# Patient Record
Sex: MALE | Race: OTHER | NOT HISPANIC OR LATINO
[De-identification: names, ages, dates, MRNs, and addresses within clinical notes are randomized per-mention and may not be internally consistent; named-entity substitution may affect disease eponyms.]

---

## 2017-08-10 ENCOUNTER — APPOINTMENT (OUTPATIENT)
Dept: SURGERY | Facility: CLINIC | Age: 62
End: 2017-08-10

## 2017-08-10 VITALS
OXYGEN SATURATION: 99 % | BODY MASS INDEX: 31.66 KG/M2 | HEIGHT: 66 IN | TEMPERATURE: 98.1 F | HEART RATE: 69 BPM | WEIGHT: 197 LBS | DIASTOLIC BLOOD PRESSURE: 97 MMHG | SYSTOLIC BLOOD PRESSURE: 154 MMHG

## 2017-08-10 PROBLEM — Z00.00 ENCOUNTER FOR PREVENTIVE HEALTH EXAMINATION: Status: ACTIVE | Noted: 2017-08-10

## 2017-11-13 ENCOUNTER — APPOINTMENT (OUTPATIENT)
Dept: SURGERY | Facility: CLINIC | Age: 62
End: 2017-11-13

## 2017-11-13 VITALS
HEART RATE: 69 BPM | WEIGHT: 203 LBS | BODY MASS INDEX: 32.62 KG/M2 | TEMPERATURE: 97.8 F | HEIGHT: 66 IN | OXYGEN SATURATION: 100 % | SYSTOLIC BLOOD PRESSURE: 160 MMHG | DIASTOLIC BLOOD PRESSURE: 81 MMHG

## 2017-12-12 ENCOUNTER — OUTPATIENT (OUTPATIENT)
Dept: OUTPATIENT SERVICES | Facility: HOSPITAL | Age: 62
LOS: 1 days | End: 2017-12-12
Payer: COMMERCIAL

## 2017-12-12 VITALS
DIASTOLIC BLOOD PRESSURE: 88 MMHG | SYSTOLIC BLOOD PRESSURE: 147 MMHG | HEIGHT: 66 IN | HEART RATE: 70 BPM | WEIGHT: 188.05 LBS | TEMPERATURE: 98 F | OXYGEN SATURATION: 98 % | RESPIRATION RATE: 18 BRPM

## 2017-12-12 DIAGNOSIS — Z90.49 ACQUIRED ABSENCE OF OTHER SPECIFIED PARTS OF DIGESTIVE TRACT: Chronic | ICD-10-CM

## 2017-12-12 DIAGNOSIS — C16.9 MALIGNANT NEOPLASM OF STOMACH, UNSPECIFIED: ICD-10-CM

## 2017-12-12 DIAGNOSIS — Z01.818 ENCOUNTER FOR OTHER PREPROCEDURAL EXAMINATION: ICD-10-CM

## 2017-12-12 DIAGNOSIS — Z98.890 OTHER SPECIFIED POSTPROCEDURAL STATES: Chronic | ICD-10-CM

## 2017-12-12 LAB — BLD GP AB SCN SERPL QL: SIGNIFICANT CHANGE UP

## 2017-12-12 NOTE — H&P PST ADULT - GASTROINTESTINAL DETAILS
normal/no masses palpable/soft/nontender/no guarding/no rebound tenderness/bowel sounds normal/no bruit/no distention bowel sounds normal/soft/no masses palpable/normal/no bruit/no rebound tenderness/no guarding/no distention

## 2017-12-12 NOTE — H&P PST ADULT - PMH
HLD (hyperlipidemia)    HTN (hypertension)    Malignant neoplasm of stomach, unspecified location HLD (hyperlipidemia)    HTN (hypertension)    Malignant neoplasm of stomach, unspecified location    Prediabetes

## 2017-12-12 NOTE — H&P PST ADULT - NSANTHOSAYNRD_GEN_A_CORE
No. NANCY screening performed.  STOP BANG Legend: 0-2 = LOW Risk; 3-4 = INTERMEDIATE Risk; 5-8 = HIGH Risk

## 2017-12-12 NOTE — H&P PST ADULT - PSH
H/O prostate biopsy    History of appendectomy    History of arthroscopy of left knee  meniscal repair  History of arthroscopy of right shoulder  repair for rotator cuff syndrome

## 2017-12-12 NOTE — H&P PST ADULT - NEGATIVE CARDIOVASCULAR SYMPTOMS
no dyspnea on exertion/no paroxysmal nocturnal dyspnea/no palpitations/no peripheral edema/no chest pain/no claudication/no orthopnea

## 2017-12-12 NOTE — H&P PST ADULT - FAMILY HISTORY
Mother  Still living? Yes, Estimated age: Age Unknown  Cerebrovascular accident, Age at diagnosis: Age Unknown

## 2017-12-12 NOTE — H&P PST ADULT - NEGATIVE GASTROINTESTINAL SYMPTOMS
no vomiting/no nausea/no constipation/no change in bowel habits/no flatulence/no abdominal pain/no diarrhea

## 2017-12-12 NOTE — H&P PST ADULT - RS GEN PE MLT RESP DETAILS PC
clear to auscultation bilaterally/no rhonchi/no rales/no wheezes/normal/no chest wall tenderness/breath sounds equal/no subcutaneous emphysema/respirations non-labored/good air movement/no intercostal retractions/airway patent

## 2017-12-12 NOTE — H&P PST ADULT - ASSESSMENT
62 yr old male with PMH of Hypertension, Hypertension, prediabetes, herniated lumbar disc presents with gastric cancer. Pt is scheduled for gastrectomy, possible splenectomy on 12/13/2017. Pt is at moderate risk for procedure.

## 2017-12-12 NOTE — H&P PST ADULT - HISTORY OF PRESENT ILLNESS
62 yr old male with PMH of Hypertension, Hypertension, prediabetes, herniated lumbar disc presents with c/o gastric cancer. Pt reports gaining weight back and increased appetite after undergoing chemotherapy for 3 months. Pt for gastrectomy, possible splenectomy on 12/13/2017.

## 2017-12-13 ENCOUNTER — RESULT REVIEW (OUTPATIENT)
Age: 62
End: 2017-12-13

## 2017-12-13 ENCOUNTER — INPATIENT (INPATIENT)
Facility: HOSPITAL | Age: 62
LOS: 29 days | Discharge: HOME | DRG: 326 | End: 2018-01-12
Attending: SPECIALIST | Admitting: SPECIALIST
Payer: COMMERCIAL

## 2017-12-13 VITALS
HEIGHT: 66 IN | OXYGEN SATURATION: 98 % | HEART RATE: 70 BPM | TEMPERATURE: 98 F | DIASTOLIC BLOOD PRESSURE: 88 MMHG | SYSTOLIC BLOOD PRESSURE: 151 MMHG | RESPIRATION RATE: 18 BRPM | WEIGHT: 188.05 LBS

## 2017-12-13 DIAGNOSIS — Z98.890 OTHER SPECIFIED POSTPROCEDURAL STATES: Chronic | ICD-10-CM

## 2017-12-13 DIAGNOSIS — J96.90 RESPIRATORY FAILURE, UNSPECIFIED, UNSPECIFIED WHETHER WITH HYPOXIA OR HYPERCAPNIA: ICD-10-CM

## 2017-12-13 DIAGNOSIS — C16.9 MALIGNANT NEOPLASM OF STOMACH, UNSPECIFIED: ICD-10-CM

## 2017-12-13 DIAGNOSIS — Z90.3 ACQUIRED ABSENCE OF STOMACH [PART OF]: Chronic | ICD-10-CM

## 2017-12-13 DIAGNOSIS — R73.03 PREDIABETES: ICD-10-CM

## 2017-12-13 DIAGNOSIS — Z90.49 ACQUIRED ABSENCE OF OTHER SPECIFIED PARTS OF DIGESTIVE TRACT: Chronic | ICD-10-CM

## 2017-12-13 DIAGNOSIS — E78.5 HYPERLIPIDEMIA, UNSPECIFIED: ICD-10-CM

## 2017-12-13 DIAGNOSIS — I10 ESSENTIAL (PRIMARY) HYPERTENSION: ICD-10-CM

## 2017-12-13 LAB
ABO RH CONFIRMATION: SIGNIFICANT CHANGE UP
ANION GAP SERPL CALC-SCNC: 11 MMOL/L — SIGNIFICANT CHANGE UP (ref 5–17)
BASE EXCESS BLDA CALC-SCNC: -3.5 MMOL/L — LOW (ref -2–2)
BASOPHILS # BLD AUTO: 0 K/UL — SIGNIFICANT CHANGE UP (ref 0–0.2)
BASOPHILS NFR BLD AUTO: 0.4 % — SIGNIFICANT CHANGE UP (ref 0–2)
BLOOD GAS COMMENTS ARTERIAL: SIGNIFICANT CHANGE UP
BUN SERPL-MCNC: 7 MG/DL — SIGNIFICANT CHANGE UP (ref 7–18)
CALCIUM SERPL-MCNC: 7.5 MG/DL — LOW (ref 8.4–10.5)
CHLORIDE SERPL-SCNC: 106 MMOL/L — SIGNIFICANT CHANGE UP (ref 96–108)
CO2 SERPL-SCNC: 22 MMOL/L — SIGNIFICANT CHANGE UP (ref 22–31)
CREAT SERPL-MCNC: 0.75 MG/DL — SIGNIFICANT CHANGE UP (ref 0.5–1.3)
EOSINOPHIL # BLD AUTO: 0 K/UL — SIGNIFICANT CHANGE UP (ref 0–0.5)
EOSINOPHIL NFR BLD AUTO: 0 % — SIGNIFICANT CHANGE UP (ref 0–6)
GLUCOSE BLDC GLUCOMTR-MCNC: 114 MG/DL — HIGH (ref 70–99)
GLUCOSE SERPL-MCNC: 174 MG/DL — HIGH (ref 70–99)
HCO3 BLDA-SCNC: 23 MMOL/L — SIGNIFICANT CHANGE UP (ref 23–27)
HCT VFR BLD CALC: 37 % — LOW (ref 39–50)
HCT VFR BLD CALC: 44.3 % — SIGNIFICANT CHANGE UP (ref 39–50)
HGB BLD-MCNC: 12.9 G/DL — LOW (ref 13–17)
HGB BLD-MCNC: 14.8 G/DL — SIGNIFICANT CHANGE UP (ref 13–17)
HOROWITZ INDEX BLDA+IHG-RTO: 100 — SIGNIFICANT CHANGE UP
LYMPHOCYTES # BLD AUTO: 0.8 K/UL — LOW (ref 1–3.3)
LYMPHOCYTES # BLD AUTO: 6.7 % — LOW (ref 13–44)
MAGNESIUM SERPL-MCNC: 1.6 MG/DL — SIGNIFICANT CHANGE UP (ref 1.6–2.6)
MCHC RBC-ENTMCNC: 31.3 PG — SIGNIFICANT CHANGE UP (ref 27–34)
MCHC RBC-ENTMCNC: 32.4 PG — SIGNIFICANT CHANGE UP (ref 27–34)
MCHC RBC-ENTMCNC: 33.5 GM/DL — SIGNIFICANT CHANGE UP (ref 32–36)
MCHC RBC-ENTMCNC: 34.8 GM/DL — SIGNIFICANT CHANGE UP (ref 32–36)
MCV RBC AUTO: 93.3 FL — SIGNIFICANT CHANGE UP (ref 80–100)
MCV RBC AUTO: 93.6 FL — SIGNIFICANT CHANGE UP (ref 80–100)
MONOCYTES # BLD AUTO: 0.6 K/UL — SIGNIFICANT CHANGE UP (ref 0–0.9)
MONOCYTES NFR BLD AUTO: 5.4 % — SIGNIFICANT CHANGE UP (ref 2–14)
NEUTROPHILS # BLD AUTO: 10 K/UL — HIGH (ref 1.8–7.4)
NEUTROPHILS NFR BLD AUTO: 87.6 % — HIGH (ref 43–77)
PCO2 BLDA: 47 MMHG — HIGH (ref 32–46)
PH BLDA: 7.3 — LOW (ref 7.35–7.45)
PHOSPHATE SERPL-MCNC: 2.3 MG/DL — LOW (ref 2.5–4.5)
PLATELET # BLD AUTO: 100 K/UL — LOW (ref 150–400)
PLATELET # BLD AUTO: 126 K/UL — LOW (ref 150–400)
PO2 BLDA: 334 MMHG — HIGH (ref 74–108)
POTASSIUM SERPL-MCNC: 3.2 MMOL/L — LOW (ref 3.5–5.3)
POTASSIUM SERPL-SCNC: 3.2 MMOL/L — LOW (ref 3.5–5.3)
RBC # BLD: 3.97 M/UL — LOW (ref 4.2–5.8)
RBC # BLD: 4.73 M/UL — SIGNIFICANT CHANGE UP (ref 4.2–5.8)
RBC # FLD: 12.8 % — SIGNIFICANT CHANGE UP (ref 10.3–14.5)
RBC # FLD: 13.3 % — SIGNIFICANT CHANGE UP (ref 10.3–14.5)
SAO2 % BLDA: >99.1 % — SIGNIFICANT CHANGE UP (ref 92–96)
SODIUM SERPL-SCNC: 139 MMOL/L — SIGNIFICANT CHANGE UP (ref 135–145)
WBC # BLD: 11.4 K/UL — HIGH (ref 3.8–10.5)
WBC # BLD: 8 K/UL — SIGNIFICANT CHANGE UP (ref 3.8–10.5)
WBC # FLD AUTO: 11.4 K/UL — HIGH (ref 3.8–10.5)
WBC # FLD AUTO: 8 K/UL — SIGNIFICANT CHANGE UP (ref 3.8–10.5)

## 2017-12-13 PROCEDURE — 71010: CPT | Mod: 26

## 2017-12-13 PROCEDURE — 86900 BLOOD TYPING SEROLOGIC ABO: CPT

## 2017-12-13 PROCEDURE — 88305 TISSUE EXAM BY PATHOLOGIST: CPT | Mod: 26

## 2017-12-13 PROCEDURE — 88304 TISSUE EXAM BY PATHOLOGIST: CPT | Mod: 26

## 2017-12-13 PROCEDURE — 88112 CYTOPATH CELL ENHANCE TECH: CPT | Mod: 26

## 2017-12-13 PROCEDURE — 88331 PATH CONSLTJ SURG 1 BLK 1SPC: CPT | Mod: 26

## 2017-12-13 PROCEDURE — 86850 RBC ANTIBODY SCREEN: CPT

## 2017-12-13 PROCEDURE — 88334 PATH CONSLTJ SURG CYTO XM EA: CPT | Mod: 26,59

## 2017-12-13 PROCEDURE — 86920 COMPATIBILITY TEST SPIN: CPT

## 2017-12-13 PROCEDURE — 88305 TISSUE EXAM BY PATHOLOGIST: CPT | Mod: 26,59

## 2017-12-13 PROCEDURE — 43621 REMOVAL OF STOMACH: CPT | Mod: AS

## 2017-12-13 PROCEDURE — 43621 REMOVAL OF STOMACH: CPT

## 2017-12-13 PROCEDURE — 88309 TISSUE EXAM BY PATHOLOGIST: CPT | Mod: 26

## 2017-12-13 PROCEDURE — G0463: CPT

## 2017-12-13 PROCEDURE — 86901 BLOOD TYPING SEROLOGIC RH(D): CPT

## 2017-12-13 RX ORDER — POTASSIUM CHLORIDE 20 MEQ
10 PACKET (EA) ORAL
Qty: 0 | Refills: 0 | Status: DISCONTINUED | OUTPATIENT
Start: 2017-12-13 | End: 2017-12-13

## 2017-12-13 RX ORDER — PANTOPRAZOLE SODIUM 20 MG/1
40 TABLET, DELAYED RELEASE ORAL DAILY
Qty: 0 | Refills: 0 | Status: DISCONTINUED | OUTPATIENT
Start: 2017-12-13 | End: 2018-01-03

## 2017-12-13 RX ORDER — HEPARIN SODIUM 5000 [USP'U]/ML
5000 INJECTION INTRAVENOUS; SUBCUTANEOUS EVERY 8 HOURS
Qty: 0 | Refills: 0 | Status: DISCONTINUED | OUTPATIENT
Start: 2017-12-13 | End: 2018-01-03

## 2017-12-13 RX ORDER — SODIUM CHLORIDE 9 MG/ML
1000 INJECTION, SOLUTION INTRAVENOUS
Qty: 0 | Refills: 0 | Status: DISCONTINUED | OUTPATIENT
Start: 2017-12-13 | End: 2017-12-13

## 2017-12-13 RX ORDER — PANTOPRAZOLE SODIUM 20 MG/1
40 TABLET, DELAYED RELEASE ORAL DAILY
Qty: 0 | Refills: 0 | Status: DISCONTINUED | OUTPATIENT
Start: 2017-12-13 | End: 2017-12-13

## 2017-12-13 RX ORDER — POTASSIUM PHOSPHATE, MONOBASIC POTASSIUM PHOSPHATE, DIBASIC 236; 224 MG/ML; MG/ML
15 INJECTION, SOLUTION INTRAVENOUS ONCE
Qty: 0 | Refills: 0 | Status: COMPLETED | OUTPATIENT
Start: 2017-12-13 | End: 2017-12-13

## 2017-12-13 RX ORDER — ONDANSETRON 8 MG/1
4 TABLET, FILM COATED ORAL ONCE
Qty: 0 | Refills: 0 | Status: DISCONTINUED | OUTPATIENT
Start: 2017-12-13 | End: 2017-12-13

## 2017-12-13 RX ORDER — HYDROMORPHONE HYDROCHLORIDE 2 MG/ML
0.5 INJECTION INTRAMUSCULAR; INTRAVENOUS; SUBCUTANEOUS
Qty: 0 | Refills: 0 | Status: DISCONTINUED | OUTPATIENT
Start: 2017-12-18 | End: 2017-12-18

## 2017-12-13 RX ORDER — FENTANYL CITRATE 50 UG/ML
0.5 INJECTION INTRAVENOUS
Qty: 2500 | Refills: 0 | Status: DISCONTINUED | OUTPATIENT
Start: 2017-12-13 | End: 2017-12-14

## 2017-12-13 RX ORDER — SODIUM CHLORIDE 9 MG/ML
3 INJECTION INTRAMUSCULAR; INTRAVENOUS; SUBCUTANEOUS EVERY 8 HOURS
Qty: 0 | Refills: 0 | Status: DISCONTINUED | OUTPATIENT
Start: 2017-12-13 | End: 2017-12-13

## 2017-12-13 RX ORDER — DEXMEDETOMIDINE HYDROCHLORIDE IN 0.9% SODIUM CHLORIDE 4 UG/ML
0.2 INJECTION INTRAVENOUS
Qty: 200 | Refills: 0 | Status: DISCONTINUED | OUTPATIENT
Start: 2017-12-13 | End: 2017-12-13

## 2017-12-13 RX ORDER — SODIUM CHLORIDE 9 MG/ML
1000 INJECTION, SOLUTION INTRAVENOUS
Qty: 0 | Refills: 0 | Status: DISCONTINUED | OUTPATIENT
Start: 2017-12-13 | End: 2017-12-15

## 2017-12-13 RX ORDER — DEXTROSE MONOHYDRATE, SODIUM CHLORIDE, AND POTASSIUM CHLORIDE 50; .745; 4.5 G/1000ML; G/1000ML; G/1000ML
1000 INJECTION, SOLUTION INTRAVENOUS
Qty: 0 | Refills: 0 | Status: DISCONTINUED | OUTPATIENT
Start: 2017-12-13 | End: 2017-12-13

## 2017-12-13 RX ORDER — INFLUENZA VIRUS VACCINE 15; 15; 15; 15 UG/.5ML; UG/.5ML; UG/.5ML; UG/.5ML
0.5 SUSPENSION INTRAMUSCULAR ONCE
Qty: 0 | Refills: 0 | Status: DISCONTINUED | OUTPATIENT
Start: 2017-12-13 | End: 2017-12-13

## 2017-12-13 RX ORDER — POTASSIUM CHLORIDE 20 MEQ
10 PACKET (EA) ORAL
Qty: 0 | Refills: 0 | Status: COMPLETED | OUTPATIENT
Start: 2017-12-13 | End: 2017-12-13

## 2017-12-13 RX ORDER — PIPERACILLIN AND TAZOBACTAM 4; .5 G/20ML; G/20ML
3.38 INJECTION, POWDER, LYOPHILIZED, FOR SOLUTION INTRAVENOUS EVERY 12 HOURS
Qty: 0 | Refills: 0 | Status: DISCONTINUED | OUTPATIENT
Start: 2017-12-13 | End: 2017-12-13

## 2017-12-13 RX ORDER — PIPERACILLIN AND TAZOBACTAM 4; .5 G/20ML; G/20ML
3.38 INJECTION, POWDER, LYOPHILIZED, FOR SOLUTION INTRAVENOUS ONCE
Qty: 0 | Refills: 0 | Status: COMPLETED | OUTPATIENT
Start: 2017-12-13 | End: 2017-12-13

## 2017-12-13 RX ORDER — SODIUM CHLORIDE 9 MG/ML
1000 INJECTION INTRAMUSCULAR; INTRAVENOUS; SUBCUTANEOUS
Qty: 0 | Refills: 0 | Status: DISCONTINUED | OUTPATIENT
Start: 2017-12-13 | End: 2017-12-13

## 2017-12-13 RX ORDER — SODIUM CHLORIDE 9 MG/ML
1000 INJECTION INTRAMUSCULAR; INTRAVENOUS; SUBCUTANEOUS ONCE
Qty: 0 | Refills: 0 | Status: COMPLETED | OUTPATIENT
Start: 2017-12-13 | End: 2017-12-13

## 2017-12-13 RX ORDER — TAMSULOSIN HYDROCHLORIDE 0.4 MG/1
0.4 CAPSULE ORAL
Qty: 0 | Refills: 0 | COMMUNITY

## 2017-12-13 RX ADMIN — Medication 100 MILLIEQUIVALENT(S): at 14:58

## 2017-12-13 RX ADMIN — FENTANYL CITRATE 4.26 MICROGRAM(S)/KG/HR: 50 INJECTION INTRAVENOUS at 18:16

## 2017-12-13 RX ADMIN — Medication 100 MILLIEQUIVALENT(S): at 17:57

## 2017-12-13 RX ADMIN — SODIUM CHLORIDE 100 MILLILITER(S): 9 INJECTION, SOLUTION INTRAVENOUS at 14:54

## 2017-12-13 RX ADMIN — SODIUM CHLORIDE 100 MILLILITER(S): 9 INJECTION, SOLUTION INTRAVENOUS at 22:30

## 2017-12-13 RX ADMIN — Medication 100 MILLIEQUIVALENT(S): at 16:18

## 2017-12-13 RX ADMIN — PIPERACILLIN AND TAZOBACTAM 100 GRAM(S): 4; .5 INJECTION, POWDER, LYOPHILIZED, FOR SOLUTION INTRAVENOUS at 14:53

## 2017-12-13 RX ADMIN — HEPARIN SODIUM 5000 UNIT(S): 5000 INJECTION INTRAVENOUS; SUBCUTANEOUS at 22:30

## 2017-12-13 RX ADMIN — PIPERACILLIN AND TAZOBACTAM 12.5 GRAM(S): 4; .5 INJECTION, POWDER, LYOPHILIZED, FOR SOLUTION INTRAVENOUS at 17:57

## 2017-12-13 RX ADMIN — POTASSIUM PHOSPHATE, MONOBASIC POTASSIUM PHOSPHATE, DIBASIC 62.5 MILLIMOLE(S): 236; 224 INJECTION, SOLUTION INTRAVENOUS at 16:00

## 2017-12-13 RX ADMIN — PANTOPRAZOLE SODIUM 40 MILLIGRAM(S): 20 TABLET, DELAYED RELEASE ORAL at 14:54

## 2017-12-13 RX ADMIN — FENTANYL CITRATE 4.26 MICROGRAM(S)/KG/HR: 50 INJECTION INTRAVENOUS at 13:30

## 2017-12-13 RX ADMIN — SODIUM CHLORIDE 2000 MILLILITER(S): 9 INJECTION INTRAMUSCULAR; INTRAVENOUS; SUBCUTANEOUS at 19:29

## 2017-12-13 RX ADMIN — SODIUM CHLORIDE 3 MILLILITER(S): 9 INJECTION INTRAMUSCULAR; INTRAVENOUS; SUBCUTANEOUS at 07:21

## 2017-12-13 NOTE — CONSULT NOTE ADULT - SUBJECTIVE AND OBJECTIVE BOX
Patient is a 62y old  Male who presents with a chief complaint of gastric cancer.  PMH of Hypertension, hyperlipidemia prediabetes, herniated lumbar disc. Pt had gastrectomy, possible splenectomy done.       INTERVAL HPI/OVERNIGHT EVENTS:  T(C): 36.4 (12-13-17 @ 07:22), Max: 36.4 (12-13-17 @ 07:22)  HR: 88 (12-13-17 @ 13:25) (66 - 88)  BP: 151/86 (12-13-17 @ 07:22) (151/86 - 151/86)  RR: 16 (12-13-17 @ 07:22) (16 - 16)  SpO2: 100% (12-13-17 @ 13:25) (98% - 100%)  Wt(kg): --  I&O's Summary      PAST MEDICAL & SURGICAL HISTORY:  Prediabetes  HLD (hyperlipidemia)  HTN (hypertension)  Malignant neoplasm of stomach, unspecified location  H/O prostate biopsy  History of arthroscopy of right shoulder: repair for rotator cuff syndrome  History of arthroscopy of left knee: meniscal repair  History of appendectomy      SOCIAL HISTORY  Alcohol:  Tobacco:  Illicit substance use:      FAMILY HISTORY:      LABS:                        14.8   11.4  )-----------( 126      ( 13 Dec 2017 14:16 )             44.3     12-13    139  |  106  |  7   ----------------------------<  174<H>  3.2<L>   |  22  |  0.75    Ca    7.5<L>      13 Dec 2017 14:16  Phos  2.3     12-13  Mg     1.6     12-13          CAPILLARY BLOOD GLUCOSE      POCT Blood Glucose.: 114 mg/dL (13 Dec 2017 07:20)            MEDICATIONS  (STANDING):  fentaNYL   Infusion 0.5 MICROgram(s)/kG/Hr (4.265 mL/Hr) IV Continuous <Continuous>  heparin  Injectable 5000 Unit(s) SubCutaneous every 8 hours  lactated ringers. 1000 milliLiter(s) (100 mL/Hr) IV Continuous <Continuous>  pantoprazole  Injectable 40 milliGRAM(s) IV Push daily  piperacillin/tazobactam IVPB. 3.375 Gram(s) IV Intermittent every 12 hours  potassium chloride  10 mEq/100 mL IVPB 10 milliEquivalent(s) IV Intermittent every 1 hour  potassium phosphate IVPB 15 milliMole(s) IV Intermittent once    MEDICATIONS  (PRN):      REVIEW OF SYSTEMS:  CONSTITUTIONAL: No fever, weight loss, or fatigue  EYES: No eye pain, visual disturbances, or discharge  ENMT:  No difficulty hearing, tinnitus, vertigo; No sinus or throat pain  NECK: No pain or stiffness  RESPIRATORY: No cough, wheezing, chills or hemoptysis; No shortness of breath  CARDIOVASCULAR: No chest pain, palpitations, dizziness, or leg swelling  GASTROINTESTINAL: No abdominal or epigastric pain. No nausea, vomiting, or hematemesis; No diarrhea or constipation. No melena or hematochezia.  GENITOURINARY: No dysuria, frequency, hematuria, or incontinence  NEUROLOGICAL: No headaches, memory loss, loss of strength, numbness, or tremors  SKIN: No itching, burning, rashes, or lesions   LYMPH NODES: No enlarged glands  ENDOCRINE: No heat or cold intolerance; No hair loss  MUSCULOSKELETAL: No joint pain or swelling; No muscle, back, or extremity pain  PSYCHIATRIC: No depression, anxiety, mood swings, or difficulty sleeping  HEME/LYMPH: No easy bruising, or bleeding gums  ALLERY AND IMMUNOLOGIC: No hives or eczema    PHYSICAL EXAM:  GENERAL: NAD, well-groomed, well-developed  HEAD:  Atraumatic, Normocephalic  EYES: EOMI, PERRLA, conjunctiva and sclera clear  ENMT: No tonsillar erythema, exudates, or enlargement; Moist mucous membranes, Good dentition, No lesions  NECK: Supple, No JVD, Normal thyroid  NERVOUS SYSTEM:  Alert & Oriented X3, Good concentration; Motor Strength 5/5 B/L upper and lower extremities; DTRs 2+ intact and symmetric  CHEST/LUNG: Clear to percussion bilaterally; No rales, rhonchi, wheezing, or rubs  HEART: Regular rate and rhythm; No murmurs, rubs, or gallops  ABDOMEN: Soft, Nontender, Nondistended; Bowel sounds present  EXTREMITIES:  2+ Peripheral Pulses, No clubbing, cyanosis, or edema  LYMPH: No lymphadenopathy noted  SKIN: No rashes or lesions    RADIOLOGY & ADDITIONAL TESTS:    Imaging Personally Reviewed:  [ ] YES  [ ] NO    Consultant(s) Notes Reviewed:  [ ] YES  [ ] NO        Care Discussed with Consultants/Other Providers [ ] YES  [ ] NO Patient is a 62y old  Male who presents with a chief complaint of gastric cancer.  PMH of Hypertension, hyperlipidemia prediabetes, herniated lumbar disc. Pt had gastrectomy done. Currently sedated, intubated on mechanical ventilation.      INTERVAL HPI/OVERNIGHT EVENTS:  T(C): 36.4 (12-13-17 @ 07:22), Max: 36.4 (12-13-17 @ 07:22)  HR: 88 (12-13-17 @ 13:25) (66 - 88)  BP: 151/86 (12-13-17 @ 07:22) (151/86 - 151/86)  RR: 16 (12-13-17 @ 07:22) (16 - 16)  SpO2: 100% (12-13-17 @ 13:25) (98% - 100%)  Wt(kg): --  I&O's Summary      PAST MEDICAL & SURGICAL HISTORY:  Prediabetes  HLD (hyperlipidemia)  HTN (hypertension)  Malignant neoplasm of stomach, unspecified location  H/O prostate biopsy  History of arthroscopy of right shoulder: repair for rotator cuff syndrome  History of arthroscopy of left knee: meniscal repair  History of appendectomy      SOCIAL HISTORY  Alcohol:  Tobacco:  Illicit substance use:      FAMILY HISTORY:      LABS:                        14.8   11.4  )-----------( 126      ( 13 Dec 2017 14:16 )             44.3     12-13    139  |  106  |  7   ----------------------------<  174<H>  3.2<L>   |  22  |  0.75    Ca    7.5<L>      13 Dec 2017 14:16  Phos  2.3     12-13  Mg     1.6     12-13          CAPILLARY BLOOD GLUCOSE      POCT Blood Glucose.: 114 mg/dL (13 Dec 2017 07:20)            MEDICATIONS  (STANDING):  fentaNYL   Infusion 0.5 MICROgram(s)/kG/Hr (4.265 mL/Hr) IV Continuous <Continuous>  heparin  Injectable 5000 Unit(s) SubCutaneous every 8 hours  lactated ringers. 1000 milliLiter(s) (100 mL/Hr) IV Continuous <Continuous>  pantoprazole  Injectable 40 milliGRAM(s) IV Push daily  piperacillin/tazobactam IVPB. 3.375 Gram(s) IV Intermittent every 12 hours  potassium chloride  10 mEq/100 mL IVPB 10 milliEquivalent(s) IV Intermittent every 1 hour  potassium phosphate IVPB 15 milliMole(s) IV Intermittent once    MEDICATIONS  (PRN):      REVIEW OF SYSTEMS:  CONSTITUTIONAL: No fever, weight loss, or fatigue  EYES: No eye pain, visual disturbances, or discharge  ENMT:  No difficulty hearing, tinnitus, vertigo; No sinus or throat pain  NECK: No pain or stiffness  RESPIRATORY: No cough, wheezing, chills or hemoptysis; No shortness of breath  CARDIOVASCULAR: No chest pain, palpitations, dizziness, or leg swelling  GASTROINTESTINAL: No abdominal or epigastric pain. No nausea, vomiting, or hematemesis; No diarrhea or constipation. No melena or hematochezia.  GENITOURINARY: No dysuria, frequency, hematuria, or incontinence  NEUROLOGICAL: No headaches, memory loss, loss of strength, numbness, or tremors  SKIN: No itching, burning, rashes, or lesions   LYMPH NODES: No enlarged glands  ENDOCRINE: No heat or cold intolerance; No hair loss  MUSCULOSKELETAL: No joint pain or swelling; No muscle, back, or extremity pain  PSYCHIATRIC: No depression, anxiety, mood swings, or difficulty sleeping  HEME/LYMPH: No easy bruising, or bleeding gums  ALLERY AND IMMUNOLOGIC: No hives or eczema    PHYSICAL EXAM:  GENERAL: NAD, well-groomed, well-developed  HEAD:  Atraumatic, Normocephalic  EYES: EOMI, PERRLA, conjunctiva and sclera clear  ENMT: No tonsillar erythema, exudates, or enlargement; Moist mucous membranes, Good dentition, No lesions  NECK: Supple, No JVD, Normal thyroid  NERVOUS SYSTEM:  Alert & Oriented X3, Good concentration; Motor Strength 5/5 B/L upper and lower extremities; DTRs 2+ intact and symmetric  CHEST/LUNG: Clear to percussion bilaterally; No rales, rhonchi, wheezing, or rubs  HEART: Regular rate and rhythm; No murmurs, rubs, or gallops  ABDOMEN: Soft, Nontender, Nondistended; Bowel sounds present,dressing clean  EXTREMITIES:  2+ Peripheral Pulses, No clubbing, cyanosis, or edema  LYMPH: No lymphadenopathy noted  SKIN: No rashes or lesions    RADIOLOGY & ADDITIONAL TESTS:    Imaging Personally Reviewed:  [x ] YES  [ ] NO    Consultant(s) Notes Reviewed:  [x ] YES  [ ] NO        Care Discussed with Consultants/Other Providers [ ] YES  [ ] NO

## 2017-12-13 NOTE — BRIEF OPERATIVE NOTE - PROCEDURE
<<-----Click on this checkbox to enter Procedure Total gastrectomy with Kelby-en-Y esophagojejunal anastomosis  12/13/2017    Active  [id number]

## 2017-12-13 NOTE — CONSULT NOTE ADULT - SUBJECTIVE AND OBJECTIVE BOX
Patient is a 62y old  Male who presents with a chief complaint of weight loss , recently Dx have gastric cancer presented for surgical treatment.  (12 Dec 2017 11:41)       62 yr old male with PMH of Hypertension, Hypertension, prediabetes, herniated lumbar disc presents with c/o gastric cancer. Pt reports gaining weight back and increased appetite after undergoing chemotherapy for 3 months. Pt for gastrectomy, possible splenectomy on 12/13/2017. (12 Dec 2017 11:41)      BRIEF HOSPITAL COURSE: ***    PAST MEDICAL & SURGICAL HISTORY:  Prediabetes  HLD (hyperlipidemia)  HTN (hypertension)  Malignant neoplasm of stomach, unspecified location  H/O prostate biopsy  History of arthroscopy of right shoulder: repair for rotator cuff syndrome  History of arthroscopy of left knee: meniscal repair  History of appendectomy    Allergies    No Known Allergies    Intolerances      FAMILY HISTORY:  Cerebrovascular accident (Mother)    Social history reviewed: ***    Review of Systems:  CONSTITUTIONAL: No fever, chills, or fatigue  EYES: No eye pain, visual disturbances, or discharge  ENMT:  No difficulty hearing, tinnitus, vertigo; No sinus or throat pain  NECK: No pain or stiffness  RESPIRATORY: No cough, wheezing, chills or hemoptysis; No shortness of breath  CARDIOVASCULAR: No chest pain, palpitations, dizziness, or leg swelling  GASTROINTESTINAL: No abdominal or epigastric pain. No nausea, vomiting, or hematemesis; No diarrhea or constipation. No melena or hematochezia.  GENITOURINARY: No dysuria, frequency, hematuria, or incontinence  NEUROLOGICAL: No headaches, memory loss, loss of strength, numbness, or tremors  SKIN: No itching, burning, rashes, or lesions   MUSCULOSKELETAL: No joint pain or swelling; No muscle, back, or extremity pain  PSYCHIATRIC: No depression, anxiety, mood swings, or difficulty sleeping      Medications:      vent settings      Vital Signs Last 24 Hrs  T(C): 36.4 (13 Dec 2017 07:22), Max: 36.4 (13 Dec 2017 07:22)  T(F): 97.5 (13 Dec 2017 07:22), Max: 97.5 (13 Dec 2017 07:22)  HR: 66 (13 Dec 2017 07:22) (66 - 66)  BP: 151/86 (13 Dec 2017 07:22) (151/86 - 151/86)  BP(mean): --  RR: 16 (13 Dec 2017 07:22) (16 - 16)  SpO2: 98% (13 Dec 2017 07:22) (98% - 98%)              LABS:                        12.9   8.0   )-----------( 100      ( 13 Dec 2017 10:41 )             37.0                 CAPILLARY BLOOD GLUCOSE      POCT Blood Glucose.: 114 mg/dL (13 Dec 2017 07:20)        CULTURES:        Physical Examination:    General: No acute distress.      HEENT: Pupils equal, reactive to light.  Symmetric.    PULM: Clear to auscultation bilaterally, no significant sputum production    CVS: Regular rate and rhythm, no murmurs, rubs, or gallops    ABD: Soft, nondistended, nontender, normoactive bowel sounds, no masses    EXT: No edema, nontender    SKIN: Warm and well perfused, no rashes noted.    NEURO: Alert, oriented, interactive, nonfocal    RADIOLOGY REVIEWED ***    CRITICAL CARE TIME SPENT: 35 minutes Patient is a 62y old  Male who presents with a chief complaint of weight loss , recently Dx have gastric cancer presented for surgical treatment.  (12 Dec 2017 11:41)       62 yearr old male with PMH of Hypertension, prediabetes, herniated lumbar disc presents with c/o gastric cancer. Pt reports gaining weight back and increased appetite after undergoing chemotherapy for 3 months. Pt for gastrectomy, possible splenectomy on 12/13/2017. (12 Dec 2017 11:41)        PAST MEDICAL & SURGICAL HISTORY:  Prediabetes  HLD (hyperlipidemia)  HTN (hypertension)  Malignant neoplasm of stomach, unspecified location  H/O prostate biopsy  History of arthroscopy of right shoulder: repair for rotator cuff syndrome  History of arthroscopy of left knee: meniscal repair  History of appendectomy    Allergies    No Known Allergies    Intolerances      FAMILY HISTORY:  Cerebrovascular accident (Mother)    Social history reviewed: ***    Review of Systems:  CONSTITUTIONAL: No fever, chills, or fatigue  EYES: No eye pain, visual disturbances, or discharge  ENMT:  No difficulty hearing, tinnitus, vertigo; No sinus or throat pain  NECK: No pain or stiffness  RESPIRATORY: No cough, wheezing, chills or hemoptysis; No shortness of breath  CARDIOVASCULAR: No chest pain, palpitations, dizziness, or leg swelling  GASTROINTESTINAL: No abdominal or epigastric pain. No nausea, vomiting, or hematemesis; No diarrhea or constipation. No melena or hematochezia.  GENITOURINARY: No dysuria, frequency, hematuria, or incontinence  NEUROLOGICAL: No headaches, memory loss, loss of strength, numbness, or tremors  SKIN: No itching, burning, rashes, or lesions   MUSCULOSKELETAL: No joint pain or swelling; No muscle, back, or extremity pain  PSYCHIATRIC: No depression, anxiety, mood swings, or difficulty sleeping      Medications:      vent settings      Vital Signs Last 24 Hrs  T(C): 36.4 (13 Dec 2017 07:22), Max: 36.4 (13 Dec 2017 07:22)  T(F): 97.5 (13 Dec 2017 07:22), Max: 97.5 (13 Dec 2017 07:22)  HR: 66 (13 Dec 2017 07:22) (66 - 66)  BP: 151/86 (13 Dec 2017 07:22) (151/86 - 151/86)  BP(mean): --  RR: 16 (13 Dec 2017 07:22) (16 - 16)  SpO2: 98% (13 Dec 2017 07:22) (98% - 98%)              LABS:                        12.9   8.0   )-----------( 100      ( 13 Dec 2017 10:41 )             37.0                 CAPILLARY BLOOD GLUCOSE      POCT Blood Glucose.: 114 mg/dL (13 Dec 2017 07:20)        CULTURES:        Physical Examination:    General: No acute distress.      HEENT: Pupils equal, reactive to light.  Symmetric.    PULM: Clear to auscultation bilaterally, no significant sputum production    CVS: Regular rate and rhythm, no murmurs, rubs, or gallops    ABD: Soft, nondistended, nontender, normoactive bowel sounds, no masses    EXT: No edema, nontender    SKIN: Warm and well perfused, no rashes noted.    NEURO: Alert, oriented, interactive, nonfocal    RADIOLOGY REVIEWED ***    CRITICAL CARE TIME SPENT: 35 minutes HPI  -    62 year old male with PMH of Hypertension, prediabetes, herniated lumbar disc presents with a chief complaint of weight loss, recently Dx have gastric cancer presented for surgical treatment. Pt reported gaining weight back and increased appetite after undergoing chemotherapy for 3 months. Today 12/13 pt has undergone through total gastrectomy with Kelby-en-Y esophagojejunal anastomosis, pathology was sent for lesion of the lesser curvature, proximal margin was sent for frozen section and pathology saw atypical cells during procedure. Pt had EBL of 500 cc, Input was 3200 cc crystalloids and 1 U PRBC, output was 2300 cc. Pt had Hx of sleep apnea as per anesthesiologist and required to be intubated after OR and ICU consulted for post op monitoring.        ROS - negative except above , limited as pt is intubated    PAST MEDICAL & SURGICAL HISTORY:  Prediabetes  HLD (hyperlipidemia)  HTN (hypertension)  Malignant neoplasm of stomach, unspecified location  H/O prostate biopsy  History of arthroscopy of right shoulder: repair for rotator cuff syndrome  History of arthroscopy of left knee: meniscal repair  History of appendectomy    Allergies  No Known Allergies  Intolerances      FAMILY HISTORY:  Cerebrovascular accident (Mother)        Review of Systems:  Negative except above    Medications     Vital Signs Last 24 Hrs  T(C): 36.4 (13 Dec 2017 07:22), Max: 36.4 (13 Dec 2017 07:22)  T(F): 97.5 (13 Dec 2017 07:22), Max: 97.5 (13 Dec 2017 07:22)  HR: 66 (13 Dec 2017 07:22) (66 - 66)  BP: 151/86 (13 Dec 2017 07:22) (151/86 - 151/86)  BP(mean): --  RR: 16 (13 Dec 2017 07:22) (16 - 16)  SpO2: 98% (13 Dec 2017 07:22) (98% - 98%)        LABS:                        12.9   8.0   )-----------( 100      ( 13 Dec 2017 10:41 )             37.0         POCT Blood Glucose.: 114 mg/dL (13 Dec 2017 07:20)            Physical Examination:    General: No acute distress    HEENT: Pupils equal, reactive to light.  Symmetric.    PULM: Clear to auscultation bilaterally, intubated     CVS: Regular rate and rhythm, no murmurs, rubs, or gallops    ABD: Soft, nondistended, nontender, normoactive bowel sounds, no masses    EXT: No edema, nontender    SKIN: Warm and well perfused, no rashes noted.    NEURO:  Sedated         CRITICAL CARE TIME SPENT: 35 minutes

## 2017-12-13 NOTE — CONSULT NOTE ADULT - ASSESSMENT
62 year old male with PMH of Hypertension, prediabetes, herniated lumbar disc presents with a chief complaint of weight loss, recently Dx have gastric cancer presented for surgical treatment. Pt reported gaining weight back and increased appetite after undergoing chemotherapy for 3 months. Today 12/13 pt has undergone through total gastrectomy with Kelby-en-Y esophagojejunal anastomosis, pathology was sent for lesion of the lesser curvature, proximal margin was sent for frozen section and pathology saw atypical cells during procedure. Pt had EBL of 500 cc, Input was 3200 cc crystalloids and 1 U PRBC, output was 2300 cc. Pt had Hx of sleep apnea as per anesthesiologist and required to be intubated after OR and ICU consulted for post op monitoring.      Admit to ICU for post op Monitoring s/p total gastrectomy with Kelby-en-Y esophagojejunal anastomosis  POD - 0  c/w DVT and GI ppx - heparin and protonix  s/p intubation , c/w mechanical intubation   c/w sedation - fentanyl  strict I/O monitoring   post op abx  - zosyn  c/w LR @ 100 cc/hr   f/u am labs, am ABG, CXR  weaning trial tomorrow

## 2017-12-13 NOTE — PROGRESS NOTE ADULT - ASSESSMENT
63 y/o male with NANCY & gastric cancer s/p Total Gastrectomy      1. NS 1000cc bolus  2. Change IV fluids to D5 half NS with potassium and run at 150cc/hr  3. NGT to LWS  4. AM Labs  5. d/c antibiotics  6. DVT PPx  7. Plan per ICU team

## 2017-12-13 NOTE — PROGRESS NOTE ADULT - SUBJECTIVE AND OBJECTIVE BOX
s/p Total Gastrectomy      Patient examined at bedside, no complaints.   Remains intubated and in the ICU  Not on pressors  on 60% O2 & 5 of PEEP  BP is 80s/40s  NGT to LWS        T(F): 97.6 (12-13-17 @ 14:30), Max: 97.6 (12-13-17 @ 14:30)  HR: 83 (12-13-17 @ 17:47) (66 - 101)  BP: 112/74 (12-13-17 @ 16:00) (108/68 - 151/86)  RR: 17 (12-13-17 @ 16:15) (12 - 17)  SpO2: 100% (12-13-17 @ 17:47) (98% - 100%)        Bulb: 60 mL    Bulb: 20 mL    Indwelling Catheter - Urethral: 1325 mL                            14.8   11.4  )-----------( 126      ( 13 Dec 2017 14:16 )             44.3       139  |  106  |  7   ----------------------------<  174<H>  3.2<L>   |  22  |  0.75    Ca    7.5<L>      13 Dec 2017 14:16  Phos  2.3     12-13  Mg     1.6     12-13        Physical Exam  General: intubated & sedated, No acute distress  Skin: No jaundice, no icterus  Abdomen: soft, tenderness unable to be appreciated, nondistended, no rebound tenderness, no guarding, no palpable masses, JPx2 to sxn, dressing is clean, dry, intact  : Normal external genitalia  Extremities: non edematous, no calf pain bilaterally

## 2017-12-14 ENCOUNTER — TRANSCRIPTION ENCOUNTER (OUTPATIENT)
Age: 62
End: 2017-12-14

## 2017-12-14 DIAGNOSIS — J98.11 ATELECTASIS: ICD-10-CM

## 2017-12-14 LAB
ALBUMIN SERPL ELPH-MCNC: 2.7 G/DL — LOW (ref 3.5–5)
ALP SERPL-CCNC: 37 U/L — LOW (ref 40–120)
ALT FLD-CCNC: 67 U/L DA — HIGH (ref 10–60)
ANION GAP SERPL CALC-SCNC: 8 MMOL/L — SIGNIFICANT CHANGE UP (ref 5–17)
ANION GAP SERPL CALC-SCNC: 9 MMOL/L — SIGNIFICANT CHANGE UP (ref 5–17)
AST SERPL-CCNC: 67 U/L — HIGH (ref 10–40)
BASE EXCESS BLDA CALC-SCNC: 0 MMOL/L — SIGNIFICANT CHANGE UP (ref -2–2)
BASOPHILS # BLD AUTO: 0 K/UL — SIGNIFICANT CHANGE UP (ref 0–0.2)
BASOPHILS # BLD AUTO: 0 K/UL — SIGNIFICANT CHANGE UP (ref 0–0.2)
BASOPHILS NFR BLD AUTO: 0.2 % — SIGNIFICANT CHANGE UP (ref 0–2)
BASOPHILS NFR BLD AUTO: 0.4 % — SIGNIFICANT CHANGE UP (ref 0–2)
BILIRUB SERPL-MCNC: 0.7 MG/DL — SIGNIFICANT CHANGE UP (ref 0.2–1.2)
BLOOD GAS COMMENTS ARTERIAL: SIGNIFICANT CHANGE UP
BUN SERPL-MCNC: 10 MG/DL — SIGNIFICANT CHANGE UP (ref 7–18)
BUN SERPL-MCNC: 10 MG/DL — SIGNIFICANT CHANGE UP (ref 7–18)
CALCIUM SERPL-MCNC: 7 MG/DL — LOW (ref 8.4–10.5)
CALCIUM SERPL-MCNC: 7.5 MG/DL — LOW (ref 8.4–10.5)
CHLORIDE SERPL-SCNC: 109 MMOL/L — HIGH (ref 96–108)
CHLORIDE SERPL-SCNC: 110 MMOL/L — HIGH (ref 96–108)
CO2 SERPL-SCNC: 21 MMOL/L — LOW (ref 22–31)
CO2 SERPL-SCNC: 22 MMOL/L — SIGNIFICANT CHANGE UP (ref 22–31)
CREAT SERPL-MCNC: 0.68 MG/DL — SIGNIFICANT CHANGE UP (ref 0.5–1.3)
CREAT SERPL-MCNC: 0.84 MG/DL — SIGNIFICANT CHANGE UP (ref 0.5–1.3)
EOSINOPHIL # BLD AUTO: 0 K/UL — SIGNIFICANT CHANGE UP (ref 0–0.5)
EOSINOPHIL # BLD AUTO: 0 K/UL — SIGNIFICANT CHANGE UP (ref 0–0.5)
EOSINOPHIL NFR BLD AUTO: 0 % — SIGNIFICANT CHANGE UP (ref 0–6)
EOSINOPHIL NFR BLD AUTO: 0 % — SIGNIFICANT CHANGE UP (ref 0–6)
GLUCOSE SERPL-MCNC: 120 MG/DL — HIGH (ref 70–99)
GLUCOSE SERPL-MCNC: 141 MG/DL — HIGH (ref 70–99)
HCO3 BLDA-SCNC: 22 MMOL/L — LOW (ref 23–27)
HCT VFR BLD CALC: 34.1 % — LOW (ref 39–50)
HCT VFR BLD CALC: 36 % — LOW (ref 39–50)
HGB BLD-MCNC: 11.5 G/DL — LOW (ref 13–17)
HGB BLD-MCNC: 12.6 G/DL — LOW (ref 13–17)
HOROWITZ INDEX BLDA+IHG-RTO: 60 — SIGNIFICANT CHANGE UP
LYMPHOCYTES # BLD AUTO: 1.1 K/UL — SIGNIFICANT CHANGE UP (ref 1–3.3)
LYMPHOCYTES # BLD AUTO: 1.3 K/UL — SIGNIFICANT CHANGE UP (ref 1–3.3)
LYMPHOCYTES # BLD AUTO: 13 % — SIGNIFICANT CHANGE UP (ref 13–44)
LYMPHOCYTES # BLD AUTO: 9.9 % — LOW (ref 13–44)
MAGNESIUM SERPL-MCNC: 1.6 MG/DL — SIGNIFICANT CHANGE UP (ref 1.6–2.6)
MAGNESIUM SERPL-MCNC: 1.6 MG/DL — SIGNIFICANT CHANGE UP (ref 1.6–2.6)
MCHC RBC-ENTMCNC: 31.5 PG — SIGNIFICANT CHANGE UP (ref 27–34)
MCHC RBC-ENTMCNC: 33 PG — SIGNIFICANT CHANGE UP (ref 27–34)
MCHC RBC-ENTMCNC: 33.7 GM/DL — SIGNIFICANT CHANGE UP (ref 32–36)
MCHC RBC-ENTMCNC: 35 GM/DL — SIGNIFICANT CHANGE UP (ref 32–36)
MCV RBC AUTO: 93.5 FL — SIGNIFICANT CHANGE UP (ref 80–100)
MCV RBC AUTO: 94.5 FL — SIGNIFICANT CHANGE UP (ref 80–100)
MONOCYTES # BLD AUTO: 0.9 K/UL — SIGNIFICANT CHANGE UP (ref 0–0.9)
MONOCYTES # BLD AUTO: 1 K/UL — HIGH (ref 0–0.9)
MONOCYTES NFR BLD AUTO: 10 % — SIGNIFICANT CHANGE UP (ref 2–14)
MONOCYTES NFR BLD AUTO: 7.9 % — SIGNIFICANT CHANGE UP (ref 2–14)
NEUTROPHILS # BLD AUTO: 7.4 K/UL — SIGNIFICANT CHANGE UP (ref 1.8–7.4)
NEUTROPHILS # BLD AUTO: 9.5 K/UL — HIGH (ref 1.8–7.4)
NEUTROPHILS NFR BLD AUTO: 76.6 % — SIGNIFICANT CHANGE UP (ref 43–77)
NEUTROPHILS NFR BLD AUTO: 82.1 % — HIGH (ref 43–77)
PCO2 BLDA: 27 MMHG — LOW (ref 32–46)
PH BLDA: 7.52 — HIGH (ref 7.35–7.45)
PHOSPHATE SERPL-MCNC: 1.8 MG/DL — LOW (ref 2.5–4.5)
PHOSPHATE SERPL-MCNC: 2.6 MG/DL — SIGNIFICANT CHANGE UP (ref 2.5–4.5)
PLATELET # BLD AUTO: 119 K/UL — LOW (ref 150–400)
PLATELET # BLD AUTO: 119 K/UL — LOW (ref 150–400)
PO2 BLDA: 283 MMHG — HIGH (ref 74–108)
POTASSIUM SERPL-MCNC: 3.7 MMOL/L — SIGNIFICANT CHANGE UP (ref 3.5–5.3)
POTASSIUM SERPL-MCNC: 4.1 MMOL/L — SIGNIFICANT CHANGE UP (ref 3.5–5.3)
POTASSIUM SERPL-SCNC: 3.7 MMOL/L — SIGNIFICANT CHANGE UP (ref 3.5–5.3)
POTASSIUM SERPL-SCNC: 4.1 MMOL/L — SIGNIFICANT CHANGE UP (ref 3.5–5.3)
PROT SERPL-MCNC: 5.4 G/DL — LOW (ref 6–8.3)
RBC # BLD: 3.65 M/UL — LOW (ref 4.2–5.8)
RBC # BLD: 3.81 M/UL — LOW (ref 4.2–5.8)
RBC # FLD: 13.1 % — SIGNIFICANT CHANGE UP (ref 10.3–14.5)
RBC # FLD: 13.1 % — SIGNIFICANT CHANGE UP (ref 10.3–14.5)
SAO2 % BLDA: SIGNIFICANT CHANGE UP % (ref 92–96)
SODIUM SERPL-SCNC: 139 MMOL/L — SIGNIFICANT CHANGE UP (ref 135–145)
SODIUM SERPL-SCNC: 140 MMOL/L — SIGNIFICANT CHANGE UP (ref 135–145)
WBC # BLD: 11.6 K/UL — HIGH (ref 3.8–10.5)
WBC # BLD: 9.7 K/UL — SIGNIFICANT CHANGE UP (ref 3.8–10.5)
WBC # FLD AUTO: 11.6 K/UL — HIGH (ref 3.8–10.5)
WBC # FLD AUTO: 9.7 K/UL — SIGNIFICANT CHANGE UP (ref 3.8–10.5)

## 2017-12-14 PROCEDURE — 99233 SBSQ HOSP IP/OBS HIGH 50: CPT

## 2017-12-14 PROCEDURE — 71010: CPT | Mod: 26

## 2017-12-14 RX ORDER — KETOROLAC TROMETHAMINE 30 MG/ML
30 SYRINGE (ML) INJECTION EVERY 4 HOURS
Qty: 0 | Refills: 0 | Status: DISCONTINUED | OUTPATIENT
Start: 2017-12-14 | End: 2017-12-14

## 2017-12-14 RX ORDER — KETOROLAC TROMETHAMINE 30 MG/ML
15 SYRINGE (ML) INJECTION EVERY 6 HOURS
Qty: 0 | Refills: 0 | Status: DISCONTINUED | OUTPATIENT
Start: 2017-12-14 | End: 2017-12-14

## 2017-12-14 RX ORDER — ACETAMINOPHEN 500 MG
650 TABLET ORAL EVERY 6 HOURS
Qty: 0 | Refills: 0 | Status: DISCONTINUED | OUTPATIENT
Start: 2017-12-14 | End: 2018-01-12

## 2017-12-14 RX ORDER — HYDROMORPHONE HYDROCHLORIDE 2 MG/ML
1 INJECTION INTRAMUSCULAR; INTRAVENOUS; SUBCUTANEOUS EVERY 4 HOURS
Qty: 0 | Refills: 0 | Status: DISCONTINUED | OUTPATIENT
Start: 2017-12-14 | End: 2017-12-15

## 2017-12-14 RX ORDER — HYDROMORPHONE HYDROCHLORIDE 2 MG/ML
1 INJECTION INTRAMUSCULAR; INTRAVENOUS; SUBCUTANEOUS EVERY 6 HOURS
Qty: 0 | Refills: 0 | Status: DISCONTINUED | OUTPATIENT
Start: 2017-12-14 | End: 2017-12-14

## 2017-12-14 RX ORDER — HYDROMORPHONE HYDROCHLORIDE 2 MG/ML
1 INJECTION INTRAMUSCULAR; INTRAVENOUS; SUBCUTANEOUS
Qty: 0 | Refills: 0 | Status: DISCONTINUED | OUTPATIENT
Start: 2017-12-14 | End: 2017-12-20

## 2017-12-14 RX ORDER — POTASSIUM PHOSPHATE, MONOBASIC POTASSIUM PHOSPHATE, DIBASIC 236; 224 MG/ML; MG/ML
15 INJECTION, SOLUTION INTRAVENOUS ONCE
Qty: 0 | Refills: 0 | Status: COMPLETED | OUTPATIENT
Start: 2017-12-14 | End: 2017-12-14

## 2017-12-14 RX ADMIN — HYDROMORPHONE HYDROCHLORIDE 1 MILLIGRAM(S): 2 INJECTION INTRAMUSCULAR; INTRAVENOUS; SUBCUTANEOUS at 18:20

## 2017-12-14 RX ADMIN — PANTOPRAZOLE SODIUM 40 MILLIGRAM(S): 20 TABLET, DELAYED RELEASE ORAL at 12:06

## 2017-12-14 RX ADMIN — HYDROMORPHONE HYDROCHLORIDE 1 MILLIGRAM(S): 2 INJECTION INTRAMUSCULAR; INTRAVENOUS; SUBCUTANEOUS at 22:09

## 2017-12-14 RX ADMIN — HYDROMORPHONE HYDROCHLORIDE 1 MILLIGRAM(S): 2 INJECTION INTRAMUSCULAR; INTRAVENOUS; SUBCUTANEOUS at 09:15

## 2017-12-14 RX ADMIN — HEPARIN SODIUM 5000 UNIT(S): 5000 INJECTION INTRAVENOUS; SUBCUTANEOUS at 05:35

## 2017-12-14 RX ADMIN — HYDROMORPHONE HYDROCHLORIDE 1 MILLIGRAM(S): 2 INJECTION INTRAMUSCULAR; INTRAVENOUS; SUBCUTANEOUS at 22:25

## 2017-12-14 RX ADMIN — HYDROMORPHONE HYDROCHLORIDE 1 MILLIGRAM(S): 2 INJECTION INTRAMUSCULAR; INTRAVENOUS; SUBCUTANEOUS at 14:20

## 2017-12-14 RX ADMIN — SODIUM CHLORIDE 100 MILLILITER(S): 9 INJECTION, SOLUTION INTRAVENOUS at 09:13

## 2017-12-14 RX ADMIN — HYDROMORPHONE HYDROCHLORIDE 1 MILLIGRAM(S): 2 INJECTION INTRAMUSCULAR; INTRAVENOUS; SUBCUTANEOUS at 18:13

## 2017-12-14 RX ADMIN — HYDROMORPHONE HYDROCHLORIDE 1 MILLIGRAM(S): 2 INJECTION INTRAMUSCULAR; INTRAVENOUS; SUBCUTANEOUS at 09:12

## 2017-12-14 RX ADMIN — HEPARIN SODIUM 5000 UNIT(S): 5000 INJECTION INTRAVENOUS; SUBCUTANEOUS at 14:16

## 2017-12-14 RX ADMIN — HEPARIN SODIUM 5000 UNIT(S): 5000 INJECTION INTRAVENOUS; SUBCUTANEOUS at 22:10

## 2017-12-14 RX ADMIN — HYDROMORPHONE HYDROCHLORIDE 1 MILLIGRAM(S): 2 INJECTION INTRAMUSCULAR; INTRAVENOUS; SUBCUTANEOUS at 10:51

## 2017-12-14 RX ADMIN — FENTANYL CITRATE 4.26 MICROGRAM(S)/KG/HR: 50 INJECTION INTRAVENOUS at 04:00

## 2017-12-14 RX ADMIN — POTASSIUM PHOSPHATE, MONOBASIC POTASSIUM PHOSPHATE, DIBASIC 62.5 MILLIMOLE(S): 236; 224 INJECTION, SOLUTION INTRAVENOUS at 12:06

## 2017-12-14 RX ADMIN — HYDROMORPHONE HYDROCHLORIDE 1 MILLIGRAM(S): 2 INJECTION INTRAMUSCULAR; INTRAVENOUS; SUBCUTANEOUS at 12:06

## 2017-12-14 RX ADMIN — HYDROMORPHONE HYDROCHLORIDE 1 MILLIGRAM(S): 2 INJECTION INTRAMUSCULAR; INTRAVENOUS; SUBCUTANEOUS at 14:16

## 2017-12-14 NOTE — CHART NOTE - NSCHARTNOTEFT_GEN_A_CORE
62 year old male with PMH of Hypertension, prediabetes, herniated lumbar disc presents with a chief complaint of weight loss, recently diagnosed with gastric cancer and presented for surgical treatment. Pt reported gaining weight back and increased appetite after undergoing chemotherapy for 3 months. On 12/13 pt underwent total gastrectomy with Kelby-en-Y esophagojejunal anastomosis, pathology was sent for lesion of the lesser curvature, proximal margin was sent for frozen section and pathology saw atypical cells during procedure. Pt had Hx of sleep apnea as per anesthesiologist and required to be intubated after OR and ICU consulted for post op monitoring.  Patient had a good urine output post op. This morning 12/14 he passed his SBT and was successfully extubated. He was started on dilaudid, toradol and tylenol PRN for pain control. Dietary recommendations per surgery.

## 2017-12-14 NOTE — PROGRESS NOTE ADULT - SUBJECTIVE AND OBJECTIVE BOX
Chief Complaint: abdominal pain    HPI:   62y Male s/p gastrectomy,pod#1.  Pt complaining of abdominal      PAIN SCORE:         SCALE USED: (1-10 VNRS)    Allergies    No Known Allergies    Intolerances      MEDICATIONS  (STANDING):  heparin  Injectable 5000 Unit(s) SubCutaneous every 8 hours  HYDROmorphone  Injectable 1 milliGRAM(s) IV Push every 4 hours  lactated ringers. 1000 milliLiter(s) (100 mL/Hr) IV Continuous <Continuous>  pantoprazole  Injectable 40 milliGRAM(s) IV Push daily    MEDICATIONS  (PRN):  acetaminophen   Tablet 650 milliGRAM(s) Oral every 6 hours PRN mild pain  HYDROmorphone  Injectable 1 milliGRAM(s) IV Push every 2 hours PRN Severe Pain (7 - 10)  ketorolac   Injectable 15 milliGRAM(s) IV Push every 6 hours PRN Moderate Pain (4 - 6)      PHYSICAL EXAM:    Vital Signs Last 24 Hrs  T(C): 37.1 (14 Dec 2017 12:00), Max: 37.1 (14 Dec 2017 12:00)  T(F): 98.7 (14 Dec 2017 12:00), Max: 98.7 (14 Dec 2017 12:00)  HR: 70 (14 Dec 2017 15:00) (65 - 104)  BP: 108/59 (14 Dec 2017 15:00) (90/47 - 149/74)  BP(mean): 71 (14 Dec 2017 15:00) (57 - 91)  RR: 12 (14 Dec 2017 15:00) (7 - 31)  SpO2: 100% (14 Dec 2017 15:00) (99% - 100%)             LABS:                          11.5   9.7   )-----------( 119      ( 14 Dec 2017 06:26 )             34.1     12-14    139  |  109<H>  |  10  ----------------------------<  120<H>  3.7   |  22  |  0.68    Ca    7.5<L>      14 Dec 2017 06:26  Phos  1.8     12-14  Mg     1.6     12-14    TPro  5.4<L>  /  Alb  2.7<L>  /  TBili  0.7  /  DBili  x   /  AST  67<H>  /  ALT  67<H>  /  AlkPhos  37<L>  12-14          Drug Screen:        RADIOLOGY: Chief Complaint: abdominal pain    HPI:   62y Male s/p gastrectomy,pod#1.  Pt complaining of abdominal pain when coughing or with movement.  No nausea or vomiting.  NGT in place.        PAIN SCORE:  5/10       SCALE USED: (1-10 VNRS)    Allergies    No Known Allergies    Intolerances      MEDICATIONS  (STANDING):  heparin  Injectable 5000 Unit(s) SubCutaneous every 8 hours  HYDROmorphone  Injectable 1 milliGRAM(s) IV Push every 4 hours  lactated ringers. 1000 milliLiter(s) (100 mL/Hr) IV Continuous <Continuous>  pantoprazole  Injectable 40 milliGRAM(s) IV Push daily    MEDICATIONS  (PRN):  acetaminophen   Tablet 650 milliGRAM(s) Oral every 6 hours PRN mild pain  HYDROmorphone  Injectable 1 milliGRAM(s) IV Push every 2 hours PRN Severe Pain (7 - 10)  ketorolac   Injectable 15 milliGRAM(s) IV Push every 6 hours PRN Moderate Pain (4 - 6)      PHYSICAL EXAM:    Vital Signs Last 24 Hrs  T(C): 37.1 (14 Dec 2017 12:00), Max: 37.1 (14 Dec 2017 12:00)  T(F): 98.7 (14 Dec 2017 12:00), Max: 98.7 (14 Dec 2017 12:00)  HR: 70 (14 Dec 2017 15:00) (65 - 104)  BP: 108/59 (14 Dec 2017 15:00) (90/47 - 149/74)  BP(mean): 71 (14 Dec 2017 15:00) (57 - 91)  RR: 12 (14 Dec 2017 15:00) (7 - 31)  SpO2: 100% (14 Dec 2017 15:00) (99% - 100%)             LABS:                          11.5   9.7   )-----------( 119      ( 14 Dec 2017 06:26 )             34.1     12-14    139  |  109<H>  |  10  ----------------------------<  120<H>  3.7   |  22  |  0.68    Ca    7.5<L>      14 Dec 2017 06:26  Phos  1.8     12-14  Mg     1.6     12-14    TPro  5.4<L>  /  Alb  2.7<L>  /  TBili  0.7  /  DBili  x   /  AST  67<H>  /  ALT  67<H>  /  AlkPhos  37<L>  12-14          Drug Screen:        RADIOLOGY:

## 2017-12-14 NOTE — PROGRESS NOTE ADULT - PROBLEM SELECTOR PLAN 2
Check path report  Pain control  Surgery and Hem/Onc follow up. Bronchodilators neb  incentive spirometry  Chest PT

## 2017-12-14 NOTE — PROGRESS NOTE ADULT - SUBJECTIVE AND OBJECTIVE BOX
INTERVAL HPI/OVERNIGHT EVENTS: No acute events overnnight. Patient's blood pressure was borderline yesterday but responded to bolus. Passed SBT this morning. No acute events overnight.    PRESSORS: [ ] YES [X ] NO  WHICH:    ANTIBIOTICS: None                 DATE STARTED:  ANTIBIOTICS:                  DATE STARTED:  ANTIBIOTICS:                  DATE STARTED:    Antimicrobial:    Cardiovascular:    Pulmonary:    Hematalogic:  heparin  Injectable 5000 Unit(s) SubCutaneous every 8 hours    Other:  fentaNYL   Infusion 0.5 MICROgram(s)/kG/Hr IV Continuous <Continuous>  lactated ringers. 1000 milliLiter(s) IV Continuous <Continuous>  pantoprazole  Injectable 40 milliGRAM(s) IV Push daily    fentaNYL   Infusion 0.5 MICROgram(s)/kG/Hr IV Continuous <Continuous>  heparin  Injectable 5000 Unit(s) SubCutaneous every 8 hours  lactated ringers. 1000 milliLiter(s) IV Continuous <Continuous>  pantoprazole  Injectable 40 milliGRAM(s) IV Push daily    Drug Dosing Weight  Height (cm): 170.18 (13 Dec 2017 14:00)  Weight (kg): 78.6 (13 Dec 2017 14:00)  BMI (kg/m2): 27.1 (13 Dec 2017 14:00)  BSA (m2): 1.9 (13 Dec 2017 14:00)    CENTRAL LINE: [ ] YES [ X] NO  LOCATION:   DATE INSERTED:  REMOVE: [ ] YES [ ] NO  EXPLAIN:    GARCIA: [X ] YES [ ] NO    DATE INSERTED:  REMOVE:  [ ] YES [ ] NO  EXPLAIN:    A-LINE:  [X ] YES [ ] NO  LOCATION:   DATE INSERTED:  REMOVE:  [ ] YES [ ] NO  EXPLAIN:    PMH -reviewed admission note, no change since admission  PAST MEDICAL & SURGICAL HISTORY:  Prediabetes  HLD (hyperlipidemia)  HTN (hypertension)  Malignant neoplasm of stomach, unspecified location  H/O prostate biopsy  History of arthroscopy of right shoulder: repair for rotator cuff syndrome  History of arthroscopy of left knee: meniscal repair  History of appendectomy      ICU Vital Signs Last 24 Hrs  T(C): 37 (14 Dec 2017 06:00), Max: 37 (14 Dec 2017 06:00)  T(F): 98.6 (14 Dec 2017 06:00), Max: 98.6 (14 Dec 2017 06:00)  HR: 74 (14 Dec 2017 07:00) (70 - 104)  BP: 108/58 (14 Dec 2017 07:00) (90/47 - 149/74)  BP(mean): 71 (14 Dec 2017 07:00) (57 - 97)  ABP: 109/56 (14 Dec 2017 07:00) (85/47 - 155/76)  ABP(mean): 76 (14 Dec 2017 07:00) (59 - 103)  RR: 21 (14 Dec 2017 07:00) (10 - 24)  SpO2: 100% (14 Dec 2017 07:00) (99% - 100%)      ABG - ( 14 Dec 2017 04:53 )  pH: 7.52  /  pCO2: 27    /  pO2: 283   / HCO3: 22    / Base Excess: 0.0   /  SaO2: RESULTS >99.1                 12-13 @ 07:01  -  12-14 @ 07:00  --------------------------------------------------------  IN: 3366.7 mL / OUT: 2835 mL / NET: 531.7 mL        Mode: AC/ CMV (Assist Control/ Continuous Mandatory Ventilation)  RR (machine): 12  TV (machine): 550  FiO2: 60  PEEP: 5  ITime: 1  MAP: 5.8  PIP: 25      PHYSICAL EXAM:    General: No acute distress    HEENT: Pupils equal, reactive to light.  Symmetric.    PULM: Clear to auscultation bilaterally, intubated     CVS: Regular rate and rhythm, no murmurs, rubs, or gallops    ABD: Soft, nondistended, nontender, normoactive bowel sounds, no masses    EXT: No edema, nontender    SKIN: Warm and well perfused, no rashes noted.    NEURO:  Awake, following commands.       LABS:  CBC Full  -  ( 14 Dec 2017 06:26 )  WBC Count : 9.7 K/uL  Hemoglobin : 11.5 g/dL  Hematocrit : 34.1 %  Platelet Count - Automated : 119 K/uL  Mean Cell Volume : 93.5 fl  Mean Cell Hemoglobin : 31.5 pg  Mean Cell Hemoglobin Concentration : 33.7 gm/dL  Auto Neutrophil # : 7.4 K/uL  Auto Lymphocyte # : 1.3 K/uL  Auto Monocyte # : 1.0 K/uL  Auto Eosinophil # : 0.0 K/uL  Auto Basophil # : 0.0 K/uL  Auto Neutrophil % : 76.6 %  Auto Lymphocyte % : 13.0 %  Auto Monocyte % : 10.0 %  Auto Eosinophil % : 0.0 %  Auto Basophil % : 0.4 %    12-14    139  |  109<H>  |  10  ----------------------------<  120<H>  3.7   |  22  |  0.68    Ca    7.5<L>      14 Dec 2017 06:26  Phos  1.8     12-14  Mg     1.6     12-14    TPro  5.4<L>  /  Alb  2.7<L>  /  TBili  0.7  /  DBili  x   /  AST  67<H>  /  ALT  67<H>  /  AlkPhos  37<L>  12-14

## 2017-12-14 NOTE — PROGRESS NOTE ADULT - PROBLEM SELECTOR PLAN 1
Ventilator support  Wean as tolerated  DVT and GI PPX  Pulmonary toilet  Aspiration precautions. S/p extubation  oxygen supp  Aspiration precautions

## 2017-12-14 NOTE — PROGRESS NOTE ADULT - ASSESSMENT
POD#1 s/p total gastrectomy    1- ween to extubate as tolerated  2- BP improved  3- NGT to lws  4- coto to monitor I/Os  5- recc downgrade to surgery floor once extubated

## 2017-12-14 NOTE — PROGRESS NOTE ADULT - SUBJECTIVE AND OBJECTIVE BOX
INTERVAL HPI/OVERNIGHT EVENTS:  Pt stable.   NPO.   No flatus/BM.  NG tube bilious drainage  Good U/O    Vital Signs Last 24 Hrs  T(C): 36.9 (14 Dec 2017 07:30), Max: 37 (14 Dec 2017 06:00)  T(F): 98.5 (14 Dec 2017 07:30), Max: 98.6 (14 Dec 2017 06:00)  HR: 83 (14 Dec 2017 10:00) (70 - 104)  BP: 115/64 (14 Dec 2017 10:00) (90/47 - 149/74)  BP(mean): 76 (14 Dec 2017 10:00) (57 - 97)  RR: 19 (14 Dec 2017 10:00) (10 - 31)  SpO2: 100% (14 Dec 2017 10:00) (99% - 100%)    Physical:  Abdomen: Soft nondistended, nontender.  Dressing intact  JPs serosanguinous drainage    I&O's Summary    13 Dec 2017 07:01  -  14 Dec 2017 07:00  --------------------------------------------------------  IN: 3366.7 mL / OUT: 2835 mL / NET: 531.7 mL        LABS:                        11.5   9.7   )-----------( 119      ( 14 Dec 2017 06:26 )             34.1             12-14    139  |  109<H>  |  10  ----------------------------<  120<H>  3.7   |  22  |  0.68    Ca    7.5<L>      14 Dec 2017 06:26  Phos  1.8     12-14  Mg     1.6     12-14    TPro  5.4<L>  /  Alb  2.7<L>  /  TBili  0.7  /  DBili  x   /  AST  67<H>  /  ALT  67<H>  /  AlkPhos  37<L>  12-14

## 2017-12-14 NOTE — PROGRESS NOTE ADULT - SUBJECTIVE AND OBJECTIVE BOX
Patient is a 62y old Male who presents with a chief complaint of gastric cancer.   PMH of Hypertension, hyperlipidemia, prediabetes, herniated lumbar disc. Pt had gastrectomy done. Currently sedated, intubated on mechanical ventilation.        INTERVAL HPI/OVERNIGHT EVENTS:      VITAL SIGNS:  T(F): 98.5 (12-14-17 @ 07:30)  HR: 83 (12-14-17 @ 09:45)  BP: 126/63 (12-14-17 @ 09:00)  RR: 31 (12-14-17 @ 09:00)  SpO2: 100% (12-14-17 @ 09:45)  Wt(kg): --  I&O's Detail    13 Dec 2017 07:01  -  14 Dec 2017 07:00  --------------------------------------------------------  IN:    fentaNYL  Infusion: 629.2 mL    lactated ringers.: 1100 mL    lactated ringers.: 600 mL    Solution: 50 mL    Solution: 187.5 mL    Solution: 500 mL    Solution: 300 mL  Total IN: 3366.7 mL    OUT:    Bulb: 70 mL    Bulb: 200 mL    Indwelling Catheter - Urethral: 2565 mL  Total OUT: 2835 mL    Total NET: 531.7 mL        Mode: Spontaneous/ CPAP (Continuous Positive Airway Pressure)  FiO2: 40  PEEP: 5  PS: 5  MAP: 8        REVIEW OF SYSTEMS:    CONSTITUTIONAL:  No fevers, chills, sweats    HEENT:  Eyes:  No diplopia or blurred vision. ENT:  No earache, sore throat or runny nose.    CARDIOVASCULAR:  No pressure, squeezing, tightness, or heaviness about the chest; no palpitations.    RESPIRATORY:  Per HPI    GASTROINTESTINAL:  No abdominal pain, nausea, vomiting or diarrhea.    GENITOURINARY:  No dysuria, frequency or urgency.    NEUROLOGIC:  No paresthesias, fasciculations, seizures or weakness.    PSYCHIATRIC:  No disorder of thought or mood.      PHYSICAL EXAM:    Constitutional: Well developed and nourished  Eyes:Perrla  ENMT: normal  Neck:supple  Respiratory: good air entry  Cardiovascular: S1 S2 regular  Gastrointestinal: Soft, Non tender  Extremities: No edema  Vascular:normal  Neurological:Awake, alert,Ox3  Musculoskeletal:Normal      MEDICATIONS  (STANDING):  heparin  Injectable 5000 Unit(s) SubCutaneous every 8 hours  HYDROmorphone  Injectable 1 milliGRAM(s) IV Push every 4 hours  lactated ringers. 1000 milliLiter(s) (100 mL/Hr) IV Continuous <Continuous>  pantoprazole  Injectable 40 milliGRAM(s) IV Push daily  potassium phosphate IVPB 15 milliMole(s) IV Intermittent once    MEDICATIONS  (PRN):  acetaminophen   Tablet 650 milliGRAM(s) Oral every 6 hours PRN mild pain  HYDROmorphone  Injectable 1 milliGRAM(s) IV Push every 2 hours PRN Severe Pain (7 - 10)  ketorolac   Injectable 15 milliGRAM(s) IV Push every 6 hours PRN Moderate Pain (4 - 6)      Allergies    No Known Allergies    Intolerances        LABS:                        11.5   9.7   )-----------( 119      ( 14 Dec 2017 06:26 )             34.1     12-14    139  |  109<H>  |  10  ----------------------------<  120<H>  3.7   |  22  |  0.68    Ca    7.5<L>      14 Dec 2017 06:26  Phos  1.8     12-14  Mg     1.6     12-14    TPro  5.4<L>  /  Alb  2.7<L>  /  TBili  0.7  /  DBili  x   /  AST  67<H>  /  ALT  67<H>  /  AlkPhos  37<L>  12-14        ABG - ( 14 Dec 2017 04:53 )  pH: 7.52  /  pCO2: 27    /  pO2: 283   / HCO3: 22    / Base Excess: 0.0   /  SaO2: RESULTS >99.1                 CAPILLARY BLOOD GLUCOSE            RADIOLOGY & ADDITIONAL TESTS:    CXR:  < from: Xray Chest 1 View AP -PORTABLE-Routine (12.14.17 @ 07:03) >  ET and enteric tubes remain in place.   Streaky bilateral mid to lower lung opacities, likely atelectasis.  No pneumothorax or pleural effusion.   The cardiac silhouette is within normal limits in size.      < end of copied text >    Ct scan chest:    ekg;    echo: Patient is a 62y old Male who presents with a chief complaint of gastric cancer.   PMH of Hypertension, hyperlipidemia, prediabetes, herniated lumbar disc. Pt had gastrectomy done. Currently extubated, awake, alert with NGT in place        INTERVAL HPI/OVERNIGHT EVENTS:      VITAL SIGNS:  T(F): 98.5 (12-14-17 @ 07:30)  HR: 83 (12-14-17 @ 09:45)  BP: 126/63 (12-14-17 @ 09:00)  RR: 31 (12-14-17 @ 09:00)  SpO2: 100% (12-14-17 @ 09:45)  Wt(kg): --  I&O's Detail    13 Dec 2017 07:01  -  14 Dec 2017 07:00  --------------------------------------------------------  IN:    fentaNYL  Infusion: 629.2 mL    lactated ringers.: 1100 mL    lactated ringers.: 600 mL    Solution: 50 mL    Solution: 187.5 mL    Solution: 500 mL    Solution: 300 mL  Total IN: 3366.7 mL    OUT:    Bulb: 70 mL    Bulb: 200 mL    Indwelling Catheter - Urethral: 2565 mL  Total OUT: 2835 mL    Total NET: 531.7 mL        Mode: Spontaneous/ CPAP (Continuous Positive Airway Pressure)  FiO2: 40  PEEP: 5  PS: 5  MAP: 8        REVIEW OF SYSTEMS:    CONSTITUTIONAL:  No fevers, chills, sweats    HEENT:  Eyes:  No diplopia or blurred vision. ENT:  No earache, sore throat or runny nose.    CARDIOVASCULAR:  No pressure, squeezing, tightness, or heaviness about the chest; no palpitations.    RESPIRATORY:  Per HPI    GASTROINTESTINAL:   +abdominal pain but no nausea, vomiting or diarrhea.    GENITOURINARY:  No dysuria, frequency or urgency.    NEUROLOGIC:  No paresthesias, fasciculations, seizures or weakness.    PSYCHIATRIC:  No disorder of thought or mood.      PHYSICAL EXAM:    Constitutional: Well developed and nourished  Eyes:Perrla  ENMT: normal  Neck:supple  Respiratory: good air entry  Cardiovascular: S1 S2 regular  Gastrointestinal: Soft, Incisional tenderness  Extremities: No edema  Vascular:normal  Neurological:Awake, alert,Ox3  Musculoskeletal:Normal      MEDICATIONS  (STANDING):  heparin  Injectable 5000 Unit(s) SubCutaneous every 8 hours  HYDROmorphone  Injectable 1 milliGRAM(s) IV Push every 4 hours  lactated ringers. 1000 milliLiter(s) (100 mL/Hr) IV Continuous <Continuous>  pantoprazole  Injectable 40 milliGRAM(s) IV Push daily  potassium phosphate IVPB 15 milliMole(s) IV Intermittent once    MEDICATIONS  (PRN):  acetaminophen   Tablet 650 milliGRAM(s) Oral every 6 hours PRN mild pain  HYDROmorphone  Injectable 1 milliGRAM(s) IV Push every 2 hours PRN Severe Pain (7 - 10)  ketorolac   Injectable 15 milliGRAM(s) IV Push every 6 hours PRN Moderate Pain (4 - 6)      Allergies    No Known Allergies    Intolerances        LABS:                        11.5   9.7   )-----------( 119      ( 14 Dec 2017 06:26 )             34.1     12-14    139  |  109<H>  |  10  ----------------------------<  120<H>  3.7   |  22  |  0.68    Ca    7.5<L>      14 Dec 2017 06:26  Phos  1.8     12-14  Mg     1.6     12-14    TPro  5.4<L>  /  Alb  2.7<L>  /  TBili  0.7  /  DBili  x   /  AST  67<H>  /  ALT  67<H>  /  AlkPhos  37<L>  12-14        ABG - ( 14 Dec 2017 04:53 )  pH: 7.52  /  pCO2: 27    /  pO2: 283   / HCO3: 22    / Base Excess: 0.0   /  SaO2: RESULTS >99.1                 CAPILLARY BLOOD GLUCOSE            RADIOLOGY & ADDITIONAL TESTS:    CXR:  < from: Xray Chest 1 View AP -PORTABLE-Routine (12.14.17 @ 07:03) >  ET and enteric tubes remain in place.   Streaky bilateral mid to lower lung opacities, likely atelectasis.  No pneumothorax or pleural effusion.   The cardiac silhouette is within normal limits in size.      < end of copied text >    Ct scan chest:    ekg;    echo:

## 2017-12-14 NOTE — PROGRESS NOTE ADULT - ASSESSMENT
62 year old male with PMH of Hypertension, prediabetes, herniated lumbar disc presents with a chief complaint of weight loss, recently Dx have gastric cancer presented for surgical treatment. Pt reported gaining weight back and increased appetite after undergoing chemotherapy for 3 months. Today 12/13 pt has undergone through total gastrectomy with Kelby-en-Y esophagojejunal anastomosis, pathology was sent for lesion of the lesser curvature, proximal margin was sent for frozen section and pathology saw atypical cells during procedure. Pt had EBL of 500 cc, Input was 3200 cc crystalloids and 1 U PRBC, output was 2300 cc. Pt had Hx of sleep apnea as per anesthesiologist and required to be intubated after OR and ICU consulted for post op monitoring.      Admit to ICU for post op Monitoring s/p total gastrectomy with Kelby-en-Y esophagojejunal anastomosis  POD - 1  c/w DVT and GI ppx - heparin and protonix  s/p intubation , c/w mechanical intubation   c/w sedation - fentanyl  strict I/O monitoring   c/w LR @ 100 cc/hr   Passed SBT this AM--> plan to extubate

## 2017-12-14 NOTE — CONSULT NOTE ADULT - SUBJECTIVE AND OBJECTIVE BOX
62 year old male with PMH of Hypertension, prediabetes, herniated lumbar disc presents with a chief complaint of weight loss, recently Dx have gastric cancer presented for surgical treatment. Pt reported gaining weight back and increased appetite after undergoing chemotherapy for 3 months. Today 12/13 pt has undergone through total gastrectomy with Kelby-en-Y esophagojejunal anastomosis, pathology was sent for lesion of the lesser curvature, proximal margin was sent for frozen section and pathology saw atypical cells during procedure. Pt had EBL of 500 cc, Input was 3200 cc crystalloids and 1 U PRBC, output was 2300 cc. Pt had Hx of sleep apnea as per anesthesiologist and required to be intubated after OR and ICU consulted for post op monitoring.        ROS - negative except above , limited as pt is intubated    PAST MEDICAL & SURGICAL HISTORY:  Prediabetes  HLD (hyperlipidemia)  HTN (hypertension)  Malignant neoplasm of stomach, unspecified location  H/O prostate biopsy  History of arthroscopy of right shoulder: repair for rotator cuff syndrome  History of arthroscopy of left knee: meniscal repair  History of appendectomy      pt seen in icu [ x ], reg med floor [   ], bed [x  ], chair at bedside [   ], a+o x3 [ x ], lethargic [  ],  nad [ x ]    coto [x  ], ngt to wall suction [ x ], O2 via aerosol mask [x ], fentanyl drip [x  ],         Allergies    No Known Allergies        Vitals    T(F): 98.7 (12-14-17 @ 12:00), Max: 98.7 (12-14-17 @ 12:00)  HR: 70 (12-14-17 @ 15:00) (65 - 104)  BP: 108/59 (12-14-17 @ 15:00) (90/47 - 149/74)  RR: 12 (12-14-17 @ 15:00) (7 - 31)  SpO2: 100% (12-14-17 @ 15:00) (99% - 100%)  Wt(kg): --  CAPILLARY BLOOD GLUCOSE      POCT Blood Glucose.: 114 mg/dL (13 Dec 2017 07:20)      Labs                          11.5   9.7   )-----------( 119      ( 14 Dec 2017 06:26 )             34.1       12-14    139  |  109<H>  |  10  ----------------------------<  120<H>  3.7   |  22  |  0.68    Ca    7.5<L>      14 Dec 2017 06:26  Phos  1.8     12-14  Mg     1.6     12-14    TPro  5.4<L>  /  Alb  2.7<L>  /  TBili  0.7  /  DBili  x   /  AST  67<H>  /  ALT  67<H>  /  AlkPhos  37<L>  12-14      Radiology Results    < from: Xray Chest 1 View AP -PORTABLE-Routine (12.14.17 @ 07:03) >  FINDINGS/  IMPRESSION:  ET and enteric tubes remain in place.   Streaky bilateral mid to lower lung opacities, likely atelectasis.  No pneumothorax or pleural effusion.   The cardiac silhouette is within normal limits in size.    < end of copied text >        Meds    MEDICATIONS  (STANDING):  heparin  Injectable 5000 Unit(s) SubCutaneous every 8 hours  HYDROmorphone  Injectable 1 milliGRAM(s) IV Push every 4 hours  lactated ringers. 1000 milliLiter(s) (100 mL/Hr) IV Continuous <Continuous>  pantoprazole  Injectable 40 milliGRAM(s) IV Push daily      MEDICATIONS  (PRN):  acetaminophen   Tablet 650 milliGRAM(s) Oral every 6 hours PRN mild pain  HYDROmorphone  Injectable 1 milliGRAM(s) IV Push every 2 hours PRN Severe Pain (7 - 10)  ketorolac   Injectable 15 milliGRAM(s) IV Push every 6 hours PRN Moderate Pain (4 - 6)      Physical Exam    Neuro :  no focal deficits  Respiratory: CTA B/L  CV: RRR, S1S2, no murmurs,   Abdominal: Soft, ND, abd dressing cdi, diann x2 with serosanguinous drainage, ngt to wall suction with coffee ground drainage  Extremities: No edema, + peripheral pulses    ASSESSMENT    Malignant neoplasm of stomach  h/o Prediabetes  HLD (hyperlipidemia)  HTN (hypertension)  Malignant neoplasm of stomach, unspecified location  H/O prostate biopsy  History of arthroscopy of right shoulder  History of arthroscopy of left knee  History of appendectomy        PLAN      s/p total gatrectomy with kelby-en-y esophagojejunal anastomosis  cont wound care   s/p extubation this am  gi and dvt prophylaxis  incentive spirometer  pulm cons noted  cont supplemental O2  cont pain control  pain mgmt cons noted  cont current meds  mgmt as per icu

## 2017-12-15 LAB
ANION GAP SERPL CALC-SCNC: 5 MMOL/L — SIGNIFICANT CHANGE UP (ref 5–17)
BASOPHILS # BLD AUTO: 0.1 K/UL — SIGNIFICANT CHANGE UP (ref 0–0.2)
BASOPHILS NFR BLD AUTO: 1 % — SIGNIFICANT CHANGE UP (ref 0–2)
BUN SERPL-MCNC: 8 MG/DL — SIGNIFICANT CHANGE UP (ref 7–18)
CALCIUM SERPL-MCNC: 8 MG/DL — LOW (ref 8.4–10.5)
CHLORIDE SERPL-SCNC: 105 MMOL/L — SIGNIFICANT CHANGE UP (ref 96–108)
CO2 SERPL-SCNC: 29 MMOL/L — SIGNIFICANT CHANGE UP (ref 22–31)
CREAT SERPL-MCNC: 0.63 MG/DL — SIGNIFICANT CHANGE UP (ref 0.5–1.3)
EOSINOPHIL # BLD AUTO: 0 K/UL — SIGNIFICANT CHANGE UP (ref 0–0.5)
EOSINOPHIL NFR BLD AUTO: 0.5 % — SIGNIFICANT CHANGE UP (ref 0–6)
GLUCOSE SERPL-MCNC: 95 MG/DL — SIGNIFICANT CHANGE UP (ref 70–99)
HCT VFR BLD CALC: 35.8 % — LOW (ref 39–50)
HGB BLD-MCNC: 11.9 G/DL — LOW (ref 13–17)
LYMPHOCYTES # BLD AUTO: 2.2 K/UL — SIGNIFICANT CHANGE UP (ref 1–3.3)
LYMPHOCYTES # BLD AUTO: 24.7 % — SIGNIFICANT CHANGE UP (ref 13–44)
MAGNESIUM SERPL-MCNC: 2 MG/DL — SIGNIFICANT CHANGE UP (ref 1.6–2.6)
MCHC RBC-ENTMCNC: 31.6 PG — SIGNIFICANT CHANGE UP (ref 27–34)
MCHC RBC-ENTMCNC: 33.3 GM/DL — SIGNIFICANT CHANGE UP (ref 32–36)
MCV RBC AUTO: 94.7 FL — SIGNIFICANT CHANGE UP (ref 80–100)
MONOCYTES # BLD AUTO: 0.8 K/UL — SIGNIFICANT CHANGE UP (ref 0–0.9)
MONOCYTES NFR BLD AUTO: 8.9 % — SIGNIFICANT CHANGE UP (ref 2–14)
NEUTROPHILS # BLD AUTO: 5.7 K/UL — SIGNIFICANT CHANGE UP (ref 1.8–7.4)
NEUTROPHILS NFR BLD AUTO: 64.9 % — SIGNIFICANT CHANGE UP (ref 43–77)
NON-GYNECOLOGICAL CYTOLOGY STUDY: SIGNIFICANT CHANGE UP
PHOSPHATE SERPL-MCNC: 2.5 MG/DL — SIGNIFICANT CHANGE UP (ref 2.5–4.5)
PLATELET # BLD AUTO: 100 K/UL — LOW (ref 150–400)
POTASSIUM SERPL-MCNC: 3.8 MMOL/L — SIGNIFICANT CHANGE UP (ref 3.5–5.3)
POTASSIUM SERPL-SCNC: 3.8 MMOL/L — SIGNIFICANT CHANGE UP (ref 3.5–5.3)
RBC # BLD: 3.78 M/UL — LOW (ref 4.2–5.8)
RBC # FLD: 13.4 % — SIGNIFICANT CHANGE UP (ref 10.3–14.5)
SODIUM SERPL-SCNC: 139 MMOL/L — SIGNIFICANT CHANGE UP (ref 135–145)
WBC # BLD: 8.7 K/UL — SIGNIFICANT CHANGE UP (ref 3.8–10.5)
WBC # FLD AUTO: 8.7 K/UL — SIGNIFICANT CHANGE UP (ref 3.8–10.5)

## 2017-12-15 RX ORDER — SODIUM CHLORIDE 9 MG/ML
1000 INJECTION, SOLUTION INTRAVENOUS
Qty: 0 | Refills: 0 | Status: DISCONTINUED | OUTPATIENT
Start: 2017-12-15 | End: 2017-12-23

## 2017-12-15 RX ADMIN — HYDROMORPHONE HYDROCHLORIDE 1 MILLIGRAM(S): 2 INJECTION INTRAMUSCULAR; INTRAVENOUS; SUBCUTANEOUS at 05:48

## 2017-12-15 RX ADMIN — HEPARIN SODIUM 5000 UNIT(S): 5000 INJECTION INTRAVENOUS; SUBCUTANEOUS at 11:55

## 2017-12-15 RX ADMIN — HEPARIN SODIUM 5000 UNIT(S): 5000 INJECTION INTRAVENOUS; SUBCUTANEOUS at 21:09

## 2017-12-15 RX ADMIN — HYDROMORPHONE HYDROCHLORIDE 1 MILLIGRAM(S): 2 INJECTION INTRAMUSCULAR; INTRAVENOUS; SUBCUTANEOUS at 23:48

## 2017-12-15 RX ADMIN — HYDROMORPHONE HYDROCHLORIDE 1 MILLIGRAM(S): 2 INJECTION INTRAMUSCULAR; INTRAVENOUS; SUBCUTANEOUS at 20:27

## 2017-12-15 RX ADMIN — HYDROMORPHONE HYDROCHLORIDE 1 MILLIGRAM(S): 2 INJECTION INTRAMUSCULAR; INTRAVENOUS; SUBCUTANEOUS at 08:30

## 2017-12-15 RX ADMIN — HYDROMORPHONE HYDROCHLORIDE 1 MILLIGRAM(S): 2 INJECTION INTRAMUSCULAR; INTRAVENOUS; SUBCUTANEOUS at 05:33

## 2017-12-15 RX ADMIN — HYDROMORPHONE HYDROCHLORIDE 1 MILLIGRAM(S): 2 INJECTION INTRAMUSCULAR; INTRAVENOUS; SUBCUTANEOUS at 08:16

## 2017-12-15 RX ADMIN — HYDROMORPHONE HYDROCHLORIDE 1 MILLIGRAM(S): 2 INJECTION INTRAMUSCULAR; INTRAVENOUS; SUBCUTANEOUS at 01:42

## 2017-12-15 RX ADMIN — HYDROMORPHONE HYDROCHLORIDE 1 MILLIGRAM(S): 2 INJECTION INTRAMUSCULAR; INTRAVENOUS; SUBCUTANEOUS at 13:31

## 2017-12-15 RX ADMIN — HYDROMORPHONE HYDROCHLORIDE 1 MILLIGRAM(S): 2 INJECTION INTRAMUSCULAR; INTRAVENOUS; SUBCUTANEOUS at 02:08

## 2017-12-15 RX ADMIN — HEPARIN SODIUM 5000 UNIT(S): 5000 INJECTION INTRAVENOUS; SUBCUTANEOUS at 05:34

## 2017-12-15 RX ADMIN — PANTOPRAZOLE SODIUM 40 MILLIGRAM(S): 20 TABLET, DELAYED RELEASE ORAL at 11:55

## 2017-12-15 RX ADMIN — HYDROMORPHONE HYDROCHLORIDE 1 MILLIGRAM(S): 2 INJECTION INTRAMUSCULAR; INTRAVENOUS; SUBCUTANEOUS at 21:00

## 2017-12-15 RX ADMIN — HYDROMORPHONE HYDROCHLORIDE 1 MILLIGRAM(S): 2 INJECTION INTRAMUSCULAR; INTRAVENOUS; SUBCUTANEOUS at 13:45

## 2017-12-15 NOTE — PROGRESS NOTE ADULT - SUBJECTIVE AND OBJECTIVE BOX
PULMONARY CONSULT NOTE      ROGER PEGUERO  [id number]    Patient is a 62y old  Male who presents with a chief complaint of  gastric cancer.  PMH of Hypertension, hyperlipidemia, prediabetes, herniated lumbar disc. Pt had gastrectomy done. Currently extubated, awake, alert with NGT in place.        HISTORY OF PRESENT ILLNESS:    MEDICATIONS  (STANDING):  dextrose 5% + sodium chloride 0.45% 1000 milliLiter(s) (125 mL/Hr) IV Continuous <Continuous>  heparin  Injectable 5000 Unit(s) SubCutaneous every 8 hours  pantoprazole  Injectable 40 milliGRAM(s) IV Push daily      MEDICATIONS  (PRN):  acetaminophen   Tablet 650 milliGRAM(s) Oral every 6 hours PRN mild pain  HYDROmorphone  Injectable 1 milliGRAM(s) IV Push every 2 hours PRN Severe Pain (7 - 10)  ketorolac   Injectable 15 milliGRAM(s) IV Push every 6 hours PRN Moderate Pain (4 - 6)      Allergies    No Known Allergies    Intolerances        PAST MEDICAL & SURGICAL HISTORY:  Prediabetes  HLD (hyperlipidemia)  HTN (hypertension)  Malignant neoplasm of stomach, unspecified location  H/O prostate biopsy  History of arthroscopy of right shoulder: repair for rotator cuff syndrome  History of arthroscopy of left knee: meniscal repair  History of appendectomy      FAMILY HISTORY:  Cerebrovascular accident (Mother)      SOCIAL HISTORY  Smoking History:     REVIEW OF SYSTEMS:    CONSTITUTIONAL:  No fevers, chills, sweats    HEENT:  Eyes:  No diplopia or blurred vision. ENT:  No earache, sore throat or runny nose.    CARDIOVASCULAR:  No pressure, squeezing, tightness, or heaviness about the chest; no palpitations.    RESPIRATORY:  Per HPI    GASTROINTESTINAL:  No abdominal pain, nausea, vomiting or diarrhea.    GENITOURINARY:  No dysuria, frequency or urgency.    NEUROLOGIC:  No paresthesias, fasciculations, seizures or weakness.    PSYCHIATRIC:  No disorder of thought or mood.    Vital Signs Last 24 Hrs  T(C): 36.5 (15 Dec 2017 06:00), Max: 37.4 (14 Dec 2017 19:30)  T(F): 97.7 (15 Dec 2017 06:00), Max: 99.3 (14 Dec 2017 19:30)  HR: 75 (15 Dec 2017 06:00) (65 - 85)  BP: 135/59 (15 Dec 2017 06:00) (104/51 - 135/59)  BP(mean): 74 (14 Dec 2017 18:00) (65 - 74)  RR: 18 (15 Dec 2017 06:00) (9 - 29)  SpO2: 100% (15 Dec 2017 06:00) (99% - 100%)  I&O's Detail    14 Dec 2017 07:01  -  15 Dec 2017 07:00  --------------------------------------------------------  IN:    fentaNYL  Infusion: 62.8 mL    lactated ringers.: 1100 mL    Solution: 250 mL  Total IN: 1412.8 mL    OUT:    Bulb: 25 mL    Bulb: 30 mL    Indwelling Catheter - Urethral: 3040 mL  Total OUT: 3095 mL    Total NET: -1682.2 mL          PHYSICAL EXAMINATION:    GENERAL: The patient is a well-developed, well-nourished _____in no apparent distress.     HEENT: Head is normocephalic and atraumatic. Extraocular muscles are intact. Mucous membranes are moist.     NECK: Supple.     LUNGS: Clear to auscultation without wheezing, rales, or rhonchi. Respirations unlabored    HEART: Regular rate and rhythm without murmur.    ABDOMEN: Soft, nontender, and nondistended.  No hepatosplenomegaly is noted.    EXTREMITIES: Without any cyanosis, clubbing, rash, lesions or edema.    NEUROLOGIC: Grossly intact.      LABS:                        11.9   8.7   )-----------( 100      ( 15 Dec 2017 05:38 )             35.8     12-15    139  |  105  |  8   ----------------------------<  95  3.8   |  29  |  0.63    Ca    8.0<L>      15 Dec 2017 05:38  Phos  2.5     12-15  Mg     2.0     12-15    TPro  5.4<L>  /  Alb  2.7<L>  /  TBili  0.7  /  DBili  x   /  AST  67<H>  /  ALT  67<H>  /  AlkPhos  37<L>  12-14        ABG - ( 14 Dec 2017 04:53 )  pH: 7.52  /  pCO2: 27    /  pO2: 283   / HCO3: 22    / Base Excess: 0.0   /  SaO2: RESULTS >99.1                           MICROBIOLOGY:    RADIOLOGY & ADDITIONAL STUDIES:    CXR:  < from: Xray Chest 1 View AP -PORTABLE-Routine (12.14.17 @ 07:03) >  ET and enteric tubes remain in place.   Streaky bilateral mid to lower lung opacities, likely atelectasis.  No pneumothorax or pleural effusion.   The cardiac silhouette is within normal limits in size.      < end of copied text >    Ct scan chest:    ekg;    echo: PULMONARY CONSULT NOTE      ROGER PEGUERO  [id number]    Patient is a 62y old  Male who presents with a chief complaint of  gastric cancer.  PMH of Hypertension, hyperlipidemia, prediabetes, herniated lumbar disc. Pt had gastrectomy done. Currently extubated, awake, alert with NGT in place.  Lying in bed in NAD.      HISTORY OF PRESENT ILLNESS:    MEDICATIONS  (STANDING):  dextrose 5% + sodium chloride 0.45% 1000 milliLiter(s) (125 mL/Hr) IV Continuous <Continuous>  heparin  Injectable 5000 Unit(s) SubCutaneous every 8 hours  pantoprazole  Injectable 40 milliGRAM(s) IV Push daily      MEDICATIONS  (PRN):  acetaminophen   Tablet 650 milliGRAM(s) Oral every 6 hours PRN mild pain  HYDROmorphone  Injectable 1 milliGRAM(s) IV Push every 2 hours PRN Severe Pain (7 - 10)  ketorolac   Injectable 15 milliGRAM(s) IV Push every 6 hours PRN Moderate Pain (4 - 6)      Allergies    No Known Allergies    Intolerances        PAST MEDICAL & SURGICAL HISTORY:  Prediabetes  HLD (hyperlipidemia)  HTN (hypertension)  Malignant neoplasm of stomach, unspecified location  H/O prostate biopsy  History of arthroscopy of right shoulder: repair for rotator cuff syndrome  History of arthroscopy of left knee: meniscal repair  History of appendectomy      FAMILY HISTORY:  Cerebrovascular accident (Mother)      SOCIAL HISTORY  Smoking History:     REVIEW OF SYSTEMS:    CONSTITUTIONAL:  No fevers, chills, sweats    HEENT:  Eyes:  No diplopia or blurred vision. ENT:  No earache, sore throat or runny nose.    CARDIOVASCULAR:  No pressure, squeezing, tightness, or heaviness about the chest; no palpitations.    RESPIRATORY:  Per HPI    GASTROINTESTINAL:  Abdominal pain+    GENITOURINARY:  No dysuria, frequency or urgency.    NEUROLOGIC:  No paresthesias, fasciculations, seizures or weakness.    PSYCHIATRIC:  No disorder of thought or mood.    Vital Signs Last 24 Hrs  T(C): 36.5 (15 Dec 2017 06:00), Max: 37.4 (14 Dec 2017 19:30)  T(F): 97.7 (15 Dec 2017 06:00), Max: 99.3 (14 Dec 2017 19:30)  HR: 75 (15 Dec 2017 06:00) (65 - 85)  BP: 135/59 (15 Dec 2017 06:00) (104/51 - 135/59)  BP(mean): 74 (14 Dec 2017 18:00) (65 - 74)  RR: 18 (15 Dec 2017 06:00) (9 - 29)  SpO2: 100% (15 Dec 2017 06:00) (99% - 100%)  I&O's Detail    14 Dec 2017 07:01  -  15 Dec 2017 07:00  --------------------------------------------------------  IN:    fentaNYL  Infusion: 62.8 mL    lactated ringers.: 1100 mL    Solution: 250 mL  Total IN: 1412.8 mL    OUT:    Bulb: 25 mL    Bulb: 30 mL    Indwelling Catheter - Urethral: 3040 mL  Total OUT: 3095 mL    Total NET: -1682.2 mL          PHYSICAL EXAMINATION:    GENERAL: The patient is a well-developed, well-nourished _____in no apparent distress.     HEENT: Head is normocephalic and atraumatic. Extraocular muscles are intact. Mucous membranes are moist.     NECK: Supple.     LUNGS: Clear to auscultation without wheezing, rales, or rhonchi. Respirations unlabored    HEART: Regular rate and rhythm without murmur.    ABDOMEN: Soft, incisional tenderness    EXTREMITIES: Without any cyanosis, clubbing, rash, lesions or edema.    NEUROLOGIC: Grossly intact.      LABS:                        11.9   8.7   )-----------( 100      ( 15 Dec 2017 05:38 )             35.8     12-15    139  |  105  |  8   ----------------------------<  95  3.8   |  29  |  0.63    Ca    8.0<L>      15 Dec 2017 05:38  Phos  2.5     12-15  Mg     2.0     12-15    TPro  5.4<L>  /  Alb  2.7<L>  /  TBili  0.7  /  DBili  x   /  AST  67<H>  /  ALT  67<H>  /  AlkPhos  37<L>  12-14        ABG - ( 14 Dec 2017 04:53 )  pH: 7.52  /  pCO2: 27    /  pO2: 283   / HCO3: 22    / Base Excess: 0.0   /  SaO2: RESULTS >99.1                           MICROBIOLOGY:    RADIOLOGY & ADDITIONAL STUDIES:    CXR:  < from: Xray Chest 1 View AP -PORTABLE-Routine (12.14.17 @ 07:03) >  ET and enteric tubes remain in place.   Streaky bilateral mid to lower lung opacities, likely atelectasis.  No pneumothorax or pleural effusion.   The cardiac silhouette is within normal limits in size.      < end of copied text >    Ct scan chest:    ekg;    echo:

## 2017-12-15 NOTE — PROGRESS NOTE ADULT - SUBJECTIVE AND OBJECTIVE BOX
POD#2  Alert and awake, NAD. reports mild incisional pain  NGT to yaima coto    Vital Signs:  T(C): 36.5 (15 Dec 2017 06:00), Max: 37.4 (14 Dec 2017 19:30)  T(F): 97.7 (15 Dec 2017 06:00), Max: 99.3 (14 Dec 2017 19:30)  HR: 75 (15 Dec 2017 06:00) (65 - 89)  BP: 135/59 (15 Dec 2017 06:00) (103/55 - 135/59)  BP(mean): 74 (14 Dec 2017 18:00) (65 - 78)  ABP: 161/80 (14 Dec 2017 17:00) (114/54 - 161/80)  ABP(mean): 111 (14 Dec 2017 17:00) (75 - 111)  RR: 18 (15 Dec 2017 06:00) (7 - 31)  SpO2: 100% (15 Dec 2017 06:00) (99% - 100%)      Physical Exam:  General: NAD, comfortable  Abdomen: soft, nondistended. all dressings C/D/I. 2 SOSA drains to self suction, both outputs serosang (25,30mL)    IN:    fentaNYL  Infusion: 629.2 mL    lactated ringers.: 1100 mL    lactated ringers.: 600 mL    Solution: 50 mL    Solution: 187.5 mL    Solution: 500 mL    Solution: 300 mL  Total IN: 3366.7 mL      Indwelling Catheter - Urethral:3L                          11.9   8.7   )-----------( x        ( 15 Dec 2017 05:38 )             35.8   12-15    139  |  105  |  8   ----------------------------<  95  3.8   |  29  |  0.63    Ca    8.0<L>      15 Dec 2017 05:38  Phos  2.5     12-15  Mg     2.0     12-15    TPro  5.4<L>  /  Alb  2.7<L>  /  TBili  0.7  /  DBili  x   /  AST  67<H>  /  ALT  67<H>  /  AlkPhos  37<L>  12-14

## 2017-12-15 NOTE — PROGRESS NOTE ADULT - SUBJECTIVE AND OBJECTIVE BOX
Patient is a 62y old  Male who presents with a chief complaint of I have gastric cancer (12 Dec 2017 11:41)    pt seen in icu [ x ], reg med floor [   ], bed [x  ], chair at bedside [   ], a+o x3 [ x ], lethargic [  ],  nad [ x ]    coto [x  ], ngt to wall suction [ x ], O2 via aerosol mask [x ],      Allergies    No Known Allergies        Vitals    T(F): 97.7 (12-15-17 @ 06:00), Max: 99.3 (12-14-17 @ 19:30)  HR: 75 (12-15-17 @ 06:00) (65 - 89)  BP: 135/59 (12-15-17 @ 06:00) (103/55 - 135/59)  RR: 18 (12-15-17 @ 06:00) (7 - 31)  SpO2: 100% (12-15-17 @ 06:00) (99% - 100%)  Wt(kg): --  CAPILLARY BLOOD GLUCOSE          Labs                          11.9   8.7   )-----------( x        ( 15 Dec 2017 05:38 )             35.8       12-15    139  |  105  |  8   ----------------------------<  95  3.8   |  29  |  0.63    Ca    8.0<L>      15 Dec 2017 05:38  Phos  2.5     12-15  Mg     2.0     12-15    TPro  5.4<L>  /  Alb  2.7<L>  /  TBili  0.7  /  DBili  x   /  AST  67<H>  /  ALT  67<H>  /  AlkPhos  37<L>  12-14                Radiology Results      Meds    MEDICATIONS  (STANDING):  heparin  Injectable 5000 Unit(s) SubCutaneous every 8 hours  HYDROmorphone  Injectable 1 milliGRAM(s) IV Push every 4 hours  lactated ringers. 1000 milliLiter(s) (100 mL/Hr) IV Continuous <Continuous>  pantoprazole  Injectable 40 milliGRAM(s) IV Push daily      MEDICATIONS  (PRN):  acetaminophen   Tablet 650 milliGRAM(s) Oral every 6 hours PRN mild pain  HYDROmorphone  Injectable 1 milliGRAM(s) IV Push every 2 hours PRN Severe Pain (7 - 10)  ketorolac   Injectable 15 milliGRAM(s) IV Push every 6 hours PRN Moderate Pain (4 - 6)      Physical Exam      Neuro :  no focal deficits  Respiratory: CTA B/L  CV: RRR, S1S2, no murmurs,   Abdominal: Soft, ND, abd dressing cdi, diann x2 with serosanguinous drainage, ngt to wall suction with coffee ground drainage  Extremities: No edema, + peripheral pulses    ASSESSMENT    Malignant neoplasm of stomach  h/o Prediabetes  HLD (hyperlipidemia)  HTN (hypertension)  Malignant neoplasm of stomach, unspecified location  H/O prostate biopsy  History of arthroscopy of right shoulder  History of arthroscopy of left knee  History of appendectomy        PLAN      s/p total gatrectomy with cristobal-en-y esophagojejunal anastomosis  cont wound care   d/c coto  s/p extubation  gi and dvt prophylaxis  incentive spirometer  pulm f/u  cont supplemental O2  cont pain control  pain mgmt f/u  cont current meds Patient is a 62y old  Male who presents with a chief complaint of I have gastric cancer (12 Dec 2017 11:41)    pt seen in icu [ ], reg med floor [ x  ], bed [x  ], chair at bedside [   ], a+o x3 [ x ], lethargic [  ],  nad [ x ]    coto [x  ], ngt to wall suction [ x ], O2 via n/c [x ],      Allergies    No Known Allergies        Vitals    T(F): 97.7 (12-15-17 @ 06:00), Max: 99.3 (12-14-17 @ 19:30)  HR: 75 (12-15-17 @ 06:00) (65 - 89)  BP: 135/59 (12-15-17 @ 06:00) (103/55 - 135/59)  RR: 18 (12-15-17 @ 06:00) (7 - 31)  SpO2: 100% (12-15-17 @ 06:00) (99% - 100%)  Wt(kg): --  CAPILLARY BLOOD GLUCOSE          Labs                          11.9   8.7   )-----------( x        ( 15 Dec 2017 05:38 )             35.8       12-15    139  |  105  |  8   ----------------------------<  95  3.8   |  29  |  0.63    Ca    8.0<L>      15 Dec 2017 05:38  Phos  2.5     12-15  Mg     2.0     12-15    TPro  5.4<L>  /  Alb  2.7<L>  /  TBili  0.7  /  DBili  x   /  AST  67<H>  /  ALT  67<H>  /  AlkPhos  37<L>  12-14                Radiology Results      Meds    MEDICATIONS  (STANDING):  heparin  Injectable 5000 Unit(s) SubCutaneous every 8 hours  HYDROmorphone  Injectable 1 milliGRAM(s) IV Push every 4 hours  lactated ringers. 1000 milliLiter(s) (100 mL/Hr) IV Continuous <Continuous>  pantoprazole  Injectable 40 milliGRAM(s) IV Push daily      MEDICATIONS  (PRN):  acetaminophen   Tablet 650 milliGRAM(s) Oral every 6 hours PRN mild pain  HYDROmorphone  Injectable 1 milliGRAM(s) IV Push every 2 hours PRN Severe Pain (7 - 10)  ketorolac   Injectable 15 milliGRAM(s) IV Push every 6 hours PRN Moderate Pain (4 - 6)      Physical Exam      Neuro :  no focal deficits  Respiratory: CTA B/L  CV: RRR, S1S2, no murmurs,   Abdominal: Soft, ND, abd dressing cdi, diann x2 with serosanguinous drainage, ngt to wall suction with coffee ground drainage  Extremities: No edema, + peripheral pulses    ASSESSMENT    Malignant neoplasm of stomach  h/o Prediabetes  HLD (hyperlipidemia)  HTN (hypertension)  Malignant neoplasm of stomach, unspecified location  H/O prostate biopsy  History of arthroscopy of right shoulder  History of arthroscopy of left knee  History of appendectomy        PLAN      s/p total gatrectomy with cristobal-en-y esophagojejunal anastomosis  cont wound care   d/c coto  s/p extubation  gi and dvt prophylaxis  incentive spirometer  pulm f/u  cont supplemental O2  cont pain control  pain mgmt f/u  cont current meds

## 2017-12-15 NOTE — PHYSICAL THERAPY INITIAL EVALUATION ADULT - GENERAL OBSERVATIONS, REHAB EVAL
Pt. received sitting in his chair; AxOx3; IV line, NGT; reports abdominal pain 5/10; Edema noted on both hands.

## 2017-12-15 NOTE — PROGRESS NOTE ADULT - SUBJECTIVE AND OBJECTIVE BOX
INTERVAL HPI/OVERNIGHT EVENTS:  Pt stable.   NPO   No flatus/BM.  NG tube minimal bilious drainage    Vital Signs Last 24 Hrs  T(C): 36.8 (15 Dec 2017 14:44), Max: 37.4 (14 Dec 2017 19:30)  T(F): 98.3 (15 Dec 2017 14:44), Max: 99.3 (14 Dec 2017 19:30)  HR: 86 (15 Dec 2017 14:44) (68 - 86)  BP: 146/72 (15 Dec 2017 14:44) (105/52 - 146/72)  BP(mean): 74 (14 Dec 2017 18:00) (74 - 74)  RR: 18 (15 Dec 2017 14:44) (16 - 29)  SpO2: 96% (15 Dec 2017 14:44) (96% - 100%)    Physical:  Abdomen: Soft nondistended, nontender.  Dressing clean  JPs serosanguinous drainagw    I&O's Summary    14 Dec 2017 07:01  -  15 Dec 2017 07:00  --------------------------------------------------------  IN: 1412.8 mL / OUT: 3095 mL / NET: -1682.2 mL    15 Dec 2017 07:01  -  15 Dec 2017 17:34  --------------------------------------------------------  IN: 0 mL / OUT: 35 mL / NET: -35 mL        LABS:                        11.9   8.7   )-----------( 100      ( 15 Dec 2017 05:38 )             35.8             12-15    139  |  105  |  8   ----------------------------<  95  3.8   |  29  |  0.63    Ca    8.0<L>      15 Dec 2017 05:38  Phos  2.5     12-15  Mg     2.0     12-15    TPro  5.4<L>  /  Alb  2.7<L>  /  TBili  0.7  /  DBili  x   /  AST  67<H>  /  ALT  67<H>  /  AlkPhos  37<L>  12-14

## 2017-12-16 DIAGNOSIS — R10.9 UNSPECIFIED ABDOMINAL PAIN: ICD-10-CM

## 2017-12-16 DIAGNOSIS — Z90.3 ACQUIRED ABSENCE OF STOMACH [PART OF]: ICD-10-CM

## 2017-12-16 DIAGNOSIS — G89.18 OTHER ACUTE POSTPROCEDURAL PAIN: ICD-10-CM

## 2017-12-16 LAB
ANION GAP SERPL CALC-SCNC: 8 MMOL/L — SIGNIFICANT CHANGE UP (ref 5–17)
BUN SERPL-MCNC: 7 MG/DL — SIGNIFICANT CHANGE UP (ref 7–18)
CALCIUM SERPL-MCNC: 8.5 MG/DL — SIGNIFICANT CHANGE UP (ref 8.4–10.5)
CHLORIDE SERPL-SCNC: 103 MMOL/L — SIGNIFICANT CHANGE UP (ref 96–108)
CO2 SERPL-SCNC: 26 MMOL/L — SIGNIFICANT CHANGE UP (ref 22–31)
CREAT SERPL-MCNC: 0.65 MG/DL — SIGNIFICANT CHANGE UP (ref 0.5–1.3)
GLUCOSE SERPL-MCNC: 107 MG/DL — HIGH (ref 70–99)
HCT VFR BLD CALC: 36.2 % — LOW (ref 39–50)
HGB BLD-MCNC: 12.1 G/DL — LOW (ref 13–17)
MCHC RBC-ENTMCNC: 31.1 PG — SIGNIFICANT CHANGE UP (ref 27–34)
MCHC RBC-ENTMCNC: 33.3 GM/DL — SIGNIFICANT CHANGE UP (ref 32–36)
MCV RBC AUTO: 93.4 FL — SIGNIFICANT CHANGE UP (ref 80–100)
PLATELET # BLD AUTO: 130 K/UL — LOW (ref 150–400)
POTASSIUM SERPL-MCNC: 3.6 MMOL/L — SIGNIFICANT CHANGE UP (ref 3.5–5.3)
POTASSIUM SERPL-SCNC: 3.6 MMOL/L — SIGNIFICANT CHANGE UP (ref 3.5–5.3)
RBC # BLD: 3.88 M/UL — LOW (ref 4.2–5.8)
RBC # FLD: 12.6 % — SIGNIFICANT CHANGE UP (ref 10.3–14.5)
SODIUM SERPL-SCNC: 137 MMOL/L — SIGNIFICANT CHANGE UP (ref 135–145)
WBC # BLD: 7.4 K/UL — SIGNIFICANT CHANGE UP (ref 3.8–10.5)
WBC # FLD AUTO: 7.4 K/UL — SIGNIFICANT CHANGE UP (ref 3.8–10.5)

## 2017-12-16 RX ADMIN — HYDROMORPHONE HYDROCHLORIDE 1 MILLIGRAM(S): 2 INJECTION INTRAMUSCULAR; INTRAVENOUS; SUBCUTANEOUS at 06:00

## 2017-12-16 RX ADMIN — SODIUM CHLORIDE 125 MILLILITER(S): 9 INJECTION, SOLUTION INTRAVENOUS at 12:36

## 2017-12-16 RX ADMIN — HYDROMORPHONE HYDROCHLORIDE 1 MILLIGRAM(S): 2 INJECTION INTRAMUSCULAR; INTRAVENOUS; SUBCUTANEOUS at 22:18

## 2017-12-16 RX ADMIN — HYDROMORPHONE HYDROCHLORIDE 1 MILLIGRAM(S): 2 INJECTION INTRAMUSCULAR; INTRAVENOUS; SUBCUTANEOUS at 05:30

## 2017-12-16 RX ADMIN — HEPARIN SODIUM 5000 UNIT(S): 5000 INJECTION INTRAVENOUS; SUBCUTANEOUS at 15:09

## 2017-12-16 RX ADMIN — HEPARIN SODIUM 5000 UNIT(S): 5000 INJECTION INTRAVENOUS; SUBCUTANEOUS at 05:29

## 2017-12-16 RX ADMIN — PANTOPRAZOLE SODIUM 40 MILLIGRAM(S): 20 TABLET, DELAYED RELEASE ORAL at 12:36

## 2017-12-16 RX ADMIN — SODIUM CHLORIDE 125 MILLILITER(S): 9 INJECTION, SOLUTION INTRAVENOUS at 17:32

## 2017-12-16 RX ADMIN — HYDROMORPHONE HYDROCHLORIDE 1 MILLIGRAM(S): 2 INJECTION INTRAMUSCULAR; INTRAVENOUS; SUBCUTANEOUS at 12:36

## 2017-12-16 RX ADMIN — HYDROMORPHONE HYDROCHLORIDE 1 MILLIGRAM(S): 2 INJECTION INTRAMUSCULAR; INTRAVENOUS; SUBCUTANEOUS at 18:51

## 2017-12-16 RX ADMIN — HYDROMORPHONE HYDROCHLORIDE 1 MILLIGRAM(S): 2 INJECTION INTRAMUSCULAR; INTRAVENOUS; SUBCUTANEOUS at 17:31

## 2017-12-16 RX ADMIN — HYDROMORPHONE HYDROCHLORIDE 1 MILLIGRAM(S): 2 INJECTION INTRAMUSCULAR; INTRAVENOUS; SUBCUTANEOUS at 22:40

## 2017-12-16 RX ADMIN — HYDROMORPHONE HYDROCHLORIDE 1 MILLIGRAM(S): 2 INJECTION INTRAMUSCULAR; INTRAVENOUS; SUBCUTANEOUS at 00:20

## 2017-12-16 RX ADMIN — HEPARIN SODIUM 5000 UNIT(S): 5000 INJECTION INTRAVENOUS; SUBCUTANEOUS at 21:37

## 2017-12-16 RX ADMIN — HYDROMORPHONE HYDROCHLORIDE 1 MILLIGRAM(S): 2 INJECTION INTRAMUSCULAR; INTRAVENOUS; SUBCUTANEOUS at 13:07

## 2017-12-16 NOTE — PROGRESS NOTE ADULT - SUBJECTIVE AND OBJECTIVE BOX
POD#3  Alert and awake, NAD. reports mild incisional pain  NGT to lws    Vital Signs:  T(C): 37.6 (16 Dec 2017 06:12), Max: 37.6 (16 Dec 2017 06:12)  T(F): 99.6 (16 Dec 2017 06:12), Max: 99.6 (16 Dec 2017 06:12)  HR: 87 (16 Dec 2017 06:12) (68 - 87)  BP: 125/60 (16 Dec 2017 06:12) (116/77 - 146/72)  RR: 16 (16 Dec 2017 06:12) (16 - 19)  SpO2: 96% (16 Dec 2017 06:12) (95% - 96%)    Physical Exam:  General: NAD, comfortable  Abdomen: soft, nondistended. all dressings C/D/I. 2 SOSA drains to self suction, both outputs serosang (20/45mL)    IN:    fentaNYL  Infusion: 629.2 mL    lactated ringers.: 1100 mL    lactated ringers.: 600 mL    Solution: 50 mL    Solution: 187.5 mL    Solution: 500 mL    Solution: 300 mL  Total IN: 3366.7 mL                                       12.1   7.4   )-----------( 130      ( 16 Dec 2017 06:17 )             36.2   12-16    137  |  103  |  7   ----------------------------<  107<H>  3.6   |  26  |  0.65    Ca    8.5      16 Dec 2017 06:17  Phos  2.5     12-15  Mg     2.0     12-15

## 2017-12-16 NOTE — PROGRESS NOTE ADULT - SUBJECTIVE AND OBJECTIVE BOX
Patient is a 62y old  Male who presents with a chief complaint of I have gastric cancer (12 Dec 2017 11:41)      pt seen in icu [ ], reg med floor [ x  ], bed [x  ], chair at bedside [   ], a+o x3 [ x ], lethargic [  ],  nad [ x ]    coto [x  ], ngt to wall suction [ x ], O2 via n/c [x ],      Allergies    No Known Allergies        Vitals    T(F): 99.6 (12-16-17 @ 06:12), Max: 99.6 (12-16-17 @ 06:12)  HR: 87 (12-16-17 @ 06:12) (68 - 87)  BP: 125/60 (12-16-17 @ 06:12) (116/77 - 146/72)  RR: 16 (12-16-17 @ 06:12) (16 - 19)  SpO2: 96% (12-16-17 @ 06:12) (95% - 96%)  Wt(kg): --  CAPILLARY BLOOD GLUCOSE          Labs                          11.9   8.7   )-----------( 100      ( 15 Dec 2017 05:38 )             35.8       12-16    137  |  103  |  7   ----------------------------<  107<H>  3.6   |  26  |  0.65    Ca    8.5      16 Dec 2017 06:17  Phos  2.5     12-15  Mg     2.0     12-15                  Radiology Results      Meds    MEDICATIONS  (STANDING):  dextrose 5% + sodium chloride 0.45% 1000 milliLiter(s) (125 mL/Hr) IV Continuous <Continuous>  heparin  Injectable 5000 Unit(s) SubCutaneous every 8 hours  pantoprazole  Injectable 40 milliGRAM(s) IV Push daily      MEDICATIONS  (PRN):  acetaminophen   Tablet 650 milliGRAM(s) Oral every 6 hours PRN mild pain  HYDROmorphone  Injectable 1 milliGRAM(s) IV Push every 2 hours PRN Severe Pain (7 - 10)  ketorolac   Injectable 15 milliGRAM(s) IV Push every 6 hours PRN Moderate Pain (4 - 6)      Physical Exam      Neuro :  no focal deficits  Respiratory: CTA B/L  CV: RRR, S1S2, no murmurs,   Abdominal: Soft, ND, abd dressing cdi, diann x2 with serosanguinous drainage, ngt to wall suction with coffee ground drainage  Extremities: No edema, + peripheral pulses    ASSESSMENT    Malignant neoplasm of stomach  h/o Prediabetes  HLD (hyperlipidemia)  HTN (hypertension)  Malignant neoplasm of stomach, unspecified location  H/O prostate biopsy  History of arthroscopy of right shoulder  History of arthroscopy of left knee  History of appendectomy        PLAN      s/p total gatrectomy with cristobal-en-y esophagojejunal anastomosis  cont wound care   d/c coto  s/p extubation  gi and dvt prophylaxis  incentive spirometer  pulm f/u  cont supplemental O2  cont pain control  pain mgmt f/u  cont current meds Patient is a 62y old  Male who presents with a chief complaint of I have gastric cancer (12 Dec 2017 11:41)      pt seen in icu [ ], reg med floor [ x  ], bed [ ], chair at bedside [ x  ], a+o x3 [ x ], lethargic [  ],  nad [ x ]    coto [x  ], ngt to wall suction [ x ], O2 via n/c [x ],      Allergies    No Known Allergies        Vitals    T(F): 99.6 (12-16-17 @ 06:12), Max: 99.6 (12-16-17 @ 06:12)  HR: 87 (12-16-17 @ 06:12) (68 - 87)  BP: 125/60 (12-16-17 @ 06:12) (116/77 - 146/72)  RR: 16 (12-16-17 @ 06:12) (16 - 19)  SpO2: 96% (12-16-17 @ 06:12) (95% - 96%)  Wt(kg): --  CAPILLARY BLOOD GLUCOSE          Labs                          11.9   8.7   )-----------( 100      ( 15 Dec 2017 05:38 )             35.8       12-16    137  |  103  |  7   ----------------------------<  107<H>  3.6   |  26  |  0.65    Ca    8.5      16 Dec 2017 06:17  Phos  2.5     12-15  Mg     2.0     12-15                  Radiology Results      Meds    MEDICATIONS  (STANDING):  dextrose 5% + sodium chloride 0.45% 1000 milliLiter(s) (125 mL/Hr) IV Continuous <Continuous>  heparin  Injectable 5000 Unit(s) SubCutaneous every 8 hours  pantoprazole  Injectable 40 milliGRAM(s) IV Push daily      MEDICATIONS  (PRN):  acetaminophen   Tablet 650 milliGRAM(s) Oral every 6 hours PRN mild pain  HYDROmorphone  Injectable 1 milliGRAM(s) IV Push every 2 hours PRN Severe Pain (7 - 10)  ketorolac   Injectable 15 milliGRAM(s) IV Push every 6 hours PRN Moderate Pain (4 - 6)      Physical Exam      Neuro :  no focal deficits  Respiratory: CTA B/L  CV: RRR, S1S2, no murmurs,   Abdominal: Soft, ND, abd dressing cdi, diann x2 with serosanguinous drainage, ngt to wall suction with coffee ground drainage  Extremities: No edema, + peripheral pulses    ASSESSMENT    Malignant neoplasm of stomach  h/o Prediabetes  HLD (hyperlipidemia)  HTN (hypertension)  Malignant neoplasm of stomach, unspecified location  H/O prostate biopsy  History of arthroscopy of right shoulder  History of arthroscopy of left knee  History of appendectomy        PLAN      s/p total gatrectomy with cristobal-en-y esophagojejunal anastomosis  cont wound care   d/c coto  s/p extubation  gi and dvt prophylaxis  incentive spirometer  pulm f/u  cont supplemental O2  cont pain control  pain mgmt f/u  cont current meds

## 2017-12-16 NOTE — PROGRESS NOTE ADULT - SUBJECTIVE AND OBJECTIVE BOX
Patient is a 62y old  Male who presents with a chief complaint of  gastric cancer.  PMH of Hypertension, hyperlipidemia, prediabetes, herniated lumbar disc. Pt had gastrectomy done. Currently extubated,     awake, alert with NGT in place.  Lying in bed in NAD.      INTERVAL HPI/OVERNIGHT EVENTS:      VITAL SIGNS:  T(F): 99.6 (12-16-17 @ 06:12)  HR: 87 (12-16-17 @ 06:12)  BP: 125/60 (12-16-17 @ 06:12)  RR: 16 (12-16-17 @ 06:12)  SpO2: 96% (12-16-17 @ 06:12)  Wt(kg): --  I&O's Detail    15 Dec 2017 07:01  -  16 Dec 2017 07:00  --------------------------------------------------------  IN:  Total IN: 0 mL    OUT:    Bulb: 20 mL    Bulb: 45 mL    Nasoenteral Tube: 100 mL  Total OUT: 165 mL    Total NET: -165 mL              REVIEW OF SYSTEMS:    CONSTITUTIONAL:  No fevers, chills, sweats    HEENT:  Eyes:  No diplopia or blurred vision. ENT:  No earache, sore throat or runny nose.    CARDIOVASCULAR:  No pressure, squeezing, tightness, or heaviness about the chest; no palpitations.    RESPIRATORY:  Per HPI    GASTROINTESTINAL:  +abdominal pain    GENITOURINARY:  No dysuria, frequency or urgency.    NEUROLOGIC:  No paresthesias, fasciculations, seizures or weakness.    PSYCHIATRIC:  No disorder of thought or mood.      PHYSICAL EXAM:    Constitutional: Well developed and nourished  Eyes:Perrla  ENMT: normal  Neck:supple  Respiratory: good air entry  Cardiovascular: S1 S2 regular  Gastrointestinal: Soft, incisional tenderness  Extremities: No edema  Vascular:normal  Neurological:Awake, alert,Ox3  Musculoskeletal:Normal      MEDICATIONS  (STANDING):  dextrose 5% + sodium chloride 0.45% 1000 milliLiter(s) (125 mL/Hr) IV Continuous <Continuous>  heparin  Injectable 5000 Unit(s) SubCutaneous every 8 hours  pantoprazole  Injectable 40 milliGRAM(s) IV Push daily    MEDICATIONS  (PRN):  acetaminophen   Tablet 650 milliGRAM(s) Oral every 6 hours PRN mild pain  HYDROmorphone  Injectable 1 milliGRAM(s) IV Push every 2 hours PRN Severe Pain (7 - 10)  ketorolac   Injectable 15 milliGRAM(s) IV Push every 6 hours PRN Moderate Pain (4 - 6)      Allergies    No Known Allergies    Intolerances        LABS:                        12.1   7.4   )-----------( 130      ( 16 Dec 2017 06:17 )             36.2     12-16    137  |  103  |  7   ----------------------------<  107<H>  3.6   |  26  |  0.65    Ca    8.5      16 Dec 2017 06:17  Phos  2.5     12-15  Mg     2.0     12-15                CAPILLARY BLOOD GLUCOSE            RADIOLOGY & ADDITIONAL TESTS:    CXR:  < from: Xray Chest 1 View AP -PORTABLE-Routine (12.14.17 @ 07:03) >  IMPRESSION:  ET and enteric tubes remain in place.   Streaky bilateral mid to lower lung opacities, likely atelectasis.  No pneumothorax or pleural effusion.   The cardiac silhouette is within normal limits in size.    < end of copied text >    Ct scan chest:    ekg;    echo: Patient is a 62y old  Male who presents with a chief complaint of  gastric cancer.  PMH of Hypertension, hyperlipidemia, prediabetes, herniated lumbar disc. Pt had gastrectomy done. Currently extubated,     awake, alert with NGT in place.  Out of bed in NAD. No passing flatus as yet.      INTERVAL HPI/OVERNIGHT EVENTS:      VITAL SIGNS:  T(F): 99.6 (12-16-17 @ 06:12)  HR: 87 (12-16-17 @ 06:12)  BP: 125/60 (12-16-17 @ 06:12)  RR: 16 (12-16-17 @ 06:12)  SpO2: 96% (12-16-17 @ 06:12)  Wt(kg): --  I&O's Detail    15 Dec 2017 07:01  -  16 Dec 2017 07:00  --------------------------------------------------------  IN:  Total IN: 0 mL    OUT:    Bulb: 20 mL    Bulb: 45 mL    Nasoenteral Tube: 100 mL  Total OUT: 165 mL    Total NET: -165 mL              REVIEW OF SYSTEMS:    CONSTITUTIONAL:  No fevers, chills, sweats    HEENT:  Eyes:  No diplopia or blurred vision. ENT:  No earache, sore throat or runny nose.    CARDIOVASCULAR:  No pressure, squeezing, tightness, or heaviness about the chest; no palpitations.    RESPIRATORY:  Per HPI    GASTROINTESTINAL:  +abdominal pain    GENITOURINARY:  No dysuria, frequency or urgency.    NEUROLOGIC:  No paresthesias, fasciculations, seizures or weakness.    PSYCHIATRIC:  No disorder of thought or mood.      PHYSICAL EXAM:    Constitutional: Well developed and nourished  Eyes:Perrla  ENMT: normal  Neck:supple  Respiratory: good air entry  Cardiovascular: S1 S2 regular  Gastrointestinal: Soft, incisional tenderness  Extremities: No edema  Vascular:normal  Neurological:Awake, alert,Ox3  Musculoskeletal:Normal      MEDICATIONS  (STANDING):  dextrose 5% + sodium chloride 0.45% 1000 milliLiter(s) (125 mL/Hr) IV Continuous <Continuous>  heparin  Injectable 5000 Unit(s) SubCutaneous every 8 hours  pantoprazole  Injectable 40 milliGRAM(s) IV Push daily    MEDICATIONS  (PRN):  acetaminophen   Tablet 650 milliGRAM(s) Oral every 6 hours PRN mild pain  HYDROmorphone  Injectable 1 milliGRAM(s) IV Push every 2 hours PRN Severe Pain (7 - 10)  ketorolac   Injectable 15 milliGRAM(s) IV Push every 6 hours PRN Moderate Pain (4 - 6)      Allergies    No Known Allergies    Intolerances        LABS:                        12.1   7.4   )-----------( 130      ( 16 Dec 2017 06:17 )             36.2     12-16    137  |  103  |  7   ----------------------------<  107<H>  3.6   |  26  |  0.65    Ca    8.5      16 Dec 2017 06:17  Phos  2.5     12-15  Mg     2.0     12-15                CAPILLARY BLOOD GLUCOSE            RADIOLOGY & ADDITIONAL TESTS:    CXR:  < from: Xray Chest 1 View AP -PORTABLE-Routine (12.14.17 @ 07:03) >  IMPRESSION:  ET and enteric tubes remain in place.   Streaky bilateral mid to lower lung opacities, likely atelectasis.  No pneumothorax or pleural effusion.   The cardiac silhouette is within normal limits in size.    < end of copied text >    Ct scan chest:    ekg;    echo:

## 2017-12-16 NOTE — DIETITIAN INITIAL EVALUATION ADULT. - OTHER INFO
nutrition assessment for NPO status; lives home PTA; no pressure ulcer; NPO x 4d -in-house; NGT to LWS, minimal drainage, 100 ml/past 24h;

## 2017-12-16 NOTE — PROGRESS NOTE ADULT - SUBJECTIVE AND OBJECTIVE BOX
INTERVAL HPI/OVERNIGHT EVENTS:  Pt stable.   NPO  No flatus/BM.  NG tube minimal drainage  Ambulating    Vital Signs Last 24 Hrs  T(C): 37.6 (16 Dec 2017 06:12), Max: 37.6 (16 Dec 2017 06:12)  T(F): 99.6 (16 Dec 2017 06:12), Max: 99.6 (16 Dec 2017 06:12)  HR: 87 (16 Dec 2017 06:12) (68 - 87)  BP: 125/60 (16 Dec 2017 06:12) (116/77 - 146/72)  BP(mean): --  RR: 16 (16 Dec 2017 06:12) (16 - 19)  SpO2: 96% (16 Dec 2017 06:12) (95% - 96%)    Physical:  Abdomen: Soft nondistended, nontender.  Wound clean  JPs serous drainage    I&O's Summary    15 Dec 2017 07:01  -  16 Dec 2017 07:00  --------------------------------------------------------  IN: 0 mL / OUT: 165 mL / NET: -165 mL        LABS:                        12.1   7.4   )-----------( 130      ( 16 Dec 2017 06:17 )             36.2             12-16    137  |  103  |  7   ----------------------------<  107<H>  3.6   |  26  |  0.65    Ca    8.5      16 Dec 2017 06:17  Phos  2.5     12-15  Mg     2.0     12-15

## 2017-12-17 LAB
ANION GAP SERPL CALC-SCNC: 10 MMOL/L — SIGNIFICANT CHANGE UP (ref 5–17)
BUN SERPL-MCNC: 7 MG/DL — SIGNIFICANT CHANGE UP (ref 7–18)
CALCIUM SERPL-MCNC: 8.7 MG/DL — SIGNIFICANT CHANGE UP (ref 8.4–10.5)
CHLORIDE SERPL-SCNC: 102 MMOL/L — SIGNIFICANT CHANGE UP (ref 96–108)
CO2 SERPL-SCNC: 24 MMOL/L — SIGNIFICANT CHANGE UP (ref 22–31)
CREAT SERPL-MCNC: 0.68 MG/DL — SIGNIFICANT CHANGE UP (ref 0.5–1.3)
GLUCOSE BLDC GLUCOMTR-MCNC: 98 MG/DL — SIGNIFICANT CHANGE UP (ref 70–99)
GLUCOSE SERPL-MCNC: 128 MG/DL — HIGH (ref 70–99)
HCT VFR BLD CALC: 34.8 % — LOW (ref 39–50)
HGB BLD-MCNC: 12 G/DL — LOW (ref 13–17)
MCHC RBC-ENTMCNC: 32.4 PG — SIGNIFICANT CHANGE UP (ref 27–34)
MCHC RBC-ENTMCNC: 34.4 GM/DL — SIGNIFICANT CHANGE UP (ref 32–36)
MCV RBC AUTO: 94.3 FL — SIGNIFICANT CHANGE UP (ref 80–100)
PLATELET # BLD AUTO: 145 K/UL — LOW (ref 150–400)
POTASSIUM SERPL-MCNC: 3.5 MMOL/L — SIGNIFICANT CHANGE UP (ref 3.5–5.3)
POTASSIUM SERPL-SCNC: 3.5 MMOL/L — SIGNIFICANT CHANGE UP (ref 3.5–5.3)
RBC # BLD: 3.69 M/UL — LOW (ref 4.2–5.8)
RBC # FLD: 12.4 % — SIGNIFICANT CHANGE UP (ref 10.3–14.5)
SODIUM SERPL-SCNC: 136 MMOL/L — SIGNIFICANT CHANGE UP (ref 135–145)
WBC # BLD: 6.3 K/UL — SIGNIFICANT CHANGE UP (ref 3.8–10.5)
WBC # FLD AUTO: 6.3 K/UL — SIGNIFICANT CHANGE UP (ref 3.8–10.5)

## 2017-12-17 RX ADMIN — SODIUM CHLORIDE 125 MILLILITER(S): 9 INJECTION, SOLUTION INTRAVENOUS at 13:17

## 2017-12-17 RX ADMIN — HYDROMORPHONE HYDROCHLORIDE 1 MILLIGRAM(S): 2 INJECTION INTRAMUSCULAR; INTRAVENOUS; SUBCUTANEOUS at 21:57

## 2017-12-17 RX ADMIN — HYDROMORPHONE HYDROCHLORIDE 1 MILLIGRAM(S): 2 INJECTION INTRAMUSCULAR; INTRAVENOUS; SUBCUTANEOUS at 22:20

## 2017-12-17 RX ADMIN — HYDROMORPHONE HYDROCHLORIDE 1 MILLIGRAM(S): 2 INJECTION INTRAMUSCULAR; INTRAVENOUS; SUBCUTANEOUS at 01:23

## 2017-12-17 RX ADMIN — HYDROMORPHONE HYDROCHLORIDE 1 MILLIGRAM(S): 2 INJECTION INTRAMUSCULAR; INTRAVENOUS; SUBCUTANEOUS at 01:35

## 2017-12-17 RX ADMIN — HEPARIN SODIUM 5000 UNIT(S): 5000 INJECTION INTRAVENOUS; SUBCUTANEOUS at 13:17

## 2017-12-17 RX ADMIN — PANTOPRAZOLE SODIUM 40 MILLIGRAM(S): 20 TABLET, DELAYED RELEASE ORAL at 13:17

## 2017-12-17 RX ADMIN — HYDROMORPHONE HYDROCHLORIDE 1 MILLIGRAM(S): 2 INJECTION INTRAMUSCULAR; INTRAVENOUS; SUBCUTANEOUS at 07:07

## 2017-12-17 RX ADMIN — HYDROMORPHONE HYDROCHLORIDE 1 MILLIGRAM(S): 2 INJECTION INTRAMUSCULAR; INTRAVENOUS; SUBCUTANEOUS at 06:44

## 2017-12-17 RX ADMIN — HEPARIN SODIUM 5000 UNIT(S): 5000 INJECTION INTRAVENOUS; SUBCUTANEOUS at 21:58

## 2017-12-17 RX ADMIN — HEPARIN SODIUM 5000 UNIT(S): 5000 INJECTION INTRAVENOUS; SUBCUTANEOUS at 05:22

## 2017-12-17 NOTE — PROGRESS NOTE ADULT - SUBJECTIVE AND OBJECTIVE BOX
Patient is a 62y old  Male who presents with a chief complaint of I have gastric cancer (12 Dec 2017 11:41)  awake, alert, comfortable in bed in NAD. NGT is out. Passing flatus.    INTERVAL HPI/OVERNIGHT EVENTS:      VITAL SIGNS:  T(F): 98.1 (12-16-17 @ 22:00)  HR: 83 (12-16-17 @ 22:00)  BP: 135/74 (12-16-17 @ 22:00)  RR: 16 (12-16-17 @ 22:00)  SpO2: 99% (12-16-17 @ 22:00)  Wt(kg): --  I&O's Detail    16 Dec 2017 07:01  -  17 Dec 2017 07:00  --------------------------------------------------------  IN:  Total IN: 0 mL    OUT:    Bulb: 50 mL    Bulb: 22 mL    Nasoenteral Tube: 230 mL  Total OUT: 302 mL    Total NET: -302 mL              REVIEW OF SYSTEMS:    CONSTITUTIONAL:  No fevers, chills, sweats    HEENT:  Eyes:  No diplopia or blurred vision. ENT:  No earache, sore throat or runny nose.    CARDIOVASCULAR:  No pressure, squeezing, tightness, or heaviness about the chest; no palpitations.    RESPIRATORY:  Per HPI    GASTROINTESTINAL:  No abdominal pain, nausea, vomiting or diarrhea.    GENITOURINARY:  No dysuria, frequency or urgency.    NEUROLOGIC:  No paresthesias, fasciculations, seizures or weakness.    PSYCHIATRIC:  No disorder of thought or mood.      PHYSICAL EXAM:    Constitutional: Well developed and nourished  Eyes:Perrla  ENMT: normal  Neck:supple  Respiratory: good air entry  Cardiovascular: S1 S2 regular  Gastrointestinal: Soft, Incisional tenderness  Extremities: No edema  Vascular:normal  Neurological:Awake, alert,Ox3  Musculoskeletal:Normal      MEDICATIONS  (STANDING):  dextrose 5% + sodium chloride 0.45% 1000 milliLiter(s) (125 mL/Hr) IV Continuous <Continuous>  heparin  Injectable 5000 Unit(s) SubCutaneous every 8 hours  pantoprazole  Injectable 40 milliGRAM(s) IV Push daily    MEDICATIONS  (PRN):  acetaminophen   Tablet 650 milliGRAM(s) Oral every 6 hours PRN mild pain  HYDROmorphone  Injectable 1 milliGRAM(s) IV Push every 2 hours PRN Severe Pain (7 - 10)  ketorolac   Injectable 15 milliGRAM(s) IV Push every 6 hours PRN Moderate Pain (4 - 6)      Allergies    No Known Allergies    Intolerances        LABS:                        12.0   6.3   )-----------( 145      ( 17 Dec 2017 07:52 )             34.8     12-17    136  |  102  |  7   ----------------------------<  128<H>  3.5   |  24  |  0.68    Ca    8.7      17 Dec 2017 07:52                CAPILLARY BLOOD GLUCOSE            RADIOLOGY & ADDITIONAL TESTS:    CXR:    Ct scan chest:    ekg;    echo:

## 2017-12-17 NOTE — PROGRESS NOTE ADULT - ASSESSMENT
S/P total gastrectomy with Kelby-en-Y esophagojejunal anastomosis  POD # 4  hemodynamically stable    -OOB, IS  -pain management  -NPO, IVF  -NGT to gravity  -GI/DVT prophylaxis

## 2017-12-17 NOTE — PROGRESS NOTE ADULT - SUBJECTIVE AND OBJECTIVE BOX
INTERVAL HPI/OVERNIGHT EVENTS:  Pt stable.   NPO  flatus, no BM  NG tube minimal drainage    Vital Signs Last 24 Hrs  T(C): 36.7 (16 Dec 2017 22:00), Max: 37 (16 Dec 2017 14:53)  T(F): 98.1 (16 Dec 2017 22:00), Max: 98.6 (16 Dec 2017 14:53)  HR: 83 (16 Dec 2017 22:00) (78 - 83)  BP: 135/74 (16 Dec 2017 22:00) (112/82 - 135/74)  BP(mean): --  RR: 16 (16 Dec 2017 22:00) (16 - 17)  SpO2: 99% (16 Dec 2017 22:00) (95% - 99%)    Physical:  Abdomen: Soft nondistended, nontender.  JPs serosanguinous drainage    I&O's Summary    16 Dec 2017 07:01  -  17 Dec 2017 07:00  --------------------------------------------------------  IN: 0 mL / OUT: 302 mL / NET: -302 mL        LABS:                        12.0   6.3   )-----------( 145      ( 17 Dec 2017 07:52 )             34.8             12-17    136  |  102  |  7   ----------------------------<  128<H>  3.5   |  24  |  0.68    Ca    8.7      17 Dec 2017 07:52

## 2017-12-17 NOTE — PROGRESS NOTE ADULT - SUBJECTIVE AND OBJECTIVE BOX
Pt c/o mild incisional pain    PE:  A & O x 3, NAD    Vital Signs Last 24 Hrs  T(C): 36.7 (16 Dec 2017 22:00), Max: 37 (16 Dec 2017 14:53)  T(F): 98.1 (16 Dec 2017 22:00), Max: 98.6 (16 Dec 2017 14:53)  HR: 83 (16 Dec 2017 22:00) (78 - 83)  BP: 135/74 (16 Dec 2017 22:00) (112/82 - 135/74)  BP(mean): --  RR: 16 (16 Dec 2017 22:00) (16 - 17)  SpO2: 99% (16 Dec 2017 22:00) (95% - 99%)    Chest: B/l BS, decreased at bases  CVS: S1S2, RRR  Abdomen: soft, ND, +BS, dressing C/D/I; SOSA x 2 with ss draiange  Ext: no edema, no calf tenderness                          12.0   6.3   )-----------( 145      ( 17 Dec 2017 07:52 )             34.8     12-17    136  |  102  |  7   ----------------------------<  128<H>  3.5   |  24  |  0.68    Ca    8.7      17 Dec 2017 07:52      I&O's Detail    16 Dec 2017 07:01  -  17 Dec 2017 07:00  --------------------------------------------------------  IN:  Total IN: 0 mL    OUT:    Bulb: 50 mL    Bulb: 22 mL    Nasoenteral Tube: 230 mL  Total OUT: 302 mL    Total NET: -302 mL        MEDICATIONS  (STANDING):  dextrose 5% + sodium chloride 0.45% 1000 milliLiter(s) (125 mL/Hr) IV Continuous <Continuous>  heparin  Injectable 5000 Unit(s) SubCutaneous every 8 hours  pantoprazole  Injectable 40 milliGRAM(s) IV Push daily    MEDICATIONS  (PRN):  acetaminophen   Tablet 650 milliGRAM(s) Oral every 6 hours PRN mild pain  HYDROmorphone  Injectable 1 milliGRAM(s) IV Push every 2 hours PRN Severe Pain (7 - 10)  ketorolac   Injectable 15 milliGRAM(s) IV Push every 6 hours PRN Moderate Pain (4 - 6)

## 2017-12-17 NOTE — PROGRESS NOTE ADULT - SUBJECTIVE AND OBJECTIVE BOX
Patient is a 62y old  Male who presents with a chief complaint of I have gastric cancer (12 Dec 2017 11:41)      pt seen in icu [ ], reg med floor [ x  ], bed [ ], chair at bedside [ x  ], a+o x3 [ x ], lethargic [  ],  nad [ x ]    coto [x  ], ngt to wall suction [ x ], O2 via n/c [x ],      Allergies    No Known Allergies        Vitals    T(F): 98.1 (12-16-17 @ 22:00), Max: 98.6 (12-16-17 @ 14:53)  HR: 83 (12-16-17 @ 22:00) (78 - 83)  BP: 135/74 (12-16-17 @ 22:00) (112/82 - 135/74)  RR: 16 (12-16-17 @ 22:00) (16 - 17)  SpO2: 99% (12-16-17 @ 22:00) (95% - 99%)  Wt(kg): --  CAPILLARY BLOOD GLUCOSE          Labs                          12.1   7.4   )-----------( 130      ( 16 Dec 2017 06:17 )             36.2       12-16    137  |  103  |  7   ----------------------------<  107<H>  3.6   |  26  |  0.65    Ca    8.5      16 Dec 2017 06:17                  Radiology Results      Meds    MEDICATIONS  (STANDING):  dextrose 5% + sodium chloride 0.45% 1000 milliLiter(s) (125 mL/Hr) IV Continuous <Continuous>  heparin  Injectable 5000 Unit(s) SubCutaneous every 8 hours  pantoprazole  Injectable 40 milliGRAM(s) IV Push daily      MEDICATIONS  (PRN):  acetaminophen   Tablet 650 milliGRAM(s) Oral every 6 hours PRN mild pain  HYDROmorphone  Injectable 1 milliGRAM(s) IV Push every 2 hours PRN Severe Pain (7 - 10)  ketorolac   Injectable 15 milliGRAM(s) IV Push every 6 hours PRN Moderate Pain (4 - 6)      Physical Exam    Neuro :  no focal deficits  Respiratory: CTA B/L  CV: RRR, S1S2, no murmurs,   Abdominal: Soft, ND, abd dressing cdi, diann x2 with serosanguinous drainage, ngt to wall suction with coffee ground drainage  Extremities: No edema, + peripheral pulses    ASSESSMENT    Malignant neoplasm of stomach  h/o Prediabetes  HLD (hyperlipidemia)  HTN (hypertension)  Malignant neoplasm of stomach, unspecified location  H/O prostate biopsy  History of arthroscopy of right shoulder  History of arthroscopy of left knee  History of appendectomy        PLAN      s/p total gatrectomy with cristobal-en-y esophagojejunal anastomosis  cont wound care   d/c coto  s/p extubation  gi and dvt prophylaxis  incentive spirometer  pulm f/u  cont supplemental O2  cont pain control  pain mgmt f/u  cont current meds Patient is a 62y old  Male who presents with a chief complaint of I have gastric cancer (12 Dec 2017 11:41)      pt seen in icu [ ], reg med floor [ x  ], bed [ ], chair at bedside [ x  ], a+o x3 [ x ], lethargic [  ],  nad [ x ]    coto [  ], ngt to bsd[ x ],       Allergies    No Known Allergies        Vitals    T(F): 98.1 (12-16-17 @ 22:00), Max: 98.6 (12-16-17 @ 14:53)  HR: 83 (12-16-17 @ 22:00) (78 - 83)  BP: 135/74 (12-16-17 @ 22:00) (112/82 - 135/74)  RR: 16 (12-16-17 @ 22:00) (16 - 17)  SpO2: 99% (12-16-17 @ 22:00) (95% - 99%)  Wt(kg): --  CAPILLARY BLOOD GLUCOSE          Labs                          12.1   7.4   )-----------( 130      ( 16 Dec 2017 06:17 )             36.2       12-16    137  |  103  |  7   ----------------------------<  107<H>  3.6   |  26  |  0.65    Ca    8.5      16 Dec 2017 06:17                  Radiology Results      Meds    MEDICATIONS  (STANDING):  dextrose 5% + sodium chloride 0.45% 1000 milliLiter(s) (125 mL/Hr) IV Continuous <Continuous>  heparin  Injectable 5000 Unit(s) SubCutaneous every 8 hours  pantoprazole  Injectable 40 milliGRAM(s) IV Push daily      MEDICATIONS  (PRN):  acetaminophen   Tablet 650 milliGRAM(s) Oral every 6 hours PRN mild pain  HYDROmorphone  Injectable 1 milliGRAM(s) IV Push every 2 hours PRN Severe Pain (7 - 10)  ketorolac   Injectable 15 milliGRAM(s) IV Push every 6 hours PRN Moderate Pain (4 - 6)      Physical Exam    Neuro :  no focal deficits  Respiratory: CTA B/L  CV: RRR, S1S2, no murmurs,   Abdominal: Soft, ND, abd dressing cdi, diann x2 with serosanguinous drainage, ngt to bsd with small amt coffee ground drainage  Extremities: No edema, + peripheral pulses    ASSESSMENT    Malignant neoplasm of stomach  h/o Prediabetes  HLD (hyperlipidemia)  HTN (hypertension)  Malignant neoplasm of stomach, unspecified location  H/O prostate biopsy  History of arthroscopy of right shoulder  History of arthroscopy of left knee  History of appendectomy        PLAN      s/p total gatrectomy with cristobal-en-y esophagojejunal anastomosis  cont wound care   s/p extubation  gi and dvt prophylaxis  incentive spirometer  pulm f/u  cont supplemental O2  cont pain control  pain mgmt f/u  cont current meds

## 2017-12-18 LAB
ANION GAP SERPL CALC-SCNC: 7 MMOL/L — SIGNIFICANT CHANGE UP (ref 5–17)
BUN SERPL-MCNC: 9 MG/DL — SIGNIFICANT CHANGE UP (ref 7–18)
CALCIUM SERPL-MCNC: 9.1 MG/DL — SIGNIFICANT CHANGE UP (ref 8.4–10.5)
CHLORIDE SERPL-SCNC: 105 MMOL/L — SIGNIFICANT CHANGE UP (ref 96–108)
CO2 SERPL-SCNC: 26 MMOL/L — SIGNIFICANT CHANGE UP (ref 22–31)
CREAT SERPL-MCNC: 0.74 MG/DL — SIGNIFICANT CHANGE UP (ref 0.5–1.3)
GLUCOSE SERPL-MCNC: 109 MG/DL — HIGH (ref 70–99)
HCT VFR BLD CALC: 37.2 % — LOW (ref 39–50)
HGB BLD-MCNC: 12.7 G/DL — LOW (ref 13–17)
MCHC RBC-ENTMCNC: 31.6 PG — SIGNIFICANT CHANGE UP (ref 27–34)
MCHC RBC-ENTMCNC: 34 GM/DL — SIGNIFICANT CHANGE UP (ref 32–36)
MCV RBC AUTO: 93 FL — SIGNIFICANT CHANGE UP (ref 80–100)
PLATELET # BLD AUTO: 173 K/UL — SIGNIFICANT CHANGE UP (ref 150–400)
POTASSIUM SERPL-MCNC: 4 MMOL/L — SIGNIFICANT CHANGE UP (ref 3.5–5.3)
POTASSIUM SERPL-SCNC: 4 MMOL/L — SIGNIFICANT CHANGE UP (ref 3.5–5.3)
RBC # BLD: 4 M/UL — LOW (ref 4.2–5.8)
RBC # FLD: 12.4 % — SIGNIFICANT CHANGE UP (ref 10.3–14.5)
SODIUM SERPL-SCNC: 138 MMOL/L — SIGNIFICANT CHANGE UP (ref 135–145)
WBC # BLD: 7 K/UL — SIGNIFICANT CHANGE UP (ref 3.8–10.5)
WBC # FLD AUTO: 7 K/UL — SIGNIFICANT CHANGE UP (ref 3.8–10.5)

## 2017-12-18 RX ADMIN — HEPARIN SODIUM 5000 UNIT(S): 5000 INJECTION INTRAVENOUS; SUBCUTANEOUS at 21:15

## 2017-12-18 RX ADMIN — HYDROMORPHONE HYDROCHLORIDE 1 MILLIGRAM(S): 2 INJECTION INTRAMUSCULAR; INTRAVENOUS; SUBCUTANEOUS at 19:51

## 2017-12-18 RX ADMIN — HYDROMORPHONE HYDROCHLORIDE 1 MILLIGRAM(S): 2 INJECTION INTRAMUSCULAR; INTRAVENOUS; SUBCUTANEOUS at 19:36

## 2017-12-18 RX ADMIN — HYDROMORPHONE HYDROCHLORIDE 1 MILLIGRAM(S): 2 INJECTION INTRAMUSCULAR; INTRAVENOUS; SUBCUTANEOUS at 17:34

## 2017-12-18 RX ADMIN — SODIUM CHLORIDE 125 MILLILITER(S): 9 INJECTION, SOLUTION INTRAVENOUS at 05:36

## 2017-12-18 RX ADMIN — HYDROMORPHONE HYDROCHLORIDE 1 MILLIGRAM(S): 2 INJECTION INTRAMUSCULAR; INTRAVENOUS; SUBCUTANEOUS at 16:52

## 2017-12-18 RX ADMIN — HEPARIN SODIUM 5000 UNIT(S): 5000 INJECTION INTRAVENOUS; SUBCUTANEOUS at 05:36

## 2017-12-18 RX ADMIN — PANTOPRAZOLE SODIUM 40 MILLIGRAM(S): 20 TABLET, DELAYED RELEASE ORAL at 11:39

## 2017-12-18 RX ADMIN — HEPARIN SODIUM 5000 UNIT(S): 5000 INJECTION INTRAVENOUS; SUBCUTANEOUS at 13:20

## 2017-12-18 NOTE — PROGRESS NOTE ADULT - ASSESSMENT
POD#5 s/p total gastrectomy    1- npo  2- oob/ambulate  3- plan for upper GI series this AM POD#5 s/p total gastrectomy    1- npo  2- oob/ambulate  3- start clear liquid diet

## 2017-12-18 NOTE — PROGRESS NOTE ADULT - SUBJECTIVE AND OBJECTIVE BOX
INTERVAL HPI/OVERNIGHT EVENTS:  Pt stable.   NPO  flatus/BM  Spitting up bilious fluid  No nausea or vomiting    Vital Signs Last 24 Hrs  T(C): 36.9 (18 Dec 2017 05:33), Max: 36.9 (18 Dec 2017 05:33)  T(F): 98.5 (18 Dec 2017 05:33), Max: 98.5 (18 Dec 2017 05:33)  HR: 71 (18 Dec 2017 05:33) (71 - 93)  BP: 136/72 (18 Dec 2017 05:33) (110/68 - 136/72)  BP(mean): --  RR: 17 (18 Dec 2017 05:33) (16 - 17)  SpO2: 97% (18 Dec 2017 05:33) (97% - 98%)    Physical:  Abdomen: Soft nondistended, nontender.  Wound clean  JPs serous drainage    I&O's Summary    17 Dec 2017 07:01  -  18 Dec 2017 07:00  --------------------------------------------------------  IN: 0 mL / OUT: 415 mL / NET: -415 mL        LABS:                        12.7   7.0   )-----------( 173      ( 18 Dec 2017 08:28 )             37.2             12-18    138  |  105  |  9   ----------------------------<  109<H>  4.0   |  26  |  0.74    Ca    9.1      18 Dec 2017 08:28

## 2017-12-18 NOTE — PROGRESS NOTE ADULT - SUBJECTIVE AND OBJECTIVE BOX
Patient is a 62y old  Male who presents with a chief complaint of I have gastric cancer.   Awake, alert, comfortable in bed in NAD. NGT is out. Passing flatus.        INTERVAL HPI/OVERNIGHT EVENTS:      VITAL SIGNS:  T(F): 98.5 (12-18-17 @ 05:33)  HR: 71 (12-18-17 @ 05:33)  BP: 136/72 (12-18-17 @ 05:33)  RR: 17 (12-18-17 @ 05:33)  SpO2: 97% (12-18-17 @ 05:33)  Wt(kg): --  I&O's Detail    17 Dec 2017 07:01  -  18 Dec 2017 07:00  --------------------------------------------------------  IN:  Total IN: 0 mL    OUT:    Bulb: 40 mL    Bulb: 25 mL    Other: 350 mL  Total OUT: 415 mL    Total NET: -415 mL              REVIEW OF SYSTEMS:    CONSTITUTIONAL:  No fevers, chills, sweats    HEENT:  Eyes:  No diplopia or blurred vision. ENT:  No earache, sore throat or runny nose.    CARDIOVASCULAR:  No pressure, squeezing, tightness, or heaviness about the chest; no palpitations.    RESPIRATORY:  Per HPI    GASTROINTESTINAL:  No abdominal pain, nausea, vomiting or diarrhea.    GENITOURINARY:  No dysuria, frequency or urgency.    NEUROLOGIC:  No paresthesias, fasciculations, seizures or weakness.    PSYCHIATRIC:  No disorder of thought or mood.      PHYSICAL EXAM:    Constitutional: Well developed and nourished  Eyes:Perrla  ENMT: normal  Neck:supple  Respiratory: good air entry  Cardiovascular: S1 S2 regular  Gastrointestinal: Soft, Non tender  Extremities: No edema  Vascular:normal  Neurological:Awake, alert,Ox3  Musculoskeletal:Normal      MEDICATIONS  (STANDING):  dextrose 5% + sodium chloride 0.45% 1000 milliLiter(s) (125 mL/Hr) IV Continuous <Continuous>  heparin  Injectable 5000 Unit(s) SubCutaneous every 8 hours  pantoprazole  Injectable 40 milliGRAM(s) IV Push daily    MEDICATIONS  (PRN):  acetaminophen   Tablet 650 milliGRAM(s) Oral every 6 hours PRN mild pain  HYDROmorphone  Injectable 1 milliGRAM(s) IV Push every 2 hours PRN Severe Pain (7 - 10)  ketorolac   Injectable 15 milliGRAM(s) IV Push every 6 hours PRN Moderate Pain (4 - 6)      Allergies    No Known Allergies    Intolerances        LABS:                        12.0   6.3   )-----------( 145      ( 17 Dec 2017 07:52 )             34.8     12-18    138  |  105  |  9   ----------------------------<  109<H>  4.0   |  26  |  0.74    Ca    9.1      18 Dec 2017 08:28                CAPILLARY BLOOD GLUCOSE      POCT Blood Glucose.: 98 mg/dL (17 Dec 2017 16:16)        RADIOLOGY & ADDITIONAL TESTS:    CXR:    Ct scan chest:    ekg;    echo: Patient is a 62y old  Male who presents with a chief complaint of I have gastric cancer.   Awake, alert, comfortable in bed in NAD. NGT is out. Passing flatus. Still NPO        INTERVAL HPI/OVERNIGHT EVENTS:      VITAL SIGNS:  T(F): 98.5 (12-18-17 @ 05:33)  HR: 71 (12-18-17 @ 05:33)  BP: 136/72 (12-18-17 @ 05:33)  RR: 17 (12-18-17 @ 05:33)  SpO2: 97% (12-18-17 @ 05:33)  Wt(kg): --  I&O's Detail    17 Dec 2017 07:01  -  18 Dec 2017 07:00  --------------------------------------------------------  IN:  Total IN: 0 mL    OUT:    Bulb: 40 mL    Bulb: 25 mL    Other: 350 mL  Total OUT: 415 mL    Total NET: -415 mL              REVIEW OF SYSTEMS:    CONSTITUTIONAL:  No fevers, chills, sweats    HEENT:  Eyes:  No diplopia or blurred vision. ENT:  No earache, sore throat or runny nose.    CARDIOVASCULAR:  No pressure, squeezing, tightness, or heaviness about the chest; no palpitations.    RESPIRATORY:  Per HPI    GASTROINTESTINAL:  No abdominal pain, nausea, vomiting or diarrhea.    GENITOURINARY:  No dysuria, frequency or urgency.    NEUROLOGIC:  No paresthesias, fasciculations, seizures or weakness.    PSYCHIATRIC:  No disorder of thought or mood.      PHYSICAL EXAM:    Constitutional: Well developed and nourished  Eyes:Perrla  ENMT: normal  Neck:supple  Respiratory: good air entry  Cardiovascular: S1 S2 regular  Gastrointestinal: Soft, incisional tenderness  Extremities: No edema  Vascular:normal  Neurological:Awake, alert,Ox3  Musculoskeletal:Normal      MEDICATIONS  (STANDING):  dextrose 5% + sodium chloride 0.45% 1000 milliLiter(s) (125 mL/Hr) IV Continuous <Continuous>  heparin  Injectable 5000 Unit(s) SubCutaneous every 8 hours  pantoprazole  Injectable 40 milliGRAM(s) IV Push daily    MEDICATIONS  (PRN):  acetaminophen   Tablet 650 milliGRAM(s) Oral every 6 hours PRN mild pain  HYDROmorphone  Injectable 1 milliGRAM(s) IV Push every 2 hours PRN Severe Pain (7 - 10)  ketorolac   Injectable 15 milliGRAM(s) IV Push every 6 hours PRN Moderate Pain (4 - 6)      Allergies    No Known Allergies    Intolerances        LABS:                        12.0   6.3   )-----------( 145      ( 17 Dec 2017 07:52 )             34.8     12-18    138  |  105  |  9   ----------------------------<  109<H>  4.0   |  26  |  0.74    Ca    9.1      18 Dec 2017 08:28                CAPILLARY BLOOD GLUCOSE      POCT Blood Glucose.: 98 mg/dL (17 Dec 2017 16:16)        RADIOLOGY & ADDITIONAL TESTS:    CXR:    Ct scan chest:    ekg;    echo:

## 2017-12-18 NOTE — PROGRESS NOTE ADULT - PROBLEM SELECTOR PLAN 3
Check path report  Pain control  Surgery and Hem/Onc follow up.  NGT Check path report  Pain control  Surgery and Hem/Onc follow up.  Clear liquids if okay with surgery

## 2017-12-18 NOTE — PROGRESS NOTE ADULT - SUBJECTIVE AND OBJECTIVE BOX
POD#5  Alert and awake, NAD.denies abdominal pain, +nausea and vomiting this morning, pt reports +flatus, no bowel movement      Vital Signs:  T(C): 36.9 (18 Dec 2017 05:33), Max: 36.9 (18 Dec 2017 05:33)  T(F): 98.5 (18 Dec 2017 05:33), Max: 98.5 (18 Dec 2017 05:33)  HR: 71 (18 Dec 2017 05:33) (71 - 93)  BP: 136/72 (18 Dec 2017 05:33) (110/68 - 136/72)  RR: 17 (18 Dec 2017 05:33) (16 - 17)  SpO2: 97% (18 Dec 2017 05:33) (97% - 98%)      Physical Exam:  General: NAD, comfortable  Abdomen: soft, nondistended, nontender,incision C/D/I. 2 SOSA drains to self suction, both outputs serosang (25/40mL)

## 2017-12-18 NOTE — PROGRESS NOTE ADULT - SUBJECTIVE AND OBJECTIVE BOX
Patient is a 62y old  Male who presents with a chief complaint of I have gastric cancer (12 Dec 2017 11:41)    pt seen in icu [ ], reg med floor [ x  ], bed [ ], chair at bedside [ x  ], a+o x3 [ x ], lethargic [  ],   nad [ x ]    coto [  ], ngt to bsd[ x ],     Allergies    No Known Allergies        Vitals    T(F): 98.5 (12-18-17 @ 05:33), Max: 98.5 (12-18-17 @ 05:33)  HR: 71 (12-18-17 @ 05:33) (71 - 93)  BP: 136/72 (12-18-17 @ 05:33) (110/68 - 136/72)  RR: 17 (12-18-17 @ 05:33) (16 - 17)  SpO2: 97% (12-18-17 @ 05:33) (97% - 98%)  Wt(kg): --  CAPILLARY BLOOD GLUCOSE      POCT Blood Glucose.: 98 mg/dL (17 Dec 2017 16:16)      Labs                          12.0   6.3   )-----------( 145      ( 17 Dec 2017 07:52 )             34.8       12-17    136  |  102  |  7   ----------------------------<  128<H>  3.5   |  24  |  0.68    Ca    8.7      17 Dec 2017 07:52                  Radiology Results      Meds    MEDICATIONS  (STANDING):  dextrose 5% + sodium chloride 0.45% 1000 milliLiter(s) (125 mL/Hr) IV Continuous <Continuous>  heparin  Injectable 5000 Unit(s) SubCutaneous every 8 hours  pantoprazole  Injectable 40 milliGRAM(s) IV Push daily      MEDICATIONS  (PRN):  acetaminophen   Tablet 650 milliGRAM(s) Oral every 6 hours PRN mild pain  HYDROmorphone  Injectable 1 milliGRAM(s) IV Push every 2 hours PRN Severe Pain (7 - 10)  ketorolac   Injectable 15 milliGRAM(s) IV Push every 6 hours PRN Moderate Pain (4 - 6)      Physical Exam    Neuro :  no focal deficits  Respiratory: CTA B/L  CV: RRR, S1S2, no murmurs,   Abdominal: Soft, ND, abd dressing cdi, diann x2 with serosanguinous drainage, ngt to bsd with small amt coffee ground drainage  Extremities: No edema, + peripheral pulses    ASSESSMENT    Malignant neoplasm of stomach  h/o Prediabetes  HLD (hyperlipidemia)  HTN (hypertension)  Malignant neoplasm of stomach, unspecified location  H/O prostate biopsy  History of arthroscopy of right shoulder  History of arthroscopy of left knee  History of appendectomy        PLAN      s/p total gatrectomy with cristobal-en-y esophagojejunal anastomosis  cont wound care   s/p extubation  gi and dvt prophylaxis  incentive spirometer  pulm f/u  cont supplemental O2  cont pain control  pain mgmt f/u  cont current meds Patient is a 62y old  Male who presents with a chief complaint of I have gastric cancer (12 Dec 2017 11:41)    pt seen in icu [ ], reg med floor [ x  ], bed [ ], chair at bedside [ x  ], a+o x3 [ x ], lethargic [  ],   nad [ x ]      Allergies    No Known Allergies        Vitals    T(F): 98.5 (12-18-17 @ 05:33), Max: 98.5 (12-18-17 @ 05:33)  HR: 71 (12-18-17 @ 05:33) (71 - 93)  BP: 136/72 (12-18-17 @ 05:33) (110/68 - 136/72)  RR: 17 (12-18-17 @ 05:33) (16 - 17)  SpO2: 97% (12-18-17 @ 05:33) (97% - 98%)  Wt(kg): --  CAPILLARY BLOOD GLUCOSE      POCT Blood Glucose.: 98 mg/dL (17 Dec 2017 16:16)      Labs                          12.0   6.3   )-----------( 145      ( 17 Dec 2017 07:52 )             34.8       12-17    136  |  102  |  7   ----------------------------<  128<H>  3.5   |  24  |  0.68    Ca    8.7      17 Dec 2017 07:52                  Radiology Results      Meds    MEDICATIONS  (STANDING):  dextrose 5% + sodium chloride 0.45% 1000 milliLiter(s) (125 mL/Hr) IV Continuous <Continuous>  heparin  Injectable 5000 Unit(s) SubCutaneous every 8 hours  pantoprazole  Injectable 40 milliGRAM(s) IV Push daily      MEDICATIONS  (PRN):  acetaminophen   Tablet 650 milliGRAM(s) Oral every 6 hours PRN mild pain  HYDROmorphone  Injectable 1 milliGRAM(s) IV Push every 2 hours PRN Severe Pain (7 - 10)  ketorolac   Injectable 15 milliGRAM(s) IV Push every 6 hours PRN Moderate Pain (4 - 6)      Physical Exam    Neuro :  no focal deficits  Respiratory: CTA B/L  CV: RRR, S1S2, no murmurs,   Abdominal: Soft, ND, abd dressing cdi, diann x2 with serosanguinous drainage,  Extremities: No edema, + peripheral pulses    ASSESSMENT    Malignant neoplasm of stomach  h/o Prediabetes  HLD (hyperlipidemia)  HTN (hypertension)  Malignant neoplasm of stomach, unspecified location  H/O prostate biopsy  History of arthroscopy of right shoulder  History of arthroscopy of left knee  History of appendectomy        PLAN      s/p total gatrectomy with cristobal-en-y esophagojejunal anastomosis  cont wound care   s/p extubation  s/p d/c ngt  pt passing flattus but no bm as yet  pt to start clears  gi and dvt prophylaxis  incentive spirometer  pulm f/u  cont pain control  pain mgmt f/u  oob with ambulation as tolerated  cont current meds

## 2017-12-19 LAB
ANION GAP SERPL CALC-SCNC: 13 MMOL/L — SIGNIFICANT CHANGE UP (ref 5–17)
BUN SERPL-MCNC: 24 MG/DL — HIGH (ref 7–18)
CALCIUM SERPL-MCNC: 10 MG/DL — SIGNIFICANT CHANGE UP (ref 8.4–10.5)
CHLORIDE SERPL-SCNC: 104 MMOL/L — SIGNIFICANT CHANGE UP (ref 96–108)
CO2 SERPL-SCNC: 19 MMOL/L — LOW (ref 22–31)
CREAT SERPL-MCNC: 1.37 MG/DL — HIGH (ref 0.5–1.3)
GLUCOSE SERPL-MCNC: 178 MG/DL — HIGH (ref 70–99)
HCT VFR BLD CALC: 43.6 % — SIGNIFICANT CHANGE UP (ref 39–50)
HGB BLD-MCNC: 14.9 G/DL — SIGNIFICANT CHANGE UP (ref 13–17)
MCHC RBC-ENTMCNC: 31.8 PG — SIGNIFICANT CHANGE UP (ref 27–34)
MCHC RBC-ENTMCNC: 34.2 GM/DL — SIGNIFICANT CHANGE UP (ref 32–36)
MCV RBC AUTO: 93 FL — SIGNIFICANT CHANGE UP (ref 80–100)
PLATELET # BLD AUTO: 246 K/UL — SIGNIFICANT CHANGE UP (ref 150–400)
POTASSIUM SERPL-MCNC: 4.2 MMOL/L — SIGNIFICANT CHANGE UP (ref 3.5–5.3)
POTASSIUM SERPL-SCNC: 4.2 MMOL/L — SIGNIFICANT CHANGE UP (ref 3.5–5.3)
RBC # BLD: 4.69 M/UL — SIGNIFICANT CHANGE UP (ref 4.2–5.8)
RBC # FLD: 12.5 % — SIGNIFICANT CHANGE UP (ref 10.3–14.5)
SODIUM SERPL-SCNC: 136 MMOL/L — SIGNIFICANT CHANGE UP (ref 135–145)
SURGICAL PATHOLOGY FINAL REPORT - CH: SIGNIFICANT CHANGE UP
WBC # BLD: 8.2 K/UL — SIGNIFICANT CHANGE UP (ref 3.8–10.5)
WBC # FLD AUTO: 8.2 K/UL — SIGNIFICANT CHANGE UP (ref 3.8–10.5)

## 2017-12-19 RX ORDER — ONDANSETRON 8 MG/1
4 TABLET, FILM COATED ORAL ONCE
Qty: 0 | Refills: 0 | Status: COMPLETED | OUTPATIENT
Start: 2017-12-19 | End: 2017-12-19

## 2017-12-19 RX ORDER — ONDANSETRON 8 MG/1
4 TABLET, FILM COATED ORAL EVERY 8 HOURS
Qty: 0 | Refills: 0 | Status: DISCONTINUED | OUTPATIENT
Start: 2017-12-19 | End: 2018-01-12

## 2017-12-19 RX ORDER — SODIUM CHLORIDE 9 MG/ML
1000 INJECTION INTRAMUSCULAR; INTRAVENOUS; SUBCUTANEOUS ONCE
Qty: 0 | Refills: 0 | Status: COMPLETED | OUTPATIENT
Start: 2017-12-19 | End: 2017-12-19

## 2017-12-19 RX ADMIN — HEPARIN SODIUM 5000 UNIT(S): 5000 INJECTION INTRAVENOUS; SUBCUTANEOUS at 13:40

## 2017-12-19 RX ADMIN — SODIUM CHLORIDE 2000 MILLILITER(S): 9 INJECTION INTRAMUSCULAR; INTRAVENOUS; SUBCUTANEOUS at 15:37

## 2017-12-19 RX ADMIN — ONDANSETRON 4 MILLIGRAM(S): 8 TABLET, FILM COATED ORAL at 18:50

## 2017-12-19 RX ADMIN — ONDANSETRON 4 MILLIGRAM(S): 8 TABLET, FILM COATED ORAL at 13:40

## 2017-12-19 RX ADMIN — PANTOPRAZOLE SODIUM 40 MILLIGRAM(S): 20 TABLET, DELAYED RELEASE ORAL at 13:41

## 2017-12-19 RX ADMIN — HEPARIN SODIUM 5000 UNIT(S): 5000 INJECTION INTRAVENOUS; SUBCUTANEOUS at 21:12

## 2017-12-19 RX ADMIN — HYDROMORPHONE HYDROCHLORIDE 1 MILLIGRAM(S): 2 INJECTION INTRAMUSCULAR; INTRAVENOUS; SUBCUTANEOUS at 20:25

## 2017-12-19 RX ADMIN — HYDROMORPHONE HYDROCHLORIDE 1 MILLIGRAM(S): 2 INJECTION INTRAMUSCULAR; INTRAVENOUS; SUBCUTANEOUS at 20:43

## 2017-12-19 RX ADMIN — HEPARIN SODIUM 5000 UNIT(S): 5000 INJECTION INTRAVENOUS; SUBCUTANEOUS at 05:07

## 2017-12-19 NOTE — PROGRESS NOTE ADULT - SUBJECTIVE AND OBJECTIVE BOX
Patient is a 62y old  Male who presents with a chief complaint of  gastric cancer.  Awake, alert, comfortable in bed in NAD. NGT is out. Passing flatus. Still NPO.      INTERVAL HPI/OVERNIGHT EVENTS:      VITAL SIGNS:  T(F): 98.6 (12-19-17 @ 06:18)  HR: 92 (12-19-17 @ 06:18)  BP: 127/78 (12-19-17 @ 06:18)  RR: 18 (12-19-17 @ 06:18)  SpO2: 97% (12-19-17 @ 06:18)  Wt(kg): --  I&O's Detail    18 Dec 2017 07:01  -  19 Dec 2017 07:00  --------------------------------------------------------  IN:  Total IN: 0 mL    OUT:    Bulb: 35 mL    Bulb: 45 mL  Total OUT: 80 mL    Total NET: -80 mL              REVIEW OF SYSTEMS:    CONSTITUTIONAL:  No fevers, chills, sweats    HEENT:  Eyes:  No diplopia or blurred vision. ENT:  No earache, sore throat or runny nose.    CARDIOVASCULAR:  No pressure, squeezing, tightness, or heaviness about the chest; no palpitations.    RESPIRATORY:  Per HPI    GASTROINTESTINAL:  No abdominal pain, nausea, vomiting or diarrhea.    GENITOURINARY:  No dysuria, frequency or urgency.    NEUROLOGIC:  No paresthesias, fasciculations, seizures or weakness.    PSYCHIATRIC:  No disorder of thought or mood.      PHYSICAL EXAM:    Constitutional: Well developed and nourished  Eyes:Perrla  ENMT: normal  Neck:supple  Respiratory: good air entry  Cardiovascular: S1 S2 regular  Gastrointestinal: Soft, Non tender  Extremities: No edema  Vascular:normal  Neurological:Awake, alert,Ox3  Musculoskeletal:Normal      MEDICATIONS  (STANDING):  dextrose 5% + sodium chloride 0.45% 1000 milliLiter(s) (125 mL/Hr) IV Continuous <Continuous>  heparin  Injectable 5000 Unit(s) SubCutaneous every 8 hours  ondansetron Injectable 4 milliGRAM(s) IV Push once  pantoprazole  Injectable 40 milliGRAM(s) IV Push daily    MEDICATIONS  (PRN):  acetaminophen   Tablet 650 milliGRAM(s) Oral every 6 hours PRN mild pain  HYDROmorphone  Injectable 0.5 milliGRAM(s) IV Push every 10 minutes PRN Moderate Pain  HYDROmorphone  Injectable 1 milliGRAM(s) IV Push every 2 hours PRN Severe Pain (7 - 10)  ketorolac   Injectable 15 milliGRAM(s) IV Push every 6 hours PRN Moderate Pain (4 - 6)  ondansetron Injectable 4 milliGRAM(s) IV Push every 8 hours PRN Nausea and/or Vomiting      Allergies    No Known Allergies    Intolerances        LABS:                        14.9   8.2   )-----------( 246      ( 19 Dec 2017 06:23 )             43.6     12-19    136  |  104  |  24<H>  ----------------------------<  178<H>  4.2   |  19<L>  |  1.37<H>    Ca    10.0      19 Dec 2017 06:23                CAPILLARY BLOOD GLUCOSE            RADIOLOGY & ADDITIONAL TESTS:    CXR:  < from: Xray Chest 1 View AP -PORTABLE-Routine (12.14.17 @ 07:03) >  ET and enteric tubes remain in place.   Streaky bilateral mid to lower lung opacities, likely atelectasis.  No pneumothorax or pleural effusion.   The cardiac silhouette is within normal limits in size.    < end of copied text >    Ct scan chest:    ekg;    echo: Patient is a 62y old  Male who presents with a chief complaint of  gastric cancer.  Awake, alert, comfortable in bed in NAD. NGT is out. Passing flatus. Still NPO. Nausea relieved with med IV      INTERVAL HPI/OVERNIGHT EVENTS:      VITAL SIGNS:  T(F): 98.6 (12-19-17 @ 06:18)  HR: 92 (12-19-17 @ 06:18)  BP: 127/78 (12-19-17 @ 06:18)  RR: 18 (12-19-17 @ 06:18)  SpO2: 97% (12-19-17 @ 06:18)  Wt(kg): --  I&O's Detail    18 Dec 2017 07:01  -  19 Dec 2017 07:00  --------------------------------------------------------  IN:  Total IN: 0 mL    OUT:    Bulb: 35 mL    Bulb: 45 mL  Total OUT: 80 mL    Total NET: -80 mL              REVIEW OF SYSTEMS:    CONSTITUTIONAL:  No fevers, chills, sweats    HEENT:  Eyes:  No diplopia or blurred vision. ENT:  No earache, sore throat or runny nose.    CARDIOVASCULAR:  No pressure, squeezing, tightness, or heaviness about the chest; no palpitations.    RESPIRATORY:  Per HPI    GASTROINTESTINAL:  No abdominal pain, + nausea, no vomiting or diarrhea.    GENITOURINARY:  No dysuria, frequency or urgency.    NEUROLOGIC:  No paresthesias, fasciculations, seizures or weakness.    PSYCHIATRIC:  No disorder of thought or mood.      PHYSICAL EXAM:    Constitutional: Well developed and nourished  Eyes:Perrla  ENMT: normal  Neck:supple  Respiratory: good air entry  Cardiovascular: S1 S2 regular  Gastrointestinal: Soft, Incisional tenderness  Extremities: No edema  Vascular:normal  Neurological:Awake, alert,Ox3  Musculoskeletal:Normal      MEDICATIONS  (STANDING):  dextrose 5% + sodium chloride 0.45% 1000 milliLiter(s) (125 mL/Hr) IV Continuous <Continuous>  heparin  Injectable 5000 Unit(s) SubCutaneous every 8 hours  ondansetron Injectable 4 milliGRAM(s) IV Push once  pantoprazole  Injectable 40 milliGRAM(s) IV Push daily    MEDICATIONS  (PRN):  acetaminophen   Tablet 650 milliGRAM(s) Oral every 6 hours PRN mild pain  HYDROmorphone  Injectable 0.5 milliGRAM(s) IV Push every 10 minutes PRN Moderate Pain  HYDROmorphone  Injectable 1 milliGRAM(s) IV Push every 2 hours PRN Severe Pain (7 - 10)  ketorolac   Injectable 15 milliGRAM(s) IV Push every 6 hours PRN Moderate Pain (4 - 6)  ondansetron Injectable 4 milliGRAM(s) IV Push every 8 hours PRN Nausea and/or Vomiting      Allergies    No Known Allergies    Intolerances        LABS:                        14.9   8.2   )-----------( 246      ( 19 Dec 2017 06:23 )             43.6     12-19    136  |  104  |  24<H>  ----------------------------<  178<H>  4.2   |  19<L>  |  1.37<H>    Ca    10.0      19 Dec 2017 06:23                CAPILLARY BLOOD GLUCOSE            RADIOLOGY & ADDITIONAL TESTS:    CXR:  < from: Xray Chest 1 View AP -PORTABLE-Routine (12.14.17 @ 07:03) >  ET and enteric tubes remain in place.   Streaky bilateral mid to lower lung opacities, likely atelectasis.  No pneumothorax or pleural effusion.   The cardiac silhouette is within normal limits in size.    < end of copied text >    Ct scan chest:    ekg;    echo:

## 2017-12-19 NOTE — PROGRESS NOTE ADULT - SUBJECTIVE AND OBJECTIVE BOX
INTERVAL HPI/OVERNIGHT EVENTS:    Pt s/p total gastrectomy 12/13, seen and examined at bedside. Pt admits to multiple episodes of vomiting overnight, NBNB, currently nauseous. Admits to flatus/ BMs.       Vital Signs Last 24 Hrs  T(C): 37 (19 Dec 2017 06:18), Max: 37.2 (18 Dec 2017 14:42)  T(F): 98.6 (19 Dec 2017 06:18), Max: 98.9 (18 Dec 2017 14:42)  HR: 92 (19 Dec 2017 06:18) (82 - 92)  BP: 127/78 (19 Dec 2017 06:18) (112/75 - 127/78)  BP(mean): --  RR: 18 (19 Dec 2017 06:18) (17 - 18)  SpO2: 97% (19 Dec 2017 06:18) (97% - 99%)  I&O's Detail    18 Dec 2017 07:01  -  19 Dec 2017 07:00  --------------------------------------------------------  IN:  Total IN: 0 mL    OUT:    Bulb: 35 mL    Bulb: 45 mL  Total OUT: 80 mL    Total NET: -80 mL        pantoprazole  Injectable 40 milliGRAM(s) IV Push daily      Physical Exam  General: AAOx3, No acute distress  Skin: No jaundice, no icterus  Abdomen: soft, nondistended, nontender, incision w/ staples in place, clean and dry, JPx2 in place, ss output  Extremities: non edematous, no calf pain bilaterally      Labs:                        14.9   8.2   )-----------( x        ( 19 Dec 2017 06:23 )             43.6     12-19    136  |  104  |  x   ----------------------------<  178<H>  4.2   |  x   |  x     Ca    10.0      19 Dec 2017 06:23

## 2017-12-19 NOTE — PROGRESS NOTE ADULT - SUBJECTIVE AND OBJECTIVE BOX
Patient is a 62y old  Male who presents with a chief complaint of I have gastric cancer (12 Dec 2017 11:41)    pt seen in icu [ ], reg med floor [ x  ], bed [ ], chair at bedside [ x  ], a+o x3 [ x ], lethargic [  ],   nad [ x ]    Pt with multiple episodes of vomiting overnight, NBNB, currently nauseous. Admits to flatus/ BMs.           Allergies    No Known Allergies        Vitals    T(F): 98.6 (12-19-17 @ 06:18), Max: 98.9 (12-18-17 @ 14:42)  HR: 92 (12-19-17 @ 06:18) (82 - 92)  BP: 127/78 (12-19-17 @ 06:18) (112/75 - 127/78)  RR: 18 (12-19-17 @ 06:18) (17 - 18)  SpO2: 97% (12-19-17 @ 06:18) (97% - 99%)  Wt(kg): --  CAPILLARY BLOOD GLUCOSE      POCT Blood Glucose.: 98 mg/dL (17 Dec 2017 16:16)      Labs                          14.9   8.2   )-----------( 246      ( 19 Dec 2017 06:23 )             43.6       12-19    136  |  104  |  24<H>  ----------------------------<  178<H>  4.2   |  19<L>  |  1.37<H>    Ca    10.0      19 Dec 2017 06:23                  Radiology Results      Meds    MEDICATIONS  (STANDING):  dextrose 5% + sodium chloride 0.45% 1000 milliLiter(s) (125 mL/Hr) IV Continuous <Continuous>  heparin  Injectable 5000 Unit(s) SubCutaneous every 8 hours  ondansetron Injectable 4 milliGRAM(s) IV Push once  pantoprazole  Injectable 40 milliGRAM(s) IV Push daily      MEDICATIONS  (PRN):  acetaminophen   Tablet 650 milliGRAM(s) Oral every 6 hours PRN mild pain  HYDROmorphone  Injectable 0.5 milliGRAM(s) IV Push every 10 minutes PRN Moderate Pain  HYDROmorphone  Injectable 1 milliGRAM(s) IV Push every 2 hours PRN Severe Pain (7 - 10)  ketorolac   Injectable 15 milliGRAM(s) IV Push every 6 hours PRN Moderate Pain (4 - 6)  ondansetron Injectable 4 milliGRAM(s) IV Push every 8 hours PRN Nausea and/or Vomiting      Physical Exam      Neuro :  no focal deficits  Respiratory: CTA B/L  CV: RRR, S1S2, no murmurs,   Abdominal: Soft, ND, incision w/ staples in place, clean and dry,  diann x2 with serosanguinous drainage,  Extremities: No edema, + peripheral pulses    ASSESSMENT    Malignant neoplasm of stomach  h/o Prediabetes  HLD (hyperlipidemia)  HTN (hypertension)  Malignant neoplasm of stomach, unspecified location  H/O prostate biopsy  History of arthroscopy of right shoulder  History of arthroscopy of left knee  History of appendectomy        PLAN      s/p total gatrectomy with cristobal-en-y esophagojejunal anastomosis  cont wound care   s/p extubation  s/p d/c ngt  cont npo  reglan prn  gi and dvt prophylaxis  incentive spirometer  pulm f/u  cont pain control  pain mgmt f/u  oob with ambulation as tolerated  cont current meds

## 2017-12-19 NOTE — PROGRESS NOTE ADULT - ASSESSMENT
63 y/o Male s/p total gastrectomy 12/13    -NPO  -Antiemetics PRN   -Pain medication PRN  -OOB/ Ambulate   -DVT PPx

## 2017-12-19 NOTE — PROGRESS NOTE ADULT - SUBJECTIVE AND OBJECTIVE BOX
INTERVAL HPI/OVERNIGHT EVENTS:  Pt stable. Last PM events noted  NPO   flatus/BM.  Nausea improved    Vital Signs Last 24 Hrs  T(C): 37 (19 Dec 2017 06:18), Max: 37.2 (18 Dec 2017 14:42)  T(F): 98.6 (19 Dec 2017 06:18), Max: 98.9 (18 Dec 2017 14:42)  HR: 92 (19 Dec 2017 06:18) (82 - 92)  BP: 127/78 (19 Dec 2017 06:18) (112/75 - 127/78)  BP(mean): --  RR: 18 (19 Dec 2017 06:18) (17 - 18)  SpO2: 97% (19 Dec 2017 06:18) (97% - 99%)    Physical:  Abdomen: Soft nondistended, nontender.  Wound clean  JPs serous drainage    I&O's Summary    18 Dec 2017 07:01  -  19 Dec 2017 07:00  --------------------------------------------------------  IN: 0 mL / OUT: 80 mL / NET: -80 mL        LABS:                        14.9   8.2   )-----------( 246      ( 19 Dec 2017 06:23 )             43.6             12-19    136  |  104  |  24<H>  ----------------------------<  178<H>  4.2   |  19<L>  |  1.37<H>    Ca    10.0      19 Dec 2017 06:23

## 2017-12-20 LAB
ANION GAP SERPL CALC-SCNC: 9 MMOL/L — SIGNIFICANT CHANGE UP (ref 5–17)
BUN SERPL-MCNC: 30 MG/DL — HIGH (ref 7–18)
CALCIUM SERPL-MCNC: 8.7 MG/DL — SIGNIFICANT CHANGE UP (ref 8.4–10.5)
CHLORIDE SERPL-SCNC: 104 MMOL/L — SIGNIFICANT CHANGE UP (ref 96–108)
CO2 SERPL-SCNC: 23 MMOL/L — SIGNIFICANT CHANGE UP (ref 22–31)
CREAT SERPL-MCNC: 1.46 MG/DL — HIGH (ref 0.5–1.3)
GLUCOSE SERPL-MCNC: 138 MG/DL — HIGH (ref 70–99)
HCT VFR BLD CALC: 37.3 % — LOW (ref 39–50)
HGB BLD-MCNC: 13 G/DL — SIGNIFICANT CHANGE UP (ref 13–17)
MCHC RBC-ENTMCNC: 32.8 PG — SIGNIFICANT CHANGE UP (ref 27–34)
MCHC RBC-ENTMCNC: 34.9 GM/DL — SIGNIFICANT CHANGE UP (ref 32–36)
MCV RBC AUTO: 94.1 FL — SIGNIFICANT CHANGE UP (ref 80–100)
PLATELET # BLD AUTO: 227 K/UL — SIGNIFICANT CHANGE UP (ref 150–400)
POTASSIUM SERPL-MCNC: 4.1 MMOL/L — SIGNIFICANT CHANGE UP (ref 3.5–5.3)
POTASSIUM SERPL-SCNC: 4.1 MMOL/L — SIGNIFICANT CHANGE UP (ref 3.5–5.3)
RBC # BLD: 3.96 M/UL — LOW (ref 4.2–5.8)
RBC # FLD: 12.6 % — SIGNIFICANT CHANGE UP (ref 10.3–14.5)
SODIUM SERPL-SCNC: 136 MMOL/L — SIGNIFICANT CHANGE UP (ref 135–145)
WBC # BLD: 11.2 K/UL — HIGH (ref 3.8–10.5)
WBC # FLD AUTO: 11.2 K/UL — HIGH (ref 3.8–10.5)

## 2017-12-20 PROCEDURE — 76775 US EXAM ABDO BACK WALL LIM: CPT | Mod: 26

## 2017-12-20 PROCEDURE — 74020: CPT | Mod: 26

## 2017-12-20 RX ORDER — ACETAMINOPHEN 500 MG
1000 TABLET ORAL ONCE
Qty: 0 | Refills: 0 | Status: COMPLETED | OUTPATIENT
Start: 2017-12-20 | End: 2017-12-20

## 2017-12-20 RX ORDER — SODIUM CHLORIDE 9 MG/ML
1000 INJECTION INTRAMUSCULAR; INTRAVENOUS; SUBCUTANEOUS ONCE
Qty: 0 | Refills: 0 | Status: COMPLETED | OUTPATIENT
Start: 2017-12-20 | End: 2017-12-20

## 2017-12-20 RX ADMIN — SODIUM CHLORIDE 1000 MILLILITER(S): 9 INJECTION INTRAMUSCULAR; INTRAVENOUS; SUBCUTANEOUS at 12:06

## 2017-12-20 RX ADMIN — HYDROMORPHONE HYDROCHLORIDE 1 MILLIGRAM(S): 2 INJECTION INTRAMUSCULAR; INTRAVENOUS; SUBCUTANEOUS at 15:40

## 2017-12-20 RX ADMIN — HYDROMORPHONE HYDROCHLORIDE 1 MILLIGRAM(S): 2 INJECTION INTRAMUSCULAR; INTRAVENOUS; SUBCUTANEOUS at 07:35

## 2017-12-20 RX ADMIN — HEPARIN SODIUM 5000 UNIT(S): 5000 INJECTION INTRAVENOUS; SUBCUTANEOUS at 21:13

## 2017-12-20 RX ADMIN — HYDROMORPHONE HYDROCHLORIDE 1 MILLIGRAM(S): 2 INJECTION INTRAMUSCULAR; INTRAVENOUS; SUBCUTANEOUS at 07:20

## 2017-12-20 RX ADMIN — PANTOPRAZOLE SODIUM 40 MILLIGRAM(S): 20 TABLET, DELAYED RELEASE ORAL at 13:37

## 2017-12-20 RX ADMIN — HEPARIN SODIUM 5000 UNIT(S): 5000 INJECTION INTRAVENOUS; SUBCUTANEOUS at 05:38

## 2017-12-20 RX ADMIN — Medication 400 MILLIGRAM(S): at 22:58

## 2017-12-20 RX ADMIN — HYDROMORPHONE HYDROCHLORIDE 1 MILLIGRAM(S): 2 INJECTION INTRAMUSCULAR; INTRAVENOUS; SUBCUTANEOUS at 02:42

## 2017-12-20 RX ADMIN — HEPARIN SODIUM 5000 UNIT(S): 5000 INJECTION INTRAVENOUS; SUBCUTANEOUS at 13:37

## 2017-12-20 RX ADMIN — HYDROMORPHONE HYDROCHLORIDE 1 MILLIGRAM(S): 2 INJECTION INTRAMUSCULAR; INTRAVENOUS; SUBCUTANEOUS at 15:55

## 2017-12-20 RX ADMIN — HYDROMORPHONE HYDROCHLORIDE 1 MILLIGRAM(S): 2 INJECTION INTRAMUSCULAR; INTRAVENOUS; SUBCUTANEOUS at 02:29

## 2017-12-20 RX ADMIN — ONDANSETRON 4 MILLIGRAM(S): 8 TABLET, FILM COATED ORAL at 13:37

## 2017-12-20 NOTE — PROGRESS NOTE ADULT - SUBJECTIVE AND OBJECTIVE BOX
Patient is a 62y old  Male who presents with a chief complaint of I have gastric cancer (12 Dec 2017 11:41)      pt seen in icu [ ], reg med floor [ x  ], bed [ ], chair at bedside [ x  ], a+o x3 [ x ], lethargic [  ],   nad [ x ]    Pt with multiple episodes of vomiting overnight, NBNB, currently nauseous. Admits to flatus/ BMs.      Allergies    No Known Allergies        Vitals    T(F): 98.1 (12-20-17 @ 06:18), Max: 98.2 (12-19-17 @ 13:45)  HR: 80 (12-20-17 @ 06:18) (80 - 92)  BP: 95/66 (12-20-17 @ 06:18) (95/66 - 119/70)  RR: 16 (12-20-17 @ 06:18) (16 - 17)  SpO2: 97% (12-20-17 @ 06:18) (97% - 100%)  Wt(kg): --  CAPILLARY BLOOD GLUCOSE          Labs                          14.9   8.2   )-----------( 246      ( 19 Dec 2017 06:23 )             43.6       12-19    136  |  104  |  24<H>  ----------------------------<  178<H>  4.2   |  19<L>  |  1.37<H>    Ca    10.0      19 Dec 2017 06:23                  Radiology Results      Meds    MEDICATIONS  (STANDING):  dextrose 5% + sodium chloride 0.45% 1000 milliLiter(s) (150 mL/Hr) IV Continuous <Continuous>  heparin  Injectable 5000 Unit(s) SubCutaneous every 8 hours  pantoprazole  Injectable 40 milliGRAM(s) IV Push daily      MEDICATIONS  (PRN):  acetaminophen   Tablet 650 milliGRAM(s) Oral every 6 hours PRN mild pain  HYDROmorphone  Injectable 0.5 milliGRAM(s) IV Push every 10 minutes PRN Moderate Pain  HYDROmorphone  Injectable 1 milliGRAM(s) IV Push every 2 hours PRN Severe Pain (7 - 10)  ondansetron Injectable 4 milliGRAM(s) IV Push every 8 hours PRN Nausea and/or Vomiting      Physical Exam    Neuro :  no focal deficits  Respiratory: CTA B/L  CV: RRR, S1S2, no murmurs,   Abdominal: Soft, ND, incision w/ staples in place, clean and dry,  diann x2 with serosanguinous drainage,  Extremities: No edema, + peripheral pulses    ASSESSMENT    Malignant neoplasm of stomach  h/o Prediabetes  HLD (hyperlipidemia)  HTN (hypertension)  Malignant neoplasm of stomach, unspecified location  H/O prostate biopsy  History of arthroscopy of right shoulder  History of arthroscopy of left knee  History of appendectomy        PLAN      s/p total gatrectomy with cristobal-en-y esophagojejunal anastomosis  cont wound care   s/p extubation  s/p d/c ngt  cont npo  reglan prn  gi and dvt prophylaxis  incentive spirometer  pulm f/u  cont pain control  pain mgmt f/u  oob with ambulation as tolerated  cont current meds Patient is a 62y old  Male who presents with a chief complaint of I have gastric cancer (12 Dec 2017 11:41)      pt seen in icu [ ], reg med floor [ x  ], bed [ ], chair at bedside [ x  ], a+o x3 [ x ], lethargic [  ],   nad [ x ]    Pt with multiple episodes of vomiting overnight, NBNB, currently nauseous. Admits to flatus/ BMs.      Allergies    No Known Allergies        Vitals    T(F): 98.1 (12-20-17 @ 06:18), Max: 98.2 (12-19-17 @ 13:45)  HR: 80 (12-20-17 @ 06:18) (80 - 92)  BP: 95/66 (12-20-17 @ 06:18) (95/66 - 119/70)  RR: 16 (12-20-17 @ 06:18) (16 - 17)  SpO2: 97% (12-20-17 @ 06:18) (97% - 100%)  Wt(kg): --  CAPILLARY BLOOD GLUCOSE          Labs                          14.9   8.2   )-----------( 246      ( 19 Dec 2017 06:23 )             43.6       12-19    136  |  104  |  24<H>  ----------------------------<  178<H>  4.2   |  19<L>  |  1.37<H>    Ca    10.0      19 Dec 2017 06:23                  Radiology Results      Meds    MEDICATIONS  (STANDING):  dextrose 5% + sodium chloride 0.45% 1000 milliLiter(s) (150 mL/Hr) IV Continuous <Continuous>  heparin  Injectable 5000 Unit(s) SubCutaneous every 8 hours  pantoprazole  Injectable 40 milliGRAM(s) IV Push daily      MEDICATIONS  (PRN):  acetaminophen   Tablet 650 milliGRAM(s) Oral every 6 hours PRN mild pain  HYDROmorphone  Injectable 0.5 milliGRAM(s) IV Push every 10 minutes PRN Moderate Pain  HYDROmorphone  Injectable 1 milliGRAM(s) IV Push every 2 hours PRN Severe Pain (7 - 10)  ondansetron Injectable 4 milliGRAM(s) IV Push every 8 hours PRN Nausea and/or Vomiting      Physical Exam    Neuro :  no focal deficits  Respiratory: CTA B/L  CV: RRR, S1S2, no murmurs,   Abdominal: Soft, ND, incision w/ staples in place, clean and dry,  diann x2 with serosanguinous drainage,  Extremities: No edema, + peripheral pulses    ASSESSMENT    Malignant neoplasm of stomach  h/o Prediabetes  HLD (hyperlipidemia)  HTN (hypertension)  Malignant neoplasm of stomach, unspecified location  H/O prostate biopsy  History of arthroscopy of right shoulder  History of arthroscopy of left knee  History of appendectomy        PLAN      s/p total gatrectomy with cristobal-en-y esophagojejunal anastomosis  cont wound care   cont npo  reglan prn  diet recs as per surg  gi and dvt prophylaxis  incentive spirometer  pulm f/u  cont pain control  pain mgmt f/u  oob with ambulation as tolerated  cont current meds

## 2017-12-20 NOTE — PROGRESS NOTE ADULT - SUBJECTIVE AND OBJECTIVE BOX
Post Operative Day #: 7    SUBJECTIVE: 62y Male s/p total gastrectomy 12/13    INTERVAL HPI/OVERNIGHT EVENTS:    Flatus:     y       Bowel Movement: y    Pain Control Adequate:     Nausea:     y      Vomiting: yes but much improved from yesterday    Diet: NPO      MEDICATIONS  (STANDING):  dextrose 5% + sodium chloride 0.45% 1000 milliLiter(s) (150 mL/Hr) IV Continuous <Continuous>  heparin  Injectable 5000 Unit(s) SubCutaneous every 8 hours  pantoprazole  Injectable 40 milliGRAM(s) IV Push daily    MEDICATIONS  (PRN):  acetaminophen   Tablet 650 milliGRAM(s) Oral every 6 hours PRN mild pain  HYDROmorphone  Injectable 0.5 milliGRAM(s) IV Push every 10 minutes PRN Moderate Pain  HYDROmorphone  Injectable 1 milliGRAM(s) IV Push every 2 hours PRN Severe Pain (7 - 10)  ondansetron Injectable 4 milliGRAM(s) IV Push every 8 hours PRN Nausea and/or Vomiting      OBJECTIVE:  Vital Signs Last 24 Hrs  T(C): 36.7 (20 Dec 2017 06:18), Max: 36.8 (19 Dec 2017 13:45)  T(F): 98.1 (20 Dec 2017 06:18), Max: 98.2 (19 Dec 2017 13:45)  HR: 80 (20 Dec 2017 06:18) (80 - 92)  BP: 95/66 (20 Dec 2017 06:18) (95/66 - 119/70)  BP(mean): --  RR: 16 (20 Dec 2017 06:18) (16 - 17)  SpO2: 97% (20 Dec 2017 06:18) (97% - 100%)    Physical Exam:    Gen: awake, alert oriented NAD  Abdomen: soft, upper abdominal incision stapled, + tenderness Left upper quadrant, SOSA x 2 with serosanguinous output  Extremities: warm to touch no c/c/e    I&O's Detail    19 Dec 2017 07:01  -  20 Dec 2017 07:00  --------------------------------------------------------  IN:  Total IN: 0 mL    OUT:    Bulb: 15 mL    Bulb: 15 mL  Total OUT: 30 mL    Total NET: -30 mL                                13.0   x     )-----------( 227      ( 20 Dec 2017 06:59 )             37.3     20 Dec 2017 06:59    136    |  104    |  30     ----------------------------<  138    4.1     |  23     |  1.46   19 Dec 2017 06:23    136    |  104    |  24     ----------------------------<  178    4.2     |  19     |  1.37   18 Dec 2017 08:28    138    |  105    |  9      ----------------------------<  109    4.0     |  26     |  0.74     Ca    8.7        20 Dec 2017 06:59  Ca    10.0       19 Dec 2017 06:23  Ca    9.1        18 Dec 2017 08:28

## 2017-12-20 NOTE — PROGRESS NOTE ADULT - ASSESSMENT
s/p total gastrectomy 12/13 with vomiting that is improved. MEKHI  1. IV bolus  2. continue NPO  3. OOB to chair/ambulation  4. DVT prophylaxis  5. renal ultrasound  6. abdominal Xray s/p total gastrectomy 12/13 with vomiting that is improved. r/o Ileus. MEKHI  1. IV bolus  2. continue NPO  3. OOB to chair/ambulation  4. DVT prophylaxis  5. renal ultrasound  6. abdominal Xray

## 2017-12-20 NOTE — PROGRESS NOTE ADULT - SUBJECTIVE AND OBJECTIVE BOX
INTERVAL HPI/OVERNIGHT EVENTS:  Pt stable.   NPO  flatus, no BM today  NG tube clear bilious drainage    Vital Signs Last 24 Hrs  T(C): 36.6 (20 Dec 2017 14:12), Max: 36.7 (19 Dec 2017 22:35)  T(F): 97.8 (20 Dec 2017 14:12), Max: 98.1 (20 Dec 2017 06:18)  HR: 79 (20 Dec 2017 14:12) (79 - 87)  BP: 108/60 (20 Dec 2017 14:12) (95/66 - 121/75)  BP(mean): --  RR: 16 (20 Dec 2017 14:12) (16 - 17)  SpO2: 100% (20 Dec 2017 14:12) (97% - 100%)    Physical:  Abdomen: Soft nondistended, nontender.  Wound clean  JPs serous drainage    I&O's Summary    19 Dec 2017 07:01  -  20 Dec 2017 07:00  --------------------------------------------------------  IN: 0 mL / OUT: 30 mL / NET: -30 mL        LABS:                        13.0   11.2  )-----------( 227      ( 20 Dec 2017 06:59 )             37.3             12-20    136  |  104  |  30<H>  ----------------------------<  138<H>  4.1   |  23  |  1.46<H>    Ca    8.7      20 Dec 2017 06:59

## 2017-12-20 NOTE — PROGRESS NOTE ADULT - SUBJECTIVE AND OBJECTIVE BOX
Patient is a 62y old  Male who presents with a chief complaint of gastric cancer.   Awake, alert, comfortable in bed in NAD. NGT is out. Passing flatus. Still NPO. Nausea relieved with med IV        INTERVAL HPI/OVERNIGHT EVENTS:      VITAL SIGNS:  T(F): 98.1 (12-20-17 @ 06:18)  HR: 80 (12-20-17 @ 06:18)  BP: 95/66 (12-20-17 @ 06:18)  RR: 16 (12-20-17 @ 06:18)  SpO2: 97% (12-20-17 @ 06:18)  Wt(kg): --  I&O's Detail    19 Dec 2017 07:01  -  20 Dec 2017 07:00  --------------------------------------------------------  IN:  Total IN: 0 mL    OUT:    Bulb: 15 mL    Bulb: 15 mL  Total OUT: 30 mL    Total NET: -30 mL              REVIEW OF SYSTEMS:    CONSTITUTIONAL:  No fevers, chills, sweats    HEENT:  Eyes:  No diplopia or blurred vision. ENT:  No earache, sore throat or runny nose.    CARDIOVASCULAR:  No pressure, squeezing, tightness, or heaviness about the chest; no palpitations.    RESPIRATORY:  Per HPI    GASTROINTESTINAL:  No abdominal pain, nausea, vomiting or diarrhea.    GENITOURINARY:  No dysuria, frequency or urgency.    NEUROLOGIC:  No paresthesias, fasciculations, seizures or weakness.    PSYCHIATRIC:  No disorder of thought or mood.      PHYSICAL EXAM:    Constitutional: Well developed and nourished  Eyes:Perrla  ENMT: normal  Neck:supple  Respiratory: good air entry  Cardiovascular: S1 S2 regular  Gastrointestinal: Soft, Non tender  Extremities: No edema  Vascular:normal  Neurological:Awake, alert,Ox3  Musculoskeletal:Normal      MEDICATIONS  (STANDING):  dextrose 5% + sodium chloride 0.45% 1000 milliLiter(s) (150 mL/Hr) IV Continuous <Continuous>  heparin  Injectable 5000 Unit(s) SubCutaneous every 8 hours  pantoprazole  Injectable 40 milliGRAM(s) IV Push daily  sodium chloride 0.9% Bolus 1000 milliLiter(s) IV Bolus once    MEDICATIONS  (PRN):  acetaminophen   Tablet 650 milliGRAM(s) Oral every 6 hours PRN mild pain  HYDROmorphone  Injectable 0.5 milliGRAM(s) IV Push every 10 minutes PRN Moderate Pain  HYDROmorphone  Injectable 1 milliGRAM(s) IV Push every 2 hours PRN Severe Pain (7 - 10)  ondansetron Injectable 4 milliGRAM(s) IV Push every 8 hours PRN Nausea and/or Vomiting      Allergies    No Known Allergies    Intolerances        LABS:                        13.0   11.2  )-----------( 227      ( 20 Dec 2017 06:59 )             37.3     12-20    136  |  104  |  30<H>  ----------------------------<  138<H>  4.1   |  23  |  1.46<H>    Ca    8.7      20 Dec 2017 06:59                CAPILLARY BLOOD GLUCOSE            RADIOLOGY & ADDITIONAL TESTS:    CXR:    Ct scan chest:    ekg;    echo: Patient is a 62y old  Male who presents with a chief complaint of gastric cancer.   Awake, alert, comfortable in bed in NAD. NGT is out. Passing flatus. Still NPO. Nausea relieved with zofran IV  Vomited earlier yesterday. No vomiting today but nauseated.      INTERVAL HPI/OVERNIGHT EVENTS:      VITAL SIGNS:  T(F): 98.1 (12-20-17 @ 06:18)  HR: 80 (12-20-17 @ 06:18)  BP: 95/66 (12-20-17 @ 06:18)  RR: 16 (12-20-17 @ 06:18)  SpO2: 97% (12-20-17 @ 06:18)  Wt(kg): --  I&O's Detail    19 Dec 2017 07:01  -  20 Dec 2017 07:00  --------------------------------------------------------  IN:  Total IN: 0 mL    OUT:    Bulb: 15 mL    Bulb: 15 mL  Total OUT: 30 mL    Total NET: -30 mL              REVIEW OF SYSTEMS:    CONSTITUTIONAL:  No fevers, chills, sweats    HEENT:  Eyes:  No diplopia or blurred vision. ENT:  No earache, sore throat or runny nose.    CARDIOVASCULAR:  No pressure, squeezing, tightness, or heaviness about the chest; no palpitations.    RESPIRATORY:  Per HPI    GASTROINTESTINAL:  No abdominal pain, nausea, vomiting or diarrhea.    GENITOURINARY:  No dysuria, frequency or urgency.    NEUROLOGIC:  No paresthesias, fasciculations, seizures or weakness.    PSYCHIATRIC:  No disorder of thought or mood.      PHYSICAL EXAM:    Constitutional: Well developed and nourished  Eyes:Perrla  ENMT: normal  Neck:supple  Respiratory: good air entry  Cardiovascular: S1 S2 regular  Gastrointestinal: Soft, Incisional  tenderness  Extremities: No edema  Vascular:normal  Neurological:Awake, alert,Ox3  Musculoskeletal:Normal      MEDICATIONS  (STANDING):  dextrose 5% + sodium chloride 0.45% 1000 milliLiter(s) (150 mL/Hr) IV Continuous <Continuous>  heparin  Injectable 5000 Unit(s) SubCutaneous every 8 hours  pantoprazole  Injectable 40 milliGRAM(s) IV Push daily  sodium chloride 0.9% Bolus 1000 milliLiter(s) IV Bolus once    MEDICATIONS  (PRN):  acetaminophen   Tablet 650 milliGRAM(s) Oral every 6 hours PRN mild pain  HYDROmorphone  Injectable 0.5 milliGRAM(s) IV Push every 10 minutes PRN Moderate Pain  HYDROmorphone  Injectable 1 milliGRAM(s) IV Push every 2 hours PRN Severe Pain (7 - 10)  ondansetron Injectable 4 milliGRAM(s) IV Push every 8 hours PRN Nausea and/or Vomiting      Allergies    No Known Allergies    Intolerances        LABS:                        13.0   11.2  )-----------( 227      ( 20 Dec 2017 06:59 )             37.3     12-20    136  |  104  |  30<H>  ----------------------------<  138<H>  4.1   |  23  |  1.46<H>    Ca    8.7      20 Dec 2017 06:59                CAPILLARY BLOOD GLUCOSE            RADIOLOGY & ADDITIONAL TESTS:    CXR:    Ct scan chest:    ekg;    echo:

## 2017-12-21 DIAGNOSIS — E46 UNSPECIFIED PROTEIN-CALORIE MALNUTRITION: ICD-10-CM

## 2017-12-21 DIAGNOSIS — E43 UNSPECIFIED SEVERE PROTEIN-CALORIE MALNUTRITION: ICD-10-CM

## 2017-12-21 DIAGNOSIS — N17.9 ACUTE KIDNEY FAILURE, UNSPECIFIED: ICD-10-CM

## 2017-12-21 LAB
ANION GAP SERPL CALC-SCNC: 7 MMOL/L — SIGNIFICANT CHANGE UP (ref 5–17)
BUN SERPL-MCNC: 15 MG/DL — SIGNIFICANT CHANGE UP (ref 7–18)
CALCIUM SERPL-MCNC: 8.6 MG/DL — SIGNIFICANT CHANGE UP (ref 8.4–10.5)
CHLORIDE SERPL-SCNC: 103 MMOL/L — SIGNIFICANT CHANGE UP (ref 96–108)
CO2 SERPL-SCNC: 26 MMOL/L — SIGNIFICANT CHANGE UP (ref 22–31)
CREAT SERPL-MCNC: 0.97 MG/DL — SIGNIFICANT CHANGE UP (ref 0.5–1.3)
GLUCOSE SERPL-MCNC: 124 MG/DL — HIGH (ref 70–99)
HCT VFR BLD CALC: 36.9 % — LOW (ref 39–50)
HGB BLD-MCNC: 12.3 G/DL — LOW (ref 13–17)
MAGNESIUM SERPL-MCNC: 2.4 MG/DL — SIGNIFICANT CHANGE UP (ref 1.6–2.6)
MCHC RBC-ENTMCNC: 31.5 PG — SIGNIFICANT CHANGE UP (ref 27–34)
MCHC RBC-ENTMCNC: 33.4 GM/DL — SIGNIFICANT CHANGE UP (ref 32–36)
MCV RBC AUTO: 94.3 FL — SIGNIFICANT CHANGE UP (ref 80–100)
PHOSPHATE SERPL-MCNC: 3.1 MG/DL — SIGNIFICANT CHANGE UP (ref 2.5–4.5)
PLATELET # BLD AUTO: 222 K/UL — SIGNIFICANT CHANGE UP (ref 150–400)
POTASSIUM SERPL-MCNC: 4.1 MMOL/L — SIGNIFICANT CHANGE UP (ref 3.5–5.3)
POTASSIUM SERPL-SCNC: 4.1 MMOL/L — SIGNIFICANT CHANGE UP (ref 3.5–5.3)
RBC # BLD: 3.91 M/UL — LOW (ref 4.2–5.8)
RBC # FLD: 12.5 % — SIGNIFICANT CHANGE UP (ref 10.3–14.5)
SODIUM SERPL-SCNC: 136 MMOL/L — SIGNIFICANT CHANGE UP (ref 135–145)
WBC # BLD: 9.8 K/UL — SIGNIFICANT CHANGE UP (ref 3.8–10.5)
WBC # FLD AUTO: 9.8 K/UL — SIGNIFICANT CHANGE UP (ref 3.8–10.5)

## 2017-12-21 PROCEDURE — 36569 INSJ PICC 5 YR+ W/O IMAGING: CPT

## 2017-12-21 PROCEDURE — 74250 X-RAY XM SM INT 1CNTRST STD: CPT | Mod: 26

## 2017-12-21 PROCEDURE — 76937 US GUIDE VASCULAR ACCESS: CPT | Mod: 26

## 2017-12-21 PROCEDURE — 77001 FLUOROGUIDE FOR VEIN DEVICE: CPT | Mod: 26

## 2017-12-21 RX ORDER — SODIUM CHLORIDE 9 MG/ML
10 INJECTION INTRAMUSCULAR; INTRAVENOUS; SUBCUTANEOUS
Qty: 0 | Refills: 0 | Status: DISCONTINUED | OUTPATIENT
Start: 2017-12-21 | End: 2018-01-08

## 2017-12-21 RX ORDER — SODIUM CHLORIDE 9 MG/ML
10 INJECTION INTRAMUSCULAR; INTRAVENOUS; SUBCUTANEOUS EVERY 12 HOURS
Qty: 0 | Refills: 0 | Status: DISCONTINUED | OUTPATIENT
Start: 2017-12-21 | End: 2018-01-08

## 2017-12-21 RX ORDER — SODIUM CHLORIDE 9 MG/ML
20 INJECTION INTRAMUSCULAR; INTRAVENOUS; SUBCUTANEOUS ONCE
Qty: 0 | Refills: 0 | Status: DISCONTINUED | OUTPATIENT
Start: 2017-12-21 | End: 2018-01-08

## 2017-12-21 RX ADMIN — ONDANSETRON 4 MILLIGRAM(S): 8 TABLET, FILM COATED ORAL at 04:54

## 2017-12-21 RX ADMIN — SODIUM CHLORIDE 10 MILLILITER(S): 9 INJECTION INTRAMUSCULAR; INTRAVENOUS; SUBCUTANEOUS at 18:30

## 2017-12-21 RX ADMIN — HEPARIN SODIUM 5000 UNIT(S): 5000 INJECTION INTRAVENOUS; SUBCUTANEOUS at 05:34

## 2017-12-21 RX ADMIN — HEPARIN SODIUM 5000 UNIT(S): 5000 INJECTION INTRAVENOUS; SUBCUTANEOUS at 21:22

## 2017-12-21 RX ADMIN — PANTOPRAZOLE SODIUM 40 MILLIGRAM(S): 20 TABLET, DELAYED RELEASE ORAL at 12:46

## 2017-12-21 NOTE — PROGRESS NOTE ADULT - SUBJECTIVE AND OBJECTIVE BOX
Patient is a 62y old  Male who presents with a chief complaint of I have gastric cancer (12 Dec 2017 11:41)    pt seen in icu [ ], reg med floor [ x  ], bed [ ], chair at bedside [ x  ], a+o x3 [ x ], lethargic [  ],   nad [ x ]    Pt with multiple episodes of vomiting overnight, NBNB, currently nauseous. Admits to flatus/ BMs.      Allergies    No Known Allergies        Vitals    T(F): 99.5 (12-21-17 @ 06:02), Max: 99.9 (12-20-17 @ 22:30)  HR: 87 (12-21-17 @ 06:02) (79 - 88)  BP: 104/62 (12-21-17 @ 06:02) (104/62 - 121/75)  RR: 16 (12-21-17 @ 06:02) (16 - 16)  SpO2: 99% (12-21-17 @ 06:02) (98% - 100%)  Wt(kg): --  CAPILLARY BLOOD GLUCOSE          Labs                          13.0   11.2  )-----------( 227      ( 20 Dec 2017 06:59 )             37.3       12-20    136  |  104  |  30<H>  ----------------------------<  138<H>  4.1   |  23  |  1.46<H>    Ca    8.7      20 Dec 2017 06:59                  Radiology Results      Meds    MEDICATIONS  (STANDING):  dextrose 5% + sodium chloride 0.45% 1000 milliLiter(s) (150 mL/Hr) IV Continuous <Continuous>  heparin  Injectable 5000 Unit(s) SubCutaneous every 8 hours  pantoprazole  Injectable 40 milliGRAM(s) IV Push daily      MEDICATIONS  (PRN):  acetaminophen   Tablet 650 milliGRAM(s) Oral every 6 hours PRN mild pain  HYDROmorphone  Injectable 1 milliGRAM(s) IV Push every 2 hours PRN Severe Pain (7 - 10)  ondansetron Injectable 4 milliGRAM(s) IV Push every 8 hours PRN Nausea and/or Vomiting      Physical Exam    Neuro :  no focal deficits  Respiratory: CTA B/L  CV: RRR, S1S2, no murmurs,   Abdominal: Soft, ND, incision w/ staples in place, clean and dry,  diann x2 with serosanguinous drainage,  Extremities: No edema, + peripheral pulses    ASSESSMENT    Malignant neoplasm of stomach  h/o Prediabetes  HLD (hyperlipidemia)  HTN (hypertension)  Malignant neoplasm of stomach, unspecified location  H/O prostate biopsy  History of arthroscopy of right shoulder  History of arthroscopy of left knee  History of appendectomy        PLAN      s/p total gatrectomy with cristobal-en-y esophagojejunal anastomosis  cont wound care   cont npo  reglan prn  diet recs as per surg  gi and dvt prophylaxis  incentive spirometer  pulm f/u  cont pain control  pain mgmt f/u  oob with ambulation as tolerated  cont current meds Patient is a 62y old  Male who presents with a chief complaint of I have gastric cancer (12 Dec 2017 11:41)    pt seen in icu [ ], reg med floor [ x  ], bed [ ], chair at bedside [ x  ], a+o x3 [ x ], lethargic [  ],   nad [ x ], ngt to wall suction [x]            Allergies    No Known Allergies        Vitals    T(F): 99.5 (12-21-17 @ 06:02), Max: 99.9 (12-20-17 @ 22:30)  HR: 87 (12-21-17 @ 06:02) (79 - 88)  BP: 104/62 (12-21-17 @ 06:02) (104/62 - 121/75)  RR: 16 (12-21-17 @ 06:02) (16 - 16)  SpO2: 99% (12-21-17 @ 06:02) (98% - 100%)  Wt(kg): --  CAPILLARY BLOOD GLUCOSE          Labs                          13.0   11.2  )-----------( 227      ( 20 Dec 2017 06:59 )             37.3       12-20    136  |  104  |  30<H>  ----------------------------<  138<H>  4.1   |  23  |  1.46<H>    Ca    8.7      20 Dec 2017 06:59                  Radiology Results      Meds    MEDICATIONS  (STANDING):  dextrose 5% + sodium chloride 0.45% 1000 milliLiter(s) (150 mL/Hr) IV Continuous <Continuous>  heparin  Injectable 5000 Unit(s) SubCutaneous every 8 hours  pantoprazole  Injectable 40 milliGRAM(s) IV Push daily      MEDICATIONS  (PRN):  acetaminophen   Tablet 650 milliGRAM(s) Oral every 6 hours PRN mild pain  HYDROmorphone  Injectable 1 milliGRAM(s) IV Push every 2 hours PRN Severe Pain (7 - 10)  ondansetron Injectable 4 milliGRAM(s) IV Push every 8 hours PRN Nausea and/or Vomiting      Physical Exam    Neuro :  no focal deficits  Respiratory: CTA B/L  CV: RRR, S1S2, no murmurs,   Abdominal: Soft, ND, incision w/ staples in place, clean and dry,  diann x2 with serosanguinous drainage,  Extremities: No edema, + peripheral pulses    ASSESSMENT    Malignant neoplasm of stomach  h/o Prediabetes  HLD (hyperlipidemia)  HTN (hypertension)  Malignant neoplasm of stomach, unspecified location  H/O prostate biopsy  History of arthroscopy of right shoulder  History of arthroscopy of left knee  History of appendectomy        PLAN      s/p total gatrectomy with cristobal-en-y esophagojejunal anastomosis  cont wound care   cont npo  reglan prn  cont ngt  f/u abd series  gi and dvt prophylaxis  incentive spirometer  pulm f/u  cont pain control  pain mgmt f/u  oobto chair  cont current meds

## 2017-12-21 NOTE — PROGRESS NOTE ADULT - SUBJECTIVE AND OBJECTIVE BOX
Patient is a 62y old  Male who presents with a chief complaint of I have gastric cancer (12 Dec 2017 11:41)  S/p partial gastrectomy. Still with persistent nausea and occasional vomiting. NGT in place. Passing flatus. Had BM already. S/p small bowel series.    INTERVAL HPI/OVERNIGHT EVENTS:      VITAL SIGNS:  T(F): 98.1 (12-21-17 @ 09:30)  HR: 87 (12-21-17 @ 09:30)  BP: 108/63 (12-21-17 @ 09:30)  RR: 16 (12-21-17 @ 09:30)  SpO2: 98% (12-21-17 @ 09:30)  Wt(kg): --  I&O's Detail    20 Dec 2017 07:01  -  21 Dec 2017 07:00  --------------------------------------------------------  IN:  Total IN: 0 mL    OUT:    Bulb: 40 mL    Bulb: 30 mL    Nasoenteral Tube: 1150 mL    Other: 200 mL  Total OUT: 1420 mL    Total NET: -1420 mL              REVIEW OF SYSTEMS:    CONSTITUTIONAL:  No fevers, chills, sweats    HEENT:  Eyes:  No diplopia or blurred vision. ENT:  No earache, sore throat or runny nose.    CARDIOVASCULAR:  No pressure, squeezing, tightness, or heaviness about the chest; no palpitations.    RESPIRATORY:  Per HPI    GASTROINTESTINAL: +nausea and vomiting    GENITOURINARY:  No dysuria, frequency or urgency.    NEUROLOGIC:  No paresthesias, fasciculations, seizures or weakness.    PSYCHIATRIC:  No disorder of thought or mood.      PHYSICAL EXAM:    Constitutional: Well developed and nourished  Eyes:Perrla  ENMT: normal  Neck:supple  Respiratory: good air entry  Cardiovascular: S1 S2 regular  Gastrointestinal: Soft, Incisional tenderness  Extremities: No edema  Vascular:normal  Neurological:Awake, alert,Ox3  Musculoskeletal:Normal      MEDICATIONS  (STANDING):  dextrose 5% + sodium chloride 0.45% 1000 milliLiter(s) (150 mL/Hr) IV Continuous <Continuous>  heparin  Injectable 5000 Unit(s) SubCutaneous every 8 hours  pantoprazole  Injectable 40 milliGRAM(s) IV Push daily    MEDICATIONS  (PRN):  acetaminophen   Tablet 650 milliGRAM(s) Oral every 6 hours PRN mild pain  HYDROmorphone  Injectable 1 milliGRAM(s) IV Push every 2 hours PRN Severe Pain (7 - 10)  ondansetron Injectable 4 milliGRAM(s) IV Push every 8 hours PRN Nausea and/or Vomiting      Allergies    No Known Allergies    Intolerances        LABS:                        12.3   9.8   )-----------( 222      ( 21 Dec 2017 07:52 )             36.9     12-21    136  |  103  |  15  ----------------------------<  124<H>  4.1   |  26  |  0.97    Ca    8.6      21 Dec 2017 07:52  Phos  3.1     12-21  Mg     2.4     12-21                CAPILLARY BLOOD GLUCOSE            RADIOLOGY & ADDITIONAL TESTS:    CXR:  < from: Xray Abdomen Series Flat/Upright 2 View (12.20.17 @ 11:42) >  IMPRESSION:   1. The colon is decompressed.  2. Paucity of small bowel gas.  3. Post-surgical changes as above.  4. Recommend short interval (3 to 6hr) followup plain abdominal films to   reassess. Cross sectional imaging can be done at that time.       < end of copied text >    Ct scan chest:    ekg;    echo:

## 2017-12-21 NOTE — CONSULT NOTE ADULT - PROBLEM SELECTOR RECOMMENDATION 3
Lipid profile  statin once extubated
Pre-renal MEKHI resolving with IVF.   Renal US with no hydro.  Strict I/Os. Avoid nephrotoxins/ NSAIDs/ RCA. Monitor BMP.

## 2017-12-21 NOTE — CONSULT NOTE ADULT - SUBJECTIVE AND OBJECTIVE BOX
[facility] NEPHROLOGY- METABOLIC SUPPORT SERVICE CONSULTATION NOTE    62 yr old male with PMH of Hypertension, prediabetes, herniated lumbar disc, gastric adenocarcinoma admitted for gastrectomy. s/p Total gastrectomy with Kelby-en-Y esophagojejunal anastomosis  12/13/2017 .  Pt NPO for 9 days. Pt with nausea and vomiting with abd pain.  s/p Small bowel series with partial small bowel obstruction distal to the anastomosis. Plan to keep NPO. Consulted for TPN.   Pt states he weighed 188lb prior to admission, on admission weight documented 170 and today 179 lbs.     PAST MEDICAL & SURGICAL HISTORY:  Prediabetes  HLD (hyperlipidemia)  HTN (hypertension)  Malignant neoplasm of stomach, unspecified location  H/O prostate biopsy  History of arthroscopy of right shoulder: repair for rotator cuff syndrome  History of arthroscopy of left knee: meniscal repair  History of appendectomy    No Known Allergies    Home Medications Reviewed  Hospital Medications:   MEDICATIONS  (STANDING):  dextrose 5% + sodium chloride 0.45% 1000 milliLiter(s) (150 mL/Hr) IV Continuous <Continuous>  heparin  Injectable 5000 Unit(s) SubCutaneous every 8 hours  pantoprazole  Injectable 40 milliGRAM(s) IV Push daily  sodium chloride 0.9% lock flush 20 milliLiter(s) IV Push once      FAMILY HISTORY:  Cerebrovascular accident (Mother)    REVIEW OF SYSTEMS:  CONSTITUTIONAL: No weakness, fevers or chills  EYES/ENT: No visual changes;  No vertigo or throat pain   NECK: No pain or stiffness  RESPIRATORY: No cough, wheezing, hemoptysis; No shortness of breath  CARDIOVASCULAR: No chest pain or palpitations.  GASTROINTESTINAL: + abdominal pain. with +nausea and vomiting.. +flatus and had BM today.   GENITOURINARY: No dysuria, frequency, foamy urine, urinary urgency, incontinence or hematuria  NEUROLOGICAL: No numbness or weakness  SKIN: No itching, burning, rashes, or lesions   VASCULAR: No bilateral lower extremity edema.   All other review of systems is negative unless indicated above.    VITALS:  T(F): 98.5 (12-21-17 @ 13:40), Max: 99.9 (12-20-17 @ 22:30)  HR: 90 (12-21-17 @ 13:40)  BP: 123/70 (12-21-17 @ 13:40)  RR: 16 (12-21-17 @ 13:40)  SpO2: 95% (12-21-17 @ 13:40)  Wt(kg): --  Height (cm): 170.18 (12-13 @ 14:00), 167.64 (12-12 @ 11:41)  Weight (kg): 78.6 (12-13 @ 14:00), 85.275 (12-12 @ 12:33)  BMI (kg/m2): 27.1 (12-13 @ 14:00), 30.3 (12-12 @ 12:33)  BSA (m2): 1.9 (12-13 @ 14:00), 1.95 (12-12 @ 12:33)    12-20 @ 07:01  -  12-21 @ 07:00  --------------------------------------------------------  IN: 0 mL / OUT: 1420 mL / NET: -1420 mL      PHYSICAL EXAM:  Constitutional: NAD,   HEENT: +NG tube  Neck: No JVD  Respiratory: CTAB, no wheezes, rales or rhonchi  Cardiovascular: S1, S2, RRR  Gastrointestinal: BS+, soft, + left sided tenderness +SOSA drain x2  Extremities: No cyanosis or clubbing. No peripheral edema  Neurological: A/O x 3, no focal deficits  Psychiatric: Normal mood, normal affect  : No CVA tenderness. No coto.   Skin: No rashes      LABS:  12-21    136  |  103  |  15  ----------------------------<  124<H>  4.1   |  26  |  0.97    Ca    8.6      21 Dec 2017 07:52  Phos  3.1     12-21  Mg     2.4     12-21      Creatinine Trend: 0.97 <--, 1.46 <--, 1.37 <--, 0.74 <--, 0.68 <--, 0.65 <--, 0.63 <--                        12.3   9.8   )-----------( 222      ( 21 Dec 2017 07:52 )             36.9     Urine Studies:      RADIOLOGY & ADDITIONAL STUDIES:    < from: US Renal (12.20.17 @ 12:14) >    EXAM:  US KIDNEY(S)                            PROCEDURE DATE:  12/20/2017          INTERPRETATION:  CLINICAL INFORMATION: Renal insufficiency    COMPARISON: None available.    TECHNIQUE: Sonography of the kidneys and bladder.     FINDINGS:    Right kidney:  12.1 cm. No renal mass, hydronephrosis or calculi.    Left kidney:  12.1 cm. No hydronephrosis or calculi. A 1.1 cm hypoechoic   structure is identified at the margin of the mid pole region of the left   kidney, likely cystic in nature.    Urinary bladder: Not distended, precluding evaluation.    IMPRESSION:     No evidence for bilateral hydronephrosis. See full discussion.    < end of copied text > [facility] NEPHROLOGY- METABOLIC SUPPORT SERVICE CONSULTATION NOTE    62 yr old male with PMH of Hypertension, prediabetes, herniated lumbar disc, gastric adenocarcinoma admitted for gastrectomy. s/p Total gastrectomy with Kelby-en-Y esophagojejunal anastomosis  12/13/2017 .  Pt NPO for 9 days. Pt with nausea and vomiting with abd pain.  s/p Small bowel series with partial small bowel obstruction distal to the anastomosis. Plan to keep NPO. Consulted for TPN.   Pt states he weighed 188lb prior to admission, on admission weight documented 170 and today 179 lbs.     PAST MEDICAL & SURGICAL HISTORY:  Prediabetes  HLD (hyperlipidemia)  HTN (hypertension)  Malignant neoplasm of stomach, unspecified location  H/O prostate biopsy  History of arthroscopy of right shoulder: repair for rotator cuff syndrome  History of arthroscopy of left knee: meniscal repair  History of appendectomy    No Known Allergies    Home Medications Reviewed  Hospital Medications:   MEDICATIONS  (STANDING):  dextrose 5% + sodium chloride 0.45% 1000 milliLiter(s) (150 mL/Hr) IV Continuous <Continuous>  heparin  Injectable 5000 Unit(s) SubCutaneous every 8 hours  pantoprazole  Injectable 40 milliGRAM(s) IV Push daily  sodium chloride 0.9% lock flush 20 milliLiter(s) IV Push once      FAMILY HISTORY:  Cerebrovascular accident (Mother)    REVIEW OF SYSTEMS:  CONSTITUTIONAL: No weakness, fevers or chills  EYES/ENT: No visual changes;  No vertigo or throat pain   NECK: No pain or stiffness  RESPIRATORY: No cough, wheezing, hemoptysis; No shortness of breath  CARDIOVASCULAR: No chest pain or palpitations.  GASTROINTESTINAL: + abdominal pain. with +nausea and vomiting.. +flatus and had BM today.   GENITOURINARY: No dysuria, frequency, foamy urine, urinary urgency, incontinence or hematuria  NEUROLOGICAL: No numbness or weakness  SKIN: No itching, burning, rashes, or lesions   VASCULAR: No bilateral lower extremity edema.   All other review of systems is negative unless indicated above.    VITALS:  T(F): 98.5 (12-21-17 @ 13:40), Max: 99.9 (12-20-17 @ 22:30)  HR: 90 (12-21-17 @ 13:40)  BP: 123/70 (12-21-17 @ 13:40)  RR: 16 (12-21-17 @ 13:40)  SpO2: 95% (12-21-17 @ 13:40)  Wt(kg): --  Height (cm): 170.18 (12-13 @ 14:00), 167.64 (12-12 @ 11:41)  Weight (kg): 78.6 (12-13 @ 14:00), 85.275 (12-12 @ 12:33)  BMI (kg/m2): 27.1 (12-13 @ 14:00), 30.3 (12-12 @ 12:33)  BSA (m2): 1.9 (12-13 @ 14:00), 1.95 (12-12 @ 12:33)    12-20 @ 07:01  -  12-21 @ 07:00  --------------------------------------------------------  IN: 0 mL / OUT: 1420 mL / NET: -1420 mL      PHYSICAL EXAM:  Constitutional: NAD,   HEENT: +NG tube  Neck: No JVD  Respiratory: CTAB, no wheezes, rales or rhonchi  Cardiovascular: S1, S2, RRR  Gastrointestinal: BS+, soft, + left sided tenderness +SOSA drain x2  Extremities: No cyanosis or clubbing. No peripheral edema  Neurological: A/O x 3, no focal deficits  Psychiatric: Normal mood, normal affect  : No CVA tenderness. No coto.   Skin: No rashes      LABS:  12-21    136  |  103  |  15  ----------------------------<  124<H>  4.1   |  26  |  0.97    Ca    8.6      21 Dec 2017 07:52  Phos  3.1     12-21  Mg     2.4     12-21      Creatinine Trend: 0.97 <--, 1.46 <--, 1.37 <--, 0.74 <--, 0.68 <--, 0.65 <--, 0.63 <--                        12.3   9.8   )-----------( 222      ( 21 Dec 2017 07:52 )             36.9     Urine Studies:      RADIOLOGY & ADDITIONAL STUDIES:    < from: US Renal (12.20.17 @ 12:14) >    EXAM:  US KIDNEY(S)                            PROCEDURE DATE:  12/20/2017          INTERPRETATION:  CLINICAL INFORMATION: Renal insufficiency    COMPARISON: None available.    TECHNIQUE: Sonography of the kidneys and bladder.     FINDINGS:    Right kidney:  12.1 cm. No renal mass, hydronephrosis or calculi.    Left kidney:  12.1 cm. No hydronephrosis or calculi. A 1.1 cm hypoechoic   structure is identified at the margin of the mid pole region of the left   kidney, likely cystic in nature.    Urinary bladder: Not distended, precluding evaluation.    IMPRESSION:     No evidence for bilateral hydronephrosis. See full discussion.    < end of copied text >    < from: Xray Small Bowel Series (12.21.17 @ 11:08) >  EXAM:  SMALL BOWEL ONLY                            PROCEDURE DATE:  12/21/2017        < end of copied text >  < from: Xray Small Bowel Series (12.21.17 @ 11:08) >    IMPRESSION:  Status post total gastrectomy, with no leakage of oral contrast. Transit   time to the colon was 40 minutes. At the gastrojejunal anastomosis, there   is dilated proximal to mid degenerative without mucosal or mural   thickening. Differential includes ileus versus partial small bowel   obstruction.      < end of copied text >

## 2017-12-21 NOTE — PROGRESS NOTE ADULT - PROBLEM SELECTOR PLAN 3
Check path report  Pain control  Surgery and Hem/Onc follow up.  Check small bowel series  NGT as per surgery  NPO  Surgery follow up

## 2017-12-21 NOTE — PROGRESS NOTE ADULT - SUBJECTIVE AND OBJECTIVE BOX
INTERVAL HPI/OVERNIGHT EVENTS:  Pt stable.   NPO  flatus  NG tube bilious drainage  Small bowel series partial small bowel obstruction distal to the anastomosis    Vital Signs Last 24 Hrs  T(C): 36.7 (21 Dec 2017 09:30), Max: 37.7 (20 Dec 2017 22:30)  T(F): 98.1 (21 Dec 2017 09:30), Max: 99.9 (20 Dec 2017 22:30)  HR: 87 (21 Dec 2017 09:30) (79 - 88)  BP: 108/63 (21 Dec 2017 09:30) (104/62 - 119/71)  BP(mean): --  RR: 16 (21 Dec 2017 09:30) (16 - 16)  SpO2: 98% (21 Dec 2017 09:30) (98% - 100%)    Physical:  Abdomen: Soft nondistended, nontender.    I&O's Summary    20 Dec 2017 07:01  -  21 Dec 2017 07:00  --------------------------------------------------------  IN: 0 mL / OUT: 1420 mL / NET: -1420 mL        LABS:                        12.3   9.8   )-----------( 222      ( 21 Dec 2017 07:52 )             36.9             12-21    136  |  103  |  15  ----------------------------<  124<H>  4.1   |  26  |  0.97    Ca    8.6      21 Dec 2017 07:52  Phos  3.1     12-21  Mg     2.4     12-21

## 2017-12-21 NOTE — PROGRESS NOTE ADULT - SUBJECTIVE AND OBJECTIVE BOX
INTERVAL HPI/OVERNIGHT EVENTS:  Pt resting comfortably. NGT lws. Improved nausea.  -flatus/BM    MEDICATIONS  (STANDING):  dextrose 5% + sodium chloride 0.45% 1000 milliLiter(s) (150 mL/Hr) IV Continuous <Continuous>  heparin  Injectable 5000 Unit(s) SubCutaneous every 8 hours  pantoprazole  Injectable 40 milliGRAM(s) IV Push daily    MEDICATIONS  (PRN):  acetaminophen   Tablet 650 milliGRAM(s) Oral every 6 hours PRN mild pain  HYDROmorphone  Injectable 1 milliGRAM(s) IV Push every 2 hours PRN Severe Pain (7 - 10)  ondansetron Injectable 4 milliGRAM(s) IV Push every 8 hours PRN Nausea and/or Vomiting      Vital Signs Last 24 Hrs  T(C): 37.5 (21 Dec 2017 06:02), Max: 37.7 (20 Dec 2017 22:30)  T(F): 99.5 (21 Dec 2017 06:02), Max: 99.9 (20 Dec 2017 22:30)  HR: 87 (21 Dec 2017 06:02) (79 - 88)  BP: 104/62 (21 Dec 2017 06:02) (104/62 - 121/75)  BP(mean): --  RR: 16 (21 Dec 2017 06:02) (16 - 16)  SpO2: 99% (21 Dec 2017 06:02) (98% - 100%)    Physical:  General: A&Ox3. NAD.  Abdomen: Soft nondistended, nontender. Incision clean, dry. Staples intact.    I&O's Detail    20 Dec 2017 07:01  -  21 Dec 2017 07:00  --------------------------------------------------------  IN:  Total IN: 0 mL    OUT:    Bulb: 40 mL serosanguinous     Bulb: 30 mL serosanguinous    Nasoenteral Tube: 1150 mL light bilious    Other: 200 mL  Total OUT: 1420 mL    Total NET: -1420 mL    LABS:                        12.3   9.8   )-----------( 222      ( 21 Dec 2017 07:52 )             36.9             12-21    136  |  103  |  15  ----------------------------<  124<H>  4.1   |  26  |  0.97    Ca    8.6      21 Dec 2017 07:52  Phos  3.1     12-21  Mg     2.4     12-21

## 2017-12-21 NOTE — CONSULT NOTE ADULT - PROBLEM SELECTOR RECOMMENDATION 9
s/p total gastrectomy. Now with partial SBO. Pt NPO since admission with no plans to feed soon (NPO >9 days).   Plan for IR PICC placement and initiate TPN with lipids at 1L on 12/22/17. Plan to increase total volume to 2L on 12/23/17.  Check CMP/ Mg/ Phos and triglycerides.  Daily weights and strict I/Os. s/p total gastrectomy. Now with partial SBO. Pt NPO since admission with no plans to feed soon (NPO >9 days).   Plan for IR PICC placement and initiate TPN with lipids at 1L on 12/22/17. Plan to increase total volume to 2L on 12/23/17.  Dietician consult for calorie assessment.   Check CMP/ Mg/ Phos and triglycerides.  Daily weights and strict I/Os.

## 2017-12-21 NOTE — CONSULT NOTE ADULT - ASSESSMENT
62 yr old male with PMH of Hypertension, prediabetes, herniated lumbar disc, gastric adenocarcinoma admitted for gastrectomy. s/p Total gastrectomy with Kelby-en-Y esophagojejunal anastomosis  12/13/2017 .  Pt NPO for 9 days. Pt with nausea and vomiting with abd pain.  s/p Small bowel series with partial small bowel obstruction distal to the anastomosis. Plan to keep NPO. Consulted for TPN.   Pt states he weighed 188lb prior to admission, on admission weight documented 170 and today 179 lbs. 62 yr old male with gastric adenocarcinoma s/p Total gastrectomy with Kelby-en-Y esophagojejunal anastomosis  12/13/2017 .  Pt NPO for 9 days. Pt with nausea and vomiting with abd pain.  s/p Small bowel series with partial small bowel obstruction distal to the anastomosis. Plan to keep NPO. Consulted for TPN.

## 2017-12-22 LAB
ALBUMIN SERPL ELPH-MCNC: 3 G/DL — LOW (ref 3.5–5)
ALP SERPL-CCNC: 79 U/L — SIGNIFICANT CHANGE UP (ref 40–120)
ALT FLD-CCNC: 80 U/L DA — HIGH (ref 10–60)
ANION GAP SERPL CALC-SCNC: 10 MMOL/L — SIGNIFICANT CHANGE UP (ref 5–17)
AST SERPL-CCNC: 47 U/L — HIGH (ref 10–40)
BILIRUB SERPL-MCNC: 0.8 MG/DL — SIGNIFICANT CHANGE UP (ref 0.2–1.2)
BUN SERPL-MCNC: 23 MG/DL — HIGH (ref 7–18)
CALCIUM SERPL-MCNC: 9.2 MG/DL — SIGNIFICANT CHANGE UP (ref 8.4–10.5)
CHLORIDE SERPL-SCNC: 105 MMOL/L — SIGNIFICANT CHANGE UP (ref 96–108)
CHLORIDE UR-SCNC: 21 MMOL/L — LOW (ref 55–125)
CO2 SERPL-SCNC: 22 MMOL/L — SIGNIFICANT CHANGE UP (ref 22–31)
CREAT ?TM UR-MCNC: 531 MG/DL — SIGNIFICANT CHANGE UP
CREAT SERPL-MCNC: 1.23 MG/DL — SIGNIFICANT CHANGE UP (ref 0.5–1.3)
GLUCOSE BLDC GLUCOMTR-MCNC: 239 MG/DL — HIGH (ref 70–99)
GLUCOSE SERPL-MCNC: 147 MG/DL — HIGH (ref 70–99)
HCT VFR BLD CALC: 38.3 % — LOW (ref 39–50)
HGB BLD-MCNC: 12.8 G/DL — LOW (ref 13–17)
MAGNESIUM SERPL-MCNC: 2.9 MG/DL — HIGH (ref 1.6–2.6)
MCHC RBC-ENTMCNC: 32.1 PG — SIGNIFICANT CHANGE UP (ref 27–34)
MCHC RBC-ENTMCNC: 33.4 GM/DL — SIGNIFICANT CHANGE UP (ref 32–36)
MCV RBC AUTO: 96 FL — SIGNIFICANT CHANGE UP (ref 80–100)
PHOSPHATE SERPL-MCNC: 3.8 MG/DL — SIGNIFICANT CHANGE UP (ref 2.5–4.5)
PLATELET # BLD AUTO: 248 K/UL — SIGNIFICANT CHANGE UP (ref 150–400)
POTASSIUM SERPL-MCNC: 4.2 MMOL/L — SIGNIFICANT CHANGE UP (ref 3.5–5.3)
POTASSIUM SERPL-SCNC: 4.2 MMOL/L — SIGNIFICANT CHANGE UP (ref 3.5–5.3)
PROT SERPL-MCNC: 8.2 G/DL — SIGNIFICANT CHANGE UP (ref 6–8.3)
RBC # BLD: 3.99 M/UL — LOW (ref 4.2–5.8)
RBC # FLD: 12.6 % — SIGNIFICANT CHANGE UP (ref 10.3–14.5)
SODIUM SERPL-SCNC: 137 MMOL/L — SIGNIFICANT CHANGE UP (ref 135–145)
SODIUM UR-SCNC: 7 MMOL/L — LOW (ref 40–220)
TRIGL SERPL-MCNC: 117 MG/DL — SIGNIFICANT CHANGE UP (ref 10–149)
WBC # BLD: 11.4 K/UL — HIGH (ref 3.8–10.5)
WBC # FLD AUTO: 11.4 K/UL — HIGH (ref 3.8–10.5)

## 2017-12-22 RX ORDER — I.V. FAT EMULSION 20 G/100ML
0.3 EMULSION INTRAVENOUS
Qty: 24 | Refills: 0 | Status: DISCONTINUED | OUTPATIENT
Start: 2017-12-22 | End: 2017-12-22

## 2017-12-22 RX ORDER — ELECTROLYTE SOLUTION,INJ
1 VIAL (ML) INTRAVENOUS
Qty: 0 | Refills: 0 | Status: DISCONTINUED | OUTPATIENT
Start: 2017-12-22 | End: 2017-12-22

## 2017-12-22 RX ADMIN — PANTOPRAZOLE SODIUM 40 MILLIGRAM(S): 20 TABLET, DELAYED RELEASE ORAL at 13:46

## 2017-12-22 RX ADMIN — ONDANSETRON 4 MILLIGRAM(S): 8 TABLET, FILM COATED ORAL at 05:50

## 2017-12-22 RX ADMIN — SODIUM CHLORIDE 150 MILLILITER(S): 9 INJECTION, SOLUTION INTRAVENOUS at 13:43

## 2017-12-22 RX ADMIN — HEPARIN SODIUM 5000 UNIT(S): 5000 INJECTION INTRAVENOUS; SUBCUTANEOUS at 05:50

## 2017-12-22 RX ADMIN — I.V. FAT EMULSION 5 GM/KG/DAY: 20 EMULSION INTRAVENOUS at 22:01

## 2017-12-22 RX ADMIN — HEPARIN SODIUM 5000 UNIT(S): 5000 INJECTION INTRAVENOUS; SUBCUTANEOUS at 13:46

## 2017-12-22 RX ADMIN — Medication 1 EACH: at 22:01

## 2017-12-22 RX ADMIN — HEPARIN SODIUM 5000 UNIT(S): 5000 INJECTION INTRAVENOUS; SUBCUTANEOUS at 21:46

## 2017-12-22 NOTE — PROGRESS NOTE ADULT - SUBJECTIVE AND OBJECTIVE BOX
Patient is a 62y old  Male who presents with a chief complaint of I have gastric cancer (12 Dec 2017 11:41)      pt seen in icu [ ], reg med floor [ x  ], bed [ ], chair at bedside [ x  ], a+o x3 [ x ], lethargic [  ],   nad [ x ], ngt to wall suction [x]      Allergies    No Known Allergies        Vitals    T(F): 98.4 (12-22-17 @ 06:00), Max: 98.5 (12-21-17 @ 13:40)  HR: 71 (12-22-17 @ 06:00) (71 - 90)  BP: 122/69 (12-22-17 @ 06:00) (108/63 - 123/70)  RR: 18 (12-22-17 @ 06:00) (16 - 20)  SpO2: 98% (12-22-17 @ 06:00) (95% - 99%)  Wt(kg): --  CAPILLARY BLOOD GLUCOSE          Labs                          12.3   9.8   )-----------( 222      ( 21 Dec 2017 07:52 )             36.9       12-21    136  |  103  |  15  ----------------------------<  124<H>  4.1   |  26  |  0.97    Ca    8.6      21 Dec 2017 07:52  Phos  3.1     12-21  Mg     2.4     12-21                  Radiology Results      Meds    MEDICATIONS  (STANDING):  dextrose 5% + sodium chloride 0.45% 1000 milliLiter(s) (150 mL/Hr) IV Continuous <Continuous>  heparin  Injectable 5000 Unit(s) SubCutaneous every 8 hours  pantoprazole  Injectable 40 milliGRAM(s) IV Push daily  sodium chloride 0.9% lock flush 20 milliLiter(s) IV Push once      MEDICATIONS  (PRN):  acetaminophen   Tablet 650 milliGRAM(s) Oral every 6 hours PRN mild pain  ondansetron Injectable 4 milliGRAM(s) IV Push every 8 hours PRN Nausea and/or Vomiting  sodium chloride 0.9% lock flush 10 milliLiter(s) IV Push every 1 hour PRN After each medication administration  sodium chloride 0.9% lock flush 10 milliLiter(s) IV Push every 12 hours PRN Lumen of catheter NOT used      Physical Exam    Neuro :  no focal deficits  Respiratory: CTA B/L  CV: RRR, S1S2, no murmurs,   Abdominal: Soft, ND, incision w/ staples in place, clean and dry,  diann x2 with serosanguinous drainage,  Extremities: No edema, + peripheral pulses    ASSESSMENT    Malignant neoplasm of stomach  h/o Prediabetes  HLD (hyperlipidemia)  HTN (hypertension)  Malignant neoplasm of stomach, unspecified location  H/O prostate biopsy  History of arthroscopy of right shoulder  History of arthroscopy of left knee  History of appendectomy        PLAN      s/p total gatrectomy with cristobal-en-y esophagojejunal anastomosis  cont wound care   cont npo  reglan prn  cont ngt  f/u abd series  gi and dvt prophylaxis  incentive spirometer  pulm f/u  cont pain control  pain mgmt f/u  oobto chair  cont current meds Patient is a 62y old  Male who presents with a chief complaint of I have gastric cancer (12 Dec 2017 11:41)      pt seen in icu [ ], reg med floor [ x  ], bed [ ], chair at bedside [ x  ], a+o x3 [ x ], lethargic [  ],   nad [ x ], ngt to wall suction [x]      Allergies    No Known Allergies        Vitals    T(F): 98.4 (12-22-17 @ 06:00), Max: 98.5 (12-21-17 @ 13:40)  HR: 71 (12-22-17 @ 06:00) (71 - 90)  BP: 122/69 (12-22-17 @ 06:00) (108/63 - 123/70)  RR: 18 (12-22-17 @ 06:00) (16 - 20)  SpO2: 98% (12-22-17 @ 06:00) (95% - 99%)  Wt(kg): --  CAPILLARY BLOOD GLUCOSE          Labs                          12.3   9.8   )-----------( 222      ( 21 Dec 2017 07:52 )             36.9       12-21    136  |  103  |  15  ----------------------------<  124<H>  4.1   |  26  |  0.97    Ca    8.6      21 Dec 2017 07:52  Phos  3.1     12-21  Mg     2.4     12-21          Radiology Results    < from: Xray Small Bowel Series (12.21.17 @ 11:08) >  IMPRESSION:  Status post total gastrectomy, with no leakage of oral contrast. Transit   time to the colon was 40 minutes. At the gastrojejunal anastomosis, there   is dilated proximal to mid degenerative without mucosal or mural   thickening. Differential includes ileus versus partial small bowel   obstruction.    < end of copied text >        Meds    MEDICATIONS  (STANDING):  dextrose 5% + sodium chloride 0.45% 1000 milliLiter(s) (150 mL/Hr) IV Continuous <Continuous>  heparin  Injectable 5000 Unit(s) SubCutaneous every 8 hours  pantoprazole  Injectable 40 milliGRAM(s) IV Push daily  sodium chloride 0.9% lock flush 20 milliLiter(s) IV Push once      MEDICATIONS  (PRN):  acetaminophen   Tablet 650 milliGRAM(s) Oral every 6 hours PRN mild pain  ondansetron Injectable 4 milliGRAM(s) IV Push every 8 hours PRN Nausea and/or Vomiting  sodium chloride 0.9% lock flush 10 milliLiter(s) IV Push every 1 hour PRN After each medication administration  sodium chloride 0.9% lock flush 10 milliLiter(s) IV Push every 12 hours PRN Lumen of catheter NOT used      Physical Exam    Neuro :  no focal deficits  Respiratory: CTA B/L  CV: RRR, S1S2, no murmurs,   Abdominal: Soft, ND, incision w/ staples in place, clean and dry,  diann x2 with serosanguinous drainage,  Extremities: No edema, + peripheral pulses    ASSESSMENT    Malignant neoplasm of stomach  small bowell ileus vs obstruction  h/o Prediabetes  HLD (hyperlipidemia)  HTN (hypertension)  Malignant neoplasm of stomach, unspecified location  H/O prostate biopsy  History of arthroscopy of right shoulder  History of arthroscopy of left knee  History of appendectomy        PLAN      s/p total gatrectomy with cristobal-en-y esophagojejunal anastomosis  cont wound care   cont npo  reglan prn  cont ngt to ws  pt to start tpm today  renal cons for eval for tpn  small bowell series result noted above  gi and dvt prophylaxis  incentive spirometer  pulm f/u  cont pain control  pain mgmt f/u  oobto chair  cont current meds

## 2017-12-22 NOTE — PROGRESS NOTE ADULT - PROBLEM SELECTOR PLAN 1
s/p total gastrectomy. Now with partial SBO. Pt NPO since admission with no plans to feed soon (NPO >9 days).   s/p Left arm PICC placement. Will initiate TPN with lipids at 1L today. Spoke with PA to d/c IVF once TPN is initiated. Plan to increase TPN total volume to 2L on 12/23/17.   Check CMP/ Mg/ Phos.  Check FS q 6hrs; if elevated start ISS.   Daily weights and strict I/Os.

## 2017-12-22 NOTE — PROGRESS NOTE ADULT - SUBJECTIVE AND OBJECTIVE BOX
[facility] NEPHROLOGY- METABOLIC SUPPORT SERVICE PROGRESS NOTE    62 yr old male with gastric adenocarcinoma s/p Total gastrectomy with Kelby-en-Y esophagojejunal anastomosis  12/13/2017 .  Pt NPO for 9 days. Pt with nausea and vomiting with abd pain.  s/p Small bowel series with partial small bowel obstruction distal to the anastomosis. Plan to keep NPO. Consulted for TPN.       Hospital Medications: Medications reviewed.  REVIEW OF SYSTEMS:  CONSTITUTIONAL: No fevers or chills  RESPIRATORY: No shortness of breath  CARDIOVASCULAR: No chest pain.  GASTROINTESTINAL: +nausea and one episode of vomiting today. +abd pain. +flatus/ BM today   VASCULAR: No bilateral lower extremity edema.     VITALS:  T(F): 98.4 (12-22-17 @ 06:00), Max: 98.5 (12-21-17 @ 22:14)  HR: 90 (12-22-17 @ 11:26)  BP: 112/74 (12-22-17 @ 11:26)  RR: 18 (12-22-17 @ 06:00)  SpO2: 100% (12-22-17 @ 11:26)  Wt(kg): --  Height (cm): 170.18 (12-21 @ 18:30), 167.64 (12-12 @ 11:41)  Weight (kg): 80 (12-21 @ 18:30), 85.275 (12-12 @ 12:33)  BMI (kg/m2): 27.6 (12-21 @ 18:30), 30.3 (12-12 @ 12:33)  BSA (m2): 1.92 (12-21 @ 18:30), 1.95 (12-12 @ 12:33)    12-21 @ 07:01  -  12-22 @ 07:00  --------------------------------------------------------  IN: 1800 mL / OUT: 3020 mL / NET: -1220 mL      PHYSICAL EXAM:  Constitutional: NAD,   HEENT: +NG tube  Respiratory: CTAB, no wheezes, rales or rhonchi  Cardiovascular: S1, S2, RRR  Gastrointestinal: BS+, soft, + left sided tenderness +SOSA drain x2, intact incision  Extremities: No peripheral edema  Access: left arm PICC line      LABS:  12-22    137  |  105  |  23<H>  ----------------------------<  147<H>  4.2   |  22  |  1.23    Ca    9.2      22 Dec 2017 07:39  Phos  3.8     12-22  Mg     2.9     12-22    TPro  8.2  /  Alb  3.0<L>  /  TBili  0.8  /  DBili      /  AST  47<H>  /  ALT  80<H>  /  AlkPhos  79  12-22    Creatinine Trend: 1.23 <--, 0.97 <--, 1.46 <--, 1.37 <--, 0.74 <--, 0.68 <--, 0.65 <--                        12.8   11.4  )-----------( 248      ( 22 Dec 2017 07:39 )             38.3     Urine Studies:      RADIOLOGY & ADDITIONAL STUDIES:

## 2017-12-22 NOTE — PROGRESS NOTE ADULT - SUBJECTIVE AND OBJECTIVE BOX
Patient is a 62y old  Male who presents with a chief complaint of I have gastric cancer (12 Dec 2017 11:41)  Awake, alert, comfortable OOB to chair in NAD. Still with NGT in place because of nausea and vomiting. S/p partial gastrectomy    INTERVAL HPI/OVERNIGHT EVENTS:      VITAL SIGNS:  T(F): 98.4 (12-22-17 @ 06:00)  HR: 71 (12-22-17 @ 06:00)  BP: 122/69 (12-22-17 @ 06:00)  RR: 18 (12-22-17 @ 06:00)  SpO2: 98% (12-22-17 @ 06:00)  Wt(kg): --  I&O's Detail    21 Dec 2017 07:01  -  22 Dec 2017 07:00  --------------------------------------------------------  IN:    dextrose 5% + sodium chloride 0.45%: 1800 mL  Total IN: 1800 mL    OUT:    Bulb: 12 mL    Bulb: 8 mL    Nasoenteral Tube: 2100 mL    Voided: 900 mL  Total OUT: 3020 mL    Total NET: -1220 mL              REVIEW OF SYSTEMS:    CONSTITUTIONAL:  No fevers, chills, sweats    HEENT:  Eyes:  No diplopia or blurred vision. ENT:  No earache, sore throat or runny nose.    CARDIOVASCULAR:  No pressure, squeezing, tightness, or heaviness about the chest; no palpitations.    RESPIRATORY:  Per HPI    GASTROINTESTINAL:  Nausea and vomiting    GENITOURINARY:  No dysuria, frequency or urgency.    NEUROLOGIC:  No paresthesias, fasciculations, seizures or weakness.    PSYCHIATRIC:  No disorder of thought or mood.      PHYSICAL EXAM:    Constitutional: Well developed and nourished  Eyes:Perrla  ENMT: normal  Neck:supple  Respiratory: good air entry  Cardiovascular: S1 S2 regular  Gastrointestinal: Soft, Incisional tenderness  Extremities: No edema  Vascular:normal  Neurological:Awake, alert,Ox3  Musculoskeletal:Normal      MEDICATIONS  (STANDING):  dextrose 5% + sodium chloride 0.45% 1000 milliLiter(s) (150 mL/Hr) IV Continuous <Continuous>  heparin  Injectable 5000 Unit(s) SubCutaneous every 8 hours  pantoprazole  Injectable 40 milliGRAM(s) IV Push daily  sodium chloride 0.9% lock flush 20 milliLiter(s) IV Push once    MEDICATIONS  (PRN):  acetaminophen   Tablet 650 milliGRAM(s) Oral every 6 hours PRN mild pain  ondansetron Injectable 4 milliGRAM(s) IV Push every 8 hours PRN Nausea and/or Vomiting  sodium chloride 0.9% lock flush 10 milliLiter(s) IV Push every 1 hour PRN After each medication administration  sodium chloride 0.9% lock flush 10 milliLiter(s) IV Push every 12 hours PRN Lumen of catheter NOT used      Allergies    No Known Allergies    Intolerances        LABS:                        12.3   9.8   )-----------( 222      ( 21 Dec 2017 07:52 )             36.9     12-22    137  |  105  |  23<H>  ----------------------------<  147<H>  4.2   |  22  |  1.23    Ca    9.2      22 Dec 2017 07:39  Phos  3.8     12-22  Mg     2.9     12-22    TPro  8.2  /  Alb  3.0<L>  /  TBili  0.8  /  DBili  x   /  AST  47<H>  /  ALT  80<H>  /  AlkPhos  79  12-22              CAPILLARY BLOOD GLUCOSE            RADIOLOGY & ADDITIONAL TESTS:    CXR:  < from: US Renal (12.20.17 @ 12:14) >    No evidence for bilateral hydronephrosis. See full discussion.    < from: Xray Small Bowel Series (12.21.17 @ 11:08) >  IMPRESSION:  Status post total gastrectomy, with no leakage of oral contrast. Transit   time to the colon was 40 minutes. At the gastrojejunal anastomosis, there   is dilated proximal to mid degenerative without mucosal or mural   thickening. Differential includes ileus versus partial small bowel   obstruction.      < end of copied text >    < end of copied text >    Ct scan chest:    ekg;    echo:

## 2017-12-22 NOTE — PROGRESS NOTE ADULT - SUBJECTIVE AND OBJECTIVE BOX
s/p total gastrectomy POD #9  Pt resting comfortably. NGT lws. Improved nausea. reports vomiting once this morning.  +flatus and BM  Patient has picc line placed    MEDICATIONS  (STANDING):  dextrose 5% + sodium chloride 0.45% 1000 milliLiter(s) (150 mL/Hr) IV Continuous <Continuous>  heparin  Injectable 5000 Unit(s) SubCutaneous every 8 hours  pantoprazole  Injectable 40 milliGRAM(s) IV Push daily    MEDICATIONS  (PRN):  acetaminophen   Tablet 650 milliGRAM(s) Oral every 6 hours PRN mild pain  HYDROmorphone  Injectable 1 milliGRAM(s) IV Push every 2 hours PRN Severe Pain (7 - 10)  ondansetron Injectable 4 milliGRAM(s) IV Push every 8 hours PRN Nausea and/or Vomiting      Vital Signs Last 24 Hrs  T(C): 36.9 (22 Dec 2017 06:00), Max: 36.9 (21 Dec 2017 13:40)  T(F): 98.4 (22 Dec 2017 06:00), Max: 98.5 (21 Dec 2017 13:40)  HR: 71 (22 Dec 2017 06:00) (71 - 90)  BP: 122/69 (22 Dec 2017 06:00) (108/63 - 123/70)  ABP: 108/63 (21 Dec 2017 09:30) (108/63 - 108/63)  RR: 18 (22 Dec 2017 06:00) (16 - 20)  SpO2: 98% (22 Dec 2017 06:00) (95% - 99%)      Physical:  General: A&Ox3. NAD.  Abdomen: Soft nondistended, nontender. Incision clean, dry. Staples intact. JPx 2 in place with serosanguinous fluid (12,8)  NGT on lws: 2100mL

## 2017-12-22 NOTE — PROGRESS NOTE ADULT - SUBJECTIVE AND OBJECTIVE BOX
INTERVAL HPI/OVERNIGHT EVENTS:  Pt stable.   NPO  flatus/BM.  NG tube bilious drainage    Vital Signs Last 24 Hrs  T(C): 36.7 (22 Dec 2017 14:32), Max: 36.9 (21 Dec 2017 22:14)  T(F): 98 (22 Dec 2017 14:32), Max: 98.5 (21 Dec 2017 22:14)  HR: 91 (22 Dec 2017 14:32) (71 - 91)  BP: 105/81 (22 Dec 2017 14:32) (105/81 - 122/69)  BP(mean): --  RR: 17 (22 Dec 2017 14:32) (17 - 20)  SpO2: 98% (22 Dec 2017 14:32) (98% - 100%)    Physical:  Abdomen: Soft nondistended, nontender.  Wound clean  JPs serous drainage    I&O's Summary    21 Dec 2017 07:01  -  22 Dec 2017 07:00  --------------------------------------------------------  IN: 1800 mL / OUT: 3020 mL / NET: -1220 mL    22 Dec 2017 07:01  -  22 Dec 2017 17:36  --------------------------------------------------------  IN: 0 mL / OUT: 10 mL / NET: -10 mL        LABS:                        12.8   11.4  )-----------( 248      ( 22 Dec 2017 07:39 )             38.3             12-22    137  |  105  |  23<H>  ----------------------------<  147<H>  4.2   |  22  |  1.23    Ca    9.2      22 Dec 2017 07:39  Phos  3.8     12-22  Mg     2.9     12-22    TPro  8.2  /  Alb  3.0<L>  /  TBili  0.8  /  DBili  x   /  AST  47<H>  /  ALT  80<H>  /  AlkPhos  79  12-22

## 2017-12-22 NOTE — PROGRESS NOTE ADULT - PROBLEM SELECTOR PLAN 3
Pre-renal MEKHI overall improving with IVF.  Check urine lytes.   Renal US with no hydro.  Strict I/Os. Avoid nephrotoxins/ NSAIDs/ RCA. Monitor BMP.

## 2017-12-22 NOTE — CHART NOTE - NSCHARTNOTEFT_GEN_A_CORE
Upon Nutritional Assessment by the Registered Dietitian your patient was determined to meet criteria / has evidence of the following diagnosis/diagnoses:          [ ]  Mild Protein Calorie Malnutrition        [ ]  Moderate Protein Calorie Malnutrition        [X ] Severe Protein Calorie Malnutrition        [ ] Unspecified Protein Calorie Malnutrition        [ ] Underweight / BMI <19        [ ] Morbid Obesity / BMI > 40      Findings as based on:  •  Comprehensive nutrition assessment and consultation  •  Calorie counts (nutrient intake analysis)  •  Food acceptance and intake status from observations by staff  •  Follow up  •  Patient education  •  Intervention secondary to interdisciplinary rounds  •   concerns      Treatment:    The following diet has been recommended: NPO with TPN, as medially feasible       PROVIDER Section:     By signing this assessment you are acknowledging and agree with the diagnosis/diagnoses assigned by the Registered Dietitian    Comments:  To provide adequate nutrition via TPN, MD to adjust, as medically feasible.

## 2017-12-22 NOTE — PROGRESS NOTE ADULT - ASSESSMENT
62 yr old male with gastric adenocarcinoma s/p Total gastrectomy with Eklby-en-Y esophagojejunal anastomosis  12/13/2017 .  Pt NPO for 9 days. Pt with nausea and vomiting with abd pain.  s/p Small bowel series with partial small bowel obstruction distal to the anastomosis. Plan to keep NPO. Consulted for TPN.     TPN:   Total Calories 1170  Fat 24g    g  Dextrose 350 g

## 2017-12-23 LAB
-  AMIKACIN: SIGNIFICANT CHANGE UP
-  AMPICILLIN/SULBACTAM: SIGNIFICANT CHANGE UP
-  AMPICILLIN: SIGNIFICANT CHANGE UP
-  AZTREONAM: SIGNIFICANT CHANGE UP
-  CEFAZOLIN: SIGNIFICANT CHANGE UP
-  CEFEPIME: SIGNIFICANT CHANGE UP
-  CEFOXITIN: SIGNIFICANT CHANGE UP
-  CEFTAZIDIME: SIGNIFICANT CHANGE UP
-  CEFTRIAXONE: SIGNIFICANT CHANGE UP
-  CIPROFLOXACIN: SIGNIFICANT CHANGE UP
-  ERTAPENEM: SIGNIFICANT CHANGE UP
-  GENTAMICIN: SIGNIFICANT CHANGE UP
-  IMIPENEM: SIGNIFICANT CHANGE UP
-  LEVOFLOXACIN: SIGNIFICANT CHANGE UP
-  MEROPENEM: SIGNIFICANT CHANGE UP
-  NITROFURANTOIN: SIGNIFICANT CHANGE UP
-  PIPERACILLIN/TAZOBACTAM: SIGNIFICANT CHANGE UP
-  TOBRAMYCIN: SIGNIFICANT CHANGE UP
-  TRIMETHOPRIM/SULFAMETHOXAZOLE: SIGNIFICANT CHANGE UP
ALBUMIN SERPL ELPH-MCNC: 2.7 G/DL — LOW (ref 3.5–5)
ALP SERPL-CCNC: 88 U/L — SIGNIFICANT CHANGE UP (ref 40–120)
ALT FLD-CCNC: 101 U/L DA — HIGH (ref 10–60)
ANION GAP SERPL CALC-SCNC: 8 MMOL/L — SIGNIFICANT CHANGE UP (ref 5–17)
AST SERPL-CCNC: 55 U/L — HIGH (ref 10–40)
BILIRUB SERPL-MCNC: 0.6 MG/DL — SIGNIFICANT CHANGE UP (ref 0.2–1.2)
BUN SERPL-MCNC: 24 MG/DL — HIGH (ref 7–18)
CA-I BLD-SCNC: 1.24 MMOL/L — SIGNIFICANT CHANGE UP (ref 1.12–1.3)
CALCIUM SERPL-MCNC: 8.6 MG/DL — SIGNIFICANT CHANGE UP (ref 8.4–10.5)
CHLORIDE SERPL-SCNC: 105 MMOL/L — SIGNIFICANT CHANGE UP (ref 96–108)
CO2 SERPL-SCNC: 23 MMOL/L — SIGNIFICANT CHANGE UP (ref 22–31)
CREAT SERPL-MCNC: 1.03 MG/DL — SIGNIFICANT CHANGE UP (ref 0.5–1.3)
CULTURE RESULTS: SIGNIFICANT CHANGE UP
GLUCOSE BLDC GLUCOMTR-MCNC: 108 MG/DL — HIGH (ref 70–99)
GLUCOSE BLDC GLUCOMTR-MCNC: 139 MG/DL — HIGH (ref 70–99)
GLUCOSE BLDC GLUCOMTR-MCNC: 152 MG/DL — HIGH (ref 70–99)
GLUCOSE SERPL-MCNC: 156 MG/DL — HIGH (ref 70–99)
MAGNESIUM SERPL-MCNC: 2.6 MG/DL — SIGNIFICANT CHANGE UP (ref 1.6–2.6)
METHOD TYPE: SIGNIFICANT CHANGE UP
ORGANISM # SPEC MICROSCOPIC CNT: SIGNIFICANT CHANGE UP
ORGANISM # SPEC MICROSCOPIC CNT: SIGNIFICANT CHANGE UP
PHOSPHATE SERPL-MCNC: 3.8 MG/DL — SIGNIFICANT CHANGE UP (ref 2.5–4.5)
POTASSIUM SERPL-MCNC: 4.2 MMOL/L — SIGNIFICANT CHANGE UP (ref 3.5–5.3)
POTASSIUM SERPL-SCNC: 4.2 MMOL/L — SIGNIFICANT CHANGE UP (ref 3.5–5.3)
PROT SERPL-MCNC: 7.3 G/DL — SIGNIFICANT CHANGE UP (ref 6–8.3)
SODIUM SERPL-SCNC: 136 MMOL/L — SIGNIFICANT CHANGE UP (ref 135–145)
SPECIMEN SOURCE: SIGNIFICANT CHANGE UP

## 2017-12-23 RX ORDER — HYDROMORPHONE HYDROCHLORIDE 2 MG/ML
1 INJECTION INTRAMUSCULAR; INTRAVENOUS; SUBCUTANEOUS EVERY 6 HOURS
Qty: 0 | Refills: 0 | Status: DISCONTINUED | OUTPATIENT
Start: 2017-12-23 | End: 2017-12-30

## 2017-12-23 RX ORDER — ELECTROLYTE SOLUTION,INJ
1 VIAL (ML) INTRAVENOUS
Qty: 0 | Refills: 0 | Status: DISCONTINUED | OUTPATIENT
Start: 2017-12-23 | End: 2017-12-23

## 2017-12-23 RX ORDER — I.V. FAT EMULSION 20 G/100ML
0.3 EMULSION INTRAVENOUS
Qty: 24 | Refills: 0 | Status: DISCONTINUED | OUTPATIENT
Start: 2017-12-23 | End: 2017-12-23

## 2017-12-23 RX ORDER — SODIUM CHLORIDE 9 MG/ML
1000 INJECTION, SOLUTION INTRAVENOUS
Qty: 0 | Refills: 0 | Status: DISCONTINUED | OUTPATIENT
Start: 2017-12-23 | End: 2017-12-23

## 2017-12-23 RX ADMIN — SODIUM CHLORIDE 150 MILLILITER(S): 9 INJECTION, SOLUTION INTRAVENOUS at 13:41

## 2017-12-23 RX ADMIN — HYDROMORPHONE HYDROCHLORIDE 1 MILLIGRAM(S): 2 INJECTION INTRAMUSCULAR; INTRAVENOUS; SUBCUTANEOUS at 20:00

## 2017-12-23 RX ADMIN — HEPARIN SODIUM 5000 UNIT(S): 5000 INJECTION INTRAVENOUS; SUBCUTANEOUS at 13:41

## 2017-12-23 RX ADMIN — PANTOPRAZOLE SODIUM 40 MILLIGRAM(S): 20 TABLET, DELAYED RELEASE ORAL at 12:12

## 2017-12-23 RX ADMIN — HEPARIN SODIUM 5000 UNIT(S): 5000 INJECTION INTRAVENOUS; SUBCUTANEOUS at 05:56

## 2017-12-23 RX ADMIN — HYDROMORPHONE HYDROCHLORIDE 1 MILLIGRAM(S): 2 INJECTION INTRAMUSCULAR; INTRAVENOUS; SUBCUTANEOUS at 20:15

## 2017-12-23 RX ADMIN — HEPARIN SODIUM 5000 UNIT(S): 5000 INJECTION INTRAVENOUS; SUBCUTANEOUS at 21:42

## 2017-12-23 NOTE — PROGRESS NOTE ADULT - PROBLEM SELECTOR PLAN 3
Pre-renal MEKHI now resolving.   Renal US with no hydro.  Strict I/Os. Avoid nephrotoxins/ NSAIDs/ RCA. Monitor BMP.

## 2017-12-23 NOTE — PROGRESS NOTE ADULT - SUBJECTIVE AND OBJECTIVE BOX
[facility] NEPHROLOGY- METABOLIC SUPPORT SERVICE PROGRESS NOTE    62 yr old male with gastric adenocarcinoma s/p Total gastrectomy with Kelby-en-Y esophagojejunal anastomosis  12/13/2017 .  Pt NPO for 9 days. Pt with nausea and vomiting with abd pain.  s/p Small bowel series with partial small bowel obstruction distal to the anastomosis. Plan to keep NPO. Consulted for TPN.       Hospital Medications: Medications reviewed.  REVIEW OF SYSTEMS:  CONSTITUTIONAL: No fevers or chills  RESPIRATORY: No shortness of breath  CARDIOVASCULAR: No chest pain.  GASTROINTESTINAL: + nausea and one episode of vomiting today. + abd pain. +flatus/ BM today   VASCULAR: No bilateral lower extremity edema.     VITALS:  T(F): 99.5 (12-23-17 @ 14:37), Max: 99.5 (12-23-17 @ 14:37)  HR: 84 (12-23-17 @ 14:37)  BP: 109/72 (12-23-17 @ 14:37)  RR: 16 (12-23-17 @ 14:37)  SpO2: 98% (12-23-17 @ 14:37)  Wt(kg): --    12-22 @ 07:01  -  12-23 @ 07:00  --------------------------------------------------------  IN: 0 mL / OUT: 1220 mL / NET: -1220 mL    12-23 @ 07:01  -  12-23 @ 16:18  --------------------------------------------------------  IN: 788 mL / OUT: 555 mL / NET: 233 mL      PHYSICAL EXAM:  Constitutional: NAD,   HEENT: +NG tube  Respiratory: CTAB, no wheezes, rales or rhonchi  Cardiovascular: S1, S2, RRR  Gastrointestinal: BS+, soft, + left sided tenderness +SOSA drain x2, intact incision  Extremities: No peripheral edema  Access: left arm PICC line      LABS:  12-23    136  |  105  |  24<H>  ----------------------------<  156<H>  4.2   |  23  |  1.03    Ca    8.6      23 Dec 2017 06:20  Phos  3.8     12-23  Mg     2.6     12-23    TPro  7.3  /  Alb  2.7<L>  /  TBili  0.6  /  DBili      /  AST  55<H>  /  ALT  101<H>  /  AlkPhos  88  12-23    Creatinine Trend: 1.03 <--, 1.23 <--, 0.97 <--, 1.46 <--, 1.37 <--, 0.74 <--, 0.68 <--                        12.8   11.4  )-----------( 248      ( 22 Dec 2017 07:39 )             38.3

## 2017-12-23 NOTE — PROGRESS NOTE ADULT - SUBJECTIVE AND OBJECTIVE BOX
Patient is a 62y old  Male who presents with a chief complaint of I have gastric cancer (12 Dec 2017 11:41)  Awake, alert, comfortable in NAD. Less abdominal pain. Still with NGT. No further nausea or vomiting.    INTERVAL HPI/OVERNIGHT EVENTS:      VITAL SIGNS:  T(F): 98.4 (12-23-17 @ 06:17)  HR: 83 (12-23-17 @ 06:17)  BP: 105/63 (12-23-17 @ 06:17)  RR: 16 (12-23-17 @ 06:17)  SpO2: 97% (12-23-17 @ 06:17)  Wt(kg): --  I&O's Detail    22 Dec 2017 07:01  -  23 Dec 2017 07:00  --------------------------------------------------------  IN:  Total IN: 0 mL    OUT:    Bulb: 10 mL    Bulb: 10 mL    Nasoenteral Tube: 1200 mL  Total OUT: 1220 mL    Total NET: -1220 mL      23 Dec 2017 07:01  -  23 Dec 2017 11:30  --------------------------------------------------------  IN:    dextrose 5% + sodium chloride 0.45%: 600 mL    fat emulsion (Fish Oil and Plant Based) 20% Infusion: 20 mL    TPN (Total Parenteral Nutrition): 168 mL  Total IN: 788 mL    OUT:    Voided: 450 mL  Total OUT: 450 mL    Total NET: 338 mL              REVIEW OF SYSTEMS:    CONSTITUTIONAL:  No fevers, chills, sweats    HEENT:  Eyes:  No diplopia or blurred vision. ENT:  No earache, sore throat or runny nose.    CARDIOVASCULAR:  No pressure, squeezing, tightness, or heaviness about the chest; no palpitations.    RESPIRATORY:  Per HPI    GASTROINTESTINAL:  + abdominal pain but no nausea, vomiting or diarrhea.    GENITOURINARY:  No dysuria, frequency or urgency.    NEUROLOGIC:  No paresthesias, fasciculations, seizures or weakness.    PSYCHIATRIC:  No disorder of thought or mood.      PHYSICAL EXAM:    Constitutional: Well developed and nourished  Eyes:Perrla  ENMT: normal  Neck:supple  Respiratory: good air entry  Cardiovascular: S1 S2 regular  Gastrointestinal: Soft, incisional tenderness  Extremities: No edema  Vascular:normal  Neurological:Awake, alert,Ox3  Musculoskeletal:Normal      MEDICATIONS  (STANDING):  dextrose 5% + sodium chloride 0.45% 1000 milliLiter(s) (150 mL/Hr) IV Continuous <Continuous>  fat emulsion (Fish Oil and Plant Based) 20% Infusion 0.3 Gm/kG/Day (5 mL/Hr) IV Continuous <Continuous>  heparin  Injectable 5000 Unit(s) SubCutaneous every 8 hours  pantoprazole  Injectable 40 milliGRAM(s) IV Push daily  Parenteral Nutrition - Adult 1 Each (42 mL/Hr) TPN Continuous <Continuous>  sodium chloride 0.9% lock flush 20 milliLiter(s) IV Push once    MEDICATIONS  (PRN):  acetaminophen   Tablet 650 milliGRAM(s) Oral every 6 hours PRN mild pain  ondansetron Injectable 4 milliGRAM(s) IV Push every 8 hours PRN Nausea and/or Vomiting  sodium chloride 0.9% lock flush 10 milliLiter(s) IV Push every 1 hour PRN After each medication administration  sodium chloride 0.9% lock flush 10 milliLiter(s) IV Push every 12 hours PRN Lumen of catheter NOT used      Allergies    No Known Allergies    Intolerances        LABS:                        12.8   11.4  )-----------( 248      ( 22 Dec 2017 07:39 )             38.3     12-23    136  |  105  |  24<H>  ----------------------------<  156<H>  4.2   |  23  |  1.03    Ca    8.6      23 Dec 2017 06:20  Phos  3.8     12-23  Mg     2.6     12-23    TPro  7.3  /  Alb  2.7<L>  /  TBili  0.6  /  DBili  x   /  AST  55<H>  /  ALT  101<H>  /  AlkPhos  88  12-23              CAPILLARY BLOOD GLUCOSE      POCT Blood Glucose.: 152 mg/dL (23 Dec 2017 05:41)  POCT Blood Glucose.: 239 mg/dL (22 Dec 2017 23:40)        RADIOLOGY & ADDITIONAL TESTS:    CXR:    Ct scan chest:    ekg;    echo:

## 2017-12-23 NOTE — PROGRESS NOTE ADULT - ASSESSMENT
62 yr old male with gastric adenocarcinoma s/p Total gastrectomy with Kelby-en-Y esophagojejunal anastomosis  12/13/2017 .  Pt NPO for 9 days. Pt with nausea and vomiting with abd pain.  s/p Small bowel series with partial small bowel obstruction distal to the anastomosis. Plan to keep NPO. Consulted for TPN.     TPN:   Total Calories 1170  Fat 24g    g  Dextrose 350 g

## 2017-12-23 NOTE — PROGRESS NOTE ADULT - PROBLEM SELECTOR PLAN 1
s/p total gastrectomy. Now with partial SBO. Pt NPO since admission with no plans to feed soon (NPO >9 days).   s/p Left arm PICC placement. s/p initiation of TPN on 12/22. Will increase to 2L today. Will discontinue IVF.   Check CMP/Mg/Phos. Check FS Q 6hrs; if elevated start ISS.   Daily weights and strict I/Os.

## 2017-12-23 NOTE — PROGRESS NOTE ADULT - SUBJECTIVE AND OBJECTIVE BOX
s/p total gastrectomy POD #10  Pt resting comfortably. NGT lws. Improved nausea. reports no vomiting today  +flatus and BM  Patient has picc line and getting TPN    MEDICATIONS  (STANDING):  dextrose 5% + sodium chloride 0.45% 1000 milliLiter(s) (150 mL/Hr) IV Continuous <Continuous>  heparin  Injectable 5000 Unit(s) SubCutaneous every 8 hours  pantoprazole  Injectable 40 milliGRAM(s) IV Push daily    MEDICATIONS  (PRN):  acetaminophen   Tablet 650 milliGRAM(s) Oral every 6 hours PRN mild pain  HYDROmorphone  Injectable 1 milliGRAM(s) IV Push every 2 hours PRN Severe Pain (7 - 10)  ondansetron Injectable 4 milliGRAM(s) IV Push every 8 hours PRN Nausea and/or Vomiting      Vital Signs Last 24 Hrs  T(C): 36.9 (22 Dec 2017 06:00), Max: 36.9 (21 Dec 2017 13:40)  T(F): 98.4 (22 Dec 2017 06:00), Max: 98.5 (21 Dec 2017 13:40)  HR: 71 (22 Dec 2017 06:00) (71 - 90)  BP: 122/69 (22 Dec 2017 06:00) (108/63 - 123/70)  ABP: 108/63 (21 Dec 2017 09:30) (108/63 - 108/63)  RR: 18 (22 Dec 2017 06:00) (16 - 20)  SpO2: 98% (22 Dec 2017 06:00) (95% - 99%)      Physical:  General: A&Ox3. NAD.  Abdomen: Soft nondistended, nontender. Incision clean, dry. Staples intact. JPx 2 in place with serosanguinous fluid (10,10)  NGT on lws: 1200mL

## 2017-12-24 LAB
ALBUMIN SERPL ELPH-MCNC: 2.7 G/DL — LOW (ref 3.5–5)
ALP SERPL-CCNC: 104 U/L — SIGNIFICANT CHANGE UP (ref 40–120)
ALT FLD-CCNC: 114 U/L DA — HIGH (ref 10–60)
ANION GAP SERPL CALC-SCNC: 11 MMOL/L — SIGNIFICANT CHANGE UP (ref 5–17)
AST SERPL-CCNC: 58 U/L — HIGH (ref 10–40)
BILIRUB SERPL-MCNC: 0.6 MG/DL — SIGNIFICANT CHANGE UP (ref 0.2–1.2)
BUN SERPL-MCNC: 18 MG/DL — SIGNIFICANT CHANGE UP (ref 7–18)
CA-I BLD-SCNC: 1.25 MMOL/L — SIGNIFICANT CHANGE UP (ref 1.12–1.3)
CALCIUM SERPL-MCNC: 8.9 MG/DL — SIGNIFICANT CHANGE UP (ref 8.4–10.5)
CHLORIDE SERPL-SCNC: 103 MMOL/L — SIGNIFICANT CHANGE UP (ref 96–108)
CO2 SERPL-SCNC: 22 MMOL/L — SIGNIFICANT CHANGE UP (ref 22–31)
CREAT SERPL-MCNC: 0.89 MG/DL — SIGNIFICANT CHANGE UP (ref 0.5–1.3)
GLUCOSE BLDC GLUCOMTR-MCNC: 135 MG/DL — HIGH (ref 70–99)
GLUCOSE BLDC GLUCOMTR-MCNC: 135 MG/DL — HIGH (ref 70–99)
GLUCOSE BLDC GLUCOMTR-MCNC: 154 MG/DL — HIGH (ref 70–99)
GLUCOSE SERPL-MCNC: 143 MG/DL — HIGH (ref 70–99)
HCT VFR BLD CALC: 35.3 % — LOW (ref 39–50)
HGB BLD-MCNC: 12.2 G/DL — LOW (ref 13–17)
MAGNESIUM SERPL-MCNC: 2.4 MG/DL — SIGNIFICANT CHANGE UP (ref 1.6–2.6)
MCHC RBC-ENTMCNC: 33 PG — SIGNIFICANT CHANGE UP (ref 27–34)
MCHC RBC-ENTMCNC: 34.7 GM/DL — SIGNIFICANT CHANGE UP (ref 32–36)
MCV RBC AUTO: 95.3 FL — SIGNIFICANT CHANGE UP (ref 80–100)
PHOSPHATE SERPL-MCNC: 4.1 MG/DL — SIGNIFICANT CHANGE UP (ref 2.5–4.5)
PLATELET # BLD AUTO: 254 K/UL — SIGNIFICANT CHANGE UP (ref 150–400)
POTASSIUM SERPL-MCNC: 3.9 MMOL/L — SIGNIFICANT CHANGE UP (ref 3.5–5.3)
POTASSIUM SERPL-SCNC: 3.9 MMOL/L — SIGNIFICANT CHANGE UP (ref 3.5–5.3)
PROT SERPL-MCNC: 7.5 G/DL — SIGNIFICANT CHANGE UP (ref 6–8.3)
RBC # BLD: 3.7 M/UL — LOW (ref 4.2–5.8)
RBC # FLD: 12.5 % — SIGNIFICANT CHANGE UP (ref 10.3–14.5)
SODIUM SERPL-SCNC: 136 MMOL/L — SIGNIFICANT CHANGE UP (ref 135–145)
WBC # BLD: 10.9 K/UL — HIGH (ref 3.8–10.5)
WBC # FLD AUTO: 10.9 K/UL — HIGH (ref 3.8–10.5)

## 2017-12-24 RX ORDER — ELECTROLYTE SOLUTION,INJ
1 VIAL (ML) INTRAVENOUS
Qty: 0 | Refills: 0 | Status: DISCONTINUED | OUTPATIENT
Start: 2017-12-24 | End: 2017-12-24

## 2017-12-24 RX ORDER — I.V. FAT EMULSION 20 G/100ML
0.3 EMULSION INTRAVENOUS
Qty: 24 | Refills: 0 | Status: DISCONTINUED | OUTPATIENT
Start: 2017-12-24 | End: 2017-12-24

## 2017-12-24 RX ADMIN — HEPARIN SODIUM 5000 UNIT(S): 5000 INJECTION INTRAVENOUS; SUBCUTANEOUS at 05:14

## 2017-12-24 RX ADMIN — HEPARIN SODIUM 5000 UNIT(S): 5000 INJECTION INTRAVENOUS; SUBCUTANEOUS at 13:37

## 2017-12-24 RX ADMIN — HEPARIN SODIUM 5000 UNIT(S): 5000 INJECTION INTRAVENOUS; SUBCUTANEOUS at 21:29

## 2017-12-24 RX ADMIN — PANTOPRAZOLE SODIUM 40 MILLIGRAM(S): 20 TABLET, DELAYED RELEASE ORAL at 11:30

## 2017-12-24 NOTE — PROGRESS NOTE ADULT - PROBLEM SELECTOR PLAN 1
s/p total gastrectomy. Now with partial SBO. Pt NPO since admission with no plans to feed soon (NPO >9 days).   s/p Left arm PICC placement. s/p initiation of TPN on 12/22. Continue with TPN 2L daily with lipids until obstruction resolved.   Check CMP/Mg/Phos. Check FS Q 6hrs; if elevated start ISS.   Daily weights and strict I/Os.

## 2017-12-24 NOTE — PROGRESS NOTE ADULT - SUBJECTIVE AND OBJECTIVE BOX
Patient is a 62y old  Male who presents with a chief complaint of I have gastric cancer (12 Dec 2017 11:41)  S/p partial gastrectomy. Currently with NGT in place and on TPN because of persistent nausea and vomiting.  Less abdominal pain    INTERVAL HPI/OVERNIGHT EVENTS:      VITAL SIGNS:  T(F): 98.6 (12-24-17 @ 05:55)  HR: 83 (12-24-17 @ 05:55)  BP: 116/65 (12-24-17 @ 05:55)  RR: 97 (12-24-17 @ 05:55)  SpO2: 97% (12-24-17 @ 05:55)  Wt(kg): --  I&O's Detail    23 Dec 2017 07:01  -  24 Dec 2017 07:00  --------------------------------------------------------  IN:    dextrose 5% + sodium chloride 0.45%: 600 mL    fat emulsion (Fish Oil and Plant Based) 20% Infusion: 45 mL    TPN (Total Parenteral Nutrition): 542 mL  Total IN: 1187 mL    OUT:    Bulb: 9 mL    Bulb: 9 mL    Nasoenteral Tube: 650 mL    Voided: 1550 mL  Total OUT: 2218 mL    Total NET: -1031 mL              REVIEW OF SYSTEMS:    CONSTITUTIONAL:  No fevers, chills, sweats    HEENT:  Eyes:  No diplopia or blurred vision. ENT:  No earache, sore throat or runny nose.    CARDIOVASCULAR:  No pressure, squeezing, tightness, or heaviness about the chest; no palpitations.    RESPIRATORY:  Per HPI    GASTROINTESTINAL:  + abdominal pain but no nausea, vomiting or diarrhea.    GENITOURINARY:  No dysuria, frequency or urgency.    NEUROLOGIC:  No paresthesias, fasciculations, seizures or weakness.    PSYCHIATRIC:  No disorder of thought or mood.      PHYSICAL EXAM:    Constitutional: Well developed and nourished  Eyes:Perrla  ENMT: normal  Neck:supple  Respiratory: good air entry  Cardiovascular: S1 S2 regular  Gastrointestinal: Soft, Mildly  tender  Extremities: No edema  Vascular:normal  Neurological:Awake, alert,Ox3  Musculoskeletal:Normal      MEDICATIONS  (STANDING):  fat emulsion (Fish Oil and Plant Based) 20% Infusion 0.3 Gm/kG/Day (5 mL/Hr) IV Continuous <Continuous>  fat emulsion (Fish Oil and Plant Based) 20% Infusion 0.3 Gm/kG/Day (5 mL/Hr) IV Continuous <Continuous>  heparin  Injectable 5000 Unit(s) SubCutaneous every 8 hours  pantoprazole  Injectable 40 milliGRAM(s) IV Push daily  Parenteral Nutrition - Adult 1 Each (83 mL/Hr) TPN Continuous <Continuous>  Parenteral Nutrition - Adult 1 Each (83 mL/Hr) TPN Continuous <Continuous>  sodium chloride 0.9% lock flush 20 milliLiter(s) IV Push once    MEDICATIONS  (PRN):  acetaminophen   Tablet 650 milliGRAM(s) Oral every 6 hours PRN mild pain  HYDROmorphone  Injectable 1 milliGRAM(s) IV Push every 6 hours PRN Severe Pain (7 - 10)  ondansetron Injectable 4 milliGRAM(s) IV Push every 8 hours PRN Nausea and/or Vomiting  sodium chloride 0.9% lock flush 10 milliLiter(s) IV Push every 1 hour PRN After each medication administration  sodium chloride 0.9% lock flush 10 milliLiter(s) IV Push every 12 hours PRN Lumen of catheter NOT used      Allergies    No Known Allergies    Intolerances        LABS:                        12.2   10.9  )-----------( 254      ( 24 Dec 2017 06:59 )             35.3     12-24    136  |  103  |  18  ----------------------------<  143<H>  3.9   |  22  |  0.89    Ca    8.9      24 Dec 2017 06:59  Phos  4.1     12-24  Mg     2.4     12-24    TPro  7.5  /  Alb  2.7<L>  /  TBili  0.6  /  DBili  x   /  AST  58<H>  /  ALT  114<H>  /  AlkPhos  104  12-24              CAPILLARY BLOOD GLUCOSE      POCT Blood Glucose.: 154 mg/dL (24 Dec 2017 05:31)  POCT Blood Glucose.: 139 mg/dL (23 Dec 2017 23:37)  POCT Blood Glucose.: 108 mg/dL (23 Dec 2017 17:52)        RADIOLOGY & ADDITIONAL TESTS:    CXR:  < from: Xray Small Bowel Series (12.21.17 @ 11:08) >  IMPRESSION:  Status post total gastrectomy, with no leakage of oral contrast. Transit   time to the colon was 40 minutes. At the gastrojejunal anastomosis, there   is dilated proximal to mid degenerative without mucosal or mural   thickening. Differential includes ileus versus partial small bowel   obstruction.    < end of copied text >    Ct scan chest:    ekg;    echo:

## 2017-12-24 NOTE — PROGRESS NOTE ADULT - SUBJECTIVE AND OBJECTIVE BOX
pt s- new complaints.  +bm/flatus  VSS a/f  Abd: soft nt nd jp1=10cc serosanguinous  jp2=10cc serosanguinous  wounds: cdi, staples in place  lungs: cta  NGT: 650cc bilious  no calf swelling or erythema                          12.2   10.9  )-----------( 254      ( 24 Dec 2017 06:59 )             35.3   12-24    136  |  103  |  18  ----------------------------<  143<H>  3.9   |  22  |  0.89    Ca    8.9      24 Dec 2017 06:59  Phos  4.1     12-24  Mg     2.4     12-24    TPro  7.5  /  Alb  2.7<L>  /  TBili  0.6  /  DBili  x   /  AST  58<H>  /  ALT  114<H>  /  AlkPhos  104  12-24

## 2017-12-24 NOTE — PROGRESS NOTE ADULT - PROBLEM SELECTOR PLAN 3
Check path report  Pain control  Surgery and Hem/Onc follow up  NGT as per surgery  NPO  Surgery follow up  Cont TPN

## 2017-12-24 NOTE — PROGRESS NOTE ADULT - ASSESSMENT
s/p total gastrectemy  psbo resolving, ngt now 650cc 24 hrs  ppn  local wound care    continue present management, likely d/c ngt 12/25

## 2017-12-24 NOTE — PROGRESS NOTE ADULT - SUBJECTIVE AND OBJECTIVE BOX
[facility] NEPHROLOGY- METABOLIC SUPPORT SERVICE PROGRESS NOTE    62 yr old male with gastric adenocarcinoma s/p Total gastrectomy with Kelby-en-Y esophagojejunal anastomosis  12/13/2017 .  Pt NPO for 9 days. Pt with nausea and vomiting with abd pain.  s/p Small bowel series with partial small bowel obstruction distal to the anastomosis. Plan to keep NPO. Consulted for TPN.       Hospital Medications: Medications reviewed.  REVIEW OF SYSTEMS:  CONSTITUTIONAL: No fevers or chills  RESPIRATORY: No shortness of breath  CARDIOVASCULAR: No chest pain.  GASTROINTESTINAL: No nausea or vomiting, + flatus/ BM today   VASCULAR: No bilateral lower extremity edema.     VITALS:  T(F): 97.9 (12-24-17 @ 14:23), Max: 98.6 (12-24-17 @ 05:55)  HR: 82 (12-24-17 @ 14:23)  BP: 106/68 (12-24-17 @ 14:23)  RR: 18 (12-24-17 @ 14:23)  SpO2: 96% (12-24-17 @ 14:23)  Wt(kg): --    12-23 @ 07:01  -  12-24 @ 07:00  --------------------------------------------------------  IN: 1187 mL / OUT: 2218 mL / NET: -1031 mL    12-24 @ 07:01  -  12-24 @ 14:37  --------------------------------------------------------  IN: 0 mL / OUT: 1 mL / NET: -1 mL      PHYSICAL EXAM:  Constitutional: NAD,   HEENT: +NG tube  Respiratory: CTAB, no wheezes, rales or rhonchi  Cardiovascular: S1, S2, RRR  Gastrointestinal: BS+, soft, + left sided tenderness +SOSA drain x2, intact incision  Extremities: No peripheral edema  Access: left arm PICC line      LABS:  12-24    136  |  103  |  18  ----------------------------<  143<H>  3.9   |  22  |  0.89    Ca    8.9      24 Dec 2017 06:59  Phos  4.1     12-24  Mg     2.4     12-24    TPro  7.5  /  Alb  2.7<L>  /  TBili  0.6  /  DBili      /  AST  58<H>  /  ALT  114<H>  /  AlkPhos  104  12-24    Creatinine Trend: 0.89 <--, 1.03 <--, 1.23 <--, 0.97 <--, 1.46 <--, 1.37 <--, 0.74 <--                        12.2   10.9  )-----------( 254      ( 24 Dec 2017 06:59 )             35.3

## 2017-12-24 NOTE — PROGRESS NOTE ADULT - SUBJECTIVE AND OBJECTIVE BOX
Patient is a 62y old  Male who presents with a chief complaint of I have gastric cancer (12 Dec 2017 11:41)       pt seen in icu [ ], reg med floor [ x  ], bed [ ], chair at bedside [ x  ], a+o x3 [ x ], lethargic [  ],   nad [ x ], ngt to wall suction [x] on tpn via left upper arm picc line [x]        Allergies    No Known Allergies        Vitals    T(F): 98.6 (12-24-17 @ 05:55), Max: 99.5 (12-23-17 @ 14:37)  HR: 83 (12-24-17 @ 05:55) (68 - 84)  BP: 116/65 (12-24-17 @ 05:55) (109/72 - 116/69)  RR: 97 (12-24-17 @ 05:55) (16 - 97)  SpO2: 97% (12-24-17 @ 05:55) (97% - 100%)  Wt(kg): --  CAPILLARY BLOOD GLUCOSE      POCT Blood Glucose.: 154 mg/dL (24 Dec 2017 05:31)      Labs                          12.8   11.4  )-----------( 248      ( 22 Dec 2017 07:39 )             38.3       12-23    136  |  105  |  24<H>  ----------------------------<  156<H>  4.2   |  23  |  1.03    Ca    8.6      23 Dec 2017 06:20  Phos  3.8     12-23  Mg     2.6     12-23    TPro  7.3  /  Alb  2.7<L>  /  TBili  0.6  /  DBili  x   /  AST  55<H>  /  ALT  101<H>  /  AlkPhos  88  12-23            .Urine Clean Catch (Midstream)  12-21 @ 11:20   10,000 - 49,000 CFU/mL Escherichia coli  <10,000 CFU/ml Normal Urogenital keila present  --  Escherichia coli      .Blood  12-21 @ 11:19   No growth to date.  --  --      .Blood Blood  12-21 @ 11:17   No growth to date.  --  --          Radiology Results      Meds    MEDICATIONS  (STANDING):  fat emulsion (Fish Oil and Plant Based) 20% Infusion 0.3 Gm/kG/Day (5 mL/Hr) IV Continuous <Continuous>  heparin  Injectable 5000 Unit(s) SubCutaneous every 8 hours  pantoprazole  Injectable 40 milliGRAM(s) IV Push daily  Parenteral Nutrition - Adult 1 Each (83 mL/Hr) TPN Continuous <Continuous>  sodium chloride 0.9% lock flush 20 milliLiter(s) IV Push once      MEDICATIONS  (PRN):  acetaminophen   Tablet 650 milliGRAM(s) Oral every 6 hours PRN mild pain  HYDROmorphone  Injectable 1 milliGRAM(s) IV Push every 6 hours PRN Severe Pain (7 - 10)  ondansetron Injectable 4 milliGRAM(s) IV Push every 8 hours PRN Nausea and/or Vomiting  sodium chloride 0.9% lock flush 10 milliLiter(s) IV Push every 1 hour PRN After each medication administration  sodium chloride 0.9% lock flush 10 milliLiter(s) IV Push every 12 hours PRN Lumen of catheter NOT used      Physical Exam    Neuro :  no focal deficits  Respiratory: CTA B/L  CV: RRR, S1S2, no murmurs,   Abdominal: Soft, ND, incision w/ staples in place, clean and dry,  diann x2 with serosanguinous drainage,  Extremities: No edema, + peripheral pulses    ASSESSMENT    Malignant neoplasm of stomach  small bowell ileus vs obstruction  h/o Prediabetes  HLD (hyperlipidemia)  HTN (hypertension)  Malignant neoplasm of stomach, unspecified location  H/O prostate biopsy  History of arthroscopy of right shoulder  History of arthroscopy of left knee  History of appendectomy        PLAN      s/p total gatrectomy with cristobal-en-y esophagojejunal anastomosis  cont wound care   cont npo  pt having bm and flattus  reglan prn  cont ngt to ws  cont tpn  renal cons for eval for tpn  small bowell series result noted   gi and dvt prophylaxis  incentive spirometer  pulm f/u  cont pain control  pain mgmt f/u  oob to chair  cont current meds Patient is a 62y old  Male who presents with a chief complaint of I have gastric cancer (12 Dec 2017 11:41)       pt seen in icu [ ], reg med floor [ x  ], bed [x ], chair at bedside [ ], a+o x3 [ x ], lethargic [  ],   nad [ x ], ngt to wall suction [x] on tpn via left upper arm picc line [x]        Allergies    No Known Allergies        Vitals    T(F): 98.6 (12-24-17 @ 05:55), Max: 99.5 (12-23-17 @ 14:37)  HR: 83 (12-24-17 @ 05:55) (68 - 84)  BP: 116/65 (12-24-17 @ 05:55) (109/72 - 116/69)  RR: 97 (12-24-17 @ 05:55) (16 - 97)  SpO2: 97% (12-24-17 @ 05:55) (97% - 100%)  Wt(kg): --  CAPILLARY BLOOD GLUCOSE      POCT Blood Glucose.: 154 mg/dL (24 Dec 2017 05:31)      Labs                          12.8   11.4  )-----------( 248      ( 22 Dec 2017 07:39 )             38.3       12-23    136  |  105  |  24<H>  ----------------------------<  156<H>  4.2   |  23  |  1.03    Ca    8.6      23 Dec 2017 06:20  Phos  3.8     12-23  Mg     2.6     12-23    TPro  7.3  /  Alb  2.7<L>  /  TBili  0.6  /  DBili  x   /  AST  55<H>  /  ALT  101<H>  /  AlkPhos  88  12-23            .Urine Clean Catch (Midstream)  12-21 @ 11:20   10,000 - 49,000 CFU/mL Escherichia coli  <10,000 CFU/ml Normal Urogenital keila present  --  Escherichia coli      .Blood  12-21 @ 11:19   No growth to date.  --  --      .Blood Blood  12-21 @ 11:17   No growth to date.  --  --          Radiology Results      Meds    MEDICATIONS  (STANDING):  fat emulsion (Fish Oil and Plant Based) 20% Infusion 0.3 Gm/kG/Day (5 mL/Hr) IV Continuous <Continuous>  heparin  Injectable 5000 Unit(s) SubCutaneous every 8 hours  pantoprazole  Injectable 40 milliGRAM(s) IV Push daily  Parenteral Nutrition - Adult 1 Each (83 mL/Hr) TPN Continuous <Continuous>  sodium chloride 0.9% lock flush 20 milliLiter(s) IV Push once      MEDICATIONS  (PRN):  acetaminophen   Tablet 650 milliGRAM(s) Oral every 6 hours PRN mild pain  HYDROmorphone  Injectable 1 milliGRAM(s) IV Push every 6 hours PRN Severe Pain (7 - 10)  ondansetron Injectable 4 milliGRAM(s) IV Push every 8 hours PRN Nausea and/or Vomiting  sodium chloride 0.9% lock flush 10 milliLiter(s) IV Push every 1 hour PRN After each medication administration  sodium chloride 0.9% lock flush 10 milliLiter(s) IV Push every 12 hours PRN Lumen of catheter NOT used      Physical Exam    Neuro :  no focal deficits  Respiratory: CTA B/L  CV: RRR, S1S2, no murmurs,   Abdominal: Soft, ND, incision w/ staples in place, clean and dry,  diann x2 with serosanguinous drainage,  Extremities: No edema, + peripheral pulses    ASSESSMENT    Malignant neoplasm of stomach  small bowell ileus vs obstruction  h/o Prediabetes  HLD (hyperlipidemia)  HTN (hypertension)  Malignant neoplasm of stomach, unspecified location  H/O prostate biopsy  History of arthroscopy of right shoulder  History of arthroscopy of left knee  History of appendectomy        PLAN      s/p total gatrectomy with cristobal-en-y esophagojejunal anastomosis  cont wound care   cont npo  pt having bm and flattus  reglan prn  cont ngt to ws  cont tpn  renal cons f/u  small bowell series result noted   gi and dvt prophylaxis  incentive spirometer  pulm f/u  cont pain control  pain mgmt f/u  oob to chair  cont current meds

## 2017-12-24 NOTE — PROGRESS NOTE ADULT - PROBLEM SELECTOR PLAN 3
Pre-renal MEKHI now resolved.   Renal US with no hydro.  Strict I/Os. Avoid nephrotoxins/ NSAIDs/ RCA. Monitor BMP.

## 2017-12-25 LAB
ALBUMIN SERPL ELPH-MCNC: 2.9 G/DL — LOW (ref 3.5–5)
ALP SERPL-CCNC: 116 U/L — SIGNIFICANT CHANGE UP (ref 40–120)
ALT FLD-CCNC: 117 U/L DA — HIGH (ref 10–60)
ANION GAP SERPL CALC-SCNC: 12 MMOL/L — SIGNIFICANT CHANGE UP (ref 5–17)
AST SERPL-CCNC: 53 U/L — HIGH (ref 10–40)
BILIRUB SERPL-MCNC: 0.6 MG/DL — SIGNIFICANT CHANGE UP (ref 0.2–1.2)
BUN SERPL-MCNC: 23 MG/DL — HIGH (ref 7–18)
CA-I BLD-SCNC: 1.22 MMOL/L — SIGNIFICANT CHANGE UP (ref 1.12–1.3)
CALCIUM SERPL-MCNC: 8.9 MG/DL — SIGNIFICANT CHANGE UP (ref 8.4–10.5)
CHLORIDE SERPL-SCNC: 102 MMOL/L — SIGNIFICANT CHANGE UP (ref 96–108)
CO2 SERPL-SCNC: 24 MMOL/L — SIGNIFICANT CHANGE UP (ref 22–31)
CREAT SERPL-MCNC: 1.01 MG/DL — SIGNIFICANT CHANGE UP (ref 0.5–1.3)
GLUCOSE BLDC GLUCOMTR-MCNC: 137 MG/DL — HIGH (ref 70–99)
GLUCOSE BLDC GLUCOMTR-MCNC: 151 MG/DL — HIGH (ref 70–99)
GLUCOSE BLDC GLUCOMTR-MCNC: 156 MG/DL — HIGH (ref 70–99)
GLUCOSE BLDC GLUCOMTR-MCNC: 157 MG/DL — HIGH (ref 70–99)
GLUCOSE BLDC GLUCOMTR-MCNC: 168 MG/DL — HIGH (ref 70–99)
GLUCOSE SERPL-MCNC: 164 MG/DL — HIGH (ref 70–99)
MAGNESIUM SERPL-MCNC: 2.4 MG/DL — SIGNIFICANT CHANGE UP (ref 1.6–2.6)
PHOSPHATE SERPL-MCNC: 4.2 MG/DL — SIGNIFICANT CHANGE UP (ref 2.5–4.5)
POTASSIUM SERPL-MCNC: 3.9 MMOL/L — SIGNIFICANT CHANGE UP (ref 3.5–5.3)
POTASSIUM SERPL-SCNC: 3.9 MMOL/L — SIGNIFICANT CHANGE UP (ref 3.5–5.3)
PROT SERPL-MCNC: 8.1 G/DL — SIGNIFICANT CHANGE UP (ref 6–8.3)
SODIUM SERPL-SCNC: 138 MMOL/L — SIGNIFICANT CHANGE UP (ref 135–145)

## 2017-12-25 RX ORDER — I.V. FAT EMULSION 20 G/100ML
0.3 EMULSION INTRAVENOUS
Qty: 24 | Refills: 0 | Status: DISCONTINUED | OUTPATIENT
Start: 2017-12-25 | End: 2017-12-25

## 2017-12-25 RX ORDER — ELECTROLYTE SOLUTION,INJ
1 VIAL (ML) INTRAVENOUS
Qty: 0 | Refills: 0 | Status: DISCONTINUED | OUTPATIENT
Start: 2017-12-25 | End: 2017-12-25

## 2017-12-25 RX ADMIN — ONDANSETRON 4 MILLIGRAM(S): 8 TABLET, FILM COATED ORAL at 17:38

## 2017-12-25 RX ADMIN — HEPARIN SODIUM 5000 UNIT(S): 5000 INJECTION INTRAVENOUS; SUBCUTANEOUS at 06:09

## 2017-12-25 RX ADMIN — HEPARIN SODIUM 5000 UNIT(S): 5000 INJECTION INTRAVENOUS; SUBCUTANEOUS at 13:18

## 2017-12-25 RX ADMIN — HEPARIN SODIUM 5000 UNIT(S): 5000 INJECTION INTRAVENOUS; SUBCUTANEOUS at 21:36

## 2017-12-25 RX ADMIN — PANTOPRAZOLE SODIUM 40 MILLIGRAM(S): 20 TABLET, DELAYED RELEASE ORAL at 11:37

## 2017-12-25 NOTE — PROGRESS NOTE ADULT - SUBJECTIVE AND OBJECTIVE BOX
s/p total gastrectomy 12/13  Pt resting comfortably. NGT fell off this morning. Pt denies nausea or vomiting and denies abdominal pain  +flatus and BM  Patient has picc line and getting TPN    MEDICATIONS  (STANDING):  dextrose 5% + sodium chloride 0.45% 1000 milliLiter(s) (150 mL/Hr) IV Continuous <Continuous>  heparin  Injectable 5000 Unit(s) SubCutaneous every 8 hours  pantoprazole  Injectable 40 milliGRAM(s) IV Push daily    MEDICATIONS  (PRN):  acetaminophen   Tablet 650 milliGRAM(s) Oral every 6 hours PRN mild pain  HYDROmorphone  Injectable 1 milliGRAM(s) IV Push every 2 hours PRN Severe Pain (7 - 10)  ondansetron Injectable 4 milliGRAM(s) IV Push every 8 hours PRN Nausea and/or Vomiting      Vital Signs Last 24 Hrs  T(C): 36.4 (25 Dec 2017 05:34), Max: 37.3 (24 Dec 2017 21:48)  T(F): 97.6 (25 Dec 2017 05:34), Max: 99.1 (24 Dec 2017 21:48)  HR: 82 (25 Dec 2017 05:34) (77 - 82)  BP: 111/68 (25 Dec 2017 05:34) (106/68 - 111/68)  RR: 18 (25 Dec 2017 05:34) (17 - 18)  SpO2: 98% (25 Dec 2017 05:34) (96% - 98%)      Physical:  General: A&Ox3. NAD.  Abdomen: Soft nondistended, nontender. Incision clean, dry. Staples intact. JPx 2 in place with serosanguinous fluid (1,2)                 12-25    138  |  102  |  23<H>  ----------------------------<  164<H>  3.9   |  24  |  1.01    Ca    8.9      25 Dec 2017 06:35  Phos  4.2     12-25  Mg     2.4     12-25    TPro  8.1  /  Alb  2.9<L>  /  TBili  0.6  /  DBili  x   /  AST  53<H>  /  ALT  117<H>  /  AlkPhos  116  12-25

## 2017-12-25 NOTE — PROGRESS NOTE ADULT - SUBJECTIVE AND OBJECTIVE BOX
[facility] NEPHROLOGY- METABOLIC SUPPORT SERVICE PROGRESS NOTE    62 yr old male with gastric adenocarcinoma s/p Total gastrectomy with Kelby-en-Y esophagojejunal anastomosis  12/13/2017 .  Pt NPO for 9 days. Pt with nausea and vomiting with abd pain.  s/p Small bowel series with partial small bowel obstruction distal to the anastomosis. Plan to keep NPO. Consulted for TPN.       Hospital Medications: Medications reviewed.  REVIEW OF SYSTEMS:  CONSTITUTIONAL: No fevers or chills  RESPIRATORY: No shortness of breath  CARDIOVASCULAR: No chest pain.  GASTROINTESTINAL: No nausea or vomiting, + flatus/ BM today   VASCULAR: No bilateral lower extremity edema.     VITALS:  T(F): 97.6 (12-25-17 @ 05:34), Max: 99.1 (12-24-17 @ 21:48)  HR: 82 (12-25-17 @ 05:34)  BP: 111/68 (12-25-17 @ 05:34)  RR: 18 (12-25-17 @ 05:34)  SpO2: 98% (12-25-17 @ 05:34)  Wt(kg): --    12-24 @ 07:01  -  12-25 @ 07:00  --------------------------------------------------------  IN: 0 mL / OUT: 2043 mL / NET: -2043 mL      PHYSICAL EXAM:  Constitutional: NAD,   HEENT: +NG tube  Respiratory: CTAB, no wheezes, rales or rhonchi  Cardiovascular: S1, S2, RRR  Gastrointestinal: BS+, soft, + left sided tenderness +SOSA drain x2, intact incision  Extremities: No peripheral edema  Access: left arm PICC line    LABS:  12-25    138  |  102  |  23<H>  ----------------------------<  164<H>  3.9   |  24  |  1.01    Ca    8.9      25 Dec 2017 06:35  Phos  4.2     12-25  Mg     2.4     12-25    TPro  8.1  /  Alb  2.9<L>  /  TBili  0.6  /  DBili      /  AST  53<H>  /  ALT  117<H>  /  AlkPhos  116  12-25    Creatinine Trend: 1.01 <--, 0.89 <--, 1.03 <--, 1.23 <--, 0.97 <--, 1.46 <--, 1.37 <--                        12.2   10.9  )-----------( 254      ( 24 Dec 2017 06:59 )             35.3

## 2017-12-25 NOTE — PROGRESS NOTE ADULT - SUBJECTIVE AND OBJECTIVE BOX
Patient is a 62y old  Male who presents with a chief complaint of I have gastric cancer (12 Dec 2017 11:41)  Awake, alert, comfortable in NAD. NGT is out.    INTERVAL HPI/OVERNIGHT EVENTS:      VITAL SIGNS:  T(F): 97.7 (12-25-17 @ 14:28)  HR: 72 (12-25-17 @ 14:28)  BP: 116/59 (12-25-17 @ 14:28)  RR: 17 (12-25-17 @ 14:28)  SpO2: 100% (12-25-17 @ 14:28)  Wt(kg): --  I&O's Detail    24 Dec 2017 07:01  -  25 Dec 2017 07:00  --------------------------------------------------------  IN:  Total IN: 0 mL    OUT:    Bulb: 1 mL    Bulb: 2 mL    Nasoenteral Tube: 1280 mL    Voided: 760 mL  Total OUT: 2043 mL    Total NET: -2043 mL              REVIEW OF SYSTEMS:    CONSTITUTIONAL:  No fevers, chills, sweats    HEENT:  Eyes:  No diplopia or blurred vision. ENT:  No earache, sore throat or runny nose.    CARDIOVASCULAR:  No pressure, squeezing, tightness, or heaviness about the chest; no palpitations.    RESPIRATORY:  Per HPI    GASTROINTESTINAL:  No abdominal pain, nausea, vomiting or diarrhea.    GENITOURINARY:  No dysuria, frequency or urgency.    NEUROLOGIC:  No paresthesias, fasciculations, seizures or weakness.    PSYCHIATRIC:  No disorder of thought or mood.      PHYSICAL EXAM:    Constitutional: Well developed and nourished  Eyes:Perrla  ENMT: normal  Neck:supple  Respiratory: good air entry  Cardiovascular: S1 S2 regular  Gastrointestinal: Soft, Non tender  Extremities: No edema  Vascular:normal  Neurological:Awake, alert,Ox3  Musculoskeletal:Normal      MEDICATIONS  (STANDING):  fat emulsion (Fish Oil and Plant Based) 20% Infusion 0.3 Gm/kG/Day (5 mL/Hr) IV Continuous <Continuous>  fat emulsion (Fish Oil and Plant Based) 20% Infusion 0.3 Gm/kG/Day (5 mL/Hr) IV Continuous <Continuous>  heparin  Injectable 5000 Unit(s) SubCutaneous every 8 hours  pantoprazole  Injectable 40 milliGRAM(s) IV Push daily  Parenteral Nutrition - Adult 1 Each (83 mL/Hr) TPN Continuous <Continuous>  Parenteral Nutrition - Adult 1 Each (83 mL/Hr) TPN Continuous <Continuous>  sodium chloride 0.9% lock flush 20 milliLiter(s) IV Push once    MEDICATIONS  (PRN):  acetaminophen   Tablet 650 milliGRAM(s) Oral every 6 hours PRN mild pain  HYDROmorphone  Injectable 1 milliGRAM(s) IV Push every 6 hours PRN Severe Pain (7 - 10)  ondansetron Injectable 4 milliGRAM(s) IV Push every 8 hours PRN Nausea and/or Vomiting  sodium chloride 0.9% lock flush 10 milliLiter(s) IV Push every 1 hour PRN After each medication administration  sodium chloride 0.9% lock flush 10 milliLiter(s) IV Push every 12 hours PRN Lumen of catheter NOT used      Allergies    No Known Allergies    Intolerances        LABS:                        12.2   10.9  )-----------( 254      ( 24 Dec 2017 06:59 )             35.3     12-25    138  |  102  |  23<H>  ----------------------------<  164<H>  3.9   |  24  |  1.01    Ca    8.9      25 Dec 2017 06:35  Phos  4.2     12-25  Mg     2.4     12-25    TPro  8.1  /  Alb  2.9<L>  /  TBili  0.6  /  DBili  x   /  AST  53<H>  /  ALT  117<H>  /  AlkPhos  116  12-25              CAPILLARY BLOOD GLUCOSE      POCT Blood Glucose.: 151 mg/dL (25 Dec 2017 11:28)  POCT Blood Glucose.: 168 mg/dL (25 Dec 2017 07:25)  POCT Blood Glucose.: 157 mg/dL (25 Dec 2017 05:55)  POCT Blood Glucose.: 135 mg/dL (24 Dec 2017 17:25)        RADIOLOGY & ADDITIONAL TESTS:    CXR:    Ct scan chest:    ekg;    echo:

## 2017-12-25 NOTE — PROGRESS NOTE ADULT - SUBJECTIVE AND OBJECTIVE BOX
Patient is a 62y old  Male who presents with a chief complaint of I have gastric cancer (12 Dec 2017 11:41)      pt seen in icu [ ], reg med floor [ x  ], bed [x ], chair at bedside [ ], a+o x3 [ x ], lethargic [  ],   nad [ x ], ngt to wall suction [x] on tpn via left upper arm picc line [x]      Allergies    No Known Allergies        Vitals    T(F): 97.6 (12-25-17 @ 05:34), Max: 99.1 (12-24-17 @ 21:48)  HR: 82 (12-25-17 @ 05:34) (77 - 82)  BP: 111/68 (12-25-17 @ 05:34) (106/68 - 111/68)  RR: 18 (12-25-17 @ 05:34) (17 - 18)  SpO2: 98% (12-25-17 @ 05:34) (96% - 98%)  Wt(kg): --  CAPILLARY BLOOD GLUCOSE      POCT Blood Glucose.: 168 mg/dL (25 Dec 2017 07:25)      Labs                          12.2   10.9  )-----------( 254      ( 24 Dec 2017 06:59 )             35.3       12-25    138  |  102  |  23<H>  ----------------------------<  164<H>  3.9   |  24  |  1.01    Ca    8.9      25 Dec 2017 06:35  Phos  4.2     12-25  Mg     2.4     12-25    TPro  8.1  /  Alb  2.9<L>  /  TBili  0.6  /  DBili  x   /  AST  53<H>  /  ALT  117<H>  /  AlkPhos  116  12-25            .Urine Clean Catch (Midstream)  12-21 @ 11:20   10,000 - 49,000 CFU/mL Escherichia coli  <10,000 CFU/ml Normal Urogenital keila present  --  Escherichia coli      .Blood  12-21 @ 11:19   No growth to date.  --  --      .Blood Blood  12-21 @ 11:17   No growth to date.  --  --          Radiology Results      Meds    MEDICATIONS  (STANDING):  fat emulsion (Fish Oil and Plant Based) 20% Infusion 0.3 Gm/kG/Day (5 mL/Hr) IV Continuous <Continuous>  heparin  Injectable 5000 Unit(s) SubCutaneous every 8 hours  pantoprazole  Injectable 40 milliGRAM(s) IV Push daily  Parenteral Nutrition - Adult 1 Each (83 mL/Hr) TPN Continuous <Continuous>  sodium chloride 0.9% lock flush 20 milliLiter(s) IV Push once      MEDICATIONS  (PRN):  acetaminophen   Tablet 650 milliGRAM(s) Oral every 6 hours PRN mild pain  HYDROmorphone  Injectable 1 milliGRAM(s) IV Push every 6 hours PRN Severe Pain (7 - 10)  ondansetron Injectable 4 milliGRAM(s) IV Push every 8 hours PRN Nausea and/or Vomiting  sodium chloride 0.9% lock flush 10 milliLiter(s) IV Push every 1 hour PRN After each medication administration  sodium chloride 0.9% lock flush 10 milliLiter(s) IV Push every 12 hours PRN Lumen of catheter NOT used      Physical Exam    Neuro :  no focal deficits  Respiratory: CTA B/L  CV: RRR, S1S2, no murmurs,   Abdominal: Soft, ND, incision w/ staples in place, clean and dry,  diann x2 with serosanguinous drainage,  Extremities: No edema, + peripheral pulses    ASSESSMENT    Malignant neoplasm of stomach  small bowell ileus vs obstruction  h/o Prediabetes  HLD (hyperlipidemia)  HTN (hypertension)  Malignant neoplasm of stomach, unspecified location  H/O prostate biopsy  History of arthroscopy of right shoulder  History of arthroscopy of left knee  History of appendectomy        PLAN      s/p total gatrectomy with cristobal-en-y esophagojejunal anastomosis  cont wound care   cont npo  pt having bm and flattus  reglan prn  cont ngt to ws  cont tpn  renal cons f/u  small bowell series result noted   gi and dvt prophylaxis  incentive spirometer  pulm f/u  cont pain control  pain mgmt f/u  oob to chair  cont current meds Patient is a 62y old  Male who presents with a chief complaint of I have gastric cancer (12 Dec 2017 11:41)      pt seen in icu [ ], reg med floor [ x  ], bed [ ], chair at bedside [x ], a+o x3 [ x ], lethargic [  ],   nad [ x ], on tpn via left upper arm picc line [x]      Allergies    No Known Allergies        Vitals    T(F): 97.6 (12-25-17 @ 05:34), Max: 99.1 (12-24-17 @ 21:48)  HR: 82 (12-25-17 @ 05:34) (77 - 82)  BP: 111/68 (12-25-17 @ 05:34) (106/68 - 111/68)  RR: 18 (12-25-17 @ 05:34) (17 - 18)  SpO2: 98% (12-25-17 @ 05:34) (96% - 98%)  Wt(kg): --  CAPILLARY BLOOD GLUCOSE      POCT Blood Glucose.: 168 mg/dL (25 Dec 2017 07:25)      Labs                          12.2   10.9  )-----------( 254      ( 24 Dec 2017 06:59 )             35.3       12-25    138  |  102  |  23<H>  ----------------------------<  164<H>  3.9   |  24  |  1.01    Ca    8.9      25 Dec 2017 06:35  Phos  4.2     12-25  Mg     2.4     12-25    TPro  8.1  /  Alb  2.9<L>  /  TBili  0.6  /  DBili  x   /  AST  53<H>  /  ALT  117<H>  /  AlkPhos  116  12-25            .Urine Clean Catch (Midstream)  12-21 @ 11:20   10,000 - 49,000 CFU/mL Escherichia coli  <10,000 CFU/ml Normal Urogenital keila present  --  Escherichia coli      .Blood  12-21 @ 11:19   No growth to date.  --  --      .Blood Blood  12-21 @ 11:17   No growth to date.  --  --          Radiology Results      Meds    MEDICATIONS  (STANDING):  fat emulsion (Fish Oil and Plant Based) 20% Infusion 0.3 Gm/kG/Day (5 mL/Hr) IV Continuous <Continuous>  heparin  Injectable 5000 Unit(s) SubCutaneous every 8 hours  pantoprazole  Injectable 40 milliGRAM(s) IV Push daily  Parenteral Nutrition - Adult 1 Each (83 mL/Hr) TPN Continuous <Continuous>  sodium chloride 0.9% lock flush 20 milliLiter(s) IV Push once      MEDICATIONS  (PRN):  acetaminophen   Tablet 650 milliGRAM(s) Oral every 6 hours PRN mild pain  HYDROmorphone  Injectable 1 milliGRAM(s) IV Push every 6 hours PRN Severe Pain (7 - 10)  ondansetron Injectable 4 milliGRAM(s) IV Push every 8 hours PRN Nausea and/or Vomiting  sodium chloride 0.9% lock flush 10 milliLiter(s) IV Push every 1 hour PRN After each medication administration  sodium chloride 0.9% lock flush 10 milliLiter(s) IV Push every 12 hours PRN Lumen of catheter NOT used      Physical Exam    Neuro :  no focal deficits  Respiratory: CTA B/L  CV: RRR, S1S2, no murmurs,   Abdominal: Soft, ND, incision w/ staples in place, clean and dry,  diann x2 with serosanguinous drainage,  Extremities: No edema, + peripheral pulses    ASSESSMENT    Malignant neoplasm of stomach  small bowell ileus vs obstruction  h/o Prediabetes  HLD (hyperlipidemia)  HTN (hypertension)  Malignant neoplasm of stomach, unspecified location  H/O prostate biopsy  History of arthroscopy of right shoulder  History of arthroscopy of left knee  History of appendectomy        PLAN      s/p total gatrectomy with cristobal-en-y esophagojejunal anastomosis  cont wound care   cont npo  pt having bm and flattus  reglan prn  s/p d/c ngt to ws  cont tpn  renal f/u  small bowell series result noted   gi and dvt prophylaxis  incentive spirometer  pulm f/u  cont pain control  pain mgmt f/u  oob to chair  cont current meds

## 2017-12-26 DIAGNOSIS — E87.1 HYPO-OSMOLALITY AND HYPONATREMIA: ICD-10-CM

## 2017-12-26 LAB
ALBUMIN SERPL ELPH-MCNC: 2.9 G/DL — LOW (ref 3.5–5)
ALP SERPL-CCNC: 135 U/L — HIGH (ref 40–120)
ALT FLD-CCNC: 124 U/L DA — HIGH (ref 10–60)
ANION GAP SERPL CALC-SCNC: 12 MMOL/L — SIGNIFICANT CHANGE UP (ref 5–17)
AST SERPL-CCNC: 55 U/L — HIGH (ref 10–40)
BILIRUB SERPL-MCNC: 0.9 MG/DL — SIGNIFICANT CHANGE UP (ref 0.2–1.2)
BUN SERPL-MCNC: 26 MG/DL — HIGH (ref 7–18)
CA-I BLD-SCNC: 1.26 MMOL/L — SIGNIFICANT CHANGE UP (ref 1.12–1.3)
CALCIUM SERPL-MCNC: 8.9 MG/DL — SIGNIFICANT CHANGE UP (ref 8.4–10.5)
CHLORIDE SERPL-SCNC: 99 MMOL/L — SIGNIFICANT CHANGE UP (ref 96–108)
CO2 SERPL-SCNC: 21 MMOL/L — LOW (ref 22–31)
CREAT SERPL-MCNC: 1.25 MG/DL — SIGNIFICANT CHANGE UP (ref 0.5–1.3)
CULTURE RESULTS: SIGNIFICANT CHANGE UP
GLUCOSE BLDC GLUCOMTR-MCNC: 141 MG/DL — HIGH (ref 70–99)
GLUCOSE BLDC GLUCOMTR-MCNC: 170 MG/DL — HIGH (ref 70–99)
GLUCOSE BLDC GLUCOMTR-MCNC: 249 MG/DL — HIGH (ref 70–99)
GLUCOSE SERPL-MCNC: 238 MG/DL — HIGH (ref 70–99)
HCT VFR BLD CALC: 38 % — LOW (ref 39–50)
HGB BLD-MCNC: 12.8 G/DL — LOW (ref 13–17)
MAGNESIUM SERPL-MCNC: 2.1 MG/DL — SIGNIFICANT CHANGE UP (ref 1.6–2.6)
MCHC RBC-ENTMCNC: 31.2 PG — SIGNIFICANT CHANGE UP (ref 27–34)
MCHC RBC-ENTMCNC: 33.7 GM/DL — SIGNIFICANT CHANGE UP (ref 32–36)
MCV RBC AUTO: 92.5 FL — SIGNIFICANT CHANGE UP (ref 80–100)
PHOSPHATE SERPL-MCNC: 2.6 MG/DL — SIGNIFICANT CHANGE UP (ref 2.5–4.5)
PLATELET # BLD AUTO: 299 K/UL — SIGNIFICANT CHANGE UP (ref 150–400)
POTASSIUM SERPL-MCNC: 4.1 MMOL/L — SIGNIFICANT CHANGE UP (ref 3.5–5.3)
POTASSIUM SERPL-SCNC: 4.1 MMOL/L — SIGNIFICANT CHANGE UP (ref 3.5–5.3)
PREALB SERPL-MCNC: 21 MG/DL — SIGNIFICANT CHANGE UP (ref 18–45)
PROT SERPL-MCNC: 7.9 G/DL — SIGNIFICANT CHANGE UP (ref 6–8.3)
RBC # BLD: 4.11 M/UL — LOW (ref 4.2–5.8)
RBC # FLD: 12.1 % — SIGNIFICANT CHANGE UP (ref 10.3–14.5)
SODIUM SERPL-SCNC: 132 MMOL/L — LOW (ref 135–145)
SPECIMEN SOURCE: SIGNIFICANT CHANGE UP
TRIGL SERPL-MCNC: 111 MG/DL — SIGNIFICANT CHANGE UP (ref 10–149)
WBC # BLD: 17.5 K/UL — HIGH (ref 3.8–10.5)
WBC # FLD AUTO: 17.5 K/UL — HIGH (ref 3.8–10.5)

## 2017-12-26 RX ORDER — DEXTROSE 50 % IN WATER 50 %
12.5 SYRINGE (ML) INTRAVENOUS ONCE
Qty: 0 | Refills: 0 | Status: DISCONTINUED | OUTPATIENT
Start: 2017-12-26 | End: 2018-01-11

## 2017-12-26 RX ORDER — DEXTROSE 50 % IN WATER 50 %
25 SYRINGE (ML) INTRAVENOUS ONCE
Qty: 0 | Refills: 0 | Status: DISCONTINUED | OUTPATIENT
Start: 2017-12-26 | End: 2018-01-11

## 2017-12-26 RX ORDER — SODIUM CHLORIDE 9 MG/ML
1000 INJECTION, SOLUTION INTRAVENOUS
Qty: 0 | Refills: 0 | Status: DISCONTINUED | OUTPATIENT
Start: 2017-12-26 | End: 2018-01-11

## 2017-12-26 RX ORDER — SODIUM CHLORIDE 9 MG/ML
1000 INJECTION INTRAMUSCULAR; INTRAVENOUS; SUBCUTANEOUS
Qty: 0 | Refills: 0 | Status: DISCONTINUED | OUTPATIENT
Start: 2017-12-26 | End: 2017-12-27

## 2017-12-26 RX ORDER — ELECTROLYTE SOLUTION,INJ
1 VIAL (ML) INTRAVENOUS
Qty: 0 | Refills: 0 | Status: DISCONTINUED | OUTPATIENT
Start: 2017-12-26 | End: 2017-12-26

## 2017-12-26 RX ORDER — GLUCAGON INJECTION, SOLUTION 0.5 MG/.1ML
1 INJECTION, SOLUTION SUBCUTANEOUS ONCE
Qty: 0 | Refills: 0 | Status: DISCONTINUED | OUTPATIENT
Start: 2017-12-26 | End: 2018-01-11

## 2017-12-26 RX ORDER — INSULIN LISPRO 100/ML
VIAL (ML) SUBCUTANEOUS EVERY 6 HOURS
Qty: 0 | Refills: 0 | Status: DISCONTINUED | OUTPATIENT
Start: 2017-12-26 | End: 2018-01-08

## 2017-12-26 RX ORDER — DEXTROSE 50 % IN WATER 50 %
1 SYRINGE (ML) INTRAVENOUS ONCE
Qty: 0 | Refills: 0 | Status: DISCONTINUED | OUTPATIENT
Start: 2017-12-26 | End: 2018-01-11

## 2017-12-26 RX ORDER — I.V. FAT EMULSION 20 G/100ML
0.3 EMULSION INTRAVENOUS
Qty: 24 | Refills: 0 | Status: DISCONTINUED | OUTPATIENT
Start: 2017-12-26 | End: 2017-12-26

## 2017-12-26 RX ADMIN — Medication 83 EACH: at 22:01

## 2017-12-26 RX ADMIN — SODIUM CHLORIDE 50 MILLILITER(S): 9 INJECTION INTRAMUSCULAR; INTRAVENOUS; SUBCUTANEOUS at 11:53

## 2017-12-26 RX ADMIN — ONDANSETRON 4 MILLIGRAM(S): 8 TABLET, FILM COATED ORAL at 05:45

## 2017-12-26 RX ADMIN — PANTOPRAZOLE SODIUM 40 MILLIGRAM(S): 20 TABLET, DELAYED RELEASE ORAL at 11:54

## 2017-12-26 RX ADMIN — HEPARIN SODIUM 5000 UNIT(S): 5000 INJECTION INTRAVENOUS; SUBCUTANEOUS at 14:59

## 2017-12-26 RX ADMIN — HEPARIN SODIUM 5000 UNIT(S): 5000 INJECTION INTRAVENOUS; SUBCUTANEOUS at 21:54

## 2017-12-26 RX ADMIN — I.V. FAT EMULSION 5 GM/KG/DAY: 20 EMULSION INTRAVENOUS at 22:01

## 2017-12-26 RX ADMIN — HEPARIN SODIUM 5000 UNIT(S): 5000 INJECTION INTRAVENOUS; SUBCUTANEOUS at 05:45

## 2017-12-26 RX ADMIN — ONDANSETRON 4 MILLIGRAM(S): 8 TABLET, FILM COATED ORAL at 22:07

## 2017-12-26 NOTE — PROGRESS NOTE ADULT - PROBLEM SELECTOR PLAN 1
s/p total gastrectomy. Now with partial SBO. Pt NPO since admission still unable to feed.  s/p Left arm PICC placement. s/p initiation of TPN on 12/22.     Given rising LFTs and alk phos, will decrease dextrose to 300g (total calories to 1580).  Monitor LFTs daily.  Given mild MEKHI/dehydration, will give IVF NS 50ml/hr (1200ml/24h) in addition to 2L TPN for total volume 2200ml/24h.  For hyperglycemia, will decrease dextrose as above.  Also now started on sliding scale insulin.  Tomorrow will add insulin to the TPN.    Check CMP/Mg/Phos. Check FS Q 6hrs; if elevated start ISS.   Daily weights and strict I/Os.

## 2017-12-26 NOTE — PROGRESS NOTE ADULT - SUBJECTIVE AND OBJECTIVE BOX
s/p gastrectomy pod # 13. Patient examined at bedside, + nausea and vomiting, + flatus, no bm today   currently NPO    T(F): 99.4 (12-26-17 @ 06:03), Max: 99.4 (12-26-17 @ 06:03)  HR: 97 (12-26-17 @ 06:03) (17 - 97)  BP: 112/71 (12-26-17 @ 06:03) (106/61 - 116/59)  RR: 18 (12-26-17 @ 06:03) (17 - 18)  SpO2: 96% (12-26-17 @ 06:03) (96% - 100%)  Wt(kg): --      12-25 @ 07:01  -  12-26 @ 07:00  --------------------------------------------------------  IN:  Total IN: 0 mL    OUT:    Bulb: 6 mL    Bulb: 6 mL    Voided: 1125 mL  Total OUT: 1137 mL    Total NET: -1137 mL                        12.8   17.5  )-----------( 299      ( 26 Dec 2017 06:24 )             38.0   12-26    132<L>  |  99  |  26<H>  ----------------------------<  238<H>  4.1   |  21<L>  |  1.25    Ca    8.9      26 Dec 2017 06:24  Phos  2.6     12-26  Mg     2.1     12-26    TPro  7.9  /  Alb  2.9<L>  /  TBili  0.9  /  DBili  x   /  AST  55<H>  /  ALT  124<H>  /  AlkPhos  135<H>  12-26      Physical Exam  General: AAOx3, No acute distress  Skin: No jaundice, no icterus  Abdomen: soft, nontender, nondistended, no rebound tenderness, no guarding, no palpable masses, SOSA x 2 serous sanguinous drainage   : Normal external genitalia  Extremities: non edematous, no calf pain bilaterally

## 2017-12-26 NOTE — PROGRESS NOTE ADULT - SUBJECTIVE AND OBJECTIVE BOX
Remains afebrile,  Vomited yesterday x1  curently not nauseated    Abd  soft non tender,  SOSA  minimal      WBC inc 17,000   BUN sl increased     Imp m Still not open at likely jejuno-jej   Plan  Continue TPN,  increase fluids, ? NGT

## 2017-12-26 NOTE — PROGRESS NOTE ADULT - ASSESSMENT
62 yr old male with gastric adenocarcinoma s/p Total gastrectomy with Kelby-en-Y esophagojejunal anastomosis  12/13/2017 .  Pt NPO for 9 days. Pt with nausea and vomiting with abd pain.  s/p Small bowel series with partial small bowel obstruction distal to the anastomosis. Plan to keep NPO. Consulted for TPN.     Pt stable on TPN.  Pt with N/V today and with rising creatinine that is c/w dehydration.  Also with slowly increasing alk phos and LFTs, likely due to dextrose.  Hyperglycemia now as well.      TPN:   Total Calories: decrease  to 1580  Fat 24g   AA 80 g  Dextrose decrease to 300 g

## 2017-12-26 NOTE — PROGRESS NOTE ADULT - SUBJECTIVE AND OBJECTIVE BOX
[facility] NEPHROLOGY- METABOLIC SUPPORT SERVICE PROGRESS NOTE    62 yr old male with gastric adenocarcinoma s/p Total gastrectomy with Kelby-en-Y esophagojejunal anastomosis  12/13/2017 .  Pt NPO for 9 days. Pt with nausea and vomiting with abd pain.  s/p Small bowel series with partial small bowel obstruction distal to the anastomosis. Plan to keep NPO. Consulted for TPN.     Pt stable on TPN.  Pt now having N/V again.      Hospital Medications: Medications reviewed.  REVIEW OF SYSTEMS:  CONSTITUTIONAL: No fevers or chills  RESPIRATORY: No shortness of breath  CARDIOVASCULAR: No chest pain.  GASTROINTESTINAL: No nausea or vomiting, + flatus/ BM today   VASCULAR: No bilateral lower extremity edema.     VITALS:  T(F): 99.4 (12-26-17 @ 06:03), Max: 99.4 (12-26-17 @ 06:03)  HR: 97 (12-26-17 @ 06:03)  BP: 112/71 (12-26-17 @ 06:03)  RR: 18 (12-26-17 @ 06:03)  SpO2: 96% (12-26-17 @ 06:03)  Wt(kg): --    12-25 @ 07:01  -  12-26 @ 07:00  --------------------------------------------------------  IN: 0 mL / OUT: 1137 mL / NET: -1137 mL        PHYSICAL EXAM:  Constitutional: NAD,   HEENT: +NG tube  Respiratory: CTAB, no wheezes, rales or rhonchi  Cardiovascular: S1, S2, RRR  Gastrointestinal: BS+, soft, + mild left sided tenderness, a little more generalized today.  +SOSA drain x2, intact incision, dressing cdi.  Extremities: No peripheral edema  Access: left arm PICC line benign    LABS:  12-26    132<L>  |  99  |  26<H>  ----------------------------<  238<H>  4.1   |  21<L>  |  1.25    Ca    8.9      26 Dec 2017 06:24  Phos  2.6     12-26  Mg     2.1     12-26    TPro  7.9  /  Alb  2.9<L>  /  TBili  0.9  /  DBili      /  AST  55<H>  /  ALT  124<H>  /  AlkPhos  135<H>  12-26    Creatinine Trend: 1.25 <--, 1.01 <--, 0.89 <--, 1.03 <--, 1.23 <--, 0.97 <--, 1.46 <--                        12.8   17.5  )-----------( 299      ( 26 Dec 2017 06:24 )             38.0     Urine Studies:    Chloride, Random Urine: 21 mmol/L (12-22 @ 20:10)  Creatinine, Random Urine: 531 mg/dL (12-22 @ 20:10)  Sodium, Random Urine: 7 mmol/L (12-22 @ 20:10)

## 2017-12-26 NOTE — PROGRESS NOTE ADULT - SUBJECTIVE AND OBJECTIVE BOX
Patient is a 62y old  Male who presents with a chief complaint of I have gastric cancer (12 Dec 2017 11:41)  Patient is resting comfortable in bed in NAD. NGT is out. Still NPO on TPN. No nausea or vomiting. No sob or cough.    INTERVAL HPI/OVERNIGHT EVENTS:      VITAL SIGNS:  T(F): 99.4 (12-26-17 @ 06:03)  HR: 97 (12-26-17 @ 06:03)  BP: 112/71 (12-26-17 @ 06:03)  RR: 18 (12-26-17 @ 06:03)  SpO2: 96% (12-26-17 @ 06:03)  Wt(kg): --  I&O's Detail    25 Dec 2017 07:01  -  26 Dec 2017 07:00  --------------------------------------------------------  IN:  Total IN: 0 mL    OUT:    Bulb: 6 mL    Bulb: 6 mL    Voided: 1125 mL  Total OUT: 1137 mL    Total NET: -1137 mL              REVIEW OF SYSTEMS:    CONSTITUTIONAL:  No fevers, chills, sweats    HEENT:  Eyes:  No diplopia or blurred vision. ENT:  No earache, sore throat or runny nose.    CARDIOVASCULAR:  No pressure, squeezing, tightness, or heaviness about the chest; no palpitations.    RESPIRATORY:  Per HPI    GASTROINTESTINAL:  No abdominal pain, nausea, vomiting or diarrhea.    GENITOURINARY:  No dysuria, frequency or urgency.    NEUROLOGIC:  No paresthesias, fasciculations, seizures or weakness.    PSYCHIATRIC:  No disorder of thought or mood.      PHYSICAL EXAM:    Constitutional: Well developed and nourished  Eyes:Perrla  ENMT: normal  Neck:supple  Respiratory: good air entry  Cardiovascular: S1 S2 regular  Gastrointestinal: Soft, Incisional tenderness  Extremities: No edema  Vascular:normal  Neurological:Awake, alert,Ox3  Musculoskeletal:Normal      MEDICATIONS  (STANDING):  fat emulsion (Fish Oil and Plant Based) 20% Infusion 0.3 Gm/kG/Day (5 mL/Hr) IV Continuous <Continuous>  heparin  Injectable 5000 Unit(s) SubCutaneous every 8 hours  pantoprazole  Injectable 40 milliGRAM(s) IV Push daily  Parenteral Nutrition - Adult 1 Each (83 mL/Hr) TPN Continuous <Continuous>  sodium chloride 0.9% lock flush 20 milliLiter(s) IV Push once    MEDICATIONS  (PRN):  acetaminophen   Tablet 650 milliGRAM(s) Oral every 6 hours PRN mild pain  HYDROmorphone  Injectable 1 milliGRAM(s) IV Push every 6 hours PRN Severe Pain (7 - 10)  ondansetron Injectable 4 milliGRAM(s) IV Push every 8 hours PRN Nausea and/or Vomiting  sodium chloride 0.9% lock flush 10 milliLiter(s) IV Push every 1 hour PRN After each medication administration  sodium chloride 0.9% lock flush 10 milliLiter(s) IV Push every 12 hours PRN Lumen of catheter NOT used      Allergies    No Known Allergies    Intolerances        LABS:                        12.8   17.5  )-----------( 299      ( 26 Dec 2017 06:24 )             38.0     12-26    132<L>  |  99  |  26<H>  ----------------------------<  238<H>  4.1   |  21<L>  |  1.25    Ca    8.9      26 Dec 2017 06:24  Phos  2.6     12-26  Mg     2.1     12-26    TPro  7.9  /  Alb  2.9<L>  /  TBili  0.9  /  DBili  x   /  AST  55<H>  /  ALT  124<H>  /  AlkPhos  135<H>  12-26              CAPILLARY BLOOD GLUCOSE      POCT Blood Glucose.: 249 mg/dL (26 Dec 2017 05:42)  POCT Blood Glucose.: 137 mg/dL (25 Dec 2017 23:33)  POCT Blood Glucose.: 156 mg/dL (25 Dec 2017 17:04)  POCT Blood Glucose.: 151 mg/dL (25 Dec 2017 11:28)        RADIOLOGY & ADDITIONAL TESTS:    CXR:    Ct scan chest:    ekg;    echo:

## 2017-12-26 NOTE — PROGRESS NOTE ADULT - SUBJECTIVE AND OBJECTIVE BOX
Patient is a 62y old  Male who presents with a chief complaint of I have gastric cancer (12 Dec 2017 11:41)      pt seen in icu [ ], reg med floor [ x  ], bed [ ], chair at bedside [x ], a+o x3 [ x ], lethargic [  ],   nad [ x ], on tpn via left upper arm picc line [x]        Allergies    No Known Allergies        Vitals    T(F): 99.4 (12-26-17 @ 06:03), Max: 99.4 (12-26-17 @ 06:03)  HR: 97 (12-26-17 @ 06:03) (17 - 97)  BP: 112/71 (12-26-17 @ 06:03) (106/61 - 116/59)  RR: 18 (12-26-17 @ 06:03) (17 - 18)  SpO2: 96% (12-26-17 @ 06:03) (96% - 100%)  Wt(kg): --  CAPILLARY BLOOD GLUCOSE      POCT Blood Glucose.: 249 mg/dL (26 Dec 2017 05:42)      Labs                          12.8   x     )-----------( 299      ( 26 Dec 2017 06:24 )             38.0       12-26    132<L>  |  99  |  26<H>  ----------------------------<  238<H>  4.1   |  21<L>  |  1.25    Ca    8.9      26 Dec 2017 06:24  Phos  2.6     12-26  Mg     2.1     12-26    TPro  7.9  /  Alb  2.9<L>  /  TBili  0.9  /  DBili  x   /  AST  55<H>  /  ALT  124<H>  /  AlkPhos  135<H>  12-26            .Urine Clean Catch (Midstream)  12-21 @ 11:20   10,000 - 49,000 CFU/mL Escherichia coli  <10,000 CFU/ml Normal Urogenital keila present  --  Escherichia coli      .Blood  12-21 @ 11:19   No growth to date.  --  --      .Blood Blood  12-21 @ 11:17   No growth to date.  --  --          Radiology Results      Meds    MEDICATIONS  (STANDING):  fat emulsion (Fish Oil and Plant Based) 20% Infusion 0.3 Gm/kG/Day (5 mL/Hr) IV Continuous <Continuous>  heparin  Injectable 5000 Unit(s) SubCutaneous every 8 hours  pantoprazole  Injectable 40 milliGRAM(s) IV Push daily  Parenteral Nutrition - Adult 1 Each (83 mL/Hr) TPN Continuous <Continuous>  sodium chloride 0.9% lock flush 20 milliLiter(s) IV Push once      MEDICATIONS  (PRN):  acetaminophen   Tablet 650 milliGRAM(s) Oral every 6 hours PRN mild pain  HYDROmorphone  Injectable 1 milliGRAM(s) IV Push every 6 hours PRN Severe Pain (7 - 10)  ondansetron Injectable 4 milliGRAM(s) IV Push every 8 hours PRN Nausea and/or Vomiting  sodium chloride 0.9% lock flush 10 milliLiter(s) IV Push every 1 hour PRN After each medication administration  sodium chloride 0.9% lock flush 10 milliLiter(s) IV Push every 12 hours PRN Lumen of catheter NOT used      Physical Exam    Neuro :  no focal deficits  Respiratory: CTA B/L  CV: RRR, S1S2, no murmurs,   Abdominal: Soft, ND, incision w/ staples in place, clean and dry,  diann x2 with serosanguinous drainage,  Extremities: No edema, + peripheral pulses    ASSESSMENT    Malignant neoplasm of stomach  small bowell ileus vs obstruction  h/o Prediabetes  HLD (hyperlipidemia)  HTN (hypertension)  Malignant neoplasm of stomach, unspecified location  H/O prostate biopsy  History of arthroscopy of right shoulder  History of arthroscopy of left knee  History of appendectomy        PLAN      s/p total gatrectomy with cristobal-en-y esophagojejunal anastomosis  cont wound care   cont npo  pt having bm and flattus  reglan prn  s/p d/c ngt to ws  cont tpn  renal f/u  small bowell series result noted   gi and dvt prophylaxis  incentive spirometer  pulm f/u  cont pain control  pain mgmt f/u  oob to chair  cont current meds Patient is a 62y old  Male who presents with a chief complaint of I have gastric cancer (12 Dec 2017 11:41)      pt seen in icu [ ], reg med floor [ x  ], bed [ ], chair at bedside [x ], a+o x3 [ x ], lethargic [  ],   nad [ x ], on tpn via left upper arm picc line [x]        Allergies    No Known Allergies        Vitals    T(F): 99.4 (12-26-17 @ 06:03), Max: 99.4 (12-26-17 @ 06:03)  HR: 97 (12-26-17 @ 06:03) (17 - 97)  BP: 112/71 (12-26-17 @ 06:03) (106/61 - 116/59)  RR: 18 (12-26-17 @ 06:03) (17 - 18)  SpO2: 96% (12-26-17 @ 06:03) (96% - 100%)  Wt(kg): --  CAPILLARY BLOOD GLUCOSE      POCT Blood Glucose.: 249 mg/dL (26 Dec 2017 05:42)      Labs                          12.8   x     )-----------( 299      ( 26 Dec 2017 06:24 )             38.0       12-26    132<L>  |  99  |  26<H>  ----------------------------<  238<H>  4.1   |  21<L>  |  1.25    Ca    8.9      26 Dec 2017 06:24  Phos  2.6     12-26  Mg     2.1     12-26    TPro  7.9  /  Alb  2.9<L>  /  TBili  0.9  /  DBili  x   /  AST  55<H>  /  ALT  124<H>  /  AlkPhos  135<H>  12-26            .Urine Clean Catch (Midstream)  12-21 @ 11:20   10,000 - 49,000 CFU/mL Escherichia coli  <10,000 CFU/ml Normal Urogenital keila present  --  Escherichia coli      .Blood  12-21 @ 11:19   No growth to date.  --  --      .Blood Blood  12-21 @ 11:17   No growth to date.  --  --          Radiology Results      Meds    MEDICATIONS  (STANDING):  fat emulsion (Fish Oil and Plant Based) 20% Infusion 0.3 Gm/kG/Day (5 mL/Hr) IV Continuous <Continuous>  heparin  Injectable 5000 Unit(s) SubCutaneous every 8 hours  pantoprazole  Injectable 40 milliGRAM(s) IV Push daily  Parenteral Nutrition - Adult 1 Each (83 mL/Hr) TPN Continuous <Continuous>  sodium chloride 0.9% lock flush 20 milliLiter(s) IV Push once      MEDICATIONS  (PRN):  acetaminophen   Tablet 650 milliGRAM(s) Oral every 6 hours PRN mild pain  HYDROmorphone  Injectable 1 milliGRAM(s) IV Push every 6 hours PRN Severe Pain (7 - 10)  ondansetron Injectable 4 milliGRAM(s) IV Push every 8 hours PRN Nausea and/or Vomiting  sodium chloride 0.9% lock flush 10 milliLiter(s) IV Push every 1 hour PRN After each medication administration  sodium chloride 0.9% lock flush 10 milliLiter(s) IV Push every 12 hours PRN Lumen of catheter NOT used      Physical Exam    Neuro :  no focal deficits  Respiratory: CTA B/L  CV: RRR, S1S2, no murmurs,   Abdominal: Soft, ND, incision w/ staples in place, clean and dry,  diann x2 with serosanguinous drainage,  Extremities: No edema, + peripheral pulses    ASSESSMENT    Malignant neoplasm of stomach  small bowell ileus vs obstruction  h/o Prediabetes  HLD (hyperlipidemia)  HTN (hypertension)  Malignant neoplasm of stomach, unspecified location  H/O prostate biopsy  History of arthroscopy of right shoulder  History of arthroscopy of left knee  History of appendectomy        PLAN      s/p total gatrectomy with cristobal-en-y esophagojejunal anastomosis  cont wound care   cont npo  pt having bm and flattus  1 episode vomiting overnight  reglan prn  s/p d/c ngt to ws  cont tpn  renal f/u  small bowell series result noted   gi and dvt prophylaxis  incentive spirometer  pulm f/u  cont pain control  pain mgmt f/u  oob to chair  cont current meds

## 2017-12-26 NOTE — PROGRESS NOTE ADULT - PROBLEM SELECTOR PLAN 3
Pre-renal MEKHI worsening again.  IVF as above.    Renal US with no hydro.  Strict I/Os. Avoid nephrotoxins/ NSAIDs/ RCA. Monitor BMP.

## 2017-12-27 LAB
ANION GAP SERPL CALC-SCNC: 9 MMOL/L — SIGNIFICANT CHANGE UP (ref 5–17)
BUN SERPL-MCNC: 24 MG/DL — HIGH (ref 7–18)
CALCIUM SERPL-MCNC: 8.8 MG/DL — SIGNIFICANT CHANGE UP (ref 8.4–10.5)
CHLORIDE SERPL-SCNC: 102 MMOL/L — SIGNIFICANT CHANGE UP (ref 96–108)
CO2 SERPL-SCNC: 23 MMOL/L — SIGNIFICANT CHANGE UP (ref 22–31)
CREAT SERPL-MCNC: 1.03 MG/DL — SIGNIFICANT CHANGE UP (ref 0.5–1.3)
GLUCOSE BLDC GLUCOMTR-MCNC: 154 MG/DL — HIGH (ref 70–99)
GLUCOSE BLDC GLUCOMTR-MCNC: 155 MG/DL — HIGH (ref 70–99)
GLUCOSE BLDC GLUCOMTR-MCNC: 170 MG/DL — HIGH (ref 70–99)
GLUCOSE SERPL-MCNC: 186 MG/DL — HIGH (ref 70–99)
HBA1C BLD-MCNC: 5.7 % — HIGH (ref 4–5.6)
HCT VFR BLD CALC: 36.2 % — LOW (ref 39–50)
HGB BLD-MCNC: 12.1 G/DL — LOW (ref 13–17)
MAGNESIUM SERPL-MCNC: 2.2 MG/DL — SIGNIFICANT CHANGE UP (ref 1.6–2.6)
MCHC RBC-ENTMCNC: 31.4 PG — SIGNIFICANT CHANGE UP (ref 27–34)
MCHC RBC-ENTMCNC: 33.5 GM/DL — SIGNIFICANT CHANGE UP (ref 32–36)
MCV RBC AUTO: 93.7 FL — SIGNIFICANT CHANGE UP (ref 80–100)
PHOSPHATE SERPL-MCNC: 2.9 MG/DL — SIGNIFICANT CHANGE UP (ref 2.5–4.5)
PLATELET # BLD AUTO: 266 K/UL — SIGNIFICANT CHANGE UP (ref 150–400)
POTASSIUM SERPL-MCNC: 3.7 MMOL/L — SIGNIFICANT CHANGE UP (ref 3.5–5.3)
POTASSIUM SERPL-SCNC: 3.7 MMOL/L — SIGNIFICANT CHANGE UP (ref 3.5–5.3)
RBC # BLD: 3.86 M/UL — LOW (ref 4.2–5.8)
RBC # FLD: 12.3 % — SIGNIFICANT CHANGE UP (ref 10.3–14.5)
SODIUM SERPL-SCNC: 134 MMOL/L — LOW (ref 135–145)
WBC # BLD: 10.3 K/UL — SIGNIFICANT CHANGE UP (ref 3.8–10.5)
WBC # FLD AUTO: 10.3 K/UL — SIGNIFICANT CHANGE UP (ref 3.8–10.5)

## 2017-12-27 PROCEDURE — 74177 CT ABD & PELVIS W/CONTRAST: CPT | Mod: 26

## 2017-12-27 RX ORDER — I.V. FAT EMULSION 20 G/100ML
0.3 EMULSION INTRAVENOUS
Qty: 24 | Refills: 0 | Status: DISCONTINUED | OUTPATIENT
Start: 2017-12-27 | End: 2017-12-28

## 2017-12-27 RX ORDER — ELECTROLYTE SOLUTION,INJ
1 VIAL (ML) INTRAVENOUS
Qty: 0 | Refills: 0 | Status: DISCONTINUED | OUTPATIENT
Start: 2017-12-27 | End: 2017-12-28

## 2017-12-27 RX ORDER — SODIUM CHLORIDE 9 MG/ML
1000 INJECTION INTRAMUSCULAR; INTRAVENOUS; SUBCUTANEOUS
Qty: 0 | Refills: 0 | Status: DISCONTINUED | OUTPATIENT
Start: 2017-12-27 | End: 2018-01-02

## 2017-12-27 RX ADMIN — Medication 2: at 12:15

## 2017-12-27 RX ADMIN — ONDANSETRON 4 MILLIGRAM(S): 8 TABLET, FILM COATED ORAL at 21:47

## 2017-12-27 RX ADMIN — PANTOPRAZOLE SODIUM 40 MILLIGRAM(S): 20 TABLET, DELAYED RELEASE ORAL at 12:16

## 2017-12-27 RX ADMIN — Medication 2: at 18:42

## 2017-12-27 RX ADMIN — HEPARIN SODIUM 5000 UNIT(S): 5000 INJECTION INTRAVENOUS; SUBCUTANEOUS at 21:46

## 2017-12-27 RX ADMIN — SODIUM CHLORIDE 50 MILLILITER(S): 9 INJECTION INTRAMUSCULAR; INTRAVENOUS; SUBCUTANEOUS at 11:35

## 2017-12-27 RX ADMIN — I.V. FAT EMULSION 5 GM/KG/DAY: 20 EMULSION INTRAVENOUS at 22:01

## 2017-12-27 RX ADMIN — Medication 4: at 06:07

## 2017-12-27 RX ADMIN — HEPARIN SODIUM 5000 UNIT(S): 5000 INJECTION INTRAVENOUS; SUBCUTANEOUS at 06:00

## 2017-12-27 RX ADMIN — Medication 2: at 01:00

## 2017-12-27 NOTE — CONSULT NOTE ADULT - ASSESSMENT
Patient is a 62y old  Male who presents with a chief complaint of I have gastric cancer (12 Dec 2017 11:41)      HPI:  62 yr old male with PMH of Hypertension, Hypertension, prediabetes, herniated lumbar disc presents with c/o gastric cancer. Pt reports gaining weight back and increased appetite after undergoing chemotherapy for 3 months. s/p Gastrectomy w/ cristobal en y jejunostomy c/b partial SBO vs ileus. Now on TPN. NGT d/c'd

## 2017-12-27 NOTE — PROGRESS NOTE ADULT - SUBJECTIVE AND OBJECTIVE BOX
Patient is a 62y old  Male who presents with a chief complaint of I have gastric cancer (12 Dec 2017 11:41)  Patient is comfortable in bed in NAD. Has nausea but no vomiting. No abdominal pain. Passing flatus and had BM post op. No respiratory distress or cough. Still on TPN    INTERVAL HPI/OVERNIGHT EVENTS:      VITAL SIGNS:  T(F): 98.7 (12-27-17 @ 06:00)  HR: 84 (12-27-17 @ 06:00)  BP: 109/59 (12-27-17 @ 06:00)  RR: 18 (12-27-17 @ 06:00)  SpO2: 97% (12-27-17 @ 06:00)  Wt(kg): --  I&O's Detail    26 Dec 2017 07:01  -  27 Dec 2017 07:00  --------------------------------------------------------  IN:    fat emulsion (Fish Oil and Plant Based) 20% Infusion: 60 mL    sodium chloride 0.9%.: 400 mL    TPN (Total Parenteral Nutrition): 996 mL  Total IN: 1456 mL    OUT:    Bulb: 7 mL    Bulb: 7.5 mL    Voided: 850 mL  Total OUT: 864.5 mL    Total NET: 591.5 mL              REVIEW OF SYSTEMS:    CONSTITUTIONAL:  No fevers, chills, sweats    HEENT:  Eyes:  No diplopia or blurred vision. ENT:  No earache, sore throat or runny nose.    CARDIOVASCULAR:  No pressure, squeezing, tightness, or heaviness about the chest; no palpitations.    RESPIRATORY:  Per HPI    GASTROINTESTINAL:  No abdominal pain, nausea, vomiting or diarrhea.    GENITOURINARY:  No dysuria, frequency or urgency.    NEUROLOGIC:  No paresthesias, fasciculations, seizures or weakness.    PSYCHIATRIC:  No disorder of thought or mood.      PHYSICAL EXAM:    Constitutional: Well developed and nourished  Eyes:Perrla  ENMT: normal  Neck:supple  Respiratory: good air entry  Cardiovascular: S1 S2 regular  Gastrointestinal: Soft, Incisional tenderness  Extremities: No edema  Vascular:normal  Neurological:Awake, alert,Ox3  Musculoskeletal:Normal      MEDICATIONS  (STANDING):  dextrose 5%. 1000 milliLiter(s) (50 mL/Hr) IV Continuous <Continuous>  dextrose 50% Injectable 12.5 Gram(s) IV Push once  dextrose 50% Injectable 25 Gram(s) IV Push once  dextrose 50% Injectable 25 Gram(s) IV Push once  fat emulsion (Fish Oil and Plant Based) 20% Infusion 0.3 Gm/kG/Day (5 mL/Hr) IV Continuous <Continuous>  fat emulsion (Fish Oil and Plant Based) 20% Infusion 0.3 Gm/kG/Day (5 mL/Hr) IV Continuous <Continuous>  heparin  Injectable 5000 Unit(s) SubCutaneous every 8 hours  insulin lispro (HumaLOG) corrective regimen sliding scale   SubCutaneous every 6 hours  pantoprazole  Injectable 40 milliGRAM(s) IV Push daily  Parenteral Nutrition - Adult 1 Each (83 mL/Hr) TPN Continuous <Continuous>  Parenteral Nutrition - Adult 1 Each (83 mL/Hr) TPN Continuous <Continuous>  sodium chloride 0.9% lock flush 20 milliLiter(s) IV Push once  sodium chloride 0.9%. 1000 milliLiter(s) (50 mL/Hr) IV Continuous <Continuous>    MEDICATIONS  (PRN):  acetaminophen   Tablet 650 milliGRAM(s) Oral every 6 hours PRN mild pain  dextrose Gel 1 Dose(s) Oral once PRN Blood Glucose LESS THAN 70 milliGRAM(s)/deciliter  glucagon  Injectable 1 milliGRAM(s) IntraMuscular once PRN Glucose LESS THAN 70 milligrams/deciliter  HYDROmorphone  Injectable 1 milliGRAM(s) IV Push every 6 hours PRN Severe Pain (7 - 10)  ondansetron Injectable 4 milliGRAM(s) IV Push every 8 hours PRN Nausea and/or Vomiting  sodium chloride 0.9% lock flush 10 milliLiter(s) IV Push every 1 hour PRN After each medication administration  sodium chloride 0.9% lock flush 10 milliLiter(s) IV Push every 12 hours PRN Lumen of catheter NOT used      Allergies    No Known Allergies    Intolerances        LABS:                        12.1   10.3  )-----------( 266      ( 27 Dec 2017 06:26 )             36.2     12-27    134<L>  |  102  |  24<H>  ----------------------------<  186<H>  3.7   |  23  |  1.03    Ca    8.8      27 Dec 2017 06:26  Phos  2.9     12-27  Mg     2.2     12-27    TPro  7.9  /  Alb  2.9<L>  /  TBili  0.9  /  DBili  x   /  AST  55<H>  /  ALT  124<H>  /  AlkPhos  135<H>  12-26              CAPILLARY BLOOD GLUCOSE      POCT Blood Glucose.: 154 mg/dL (27 Dec 2017 07:46)  POCT Blood Glucose.: 170 mg/dL (26 Dec 2017 19:18)  POCT Blood Glucose.: 141 mg/dL (26 Dec 2017 12:00)        RADIOLOGY & ADDITIONAL TESTS:    CXR:    Ct scan chest:    ekg;    echo:

## 2017-12-27 NOTE — PROGRESS NOTE ADULT - ASSESSMENT
61 y/o male with gastric cancer s/p total gastrectomy, with PSBO and persistent nausea      1. Keep NPO  2. Continue TPN  3. Out of bed  4. Repeat SBS today  5. DVT PPx 61 y/o male with gastric cancer s/p total gastrectomy (path reveals invasive gastric adenocarcinoma (T4a tumor) with 2/16 lymph Nodes positive for disease), with PSBO and persistent nausea      1. Keep NPO  2. Continue TPN  3. Out of bed  4. Repeat SBS today  5. DVT PPx

## 2017-12-27 NOTE — PROGRESS NOTE ADULT - SUBJECTIVE AND OBJECTIVE BOX
[facility] NEPHROLOGY- METABOLIC SUPPORT SERVICE PROGRESS NOTE    62 yr old male with gastric adenocarcinoma s/p Total gastrectomy with [place]-en-Y esophagojejunal anastomosis  12/13/2017 .  Pt NPO for 9 days. Pt with nausea and vomiting with abd pain.  s/p Small bowel series with partial small bowel obstruction distal to the anastomosis. Plan to keep NPO. Consulted for TPN.     Yesterday, pt had worsening N/V and MEKHI.  I gave him additional IVF, NS 50ml/hr X 20hrs, which he tolerated well.  No SOB.  I also lowered his dextrose (and total caloric) intake due to rising alk phos and LFTs.      Hospital Medications: Medications reviewed.  REVIEW OF SYSTEMS:  CONSTITUTIONAL: No fevers or chills  RESPIRATORY: No shortness of breath  CARDIOVASCULAR: No chest pain.  GASTROINTESTINAL: No nausea or vomiting, + flatus/ BM today.  Mild abdominal pain.   VASCULAR: No bilateral lower extremity edema.     VITALS:  T(F): 98.7 (12-27-17 @ 06:00), Max: 98.8 (12-26-17 @ 22:05)  HR: 84 (12-27-17 @ 06:00)  BP: 109/59 (12-27-17 @ 06:00)  RR: 18 (12-27-17 @ 06:00)  SpO2: 97% (12-27-17 @ 06:00)  Wt(kg): --    12-26 @ 07:01  -  12-27 @ 07:00  --------------------------------------------------------  IN: 1456 mL / OUT: 864.5 mL / NET: 591.5 mL      PHYSICAL EXAM:  Constitutional: NAD,   HEENT: +NG tube  Respiratory: CTAB, no wheezes, rales or rhonchi  Cardiovascular: S1, S2, RRR  Gastrointestinal: BS+, soft, + mild left sided tenderness, mild generalized tenderness.  +SOSA drain x2, intact incision, dressing cdi.  Extremities: No peripheral edema  Access: left arm PICC line benign      LABS:  12-27    134<L>  |  102  |  24<H>  ----------------------------<  186<H>  3.7   |  23  |  1.03    Ca    8.8      27 Dec 2017 06:26  Phos  2.9     12-27  Mg     2.2     12-27    TPro  7.9  /  Alb  2.9<L>  /  TBili  0.9  /  DBili      /  AST  55<H>  /  ALT  124<H>  /  AlkPhos  135<H>  12-26    Creatinine Trend: 1.03 <--, 1.25 <--, 1.01 <--, 0.89 <--, 1.03 <--, 1.23 <--, 0.97 <--                        12.1   10.3  )-----------( 266      ( 27 Dec 2017 06:26 )             36.2     Urine Studies:    Chloride, Random Urine: 21 mmol/L (12-22 @ 20:10)  Creatinine, Random Urine: 531 mg/dL (12-22 @ 20:10)  Sodium, Random Urine: 7 mmol/L (12-22 @ 20:10)

## 2017-12-27 NOTE — CONSULT NOTE ADULT - PROBLEM SELECTOR PROBLEM 1
Malignant neoplasm of stomach, unspecified location
Malignant neoplasm of stomach, unspecified location
Severe protein-calorie malnutrition

## 2017-12-27 NOTE — CONSULT NOTE ADULT - SUBJECTIVE AND OBJECTIVE BOX
Patient is a 62y old  Male who presents with a chief complaint of I have gastric cancer (12 Dec 2017 11:41)      HPI:  62 yr old male with PMH of Hypertension, Hypertension, prediabetes, herniated lumbar disc presents with c/o gastric cancer. Pt reports gaining weight back and increased appetite after undergoing chemotherapy for 3 months. s/p Gastrectomy w/ cristobal en y jejunostomy c/b partial SBO vs ileus. Now on TPN. NGT d/c'd       ROS:  Negative except for:    PAST MEDICAL & SURGICAL HISTORY:  Prediabetes  HLD (hyperlipidemia)  HTN (hypertension)  Malignant neoplasm of stomach, unspecified location  H/O prostate biopsy  History of arthroscopy of right shoulder: repair for rotator cuff syndrome  History of arthroscopy of left knee: meniscal repair  History of appendectomy      SOCIAL HISTORY:    FAMILY HISTORY:  Cerebrovascular accident (Mother)      MEDICATIONS  (STANDING):  dextrose 5%. 1000 milliLiter(s) (50 mL/Hr) IV Continuous <Continuous>  dextrose 50% Injectable 12.5 Gram(s) IV Push once  dextrose 50% Injectable 25 Gram(s) IV Push once  dextrose 50% Injectable 25 Gram(s) IV Push once  fat emulsion (Fish Oil and Plant Based) 20% Infusion 0.3 Gm/kG/Day (5 mL/Hr) IV Continuous <Continuous>  heparin  Injectable 5000 Unit(s) SubCutaneous every 8 hours  insulin lispro (HumaLOG) corrective regimen sliding scale   SubCutaneous every 6 hours  pantoprazole  Injectable 40 milliGRAM(s) IV Push daily  Parenteral Nutrition - Adult 1 Each (83 mL/Hr) TPN Continuous <Continuous>  sodium chloride 0.9% lock flush 20 milliLiter(s) IV Push once  sodium chloride 0.9%. 1000 milliLiter(s) (50 mL/Hr) IV Continuous <Continuous>    MEDICATIONS  (PRN):  acetaminophen   Tablet 650 milliGRAM(s) Oral every 6 hours PRN mild pain  dextrose Gel 1 Dose(s) Oral once PRN Blood Glucose LESS THAN 70 milliGRAM(s)/deciliter  glucagon  Injectable 1 milliGRAM(s) IntraMuscular once PRN Glucose LESS THAN 70 milligrams/deciliter  HYDROmorphone  Injectable 1 milliGRAM(s) IV Push every 6 hours PRN Severe Pain (7 - 10)  ondansetron Injectable 4 milliGRAM(s) IV Push every 8 hours PRN Nausea and/or Vomiting  sodium chloride 0.9% lock flush 10 milliLiter(s) IV Push every 1 hour PRN After each medication administration  sodium chloride 0.9% lock flush 10 milliLiter(s) IV Push every 12 hours PRN Lumen of catheter NOT used      Allergies    No Known Allergies    Intolerances        Vital Signs Last 24 Hrs  T(C): 37.1 (27 Dec 2017 06:00), Max: 37.1 (26 Dec 2017 22:05)  T(F): 98.7 (27 Dec 2017 06:00), Max: 98.8 (26 Dec 2017 22:05)  HR: 84 (27 Dec 2017 06:00) (71 - 97)  BP: 109/59 (27 Dec 2017 06:00) (105/67 - 109/65)  BP(mean): --  RR: 18 (27 Dec 2017 06:00) (16 - 18)  SpO2: 97% (27 Dec 2017 06:00) (96% - 99%)    PHYSICAL EXAM  General: adult in NAD  HEENT: clear oropharynx, anicteric sclera, pink conjunctiva  Neck: supple  CV: normal S1/S2 with no murmur rubs or gallops  Lungs: positive air movement b/l ant lungs,clear to auscultation, no wheezes, no rales  Abdomen: soft non-tender non-distended, no hepatosplenomegaly  Ext: no clubbing cyanosis or edema  Skin: no rashes and no petechiae  Neuro: alert and oriented X 4, no focal deficits      LABS:                          12.8   17.5  )-----------( 299      ( 26 Dec 2017 06:24 )             38.0         Mean Cell Volume : 92.5 fl  Mean Cell Hemoglobin : 31.2 pg  Mean Cell Hemoglobin Concentration : 33.7 gm/dL  Auto Neutrophil # : x  Auto Lymphocyte # : x  Auto Monocyte # : x  Auto Eosinophil # : x  Auto Basophil # : x  Auto Neutrophil % : x  Auto Lymphocyte % : x  Auto Monocyte % : x  Auto Eosinophil % : x  Auto Basophil % : x      Serial CBC's  12-26 @ 06:24  Hct-38.0 / Hgb-12.8 / Plat-299 / RBC-4.11 / WBC-17.5  Serial CBC's  12-24 @ 06:59  Hct-35.3 / Hgb-12.2 / Plat-254 / RBC-3.70 / WBC-10.9      12-27    134<L>  |  102  |  24<H>  ----------------------------<  186<H>  3.7   |  23  |  1.03    Ca    8.8      27 Dec 2017 06:26  Phos  2.9     12-27  Mg     2.2     12-27    TPro  7.9  /  Alb  2.9<L>  /  TBili  0.9  /  DBili  x   /  AST  55<H>  /  ALT  124<H>  /  AlkPhos  135<H>  12-26                      BLOOD SMEAR INTERPRETATION:       RADIOLOGY & ADDITIONAL STUDIES:

## 2017-12-27 NOTE — PROGRESS NOTE ADULT - PROBLEM SELECTOR PLAN 3
Pre-renal MEKHI improving with IVF.  Conitinue IVF NS.  Will conisder increasing TPN volume if pt will be on long-term TPN.  Otherwise, continue at current volume.    Renal US with no hydro.  Strict I/Os. Avoid nephrotoxins/ NSAIDs/ RCA. Monitor BMP.

## 2017-12-27 NOTE — PROGRESS NOTE ADULT - PROBLEM SELECTOR PLAN 1
Given rising LFTs and alk phos, yesterday, decreased dextrose to 300g (total calories to 1580).  Monitor LFTs daily.  Given mild MEKHI/dehydration, will continue IVF NS 50ml/hr (1200ml/24h) in addition to 2L TPN for total volume 2200ml/24h.  For hyperglycemia, will decrease dextrose as above.  Also now started on sliding scale insulin with minimal insulin requirement.  Continue ISS.    s/p total gastrectomy. Now with partial SBO. Pt NPO since admission still unable to feed.  s/p Left arm PICC placement. s/p initiation of TPN on 12/22.     Check CMP/Mg/Phos. Daily weights and strict I/Os.

## 2017-12-27 NOTE — PROGRESS NOTE ADULT - SUBJECTIVE AND OBJECTIVE BOX
Patient is a 62y old  Male who presents with a chief complaint of I have gastric cancer (12 Dec 2017 11:41)      pt seen in icu [ ], reg med floor [ x  ], bed [ ], chair at bedside [x ], a+o x3 [ x ], lethargic [  ],   nad [ x ], on tpn via left upper arm picc line [x]      Allergies    No Known Allergies        Vitals    T(F): 98.7 (12-27-17 @ 06:00), Max: 98.8 (12-26-17 @ 22:05)  HR: 84 (12-27-17 @ 06:00) (71 - 97)  BP: 109/59 (12-27-17 @ 06:00) (105/67 - 109/65)  RR: 18 (12-27-17 @ 06:00) (16 - 18)  SpO2: 97% (12-27-17 @ 06:00) (96% - 99%)  Wt(kg): --  CAPILLARY BLOOD GLUCOSE      POCT Blood Glucose.: 170 mg/dL (26 Dec 2017 19:18)      Labs                          12.8   17.5  )-----------( 299      ( 26 Dec 2017 06:24 )             38.0       12-27    134<L>  |  102  |  24<H>  ----------------------------<  186<H>  3.7   |  23  |  1.03    Ca    8.8      27 Dec 2017 06:26  Phos  2.9     12-27  Mg     2.2     12-27    TPro  7.9  /  Alb  2.9<L>  /  TBili  0.9  /  DBili  x   /  AST  55<H>  /  ALT  124<H>  /  AlkPhos  135<H>  12-26            .Urine Clean Catch (Midstream)  12-21 @ 11:20   10,000 - 49,000 CFU/mL Escherichia coli  <10,000 CFU/ml Normal Urogenital keila present  --  Escherichia coli      .Blood  12-21 @ 11:19   No growth at 5 days.  --  --      .Blood Blood  12-21 @ 11:17   No growth at 5 days.  --  --          Radiology Results      Meds    MEDICATIONS  (STANDING):  dextrose 5%. 1000 milliLiter(s) (50 mL/Hr) IV Continuous <Continuous>  dextrose 50% Injectable 12.5 Gram(s) IV Push once  dextrose 50% Injectable 25 Gram(s) IV Push once  dextrose 50% Injectable 25 Gram(s) IV Push once  fat emulsion (Fish Oil and Plant Based) 20% Infusion 0.3 Gm/kG/Day (5 mL/Hr) IV Continuous <Continuous>  heparin  Injectable 5000 Unit(s) SubCutaneous every 8 hours  insulin lispro (HumaLOG) corrective regimen sliding scale   SubCutaneous every 6 hours  pantoprazole  Injectable 40 milliGRAM(s) IV Push daily  Parenteral Nutrition - Adult 1 Each (83 mL/Hr) TPN Continuous <Continuous>  sodium chloride 0.9% lock flush 20 milliLiter(s) IV Push once  sodium chloride 0.9%. 1000 milliLiter(s) (50 mL/Hr) IV Continuous <Continuous>      MEDICATIONS  (PRN):  acetaminophen   Tablet 650 milliGRAM(s) Oral every 6 hours PRN mild pain  dextrose Gel 1 Dose(s) Oral once PRN Blood Glucose LESS THAN 70 milliGRAM(s)/deciliter  glucagon  Injectable 1 milliGRAM(s) IntraMuscular once PRN Glucose LESS THAN 70 milligrams/deciliter  HYDROmorphone  Injectable 1 milliGRAM(s) IV Push every 6 hours PRN Severe Pain (7 - 10)  ondansetron Injectable 4 milliGRAM(s) IV Push every 8 hours PRN Nausea and/or Vomiting  sodium chloride 0.9% lock flush 10 milliLiter(s) IV Push every 1 hour PRN After each medication administration  sodium chloride 0.9% lock flush 10 milliLiter(s) IV Push every 12 hours PRN Lumen of catheter NOT used      Physical Exam    Neuro :  no focal deficits  Respiratory: CTA B/L  CV: RRR, S1S2, no murmurs,   Abdominal: Soft, ND, incision w/ staples in place, clean and dry,  diann x2 with serosanguinous drainage,  Extremities: No edema, + peripheral pulses    ASSESSMENT    Malignant neoplasm of stomach  small bowell ileus vs obstruction  h/o Prediabetes  HLD (hyperlipidemia)  HTN (hypertension)  Malignant neoplasm of stomach, unspecified location  H/O prostate biopsy  History of arthroscopy of right shoulder  History of arthroscopy of left knee  History of appendectomy        PLAN      s/p total gatrectomy with cristobal-en-y esophagojejunal anastomosis  cont wound care   cont npo  pt having bm and flattus  1 episode vomiting overnight  reglan prn  s/p d/c ngt to ws  cont tpn  renal f/u  small bowell series result noted   gi and dvt prophylaxis  incentive spirometer  pulm f/u  cont pain control  pain mgmt f/u  oob to chair  cont current meds Patient is a 62y old  Male who presents with a chief complaint of I have gastric cancer (12 Dec 2017 11:41)      pt seen in icu [ ], reg med floor [ x  ], bed [ ], chair at bedside [x ], a+o x3 [ x ], lethargic [  ],   nad [ x ], on tpn via left upper arm picc line [x]      Allergies    No Known Allergies        Vitals    T(F): 98.7 (12-27-17 @ 06:00), Max: 98.8 (12-26-17 @ 22:05)  HR: 84 (12-27-17 @ 06:00) (71 - 97)  BP: 109/59 (12-27-17 @ 06:00) (105/67 - 109/65)  RR: 18 (12-27-17 @ 06:00) (16 - 18)  SpO2: 97% (12-27-17 @ 06:00) (96% - 99%)  Wt(kg): --  CAPILLARY BLOOD GLUCOSE      POCT Blood Glucose.: 170 mg/dL (26 Dec 2017 19:18)      Labs                          12.8   17.5  )-----------( 299      ( 26 Dec 2017 06:24 )             38.0       12-27    134<L>  |  102  |  24<H>  ----------------------------<  186<H>  3.7   |  23  |  1.03    Ca    8.8      27 Dec 2017 06:26  Phos  2.9     12-27  Mg     2.2     12-27    TPro  7.9  /  Alb  2.9<L>  /  TBili  0.9  /  DBili  x   /  AST  55<H>  /  ALT  124<H>  /  AlkPhos  135<H>  12-26            .Urine Clean Catch (Midstream)  12-21 @ 11:20   10,000 - 49,000 CFU/mL Escherichia coli  <10,000 CFU/ml Normal Urogenital keila present  --  Escherichia coli      .Blood  12-21 @ 11:19   No growth at 5 days.  --  --      .Blood Blood  12-21 @ 11:17   No growth at 5 days.  --  --          Radiology Results      Meds    MEDICATIONS  (STANDING):  dextrose 5%. 1000 milliLiter(s) (50 mL/Hr) IV Continuous <Continuous>  dextrose 50% Injectable 12.5 Gram(s) IV Push once  dextrose 50% Injectable 25 Gram(s) IV Push once  dextrose 50% Injectable 25 Gram(s) IV Push once  fat emulsion (Fish Oil and Plant Based) 20% Infusion 0.3 Gm/kG/Day (5 mL/Hr) IV Continuous <Continuous>  heparin  Injectable 5000 Unit(s) SubCutaneous every 8 hours  insulin lispro (HumaLOG) corrective regimen sliding scale   SubCutaneous every 6 hours  pantoprazole  Injectable 40 milliGRAM(s) IV Push daily  Parenteral Nutrition - Adult 1 Each (83 mL/Hr) TPN Continuous <Continuous>  sodium chloride 0.9% lock flush 20 milliLiter(s) IV Push once  sodium chloride 0.9%. 1000 milliLiter(s) (50 mL/Hr) IV Continuous <Continuous>      MEDICATIONS  (PRN):  acetaminophen   Tablet 650 milliGRAM(s) Oral every 6 hours PRN mild pain  dextrose Gel 1 Dose(s) Oral once PRN Blood Glucose LESS THAN 70 milliGRAM(s)/deciliter  glucagon  Injectable 1 milliGRAM(s) IntraMuscular once PRN Glucose LESS THAN 70 milligrams/deciliter  HYDROmorphone  Injectable 1 milliGRAM(s) IV Push every 6 hours PRN Severe Pain (7 - 10)  ondansetron Injectable 4 milliGRAM(s) IV Push every 8 hours PRN Nausea and/or Vomiting  sodium chloride 0.9% lock flush 10 milliLiter(s) IV Push every 1 hour PRN After each medication administration  sodium chloride 0.9% lock flush 10 milliLiter(s) IV Push every 12 hours PRN Lumen of catheter NOT used      Physical Exam    Neuro :  no focal deficits  Respiratory: CTA B/L  CV: RRR, S1S2, no murmurs,   Abdominal: Soft, ND, incision w/ staples in place, clean and dry,  diann x2 with serosanguinous drainage,  Extremities: No edema, + peripheral pulses    ASSESSMENT    Malignant neoplasm of stomach  small bowell ileus vs obstruction  h/o Prediabetes  HLD (hyperlipidemia)  HTN (hypertension)  Malignant neoplasm of stomach, unspecified location  H/O prostate biopsy  History of arthroscopy of right shoulder  History of arthroscopy of left knee  History of appendectomy        PLAN      s/p total gatrectomy with cristobal-en-y esophagojejunal anastomosis  cont wound care  heme onc cons  cont npo  pt having bm and flattus  reglan prn  cont tpn  renal f/u  rept small bowell series  gi and dvt prophylaxis  incentive spirometer  pulm f/u  cont pain control  pain mgmt f/u  oob to chair  cont current meds

## 2017-12-27 NOTE — PROGRESS NOTE ADULT - SUBJECTIVE AND OBJECTIVE BOX
s/p Total Gastrectomy 12/13    Patient examined at bedside, reports feeling better   Reports nausea but denies vomiting  Reports BM and passing flatus  On TPN via LUE PICC Line      T(F): 98.7 (12-27-17 @ 06:00), Max: 98.8 (12-26-17 @ 22:05)  HR: 84 (12-27-17 @ 06:00) (71 - 97)  BP: 109/59 (12-27-17 @ 06:00) (105/67 - 109/65)  RR: 18 (12-27-17 @ 06:00) (16 - 18)  SpO2: 97% (12-27-17 @ 06:00) (96% - 99%)  Wt(kg): --      12-26 @ 07:01  -  12-27 @ 07:00  --------------------------------------------------------  IN:    fat emulsion (Fish Oil and Plant Based) 20% Infusion: 60 mL    sodium chloride 0.9%.: 400 mL    TPN (Total Parenteral Nutrition): 996 mL  Total IN: 1456 mL    OUT:    Bulb: 7 mL    Bulb: 7.5 mL    Voided: 850 mL  Total OUT: 864.5 mL    Total NET: 591.5 mL        Physical Exam  General: AAOx3, No acute distress  Skin: No jaundice, no icterus  Abdomen: soft, nontender, nondistended, no rebound tenderness, no guarding, no palpable masses, SOSA x2 to suction with minimal outputs, staples intact, wound healing well  : Normal external genitalia  Extremities: non edematous, no calf pain bilaterally s/p Total Gastrectomy 12/13    Patient examined at bedside, reports feeling better   Reports nausea but denies vomiting  Reports BM and passing flatus  On TPN via LUE PICC Line    Path shows invasive adenocarcinoma with 2/16 lymph nodes positive      T(F): 98.7 (12-27-17 @ 06:00), Max: 98.8 (12-26-17 @ 22:05)  HR: 84 (12-27-17 @ 06:00) (71 - 97)  BP: 109/59 (12-27-17 @ 06:00) (105/67 - 109/65)  RR: 18 (12-27-17 @ 06:00) (16 - 18)  SpO2: 97% (12-27-17 @ 06:00) (96% - 99%)  Wt(kg): --      12-26 @ 07:01  -  12-27 @ 07:00  --------------------------------------------------------  IN:    fat emulsion (Fish Oil and Plant Based) 20% Infusion: 60 mL    sodium chloride 0.9%.: 400 mL    TPN (Total Parenteral Nutrition): 996 mL  Total IN: 1456 mL    OUT:    Bulb: 7 mL    Bulb: 7.5 mL    Voided: 850 mL  Total OUT: 864.5 mL    Total NET: 591.5 mL        Physical Exam  General: AAOx3, No acute distress  Skin: No jaundice, no icterus  Abdomen: soft, nontender, nondistended, no rebound tenderness, no guarding, no palpable masses, SOSA x2 to suction with minimal outputs, staples intact, wound healing well  : Normal external genitalia  Extremities: non edematous, no calf pain bilaterally

## 2017-12-27 NOTE — CONSULT NOTE ADULT - PROBLEM SELECTOR RECOMMENDATION 9
Pathology shows: Stage IIIA (pT4a, N1 2+ LN M0)  Recommend further chemotherapy w/ mFOLFOX followed by chemoradiotherapy when discharged and bowel function is fully restored.  discussed with patient diagnosis and prognosis and treatment plan after discharge.

## 2017-12-28 LAB
ALBUMIN SERPL ELPH-MCNC: 2.6 G/DL — LOW (ref 3.5–5)
ALP SERPL-CCNC: 112 U/L — SIGNIFICANT CHANGE UP (ref 40–120)
ALT FLD-CCNC: 93 U/L DA — HIGH (ref 10–60)
ANION GAP SERPL CALC-SCNC: 8 MMOL/L — SIGNIFICANT CHANGE UP (ref 5–17)
AST SERPL-CCNC: 39 U/L — SIGNIFICANT CHANGE UP (ref 10–40)
BASOPHILS # BLD AUTO: 0 K/UL — SIGNIFICANT CHANGE UP (ref 0–0.2)
BASOPHILS NFR BLD AUTO: 0.4 % — SIGNIFICANT CHANGE UP (ref 0–2)
BILIRUB SERPL-MCNC: 0.5 MG/DL — SIGNIFICANT CHANGE UP (ref 0.2–1.2)
BUN SERPL-MCNC: 16 MG/DL — SIGNIFICANT CHANGE UP (ref 7–18)
CA-I BLD-SCNC: 1.22 MMOL/L — SIGNIFICANT CHANGE UP (ref 1.12–1.3)
CALCIUM SERPL-MCNC: 8.5 MG/DL — SIGNIFICANT CHANGE UP (ref 8.4–10.5)
CHLORIDE SERPL-SCNC: 103 MMOL/L — SIGNIFICANT CHANGE UP (ref 96–108)
CO2 SERPL-SCNC: 26 MMOL/L — SIGNIFICANT CHANGE UP (ref 22–31)
CREAT SERPL-MCNC: 0.9 MG/DL — SIGNIFICANT CHANGE UP (ref 0.5–1.3)
EOSINOPHIL # BLD AUTO: 0.2 K/UL — SIGNIFICANT CHANGE UP (ref 0–0.5)
EOSINOPHIL NFR BLD AUTO: 1.6 % — SIGNIFICANT CHANGE UP (ref 0–6)
GLUCOSE BLDC GLUCOMTR-MCNC: 130 MG/DL — HIGH (ref 70–99)
GLUCOSE BLDC GLUCOMTR-MCNC: 140 MG/DL — HIGH (ref 70–99)
GLUCOSE BLDC GLUCOMTR-MCNC: 146 MG/DL — HIGH (ref 70–99)
GLUCOSE BLDC GLUCOMTR-MCNC: 147 MG/DL — HIGH (ref 70–99)
GLUCOSE BLDC GLUCOMTR-MCNC: 161 MG/DL — HIGH (ref 70–99)
GLUCOSE BLDC GLUCOMTR-MCNC: 167 MG/DL — HIGH (ref 70–99)
GLUCOSE SERPL-MCNC: 168 MG/DL — HIGH (ref 70–99)
HCT VFR BLD CALC: 32.4 % — LOW (ref 39–50)
HGB BLD-MCNC: 11 G/DL — LOW (ref 13–17)
LYMPHOCYTES # BLD AUTO: 1.7 K/UL — SIGNIFICANT CHANGE UP (ref 1–3.3)
LYMPHOCYTES # BLD AUTO: 13.8 % — SIGNIFICANT CHANGE UP (ref 13–44)
MAGNESIUM SERPL-MCNC: 2 MG/DL — SIGNIFICANT CHANGE UP (ref 1.6–2.6)
MCHC RBC-ENTMCNC: 31.3 PG — SIGNIFICANT CHANGE UP (ref 27–34)
MCHC RBC-ENTMCNC: 33.8 GM/DL — SIGNIFICANT CHANGE UP (ref 32–36)
MCV RBC AUTO: 92.5 FL — SIGNIFICANT CHANGE UP (ref 80–100)
MONOCYTES # BLD AUTO: 0.9 K/UL — SIGNIFICANT CHANGE UP (ref 0–0.9)
MONOCYTES NFR BLD AUTO: 7.2 % — SIGNIFICANT CHANGE UP (ref 2–14)
NEUTROPHILS # BLD AUTO: 9.7 K/UL — HIGH (ref 1.8–7.4)
NEUTROPHILS NFR BLD AUTO: 77 % — SIGNIFICANT CHANGE UP (ref 43–77)
PHOSPHATE SERPL-MCNC: 2.9 MG/DL — SIGNIFICANT CHANGE UP (ref 2.5–4.5)
PLATELET # BLD AUTO: 231 K/UL — SIGNIFICANT CHANGE UP (ref 150–400)
POTASSIUM SERPL-MCNC: 3.6 MMOL/L — SIGNIFICANT CHANGE UP (ref 3.5–5.3)
POTASSIUM SERPL-SCNC: 3.6 MMOL/L — SIGNIFICANT CHANGE UP (ref 3.5–5.3)
PROT SERPL-MCNC: 6.9 G/DL — SIGNIFICANT CHANGE UP (ref 6–8.3)
RBC # BLD: 3.5 M/UL — LOW (ref 4.2–5.8)
RBC # FLD: 12.4 % — SIGNIFICANT CHANGE UP (ref 10.3–14.5)
SODIUM SERPL-SCNC: 137 MMOL/L — SIGNIFICANT CHANGE UP (ref 135–145)
TRIGL SERPL-MCNC: 120 MG/DL — SIGNIFICANT CHANGE UP (ref 10–149)
WBC # BLD: 12.6 K/UL — HIGH (ref 3.8–10.5)
WBC # FLD AUTO: 12.6 K/UL — HIGH (ref 3.8–10.5)

## 2017-12-28 RX ORDER — I.V. FAT EMULSION 20 G/100ML
0.3 EMULSION INTRAVENOUS
Qty: 24 | Refills: 0 | Status: DISCONTINUED | OUTPATIENT
Start: 2017-12-28 | End: 2017-12-29

## 2017-12-28 RX ORDER — ELECTROLYTE SOLUTION,INJ
1 VIAL (ML) INTRAVENOUS
Qty: 0 | Refills: 0 | Status: DISCONTINUED | OUTPATIENT
Start: 2017-12-28 | End: 2017-12-28

## 2017-12-28 RX ADMIN — Medication 2: at 11:52

## 2017-12-28 RX ADMIN — HEPARIN SODIUM 5000 UNIT(S): 5000 INJECTION INTRAVENOUS; SUBCUTANEOUS at 06:07

## 2017-12-28 RX ADMIN — PANTOPRAZOLE SODIUM 40 MILLIGRAM(S): 20 TABLET, DELAYED RELEASE ORAL at 11:52

## 2017-12-28 RX ADMIN — ONDANSETRON 4 MILLIGRAM(S): 8 TABLET, FILM COATED ORAL at 22:19

## 2017-12-28 RX ADMIN — Medication 1 EACH: at 22:19

## 2017-12-28 RX ADMIN — I.V. FAT EMULSION 5 GM/KG/DAY: 20 EMULSION INTRAVENOUS at 22:19

## 2017-12-28 RX ADMIN — Medication 2: at 06:09

## 2017-12-28 RX ADMIN — HEPARIN SODIUM 5000 UNIT(S): 5000 INJECTION INTRAVENOUS; SUBCUTANEOUS at 14:08

## 2017-12-28 NOTE — PROGRESS NOTE ADULT - ASSESSMENT
62 yr old male with gastric adenocarcinoma s/p Total gastrectomy with Kelby-en-Y esophagojejunal anastomosis  12/13/2017 . Hosp cb partial small bowel obstruction distal to the anastomosis. Now on TPN.     Pt stable on TPN.       TPN:   Total Calories: decrease  to 1580  Fat 24g   AA 80 g  Dextrose decrease to 300 g

## 2017-12-28 NOTE — PROGRESS NOTE ADULT - PROBLEM SELECTOR PLAN 3
Pre-renal MEKHI improving with IVF.  Conitinue IVF NS.  Will consider increasing TPN volume if pt will be on long-term TPN.  Otherwise, continue at current volume.    Renal US with no hydro.  Strict I/Os. Avoid nephrotoxins/ NSAIDs/ RCA. Monitor BMP.

## 2017-12-28 NOTE — PROGRESS NOTE ADULT - SUBJECTIVE AND OBJECTIVE BOX
s/p gastrectomy POD # 15. Still with nausea and some vomiting. Patient examined at bedside, no complaints.   + flatus     T(F): 99.7 (12-28-17 @ 05:53), Max: 99.7 (12-28-17 @ 05:53)  HR: 81 (12-28-17 @ 05:53) (79 - 83)  BP: 104/54 (12-28-17 @ 05:53) (104/54 - 116/67)  RR: 16 (12-28-17 @ 05:53) (16 - 18)  SpO2: 98% (12-28-17 @ 05:53) (98% - 100%)  Wt(kg): --      12-27 @ 07:01  -  12-28 @ 07:00  --------------------------------------------------------  IN:    sodium chloride 0.9%.: 50 mL  Total IN: 50 mL    OUT:    Bulb: 6 mL    Bulb: 4 mL  Total OUT: 10 mL    Total NET: 40 mL        Physical Exam  General: AAOx3, No acute distress  Skin: No jaundice, no icterus  Abdomen: soft, nontender, nondistended, no rebound tenderness, no guarding, no palpable masses, staples intact, SOSA x 2   : Normal external genitalia  Extremities: non edematous, no calf pain bilaterally  CT scan:           EXAM: CT ABDOMEN AND PELVIS OC IC       PROCEDURE DATE: 12/27/2017         INTERPRETATION: CLINICAL HISTORY: Abdominal pain following gastrectomy;   evaluate for small bowel obstruction     Multiple axial images of the abdomen and pelvis were obtained from the lung   bases through pubic symphysis with the administration of oral contrast. 97   cc of Omnipaque 350 was administered intravenously without complication.   Reformatted coronal and sagittal images are submitted.     COMPARISON: None     FINDINGS: The patient is status post gastrectomy with multiple surgical   staple lines identified in the region of esophageal to small bowel as well   as small bowel-small bowel anastomosis. Cutaneous surgical staples are   identified. There are 2 abdominal region drainage catheters identified.   Mechanical small bowel obstruction is identified with dilated proximal small   bowel loops transitioning to decompressed small bowel loops within the left   abdomen in the region of the advanced left-sided drainage catheter. Note is   made of oral contrast within the distal small bowel and colon suggesting   that the mechanical small bowel obstruction is partial in nature.     The liver is normal in attenuation and size. No focal hepatic masses are   identified. There is no evidence for intrahepatic or extrahepatic biliary   dilatation. Small gallstones are identified in the region of the gallbladder   neck. No gallbladder wall thickening or pericholecystic fluid identified.     The spleen, pancreas and adrenal glands are unremarkable.     There is no evidence for hydronephrosis or renal calculi. There is a small   less than 3 mm hypodense focus identified within the lower pole aspect of   the right kidney. A 9 mm hypodense lesion is identified within the mid pole   region of the left kidney, too small to characterize.     The abdominal aorta is normal in course and caliber. No abnormally enlarged   retroperitoneal or pelvic lymphadenopathy is noted.     There is no evidence for mechanical colonic obstruction. No colonic wall   thickening is identified. There is no evidence for free intraperitoneal air   or fluid. The appendix is not visualized; correlate with clinical history.     The urinary bladder appears unremarkable.     Imaging of the lung bases demonstrates patchy airspace and somewhat nodular   opacities within the inferior aspects of the bilateral lower lobe lung   parenchyma, which may be related to lung parenchymal infiltrate versus   atelectasis. No pleural effusion is identified. Degenerative changes of the   thoracic and lumbar spine noted.     IMPRESSION: Patient status post gastrectomy. Mechanical small bowel   obstruction is identified with dilated proximal small bowel loops   transitioning to decompressed small bowel loops within the left abdomen in   the region of the advanced left-sided drainage catheter. Note is made of   oral contrast within the distal small bowel and colon suggesting that the   mechanical small bowel obstruction is partial in nature. Clinical   correlation and follow-up suggested.                 HUY DUNN   This document has been electronically signed. Dec 27 2017 3:36PM

## 2017-12-28 NOTE — PROGRESS NOTE ADULT - PROBLEM SELECTOR PLAN 1
s/p total gastrectomy. Now with partial SBO. Initiated on TPN on 12/22.   Tolerated TPN well. c/w current TPN rx.  Dextrose decreased recently due to elevated Alk phos.   c/w ISS, monitor FS.   Check CMP/Mg/Phos. Daily weights and strict I/Os.

## 2017-12-28 NOTE — PROGRESS NOTE ADULT - SUBJECTIVE AND OBJECTIVE BOX
[facility] NEPHROLOGY- METABOLIC SUPPORT SERVICE PROGRESS NOTE    62 yr old male with gastric adenocarcinoma s/p Total gastrectomy with Kelby-en-Y esophagojejunal anastomosis  12/13/2017 . Hosp cb partial small bowel obstruction distal to the anastomosis. Now on TPN.        Yesterday, pt had worsening N/V. +flatus, last BM 2 days ago      Hospital Medications: Medications reviewed.  REVIEW OF SYSTEMS:  CONSTITUTIONAL: No fevers or chills  RESPIRATORY: No shortness of breath  CARDIOVASCULAR: No chest pain.  GASTROINTESTINAL: No nausea or vomiting, + flatus  Mild abdominal pain.   VASCULAR: No bilateral lower extremity edema.     VITALS:  T(F): 99.7 (12-28-17 @ 05:53), Max: 99.7 (12-28-17 @ 05:53)  HR: 81 (12-28-17 @ 05:53)  BP: 104/54 (12-28-17 @ 05:53)  RR: 16 (12-28-17 @ 05:53)  SpO2: 98% (12-28-17 @ 05:53)  Wt(kg): --    12-27 @ 07:01  -  12-28 @ 07:00  --------------------------------------------------------  IN: 50 mL / OUT: 10 mL / NET: 40 mL      PHYSICAL EXAM:  Constitutional: NAD,   Respiratory: CTAB, no wheezes, rales or rhonchi  Cardiovascular: S1, S2, RRR  Gastrointestinal: BS+, soft, + mild left sided tenderness,  +SOSA drain x2, intact incision,   Extremities: No peripheral edema  Access: left arm PICC line benign      LABS:  12-28    137  |  103  |  16  ----------------------------<  168<H>  3.6   |  26  |  0.90    Ca    8.5      28 Dec 2017 06:32  Phos  2.9     12-28  Mg     2.0     12-28    TPro  6.9  /  Alb  2.6<L>  /  TBili  0.5  /  DBili      /  AST  39  /  ALT  93<H>  /  AlkPhos  112  12-28    Creatinine Trend: 0.90 <--, 1.03 <--, 1.25 <--, 1.01 <--, 0.89 <--, 1.03 <--, 1.23 <--                        11.0   12.6  )-----------( 231      ( 28 Dec 2017 06:32 )             32.4     Urine Studies:    Chloride, Random Urine: 21 mmol/L (12-22 @ 20:10)  Creatinine, Random Urine: 531 mg/dL (12-22 @ 20:10)  Sodium, Random Urine: 7 mmol/L (12-22 @ 20:10)

## 2017-12-29 LAB
ALBUMIN SERPL ELPH-MCNC: 2.7 G/DL — LOW (ref 3.5–5)
ALP SERPL-CCNC: 114 U/L — SIGNIFICANT CHANGE UP (ref 40–120)
ALT FLD-CCNC: 101 U/L DA — HIGH (ref 10–60)
ANION GAP SERPL CALC-SCNC: 8 MMOL/L — SIGNIFICANT CHANGE UP (ref 5–17)
AST SERPL-CCNC: 53 U/L — HIGH (ref 10–40)
BILIRUB SERPL-MCNC: 0.5 MG/DL — SIGNIFICANT CHANGE UP (ref 0.2–1.2)
BUN SERPL-MCNC: 16 MG/DL — SIGNIFICANT CHANGE UP (ref 7–18)
CA-I BLD-SCNC: 1.24 MMOL/L — SIGNIFICANT CHANGE UP (ref 1.12–1.3)
CALCIUM SERPL-MCNC: 8.4 MG/DL — SIGNIFICANT CHANGE UP (ref 8.4–10.5)
CHLORIDE SERPL-SCNC: 105 MMOL/L — SIGNIFICANT CHANGE UP (ref 96–108)
CO2 SERPL-SCNC: 24 MMOL/L — SIGNIFICANT CHANGE UP (ref 22–31)
CREAT SERPL-MCNC: 0.82 MG/DL — SIGNIFICANT CHANGE UP (ref 0.5–1.3)
GLUCOSE BLDC GLUCOMTR-MCNC: 110 MG/DL — HIGH (ref 70–99)
GLUCOSE BLDC GLUCOMTR-MCNC: 139 MG/DL — HIGH (ref 70–99)
GLUCOSE BLDC GLUCOMTR-MCNC: 161 MG/DL — HIGH (ref 70–99)
GLUCOSE BLDC GLUCOMTR-MCNC: 163 MG/DL — HIGH (ref 70–99)
GLUCOSE SERPL-MCNC: 161 MG/DL — HIGH (ref 70–99)
MAGNESIUM SERPL-MCNC: 1.9 MG/DL — SIGNIFICANT CHANGE UP (ref 1.6–2.6)
PHOSPHATE SERPL-MCNC: 3.5 MG/DL — SIGNIFICANT CHANGE UP (ref 2.5–4.5)
POTASSIUM SERPL-MCNC: 3.5 MMOL/L — SIGNIFICANT CHANGE UP (ref 3.5–5.3)
POTASSIUM SERPL-SCNC: 3.5 MMOL/L — SIGNIFICANT CHANGE UP (ref 3.5–5.3)
PROT SERPL-MCNC: 7.2 G/DL — SIGNIFICANT CHANGE UP (ref 6–8.3)
SODIUM SERPL-SCNC: 137 MMOL/L — SIGNIFICANT CHANGE UP (ref 135–145)

## 2017-12-29 RX ORDER — I.V. FAT EMULSION 20 G/100ML
0.3 EMULSION INTRAVENOUS
Qty: 24 | Refills: 0 | Status: DISCONTINUED | OUTPATIENT
Start: 2017-12-29 | End: 2017-12-29

## 2017-12-29 RX ORDER — MAGNESIUM SULFATE 500 MG/ML
1 VIAL (ML) INJECTION ONCE
Qty: 0 | Refills: 0 | Status: COMPLETED | OUTPATIENT
Start: 2017-12-29 | End: 2017-12-29

## 2017-12-29 RX ORDER — ELECTROLYTE SOLUTION,INJ
1 VIAL (ML) INTRAVENOUS
Qty: 0 | Refills: 0 | Status: DISCONTINUED | OUTPATIENT
Start: 2017-12-29 | End: 2017-12-29

## 2017-12-29 RX ORDER — POTASSIUM CHLORIDE 20 MEQ
10 PACKET (EA) ORAL
Qty: 0 | Refills: 0 | Status: COMPLETED | OUTPATIENT
Start: 2017-12-29 | End: 2017-12-29

## 2017-12-29 RX ADMIN — HEPARIN SODIUM 5000 UNIT(S): 5000 INJECTION INTRAVENOUS; SUBCUTANEOUS at 21:50

## 2017-12-29 RX ADMIN — HEPARIN SODIUM 5000 UNIT(S): 5000 INJECTION INTRAVENOUS; SUBCUTANEOUS at 13:03

## 2017-12-29 RX ADMIN — PANTOPRAZOLE SODIUM 40 MILLIGRAM(S): 20 TABLET, DELAYED RELEASE ORAL at 11:29

## 2017-12-29 RX ADMIN — Medication 83 EACH: at 23:15

## 2017-12-29 RX ADMIN — Medication 100 MILLIEQUIVALENT(S): at 13:51

## 2017-12-29 RX ADMIN — ONDANSETRON 4 MILLIGRAM(S): 8 TABLET, FILM COATED ORAL at 19:38

## 2017-12-29 RX ADMIN — Medication 2: at 11:32

## 2017-12-29 RX ADMIN — Medication 100 MILLIEQUIVALENT(S): at 12:59

## 2017-12-29 RX ADMIN — Medication 100 GRAM(S): at 14:40

## 2017-12-29 RX ADMIN — I.V. FAT EMULSION 5 GM/KG/DAY: 20 EMULSION INTRAVENOUS at 23:16

## 2017-12-29 NOTE — PROGRESS NOTE ADULT - PROBLEM SELECTOR PLAN 1
s/p total gastrectomy. Now with partial SBO. Initiated on TPN on 12/22.   Tolerated TPN well.  c/w TPN with lipids. Will adjust electrolytes accordingly.   Dextrose decreased recently due to elevated Alk phos.   c/w ISS, monitor FS. Pt requiring low dose of insulin  Check CMP/Mg/Phos. Daily weights and strict I/Os.

## 2017-12-29 NOTE — PROGRESS NOTE ADULT - SUBJECTIVE AND OBJECTIVE BOX
Patient is a 62y old  Male who presents with a chief complaint of I have gastric cancer (12 Dec 2017 11:41)      pt seen in icu [ ], reg med floor [ x  ], bed [ ], chair at bedside [x ], a+o x3 [ x ], lethargic [  ],   nad [ x ], on tpn via left upper arm picc line [x]      Allergies    No Known Allergies        Vitals    T(F): 98.3 (12-29-17 @ 06:45), Max: 98.5 (12-28-17 @ 13:21)  HR: 79 (12-29-17 @ 06:45) (79 - 86)  BP: 106/63 (12-29-17 @ 06:45) (102/58 - 110/57)  RR: 16 (12-29-17 @ 06:45) (16 - 18)  SpO2: 98% (12-29-17 @ 06:45) (98% - 100%)  Wt(kg): --  CAPILLARY BLOOD GLUCOSE  144 (29 Dec 2017 06:30)      POCT Blood Glucose.: 161 mg/dL (29 Dec 2017 07:29)      Labs                          11.0   12.6  )-----------( 231      ( 28 Dec 2017 06:32 )             32.4       12-29    137  |  105  |  16  ----------------------------<  161<H>  3.5   |  24  |  0.82    Ca    8.4      29 Dec 2017 06:41  Phos  3.5     12-29  Mg     1.9     12-29    TPro  7.2  /  Alb  2.7<L>  /  TBili  0.5  /  DBili  x   /  AST  53<H>  /  ALT  101<H>  /  AlkPhos  114  12-29            .Urine Clean Catch (Midstream)  12-21 @ 11:20   10,000 - 49,000 CFU/mL Escherichia coli  <10,000 CFU/ml Normal Urogenital keila present  --  Escherichia coli      .Blood  12-21 @ 11:19   No growth at 5 days.  --  --      .Blood Blood  12-21 @ 11:17   No growth at 5 days.  --  --          Radiology Results      Meds    MEDICATIONS  (STANDING):  dextrose 5%. 1000 milliLiter(s) (50 mL/Hr) IV Continuous <Continuous>  dextrose 50% Injectable 12.5 Gram(s) IV Push once  dextrose 50% Injectable 25 Gram(s) IV Push once  dextrose 50% Injectable 25 Gram(s) IV Push once  fat emulsion (Fish Oil and Plant Based) 20% Infusion 0.3 Gm/kG/Day (5 mL/Hr) IV Continuous <Continuous>  heparin  Injectable 5000 Unit(s) SubCutaneous every 8 hours  insulin lispro (HumaLOG) corrective regimen sliding scale   SubCutaneous every 6 hours  pantoprazole  Injectable 40 milliGRAM(s) IV Push daily  Parenteral Nutrition - Adult 1 Each (83 mL/Hr) TPN Continuous <Continuous>  sodium chloride 0.9% lock flush 20 milliLiter(s) IV Push once  sodium chloride 0.9%. 1000 milliLiter(s) (50 mL/Hr) IV Continuous <Continuous>      MEDICATIONS  (PRN):  acetaminophen   Tablet 650 milliGRAM(s) Oral every 6 hours PRN mild pain  dextrose Gel 1 Dose(s) Oral once PRN Blood Glucose LESS THAN 70 milliGRAM(s)/deciliter  glucagon  Injectable 1 milliGRAM(s) IntraMuscular once PRN Glucose LESS THAN 70 milligrams/deciliter  HYDROmorphone  Injectable 1 milliGRAM(s) IV Push every 6 hours PRN Severe Pain (7 - 10)  ondansetron Injectable 4 milliGRAM(s) IV Push every 8 hours PRN Nausea and/or Vomiting  sodium chloride 0.9% lock flush 10 milliLiter(s) IV Push every 1 hour PRN After each medication administration  sodium chloride 0.9% lock flush 10 milliLiter(s) IV Push every 12 hours PRN Lumen of catheter NOT used      Physical Exam    Neuro :  no focal deficits  Respiratory: CTA B/L  CV: RRR, S1S2, no murmurs,   Abdominal: Soft, ND, incision w/ staples in place, clean and dry,  diann x2 with serosanguinous drainage,  Extremities: No edema, + peripheral pulses    ASSESSMENT    Malignant neoplasm of stomach  small bowell ileus vs obstruction  h/o Prediabetes  HLD (hyperlipidemia)  HTN (hypertension)  Malignant neoplasm of stomach, unspecified location  H/O prostate biopsy  History of arthroscopy of right shoulder  History of arthroscopy of left knee  History of appendectomy        PLAN      s/p total gatrectomy with cristobal-en-y esophagojejunal anastomosis  cont wound care  heme onc f/u  cont npo  pt having bm and flattus  reglan prn  cont tpn  renal f/u  rept ct abd pelv with partial mechanical sbo noted above  gi and dvt prophylaxis  incentive spirometer  pulm f/u  cont pain control  pain mgmt f/u  oob to chair  cont current meds Patient is a 62y old  Male who presents with a chief complaint of I have gastric cancer (12 Dec 2017 11:41)      pt seen in icu [ ], reg med floor [ x  ], bed [ ], chair at bedside [x ], a+o x3 [ x ], lethargic [  ],   nad [ x ], on tpn via left upper arm picc line [x]      Allergies    No Known Allergies        Vitals    T(F): 98.3 (12-29-17 @ 06:45), Max: 98.5 (12-28-17 @ 13:21)  HR: 79 (12-29-17 @ 06:45) (79 - 86)  BP: 106/63 (12-29-17 @ 06:45) (102/58 - 110/57)  RR: 16 (12-29-17 @ 06:45) (16 - 18)  SpO2: 98% (12-29-17 @ 06:45) (98% - 100%)  Wt(kg): --  CAPILLARY BLOOD GLUCOSE  144 (29 Dec 2017 06:30)      POCT Blood Glucose.: 161 mg/dL (29 Dec 2017 07:29)      Labs                          11.0   12.6  )-----------( 231      ( 28 Dec 2017 06:32 )             32.4       12-29    137  |  105  |  16  ----------------------------<  161<H>  3.5   |  24  |  0.82    Ca    8.4      29 Dec 2017 06:41  Phos  3.5     12-29  Mg     1.9     12-29    TPro  7.2  /  Alb  2.7<L>  /  TBili  0.5  /  DBili  x   /  AST  53<H>  /  ALT  101<H>  /  AlkPhos  114  12-29            .Urine Clean Catch (Midstream)  12-21 @ 11:20   10,000 - 49,000 CFU/mL Escherichia coli  <10,000 CFU/ml Normal Urogenital keila present  --  Escherichia coli      .Blood  12-21 @ 11:19   No growth at 5 days.  --  --      .Blood Blood  12-21 @ 11:17   No growth at 5 days.  --  --          Radiology Results      Meds    MEDICATIONS  (STANDING):  dextrose 5%. 1000 milliLiter(s) (50 mL/Hr) IV Continuous <Continuous>  dextrose 50% Injectable 12.5 Gram(s) IV Push once  dextrose 50% Injectable 25 Gram(s) IV Push once  dextrose 50% Injectable 25 Gram(s) IV Push once  fat emulsion (Fish Oil and Plant Based) 20% Infusion 0.3 Gm/kG/Day (5 mL/Hr) IV Continuous <Continuous>  heparin  Injectable 5000 Unit(s) SubCutaneous every 8 hours  insulin lispro (HumaLOG) corrective regimen sliding scale   SubCutaneous every 6 hours  pantoprazole  Injectable 40 milliGRAM(s) IV Push daily  Parenteral Nutrition - Adult 1 Each (83 mL/Hr) TPN Continuous <Continuous>  sodium chloride 0.9% lock flush 20 milliLiter(s) IV Push once  sodium chloride 0.9%. 1000 milliLiter(s) (50 mL/Hr) IV Continuous <Continuous>      MEDICATIONS  (PRN):  acetaminophen   Tablet 650 milliGRAM(s) Oral every 6 hours PRN mild pain  dextrose Gel 1 Dose(s) Oral once PRN Blood Glucose LESS THAN 70 milliGRAM(s)/deciliter  glucagon  Injectable 1 milliGRAM(s) IntraMuscular once PRN Glucose LESS THAN 70 milligrams/deciliter  HYDROmorphone  Injectable 1 milliGRAM(s) IV Push every 6 hours PRN Severe Pain (7 - 10)  ondansetron Injectable 4 milliGRAM(s) IV Push every 8 hours PRN Nausea and/or Vomiting  sodium chloride 0.9% lock flush 10 milliLiter(s) IV Push every 1 hour PRN After each medication administration  sodium chloride 0.9% lock flush 10 milliLiter(s) IV Push every 12 hours PRN Lumen of catheter NOT used      Physical Exam    Neuro :  no focal deficits  Respiratory: CTA B/L  CV: RRR, S1S2, no murmurs,   Abdominal: Soft, ND, incision w/ staples in place, clean and dry,  diann x2 with serosanguinous drainage,  Extremities: No edema, + peripheral pulses    ASSESSMENT    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   h/o Prediabetes  HLD (hyperlipidemia)  HTN (hypertension)  Malignant neoplasm of stomach, unspecified location  H/O prostate biopsy  History of arthroscopy of right shoulder  History of arthroscopy of left knee  History of appendectomy        PLAN      s/p total gatrectomy with cristobal-en-y esophagojejunal anastomosis  cont wound care  heme onc f/u  cont npo  still with nausea  reglan prn  cont tpn  renal f/u  rept ct abd pelv with partial mechanical sbo noted  gi and dvt prophylaxis  incentive spirometer  pulm f/u  cont pain control  pain mgmt f/u  oob to chair  cont current meds

## 2017-12-29 NOTE — PROGRESS NOTE ADULT - SUBJECTIVE AND OBJECTIVE BOX
[facility] NEPHROLOGY- METABOLIC SUPPORT SERVICE PROGRESS NOTE    62 yr old male with gastric adenocarcinoma s/p Total gastrectomy with Kelby-en-Y esophagojejunal anastomosis  12/13/2017 . Hosp cb partial small bowel obstruction distal to the anastomosis. Now on TPN.        O/N pt had one episode of vomiting.     Hospital Medications: Medications reviewed.  REVIEW OF SYSTEMS:  CONSTITUTIONAL: No fevers or chills  RESPIRATORY: No shortness of breath  CARDIOVASCULAR: No chest pain.  GASTROINTESTINAL: No nausea or vomiting, + flatus  No abdominal pain.  No BM today  VASCULAR: No bilateral lower extremity edema.     VITALS:  T(F): 98.3 (12-29-17 @ 06:45), Max: 98.3 (12-29-17 @ 06:45)  HR: 79 (12-29-17 @ 06:45)  BP: 106/63 (12-29-17 @ 06:45)  RR: 16 (12-29-17 @ 06:45)  SpO2: 98% (12-29-17 @ 06:45)  Wt(kg): --    12-28 @ 07:01  -  12-29 @ 07:00  --------------------------------------------------------  IN: 550 mL / OUT: 15 mL / NET: 535 mL      PHYSICAL EXAM:  Constitutional: NAD,   Respiratory: CTAB, no wheezes, rales or rhonchi  Cardiovascular: S1, S2, RRR  Gastrointestinal: BS+, soft, + mild left sided tenderness,  +SOSA drain x2, intact incision,   Extremities: No peripheral edema  Access: left arm PICC line benign    LABS:  12-29    137  |  105  |  16  ----------------------------<  161<H>  3.5   |  24  |  0.82    Ca    8.4      29 Dec 2017 06:41  Phos  3.5     12-29  Mg     1.9     12-29    TPro  7.2  /  Alb  2.7<L>  /  TBili  0.5  /  DBili      /  AST  53<H>  /  ALT  101<H>  /  AlkPhos  114  12-29    Creatinine Trend: 0.82 <--, 0.90 <--, 1.03 <--, 1.25 <--, 1.01 <--, 0.89 <--, 1.03 <--                        11.0   12.6  )-----------( 231      ( 28 Dec 2017 06:32 )             32.4     Urine Studies:    Chloride, Random Urine: 21 mmol/L (12-22 @ 20:10)  Creatinine, Random Urine: 531 mg/dL (12-22 @ 20:10)  Sodium, Random Urine: 7 mmol/L (12-22 @ 20:10)

## 2017-12-29 NOTE — PROGRESS NOTE ADULT - SUBJECTIVE AND OBJECTIVE BOX
s/p Total Gastrectomy 12/14    Patient examined at bedside, reports feeling better   Reports nausea but denies vomiting  Reports BM and passing flatus  On TPN via LUE PICC Line    Path shows invasive adenocarcinoma with 2/16 lymph nodes positive      T(F): 98.3 (12-29-17 @ 06:45), Max: 98.5 (12-28-17 @ 13:21)  HR: 79 (12-29-17 @ 06:45) (79 - 86)  BP: 106/63 (12-29-17 @ 06:45) (102/58 - 110/57)  RR: 16 (12-29-17 @ 06:45) (16 - 18)  SpO2: 98% (12-29-17 @ 06:45) (98% - 100%)  Wt(kg): --      12-28 @ 07:01  -  12-29 @ 07:00  --------------------------------------------------------  IN:    sodium chloride 0.9%.: 550 mL  Total IN: 550 mL    OUT:    Bulb: 6 mL    Bulb: 9 mL  Total OUT: 15 mL    Total NET: 535 mL          Physical Exam  General: AAOx3, No acute distress  Skin: No jaundice, no icterus  Abdomen: soft, nontender, nondistended, no rebound tenderness, no guarding, no palpable masses, SOSA x2 to suction with minimal outputs, staples intact, wound healing well  : Normal external genitalia  Extremities: non edematous, no calf pain bilaterally s/p Total Gastrectomy 12/13    Patient examined at bedside, reports feeling better   Reports nausea but denies vomiting  Reports BM and passing flatus  On TPN via LUE PICC Line    Path shows invasive adenocarcinoma with 2/16 lymph nodes positive      T(F): 98.3 (12-29-17 @ 06:45), Max: 98.5 (12-28-17 @ 13:21)  HR: 79 (12-29-17 @ 06:45) (79 - 86)  BP: 106/63 (12-29-17 @ 06:45) (102/58 - 110/57)  RR: 16 (12-29-17 @ 06:45) (16 - 18)  SpO2: 98% (12-29-17 @ 06:45) (98% - 100%)  Wt(kg): --      12-28 @ 07:01  -  12-29 @ 07:00  --------------------------------------------------------  IN:    sodium chloride 0.9%.: 550 mL  Total IN: 550 mL    OUT:    Bulb: 6 mL    Bulb: 9 mL  Total OUT: 15 mL    Total NET: 535 mL          Physical Exam  General: AAOx3, No acute distress  Skin: No jaundice, no icterus  Abdomen: soft, nontender, nondistended, no rebound tenderness, no guarding, no palpable masses, SOSA x2 to suction with minimal outputs, staples intact, wound healing well  : Normal external genitalia  Extremities: non edematous, no calf pain bilaterally

## 2017-12-29 NOTE — PROGRESS NOTE ADULT - ASSESSMENT
61 y/o male with gastric cancer s/p total gastrectomy (path reveals invasive gastric adenocarcinoma (T4a tumor) with 2/16 lymph Nodes positive for disease), with PSBO and persistent nausea      1. Keep NPO  2. Continue TPN  3. Out of bed

## 2017-12-30 LAB
ALBUMIN SERPL ELPH-MCNC: 2.4 G/DL — LOW (ref 3.5–5)
ALP SERPL-CCNC: 98 U/L — SIGNIFICANT CHANGE UP (ref 40–120)
ALT FLD-CCNC: 79 U/L DA — HIGH (ref 10–60)
ANION GAP SERPL CALC-SCNC: 9 MMOL/L — SIGNIFICANT CHANGE UP (ref 5–17)
AST SERPL-CCNC: 33 U/L — SIGNIFICANT CHANGE UP (ref 10–40)
BILIRUB SERPL-MCNC: 0.4 MG/DL — SIGNIFICANT CHANGE UP (ref 0.2–1.2)
BUN SERPL-MCNC: 13 MG/DL — SIGNIFICANT CHANGE UP (ref 7–18)
CA-I BLD-SCNC: 1.23 MMOL/L — SIGNIFICANT CHANGE UP (ref 1.12–1.3)
CALCIUM SERPL-MCNC: 8.3 MG/DL — LOW (ref 8.4–10.5)
CHLORIDE SERPL-SCNC: 106 MMOL/L — SIGNIFICANT CHANGE UP (ref 96–108)
CO2 SERPL-SCNC: 24 MMOL/L — SIGNIFICANT CHANGE UP (ref 22–31)
CREAT SERPL-MCNC: 0.8 MG/DL — SIGNIFICANT CHANGE UP (ref 0.5–1.3)
GLUCOSE BLDC GLUCOMTR-MCNC: 134 MG/DL — HIGH (ref 70–99)
GLUCOSE BLDC GLUCOMTR-MCNC: 146 MG/DL — HIGH (ref 70–99)
GLUCOSE BLDC GLUCOMTR-MCNC: 152 MG/DL — HIGH (ref 70–99)
GLUCOSE BLDC GLUCOMTR-MCNC: 168 MG/DL — HIGH (ref 70–99)
GLUCOSE SERPL-MCNC: 159 MG/DL — HIGH (ref 70–99)
MAGNESIUM SERPL-MCNC: 2 MG/DL — SIGNIFICANT CHANGE UP (ref 1.6–2.6)
PHOSPHATE SERPL-MCNC: 3.7 MG/DL — SIGNIFICANT CHANGE UP (ref 2.5–4.5)
POTASSIUM SERPL-MCNC: 3.6 MMOL/L — SIGNIFICANT CHANGE UP (ref 3.5–5.3)
POTASSIUM SERPL-SCNC: 3.6 MMOL/L — SIGNIFICANT CHANGE UP (ref 3.5–5.3)
PROT SERPL-MCNC: 6.8 G/DL — SIGNIFICANT CHANGE UP (ref 6–8.3)
SODIUM SERPL-SCNC: 139 MMOL/L — SIGNIFICANT CHANGE UP (ref 135–145)

## 2017-12-30 RX ORDER — I.V. FAT EMULSION 20 G/100ML
0.3 EMULSION INTRAVENOUS
Qty: 24 | Refills: 0 | Status: DISCONTINUED | OUTPATIENT
Start: 2017-12-30 | End: 2017-12-30

## 2017-12-30 RX ORDER — ELECTROLYTE SOLUTION,INJ
1 VIAL (ML) INTRAVENOUS
Qty: 0 | Refills: 0 | Status: DISCONTINUED | OUTPATIENT
Start: 2017-12-30 | End: 2017-12-30

## 2017-12-30 RX ORDER — METOCLOPRAMIDE HCL 10 MG
10 TABLET ORAL ONCE
Qty: 0 | Refills: 0 | Status: COMPLETED | OUTPATIENT
Start: 2017-12-30 | End: 2017-12-30

## 2017-12-30 RX ORDER — METOCLOPRAMIDE HCL 10 MG
10 TABLET ORAL EVERY 6 HOURS
Qty: 0 | Refills: 0 | Status: DISCONTINUED | OUTPATIENT
Start: 2017-12-30 | End: 2018-01-02

## 2017-12-30 RX ADMIN — Medication 2: at 18:37

## 2017-12-30 RX ADMIN — PANTOPRAZOLE SODIUM 40 MILLIGRAM(S): 20 TABLET, DELAYED RELEASE ORAL at 13:50

## 2017-12-30 RX ADMIN — Medication 10 MILLIGRAM(S): at 13:50

## 2017-12-30 RX ADMIN — HYDROMORPHONE HYDROCHLORIDE 1 MILLIGRAM(S): 2 INJECTION INTRAMUSCULAR; INTRAVENOUS; SUBCUTANEOUS at 04:33

## 2017-12-30 RX ADMIN — HYDROMORPHONE HYDROCHLORIDE 1 MILLIGRAM(S): 2 INJECTION INTRAMUSCULAR; INTRAVENOUS; SUBCUTANEOUS at 04:17

## 2017-12-30 RX ADMIN — HEPARIN SODIUM 5000 UNIT(S): 5000 INJECTION INTRAVENOUS; SUBCUTANEOUS at 21:55

## 2017-12-30 RX ADMIN — HEPARIN SODIUM 5000 UNIT(S): 5000 INJECTION INTRAVENOUS; SUBCUTANEOUS at 13:50

## 2017-12-30 RX ADMIN — Medication 2: at 06:22

## 2017-12-30 RX ADMIN — Medication 10 MILLIGRAM(S): at 20:19

## 2017-12-30 RX ADMIN — Medication 10 MILLIGRAM(S): at 18:36

## 2017-12-30 RX ADMIN — ONDANSETRON 4 MILLIGRAM(S): 8 TABLET, FILM COATED ORAL at 17:02

## 2017-12-30 RX ADMIN — HEPARIN SODIUM 5000 UNIT(S): 5000 INJECTION INTRAVENOUS; SUBCUTANEOUS at 06:22

## 2017-12-30 NOTE — PROGRESS NOTE ADULT - SUBJECTIVE AND OBJECTIVE BOX
SURGERY PROGRESS NOTE    INTERVAL HPI/OVERNIGHT EVENTS: Had BM yesterday, but also reports continued vomiting. On TPN. No other events.  ====================================  MEDICATIONS  (STANDING):  dextrose 5%. 1000 milliLiter(s) (50 mL/Hr) IV Continuous <Continuous>  dextrose 50% Injectable 12.5 Gram(s) IV Push once  dextrose 50% Injectable 25 Gram(s) IV Push once  dextrose 50% Injectable 25 Gram(s) IV Push once  fat emulsion (Fish Oil and Plant Based) 20% Infusion 0.3 Gm/kG/Day (5 mL/Hr) IV Continuous <Continuous>  fat emulsion (Fish Oil and Plant Based) 20% Infusion 0.3 Gm/kG/Day (5 mL/Hr) IV Continuous <Continuous>  heparin  Injectable 5000 Unit(s) SubCutaneous every 8 hours  insulin lispro (HumaLOG) corrective regimen sliding scale   SubCutaneous every 6 hours  pantoprazole  Injectable 40 milliGRAM(s) IV Push daily  Parenteral Nutrition - Adult 1 Each (83 mL/Hr) TPN Continuous <Continuous>  Parenteral Nutrition - Adult 1 Each (83 mL/Hr) TPN Continuous <Continuous>  sodium chloride 0.9% lock flush 20 milliLiter(s) IV Push once  sodium chloride 0.9%. 1000 milliLiter(s) (50 mL/Hr) IV Continuous <Continuous>    MEDICATIONS  (PRN):  acetaminophen   Tablet 650 milliGRAM(s) Oral every 6 hours PRN mild pain  dextrose Gel 1 Dose(s) Oral once PRN Blood Glucose LESS THAN 70 milliGRAM(s)/deciliter  glucagon  Injectable 1 milliGRAM(s) IntraMuscular once PRN Glucose LESS THAN 70 milligrams/deciliter  HYDROmorphone  Injectable 1 milliGRAM(s) IV Push every 6 hours PRN Severe Pain (7 - 10)  ondansetron Injectable 4 milliGRAM(s) IV Push every 8 hours PRN Nausea and/or Vomiting  sodium chloride 0.9% lock flush 10 milliLiter(s) IV Push every 1 hour PRN After each medication administration  sodium chloride 0.9% lock flush 10 milliLiter(s) IV Push every 12 hours PRN Lumen of catheter NOT used    ====================================  Allergies    No Known Allergies    Intolerances      ====================================  REVIEW OF SYSTEMS:  Negative for cardiac, pulmonary, dermatologic, neurologic, endocrine, gastrointestinal, genitourinary, and psychiatric, except where listed above.   ====================================  Vital Signs Last 24 Hrs  T(C): 36.8 (30 Dec 2017 05:55), Max: 37 (29 Dec 2017 22:50)  T(F): 98.3 (30 Dec 2017 05:55), Max: 98.6 (29 Dec 2017 22:50)  HR: 79 (30 Dec 2017 05:55) (78 - 84)  BP: 104/60 (30 Dec 2017 05:55) (101/42 - 111/63)  BP(mean): --  RR: 16 (30 Dec 2017 05:55) (16 - 17)  SpO2: 97% (30 Dec 2017 05:55) (97% - 100%)  ====================================  I&O's Detail    29 Dec 2017 07:01  -  30 Dec 2017 07:00  --------------------------------------------------------  IN:    fat emulsion (Fish Oil and Plant Based) 20% Infusion: 55 mL    fat emulsion (Fish Oil and Plant Based) 20% Infusion: 60 mL    sodium chloride 0.9%.: 550 mL    TPN (Total Parenteral Nutrition): 996 mL  Total IN: 1661 mL    OUT:    Bulb: 12 mL    Bulb: 10 mL  Total OUT: 22 mL    Total NET: 1639 mL        ====================================  PHYSICAL EXAM:  Gen: alert, comfortable, no distress  HEENT: no scleral icterus  Abd: soft, nondistended nontender - JPs are serous. Incisions cdi without erythema  Ext: no edema noted  Skin: no obvious skin rash noted  ====================================  LABS:    12-30    139  |  106  |  13  ----------------------------<  159<H>  3.6   |  24  |  0.80    Ca    8.3<L>      30 Dec 2017 06:46  Phos  3.7     12-30  Mg     2.0     12-30    TPro  6.8  /  Alb  2.4<L>  /  TBili  0.4  /  DBili  x   /  AST  33  /  ALT  79<H>  /  AlkPhos  98  12-30        ====================================  RADIOLOGY & ADDITIONAL TESTS:

## 2017-12-30 NOTE — PROGRESS NOTE ADULT - PROBLEM SELECTOR PLAN 3
Pre-renal MEKHI resolved with IVF.  Consider monitoring off IVF.  If needed, will consider increasing TPN volume if pt will be on long-term TPN.  Otherwise, continue at current volume.    Renal US with no hydro.  Strict I/Os. Avoid nephrotoxins/ NSAIDs/ RCA. Monitor BMP.

## 2017-12-30 NOTE — PROGRESS NOTE ADULT - SUBJECTIVE AND OBJECTIVE BOX
[facility] NEPHROLOGY- METABOLIC SUPPORT SERVICE PROGRESS NOTE    62 yr old male with gastric adenocarcinoma s/p Total gastrectomy with Kelby-en-Y esophagojejunal anastomosis  12/13/2017 . Hosp cb partial small bowel obstruction distal to the anastomosis. Now on TPN.        Hospital Medications: Medications reviewed.  REVIEW OF SYSTEMS:  CONSTITUTIONAL: No fevers or chills  RESPIRATORY: No shortness of breath  CARDIOVASCULAR: No chest pain.  GASTROINTESTINAL: + nausea & vomiting, + flatus  No abdominal pain.  last BM- last night  VASCULAR: No bilateral lower extremity edema.     VITALS:  T(F): 98.3 (12-30-17 @ 05:55), Max: 98.6 (12-29-17 @ 22:50)  HR: 79 (12-30-17 @ 05:55)  BP: 104/60 (12-30-17 @ 05:55)  RR: 16 (12-30-17 @ 05:55)  SpO2: 97% (12-30-17 @ 05:55)  Wt(kg): --    12-29 @ 07:01  -  12-30 @ 07:00  --------------------------------------------------------  IN: 1661 mL / OUT: 22 mL / NET: 1639 mL      PHYSICAL EXAM:  Constitutional: NAD,   Respiratory: CTAB, no wheezes, rales or rhonchi  Cardiovascular: S1, S2, RRR  Gastrointestinal: BS+, soft, + mild left sided tenderness,  +SOSA drain x2, intact incision,   Extremities: No peripheral edema  Access: left arm PICC line benign    LABS:  12-30    139  |  106  |  13  ----------------------------<  159<H>  3.6   |  24  |  0.80    Ca    8.3<L>      30 Dec 2017 06:46  Phos  3.7     12-30  Mg     2.0     12-30    TPro  6.8  /  Alb  2.4<L>  /  TBili  0.4  /  DBili      /  AST  33  /  ALT  79<H>  /  AlkPhos  98  12-30    Creatinine Trend: 0.80 <--, 0.82 <--, 0.90 <--, 1.03 <--, 1.25 <--, 1.01 <--, 0.89 <--    Urine Studies:

## 2017-12-30 NOTE — PROGRESS NOTE ADULT - ASSESSMENT
s/p total gastrectomy, ongoing vomiting and nausea.  Continue TPN  Maintain NPO status at this time  Consider reglan/erythromycin for motility

## 2017-12-30 NOTE — PROGRESS NOTE ADULT - SUBJECTIVE AND OBJECTIVE BOX
Patient is a 62y old  Male who presents with a chief complaint of I have gastric cancer (12 Dec 2017 11:41)      pt seen in icu [ ], reg med floor [ x  ], bed [ ], chair at bedside [x ], a+o x3 [ x ], lethargic [  ],   nad [ x ], on tpn via left upper arm picc line [x]      Allergies    No Known Allergies        Vitals    T(F): 98.3 (12-30-17 @ 05:55), Max: 98.6 (12-29-17 @ 22:50)  HR: 79 (12-30-17 @ 05:55) (78 - 84)  BP: 104/60 (12-30-17 @ 05:55) (101/42 - 111/63)  RR: 16 (12-30-17 @ 05:55) (16 - 17)  SpO2: 97% (12-30-17 @ 05:55) (97% - 100%)  Wt(kg): --  CAPILLARY BLOOD GLUCOSE  144 (29 Dec 2017 06:30)      POCT Blood Glucose.: 168 mg/dL (30 Dec 2017 05:37)      Labs        12-29    137  |  105  |  16  ----------------------------<  161<H>  3.5   |  24  |  0.82    Ca    8.4      29 Dec 2017 06:41  Phos  3.5     12-29  Mg     1.9     12-29    TPro  7.2  /  Alb  2.7<L>  /  TBili  0.5  /  DBili  x   /  AST  53<H>  /  ALT  101<H>  /  AlkPhos  114  12-29            .Urine Clean Catch (Midstream)  12-21 @ 11:20   10,000 - 49,000 CFU/mL Escherichia coli  <10,000 CFU/ml Normal Urogenital keila present  --  Escherichia coli      .Blood  12-21 @ 11:19   No growth at 5 days.  --  --      .Blood Blood  12-21 @ 11:17   No growth at 5 days.  --  --          Radiology Results      Meds    MEDICATIONS  (STANDING):  dextrose 5%. 1000 milliLiter(s) (50 mL/Hr) IV Continuous <Continuous>  dextrose 50% Injectable 12.5 Gram(s) IV Push once  dextrose 50% Injectable 25 Gram(s) IV Push once  dextrose 50% Injectable 25 Gram(s) IV Push once  fat emulsion (Fish Oil and Plant Based) 20% Infusion 0.3 Gm/kG/Day (5 mL/Hr) IV Continuous <Continuous>  heparin  Injectable 5000 Unit(s) SubCutaneous every 8 hours  insulin lispro (HumaLOG) corrective regimen sliding scale   SubCutaneous every 6 hours  pantoprazole  Injectable 40 milliGRAM(s) IV Push daily  Parenteral Nutrition - Adult 1 Each (83 mL/Hr) TPN Continuous <Continuous>  sodium chloride 0.9% lock flush 20 milliLiter(s) IV Push once  sodium chloride 0.9%. 1000 milliLiter(s) (50 mL/Hr) IV Continuous <Continuous>      MEDICATIONS  (PRN):  acetaminophen   Tablet 650 milliGRAM(s) Oral every 6 hours PRN mild pain  dextrose Gel 1 Dose(s) Oral once PRN Blood Glucose LESS THAN 70 milliGRAM(s)/deciliter  glucagon  Injectable 1 milliGRAM(s) IntraMuscular once PRN Glucose LESS THAN 70 milligrams/deciliter  HYDROmorphone  Injectable 1 milliGRAM(s) IV Push every 6 hours PRN Severe Pain (7 - 10)  ondansetron Injectable 4 milliGRAM(s) IV Push every 8 hours PRN Nausea and/or Vomiting  sodium chloride 0.9% lock flush 10 milliLiter(s) IV Push every 1 hour PRN After each medication administration  sodium chloride 0.9% lock flush 10 milliLiter(s) IV Push every 12 hours PRN Lumen of catheter NOT used      Physical Exam    Neuro :  no focal deficits  Respiratory: CTA B/L  CV: RRR, S1S2, no murmurs,   Abdominal: Soft, ND, incision w/ staples in place, clean and dry,  diann x2 with serosanguinous drainage,  Extremities: No edema, + peripheral pulses    ASSESSMENT    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   h/o Prediabetes  HLD (hyperlipidemia)  HTN (hypertension)  Malignant neoplasm of stomach, unspecified location  H/O prostate biopsy  History of arthroscopy of right shoulder  History of arthroscopy of left knee  History of appendectomy        PLAN      s/p total gatrectomy with cristobal-en-y esophagojejunal anastomosis  cont wound care  heme onc f/u  cont npo  still with nausea  reglan prn  cont tpn  renal f/u  rept ct abd pelv with partial mechanical sbo noted  gi and dvt prophylaxis  incentive spirometer  pulm f/u  cont pain control  pain mgmt f/u  oob to chair  cont current meds Patient is a 62y old  Male who presents with a chief complaint of I have gastric cancer (12 Dec 2017 11:41)      pt seen in icu [ ], reg med floor [ x  ], bed [x ], chair at bedside [ ], a+o x3 [ x ], lethargic [  ],   nad [ x ], on tpn via left upper arm picc line [x]      Allergies    No Known Allergies        Vitals    T(F): 98.3 (12-30-17 @ 05:55), Max: 98.6 (12-29-17 @ 22:50)  HR: 79 (12-30-17 @ 05:55) (78 - 84)  BP: 104/60 (12-30-17 @ 05:55) (101/42 - 111/63)  RR: 16 (12-30-17 @ 05:55) (16 - 17)  SpO2: 97% (12-30-17 @ 05:55) (97% - 100%)  Wt(kg): --  CAPILLARY BLOOD GLUCOSE  144 (29 Dec 2017 06:30)      POCT Blood Glucose.: 168 mg/dL (30 Dec 2017 05:37)      Labs        12-29    137  |  105  |  16  ----------------------------<  161<H>  3.5   |  24  |  0.82    Ca    8.4      29 Dec 2017 06:41  Phos  3.5     12-29  Mg     1.9     12-29    TPro  7.2  /  Alb  2.7<L>  /  TBili  0.5  /  DBili  x   /  AST  53<H>  /  ALT  101<H>  /  AlkPhos  114  12-29            .Urine Clean Catch (Midstream)  12-21 @ 11:20   10,000 - 49,000 CFU/mL Escherichia coli  <10,000 CFU/ml Normal Urogenital keila present  --  Escherichia coli      .Blood  12-21 @ 11:19   No growth at 5 days.  --  --      .Blood Blood  12-21 @ 11:17   No growth at 5 days.  --  --          Radiology Results      Meds    MEDICATIONS  (STANDING):  dextrose 5%. 1000 milliLiter(s) (50 mL/Hr) IV Continuous <Continuous>  dextrose 50% Injectable 12.5 Gram(s) IV Push once  dextrose 50% Injectable 25 Gram(s) IV Push once  dextrose 50% Injectable 25 Gram(s) IV Push once  fat emulsion (Fish Oil and Plant Based) 20% Infusion 0.3 Gm/kG/Day (5 mL/Hr) IV Continuous <Continuous>  heparin  Injectable 5000 Unit(s) SubCutaneous every 8 hours  insulin lispro (HumaLOG) corrective regimen sliding scale   SubCutaneous every 6 hours  pantoprazole  Injectable 40 milliGRAM(s) IV Push daily  Parenteral Nutrition - Adult 1 Each (83 mL/Hr) TPN Continuous <Continuous>  sodium chloride 0.9% lock flush 20 milliLiter(s) IV Push once  sodium chloride 0.9%. 1000 milliLiter(s) (50 mL/Hr) IV Continuous <Continuous>      MEDICATIONS  (PRN):  acetaminophen   Tablet 650 milliGRAM(s) Oral every 6 hours PRN mild pain  dextrose Gel 1 Dose(s) Oral once PRN Blood Glucose LESS THAN 70 milliGRAM(s)/deciliter  glucagon  Injectable 1 milliGRAM(s) IntraMuscular once PRN Glucose LESS THAN 70 milligrams/deciliter  HYDROmorphone  Injectable 1 milliGRAM(s) IV Push every 6 hours PRN Severe Pain (7 - 10)  ondansetron Injectable 4 milliGRAM(s) IV Push every 8 hours PRN Nausea and/or Vomiting  sodium chloride 0.9% lock flush 10 milliLiter(s) IV Push every 1 hour PRN After each medication administration  sodium chloride 0.9% lock flush 10 milliLiter(s) IV Push every 12 hours PRN Lumen of catheter NOT used      Physical Exam    Neuro :  no focal deficits  Respiratory: CTA B/L  CV: RRR, S1S2, no murmurs,   Abdominal: Soft, ND, incision w/ staples in place, clean and dry,  diann x2 with serosanguinous drainage,  Extremities: No edema, + peripheral pulses    ASSESSMENT    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   h/o Prediabetes  HLD (hyperlipidemia)  HTN (hypertension)  Malignant neoplasm of stomach, unspecified location  H/O prostate biopsy  History of arthroscopy of right shoulder  History of arthroscopy of left knee  History of appendectomy        PLAN      s/p total gatrectomy with cristobal-en-y esophagojejunal anastomosis  cont wound care  heme onc f/u  cont npo  still with nausea  reglan prn  cont tpn  renal f/u  rept ct abd pelv with partial mechanical sbo noted  gi and dvt prophylaxis  incentive spirometer  pulm f/u  cont pain control  pain mgmt f/u  oob to chair  cont current meds

## 2017-12-31 LAB
ALBUMIN SERPL ELPH-MCNC: 2.7 G/DL — LOW (ref 3.5–5)
ALP SERPL-CCNC: 102 U/L — SIGNIFICANT CHANGE UP (ref 40–120)
ALT FLD-CCNC: 75 U/L DA — HIGH (ref 10–60)
ANION GAP SERPL CALC-SCNC: 12 MMOL/L — SIGNIFICANT CHANGE UP (ref 5–17)
AST SERPL-CCNC: 31 U/L — SIGNIFICANT CHANGE UP (ref 10–40)
BASOPHILS # BLD AUTO: 0.1 K/UL — SIGNIFICANT CHANGE UP (ref 0–0.2)
BASOPHILS NFR BLD AUTO: 0.5 % — SIGNIFICANT CHANGE UP (ref 0–2)
BILIRUB SERPL-MCNC: 0.5 MG/DL — SIGNIFICANT CHANGE UP (ref 0.2–1.2)
BUN SERPL-MCNC: 15 MG/DL — SIGNIFICANT CHANGE UP (ref 7–18)
CA-I BLD-SCNC: 1.25 MMOL/L — SIGNIFICANT CHANGE UP (ref 1.12–1.3)
CALCIUM SERPL-MCNC: 8.5 MG/DL — SIGNIFICANT CHANGE UP (ref 8.4–10.5)
CHLORIDE SERPL-SCNC: 105 MMOL/L — SIGNIFICANT CHANGE UP (ref 96–108)
CO2 SERPL-SCNC: 22 MMOL/L — SIGNIFICANT CHANGE UP (ref 22–31)
CREAT SERPL-MCNC: 0.82 MG/DL — SIGNIFICANT CHANGE UP (ref 0.5–1.3)
EOSINOPHIL # BLD AUTO: 0.2 K/UL — SIGNIFICANT CHANGE UP (ref 0–0.5)
EOSINOPHIL NFR BLD AUTO: 2.1 % — SIGNIFICANT CHANGE UP (ref 0–6)
GLUCOSE BLDC GLUCOMTR-MCNC: 145 MG/DL — HIGH (ref 70–99)
GLUCOSE BLDC GLUCOMTR-MCNC: 146 MG/DL — HIGH (ref 70–99)
GLUCOSE BLDC GLUCOMTR-MCNC: 164 MG/DL — HIGH (ref 70–99)
GLUCOSE BLDC GLUCOMTR-MCNC: 174 MG/DL — HIGH (ref 70–99)
GLUCOSE SERPL-MCNC: 164 MG/DL — HIGH (ref 70–99)
HCT VFR BLD CALC: 32.2 % — LOW (ref 39–50)
HCT VFR BLD CALC: 32.7 % — LOW (ref 39–50)
HGB BLD-MCNC: 10.3 G/DL — LOW (ref 13–17)
HGB BLD-MCNC: 10.8 G/DL — LOW (ref 13–17)
LYMPHOCYTES # BLD AUTO: 1.4 K/UL — SIGNIFICANT CHANGE UP (ref 1–3.3)
LYMPHOCYTES # BLD AUTO: 12.7 % — LOW (ref 13–44)
MAGNESIUM SERPL-MCNC: 2 MG/DL — SIGNIFICANT CHANGE UP (ref 1.6–2.6)
MCHC RBC-ENTMCNC: 29.7 PG — SIGNIFICANT CHANGE UP (ref 27–34)
MCHC RBC-ENTMCNC: 30.7 PG — SIGNIFICANT CHANGE UP (ref 27–34)
MCHC RBC-ENTMCNC: 31.9 GM/DL — LOW (ref 32–36)
MCHC RBC-ENTMCNC: 33 GM/DL — SIGNIFICANT CHANGE UP (ref 32–36)
MCV RBC AUTO: 93.1 FL — SIGNIFICANT CHANGE UP (ref 80–100)
MCV RBC AUTO: 93.1 FL — SIGNIFICANT CHANGE UP (ref 80–100)
MONOCYTES # BLD AUTO: 0.7 K/UL — SIGNIFICANT CHANGE UP (ref 0–0.9)
MONOCYTES NFR BLD AUTO: 6.3 % — SIGNIFICANT CHANGE UP (ref 2–14)
NEUTROPHILS # BLD AUTO: 8.8 K/UL — HIGH (ref 1.8–7.4)
NEUTROPHILS NFR BLD AUTO: 78.4 % — HIGH (ref 43–77)
PHOSPHATE SERPL-MCNC: 3.2 MG/DL — SIGNIFICANT CHANGE UP (ref 2.5–4.5)
PLATELET # BLD AUTO: 211 K/UL — SIGNIFICANT CHANGE UP (ref 150–400)
PLATELET # BLD AUTO: 234 K/UL — SIGNIFICANT CHANGE UP (ref 150–400)
POTASSIUM SERPL-MCNC: 3.3 MMOL/L — LOW (ref 3.5–5.3)
POTASSIUM SERPL-SCNC: 3.3 MMOL/L — LOW (ref 3.5–5.3)
PROT SERPL-MCNC: 7.4 G/DL — SIGNIFICANT CHANGE UP (ref 6–8.3)
RBC # BLD: 3.46 M/UL — LOW (ref 4.2–5.8)
RBC # BLD: 3.51 M/UL — LOW (ref 4.2–5.8)
RBC # FLD: 12.3 % — SIGNIFICANT CHANGE UP (ref 10.3–14.5)
RBC # FLD: 12.4 % — SIGNIFICANT CHANGE UP (ref 10.3–14.5)
SODIUM SERPL-SCNC: 139 MMOL/L — SIGNIFICANT CHANGE UP (ref 135–145)
WBC # BLD: 11.2 K/UL — HIGH (ref 3.8–10.5)
WBC # BLD: 12.8 K/UL — HIGH (ref 3.8–10.5)
WBC # FLD AUTO: 11.2 K/UL — HIGH (ref 3.8–10.5)
WBC # FLD AUTO: 12.8 K/UL — HIGH (ref 3.8–10.5)

## 2017-12-31 PROCEDURE — 74000: CPT | Mod: 26

## 2017-12-31 RX ORDER — ELECTROLYTE SOLUTION,INJ
1 VIAL (ML) INTRAVENOUS
Qty: 0 | Refills: 0 | Status: DISCONTINUED | OUTPATIENT
Start: 2017-12-31 | End: 2017-12-31

## 2017-12-31 RX ORDER — I.V. FAT EMULSION 20 G/100ML
0.3 EMULSION INTRAVENOUS
Qty: 24 | Refills: 0 | Status: DISCONTINUED | OUTPATIENT
Start: 2017-12-31 | End: 2017-12-31

## 2017-12-31 RX ADMIN — ONDANSETRON 4 MILLIGRAM(S): 8 TABLET, FILM COATED ORAL at 22:17

## 2017-12-31 RX ADMIN — PANTOPRAZOLE SODIUM 40 MILLIGRAM(S): 20 TABLET, DELAYED RELEASE ORAL at 12:33

## 2017-12-31 RX ADMIN — Medication 10 MILLIGRAM(S): at 12:33

## 2017-12-31 RX ADMIN — ONDANSETRON 4 MILLIGRAM(S): 8 TABLET, FILM COATED ORAL at 00:02

## 2017-12-31 RX ADMIN — Medication 650 MILLIGRAM(S): at 08:19

## 2017-12-31 RX ADMIN — HEPARIN SODIUM 5000 UNIT(S): 5000 INJECTION INTRAVENOUS; SUBCUTANEOUS at 05:53

## 2017-12-31 RX ADMIN — Medication 10 MILLIGRAM(S): at 17:53

## 2017-12-31 RX ADMIN — Medication 2: at 23:56

## 2017-12-31 RX ADMIN — Medication 2: at 05:53

## 2017-12-31 RX ADMIN — Medication 10 MILLIGRAM(S): at 23:55

## 2017-12-31 RX ADMIN — Medication 10 MILLIGRAM(S): at 05:53

## 2017-12-31 RX ADMIN — HEPARIN SODIUM 5000 UNIT(S): 5000 INJECTION INTRAVENOUS; SUBCUTANEOUS at 13:44

## 2017-12-31 RX ADMIN — HEPARIN SODIUM 5000 UNIT(S): 5000 INJECTION INTRAVENOUS; SUBCUTANEOUS at 22:16

## 2017-12-31 RX ADMIN — I.V. FAT EMULSION 5 GM/KG/DAY: 20 EMULSION INTRAVENOUS at 23:56

## 2017-12-31 RX ADMIN — Medication 10 MILLIGRAM(S): at 00:03

## 2017-12-31 RX ADMIN — ONDANSETRON 4 MILLIGRAM(S): 8 TABLET, FILM COATED ORAL at 12:33

## 2017-12-31 NOTE — PROGRESS NOTE ADULT - SUBJECTIVE AND OBJECTIVE BOX
Patient is a 62y old  Male who presents with a chief complaint of I have gastric cancer (12 Dec 2017 11:41)    pt seen in icu [ ], reg med floor [ x  ], bed [x ], chair at bedside [ ], a+o x3 [ x ], lethargic [  ],   nad [ x ], on tpn via left upper arm picc line [x]      Allergies    No Known Allergies        Vitals    T(F): 99.4 (12-31-17 @ 06:00), Max: 99.4 (12-31-17 @ 06:00)  HR: 90 (12-31-17 @ 06:00) (85 - 90)  BP: 126/68 (12-31-17 @ 06:00) (100/61 - 126/68)  RR: 17 (12-31-17 @ 06:00) (17 - 18)  SpO2: 99% (12-31-17 @ 06:00) (98% - 99%)  Wt(kg): --  CAPILLARY BLOOD GLUCOSE      POCT Blood Glucose.: 174 mg/dL (31 Dec 2017 05:47)      Labs        12-31    139  |  105  |  x   ----------------------------<  164<H>  3.3<L>   |  x   |  x     Ca    8.3<L>      30 Dec 2017 06:46  Phos  3.7     12-30  Mg     2.0     12-30    TPro  6.8  /  Alb  2.4<L>  /  TBili  0.4  /  DBili  x   /  AST  33  /  ALT  79<H>  /  AlkPhos  98  12-30            .Urine Clean Catch (Midstream)  12-21 @ 11:20   10,000 - 49,000 CFU/mL Escherichia coli  <10,000 CFU/ml Normal Urogenital keila present  --  Escherichia coli      .Blood  12-21 @ 11:19   No growth at 5 days.  --  --      .Blood Blood  12-21 @ 11:17   No growth at 5 days.  --  --          Radiology Results      Meds    MEDICATIONS  (STANDING):  dextrose 5%. 1000 milliLiter(s) (50 mL/Hr) IV Continuous <Continuous>  dextrose 50% Injectable 12.5 Gram(s) IV Push once  dextrose 50% Injectable 25 Gram(s) IV Push once  dextrose 50% Injectable 25 Gram(s) IV Push once  fat emulsion (Fish Oil and Plant Based) 20% Infusion 0.3 Gm/kG/Day (5 mL/Hr) IV Continuous <Continuous>  heparin  Injectable 5000 Unit(s) SubCutaneous every 8 hours  insulin lispro (HumaLOG) corrective regimen sliding scale   SubCutaneous every 6 hours  metoclopramide Injectable 10 milliGRAM(s) IV Push every 6 hours  pantoprazole  Injectable 40 milliGRAM(s) IV Push daily  Parenteral Nutrition - Adult 1 Each (83 mL/Hr) TPN Continuous <Continuous>  sodium chloride 0.9% lock flush 20 milliLiter(s) IV Push once  sodium chloride 0.9%. 1000 milliLiter(s) (50 mL/Hr) IV Continuous <Continuous>      MEDICATIONS  (PRN):  acetaminophen   Tablet 650 milliGRAM(s) Oral every 6 hours PRN mild pain  dextrose Gel 1 Dose(s) Oral once PRN Blood Glucose LESS THAN 70 milliGRAM(s)/deciliter  glucagon  Injectable 1 milliGRAM(s) IntraMuscular once PRN Glucose LESS THAN 70 milligrams/deciliter  ondansetron Injectable 4 milliGRAM(s) IV Push every 8 hours PRN Nausea and/or Vomiting  sodium chloride 0.9% lock flush 10 milliLiter(s) IV Push every 1 hour PRN After each medication administration  sodium chloride 0.9% lock flush 10 milliLiter(s) IV Push every 12 hours PRN Lumen of catheter NOT used      Physical Exam    Neuro :  no focal deficits  Respiratory: CTA B/L  CV: RRR, S1S2, no murmurs,   Abdominal: Soft, ND, incision w/ staples in place, clean and dry,  diann x2 with serosanguinous drainage,  Extremities: No edema, + peripheral pulses    ASSESSMENT    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   h/o Prediabetes  HLD (hyperlipidemia)  HTN (hypertension)  Malignant neoplasm of stomach, unspecified location  H/O prostate biopsy  History of arthroscopy of right shoulder  History of arthroscopy of left knee  History of appendectomy        PLAN      s/p total gatrectomy with cristobal-en-y esophagojejunal anastomosis  cont wound care  heme onc f/u  cont npo  still with nausea  reglan prn  cont tpn  renal f/u  rept ct abd pelv with partial mechanical sbo noted  gi and dvt prophylaxis  incentive spirometer  pulm f/u  cont pain control  pain mgmt f/u  oob to chair  cont current meds Patient is a 62y old  Male who presents with a chief complaint of I have gastric cancer (12 Dec 2017 11:41)    pt seen in icu [ ], reg med floor [ x  ], bed [x ], chair at bedside [ ], a+o x3 [ x ], lethargic [  ],   nad [ x ], on tpn via left upper arm picc line [x]      Allergies    No Known Allergies        Vitals    T(F): 99.4 (12-31-17 @ 06:00), Max: 99.4 (12-31-17 @ 06:00)  HR: 90 (12-31-17 @ 06:00) (85 - 90)  BP: 126/68 (12-31-17 @ 06:00) (100/61 - 126/68)  RR: 17 (12-31-17 @ 06:00) (17 - 18)  SpO2: 99% (12-31-17 @ 06:00) (98% - 99%)  Wt(kg): --  CAPILLARY BLOOD GLUCOSE      POCT Blood Glucose.: 174 mg/dL (31 Dec 2017 05:47)      Labs        12-31    139  |  105  |  x   ----------------------------<  164<H>  3.3<L>   |  x   |  x     Ca    8.3<L>      30 Dec 2017 06:46  Phos  3.7     12-30  Mg     2.0     12-30    TPro  6.8  /  Alb  2.4<L>  /  TBili  0.4  /  DBili  x   /  AST  33  /  ALT  79<H>  /  AlkPhos  98  12-30            .Urine Clean Catch (Midstream)  12-21 @ 11:20   10,000 - 49,000 CFU/mL Escherichia coli  <10,000 CFU/ml Normal Urogenital keila present  --  Escherichia coli      .Blood  12-21 @ 11:19   No growth at 5 days.  --  --      .Blood Blood  12-21 @ 11:17   No growth at 5 days.  --  --          Radiology Results      Meds    MEDICATIONS  (STANDING):  dextrose 5%. 1000 milliLiter(s) (50 mL/Hr) IV Continuous <Continuous>  dextrose 50% Injectable 12.5 Gram(s) IV Push once  dextrose 50% Injectable 25 Gram(s) IV Push once  dextrose 50% Injectable 25 Gram(s) IV Push once  fat emulsion (Fish Oil and Plant Based) 20% Infusion 0.3 Gm/kG/Day (5 mL/Hr) IV Continuous <Continuous>  heparin  Injectable 5000 Unit(s) SubCutaneous every 8 hours  insulin lispro (HumaLOG) corrective regimen sliding scale   SubCutaneous every 6 hours  metoclopramide Injectable 10 milliGRAM(s) IV Push every 6 hours  pantoprazole  Injectable 40 milliGRAM(s) IV Push daily  Parenteral Nutrition - Adult 1 Each (83 mL/Hr) TPN Continuous <Continuous>  sodium chloride 0.9% lock flush 20 milliLiter(s) IV Push once  sodium chloride 0.9%. 1000 milliLiter(s) (50 mL/Hr) IV Continuous <Continuous>      MEDICATIONS  (PRN):  acetaminophen   Tablet 650 milliGRAM(s) Oral every 6 hours PRN mild pain  dextrose Gel 1 Dose(s) Oral once PRN Blood Glucose LESS THAN 70 milliGRAM(s)/deciliter  glucagon  Injectable 1 milliGRAM(s) IntraMuscular once PRN Glucose LESS THAN 70 milligrams/deciliter  ondansetron Injectable 4 milliGRAM(s) IV Push every 8 hours PRN Nausea and/or Vomiting  sodium chloride 0.9% lock flush 10 milliLiter(s) IV Push every 1 hour PRN After each medication administration  sodium chloride 0.9% lock flush 10 milliLiter(s) IV Push every 12 hours PRN Lumen of catheter NOT used      Physical Exam    Neuro :  no focal deficits  Respiratory: CTA B/L  CV: RRR, S1S2, no murmurs,   Abdominal: Soft, ND, incision w/ staples in place, clean and dry,  diann x2 with serous drainage,  Extremities: No edema, + peripheral pulses    ASSESSMENT    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

## 2017-12-31 NOTE — PROGRESS NOTE ADULT - SUBJECTIVE AND OBJECTIVE BOX
[facility] NEPHROLOGY- METABOLIC SUPPORT SERVICE PROGRESS NOTE    62 yr old male with gastric adenocarcinoma s/p Total gastrectomy with Kelby-en-Y esophagojejunal anastomosis  12/13/2017 . Hosp cb partial small bowel obstruction distal to the anastomosis. Now on TPN.        Hospital Medications: Medications reviewed.  REVIEW OF SYSTEMS:  CONSTITUTIONAL: No fevers or chills  RESPIRATORY: No shortness of breath  CARDIOVASCULAR: No chest pain.  GASTROINTESTINAL: + nausea & vomiting, + flatus  No abdominal pain.  No BM today  VASCULAR: No bilateral lower extremity edema.     VITALS:  T(F): 98.2 (12-31-17 @ 13:47), Max: 99.4 (12-31-17 @ 06:00)  HR: 82 (12-31-17 @ 13:47)  BP: 131/68 (12-31-17 @ 13:47)  RR: 16 (12-31-17 @ 13:47)  SpO2: 100% (12-31-17 @ 13:47)  Wt(kg): --    12-30 @ 07:01  -  12-31 @ 07:00  --------------------------------------------------------  IN: 2155 mL / OUT: 458 mL / NET: 1697 mL      PHYSICAL EXAM:  Constitutional: NAD,   Respiratory: CTAB, no wheezes, rales or rhonchi  Cardiovascular: S1, S2, RRR  Gastrointestinal: BS+, soft, Non tender,  +SOSA drain x2, intact incision,   Extremities: No peripheral edema  Access: left arm PICC line benign    LABS:  12-31    139  |  105  |  15  ----------------------------<  164<H>  3.3<L>   |  22  |  0.82    Ca    8.5      31 Dec 2017 06:50  Phos  3.2     12-31  Mg     2.0     12-31    TPro  7.4  /  Alb  2.7<L>  /  TBili  0.5  /  DBili      /  AST  31  /  ALT  75<H>  /  AlkPhos  102  12-31    Creatinine Trend: 0.82 <--, 0.80 <--, 0.82 <--, 0.90 <--, 1.03 <--, 1.25 <--, 1.01 <--                        10.3   12.8  )-----------( 211      ( 31 Dec 2017 10:35 )             32.2     Urine Studies:

## 2017-12-31 NOTE — PROGRESS NOTE ADULT - SUBJECTIVE AND OBJECTIVE BOX
pt s- new complaintsd. vomited 2x yesterday. no vomiting this am. BM yesterday.   mild pain at incision site.  VSS a/f  Abd: soft, mild incisional tenderness. staples in place. no erythema. no discharge. no fluctuance.  diann with minimal serous drainage.  lungs: cta  EXT: no calf swelling or erythema                          10.8   11.2  )-----------( 234      ( 31 Dec 2017 06:50 )             32.7     12-31    139  |  105  |  15  ----------------------------<  164<H>  3.3<L>   |  22  |  0.82    Ca    8.5      31 Dec 2017 06:50  Phos  3.2     12-31  Mg     2.0     12-31    TPro  7.4  /  Alb  2.7<L>  /  TBili  0.5  /  DBili  x   /  AST  31  /  ALT  75<H>  /  AlkPhos  102  12-31 pt without new complaintsd. vomited 2x yesterday. no vomiting this am. BM yesterday.   mild pain at incision site.    VSS a/f  Abd: soft, mild incisional tenderness. staples in place. no erythema. no discharge. no fluctuance.  diann with minimal serous drainage.  lungs: cta  EXT: no calf swelling or erythema                          10.8   11.2  )-----------( 234      ( 31 Dec 2017 06:50 )             32.7     12-31    139  |  105  |  15  ----------------------------<  164<H>  3.3<L>   |  22  |  0.82    Ca    8.5      31 Dec 2017 06:50  Phos  3.2     12-31  Mg     2.0     12-31    TPro  7.4  /  Alb  2.7<L>  /  TBili  0.5  /  DBili  x   /  AST  31  /  ALT  75<H>  /  AlkPhos  102  12-31

## 2017-12-31 NOTE — PROGRESS NOTE ADULT - ASSESSMENT
s/p gastrectemy pod#18  ileus vx psbo  on ppn      f/u am AXR  oob  observation s/p gastrectemy pod#18  ileus vx psbo  on tpn      f/u am AXR  oob  observation

## 2018-01-01 LAB
ALBUMIN SERPL ELPH-MCNC: 2.6 G/DL — LOW (ref 3.5–5)
ALP SERPL-CCNC: 100 U/L — SIGNIFICANT CHANGE UP (ref 40–120)
ALT FLD-CCNC: 82 U/L DA — HIGH (ref 10–60)
ANION GAP SERPL CALC-SCNC: 12 MMOL/L — SIGNIFICANT CHANGE UP (ref 5–17)
AST SERPL-CCNC: 41 U/L — HIGH (ref 10–40)
BILIRUB SERPL-MCNC: 0.4 MG/DL — SIGNIFICANT CHANGE UP (ref 0.2–1.2)
BUN SERPL-MCNC: 14 MG/DL — SIGNIFICANT CHANGE UP (ref 7–18)
CALCIUM SERPL-MCNC: 9.1 MG/DL — SIGNIFICANT CHANGE UP (ref 8.4–10.5)
CHLORIDE SERPL-SCNC: 108 MMOL/L — SIGNIFICANT CHANGE UP (ref 96–108)
CO2 SERPL-SCNC: 20 MMOL/L — LOW (ref 22–31)
CREAT SERPL-MCNC: 0.8 MG/DL — SIGNIFICANT CHANGE UP (ref 0.5–1.3)
GLUCOSE BLDC GLUCOMTR-MCNC: 129 MG/DL — HIGH (ref 70–99)
GLUCOSE BLDC GLUCOMTR-MCNC: 144 MG/DL — HIGH (ref 70–99)
GLUCOSE BLDC GLUCOMTR-MCNC: 148 MG/DL — HIGH (ref 70–99)
GLUCOSE BLDC GLUCOMTR-MCNC: 159 MG/DL — HIGH (ref 70–99)
GLUCOSE BLDC GLUCOMTR-MCNC: 174 MG/DL — HIGH (ref 70–99)
GLUCOSE SERPL-MCNC: 165 MG/DL — HIGH (ref 70–99)
HCT VFR BLD CALC: 30.7 % — LOW (ref 39–50)
HGB BLD-MCNC: 10 G/DL — LOW (ref 13–17)
MAGNESIUM SERPL-MCNC: 2 MG/DL — SIGNIFICANT CHANGE UP (ref 1.6–2.6)
MCHC RBC-ENTMCNC: 30.2 PG — SIGNIFICANT CHANGE UP (ref 27–34)
MCHC RBC-ENTMCNC: 32.7 GM/DL — SIGNIFICANT CHANGE UP (ref 32–36)
MCV RBC AUTO: 92.5 FL — SIGNIFICANT CHANGE UP (ref 80–100)
PHOSPHATE SERPL-MCNC: 3.1 MG/DL — SIGNIFICANT CHANGE UP (ref 2.5–4.5)
PLATELET # BLD AUTO: 214 K/UL — SIGNIFICANT CHANGE UP (ref 150–400)
POTASSIUM SERPL-MCNC: 3.4 MMOL/L — LOW (ref 3.5–5.3)
POTASSIUM SERPL-SCNC: 3.4 MMOL/L — LOW (ref 3.5–5.3)
PROT SERPL-MCNC: 7.2 G/DL — SIGNIFICANT CHANGE UP (ref 6–8.3)
RBC # BLD: 3.32 M/UL — LOW (ref 4.2–5.8)
RBC # FLD: 12.6 % — SIGNIFICANT CHANGE UP (ref 10.3–14.5)
SODIUM SERPL-SCNC: 140 MMOL/L — SIGNIFICANT CHANGE UP (ref 135–145)
TRIGL SERPL-MCNC: 111 MG/DL — SIGNIFICANT CHANGE UP (ref 10–149)
WBC # BLD: 12.1 K/UL — HIGH (ref 3.8–10.5)
WBC # FLD AUTO: 12.1 K/UL — HIGH (ref 3.8–10.5)

## 2018-01-01 RX ORDER — I.V. FAT EMULSION 20 G/100ML
0.3 EMULSION INTRAVENOUS
Qty: 24 | Refills: 0 | Status: DISCONTINUED | OUTPATIENT
Start: 2018-01-01 | End: 2018-01-02

## 2018-01-01 RX ORDER — ELECTROLYTE SOLUTION,INJ
1 VIAL (ML) INTRAVENOUS
Qty: 0 | Refills: 0 | Status: DISCONTINUED | OUTPATIENT
Start: 2018-01-01 | End: 2018-01-01

## 2018-01-01 RX ADMIN — PANTOPRAZOLE SODIUM 40 MILLIGRAM(S): 20 TABLET, DELAYED RELEASE ORAL at 12:05

## 2018-01-01 RX ADMIN — HEPARIN SODIUM 5000 UNIT(S): 5000 INJECTION INTRAVENOUS; SUBCUTANEOUS at 21:34

## 2018-01-01 RX ADMIN — SODIUM CHLORIDE 50 MILLILITER(S): 9 INJECTION INTRAMUSCULAR; INTRAVENOUS; SUBCUTANEOUS at 18:25

## 2018-01-01 RX ADMIN — Medication 10 MILLIGRAM(S): at 06:21

## 2018-01-01 RX ADMIN — HEPARIN SODIUM 5000 UNIT(S): 5000 INJECTION INTRAVENOUS; SUBCUTANEOUS at 06:21

## 2018-01-01 RX ADMIN — Medication 2: at 06:21

## 2018-01-01 RX ADMIN — Medication 10 MILLIGRAM(S): at 12:05

## 2018-01-01 RX ADMIN — Medication 10 MILLIGRAM(S): at 18:21

## 2018-01-01 RX ADMIN — ONDANSETRON 4 MILLIGRAM(S): 8 TABLET, FILM COATED ORAL at 21:34

## 2018-01-01 RX ADMIN — HEPARIN SODIUM 5000 UNIT(S): 5000 INJECTION INTRAVENOUS; SUBCUTANEOUS at 15:01

## 2018-01-01 RX ADMIN — Medication 2: at 12:04

## 2018-01-01 NOTE — PROGRESS NOTE ADULT - SUBJECTIVE AND OBJECTIVE BOX
GENERAL SURGERY - PROGRESS NOTE    INTERVAL HPI/OVERNIGHT EVENTS: Continues to have vomiting. Remains on TPN. No changes in clinical status overnight. Hypokalemia.  ====================================  MEDICATIONS  (STANDING):  dextrose 5%. 1000 milliLiter(s) (50 mL/Hr) IV Continuous <Continuous>  dextrose 50% Injectable 12.5 Gram(s) IV Push once  dextrose 50% Injectable 25 Gram(s) IV Push once  dextrose 50% Injectable 25 Gram(s) IV Push once  fat emulsion (Fish Oil and Plant Based) 20% Infusion 0.3 Gm/kG/Day (5 mL/Hr) IV Continuous <Continuous>  heparin  Injectable 5000 Unit(s) SubCutaneous every 8 hours  insulin lispro (HumaLOG) corrective regimen sliding scale   SubCutaneous every 6 hours  metoclopramide Injectable 10 milliGRAM(s) IV Push every 6 hours  pantoprazole  Injectable 40 milliGRAM(s) IV Push daily  Parenteral Nutrition - Adult 1 Each (83 mL/Hr) TPN Continuous <Continuous>  sodium chloride 0.9% lock flush 20 milliLiter(s) IV Push once  sodium chloride 0.9%. 1000 milliLiter(s) (50 mL/Hr) IV Continuous <Continuous>    MEDICATIONS  (PRN):  acetaminophen   Tablet 650 milliGRAM(s) Oral every 6 hours PRN mild pain  dextrose Gel 1 Dose(s) Oral once PRN Blood Glucose LESS THAN 70 milliGRAM(s)/deciliter  glucagon  Injectable 1 milliGRAM(s) IntraMuscular once PRN Glucose LESS THAN 70 milligrams/deciliter  ondansetron Injectable 4 milliGRAM(s) IV Push every 8 hours PRN Nausea and/or Vomiting  sodium chloride 0.9% lock flush 10 milliLiter(s) IV Push every 1 hour PRN After each medication administration  sodium chloride 0.9% lock flush 10 milliLiter(s) IV Push every 12 hours PRN Lumen of catheter NOT used    ====================================  Vital Signs Last 24 Hrs  T(C): 37 (01 Jan 2018 05:30), Max: 37 (01 Jan 2018 05:30)  T(F): 98.6 (01 Jan 2018 05:30), Max: 98.6 (01 Jan 2018 05:30)  HR: 97 (01 Jan 2018 05:30) (62 - 97)  BP: 122/65 (01 Jan 2018 05:30) (118/67 - 131/68)  BP(mean): --  RR: 17 (01 Jan 2018 05:30) (16 - 17)  SpO2: 97% (01 Jan 2018 05:30) (97% - 100%)  ====================================  PHYSICAL EXAM:  Gen: alert, comfortable, no distress  HEENT: no scleral icterus  Abd: soft, nondistended nontender , JPs are serous. Incision without erythema or drainage.  Ext: no edema noted  Skin: no obvious skin rash noted  ====================================  I&O's Detail    31 Dec 2017 07:01  -  01 Jan 2018 07:00  --------------------------------------------------------  IN:    fat emulsion (Fish Oil and Plant Based) 20% Infusion: 55 mL    fat emulsion (Fish Oil and Plant Based) 20% Infusion: 50 mL    sodium chloride 0.9%.: 1200 mL    TPN (Total Parenteral Nutrition): 996 mL  Total IN: 2301 mL    OUT:    Bulb: 4 mL    Bulb: 7 mL    Other: 300 mL  Total OUT: 311 mL    Total NET: 1990 mL        ====================================  LABS:                        10.0   12.1  )-----------( 214      ( 01 Jan 2018 06:05 )             30.7     01-01    140  |  108  |  14  ----------------------------<  165<H>  3.4<L>   |  20<L>  |  0.80    Ca    9.1      01 Jan 2018 06:05  Phos  3.1     01-01  Mg     2.0     01-01    TPro  7.2  /  Alb  2.6<L>  /  TBili  0.4  /  DBili  x   /  AST  41<H>  /  ALT  82<H>  /  AlkPhos  100  01-01        ====================================  RADIOLOGY & ADDITIONAL STUDIES:

## 2018-01-01 NOTE — PROGRESS NOTE ADULT - SUBJECTIVE AND OBJECTIVE BOX
[facility] NEPHROLOGY- METABOLIC SUPPORT SERVICE PROGRESS NOTE    62 yr old male with gastric adenocarcinoma s/p Total gastrectomy with Kelby-en-Y esophagojejunal anastomosis  12/13/2017 . Hosp cb partial small bowel obstruction distal to the anastomosis. Now on TPN.        Hospital Medications: Medications reviewed.  REVIEW OF SYSTEMS:  CONSTITUTIONAL: No fevers or chills  RESPIRATORY: No shortness of breath  CARDIOVASCULAR: No chest pain.  GASTROINTESTINAL: + nausea & vomiting, + flatus  No abdominal pain.    VASCULAR: No bilateral lower extremity edema.     VITALS:  T(F): 98.6 (01-01-18 @ 05:30), Max: 98.6 (01-01-18 @ 05:30)  HR: 97 (01-01-18 @ 05:30)  BP: 122/65 (01-01-18 @ 05:30)  RR: 17 (01-01-18 @ 05:30)  SpO2: 97% (01-01-18 @ 05:30)  Wt(kg): --    12-31 @ 07:01  -  01-01 @ 07:00  --------------------------------------------------------  IN: 2301 mL / OUT: 311 mL / NET: 1990 mL      PHYSICAL EXAM:  Constitutional: NAD,   Respiratory: CTAB, no wheezes, rales or rhonchi  Cardiovascular: S1, S2, RRR  Gastrointestinal: BS+, soft, Non tender,  +SOSA drain x2, intact incision,   Extremities: No peripheral edema  Access: left arm PICC line benign    LABS:  01-01    140  |  108  |  14  ----------------------------<  165<H>  3.4<L>   |  20<L>  |  0.80    Ca    9.1      01 Jan 2018 06:05  Phos  3.1     01-01  Mg     2.0     01-01    TPro  7.2  /  Alb  2.6<L>  /  TBili  0.4  /  DBili      /  AST  41<H>  /  ALT  82<H>  /  AlkPhos  100  01-01    Creatinine Trend: 0.80 <--, 0.82 <--, 0.80 <--, 0.82 <--, 0.90 <--, 1.03 <--, 1.25 <--                        10.0   12.1  )-----------( 214      ( 01 Jan 2018 06:05 )             30.7     Urine Studies:

## 2018-01-01 NOTE — PROGRESS NOTE ADULT - PROBLEM SELECTOR PLAN 1
s/p total gastrectomy. Now with partial SBO. Initiated on TPN on 12/22.   Tolerated TPN well.  c/w TPN with lipids. Will adjust electrolytes accordingly (add potassium acetate).   Dextrose decreased recently due to elevated Alk phos.   c/w ISS, monitor FS. Pt requiring low dose of insulin  Check CMP/Mg/Phos. Daily weights and strict I/Os.

## 2018-01-01 NOTE — PROGRESS NOTE ADULT - ASSESSMENT
s/p total gastrectomy, partial bowel obstruction  Persistent vomiting    - maintain NPO and TPN  - prn and standing antiemetics

## 2018-01-01 NOTE — PROGRESS NOTE ADULT - SUBJECTIVE AND OBJECTIVE BOX
Patient is a 62y old  Male who presents with a chief complaint of I have gastric cancer (12 Dec 2017 11:41)    pt seen in icu [ ], reg med floor [ x  ], bed [x ], chair at bedside [ ], a+o x3 [ x ], lethargic [  ],   nad [ x ], on tpn via left upper arm picc line [x]        Allergies    No Known Allergies        Vitals    T(F): 98.6 (01-01-18 @ 05:30), Max: 98.6 (01-01-18 @ 05:30)  HR: 97 (01-01-18 @ 05:30) (62 - 97)  BP: 122/65 (01-01-18 @ 05:30) (118/67 - 131/68)  RR: 17 (01-01-18 @ 05:30) (16 - 17)  SpO2: 97% (01-01-18 @ 05:30) (97% - 100%)  Wt(kg): --  CAPILLARY BLOOD GLUCOSE      POCT Blood Glucose.: 159 mg/dL (01 Jan 2018 05:48)      Labs                          10.0   12.1  )-----------( 214      ( 01 Jan 2018 06:05 )             30.7       12-31    139  |  105  |  15  ----------------------------<  164<H>  3.3<L>   |  22  |  0.82    Ca    8.5      31 Dec 2017 06:50  Phos  3.2     12-31  Mg     2.0     12-31    TPro  7.4  /  Alb  2.7<L>  /  TBili  0.5  /  DBili  x   /  AST  31  /  ALT  75<H>  /  AlkPhos  102  12-31            .Urine Clean Catch (Midstream)  12-21 @ 11:20   10,000 - 49,000 CFU/mL Escherichia coli  <10,000 CFU/ml Normal Urogenital keila present  --  Escherichia coli      .Blood  12-21 @ 11:19   No growth at 5 days.  --  --      .Blood Blood  12-21 @ 11:17   No growth at 5 days.  --  --          Radiology Results      Meds    MEDICATIONS  (STANDING):  dextrose 5%. 1000 milliLiter(s) (50 mL/Hr) IV Continuous <Continuous>  dextrose 50% Injectable 12.5 Gram(s) IV Push once  dextrose 50% Injectable 25 Gram(s) IV Push once  dextrose 50% Injectable 25 Gram(s) IV Push once  fat emulsion (Fish Oil and Plant Based) 20% Infusion 0.3 Gm/kG/Day (5 mL/Hr) IV Continuous <Continuous>  heparin  Injectable 5000 Unit(s) SubCutaneous every 8 hours  insulin lispro (HumaLOG) corrective regimen sliding scale   SubCutaneous every 6 hours  metoclopramide Injectable 10 milliGRAM(s) IV Push every 6 hours  pantoprazole  Injectable 40 milliGRAM(s) IV Push daily  Parenteral Nutrition - Adult 1 Each (83 mL/Hr) TPN Continuous <Continuous>  sodium chloride 0.9% lock flush 20 milliLiter(s) IV Push once  sodium chloride 0.9%. 1000 milliLiter(s) (50 mL/Hr) IV Continuous <Continuous>      MEDICATIONS  (PRN):  acetaminophen   Tablet 650 milliGRAM(s) Oral every 6 hours PRN mild pain  dextrose Gel 1 Dose(s) Oral once PRN Blood Glucose LESS THAN 70 milliGRAM(s)/deciliter  glucagon  Injectable 1 milliGRAM(s) IntraMuscular once PRN Glucose LESS THAN 70 milligrams/deciliter  ondansetron Injectable 4 milliGRAM(s) IV Push every 8 hours PRN Nausea and/or Vomiting  sodium chloride 0.9% lock flush 10 milliLiter(s) IV Push every 1 hour PRN After each medication administration  sodium chloride 0.9% lock flush 10 milliLiter(s) IV Push every 12 hours PRN Lumen of catheter NOT used      Physical Exam    Neuro :  no focal deficits  Respiratory: CTA B/L  CV: RRR, S1S2, no murmurs,   Abdominal: Soft, ND, incision w/ staples in place, clean and dry,  diann x2 with serous drainage,  Extremities: No edema, + peripheral pulses    ASSESSMENT    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 Patient is a 62y old  Male who presents with a chief complaint of I have gastric cancer (12 Dec 2017 11:41)    pt seen in icu [ ], reg med floor [ x  ], bed [ ], chair at bedside [x ], a+o x3 [ x ], lethargic [  ],   nad [ x ], on tpn via left upper arm picc line [x]        Allergies    No Known Allergies        Vitals    T(F): 98.6 (01-01-18 @ 05:30), Max: 98.6 (01-01-18 @ 05:30)  HR: 97 (01-01-18 @ 05:30) (62 - 97)  BP: 122/65 (01-01-18 @ 05:30) (118/67 - 131/68)  RR: 17 (01-01-18 @ 05:30) (16 - 17)  SpO2: 97% (01-01-18 @ 05:30) (97% - 100%)  Wt(kg): --  CAPILLARY BLOOD GLUCOSE      POCT Blood Glucose.: 159 mg/dL (01 Jan 2018 05:48)      Labs                          10.0   12.1  )-----------( 214      ( 01 Jan 2018 06:05 )             30.7       12-31    139  |  105  |  15  ----------------------------<  164<H>  3.3<L>   |  22  |  0.82    Ca    8.5      31 Dec 2017 06:50  Phos  3.2     12-31  Mg     2.0     12-31    TPro  7.4  /  Alb  2.7<L>  /  TBili  0.5  /  DBili  x   /  AST  31  /  ALT  75<H>  /  AlkPhos  102  12-31            .Urine Clean Catch (Midstream)  12-21 @ 11:20   10,000 - 49,000 CFU/mL Escherichia coli  <10,000 CFU/ml Normal Urogenital keila present  --  Escherichia coli      .Blood  12-21 @ 11:19   No growth at 5 days.  --  --      .Blood Blood  12-21 @ 11:17   No growth at 5 days.  --  --          Radiology Results      Meds    MEDICATIONS  (STANDING):  dextrose 5%. 1000 milliLiter(s) (50 mL/Hr) IV Continuous <Continuous>  dextrose 50% Injectable 12.5 Gram(s) IV Push once  dextrose 50% Injectable 25 Gram(s) IV Push once  dextrose 50% Injectable 25 Gram(s) IV Push once  fat emulsion (Fish Oil and Plant Based) 20% Infusion 0.3 Gm/kG/Day (5 mL/Hr) IV Continuous <Continuous>  heparin  Injectable 5000 Unit(s) SubCutaneous every 8 hours  insulin lispro (HumaLOG) corrective regimen sliding scale   SubCutaneous every 6 hours  metoclopramide Injectable 10 milliGRAM(s) IV Push every 6 hours  pantoprazole  Injectable 40 milliGRAM(s) IV Push daily  Parenteral Nutrition - Adult 1 Each (83 mL/Hr) TPN Continuous <Continuous>  sodium chloride 0.9% lock flush 20 milliLiter(s) IV Push once  sodium chloride 0.9%. 1000 milliLiter(s) (50 mL/Hr) IV Continuous <Continuous>      MEDICATIONS  (PRN):  acetaminophen   Tablet 650 milliGRAM(s) Oral every 6 hours PRN mild pain  dextrose Gel 1 Dose(s) Oral once PRN Blood Glucose LESS THAN 70 milliGRAM(s)/deciliter  glucagon  Injectable 1 milliGRAM(s) IntraMuscular once PRN Glucose LESS THAN 70 milligrams/deciliter  ondansetron Injectable 4 milliGRAM(s) IV Push every 8 hours PRN Nausea and/or Vomiting  sodium chloride 0.9% lock flush 10 milliLiter(s) IV Push every 1 hour PRN After each medication administration  sodium chloride 0.9% lock flush 10 milliLiter(s) IV Push every 12 hours PRN Lumen of catheter NOT used      Physical Exam    Neuro :  no focal deficits  Respiratory: CTA B/L  CV: RRR, S1S2, no murmurs,   Abdominal: Soft, ND, incision w/ staples in place, clean and dry,  diann x2 with serous drainage,  Extremities: No edema, + peripheral pulses    ASSESSMENT    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

## 2018-01-02 LAB
ALBUMIN SERPL ELPH-MCNC: 2.5 G/DL — LOW (ref 3.5–5)
ALP SERPL-CCNC: 95 U/L — SIGNIFICANT CHANGE UP (ref 40–120)
ALT FLD-CCNC: 80 U/L DA — HIGH (ref 10–60)
ANION GAP SERPL CALC-SCNC: 9 MMOL/L — SIGNIFICANT CHANGE UP (ref 5–17)
AST SERPL-CCNC: 38 U/L — SIGNIFICANT CHANGE UP (ref 10–40)
BILIRUB SERPL-MCNC: 0.4 MG/DL — SIGNIFICANT CHANGE UP (ref 0.2–1.2)
BUN SERPL-MCNC: 14 MG/DL — SIGNIFICANT CHANGE UP (ref 7–18)
CA-I BLD-SCNC: 1.24 MMOL/L — SIGNIFICANT CHANGE UP (ref 1.12–1.3)
CA-I BLD-SCNC: 1.27 MMOL/L — SIGNIFICANT CHANGE UP (ref 1.12–1.3)
CALCIUM SERPL-MCNC: 8.6 MG/DL — SIGNIFICANT CHANGE UP (ref 8.4–10.5)
CHLORIDE SERPL-SCNC: 107 MMOL/L — SIGNIFICANT CHANGE UP (ref 96–108)
CO2 SERPL-SCNC: 25 MMOL/L — SIGNIFICANT CHANGE UP (ref 22–31)
CREAT SERPL-MCNC: 0.72 MG/DL — SIGNIFICANT CHANGE UP (ref 0.5–1.3)
GLUCOSE BLDC GLUCOMTR-MCNC: 122 MG/DL — HIGH (ref 70–99)
GLUCOSE BLDC GLUCOMTR-MCNC: 152 MG/DL — HIGH (ref 70–99)
GLUCOSE BLDC GLUCOMTR-MCNC: 173 MG/DL — HIGH (ref 70–99)
GLUCOSE SERPL-MCNC: 174 MG/DL — HIGH (ref 70–99)
HCT VFR BLD CALC: 31.1 % — LOW (ref 39–50)
HGB BLD-MCNC: 10.2 G/DL — LOW (ref 13–17)
MAGNESIUM SERPL-MCNC: 1.8 MG/DL — SIGNIFICANT CHANGE UP (ref 1.6–2.6)
MCHC RBC-ENTMCNC: 30.6 PG — SIGNIFICANT CHANGE UP (ref 27–34)
MCHC RBC-ENTMCNC: 32.7 GM/DL — SIGNIFICANT CHANGE UP (ref 32–36)
MCV RBC AUTO: 93.4 FL — SIGNIFICANT CHANGE UP (ref 80–100)
PHOSPHATE SERPL-MCNC: 3 MG/DL — SIGNIFICANT CHANGE UP (ref 2.5–4.5)
PLATELET # BLD AUTO: 204 K/UL — SIGNIFICANT CHANGE UP (ref 150–400)
POTASSIUM SERPL-MCNC: 3.5 MMOL/L — SIGNIFICANT CHANGE UP (ref 3.5–5.3)
POTASSIUM SERPL-SCNC: 3.5 MMOL/L — SIGNIFICANT CHANGE UP (ref 3.5–5.3)
PROT SERPL-MCNC: 7.2 G/DL — SIGNIFICANT CHANGE UP (ref 6–8.3)
RBC # BLD: 3.32 M/UL — LOW (ref 4.2–5.8)
RBC # FLD: 12.2 % — SIGNIFICANT CHANGE UP (ref 10.3–14.5)
SODIUM SERPL-SCNC: 141 MMOL/L — SIGNIFICANT CHANGE UP (ref 135–145)
WBC # BLD: 9.8 K/UL — SIGNIFICANT CHANGE UP (ref 3.8–10.5)
WBC # FLD AUTO: 9.8 K/UL — SIGNIFICANT CHANGE UP (ref 3.8–10.5)

## 2018-01-02 RX ORDER — ELECTROLYTE SOLUTION,INJ
1 VIAL (ML) INTRAVENOUS
Qty: 0 | Refills: 0 | Status: DISCONTINUED | OUTPATIENT
Start: 2018-01-02 | End: 2018-01-02

## 2018-01-02 RX ORDER — I.V. FAT EMULSION 20 G/100ML
0.3 EMULSION INTRAVENOUS
Qty: 24 | Refills: 0 | Status: DISCONTINUED | OUTPATIENT
Start: 2018-01-02 | End: 2018-01-02

## 2018-01-02 RX ADMIN — Medication 2: at 18:27

## 2018-01-02 RX ADMIN — I.V. FAT EMULSION 5 GM/KG/DAY: 20 EMULSION INTRAVENOUS at 00:02

## 2018-01-02 RX ADMIN — HEPARIN SODIUM 5000 UNIT(S): 5000 INJECTION INTRAVENOUS; SUBCUTANEOUS at 22:24

## 2018-01-02 RX ADMIN — ONDANSETRON 4 MILLIGRAM(S): 8 TABLET, FILM COATED ORAL at 06:09

## 2018-01-02 RX ADMIN — Medication 2: at 06:09

## 2018-01-02 RX ADMIN — ONDANSETRON 4 MILLIGRAM(S): 8 TABLET, FILM COATED ORAL at 19:15

## 2018-01-02 RX ADMIN — HEPARIN SODIUM 5000 UNIT(S): 5000 INJECTION INTRAVENOUS; SUBCUTANEOUS at 06:09

## 2018-01-02 RX ADMIN — PANTOPRAZOLE SODIUM 40 MILLIGRAM(S): 20 TABLET, DELAYED RELEASE ORAL at 11:23

## 2018-01-02 RX ADMIN — I.V. FAT EMULSION 5 GM/KG/DAY: 20 EMULSION INTRAVENOUS at 22:24

## 2018-01-02 RX ADMIN — HEPARIN SODIUM 5000 UNIT(S): 5000 INJECTION INTRAVENOUS; SUBCUTANEOUS at 14:57

## 2018-01-02 RX ADMIN — Medication 10 MILLIGRAM(S): at 00:02

## 2018-01-02 RX ADMIN — Medication 83 EACH: at 22:32

## 2018-01-02 RX ADMIN — Medication 10 MILLIGRAM(S): at 06:10

## 2018-01-02 NOTE — PROGRESS NOTE ADULT - PROBLEM SELECTOR PLAN 3
Pre-renal MEKHI resolved with IVF.  Consider d/c  IVF.  If needed, will consider increasing TPN volume if pt will be on long-term TPN.  Otherwise, continue at current volume.    Renal US with no hydro.  Strict I/Os. Avoid nephrotoxins/ NSAIDs/ RCA. Monitor BMP.

## 2018-01-02 NOTE — PROGRESS NOTE ADULT - SUBJECTIVE AND OBJECTIVE BOX
s/p Total Gastrectomy 12/13, POD #20      Patient examined at bedside, reports nausea and vomiting  Is passing flatus, denies BM x 3 days  Still NPO  On TPN via LUE PICC        T(F): 98.8 (01-02-18 @ 06:15), Max: 99.5 (01-01-18 @ 21:58)  HR: 87 (01-02-18 @ 06:15) (85 - 91)  BP: 128/62 (01-02-18 @ 06:15) (108/67 - 128/62)  RR: 18 (01-02-18 @ 06:15) (16 - 18)  SpO2: 98% (01-02-18 @ 06:15) (98% - 98%)          Bulb: 4 mL    Bulb: 5 mL    Other: 400 mL        Physical Exam  General: AAOx3, No acute distress  Skin: No jaundice, no icterus  Abdomen: soft, nontender, nondistended, no rebound tenderness, no guarding, no palpable masses, JPx2 in place draining serous fluid, staples in place  : Normal external genitalia  Extremities: non edematous, no calf pain bilaterally

## 2018-01-02 NOTE — PROGRESS NOTE ADULT - PROBLEM SELECTOR PLAN 1
s/p total gastrectomy. Now with partial SBO. Initiated on TPN on 12/22.   Tolerated TPN well.  c/w TPN with lipids. Will adjust electrolytes accordingly increase potassium chloride. )   Dextrose decreased recently due to elevated Alk phos.   c/w ISS, monitor FS. Pt requiring low dose of insulin  Check CMP/Mg/Phos. Daily weights and strict I/Os.

## 2018-01-02 NOTE — PROGRESS NOTE ADULT - SUBJECTIVE AND OBJECTIVE BOX
Patient is a 62y old  Male who presents with a chief complaint of I have gastric cancer (12 Dec 2017 11:41)    pt seen in icu [ ], reg med floor [ x  ], bed [ ], chair at bedside [x ], a+o x3 [ x ], lethargic [  ],   nad [ x ], on tpn via left upper arm picc line [x]      Allergies    No Known Allergies        Vitals    T(F): 98.8 (01-02-18 @ 06:15), Max: 99.5 (01-01-18 @ 21:58)  HR: 87 (01-02-18 @ 06:15) (85 - 91)  BP: 128/62 (01-02-18 @ 06:15) (108/67 - 128/62)  RR: 18 (01-02-18 @ 06:15) (16 - 18)  SpO2: 98% (01-02-18 @ 06:15) (98% - 98%)  Wt(kg): --  CAPILLARY BLOOD GLUCOSE      POCT Blood Glucose.: 173 mg/dL (02 Jan 2018 05:48)      Labs                          10.2   9.8   )-----------( 204      ( 02 Jan 2018 06:03 )             31.1       01-02    141  |  107  |  14  ----------------------------<  174<H>  3.5   |  25  |  0.72    Ca    8.6      02 Jan 2018 06:03  Phos  3.0     01-02  Mg     1.8     01-02    TPro  7.2  /  Alb  2.5<L>  /  TBili  0.4  /  DBili  x   /  AST  38  /  ALT  80<H>  /  AlkPhos  95  01-02            .Urine Clean Catch (Midstream)  12-21 @ 11:20   10,000 - 49,000 CFU/mL Escherichia coli  <10,000 CFU/ml Normal Urogenital keila present  --  Escherichia coli      .Blood  12-21 @ 11:19   No growth at 5 days.  --  --      .Blood Blood  12-21 @ 11:17   No growth at 5 days.  --  --          Radiology Results      Meds    MEDICATIONS  (STANDING):  dextrose 5%. 1000 milliLiter(s) (50 mL/Hr) IV Continuous <Continuous>  dextrose 50% Injectable 12.5 Gram(s) IV Push once  dextrose 50% Injectable 25 Gram(s) IV Push once  dextrose 50% Injectable 25 Gram(s) IV Push once  fat emulsion (Fish Oil and Plant Based) 20% Infusion 0.3 Gm/kG/Day (5 mL/Hr) IV Continuous <Continuous>  heparin  Injectable 5000 Unit(s) SubCutaneous every 8 hours  insulin lispro (HumaLOG) corrective regimen sliding scale   SubCutaneous every 6 hours  metoclopramide Injectable 10 milliGRAM(s) IV Push every 6 hours  pantoprazole  Injectable 40 milliGRAM(s) IV Push daily  Parenteral Nutrition - Adult 1 Each (83 mL/Hr) TPN Continuous <Continuous>  sodium chloride 0.9% lock flush 20 milliLiter(s) IV Push once  sodium chloride 0.9%. 1000 milliLiter(s) (50 mL/Hr) IV Continuous <Continuous>      MEDICATIONS  (PRN):  acetaminophen   Tablet 650 milliGRAM(s) Oral every 6 hours PRN mild pain  dextrose Gel 1 Dose(s) Oral once PRN Blood Glucose LESS THAN 70 milliGRAM(s)/deciliter  glucagon  Injectable 1 milliGRAM(s) IntraMuscular once PRN Glucose LESS THAN 70 milligrams/deciliter  ondansetron Injectable 4 milliGRAM(s) IV Push every 8 hours PRN Nausea and/or Vomiting  sodium chloride 0.9% lock flush 10 milliLiter(s) IV Push every 1 hour PRN After each medication administration  sodium chloride 0.9% lock flush 10 milliLiter(s) IV Push every 12 hours PRN Lumen of catheter NOT used      Physical Exam    Neuro :  no focal deficits  Respiratory: CTA B/L  CV: RRR, S1S2, no murmurs,   Abdominal: Soft, ND, incision w/ staples in place, clean and dry,  diann x2 with serous drainage,  Extremities: No edema, + peripheral pulses    ASSESSMENT    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

## 2018-01-02 NOTE — PROGRESS NOTE ADULT - ASSESSMENT
63 y/o male s/p Total Gastrectomy for gastric cancer, on TPN with persistent nausea & vomiting      1. Continue TPN  2. Dr. Ramirez to evaluate patient today  3. DVT PPx

## 2018-01-02 NOTE — PROGRESS NOTE ADULT - PROBLEM SELECTOR PROBLEM 2
"Chief Complaint   Patient presents with     Fracture Followup     f/u left scapula fracture > 2.5 weeks       Initial /85  Ht 5' 7\" (1.702 m)  Wt 170 lb (77.1 kg)  BMI 26.63 kg/m2 Estimated body mass index is 26.63 kg/(m^2) as calculated from the following:    Height as of this encounter: 5' 7\" (1.702 m).    Weight as of this encounter: 170 lb (77.1 kg).  Medication Reconciliation: complete     Aleks Ventura ATC  " Hypoalbuminemia due to protein-calorie malnutrition

## 2018-01-02 NOTE — PROGRESS NOTE ADULT - SUBJECTIVE AND OBJECTIVE BOX
[facility] NEPHROLOGY- METABOLIC SUPPORT SERVICE PROGRESS NOTE    62 yr old male with gastric adenocarcinoma s/p Total gastrectomy with Kelby-en-Y esophagojejunal anastomosis  12/13/2017 . Hosp cb partial small bowel obstruction distal to the anastomosis. Now on TPN.        Hospital Medications: Medications reviewed.  REVIEW OF SYSTEMS:  CONSTITUTIONAL: No fevers or chills  RESPIRATORY: No shortness of breath  CARDIOVASCULAR: No chest pain.  GASTROINTESTINAL: + nausea & vomiting O/N nothing today.  + flatus  No abdominal pain.  No BM  VASCULAR: No bilateral lower extremity edema.     VITALS:  T(F): 98.8 (01-02-18 @ 06:15), Max: 99.5 (01-01-18 @ 21:58)  HR: 87 (01-02-18 @ 06:15)  BP: 128/62 (01-02-18 @ 06:15)  RR: 18 (01-02-18 @ 06:15)  SpO2: 98% (01-02-18 @ 06:15)  Wt(kg): --    01-01 @ 07:01  -  01-02 @ 07:00  --------------------------------------------------------  IN: 440 mL / OUT: 409 mL / NET: 31 mL      PHYSICAL EXAM:  Constitutional: NAD,   Respiratory: CTAB, no wheezes, rales or rhonchi  Cardiovascular: S1, S2, RRR  Gastrointestinal: BS+, soft, Non tender,  +SOSA drain x2, intact incision,   Extremities: No peripheral edema  Access: left arm PICC line benign    LABS:  01-02    141  |  107  |  14  ----------------------------<  174<H>  3.5   |  25  |  0.72    Ca    8.6      02 Jan 2018 06:03  Phos  3.0     01-02  Mg     1.8     01-02    TPro  7.2  /  Alb  2.5<L>  /  TBili  0.4  /  DBili      /  AST  38  /  ALT  80<H>  /  AlkPhos  95  01-02    Creatinine Trend: 0.72 <--, 0.80 <--, 0.82 <--, 0.80 <--, 0.82 <--, 0.90 <--, 1.03 <--                        10.2   9.8   )-----------( 204      ( 02 Jan 2018 06:03 )             31.1     Urine Studies:

## 2018-01-03 ENCOUNTER — RESULT REVIEW (OUTPATIENT)
Age: 63
End: 2018-01-03

## 2018-01-03 LAB
ALBUMIN SERPL ELPH-MCNC: 2.6 G/DL — LOW (ref 3.5–5)
ALP SERPL-CCNC: 98 U/L — SIGNIFICANT CHANGE UP (ref 40–120)
ALT FLD-CCNC: 93 U/L DA — HIGH (ref 10–60)
ANION GAP SERPL CALC-SCNC: 10 MMOL/L — SIGNIFICANT CHANGE UP (ref 5–17)
AST SERPL-CCNC: 43 U/L — HIGH (ref 10–40)
BILIRUB SERPL-MCNC: 0.5 MG/DL — SIGNIFICANT CHANGE UP (ref 0.2–1.2)
BUN SERPL-MCNC: 14 MG/DL — SIGNIFICANT CHANGE UP (ref 7–18)
CA-I BLD-SCNC: 1.17 MMOL/L — SIGNIFICANT CHANGE UP (ref 1.12–1.3)
CALCIUM SERPL-MCNC: 8.7 MG/DL — SIGNIFICANT CHANGE UP (ref 8.4–10.5)
CHLORIDE SERPL-SCNC: 106 MMOL/L — SIGNIFICANT CHANGE UP (ref 96–108)
CO2 SERPL-SCNC: 22 MMOL/L — SIGNIFICANT CHANGE UP (ref 22–31)
CREAT SERPL-MCNC: 0.72 MG/DL — SIGNIFICANT CHANGE UP (ref 0.5–1.3)
GLUCOSE BLDC GLUCOMTR-MCNC: 162 MG/DL — HIGH (ref 70–99)
GLUCOSE BLDC GLUCOMTR-MCNC: 199 MG/DL — HIGH (ref 70–99)
GLUCOSE SERPL-MCNC: 143 MG/DL — HIGH (ref 70–99)
MAGNESIUM SERPL-MCNC: 1.7 MG/DL — SIGNIFICANT CHANGE UP (ref 1.6–2.6)
PHOSPHATE SERPL-MCNC: 3.3 MG/DL — SIGNIFICANT CHANGE UP (ref 2.5–4.5)
POTASSIUM SERPL-MCNC: 3.6 MMOL/L — SIGNIFICANT CHANGE UP (ref 3.5–5.3)
POTASSIUM SERPL-SCNC: 3.6 MMOL/L — SIGNIFICANT CHANGE UP (ref 3.5–5.3)
PROT SERPL-MCNC: 7.5 G/DL — SIGNIFICANT CHANGE UP (ref 6–8.3)
SODIUM SERPL-SCNC: 138 MMOL/L — SIGNIFICANT CHANGE UP (ref 135–145)

## 2018-01-03 PROCEDURE — 88305 TISSUE EXAM BY PATHOLOGIST: CPT | Mod: 26

## 2018-01-03 PROCEDURE — 44005 FREEING OF BOWEL ADHESION: CPT | Mod: 58

## 2018-01-03 PROCEDURE — 88300 SURGICAL PATH GROSS: CPT | Mod: 26,59

## 2018-01-03 PROCEDURE — 44005 FREEING OF BOWEL ADHESION: CPT | Mod: AS

## 2018-01-03 PROCEDURE — 88331 PATH CONSLTJ SURG 1 BLK 1SPC: CPT | Mod: 26

## 2018-01-03 RX ORDER — ACETAMINOPHEN 500 MG
1000 TABLET ORAL ONCE
Qty: 0 | Refills: 0 | Status: DISCONTINUED | OUTPATIENT
Start: 2018-01-03 | End: 2018-01-03

## 2018-01-03 RX ORDER — NALOXONE HYDROCHLORIDE 4 MG/.1ML
0.1 SPRAY NASAL
Qty: 0 | Refills: 0 | Status: DISCONTINUED | OUTPATIENT
Start: 2018-01-03 | End: 2018-01-06

## 2018-01-03 RX ORDER — I.V. FAT EMULSION 20 G/100ML
0.3 EMULSION INTRAVENOUS
Qty: 24 | Refills: 0 | Status: DISCONTINUED | OUTPATIENT
Start: 2018-01-03 | End: 2018-01-03

## 2018-01-03 RX ORDER — ENOXAPARIN SODIUM 100 MG/ML
40 INJECTION SUBCUTANEOUS EVERY 24 HOURS
Qty: 0 | Refills: 0 | Status: DISCONTINUED | OUTPATIENT
Start: 2018-01-03 | End: 2018-01-12

## 2018-01-03 RX ORDER — SODIUM CHLORIDE 9 MG/ML
1000 INJECTION INTRAMUSCULAR; INTRAVENOUS; SUBCUTANEOUS
Qty: 0 | Refills: 0 | Status: DISCONTINUED | OUTPATIENT
Start: 2018-01-03 | End: 2018-01-05

## 2018-01-03 RX ORDER — ONDANSETRON 8 MG/1
4 TABLET, FILM COATED ORAL EVERY 6 HOURS
Qty: 0 | Refills: 0 | Status: DISCONTINUED | OUTPATIENT
Start: 2018-01-03 | End: 2018-01-06

## 2018-01-03 RX ORDER — ELECTROLYTE SOLUTION,INJ
1 VIAL (ML) INTRAVENOUS
Qty: 0 | Refills: 0 | Status: DISCONTINUED | OUTPATIENT
Start: 2018-01-03 | End: 2018-01-03

## 2018-01-03 RX ORDER — HYDROMORPHONE HYDROCHLORIDE 2 MG/ML
0.5 INJECTION INTRAMUSCULAR; INTRAVENOUS; SUBCUTANEOUS ONCE
Qty: 0 | Refills: 0 | Status: DISCONTINUED | OUTPATIENT
Start: 2018-01-03 | End: 2018-01-03

## 2018-01-03 RX ORDER — HYDROMORPHONE HYDROCHLORIDE 2 MG/ML
30 INJECTION INTRAMUSCULAR; INTRAVENOUS; SUBCUTANEOUS
Qty: 0 | Refills: 0 | Status: DISCONTINUED | OUTPATIENT
Start: 2018-01-03 | End: 2018-01-06

## 2018-01-03 RX ADMIN — HYDROMORPHONE HYDROCHLORIDE 30 MILLILITER(S): 2 INJECTION INTRAMUSCULAR; INTRAVENOUS; SUBCUTANEOUS at 19:40

## 2018-01-03 RX ADMIN — Medication 2: at 00:46

## 2018-01-03 RX ADMIN — Medication 4: at 23:59

## 2018-01-03 RX ADMIN — HYDROMORPHONE HYDROCHLORIDE 30 MILLILITER(S): 2 INJECTION INTRAMUSCULAR; INTRAVENOUS; SUBCUTANEOUS at 14:50

## 2018-01-03 RX ADMIN — Medication: at 18:19

## 2018-01-03 RX ADMIN — Medication 83 EACH: at 22:01

## 2018-01-03 RX ADMIN — I.V. FAT EMULSION 5 GM/KG/DAY: 20 EMULSION INTRAVENOUS at 22:01

## 2018-01-03 RX ADMIN — HEPARIN SODIUM 5000 UNIT(S): 5000 INJECTION INTRAVENOUS; SUBCUTANEOUS at 05:27

## 2018-01-03 RX ADMIN — HYDROMORPHONE HYDROCHLORIDE 30 MILLILITER(S): 2 INJECTION INTRAMUSCULAR; INTRAVENOUS; SUBCUTANEOUS at 16:07

## 2018-01-03 NOTE — BRIEF OPERATIVE NOTE - POST-OP DX
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

## 2018-01-03 NOTE — PROGRESS NOTE ADULT - ASSESSMENT
Condition discussed with patient, options risks and benefits explained  For exploratory laparotomy, possible bowel resection today

## 2018-01-03 NOTE — PROGRESS NOTE ADULT - PROBLEM SELECTOR PLAN 3
Pre-renal MEKHI resolved. s/p IVF.   Renal US with no hydro.  Strict I/Os. Avoid nephrotoxins/ NSAIDs/ RCA. Monitor BMP.

## 2018-01-03 NOTE — BRIEF OPERATIVE NOTE - OPERATION/FINDINGS
dense adhesions around diann site.
Lesion of the lesser curvature, proximal margin was sent for frozen section and pathology saw atypical cells

## 2018-01-03 NOTE — PROGRESS NOTE ADULT - ASSESSMENT
62 yr old male with gastric adenocarcinoma s/p Total gastrectomy with Kelby-en-Y esophagojejunal anastomosis  12/13/2017 . Hosp cb partial small bowel obstruction distal to the anastomosis. Now on TPN.     Pt stable on TPN.       TPN:   Total Calories: decrease  to 1580  Fat 24g    g  Dextrose decrease to 300 g

## 2018-01-03 NOTE — PROGRESS NOTE ADULT - SUBJECTIVE AND OBJECTIVE BOX
POSTOP NOTE  Patient is doing okay.        Vital Signs Last 24 Hrs  T(C): 37 (2018 21:00), Max: 37.3 (2018 22:28)  T(F): 98.6 (2018 21:00), Max: 99.2 (2018 06:00)  HR: 91 (2018 21:00) (81 - 99)  BP: 115/64 (2018 21:00) (103/66 - 127/80)  BP(mean): 87 (2018 15:50) (79 - 852)  RR: 16 (2018 21:00) (15 - 26)  SpO2: 98% (2018 21:00) (96% - 100%)    Daily     Daily Weight in k.2 (2018 06:30)    I&O's Detail    2018 07:01  -  2018 07:00  --------------------------------------------------------  IN:    fat emulsion (Fish Oil and Plant Based) 20% Infusion: 40 mL    TPN (Total Parenteral Nutrition): 747 mL  Total IN: 787 mL    OUT:    Bulb: 1 mL    Bulb: 1 mL    Other: 850 mL  Total OUT: 852 mL    Total NET: -65 mL      2018 07:01  -  2018 21:28  --------------------------------------------------------  IN:    Lactated Ringers IV Bolus: 2500 mL  Total IN: 2500 mL    OUT:    Indwelling Catheter - Urethral: 200 mL    Nasoenteral Tube: 10 mL  Total OUT: 210 mL    Total NET: 2290 mL          MEDICATIONS  (STANDING):  dextrose 5%. 1000 milliLiter(s) (50 mL/Hr) IV Continuous <Continuous>  dextrose 50% Injectable 12.5 Gram(s) IV Push once  dextrose 50% Injectable 25 Gram(s) IV Push once  dextrose 50% Injectable 25 Gram(s) IV Push once  enoxaparin Injectable 40 milliGRAM(s) SubCutaneous every 24 hours  fat emulsion (Fish Oil and Plant Based) 20% Infusion 0.3 Gm/kG/Day (5 mL/Hr) IV Continuous <Continuous>  fat emulsion (Fish Oil and Plant Based) 20% Infusion 0.3 Gm/kG/Day (5 mL/Hr) IV Continuous <Continuous>  HYDROmorphone PCA (1 mG/mL) 30 milliLiter(s) PCA Continuous PCA Continuous  insulin lispro (HumaLOG) corrective regimen sliding scale   SubCutaneous every 6 hours  Parenteral Nutrition - Adult 1 Each (83 mL/Hr) TPN Continuous <Continuous>  Parenteral Nutrition - Adult 1 Each (83 mL/Hr) TPN Continuous <Continuous>  sodium chloride 0.9% lock flush 20 milliLiter(s) IV Push once  sodium chloride 0.9%. 1000 milliLiter(s) (75 mL/Hr) IV Continuous <Continuous>    MEDICATIONS  (PRN):  acetaminophen   Tablet 650 milliGRAM(s) Oral every 6 hours PRN mild pain  dextrose Gel 1 Dose(s) Oral once PRN Blood Glucose LESS THAN 70 milliGRAM(s)/deciliter  glucagon  Injectable 1 milliGRAM(s) IntraMuscular once PRN Glucose LESS THAN 70 milligrams/deciliter  naloxone Injectable 0.1 milliGRAM(s) IV Push every 3 minutes PRN For ANY of the following changes in patient status:  A. RR LESS THAN 10 breaths per minute, B. Oxygen saturation LESS THAN 90%, C. Sedation score of 6  ondansetron Injectable 4 milliGRAM(s) IV Push every 6 hours PRN Nausea  ondansetron Injectable 4 milliGRAM(s) IV Push every 8 hours PRN Nausea and/or Vomiting  sodium chloride 0.9% lock flush 10 milliLiter(s) IV Push every 1 hour PRN After each medication administration  sodium chloride 0.9% lock flush 10 milliLiter(s) IV Push every 12 hours PRN Lumen of catheter NOT used      PHYSICAL EXAM:  GENERAL: In no acute distress  CHEST/LUNG: Clear to percussion bilaterally; Normal respiratory effort  HEART: Regular rate and rhythm  ABDOMEN: Soft, Dressing s/d/i, Nondistended; Bowel sounds present  EXTREMITIES:  No clubbing, cyanosis, or edema    LABS:                        10.2   9.8   )-----------( 204      ( 2018 06:03 )             31.1     Neutrophil %:       138  |  106  |  14  ----------------------------<  143<H>  3.6   |  22  |  0.72    Ca    8.7      2018 05:50  Phos  3.3       Mg     1.7         TPro  7.5  /  Alb  2.6<L>  /  TBili  0.5  /  DBili  x   /  AST  43<H>  /  ALT  93<H>  /  AlkPhos  98            RADIOLOGY & ADDITIONAL STUDIES:

## 2018-01-03 NOTE — PROGRESS NOTE ADULT - SUBJECTIVE AND OBJECTIVE BOX
Patient is a 62y old  Male who presents with a chief complaint of I have gastric cancer (12 Dec 2017 11:41)  Patient remains with nausea and vomiting post op despite NGT placement in the recent past. Scheduled for exp lap today. Denies sob or cough. Had Atelectasis of lung bases on Ct Abdomen in the past.    INTERVAL HPI/OVERNIGHT EVENTS:      VITAL SIGNS:  T(F): 99.2 (01-03-18 @ 06:00)  HR: 99 (01-03-18 @ 06:00)  BP: 106/57 (01-03-18 @ 06:00)  RR: 16 (01-03-18 @ 06:00)  SpO2: 96% (01-03-18 @ 06:00)  Wt(kg): --  I&O's Detail    02 Jan 2018 07:01  -  03 Jan 2018 07:00  --------------------------------------------------------  IN:    fat emulsion (Fish Oil and Plant Based) 20% Infusion: 40 mL    TPN (Total Parenteral Nutrition): 747 mL  Total IN: 787 mL    OUT:    Bulb: 1 mL    Bulb: 1 mL    Other: 850 mL  Total OUT: 852 mL    Total NET: -65 mL              REVIEW OF SYSTEMS:    CONSTITUTIONAL:  No fevers, chills, sweats    HEENT:  Eyes:  No diplopia or blurred vision. ENT:  No earache, sore throat or runny nose.    CARDIOVASCULAR:  No pressure, squeezing, tightness, or heaviness about the chest; no palpitations.    RESPIRATORY:  Per HPI    GASTROINTESTINAL:  +abdominal pain, nausea and vomiting but no diarrhea.    GENITOURINARY:  No dysuria, frequency or urgency.    NEUROLOGIC:  No paresthesias, fasciculations, seizures or weakness.    PSYCHIATRIC:  No disorder of thought or mood.      PHYSICAL EXAM:    Constitutional: Well developed and nourished  Eyes:Perrla  ENMT: normal  Neck:supple  Respiratory: good air entry  Cardiovascular: S1 S2 regular  Gastrointestinal:Soft, incisional tenederness  Extremities: No edema  Vascular:normal  Neurological:Awake, alert,Ox3  Musculoskeletal:Normal      MEDICATIONS  (STANDING):  dextrose 5%. 1000 milliLiter(s) (50 mL/Hr) IV Continuous <Continuous>  dextrose 50% Injectable 12.5 Gram(s) IV Push once  dextrose 50% Injectable 25 Gram(s) IV Push once  dextrose 50% Injectable 25 Gram(s) IV Push once  fat emulsion (Fish Oil and Plant Based) 20% Infusion 0.3 Gm/kG/Day (5 mL/Hr) IV Continuous <Continuous>  fat emulsion (Fish Oil and Plant Based) 20% Infusion 0.3 Gm/kG/Day (5 mL/Hr) IV Continuous <Continuous>  heparin  Injectable 5000 Unit(s) SubCutaneous every 8 hours  insulin lispro (HumaLOG) corrective regimen sliding scale   SubCutaneous every 6 hours  pantoprazole  Injectable 40 milliGRAM(s) IV Push daily  Parenteral Nutrition - Adult 1 Each (83 mL/Hr) TPN Continuous <Continuous>  Parenteral Nutrition - Adult 1 Each (83 mL/Hr) TPN Continuous <Continuous>  sodium chloride 0.9% lock flush 20 milliLiter(s) IV Push once    MEDICATIONS  (PRN):  acetaminophen   Tablet 650 milliGRAM(s) Oral every 6 hours PRN mild pain  dextrose Gel 1 Dose(s) Oral once PRN Blood Glucose LESS THAN 70 milliGRAM(s)/deciliter  glucagon  Injectable 1 milliGRAM(s) IntraMuscular once PRN Glucose LESS THAN 70 milligrams/deciliter  ondansetron Injectable 4 milliGRAM(s) IV Push every 8 hours PRN Nausea and/or Vomiting  sodium chloride 0.9% lock flush 10 milliLiter(s) IV Push every 1 hour PRN After each medication administration  sodium chloride 0.9% lock flush 10 milliLiter(s) IV Push every 12 hours PRN Lumen of catheter NOT used      Allergies    No Known Allergies    Intolerances        LABS:                        10.2   9.8   )-----------( 204      ( 02 Jan 2018 06:03 )             31.1     01-03    138  |  106  |  14  ----------------------------<  143<H>  3.6   |  22  |  0.72    Ca    8.7      03 Jan 2018 05:50  Phos  3.3     01-03  Mg     1.7     01-03    TPro  7.5  /  Alb  2.6<L>  /  TBili  0.5  /  DBili  x   /  AST  43<H>  /  ALT  93<H>  /  AlkPhos  98  01-03              CAPILLARY BLOOD GLUCOSE      POCT Blood Glucose.: 152 mg/dL (02 Jan 2018 17:50)        RADIOLOGY & ADDITIONAL TESTS:    CXR:  < from: Xray Abdomen 1 View PORTABLE -Urgent (12.31.17 @ 21:16) >  Impression: There are 2 and abdominal surgical drains. There are no   dilated loops of bowel. Contrast reaches the rectum. No free air on these   limited supine views. There is a possibility of small bowel gas which is   nonspecific.    < from: Xray Small Bowel Series (12.21.17 @ 11:08) >  IMPRESSION:  Status post total gastrectomy, with no leakage of oral contrast. Transit   time to the colon was 40 minutes. At the gastrojejunal anastomosis, there   is dilated proximal to mid degenerative without mucosal or mural   thickening. Differential includes ileus versus partial small bowel   obstruction.      < end of copied text >      < end of copied text >    Ct scan chest:    ekg;    echo:

## 2018-01-03 NOTE — PROGRESS NOTE ADULT - SUBJECTIVE AND OBJECTIVE BOX
INTERVAL HPI/OVERNIGHT EVENTS:  Pt stable.   Persistent nausea or vomiting  flatus/BM.    Vital Signs Last 24 Hrs  T(C): 37.3 (03 Jan 2018 06:00), Max: 37.3 (02 Jan 2018 20:00)  T(F): 99.2 (03 Jan 2018 06:00), Max: 99.2 (03 Jan 2018 06:00)  HR: 99 (03 Jan 2018 06:00) (85 - 99)  BP: 106/57 (03 Jan 2018 06:00) (106/57 - 124/66)  BP(mean): --  RR: 16 (03 Jan 2018 06:00) (16 - 18)  SpO2: 96% (03 Jan 2018 06:00) (96% - 100%)    Physical:  Abdomen: Soft nondistended, nontender.  wound clean  SOSA minimal serous drainage    I&O's Summary    02 Jan 2018 07:01  -  03 Jan 2018 07:00  --------------------------------------------------------  IN: 787 mL / OUT: 852 mL / NET: -65 mL        LABS:                        10.2   9.8   )-----------( 204      ( 02 Jan 2018 06:03 )             31.1             01-03    138  |  106  |  14  ----------------------------<  143<H>  3.6   |  22  |  0.72    Ca    8.7      03 Jan 2018 05:50  Phos  3.3     01-03  Mg     1.7     01-03    TPro  7.5  /  Alb  2.6<L>  /  TBili  0.5  /  DBili  x   /  AST  43<H>  /  ALT  93<H>  /  AlkPhos  98  01-03

## 2018-01-03 NOTE — BRIEF OPERATIVE NOTE - PRE-OP DX
Gastric cancer  12/13/2017    Active  Stanislaw Ortiz  SBO (small bowel obstruction)  01/03/2018    Active  Stephanie Huerta

## 2018-01-03 NOTE — PROGRESS NOTE ADULT - SUBJECTIVE AND OBJECTIVE BOX
Patient is a 62y old  Male who presents with a chief complaint of I have gastric cancer (12 Dec 2017 11:41)      pt seen in icu [ ], reg med floor [ x  ], bed [ ], chair at bedside [x ], a+o x3 [ x ], lethargic [  ],   nad [ x ], on tpn via left upper arm picc line [x]      Allergies    No Known Allergies        Vitals    T(F): 99.2 (01-03-18 @ 06:00), Max: 99.2 (01-03-18 @ 06:00)  HR: 99 (01-03-18 @ 06:00) (85 - 99)  BP: 106/57 (01-03-18 @ 06:00) (106/57 - 124/66)  RR: 16 (01-03-18 @ 06:00) (16 - 18)  SpO2: 96% (01-03-18 @ 06:00) (96% - 100%)  Wt(kg): --  CAPILLARY BLOOD GLUCOSE      POCT Blood Glucose.: 152 mg/dL (02 Jan 2018 17:50)      Labs                          10.2   9.8   )-----------( 204      ( 02 Jan 2018 06:03 )             31.1       01-03    138  |  106  |  14  ----------------------------<  143<H>  3.6   |  22  |  0.72    Ca    8.7      03 Jan 2018 05:50  Phos  3.3     01-03  Mg     1.7     01-03    TPro  7.5  /  Alb  2.6<L>  /  TBili  0.5  /  DBili  x   /  AST  43<H>  /  ALT  93<H>  /  AlkPhos  98  01-03            .Urine Clean Catch (Midstream)  12-21 @ 11:20   10,000 - 49,000 CFU/mL Escherichia coli  <10,000 CFU/ml Normal Urogenital keila present  --  Escherichia coli      .Blood  12-21 @ 11:19   No growth at 5 days.  --  --      .Blood Blood  12-21 @ 11:17   No growth at 5 days.  --  --          Radiology Results      Meds    MEDICATIONS  (STANDING):  dextrose 5%. 1000 milliLiter(s) (50 mL/Hr) IV Continuous <Continuous>  dextrose 50% Injectable 12.5 Gram(s) IV Push once  dextrose 50% Injectable 25 Gram(s) IV Push once  dextrose 50% Injectable 25 Gram(s) IV Push once  fat emulsion (Fish Oil and Plant Based) 20% Infusion 0.3 Gm/kG/Day (5 mL/Hr) IV Continuous <Continuous>  heparin  Injectable 5000 Unit(s) SubCutaneous every 8 hours  insulin lispro (HumaLOG) corrective regimen sliding scale   SubCutaneous every 6 hours  pantoprazole  Injectable 40 milliGRAM(s) IV Push daily  Parenteral Nutrition - Adult 1 Each (83 mL/Hr) TPN Continuous <Continuous>  sodium chloride 0.9% lock flush 20 milliLiter(s) IV Push once      MEDICATIONS  (PRN):  acetaminophen   Tablet 650 milliGRAM(s) Oral every 6 hours PRN mild pain  dextrose Gel 1 Dose(s) Oral once PRN Blood Glucose LESS THAN 70 milliGRAM(s)/deciliter  glucagon  Injectable 1 milliGRAM(s) IntraMuscular once PRN Glucose LESS THAN 70 milligrams/deciliter  ondansetron Injectable 4 milliGRAM(s) IV Push every 8 hours PRN Nausea and/or Vomiting  sodium chloride 0.9% lock flush 10 milliLiter(s) IV Push every 1 hour PRN After each medication administration  sodium chloride 0.9% lock flush 10 milliLiter(s) IV Push every 12 hours PRN Lumen of catheter NOT used      Physical Exam    Neuro :  no focal deficits  Respiratory: CTA B/L  CV: RRR, S1S2, no murmurs,   Abdominal: Soft, ND, incision w/ staples in place, clean and dry,  diann x2 with serous drainage,  Extremities: No edema, + peripheral pulses    ASSESSMENT    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

## 2018-01-03 NOTE — PROGRESS NOTE ADULT - PROBLEM SELECTOR PLAN 1
s/p total gastrectomy. Now with partial SBO. Initiated on TPN on 12/22.   Tolerated TPN well.  c/w TPN with lipids with increased protein. Will adjust electrolytes accordingly increase potassium chloride. )   c/w ISS, monitor FS. Pt requiring low dose of insulin  Check CMP/Mg/Phos. Daily weights and strict I/Os.

## 2018-01-03 NOTE — PROGRESS NOTE ADULT - SUBJECTIVE AND OBJECTIVE BOX
[facility] NEPHROLOGY- METABOLIC SUPPORT SERVICE PROGRESS NOTE    62 yr old male with gastric adenocarcinoma s/p Total gastrectomy with Kelby-en-Y esophagojejunal anastomosis  12/13/2017 . Hosp cb partial small bowel obstruction distal to the anastomosis. Now on TPN.        Hospital Medications: Medications reviewed.  REVIEW OF SYSTEMS:  CONSTITUTIONAL: No fevers or chills  RESPIRATORY: No shortness of breath  CARDIOVASCULAR: No chest pain.  GASTROINTESTINAL: + nausea & vomiting O/N and today.  + flatus  +abdominal pain.  No BM  VASCULAR: No bilateral lower extremity edema.     VITALS:  T(F): 99.2 (01-03-18 @ 06:00), Max: 99.2 (01-03-18 @ 06:00)  HR: 99 (01-03-18 @ 06:00)  BP: 106/57 (01-03-18 @ 06:00)  RR: 16 (01-03-18 @ 06:00)  SpO2: 96% (01-03-18 @ 06:00)  Wt(kg): --    01-02 @ 07:01  -  01-03 @ 07:00  --------------------------------------------------------  IN: 787 mL / OUT: 852 mL / NET: -65 mL      PHYSICAL EXAM:  Constitutional: NAD,   Respiratory: CTAB, no wheezes, rales or rhonchi  Cardiovascular: S1, S2, RRR  Gastrointestinal: BS+, soft, +left sided tenderness,  +SOSA drain x2, intact incision,   Extremities: No peripheral edema  Access: left arm PICC line benign    LABS:  01-03    138  |  106  |  14  ----------------------------<  143<H>  3.6   |  22  |  0.72    Ca    8.7      03 Jan 2018 05:50  Phos  3.3     01-03  Mg     1.7     01-03    TPro  7.5  /  Alb  2.6<L>  /  TBili  0.5  /  DBili      /  AST  43<H>  /  ALT  93<H>  /  AlkPhos  98  01-03    Creatinine Trend: 0.72 <--, 0.72 <--, 0.80 <--, 0.82 <--, 0.80 <--, 0.82 <--, 0.90 <--                        10.2   9.8   )-----------( 204      ( 02 Jan 2018 06:03 )             31.1     Urine Studies:

## 2018-01-04 ENCOUNTER — TRANSCRIPTION ENCOUNTER (OUTPATIENT)
Age: 63
End: 2018-01-04

## 2018-01-04 LAB
ALBUMIN SERPL ELPH-MCNC: 2 G/DL — LOW (ref 3.5–5)
ALP SERPL-CCNC: 78 U/L — SIGNIFICANT CHANGE UP (ref 40–120)
ALT FLD-CCNC: 76 U/L DA — HIGH (ref 10–60)
ANION GAP SERPL CALC-SCNC: 7 MMOL/L — SIGNIFICANT CHANGE UP (ref 5–17)
AST SERPL-CCNC: 45 U/L — HIGH (ref 10–40)
BASOPHILS # BLD AUTO: 0 K/UL — SIGNIFICANT CHANGE UP (ref 0–0.2)
BASOPHILS NFR BLD AUTO: 0.3 % — SIGNIFICANT CHANGE UP (ref 0–2)
BILIRUB SERPL-MCNC: 0.3 MG/DL — SIGNIFICANT CHANGE UP (ref 0.2–1.2)
BUN SERPL-MCNC: 15 MG/DL — SIGNIFICANT CHANGE UP (ref 7–18)
CA-I BLD-SCNC: 1.24 MMOL/L — SIGNIFICANT CHANGE UP (ref 1.12–1.3)
CALCIUM SERPL-MCNC: 8.1 MG/DL — LOW (ref 8.4–10.5)
CHLORIDE SERPL-SCNC: 109 MMOL/L — HIGH (ref 96–108)
CO2 SERPL-SCNC: 26 MMOL/L — SIGNIFICANT CHANGE UP (ref 22–31)
CREAT SERPL-MCNC: 0.66 MG/DL — SIGNIFICANT CHANGE UP (ref 0.5–1.3)
EOSINOPHIL # BLD AUTO: 0 K/UL — SIGNIFICANT CHANGE UP (ref 0–0.5)
EOSINOPHIL NFR BLD AUTO: 0.1 % — SIGNIFICANT CHANGE UP (ref 0–6)
GLUCOSE BLDC GLUCOMTR-MCNC: 139 MG/DL — HIGH (ref 70–99)
GLUCOSE BLDC GLUCOMTR-MCNC: 144 MG/DL — HIGH (ref 70–99)
GLUCOSE SERPL-MCNC: 152 MG/DL — HIGH (ref 70–99)
HCT VFR BLD CALC: 27.9 % — LOW (ref 39–50)
HGB BLD-MCNC: 9 G/DL — LOW (ref 13–17)
LYMPHOCYTES # BLD AUTO: 1.4 K/UL — SIGNIFICANT CHANGE UP (ref 1–3.3)
LYMPHOCYTES # BLD AUTO: 15.1 % — SIGNIFICANT CHANGE UP (ref 13–44)
MAGNESIUM SERPL-MCNC: 1.9 MG/DL — SIGNIFICANT CHANGE UP (ref 1.6–2.6)
MCHC RBC-ENTMCNC: 30.2 PG — SIGNIFICANT CHANGE UP (ref 27–34)
MCHC RBC-ENTMCNC: 32.2 GM/DL — SIGNIFICANT CHANGE UP (ref 32–36)
MCV RBC AUTO: 93.8 FL — SIGNIFICANT CHANGE UP (ref 80–100)
MONOCYTES # BLD AUTO: 0.7 K/UL — SIGNIFICANT CHANGE UP (ref 0–0.9)
MONOCYTES NFR BLD AUTO: 7.4 % — SIGNIFICANT CHANGE UP (ref 2–14)
NEUTROPHILS # BLD AUTO: 7.2 K/UL — SIGNIFICANT CHANGE UP (ref 1.8–7.4)
NEUTROPHILS NFR BLD AUTO: 77 % — SIGNIFICANT CHANGE UP (ref 43–77)
PHOSPHATE SERPL-MCNC: 3.4 MG/DL — SIGNIFICANT CHANGE UP (ref 2.5–4.5)
PLATELET # BLD AUTO: 173 K/UL — SIGNIFICANT CHANGE UP (ref 150–400)
POTASSIUM SERPL-MCNC: 3.9 MMOL/L — SIGNIFICANT CHANGE UP (ref 3.5–5.3)
POTASSIUM SERPL-SCNC: 3.9 MMOL/L — SIGNIFICANT CHANGE UP (ref 3.5–5.3)
PROT SERPL-MCNC: 6.1 G/DL — SIGNIFICANT CHANGE UP (ref 6–8.3)
RBC # BLD: 2.97 M/UL — LOW (ref 4.2–5.8)
RBC # FLD: 12.5 % — SIGNIFICANT CHANGE UP (ref 10.3–14.5)
SODIUM SERPL-SCNC: 142 MMOL/L — SIGNIFICANT CHANGE UP (ref 135–145)
WBC # BLD: 9.3 K/UL — SIGNIFICANT CHANGE UP (ref 3.8–10.5)
WBC # FLD AUTO: 9.3 K/UL — SIGNIFICANT CHANGE UP (ref 3.8–10.5)

## 2018-01-04 RX ORDER — ELECTROLYTE SOLUTION,INJ
1 VIAL (ML) INTRAVENOUS
Qty: 0 | Refills: 0 | Status: DISCONTINUED | OUTPATIENT
Start: 2018-01-04 | End: 2018-01-04

## 2018-01-04 RX ORDER — I.V. FAT EMULSION 20 G/100ML
0.3 EMULSION INTRAVENOUS
Qty: 24 | Refills: 0 | Status: DISCONTINUED | OUTPATIENT
Start: 2018-01-04 | End: 2018-01-04

## 2018-01-04 RX ADMIN — SODIUM CHLORIDE 75 MILLILITER(S): 9 INJECTION INTRAMUSCULAR; INTRAVENOUS; SUBCUTANEOUS at 05:09

## 2018-01-04 RX ADMIN — I.V. FAT EMULSION 5 GM/KG/DAY: 20 EMULSION INTRAVENOUS at 22:01

## 2018-01-04 RX ADMIN — Medication 1 EACH: at 22:01

## 2018-01-04 RX ADMIN — ENOXAPARIN SODIUM 40 MILLIGRAM(S): 100 INJECTION SUBCUTANEOUS at 05:03

## 2018-01-04 RX ADMIN — HYDROMORPHONE HYDROCHLORIDE 30 MILLILITER(S): 2 INJECTION INTRAMUSCULAR; INTRAVENOUS; SUBCUTANEOUS at 19:03

## 2018-01-04 RX ADMIN — HYDROMORPHONE HYDROCHLORIDE 30 MILLILITER(S): 2 INJECTION INTRAMUSCULAR; INTRAVENOUS; SUBCUTANEOUS at 07:28

## 2018-01-04 RX ADMIN — Medication 2: at 05:25

## 2018-01-04 NOTE — PROGRESS NOTE ADULT - SUBJECTIVE AND OBJECTIVE BOX
Patient is a 62y old  Male who presents with a chief complaint of I have gastric cancer (12 Dec 2017 11:41)      pt seen in icu [ ], reg med floor [ x  ], bed [ ], chair at bedside [x ], a+o x3 [ x ], lethargic [  ],   nad [ x ], on tpn via left upper arm picc line [x]        Allergies    No Known Allergies        Vitals    T(F): 98 (01-04-18 @ 05:00), Max: 99 (01-03-18 @ 15:50)  HR: 81 (01-04-18 @ 05:00) (81 - 92)  BP: 101/54 (01-04-18 @ 05:00) (101/54 - 127/80)  RR: 15 (01-04-18 @ 05:00) (15 - 26)  SpO2: 99% (01-04-18 @ 05:00) (98% - 100%)  Wt(kg): --  CAPILLARY BLOOD GLUCOSE  162 (03 Jan 2018 14:54)      POCT Blood Glucose.: 199 mg/dL (03 Jan 2018 18:13)      Labs        01-03    138  |  106  |  14  ----------------------------<  143<H>  3.6   |  22  |  0.72    Ca    8.7      03 Jan 2018 05:50  Phos  3.3     01-03  Mg     1.7     01-03    TPro  7.5  /  Alb  2.6<L>  /  TBili  0.5  /  DBili  x   /  AST  43<H>  /  ALT  93<H>  /  AlkPhos  98  01-03            .Urine Clean Catch (Midstream)  12-21 @ 11:20   10,000 - 49,000 CFU/mL Escherichia coli  <10,000 CFU/ml Normal Urogenital keila present  --  Escherichia coli      .Blood  12-21 @ 11:19   No growth at 5 days.  --  --      .Blood Blood  12-21 @ 11:17   No growth at 5 days.  --  --          Radiology Results      Meds    MEDICATIONS  (STANDING):  dextrose 5%. 1000 milliLiter(s) (50 mL/Hr) IV Continuous <Continuous>  dextrose 50% Injectable 12.5 Gram(s) IV Push once  dextrose 50% Injectable 25 Gram(s) IV Push once  dextrose 50% Injectable 25 Gram(s) IV Push once  enoxaparin Injectable 40 milliGRAM(s) SubCutaneous every 24 hours  fat emulsion (Fish Oil and Plant Based) 20% Infusion 0.3 Gm/kG/Day (5 mL/Hr) IV Continuous <Continuous>  HYDROmorphone PCA (1 mG/mL) 30 milliLiter(s) PCA Continuous PCA Continuous  insulin lispro (HumaLOG) corrective regimen sliding scale   SubCutaneous every 6 hours  Parenteral Nutrition - Adult 1 Each (83 mL/Hr) TPN Continuous <Continuous>  sodium chloride 0.9% lock flush 20 milliLiter(s) IV Push once  sodium chloride 0.9%. 1000 milliLiter(s) (75 mL/Hr) IV Continuous <Continuous>      MEDICATIONS  (PRN):  acetaminophen   Tablet 650 milliGRAM(s) Oral every 6 hours PRN mild pain  dextrose Gel 1 Dose(s) Oral once PRN Blood Glucose LESS THAN 70 milliGRAM(s)/deciliter  glucagon  Injectable 1 milliGRAM(s) IntraMuscular once PRN Glucose LESS THAN 70 milligrams/deciliter  naloxone Injectable 0.1 milliGRAM(s) IV Push every 3 minutes PRN For ANY of the following changes in patient status:  A. RR LESS THAN 10 breaths per minute, B. Oxygen saturation LESS THAN 90%, C. Sedation score of 6  ondansetron Injectable 4 milliGRAM(s) IV Push every 6 hours PRN Nausea  ondansetron Injectable 4 milliGRAM(s) IV Push every 8 hours PRN Nausea and/or Vomiting  sodium chloride 0.9% lock flush 10 milliLiter(s) IV Push every 1 hour PRN After each medication administration  sodium chloride 0.9% lock flush 10 milliLiter(s) IV Push every 12 hours PRN Lumen of catheter NOT used      Physical Exam    Neuro :  no focal deficits  Respiratory: CTA B/L  CV: RRR, S1S2, no murmurs,   Abdominal: Soft, ND, incision w/ staples in place, clean and dry,  diann x2 with serous drainage,  Extremities: No edema, + peripheral pulses    ASSESSMENT    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 Patient is a 62y old  Male who presents with a chief complaint of I have gastric cancer (12 Dec 2017 11:41)      pt seen in icu [ ], reg med floor [ x  ], bed [ ], chair at bedside [x ], a+o x3 [ x ], lethargic [  ],   nad [ x ], on tpn via left upper arm picc line [x], coto to bsd,[x],  ngt to wall suction [x]        Allergies    No Known Allergies        Vitals    T(F): 98 (01-04-18 @ 05:00), Max: 99 (01-03-18 @ 15:50)  HR: 81 (01-04-18 @ 05:00) (81 - 92)  BP: 101/54 (01-04-18 @ 05:00) (101/54 - 127/80)  RR: 15 (01-04-18 @ 05:00) (15 - 26)  SpO2: 99% (01-04-18 @ 05:00) (98% - 100%)  Wt(kg): --  CAPILLARY BLOOD GLUCOSE  162 (03 Jan 2018 14:54)      POCT Blood Glucose.: 199 mg/dL (03 Jan 2018 18:13)      Labs        01-03    138  |  106  |  14  ----------------------------<  143<H>  3.6   |  22  |  0.72    Ca    8.7      03 Jan 2018 05:50  Phos  3.3     01-03  Mg     1.7     01-03    TPro  7.5  /  Alb  2.6<L>  /  TBili  0.5  /  DBili  x   /  AST  43<H>  /  ALT  93<H>  /  AlkPhos  98  01-03            .Urine Clean Catch (Midstream)  12-21 @ 11:20   10,000 - 49,000 CFU/mL Escherichia coli  <10,000 CFU/ml Normal Urogenital keila present  --  Escherichia coli      .Blood  12-21 @ 11:19   No growth at 5 days.  --  --      .Blood Blood  12-21 @ 11:17   No growth at 5 days.  --  --          Radiology Results      Meds    MEDICATIONS  (STANDING):  dextrose 5%. 1000 milliLiter(s) (50 mL/Hr) IV Continuous <Continuous>  dextrose 50% Injectable 12.5 Gram(s) IV Push once  dextrose 50% Injectable 25 Gram(s) IV Push once  dextrose 50% Injectable 25 Gram(s) IV Push once  enoxaparin Injectable 40 milliGRAM(s) SubCutaneous every 24 hours  fat emulsion (Fish Oil and Plant Based) 20% Infusion 0.3 Gm/kG/Day (5 mL/Hr) IV Continuous <Continuous>  HYDROmorphone PCA (1 mG/mL) 30 milliLiter(s) PCA Continuous PCA Continuous  insulin lispro (HumaLOG) corrective regimen sliding scale   SubCutaneous every 6 hours  Parenteral Nutrition - Adult 1 Each (83 mL/Hr) TPN Continuous <Continuous>  sodium chloride 0.9% lock flush 20 milliLiter(s) IV Push once  sodium chloride 0.9%. 1000 milliLiter(s) (75 mL/Hr) IV Continuous <Continuous>      MEDICATIONS  (PRN):  acetaminophen   Tablet 650 milliGRAM(s) Oral every 6 hours PRN mild pain  dextrose Gel 1 Dose(s) Oral once PRN Blood Glucose LESS THAN 70 milliGRAM(s)/deciliter  glucagon  Injectable 1 milliGRAM(s) IntraMuscular once PRN Glucose LESS THAN 70 milligrams/deciliter  naloxone Injectable 0.1 milliGRAM(s) IV Push every 3 minutes PRN For ANY of the following changes in patient status:  A. RR LESS THAN 10 breaths per minute, B. Oxygen saturation LESS THAN 90%, C. Sedation score of 6  ondansetron Injectable 4 milliGRAM(s) IV Push every 6 hours PRN Nausea  ondansetron Injectable 4 milliGRAM(s) IV Push every 8 hours PRN Nausea and/or Vomiting  sodium chloride 0.9% lock flush 10 milliLiter(s) IV Push every 1 hour PRN After each medication administration  sodium chloride 0.9% lock flush 10 milliLiter(s) IV Push every 12 hours PRN Lumen of catheter NOT used      Physical Exam    Neuro :  no focal deficits  Respiratory: CTA B/L  CV: RRR, S1S2, no murmurs,   Abdominal: Soft, ND, incision w wound vac in place, clean and dry,   Extremities: No edema, + peripheral pulses    ASSESSMENT    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   h/o Prediabetes  HLD (hyperlipidemia)  HTN (hypertension)  Malignant neoplasm of stomach, unspecified location  H/O prostate biopsy  History of arthroscopy of right shoulder  History of arthroscopy of left knee  History of appendectomy        PLAN      s/p total gatrectomy with cristobal-en-y esophagojejunal anastomosis  s/p ex lap with nicole pod1  heme onc f/u  cont npo  no further c/o nausea/ vomiting  cont reglan q6  cont tpn  renal f/u  gi and dvt prophylaxis  incentive spirometer  pulm f/u  cont pain control  pain mgmt f/u  oob to chair  cont current meds

## 2018-01-04 NOTE — PROGRESS NOTE ADULT - SUBJECTIVE AND OBJECTIVE BOX
Patient is a 62y old  Male who presents with a chief complaint of I have gastric cancer (12 Dec 2017 11:41)  Awake,alert, comfortable OOB to chair in NAD. S/p gastric cancer removal. Went ot OR again because of SBO secondary to adhesions. S/p Exp lap and SHAR. Still npo with ngt in place. No nause or vomiting. No flatus or BM as yet. No sob.    INTERVAL HPI/OVERNIGHT EVENTS:      VITAL SIGNS:  T(F): 98 (01-04-18 @ 05:00)  HR: 81 (01-04-18 @ 05:00)  BP: 101/54 (01-04-18 @ 05:00)  RR: 15 (01-04-18 @ 05:00)  SpO2: 99% (01-04-18 @ 05:00)  Wt(kg): --  I&O's Detail    03 Jan 2018 07:01  -  04 Jan 2018 07:00  --------------------------------------------------------  IN:    fat emulsion (Fish Oil and Plant Based) 20% Infusion: 10 mL    fat emulsion (Fish Oil and Plant Based) 20% Infusion: 45 mL    Lactated Ringers IV Bolus: 2500 mL    sodium chloride 0.9%.: 825 mL    TPN (Total Parenteral Nutrition): 996 mL  Total IN: 4376 mL    OUT:    Indwelling Catheter - Urethral: 1000 mL    Nasoenteral Tube: 210 mL  Total OUT: 1210 mL    Total NET: 3166 mL              REVIEW OF SYSTEMS:    CONSTITUTIONAL:  No fevers, chills, sweats    HEENT:  Eyes:  No diplopia or blurred vision. ENT:  No earache, sore throat or runny nose.    CARDIOVASCULAR:  No pressure, squeezing, tightness, or heaviness about the chest; no palpitations.    RESPIRATORY:  Per HPI    GASTROINTESTINAL:  No abdominal pain, nausea, vomiting or diarrhea.    GENITOURINARY:  No dysuria, frequency or urgency.    NEUROLOGIC:  No paresthesias, fasciculations, seizures or weakness.    PSYCHIATRIC:  No disorder of thought or mood.      PHYSICAL EXAM:    Constitutional: Well developed and nourished  Eyes:Perrla  ENMT: normal  Neck:supple  Respiratory: good air entry  Cardiovascular: S1 S2 regular  Gastrointestinal: Soft, Incisional tenderness  Extremities: No edema  Vascular:normal  Neurological:Awake, alert,Ox3  Musculoskeletal:Normal      MEDICATIONS  (STANDING):  dextrose 5%. 1000 milliLiter(s) (50 mL/Hr) IV Continuous <Continuous>  dextrose 50% Injectable 12.5 Gram(s) IV Push once  dextrose 50% Injectable 25 Gram(s) IV Push once  dextrose 50% Injectable 25 Gram(s) IV Push once  enoxaparin Injectable 40 milliGRAM(s) SubCutaneous every 24 hours  fat emulsion (Fish Oil and Plant Based) 20% Infusion 0.3 Gm/kG/Day (5 mL/Hr) IV Continuous <Continuous>  fat emulsion (Fish Oil and Plant Based) 20% Infusion 0.3 Gm/kG/Day (5 mL/Hr) IV Continuous <Continuous>  HYDROmorphone PCA (1 mG/mL) 30 milliLiter(s) PCA Continuous PCA Continuous  insulin lispro (HumaLOG) corrective regimen sliding scale   SubCutaneous every 6 hours  Parenteral Nutrition - Adult 1 Each (83 mL/Hr) TPN Continuous <Continuous>  Parenteral Nutrition - Adult 1 Each (83 mL/Hr) TPN Continuous <Continuous>  sodium chloride 0.9% lock flush 20 milliLiter(s) IV Push once  sodium chloride 0.9%. 1000 milliLiter(s) (75 mL/Hr) IV Continuous <Continuous>    MEDICATIONS  (PRN):  acetaminophen   Tablet 650 milliGRAM(s) Oral every 6 hours PRN mild pain  dextrose Gel 1 Dose(s) Oral once PRN Blood Glucose LESS THAN 70 milliGRAM(s)/deciliter  glucagon  Injectable 1 milliGRAM(s) IntraMuscular once PRN Glucose LESS THAN 70 milligrams/deciliter  naloxone Injectable 0.1 milliGRAM(s) IV Push every 3 minutes PRN For ANY of the following changes in patient status:  A. RR LESS THAN 10 breaths per minute, B. Oxygen saturation LESS THAN 90%, C. Sedation score of 6  ondansetron Injectable 4 milliGRAM(s) IV Push every 6 hours PRN Nausea  ondansetron Injectable 4 milliGRAM(s) IV Push every 8 hours PRN Nausea and/or Vomiting  sodium chloride 0.9% lock flush 10 milliLiter(s) IV Push every 1 hour PRN After each medication administration  sodium chloride 0.9% lock flush 10 milliLiter(s) IV Push every 12 hours PRN Lumen of catheter NOT used      Allergies    No Known Allergies    Intolerances        LABS:                        9.0    9.3   )-----------( 173      ( 04 Jan 2018 07:48 )             27.9     01-04    142  |  109<H>  |  15  ----------------------------<  152<H>  3.9   |  26  |  0.66    Ca    8.1<L>      04 Jan 2018 07:48  Phos  3.4     01-04  Mg     1.9     01-04    TPro  6.1  /  Alb  2.0<L>  /  TBili  0.3  /  DBili  x   /  AST  45<H>  /  ALT  76<H>  /  AlkPhos  78  01-04              CAPILLARY BLOOD GLUCOSE  162 (03 Jan 2018 14:54)      POCT Blood Glucose.: 144 mg/dL (04 Jan 2018 11:26)  POCT Blood Glucose.: 199 mg/dL (03 Jan 2018 18:13)  POCT Blood Glucose.: 162 mg/dL (03 Jan 2018 15:09)        RADIOLOGY & ADDITIONAL TESTS:    CXR:    Ct scan chest:    ekg;    echo:

## 2018-01-04 NOTE — PROGRESS NOTE ADULT - ASSESSMENT
s/p exploratory laparotomy, lysis of adhesions 1/4, total gastrectomy 12/13. Pt doing well this AM  1. Cont NPO  2. keep NGT  3. OOB to chair  4. DVT prophylaxis  5. d/c coto

## 2018-01-04 NOTE — PROGRESS NOTE ADULT - SUBJECTIVE AND OBJECTIVE BOX
Post Operative Day #: 1    SUBJECTIVE: 62y Male s/p ex-lap, lysis of adhesions    INTERVAL HPI/OVERNIGHT EVENTS:    Flatus:   n         Bowel Movement: n    Pain Control Adequate: yes    Nausea: n          Vomiting: n    Diet: npo      MEDICATIONS  (STANDING):  dextrose 5%. 1000 milliLiter(s) (50 mL/Hr) IV Continuous <Continuous>  dextrose 50% Injectable 12.5 Gram(s) IV Push once  dextrose 50% Injectable 25 Gram(s) IV Push once  dextrose 50% Injectable 25 Gram(s) IV Push once  enoxaparin Injectable 40 milliGRAM(s) SubCutaneous every 24 hours  fat emulsion (Fish Oil and Plant Based) 20% Infusion 0.3 Gm/kG/Day (5 mL/Hr) IV Continuous <Continuous>  HYDROmorphone PCA (1 mG/mL) 30 milliLiter(s) PCA Continuous PCA Continuous  insulin lispro (HumaLOG) corrective regimen sliding scale   SubCutaneous every 6 hours  Parenteral Nutrition - Adult 1 Each (83 mL/Hr) TPN Continuous <Continuous>  sodium chloride 0.9% lock flush 20 milliLiter(s) IV Push once  sodium chloride 0.9%. 1000 milliLiter(s) (75 mL/Hr) IV Continuous <Continuous>    MEDICATIONS  (PRN):  acetaminophen   Tablet 650 milliGRAM(s) Oral every 6 hours PRN mild pain  dextrose Gel 1 Dose(s) Oral once PRN Blood Glucose LESS THAN 70 milliGRAM(s)/deciliter  glucagon  Injectable 1 milliGRAM(s) IntraMuscular once PRN Glucose LESS THAN 70 milligrams/deciliter  naloxone Injectable 0.1 milliGRAM(s) IV Push every 3 minutes PRN For ANY of the following changes in patient status:  A. RR LESS THAN 10 breaths per minute, B. Oxygen saturation LESS THAN 90%, C. Sedation score of 6  ondansetron Injectable 4 milliGRAM(s) IV Push every 6 hours PRN Nausea  ondansetron Injectable 4 milliGRAM(s) IV Push every 8 hours PRN Nausea and/or Vomiting  sodium chloride 0.9% lock flush 10 milliLiter(s) IV Push every 1 hour PRN After each medication administration  sodium chloride 0.9% lock flush 10 milliLiter(s) IV Push every 12 hours PRN Lumen of catheter NOT used      OBJECTIVE:  Vital Signs Last 24 Hrs  T(C): 36.7 (04 Jan 2018 05:00), Max: 37.2 (03 Jan 2018 15:50)  T(F): 98 (04 Jan 2018 05:00), Max: 99 (03 Jan 2018 15:50)  HR: 81 (04 Jan 2018 05:00) (81 - 92)  BP: 101/54 (04 Jan 2018 05:00) (101/54 - 127/80)  BP(mean): 87 (03 Jan 2018 15:50) (79 - 852)  RR: 15 (04 Jan 2018 05:00) (15 - 26)  SpO2: 99% (04 Jan 2018 05:00) (98% - 100%)    Physical Exam:    Gen: awake, alert oriented NAD  HEENT: NGT in place with bilious output  Abdomen: R subcostal wound with HEMANT dressing in place, SOSA in place with serosanguinous output, incisional tenderness  Extremities: warm to touch no c/c/e, no calf tenderness bilaterally    I&O's Detail    03 Jan 2018 07:01  -  04 Jan 2018 07:00  --------------------------------------------------------  IN:    fat emulsion (Fish Oil and Plant Based) 20% Infusion: 10 mL    fat emulsion (Fish Oil and Plant Based) 20% Infusion: 45 mL    Lactated Ringers IV Bolus: 2500 mL    sodium chloride 0.9%.: 825 mL    TPN (Total Parenteral Nutrition): 996 mL  Total IN: 4376 mL    OUT:    Indwelling Catheter - Urethral: 1000 mL    Nasoenteral Tube: 210 mL  Total OUT: 1210 mL    Total NET: 3166 mL      03 Jan 2018 05:50    138    |  106    |  14     ----------------------------<  143    3.6     |  22     |  0.72   02 Jan 2018 06:03    141    |  107    |  14     ----------------------------<  174    3.5     |  25     |  0.72     Ca    8.7        03 Jan 2018 05:50  Ca    8.6        02 Jan 2018 06:03  Phos  3.3       03 Jan 2018 05:50  Mg     1.7       03 Jan 2018 05:50    TPro  7.5    /  Alb  2.6    /  TBili  0.5    /  DBili  x      /  AST  43     /  ALT  93     /  AlkPhos  98     03 Jan 2018 05:50  TPro  7.2    /  Alb  2.5    /  TBili  0.4    /  DBili  x      /  AST  38     /  ALT  80     /  AlkPhos  95     02 Jan 2018 06:03 Post Operative Day #: 1    SUBJECTIVE: 62y Male s/p ex-lap, lysis of adhesions    INTERVAL HPI/OVERNIGHT EVENTS:    Flatus:   n         Bowel Movement: n    Pain Control Adequate: yes    Nausea: n          Vomiting: n    Diet: npo      MEDICATIONS  (STANDING):  dextrose 5%. 1000 milliLiter(s) (50 mL/Hr) IV Continuous <Continuous>  dextrose 50% Injectable 12.5 Gram(s) IV Push once  dextrose 50% Injectable 25 Gram(s) IV Push once  dextrose 50% Injectable 25 Gram(s) IV Push once  enoxaparin Injectable 40 milliGRAM(s) SubCutaneous every 24 hours  fat emulsion (Fish Oil and Plant Based) 20% Infusion 0.3 Gm/kG/Day (5 mL/Hr) IV Continuous <Continuous>  HYDROmorphone PCA (1 mG/mL) 30 milliLiter(s) PCA Continuous PCA Continuous  insulin lispro (HumaLOG) corrective regimen sliding scale   SubCutaneous every 6 hours  Parenteral Nutrition - Adult 1 Each (83 mL/Hr) TPN Continuous <Continuous>  sodium chloride 0.9% lock flush 20 milliLiter(s) IV Push once  sodium chloride 0.9%. 1000 milliLiter(s) (75 mL/Hr) IV Continuous <Continuous>    MEDICATIONS  (PRN):  acetaminophen   Tablet 650 milliGRAM(s) Oral every 6 hours PRN mild pain  dextrose Gel 1 Dose(s) Oral once PRN Blood Glucose LESS THAN 70 milliGRAM(s)/deciliter  glucagon  Injectable 1 milliGRAM(s) IntraMuscular once PRN Glucose LESS THAN 70 milligrams/deciliter  naloxone Injectable 0.1 milliGRAM(s) IV Push every 3 minutes PRN For ANY of the following changes in patient status:  A. RR LESS THAN 10 breaths per minute, B. Oxygen saturation LESS THAN 90%, C. Sedation score of 6  ondansetron Injectable 4 milliGRAM(s) IV Push every 6 hours PRN Nausea  ondansetron Injectable 4 milliGRAM(s) IV Push every 8 hours PRN Nausea and/or Vomiting  sodium chloride 0.9% lock flush 10 milliLiter(s) IV Push every 1 hour PRN After each medication administration  sodium chloride 0.9% lock flush 10 milliLiter(s) IV Push every 12 hours PRN Lumen of catheter NOT used      OBJECTIVE:  Vital Signs Last 24 Hrs  T(C): 36.7 (04 Jan 2018 05:00), Max: 37.2 (03 Jan 2018 15:50)  T(F): 98 (04 Jan 2018 05:00), Max: 99 (03 Jan 2018 15:50)  HR: 81 (04 Jan 2018 05:00) (81 - 92)  BP: 101/54 (04 Jan 2018 05:00) (101/54 - 127/80)  BP(mean): 87 (03 Jan 2018 15:50) (79 - 852)  RR: 15 (04 Jan 2018 05:00) (15 - 26)  SpO2: 99% (04 Jan 2018 05:00) (98% - 100%)    Physical Exam:    Gen: awake, alert oriented NAD  HEENT: NGT in place with bilious output  Abdomen: R subcostal wound with HEMANT dressing in place, incisional tenderness  Extremities: warm to touch no c/c/e, no calf tenderness bilaterally    I&O's Detail    03 Jan 2018 07:01  -  04 Jan 2018 07:00  --------------------------------------------------------  IN:    fat emulsion (Fish Oil and Plant Based) 20% Infusion: 10 mL    fat emulsion (Fish Oil and Plant Based) 20% Infusion: 45 mL    Lactated Ringers IV Bolus: 2500 mL    sodium chloride 0.9%.: 825 mL    TPN (Total Parenteral Nutrition): 996 mL  Total IN: 4376 mL    OUT:    Indwelling Catheter - Urethral: 1000 mL    Nasoenteral Tube: 210 mL  Total OUT: 1210 mL    Total NET: 3166 mL      03 Jan 2018 05:50    138    |  106    |  14     ----------------------------<  143    3.6     |  22     |  0.72   02 Jan 2018 06:03    141    |  107    |  14     ----------------------------<  174    3.5     |  25     |  0.72     Ca    8.7        03 Jan 2018 05:50  Ca    8.6        02 Jan 2018 06:03  Phos  3.3       03 Jan 2018 05:50  Mg     1.7       03 Jan 2018 05:50    TPro  7.5    /  Alb  2.6    /  TBili  0.5    /  DBili  x      /  AST  43     /  ALT  93     /  AlkPhos  98     03 Jan 2018 05:50  TPro  7.2    /  Alb  2.5    /  TBili  0.4    /  DBili  x      /  AST  38     /  ALT  80     /  AlkPhos  95     02 Jan 2018 06:03

## 2018-01-04 NOTE — PROGRESS NOTE ADULT - SUBJECTIVE AND OBJECTIVE BOX
INTERVAL HPI/OVERNIGHT EVENTS:  Pt stable.   No nausea  No flatus/BM.  NG tube drainage clear    Vital Signs Last 24 Hrs  T(C): 36.7 (04 Jan 2018 05:00), Max: 37.2 (03 Jan 2018 15:50)  T(F): 98 (04 Jan 2018 05:00), Max: 99 (03 Jan 2018 15:50)  HR: 81 (04 Jan 2018 05:00) (81 - 92)  BP: 101/54 (04 Jan 2018 05:00) (101/54 - 127/80)  BP(mean): 87 (03 Jan 2018 15:50) (79 - 852)  RR: 15 (04 Jan 2018 05:00) (15 - 26)  SpO2: 99% (04 Jan 2018 05:00) (98% - 100%)    Physical:  Abdomen: Soft nondistended, nontender.  Dressing intact  Good U/O    I&O's Summary    03 Jan 2018 07:01  -  04 Jan 2018 07:00  --------------------------------------------------------  IN: 4376 mL / OUT: 1210 mL / NET: 3166 mL        LABS:                        9.0    9.3   )-----------( 173      ( 04 Jan 2018 07:48 )             27.9             01-04    142  |  109<H>  |  15  ----------------------------<  152<H>  3.9   |  26  |  0.66    Ca    8.1<L>      04 Jan 2018 07:48  Phos  3.4     01-04  Mg     1.9     01-04    TPro  6.1  /  Alb  2.0<L>  /  TBili  0.3  /  DBili  x   /  AST  45<H>  /  ALT  76<H>  /  AlkPhos  78  01-04

## 2018-01-05 LAB
ALBUMIN SERPL ELPH-MCNC: 2.1 G/DL — LOW (ref 3.5–5)
ALP SERPL-CCNC: 79 U/L — SIGNIFICANT CHANGE UP (ref 40–120)
ALT FLD-CCNC: 69 U/L DA — HIGH (ref 10–60)
ANION GAP SERPL CALC-SCNC: 8 MMOL/L — SIGNIFICANT CHANGE UP (ref 5–17)
AST SERPL-CCNC: 35 U/L — SIGNIFICANT CHANGE UP (ref 10–40)
BILIRUB SERPL-MCNC: 0.3 MG/DL — SIGNIFICANT CHANGE UP (ref 0.2–1.2)
BUN SERPL-MCNC: 10 MG/DL — SIGNIFICANT CHANGE UP (ref 7–18)
CA-I BLD-SCNC: 1.19 MMOL/L — SIGNIFICANT CHANGE UP (ref 1.12–1.3)
CALCIUM SERPL-MCNC: 8 MG/DL — LOW (ref 8.4–10.5)
CHLORIDE SERPL-SCNC: 103 MMOL/L — SIGNIFICANT CHANGE UP (ref 96–108)
CO2 SERPL-SCNC: 26 MMOL/L — SIGNIFICANT CHANGE UP (ref 22–31)
CREAT SERPL-MCNC: 0.5 MG/DL — SIGNIFICANT CHANGE UP (ref 0.5–1.3)
GLUCOSE BLDC GLUCOMTR-MCNC: 131 MG/DL — HIGH (ref 70–99)
GLUCOSE BLDC GLUCOMTR-MCNC: 133 MG/DL — HIGH (ref 70–99)
GLUCOSE BLDC GLUCOMTR-MCNC: 141 MG/DL — HIGH (ref 70–99)
GLUCOSE BLDC GLUCOMTR-MCNC: 179 MG/DL — HIGH (ref 70–99)
GLUCOSE BLDC GLUCOMTR-MCNC: 94 MG/DL — SIGNIFICANT CHANGE UP (ref 70–99)
GLUCOSE SERPL-MCNC: 135 MG/DL — HIGH (ref 70–99)
MAGNESIUM SERPL-MCNC: 1.6 MG/DL — SIGNIFICANT CHANGE UP (ref 1.6–2.6)
PHOSPHATE SERPL-MCNC: 2.4 MG/DL — LOW (ref 2.5–4.5)
POTASSIUM SERPL-MCNC: 3.5 MMOL/L — SIGNIFICANT CHANGE UP (ref 3.5–5.3)
POTASSIUM SERPL-SCNC: 3.5 MMOL/L — SIGNIFICANT CHANGE UP (ref 3.5–5.3)
PROT SERPL-MCNC: 6.5 G/DL — SIGNIFICANT CHANGE UP (ref 6–8.3)
SODIUM SERPL-SCNC: 137 MMOL/L — SIGNIFICANT CHANGE UP (ref 135–145)
SURGICAL PATHOLOGY FINAL REPORT - CH: SIGNIFICANT CHANGE UP
TRIGL SERPL-MCNC: 101 MG/DL — SIGNIFICANT CHANGE UP (ref 10–149)

## 2018-01-05 RX ORDER — ELECTROLYTE SOLUTION,INJ
1 VIAL (ML) INTRAVENOUS
Qty: 0 | Refills: 0 | Status: DISCONTINUED | OUTPATIENT
Start: 2018-01-05 | End: 2018-01-05

## 2018-01-05 RX ORDER — I.V. FAT EMULSION 20 G/100ML
0.3 EMULSION INTRAVENOUS
Qty: 24 | Refills: 0 | Status: DISCONTINUED | OUTPATIENT
Start: 2018-01-05 | End: 2018-01-05

## 2018-01-05 RX ADMIN — I.V. FAT EMULSION 5 GM/KG/DAY: 20 EMULSION INTRAVENOUS at 23:32

## 2018-01-05 RX ADMIN — Medication 2: at 11:38

## 2018-01-05 RX ADMIN — Medication 1 EACH: at 23:32

## 2018-01-05 RX ADMIN — HYDROMORPHONE HYDROCHLORIDE 30 MILLILITER(S): 2 INJECTION INTRAMUSCULAR; INTRAVENOUS; SUBCUTANEOUS at 07:12

## 2018-01-05 RX ADMIN — HYDROMORPHONE HYDROCHLORIDE 30 MILLILITER(S): 2 INJECTION INTRAMUSCULAR; INTRAVENOUS; SUBCUTANEOUS at 21:08

## 2018-01-05 RX ADMIN — ENOXAPARIN SODIUM 40 MILLIGRAM(S): 100 INJECTION SUBCUTANEOUS at 05:19

## 2018-01-05 NOTE — PROGRESS NOTE ADULT - SUBJECTIVE AND OBJECTIVE BOX
Patient is a 62y old  Male who presents with a chief complaint of I have gastric cancer (12 Dec 2017 11:41)    pt seen in icu [ ], reg med floor [ x  ], bed [ ], chair at bedside [x ], a+o x3 [ x ], lethargic [  ],   nad [ x ], on tpn via left upper arm picc line [x], coto to bsd,[x],  ngt to wall suction [x]      Allergies    No Known Allergies        Vitals    T(F): 97.8 (01-05-18 @ 05:58), Max: 98.6 (01-05-18 @ 00:27)  HR: 76 (01-05-18 @ 05:58) (76 - 94)  BP: 110/67 (01-05-18 @ 05:58) (95/50 - 116/67)  RR: 18 (01-05-18 @ 05:58) (16 - 18)  SpO2: 99% (01-05-18 @ 05:58) (94% - 99%)  Wt(kg): --  CAPILLARY BLOOD GLUCOSE  162 (03 Jan 2018 14:54)      POCT Blood Glucose.: 133 mg/dL (05 Jan 2018 05:37)      Labs                          9.0    9.3   )-----------( 173      ( 04 Jan 2018 07:48 )             27.9       01-04    142  |  109<H>  |  15  ----------------------------<  152<H>  3.9   |  26  |  0.66    Ca    8.1<L>      04 Jan 2018 07:48  Phos  3.4     01-04  Mg     1.9     01-04    TPro  6.1  /  Alb  2.0<L>  /  TBili  0.3  /  DBili  x   /  AST  45<H>  /  ALT  76<H>  /  AlkPhos  78  01-04            .Urine Clean Catch (Midstream)  12-21 @ 11:20   10,000 - 49,000 CFU/mL Escherichia coli  <10,000 CFU/ml Normal Urogenital keila present  --  Escherichia coli      .Blood  12-21 @ 11:19   No growth at 5 days.  --  --      .Blood Blood  12-21 @ 11:17   No growth at 5 days.  --  --          Radiology Results      Meds    MEDICATIONS  (STANDING):  dextrose 5%. 1000 milliLiter(s) (50 mL/Hr) IV Continuous <Continuous>  dextrose 50% Injectable 12.5 Gram(s) IV Push once  dextrose 50% Injectable 25 Gram(s) IV Push once  dextrose 50% Injectable 25 Gram(s) IV Push once  enoxaparin Injectable 40 milliGRAM(s) SubCutaneous every 24 hours  fat emulsion (Fish Oil and Plant Based) 20% Infusion 0.3 Gm/kG/Day (5 mL/Hr) IV Continuous <Continuous>  HYDROmorphone PCA (1 mG/mL) 30 milliLiter(s) PCA Continuous PCA Continuous  insulin lispro (HumaLOG) corrective regimen sliding scale   SubCutaneous every 6 hours  Parenteral Nutrition - Adult 1 Each (83 mL/Hr) TPN Continuous <Continuous>  sodium chloride 0.9% lock flush 20 milliLiter(s) IV Push once  sodium chloride 0.9%. 1000 milliLiter(s) (75 mL/Hr) IV Continuous <Continuous>      MEDICATIONS  (PRN):  acetaminophen   Tablet 650 milliGRAM(s) Oral every 6 hours PRN mild pain  dextrose Gel 1 Dose(s) Oral once PRN Blood Glucose LESS THAN 70 milliGRAM(s)/deciliter  glucagon  Injectable 1 milliGRAM(s) IntraMuscular once PRN Glucose LESS THAN 70 milligrams/deciliter  naloxone Injectable 0.1 milliGRAM(s) IV Push every 3 minutes PRN For ANY of the following changes in patient status:  A. RR LESS THAN 10 breaths per minute, B. Oxygen saturation LESS THAN 90%, C. Sedation score of 6  ondansetron Injectable 4 milliGRAM(s) IV Push every 6 hours PRN Nausea  ondansetron Injectable 4 milliGRAM(s) IV Push every 8 hours PRN Nausea and/or Vomiting  sodium chloride 0.9% lock flush 10 milliLiter(s) IV Push every 1 hour PRN After each medication administration  sodium chloride 0.9% lock flush 10 milliLiter(s) IV Push every 12 hours PRN Lumen of catheter NOT used      Physical Exam    Neuro :  no focal deficits  Respiratory: CTA B/L  CV: RRR, S1S2, no murmurs,   Abdominal: Soft, ND, incision w wound vac in place, clean and dry,   Extremities: No edema, + peripheral pulses    ASSESSMENT    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   h/o Prediabetes  HLD (hyperlipidemia)  HTN (hypertension)  Malignant neoplasm of stomach, unspecified location  H/O prostate biopsy  History of arthroscopy of right shoulder  History of arthroscopy of left knee  History of appendectomy        PLAN      s/p total gatrectomy with cristobal-en-y esophagojejunal anastomosis  s/p ex lap with nicole pod1  heme onc f/u  cont npo  no further c/o nausea/ vomiting  cont reglan q6  cont tpn  renal f/u  gi and dvt prophylaxis  incentive spirometer  pulm f/u  cont pain control  pain mgmt f/u  oob to chair  cont current meds Patient is a 62y old  Male who presents with a chief complaint of I have gastric cancer (12 Dec 2017 11:41)    pt seen in icu [ ], reg med floor [ x  ], bed [ ], chair at bedside [x ], a+o x3 [ x ], lethargic [  ],   nad [ x ], on tpn via left upper arm picc line [x],      Allergies    No Known Allergies        Vitals    T(F): 97.8 (01-05-18 @ 05:58), Max: 98.6 (01-05-18 @ 00:27)  HR: 76 (01-05-18 @ 05:58) (76 - 94)  BP: 110/67 (01-05-18 @ 05:58) (95/50 - 116/67)  RR: 18 (01-05-18 @ 05:58) (16 - 18)  SpO2: 99% (01-05-18 @ 05:58) (94% - 99%)  Wt(kg): --  CAPILLARY BLOOD GLUCOSE  162 (03 Jan 2018 14:54)      POCT Blood Glucose.: 133 mg/dL (05 Jan 2018 05:37)      Labs                          9.0    9.3   )-----------( 173      ( 04 Jan 2018 07:48 )             27.9       01-04    142  |  109<H>  |  15  ----------------------------<  152<H>  3.9   |  26  |  0.66    Ca    8.1<L>      04 Jan 2018 07:48  Phos  3.4     01-04  Mg     1.9     01-04    TPro  6.1  /  Alb  2.0<L>  /  TBili  0.3  /  DBili  x   /  AST  45<H>  /  ALT  76<H>  /  AlkPhos  78  01-04            .Urine Clean Catch (Midstream)  12-21 @ 11:20   10,000 - 49,000 CFU/mL Escherichia coli  <10,000 CFU/ml Normal Urogenital keila present  --  Escherichia coli      .Blood  12-21 @ 11:19   No growth at 5 days.  --  --      .Blood Blood  12-21 @ 11:17   No growth at 5 days.  --  --          Radiology Results      Meds    MEDICATIONS  (STANDING):  dextrose 5%. 1000 milliLiter(s) (50 mL/Hr) IV Continuous <Continuous>  dextrose 50% Injectable 12.5 Gram(s) IV Push once  dextrose 50% Injectable 25 Gram(s) IV Push once  dextrose 50% Injectable 25 Gram(s) IV Push once  enoxaparin Injectable 40 milliGRAM(s) SubCutaneous every 24 hours  fat emulsion (Fish Oil and Plant Based) 20% Infusion 0.3 Gm/kG/Day (5 mL/Hr) IV Continuous <Continuous>  HYDROmorphone PCA (1 mG/mL) 30 milliLiter(s) PCA Continuous PCA Continuous  insulin lispro (HumaLOG) corrective regimen sliding scale   SubCutaneous every 6 hours  Parenteral Nutrition - Adult 1 Each (83 mL/Hr) TPN Continuous <Continuous>  sodium chloride 0.9% lock flush 20 milliLiter(s) IV Push once  sodium chloride 0.9%. 1000 milliLiter(s) (75 mL/Hr) IV Continuous <Continuous>      MEDICATIONS  (PRN):  acetaminophen   Tablet 650 milliGRAM(s) Oral every 6 hours PRN mild pain  dextrose Gel 1 Dose(s) Oral once PRN Blood Glucose LESS THAN 70 milliGRAM(s)/deciliter  glucagon  Injectable 1 milliGRAM(s) IntraMuscular once PRN Glucose LESS THAN 70 milligrams/deciliter  naloxone Injectable 0.1 milliGRAM(s) IV Push every 3 minutes PRN For ANY of the following changes in patient status:  A. RR LESS THAN 10 breaths per minute, B. Oxygen saturation LESS THAN 90%, C. Sedation score of 6  ondansetron Injectable 4 milliGRAM(s) IV Push every 6 hours PRN Nausea  ondansetron Injectable 4 milliGRAM(s) IV Push every 8 hours PRN Nausea and/or Vomiting  sodium chloride 0.9% lock flush 10 milliLiter(s) IV Push every 1 hour PRN After each medication administration  sodium chloride 0.9% lock flush 10 milliLiter(s) IV Push every 12 hours PRN Lumen of catheter NOT used      Physical Exam    Neuro :  no focal deficits  Respiratory: CTA B/L  CV: RRR, S1S2, no murmurs,   Abdominal: Soft, ND, incision w wound vac in place, clean and dry,   Extremities: No edema, + peripheral pulses    ASSESSMENT    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   h/o Prediabetes  HLD (hyperlipidemia)  HTN (hypertension)  Malignant neoplasm of stomach, unspecified location  H/O prostate biopsy  History of arthroscopy of right shoulder  History of arthroscopy of left knee  History of appendectomy        PLAN      s/p total gatrectomy with cristobal-en-y esophagojejunal anastomosis  s/p ex lap with nicole  heme onc f/u  cont npo  no further c/o nausea/ vomiting  no bm or flattus as yet  cont tpn  renal f/u  gi and dvt prophylaxis  incentive spirometer  pulm f/u  cont pain control  pain mgmt f/u  oob to chair  cont current meds

## 2018-01-05 NOTE — PROGRESS NOTE ADULT - PROBLEM SELECTOR PLAN 1
s/p total gastrectomy. Now with partial SBO s/p lysis of adhesions 1/3. Initiated on TPN on 12/22.   Tolerated TPN well.  c/w TPN with lipids.  Will adjust electrolytes accordingly increase potassium phosphate)   c/w ISS, monitor FS. Pt requiring low dose of insulin  Check CMP/Mg/Phos. Daily weights and strict I/Os.

## 2018-01-05 NOTE — PROGRESS NOTE ADULT - SUBJECTIVE AND OBJECTIVE BOX
[facility] NEPHROLOGY- METABOLIC SUPPORT SERVICE PROGRESS NOTE    62 yr old male with gastric adenocarcinoma s/p Total gastrectomy with [place]-en-Y esophagojejunal anastomosis  12/13/2017 . Hosp cb partial small bowel obstruction distal to the anastomosis. Now on TPN. s/p lysis of adhesions 1/3       Hospital Medications: Medications reviewed.  REVIEW OF SYSTEMS:  CONSTITUTIONAL: No fevers or chills  RESPIRATORY: No shortness of breath  CARDIOVASCULAR: No chest pain.  GASTROINTESTINAL: No nausea & vomiting. No flatus or BM or abdominal pain  VASCULAR: No bilateral lower extremity edema.     VITALS:  T(F): 97.7 (01-05-18 @ 09:00), Max: 98.6 (01-05-18 @ 00:27)  HR: 80 (01-05-18 @ 09:00)  BP: 100/64 (01-05-18 @ 09:00)  RR: 16 (01-05-18 @ 09:00)  SpO2: 96% (01-05-18 @ 09:00)  Wt(kg): --    01-04 @ 07:01  -  01-05 @ 07:00  --------------------------------------------------------  IN: 0 mL / OUT: 2650 mL / NET: -2650 mL      PHYSICAL EXAM:  Constitutional: NAD,   Respiratory: CTAB, no wheezes, rales or rhonchi  Cardiovascular: S1, S2, RRR  Gastrointestinal: Decreased BS+, soft, +wound vac  Extremities: No peripheral edema  Access: left arm PICC line benign    LABS:  01-05    137  |  103  |  10  ----------------------------<  135<H>  3.5   |  26  |  0.50    Ca    8.0<L>      05 Jan 2018 08:25  Phos  2.4     01-05  Mg     1.6     01-05    TPro  6.5  /  Alb  2.1<L>  /  TBili  0.3  /  DBili      /  AST  35  /  ALT  69<H>  /  AlkPhos  79  01-05    Creatinine Trend: 0.50 <--, 0.66 <--, 0.72 <--, 0.72 <--, 0.80 <--, 0.82 <--, 0.80 <--                        9.0    9.3   )-----------( 173      ( 04 Jan 2018 07:48 )             27.9     Urine Studies:

## 2018-01-05 NOTE — PROGRESS NOTE ADULT - PROBLEM SELECTOR PLAN 3
Pre-renal MEKHI resolved. s/p IVF. Agree with d/c IVF.   Renal US with no hydro.  Strict I/Os. Avoid nephrotoxins/ NSAIDs/ RCA. Monitor BMP.

## 2018-01-05 NOTE — PROGRESS NOTE ADULT - SUBJECTIVE AND OBJECTIVE BOX
Patient is a 62y old  Male who presents with a chief complaint of I have gastric cancer (12 Dec 2017 11:41)  Patient is awake, alert, comfortable OOB to chair. NGT is out. Still with no flatus or BM. S/p exp lap and SHAR. Had resection of gastric cancer recently followed by SBO requiring reintervention again.    INTERVAL HPI/OVERNIGHT EVENTS:      VITAL SIGNS:  T(F): 97.7 (01-05-18 @ 09:00)  HR: 80 (01-05-18 @ 09:00)  BP: 100/64 (01-05-18 @ 09:00)  RR: 16 (01-05-18 @ 09:00)  SpO2: 96% (01-05-18 @ 09:00)  Wt(kg): --  I&O's Detail    04 Jan 2018 07:01  -  05 Jan 2018 07:00  --------------------------------------------------------  IN:  Total IN: 0 mL    OUT:    Indwelling Catheter - Urethral: 2650 mL  Total OUT: 2650 mL    Total NET: -2650 mL              REVIEW OF SYSTEMS:    CONSTITUTIONAL:  No fevers, chills, sweats    HEENT:  Eyes:  No diplopia or blurred vision. ENT:  No earache, sore throat or runny nose.    CARDIOVASCULAR:  No pressure, squeezing, tightness, or heaviness about the chest; no palpitations.    RESPIRATORY:  Per HPI    GASTROINTESTINAL:  No abdominal pain, nausea, vomiting or diarrhea.    GENITOURINARY:  No dysuria, frequency or urgency.    NEUROLOGIC:  No paresthesias, fasciculations, seizures or weakness.    PSYCHIATRIC:  No disorder of thought or mood.      PHYSICAL EXAM:    Constitutional: Well developed and nourished  Eyes:Perrla  ENMT: normal  Neck:supple  Respiratory: good air entry  Cardiovascular: S1 S2 regular  Gastrointestinal: Soft, Incisional tenderness  Extremities: No edema  Vascular:normal  Neurological:Awake, alert,Ox3  Musculoskeletal:Normal      MEDICATIONS  (STANDING):  dextrose 5%. 1000 milliLiter(s) (50 mL/Hr) IV Continuous <Continuous>  dextrose 50% Injectable 12.5 Gram(s) IV Push once  dextrose 50% Injectable 25 Gram(s) IV Push once  dextrose 50% Injectable 25 Gram(s) IV Push once  enoxaparin Injectable 40 milliGRAM(s) SubCutaneous every 24 hours  fat emulsion (Fish Oil and Plant Based) 20% Infusion 0.3 Gm/kG/Day (5 mL/Hr) IV Continuous <Continuous>  fat emulsion (Fish Oil and Plant Based) 20% Infusion 0.3 Gm/kG/Day (5 mL/Hr) IV Continuous <Continuous>  HYDROmorphone PCA (1 mG/mL) 30 milliLiter(s) PCA Continuous PCA Continuous  insulin lispro (HumaLOG) corrective regimen sliding scale   SubCutaneous every 6 hours  Parenteral Nutrition - Adult 1 Each (83 mL/Hr) TPN Continuous <Continuous>  Parenteral Nutrition - Adult 1 Each (83 mL/Hr) TPN Continuous <Continuous>  sodium chloride 0.9% lock flush 20 milliLiter(s) IV Push once    MEDICATIONS  (PRN):  acetaminophen   Tablet 650 milliGRAM(s) Oral every 6 hours PRN mild pain  dextrose Gel 1 Dose(s) Oral once PRN Blood Glucose LESS THAN 70 milliGRAM(s)/deciliter  glucagon  Injectable 1 milliGRAM(s) IntraMuscular once PRN Glucose LESS THAN 70 milligrams/deciliter  naloxone Injectable 0.1 milliGRAM(s) IV Push every 3 minutes PRN For ANY of the following changes in patient status:  A. RR LESS THAN 10 breaths per minute, B. Oxygen saturation LESS THAN 90%, C. Sedation score of 6  ondansetron Injectable 4 milliGRAM(s) IV Push every 6 hours PRN Nausea  ondansetron Injectable 4 milliGRAM(s) IV Push every 8 hours PRN Nausea and/or Vomiting  sodium chloride 0.9% lock flush 10 milliLiter(s) IV Push every 1 hour PRN After each medication administration  sodium chloride 0.9% lock flush 10 milliLiter(s) IV Push every 12 hours PRN Lumen of catheter NOT used      Allergies    No Known Allergies    Intolerances        LABS:                        9.0    9.3   )-----------( 173      ( 04 Jan 2018 07:48 )             27.9     01-05    137  |  103  |  10  ----------------------------<  135<H>  3.5   |  26  |  0.50    Ca    8.0<L>      05 Jan 2018 08:25  Phos  2.4     01-05  Mg     1.6     01-05    TPro  6.5  /  Alb  2.1<L>  /  TBili  0.3  /  DBili  x   /  AST  35  /  ALT  69<H>  /  AlkPhos  79  01-05              CAPILLARY BLOOD GLUCOSE      POCT Blood Glucose.: 133 mg/dL (05 Jan 2018 05:37)  POCT Blood Glucose.: 131 mg/dL (04 Jan 2018 23:59)  POCT Blood Glucose.: 139 mg/dL (04 Jan 2018 17:24)  POCT Blood Glucose.: 144 mg/dL (04 Jan 2018 11:26)        RADIOLOGY & ADDITIONAL TESTS:    CXR:    Ct scan chest:    ekg;    echo:

## 2018-01-05 NOTE — PROGRESS NOTE ADULT - SUBJECTIVE AND OBJECTIVE BOX
Post Operative Day #2  s/p ex-lap, lysis of adhesions  Pt denies flatus or BM, mild incisional pain      MEDICATIONS  (PRN):  acetaminophen   Tablet 650 milliGRAM(s) Oral every 6 hours PRN mild pain  dextrose Gel 1 Dose(s) Oral once PRN Blood Glucose LESS THAN 70 milliGRAM(s)/deciliter  glucagon  Injectable 1 milliGRAM(s) IntraMuscular once PRN Glucose LESS THAN 70 milligrams/deciliter  naloxone Injectable 0.1 milliGRAM(s) IV Push every 3 minutes PRN For ANY of the following changes in patient status:  A. RR LESS THAN 10 breaths per minute, B. Oxygen saturation LESS THAN 90%, C. Sedation score of 6  ondansetron Injectable 4 milliGRAM(s) IV Push every 6 hours PRN Nausea  ondansetron Injectable 4 milliGRAM(s) IV Push every 8 hours PRN Nausea and/or Vomiting  sodium chloride 0.9% lock flush 10 milliLiter(s) IV Push every 1 hour PRN After each medication administration  sodium chloride 0.9% lock flush 10 milliLiter(s) IV Push every 12 hours PRN Lumen of catheter NOT used      Vital Signs Last 24 Hrs  T(C): 36.6 (05 Jan 2018 05:58), Max: 37 (05 Jan 2018 00:27)  T(F): 97.8 (05 Jan 2018 05:58), Max: 98.6 (05 Jan 2018 00:27)  HR: 76 (05 Jan 2018 05:58) (76 - 94)  BP: 110/67 (05 Jan 2018 05:58) (95/50 - 116/67)  RR: 18 (05 Jan 2018 05:58) (16 - 18)  SpO2: 99% (05 Jan 2018 05:58) (94% - 99%)      Physical Exam:    Gen: awake, alert oriented NAD  HEENT: NGT in place with bilious output  Abdomen: HEMANT dressing in place, mild incisional tenderness, non distended  Extremities: warm to touch no c/c/e, no calf tenderness bilaterally      coto: 2650mL clear urine

## 2018-01-05 NOTE — PROGRESS NOTE ADULT - SUBJECTIVE AND OBJECTIVE BOX
INTERVAL HPI/OVERNIGHT EVENTS:  Pt stable.   NPO  flatus, no BM  No nausea or vomiting    Vital Signs Last 24 Hrs  T(C): 36.9 (05 Jan 2018 14:48), Max: 37 (05 Jan 2018 00:27)  T(F): 98.4 (05 Jan 2018 14:48), Max: 98.6 (05 Jan 2018 00:27)  HR: 83 (05 Jan 2018 14:48) (75 - 83)  BP: 110/60 (05 Jan 2018 14:48) (100/64 - 116/67)  BP(mean): --  RR: 16 (05 Jan 2018 14:48) (16 - 18)  SpO2: 98% (05 Jan 2018 14:48) (96% - 99%)    Physical:  Abdomen: Soft nondistended, nontender.  Dressing clean    I&O's Summary    04 Jan 2018 07:01  -  05 Jan 2018 07:00  --------------------------------------------------------  IN: 0 mL / OUT: 2650 mL / NET: -2650 mL        LABS:                        9.0    9.3   )-----------( 173      ( 04 Jan 2018 07:48 )             27.9             01-05    137  |  103  |  10  ----------------------------<  135<H>  3.5   |  26  |  0.50    Ca    8.0<L>      05 Jan 2018 08:25  Phos  2.4     01-05  Mg     1.6     01-05    TPro  6.5  /  Alb  2.1<L>  /  TBili  0.3  /  DBili  x   /  AST  35  /  ALT  69<H>  /  AlkPhos  79  01-05

## 2018-01-05 NOTE — PROGRESS NOTE ADULT - ASSESSMENT
s/p exploratory laparotomy, lysis of adhesions 1/4, total gastrectomy 12/13  1. Cont NPO  2.NGT removed  3. OOB ambulate encouraged  4. DVT prophylaxis  5.coto removed, wait for trial of void

## 2018-01-06 LAB
ALBUMIN SERPL ELPH-MCNC: 2.5 G/DL — LOW (ref 3.5–5)
ALP SERPL-CCNC: 105 U/L — SIGNIFICANT CHANGE UP (ref 40–120)
ALT FLD-CCNC: 89 U/L DA — HIGH (ref 10–60)
ANION GAP SERPL CALC-SCNC: 8 MMOL/L — SIGNIFICANT CHANGE UP (ref 5–17)
AST SERPL-CCNC: 47 U/L — HIGH (ref 10–40)
BILIRUB SERPL-MCNC: 0.4 MG/DL — SIGNIFICANT CHANGE UP (ref 0.2–1.2)
BUN SERPL-MCNC: 13 MG/DL — SIGNIFICANT CHANGE UP (ref 7–18)
CA-I BLD-SCNC: 1.25 MMOL/L — SIGNIFICANT CHANGE UP (ref 1.12–1.3)
CALCIUM SERPL-MCNC: 8.9 MG/DL — SIGNIFICANT CHANGE UP (ref 8.4–10.5)
CHLORIDE SERPL-SCNC: 102 MMOL/L — SIGNIFICANT CHANGE UP (ref 96–108)
CO2 SERPL-SCNC: 26 MMOL/L — SIGNIFICANT CHANGE UP (ref 22–31)
CREAT SERPL-MCNC: 0.76 MG/DL — SIGNIFICANT CHANGE UP (ref 0.5–1.3)
GLUCOSE BLDC GLUCOMTR-MCNC: 101 MG/DL — HIGH (ref 70–99)
GLUCOSE BLDC GLUCOMTR-MCNC: 133 MG/DL — HIGH (ref 70–99)
GLUCOSE BLDC GLUCOMTR-MCNC: 134 MG/DL — HIGH (ref 70–99)
GLUCOSE BLDC GLUCOMTR-MCNC: 141 MG/DL — HIGH (ref 70–99)
GLUCOSE SERPL-MCNC: 167 MG/DL — HIGH (ref 70–99)
HCT VFR BLD CALC: 33.9 % — LOW (ref 39–50)
HGB BLD-MCNC: 10.9 G/DL — LOW (ref 13–17)
MAGNESIUM SERPL-MCNC: 1.7 MG/DL — SIGNIFICANT CHANGE UP (ref 1.6–2.6)
MCHC RBC-ENTMCNC: 29.8 PG — SIGNIFICANT CHANGE UP (ref 27–34)
MCHC RBC-ENTMCNC: 32.2 GM/DL — SIGNIFICANT CHANGE UP (ref 32–36)
MCV RBC AUTO: 92.5 FL — SIGNIFICANT CHANGE UP (ref 80–100)
PHOSPHATE SERPL-MCNC: 3.6 MG/DL — SIGNIFICANT CHANGE UP (ref 2.5–4.5)
PLATELET # BLD AUTO: 203 K/UL — SIGNIFICANT CHANGE UP (ref 150–400)
POTASSIUM SERPL-MCNC: 4.2 MMOL/L — SIGNIFICANT CHANGE UP (ref 3.5–5.3)
POTASSIUM SERPL-SCNC: 4.2 MMOL/L — SIGNIFICANT CHANGE UP (ref 3.5–5.3)
PROT SERPL-MCNC: 7.4 G/DL — SIGNIFICANT CHANGE UP (ref 6–8.3)
RBC # BLD: 3.66 M/UL — LOW (ref 4.2–5.8)
RBC # FLD: 12.3 % — SIGNIFICANT CHANGE UP (ref 10.3–14.5)
SODIUM SERPL-SCNC: 136 MMOL/L — SIGNIFICANT CHANGE UP (ref 135–145)
WBC # BLD: 7 K/UL — SIGNIFICANT CHANGE UP (ref 3.8–10.5)
WBC # FLD AUTO: 7 K/UL — SIGNIFICANT CHANGE UP (ref 3.8–10.5)

## 2018-01-06 RX ORDER — OXYCODONE AND ACETAMINOPHEN 5; 325 MG/1; MG/1
1 TABLET ORAL EVERY 6 HOURS
Qty: 0 | Refills: 0 | Status: DISCONTINUED | OUTPATIENT
Start: 2018-01-06 | End: 2018-01-12

## 2018-01-06 RX ORDER — MORPHINE SULFATE 50 MG/1
2 CAPSULE, EXTENDED RELEASE ORAL EVERY 4 HOURS
Qty: 0 | Refills: 0 | Status: DISCONTINUED | OUTPATIENT
Start: 2018-01-06 | End: 2018-01-12

## 2018-01-06 RX ORDER — ELECTROLYTE SOLUTION,INJ
1 VIAL (ML) INTRAVENOUS
Qty: 0 | Refills: 0 | Status: DISCONTINUED | OUTPATIENT
Start: 2018-01-06 | End: 2018-01-06

## 2018-01-06 RX ORDER — I.V. FAT EMULSION 20 G/100ML
0.3 EMULSION INTRAVENOUS
Qty: 24 | Refills: 0 | Status: DISCONTINUED | OUTPATIENT
Start: 2018-01-06 | End: 2018-01-07

## 2018-01-06 RX ADMIN — MORPHINE SULFATE 2 MILLIGRAM(S): 50 CAPSULE, EXTENDED RELEASE ORAL at 22:13

## 2018-01-06 RX ADMIN — HYDROMORPHONE HYDROCHLORIDE 30 MILLILITER(S): 2 INJECTION INTRAMUSCULAR; INTRAVENOUS; SUBCUTANEOUS at 09:01

## 2018-01-06 RX ADMIN — MORPHINE SULFATE 2 MILLIGRAM(S): 50 CAPSULE, EXTENDED RELEASE ORAL at 22:28

## 2018-01-06 RX ADMIN — ENOXAPARIN SODIUM 40 MILLIGRAM(S): 100 INJECTION SUBCUTANEOUS at 06:10

## 2018-01-06 NOTE — PROGRESS NOTE ADULT - PROBLEM SELECTOR PLAN 1
con't small meals of clear liquid diet  keep TPN at current rate, titrate in AM if con't jordyn clears  OOB  HEMANT to self suction  d/c PCA, pain control prn  incentive spirometry

## 2018-01-06 NOTE — PROGRESS NOTE ADULT - SUBJECTIVE AND OBJECTIVE BOX
[facility] NEPHROLOGY- METABOLIC SUPPORT SERVICE PROGRESS NOTE    62 yr old male with gastric adenocarcinoma s/p Total gastrectomy with Kelby-en-Y esophagojejunal anastomosis  12/13/2017 . Hosp cb partial small bowel obstruction distal to the anastomosis. Now on TPN. s/p lysis of adhesions 1/3       Hospital Medications: Medications reviewed.  REVIEW OF SYSTEMS:  CONSTITUTIONAL: No fevers or chills  RESPIRATORY: No shortness of breath  CARDIOVASCULAR: No chest pain.  GASTROINTESTINAL: No nausea & vomiting. No flatus or BM or abdominal pain  VASCULAR: No bilateral lower extremity edema.     VITALS:  T(F): 98.2 (01-06-18 @ 14:25), Max: 98.7 (01-05-18 @ 22:30)  HR: 91 (01-06-18 @ 14:25)  BP: 112/64 (01-06-18 @ 14:25)  RR: 16 (01-06-18 @ 14:25)  SpO2: 97% (01-06-18 @ 14:25)  Wt(kg): --    01-05 @ 07:01  -  01-06 @ 07:00  --------------------------------------------------------  IN: 968 mL / OUT: 1250 mL / NET: -282 mL      PHYSICAL EXAM:  Constitutional: NAD,   Respiratory: CTAB, no wheezes, rales or rhonchi  Cardiovascular: S1, S2, RRR  Gastrointestinal: Decreased BS+, soft, +wound vac  Extremities: No peripheral edema  Access: left arm PICC line benign    LABS:  01-06    136  |  102  |  13  ----------------------------<  167<H>  4.2   |  26  |  0.76    Ca    8.9      06 Jan 2018 08:33  Phos  3.6     01-06  Mg     1.7     01-06    TPro  7.4  /  Alb  2.5<L>  /  TBili  0.4  /  DBili      /  AST  47<H>  /  ALT  89<H>  /  AlkPhos  105  01-06    Creatinine Trend: 0.76 <--, 0.50 <--, 0.66 <--, 0.72 <--, 0.72 <--, 0.80 <--, 0.82 <--                        10.9   7.0   )-----------( 203      ( 06 Jan 2018 08:33 )             33.9

## 2018-01-06 NOTE — PROGRESS NOTE ADULT - SUBJECTIVE AND OBJECTIVE BOX
INTERVAL HPI/OVERNIGHT EVENTS:  Pt resting comfortably. No acute complaints.   Tolerating clear liquid diet. On TPN.  +latus/-BM.  Denies N/V    MEDICATIONS  (STANDING):  dextrose 5%. 1000 milliLiter(s) (50 mL/Hr) IV Continuous <Continuous>  dextrose 50% Injectable 12.5 Gram(s) IV Push once  dextrose 50% Injectable 25 Gram(s) IV Push once  dextrose 50% Injectable 25 Gram(s) IV Push once  enoxaparin Injectable 40 milliGRAM(s) SubCutaneous every 24 hours  fat emulsion (Fish Oil and Plant Based) 20% Infusion 0.3 Gm/kG/Day (5 mL/Hr) IV Continuous <Continuous>  insulin lispro (HumaLOG) corrective regimen sliding scale   SubCutaneous every 6 hours  Parenteral Nutrition - Adult 1 Each (83 mL/Hr) TPN Continuous <Continuous>  sodium chloride 0.9% lock flush 20 milliLiter(s) IV Push once    MEDICATIONS  (PRN):  acetaminophen   Tablet 650 milliGRAM(s) Oral every 6 hours PRN mild pain  dextrose Gel 1 Dose(s) Oral once PRN Blood Glucose LESS THAN 70 milliGRAM(s)/deciliter  glucagon  Injectable 1 milliGRAM(s) IntraMuscular once PRN Glucose LESS THAN 70 milligrams/deciliter  morphine  - Injectable 2 milliGRAM(s) IV Push every 4 hours PRN Severe Pain (7 - 10)  ondansetron Injectable 4 milliGRAM(s) IV Push every 8 hours PRN Nausea and/or Vomiting  oxyCODONE    5 mG/acetaminophen 325 mG 1 Tablet(s) Oral every 6 hours PRN Moderate Pain (4 - 6)  sodium chloride 0.9% lock flush 10 milliLiter(s) IV Push every 1 hour PRN After each medication administration  sodium chloride 0.9% lock flush 10 milliLiter(s) IV Push every 12 hours PRN Lumen of catheter NOT used      Vital Signs Last 24 Hrs  T(C): 36.8 (06 Jan 2018 06:00), Max: 37.1 (05 Jan 2018 22:30)  T(F): 98.3 (06 Jan 2018 06:00), Max: 98.7 (05 Jan 2018 22:30)  HR: 79 (06 Jan 2018 06:00) (75 - 93)  BP: 108/64 (06 Jan 2018 06:00) (101/64 - 125/68)  BP(mean): --  RR: 16 (06 Jan 2018 06:00) (16 - 18)  SpO2: 97% (06 Jan 2018 06:00) (97% - 99%)    Physical:  General: A&Ox3. NAD.  Abdomen: Soft nondistended, HEMANT dressing with good suction, minimally saturated at medial aspect.    I&O's Detail    05 Jan 2018 07:01  -  06 Jan 2018 07:00  --------------------------------------------------------  IN:    fat emulsion (Fish Oil and Plant Based) 20% Infusion: 55 mL    TPN (Total Parenteral Nutrition): 913 mL  Total IN: 968 mL    OUT:    Voided: 1250 mL  Total OUT: 1250 mL    Total NET: -282 mL    LABS:                        10.9   7.0   )-----------( 203      ( 06 Jan 2018 08:33 )             33.9             01-06    136  |  102  |  13  ----------------------------<  167<H>  4.2   |  26  |  0.76    Ca    8.9      06 Jan 2018 08:33  Phos  3.6     01-06  Mg     1.7     01-06    TPro  7.4  /  Alb  2.5<L>  /  TBili  0.4  /  DBili  x   /  AST  47<H>  /  ALT  89<H>  /  AlkPhos  105  01-06

## 2018-01-06 NOTE — PROGRESS NOTE ADULT - PROBLEM SELECTOR PLAN 3
Pre-renal MEKHI resolved s/p IVF, now maintained on fluids via TPN.  Renal US with no hydro.  Strict I/Os. Avoid nephrotoxins/ NSAIDs/ RCA. Monitor BMP.

## 2018-01-06 NOTE — PROGRESS NOTE ADULT - SUBJECTIVE AND OBJECTIVE BOX
Patient is a 62y old  Male who presents with a chief complaint of I have gastric cancer (12 Dec 2017 11:41)    pt seen in icu [ ], reg med floor [ x  ], bed [ ], chair at bedside [x ], a+o x3 [ x ], lethargic [  ],   nad [ x ], on tpn via left upper arm picc line [x],        Allergies    No Known Allergies        Vitals    T(F): 98.3 (01-06-18 @ 06:00), Max: 98.7 (01-05-18 @ 22:30)  HR: 79 (01-06-18 @ 06:00) (75 - 93)  BP: 108/64 (01-06-18 @ 06:00) (100/64 - 125/68)  RR: 16 (01-06-18 @ 06:00) (16 - 18)  SpO2: 97% (01-06-18 @ 06:00) (96% - 99%)  Wt(kg): --  CAPILLARY BLOOD GLUCOSE      POCT Blood Glucose.: 141 mg/dL (06 Jan 2018 05:36)      Labs                          9.0    9.3   )-----------( 173      ( 04 Jan 2018 07:48 )             27.9       01-05    137  |  103  |  10  ----------------------------<  135<H>  3.5   |  26  |  0.50    Ca    8.0<L>      05 Jan 2018 08:25  Phos  2.4     01-05  Mg     1.6     01-05    TPro  6.5  /  Alb  2.1<L>  /  TBili  0.3  /  DBili  x   /  AST  35  /  ALT  69<H>  /  AlkPhos  79  01-05            .Urine Clean Catch (Midstream)  12-21 @ 11:20   10,000 - 49,000 CFU/mL Escherichia coli  <10,000 CFU/ml Normal Urogenital keila present  --  Escherichia coli      .Blood  12-21 @ 11:19   No growth at 5 days.  --  --      .Blood Blood  12-21 @ 11:17   No growth at 5 days.  --  --          Radiology Results      Meds    MEDICATIONS  (STANDING):  dextrose 5%. 1000 milliLiter(s) (50 mL/Hr) IV Continuous <Continuous>  dextrose 50% Injectable 12.5 Gram(s) IV Push once  dextrose 50% Injectable 25 Gram(s) IV Push once  dextrose 50% Injectable 25 Gram(s) IV Push once  enoxaparin Injectable 40 milliGRAM(s) SubCutaneous every 24 hours  fat emulsion (Fish Oil and Plant Based) 20% Infusion 0.3 Gm/kG/Day (5 mL/Hr) IV Continuous <Continuous>  HYDROmorphone PCA (1 mG/mL) 30 milliLiter(s) PCA Continuous PCA Continuous  insulin lispro (HumaLOG) corrective regimen sliding scale   SubCutaneous every 6 hours  Parenteral Nutrition - Adult 1 Each (83 mL/Hr) TPN Continuous <Continuous>  sodium chloride 0.9% lock flush 20 milliLiter(s) IV Push once      MEDICATIONS  (PRN):  acetaminophen   Tablet 650 milliGRAM(s) Oral every 6 hours PRN mild pain  dextrose Gel 1 Dose(s) Oral once PRN Blood Glucose LESS THAN 70 milliGRAM(s)/deciliter  glucagon  Injectable 1 milliGRAM(s) IntraMuscular once PRN Glucose LESS THAN 70 milligrams/deciliter  naloxone Injectable 0.1 milliGRAM(s) IV Push every 3 minutes PRN For ANY of the following changes in patient status:  A. RR LESS THAN 10 breaths per minute, B. Oxygen saturation LESS THAN 90%, C. Sedation score of 6  ondansetron Injectable 4 milliGRAM(s) IV Push every 6 hours PRN Nausea  ondansetron Injectable 4 milliGRAM(s) IV Push every 8 hours PRN Nausea and/or Vomiting  sodium chloride 0.9% lock flush 10 milliLiter(s) IV Push every 1 hour PRN After each medication administration  sodium chloride 0.9% lock flush 10 milliLiter(s) IV Push every 12 hours PRN Lumen of catheter NOT used      Physical Exam    Neuro :  no focal deficits  Respiratory: CTA B/L  CV: RRR, S1S2, no murmurs,   Abdominal: Soft, ND, incision w wound vac in place, clean and dry,   Extremities: No edema, + peripheral pulses    ASSESSMENT    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   h/o Prediabetes  HLD (hyperlipidemia)  HTN (hypertension)  Malignant neoplasm of stomach, unspecified location  H/O prostate biopsy  History of arthroscopy of right shoulder  History of arthroscopy of left knee  History of appendectomy        PLAN      s/p total gatrectomy with cristobal-en-y esophagojejunal anastomosis  s/p ex lap with nicole  heme onc f/u  cont npo  no further c/o nausea/ vomiting  no bm or flattus as yet  cont tpn  renal f/u  gi and dvt prophylaxis  incentive spirometer  pulm f/u  cont pain control  pain mgmt f/u  oob to chair  cont current meds Patient is a 62y old  Male who presents with a chief complaint of I have gastric cancer (12 Dec 2017 11:41)    pt seen in icu [ ], reg med floor [ x  ], bed [ ], chair at bedside [x ], a+o x3 [ x ], lethargic [  ],   nad [ x ], on tpn via left upper arm picc line [x],        Allergies    No Known Allergies        Vitals    T(F): 98.3 (01-06-18 @ 06:00), Max: 98.7 (01-05-18 @ 22:30)  HR: 79 (01-06-18 @ 06:00) (75 - 93)  BP: 108/64 (01-06-18 @ 06:00) (100/64 - 125/68)  RR: 16 (01-06-18 @ 06:00) (16 - 18)  SpO2: 97% (01-06-18 @ 06:00) (96% - 99%)  Wt(kg): --  CAPILLARY BLOOD GLUCOSE      POCT Blood Glucose.: 141 mg/dL (06 Jan 2018 05:36)      Labs                          9.0    9.3   )-----------( 173      ( 04 Jan 2018 07:48 )             27.9       01-05    137  |  103  |  10  ----------------------------<  135<H>  3.5   |  26  |  0.50    Ca    8.0<L>      05 Jan 2018 08:25  Phos  2.4     01-05  Mg     1.6     01-05    TPro  6.5  /  Alb  2.1<L>  /  TBili  0.3  /  DBili  x   /  AST  35  /  ALT  69<H>  /  AlkPhos  79  01-05            .Urine Clean Catch (Midstream)  12-21 @ 11:20   10,000 - 49,000 CFU/mL Escherichia coli  <10,000 CFU/ml Normal Urogenital keila present  --  Escherichia coli      .Blood  12-21 @ 11:19   No growth at 5 days.  --  --      .Blood Blood  12-21 @ 11:17   No growth at 5 days.  --  --          Radiology Results      Meds    MEDICATIONS  (STANDING):  dextrose 5%. 1000 milliLiter(s) (50 mL/Hr) IV Continuous <Continuous>  dextrose 50% Injectable 12.5 Gram(s) IV Push once  dextrose 50% Injectable 25 Gram(s) IV Push once  dextrose 50% Injectable 25 Gram(s) IV Push once  enoxaparin Injectable 40 milliGRAM(s) SubCutaneous every 24 hours  fat emulsion (Fish Oil and Plant Based) 20% Infusion 0.3 Gm/kG/Day (5 mL/Hr) IV Continuous <Continuous>  HYDROmorphone PCA (1 mG/mL) 30 milliLiter(s) PCA Continuous PCA Continuous  insulin lispro (HumaLOG) corrective regimen sliding scale   SubCutaneous every 6 hours  Parenteral Nutrition - Adult 1 Each (83 mL/Hr) TPN Continuous <Continuous>  sodium chloride 0.9% lock flush 20 milliLiter(s) IV Push once      MEDICATIONS  (PRN):  acetaminophen   Tablet 650 milliGRAM(s) Oral every 6 hours PRN mild pain  dextrose Gel 1 Dose(s) Oral once PRN Blood Glucose LESS THAN 70 milliGRAM(s)/deciliter  glucagon  Injectable 1 milliGRAM(s) IntraMuscular once PRN Glucose LESS THAN 70 milligrams/deciliter  naloxone Injectable 0.1 milliGRAM(s) IV Push every 3 minutes PRN For ANY of the following changes in patient status:  A. RR LESS THAN 10 breaths per minute, B. Oxygen saturation LESS THAN 90%, C. Sedation score of 6  ondansetron Injectable 4 milliGRAM(s) IV Push every 6 hours PRN Nausea  ondansetron Injectable 4 milliGRAM(s) IV Push every 8 hours PRN Nausea and/or Vomiting  sodium chloride 0.9% lock flush 10 milliLiter(s) IV Push every 1 hour PRN After each medication administration  sodium chloride 0.9% lock flush 10 milliLiter(s) IV Push every 12 hours PRN Lumen of catheter NOT used      Physical Exam    Neuro :  no focal deficits  Respiratory: CTA B/L  CV: RRR, S1S2, no murmurs,   Abdominal: Soft, ND, incision w wound vac in place, clean and dry,   Extremities: No edema, + peripheral pulses    ASSESSMENT    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   h/o Prediabetes  HLD (hyperlipidemia)  HTN (hypertension)  Malignant neoplasm of stomach, unspecified location  H/O prostate biopsy  History of arthroscopy of right shoulder  History of arthroscopy of left knee  History of appendectomy        PLAN      s/p total gatrectomy with cristobal-en-y esophagojejunal anastomosis  s/p ex lap with nicole  heme onc f/u  no further c/o nausea/ vomiting  flattus but no bm as yet  pt started on clears  cont tpn  renal f/u  gi and dvt prophylaxis  incentive spirometer  pulm f/u  cont pain control  pain mgmt f/u  oob to chair  cont current meds

## 2018-01-06 NOTE — PROGRESS NOTE ADULT - SUBJECTIVE AND OBJECTIVE BOX
Patient is a 62y old  Male who presents with a chief complaint of I have gastric cancer (12 Dec 2017 11:41)  Awake, alert, comfortable OOB to chair in NAD. NGT is out. Tolerating po clear fluid. No nausea or vomiting. Remains on TPN as well    INTERVAL HPI/OVERNIGHT EVENTS:      VITAL SIGNS:  T(F): 98.3 (01-06-18 @ 06:00)  HR: 79 (01-06-18 @ 06:00)  BP: 108/64 (01-06-18 @ 06:00)  RR: 16 (01-06-18 @ 06:00)  SpO2: 97% (01-06-18 @ 06:00)  Wt(kg): --  I&O's Detail    05 Jan 2018 07:01  -  06 Jan 2018 07:00  --------------------------------------------------------  IN:    fat emulsion (Fish Oil and Plant Based) 20% Infusion: 55 mL    TPN (Total Parenteral Nutrition): 913 mL  Total IN: 968 mL    OUT:    Voided: 1250 mL  Total OUT: 1250 mL    Total NET: -282 mL              REVIEW OF SYSTEMS:    CONSTITUTIONAL:  No fevers, chills, sweats    HEENT:  Eyes:  No diplopia or blurred vision. ENT:  No earache, sore throat or runny nose.    CARDIOVASCULAR:  No pressure, squeezing, tightness, or heaviness about the chest; no palpitations.    RESPIRATORY:  Per HPI    GASTROINTESTINAL:  + abdominal pain but no nausea, vomiting or diarrhea.    GENITOURINARY:  No dysuria, frequency or urgency.    NEUROLOGIC:  No paresthesias, fasciculations, seizures or weakness.    PSYCHIATRIC:  No disorder of thought or mood.      PHYSICAL EXAM:    Constitutional: Well developed and nourished  Eyes:Perrla  ENMT: normal  Neck:supple  Respiratory: good air entry  Cardiovascular: S1 S2 regular  Gastrointestinal: Soft, Incisional tenderness  Extremities: No edema  Vascular:normal  Neurological:Awake, alert,Ox3  Musculoskeletal:Normal      MEDICATIONS  (STANDING):  dextrose 5%. 1000 milliLiter(s) (50 mL/Hr) IV Continuous <Continuous>  dextrose 50% Injectable 12.5 Gram(s) IV Push once  dextrose 50% Injectable 25 Gram(s) IV Push once  dextrose 50% Injectable 25 Gram(s) IV Push once  enoxaparin Injectable 40 milliGRAM(s) SubCutaneous every 24 hours  fat emulsion (Fish Oil and Plant Based) 20% Infusion 0.3 Gm/kG/Day (5 mL/Hr) IV Continuous <Continuous>  insulin lispro (HumaLOG) corrective regimen sliding scale   SubCutaneous every 6 hours  Parenteral Nutrition - Adult 1 Each (83 mL/Hr) TPN Continuous <Continuous>  sodium chloride 0.9% lock flush 20 milliLiter(s) IV Push once    MEDICATIONS  (PRN):  acetaminophen   Tablet 650 milliGRAM(s) Oral every 6 hours PRN mild pain  dextrose Gel 1 Dose(s) Oral once PRN Blood Glucose LESS THAN 70 milliGRAM(s)/deciliter  glucagon  Injectable 1 milliGRAM(s) IntraMuscular once PRN Glucose LESS THAN 70 milligrams/deciliter  morphine  - Injectable 2 milliGRAM(s) IV Push every 4 hours PRN Severe Pain (7 - 10)  ondansetron Injectable 4 milliGRAM(s) IV Push every 8 hours PRN Nausea and/or Vomiting  oxyCODONE    5 mG/acetaminophen 325 mG 1 Tablet(s) Oral every 6 hours PRN Moderate Pain (4 - 6)  sodium chloride 0.9% lock flush 10 milliLiter(s) IV Push every 1 hour PRN After each medication administration  sodium chloride 0.9% lock flush 10 milliLiter(s) IV Push every 12 hours PRN Lumen of catheter NOT used      Allergies    No Known Allergies    Intolerances        LABS:                        10.9   7.0   )-----------( 203      ( 06 Jan 2018 08:33 )             33.9     01-06    136  |  102  |  13  ----------------------------<  167<H>  4.2   |  26  |  0.76    Ca    8.9      06 Jan 2018 08:33  Phos  3.6     01-06  Mg     1.7     01-06    TPro  7.4  /  Alb  2.5<L>  /  TBili  0.4  /  DBili  x   /  AST  47<H>  /  ALT  89<H>  /  AlkPhos  105  01-06              CAPILLARY BLOOD GLUCOSE      POCT Blood Glucose.: 141 mg/dL (06 Jan 2018 05:36)  POCT Blood Glucose.: 141 mg/dL (05 Jan 2018 23:28)  POCT Blood Glucose.: 94 mg/dL (05 Jan 2018 17:54)  POCT Blood Glucose.: 179 mg/dL (05 Jan 2018 11:31)        RADIOLOGY & ADDITIONAL TESTS:    CXR:    Ct scan chest:    ekg;    echo:

## 2018-01-07 DIAGNOSIS — R74.0 NONSPECIFIC ELEVATION OF LEVELS OF TRANSAMINASE AND LACTIC ACID DEHYDROGENASE [LDH]: ICD-10-CM

## 2018-01-07 LAB
ALBUMIN SERPL ELPH-MCNC: 2.4 G/DL — LOW (ref 3.5–5)
ALP SERPL-CCNC: 98 U/L — SIGNIFICANT CHANGE UP (ref 40–120)
ALT FLD-CCNC: 90 U/L DA — HIGH (ref 10–60)
ANION GAP SERPL CALC-SCNC: 4 MMOL/L — LOW (ref 5–17)
AST SERPL-CCNC: 50 U/L — HIGH (ref 10–40)
BILIRUB SERPL-MCNC: 0.5 MG/DL — SIGNIFICANT CHANGE UP (ref 0.2–1.2)
BUN SERPL-MCNC: 14 MG/DL — SIGNIFICANT CHANGE UP (ref 7–18)
CA-I BLD-SCNC: 1.23 MMOL/L — SIGNIFICANT CHANGE UP (ref 1.12–1.3)
CALCIUM SERPL-MCNC: 8.6 MG/DL — SIGNIFICANT CHANGE UP (ref 8.4–10.5)
CHLORIDE SERPL-SCNC: 102 MMOL/L — SIGNIFICANT CHANGE UP (ref 96–108)
CO2 SERPL-SCNC: 28 MMOL/L — SIGNIFICANT CHANGE UP (ref 22–31)
CREAT SERPL-MCNC: 0.64 MG/DL — SIGNIFICANT CHANGE UP (ref 0.5–1.3)
GLUCOSE BLDC GLUCOMTR-MCNC: 131 MG/DL — HIGH (ref 70–99)
GLUCOSE BLDC GLUCOMTR-MCNC: 169 MG/DL — HIGH (ref 70–99)
GLUCOSE BLDC GLUCOMTR-MCNC: 172 MG/DL — HIGH (ref 70–99)
GLUCOSE BLDC GLUCOMTR-MCNC: 174 MG/DL — HIGH (ref 70–99)
GLUCOSE SERPL-MCNC: 166 MG/DL — HIGH (ref 70–99)
MAGNESIUM SERPL-MCNC: 1.6 MG/DL — SIGNIFICANT CHANGE UP (ref 1.6–2.6)
PHOSPHATE SERPL-MCNC: 3.3 MG/DL — SIGNIFICANT CHANGE UP (ref 2.5–4.5)
POTASSIUM SERPL-MCNC: 4 MMOL/L — SIGNIFICANT CHANGE UP (ref 3.5–5.3)
POTASSIUM SERPL-SCNC: 4 MMOL/L — SIGNIFICANT CHANGE UP (ref 3.5–5.3)
PROT SERPL-MCNC: 6.6 G/DL — SIGNIFICANT CHANGE UP (ref 6–8.3)
SODIUM SERPL-SCNC: 134 MMOL/L — LOW (ref 135–145)

## 2018-01-07 RX ORDER — ELECTROLYTE SOLUTION,INJ
1 VIAL (ML) INTRAVENOUS
Qty: 0 | Refills: 0 | Status: DISCONTINUED | OUTPATIENT
Start: 2018-01-07 | End: 2018-01-08

## 2018-01-07 RX ORDER — I.V. FAT EMULSION 20 G/100ML
0.3 EMULSION INTRAVENOUS
Qty: 24 | Refills: 0 | Status: DISCONTINUED | OUTPATIENT
Start: 2018-01-07 | End: 2018-01-08

## 2018-01-07 RX ORDER — ACETAMINOPHEN 500 MG
650 TABLET ORAL ONCE
Qty: 0 | Refills: 0 | Status: COMPLETED | OUTPATIENT
Start: 2018-01-07 | End: 2018-01-07

## 2018-01-07 RX ADMIN — Medication 650 MILLIGRAM(S): at 23:47

## 2018-01-07 RX ADMIN — Medication 2: at 06:18

## 2018-01-07 RX ADMIN — MORPHINE SULFATE 2 MILLIGRAM(S): 50 CAPSULE, EXTENDED RELEASE ORAL at 04:23

## 2018-01-07 RX ADMIN — Medication 2: at 18:23

## 2018-01-07 RX ADMIN — SODIUM CHLORIDE 10 MILLILITER(S): 9 INJECTION INTRAMUSCULAR; INTRAVENOUS; SUBCUTANEOUS at 09:21

## 2018-01-07 RX ADMIN — ENOXAPARIN SODIUM 40 MILLIGRAM(S): 100 INJECTION SUBCUTANEOUS at 05:18

## 2018-01-07 RX ADMIN — Medication 2: at 12:02

## 2018-01-07 RX ADMIN — MORPHINE SULFATE 2 MILLIGRAM(S): 50 CAPSULE, EXTENDED RELEASE ORAL at 04:08

## 2018-01-07 RX ADMIN — I.V. FAT EMULSION 5 GM/KG/DAY: 20 EMULSION INTRAVENOUS at 00:06

## 2018-01-07 NOTE — PROGRESS NOTE ADULT - SUBJECTIVE AND OBJECTIVE BOX
[facility] NEPHROLOGY- METABOLIC SUPPORT SERVICE PROGRESS NOTE    62 yr old male with gastric adenocarcinoma s/p Total gastrectomy with Kelby-en-Y esophagojejunal anastomosis  12/13/2017 . Hosp cb partial small bowel obstruction distal to the anastomosis. Now on TPN. s/p lysis of adhesions 1/3.    Pt taking in some clear liquid diet, but reports abdominal pain with any intake.       Hospital Medications: Medications reviewed.  REVIEW OF SYSTEMS:  CONSTITUTIONAL: No fevers or chills  RESPIRATORY: No shortness of breath  CARDIOVASCULAR: No chest pain.  GASTROINTESTINAL: No nausea & vomiting. No flatus or BM or abdominal pain  VASCULAR: No bilateral lower extremity edema.     VITALS:  T(F): 98.2 (01-07-18 @ 05:10), Max: 98.2 (01-06-18 @ 14:25)  HR: 91 (01-07-18 @ 05:10)  BP: 112/70 (01-07-18 @ 05:10)  RR: 16 (01-07-18 @ 05:10)  SpO2: 98% (01-07-18 @ 05:10)  Wt(kg): --    01-06 @ 07:01  -  01-07 @ 07:00  --------------------------------------------------------  IN: 852 mL / OUT: 2350 mL / NET: -1498 mL      PHYSICAL EXAM:  Constitutional: NAD,   Respiratory: CTAB, no wheezes, rales or rhonchi  Cardiovascular: S1, S2, RRR  Gastrointestinal: Decreased BS+, soft, +wound vac  Extremities: No peripheral edema  Access: left arm PICC line benign    LABS:  01-07    134<L>  |  102  |  14  ----------------------------<  166<H>  4.0   |  28  |  0.64    Ca    8.6      07 Jan 2018 07:03  Phos  3.3     01-07  Mg     1.6     01-07    TPro  6.6  /  Alb  2.4<L>  /  TBili  0.5  /  DBili      /  AST  50<H>  /  ALT  90<H>  /  AlkPhos  98  01-07    Creatinine Trend: 0.64 <--, 0.76 <--, 0.50 <--, 0.66 <--, 0.72 <--, 0.72 <--, 0.80 <--                        10.9   7.0   )-----------( 203      ( 06 Jan 2018 08:33 )             33.9

## 2018-01-07 NOTE — PROGRESS NOTE ADULT - ASSESSMENT
62 yr old male with gastric adenocarcinoma s/p Total gastrectomy with Kelby-en-Y esophagojejunal anastomosis  12/13/2017 . Hosp cb partial small bowel obstruction distal to the anastomosis. Now on TPN.     Pt stable on TPN.       TPN:   Total Calories: 1580  Fat 24g    g  Dextrose 300 g

## 2018-01-07 NOTE — PROGRESS NOTE ADULT - SUBJECTIVE AND OBJECTIVE BOX
Patient is a 62y old  Male who presents with a chief complaint of I have gastric cancer (12 Dec 2017 11:41)  Awake, alert, comfortable in NAD. Complaining of pain in epigastrium after taking clear liquids diet. S/p total gastrectomy and esophagojejunostomy because of gastric Cancer. S/p exp lap because of SBO and SHAR.    INTERVAL HPI/OVERNIGHT EVENTS:      VITAL SIGNS:  T(F): 98.2 (01-07-18 @ 05:10)  HR: 91 (01-07-18 @ 05:10)  BP: 112/70 (01-07-18 @ 05:10)  RR: 16 (01-07-18 @ 05:10)  SpO2: 98% (01-07-18 @ 05:10)  Wt(kg): --  I&O's Detail    06 Jan 2018 07:01  -  07 Jan 2018 07:00  --------------------------------------------------------  IN:    fat emulsion (Fish Oil and Plant Based) 20% Infusion: 105 mL    TPN (Total Parenteral Nutrition): 747 mL  Total IN: 852 mL    OUT:    Voided: 2350 mL  Total OUT: 2350 mL    Total NET: -1498 mL      07 Jan 2018 07:01  -  07 Jan 2018 11:26  --------------------------------------------------------  IN:    fat emulsion (Fish Oil and Plant Based) 20% Infusion: 10 mL    fat emulsion (Fish Oil and Plant Based) 20% Infusion: 166 mL  Total IN: 176 mL    OUT:    Voided: 100 mL  Total OUT: 100 mL    Total NET: 76 mL              REVIEW OF SYSTEMS:    CONSTITUTIONAL:  No fevers, chills, sweats    HEENT:  Eyes:  No diplopia or blurred vision. ENT:  No earache, sore throat or runny nose.    CARDIOVASCULAR:  No pressure, squeezing, tightness, or heaviness about the chest; no palpitations.    RESPIRATORY:  Per HPI    GASTROINTESTINAL:  No abdominal pain, nausea, vomiting or diarrhea.    GENITOURINARY:  No dysuria, frequency or urgency.    NEUROLOGIC:  No paresthesias, fasciculations, seizures or weakness.    PSYCHIATRIC:  No disorder of thought or mood.      PHYSICAL EXAM:    Constitutional: Well developed and nourished  Eyes:Perrla  ENMT: normal  Neck:supple  Respiratory: good air entry  Cardiovascular: S1 S2 regular  Gastrointestinal: Soft, Incisional tenderness  Extremities: No edema  Vascular:normal  Neurological:Awake, alert,Ox3  Musculoskeletal:Normal      MEDICATIONS  (STANDING):  dextrose 5%. 1000 milliLiter(s) (50 mL/Hr) IV Continuous <Continuous>  dextrose 50% Injectable 12.5 Gram(s) IV Push once  dextrose 50% Injectable 25 Gram(s) IV Push once  dextrose 50% Injectable 25 Gram(s) IV Push once  enoxaparin Injectable 40 milliGRAM(s) SubCutaneous every 24 hours  fat emulsion (Fish Oil and Plant Based) 20% Infusion 0.3 Gm/kG/Day (5 mL/Hr) IV Continuous <Continuous>  fat emulsion (Fish Oil and Plant Based) 20% Infusion 0.3 Gm/kG/Day (5 mL/Hr) IV Continuous <Continuous>  insulin lispro (HumaLOG) corrective regimen sliding scale   SubCutaneous every 6 hours  Parenteral Nutrition - Adult 1 Each (83 mL/Hr) TPN Continuous <Continuous>  Parenteral Nutrition - Adult 1 Each (83 mL/Hr) TPN Continuous <Continuous>  sodium chloride 0.9% lock flush 20 milliLiter(s) IV Push once    MEDICATIONS  (PRN):  acetaminophen   Tablet 650 milliGRAM(s) Oral every 6 hours PRN mild pain  dextrose Gel 1 Dose(s) Oral once PRN Blood Glucose LESS THAN 70 milliGRAM(s)/deciliter  glucagon  Injectable 1 milliGRAM(s) IntraMuscular once PRN Glucose LESS THAN 70 milligrams/deciliter  morphine  - Injectable 2 milliGRAM(s) IV Push every 4 hours PRN Severe Pain (7 - 10)  ondansetron Injectable 4 milliGRAM(s) IV Push every 8 hours PRN Nausea and/or Vomiting  oxyCODONE    5 mG/acetaminophen 325 mG 1 Tablet(s) Oral every 6 hours PRN Moderate Pain (4 - 6)  sodium chloride 0.9% lock flush 10 milliLiter(s) IV Push every 1 hour PRN After each medication administration  sodium chloride 0.9% lock flush 10 milliLiter(s) IV Push every 12 hours PRN Lumen of catheter NOT used      Allergies    No Known Allergies    Intolerances        LABS:                        10.9   7.0   )-----------( 203      ( 06 Jan 2018 08:33 )             33.9     01-07    134<L>  |  102  |  14  ----------------------------<  166<H>  4.0   |  28  |  0.64    Ca    8.6      07 Jan 2018 07:03  Phos  3.3     01-07  Mg     1.6     01-07    TPro  6.6  /  Alb  2.4<L>  /  TBili  0.5  /  DBili  x   /  AST  50<H>  /  ALT  90<H>  /  AlkPhos  98  01-07              CAPILLARY BLOOD GLUCOSE      POCT Blood Glucose.: 174 mg/dL (07 Jan 2018 06:13)  POCT Blood Glucose.: 134 mg/dL (06 Jan 2018 23:55)  POCT Blood Glucose.: 101 mg/dL (06 Jan 2018 17:30)        RADIOLOGY & ADDITIONAL TESTS:    CXR:    Ct scan chest:    ekg;    echo:

## 2018-01-07 NOTE — PROGRESS NOTE ADULT - SUBJECTIVE AND OBJECTIVE BOX
SUBJECTIVE    Nausea: [ ] YES [X ] NO           Vomiting: [ ] YES [X ] NO  Flatus: [X ] YES [ ] NO             Bowel Movement: [ ] YES [X ] NO            Diarrhea: [ ] YES [ X] NO  Voiding normally: [X ]YES [ ]NO  Pain under control: [X ] YES [ ] NO  Tolerating limited liquids--he notes pain upon drinking beyond a certain amount--related to over drinking relative to small pouch at esophago-jejunostomy  Ambulated: [X ] YES [ ]NO  Using Incentive Spirometer: [X ] YES [ ] NO    VITALS  Vital Signs Last 24 Hrs  T(C): 36.8 (07 Jan 2018 05:10), Max: 36.8 (06 Jan 2018 14:25)  T(F): 98.2 (07 Jan 2018 05:10), Max: 98.2 (06 Jan 2018 14:25)  HR: 91 (07 Jan 2018 05:10) (87 - 91)  BP: 112/70 (07 Jan 2018 05:10) (108/58 - 112/70)  BP(mean): --  RR: 16 (07 Jan 2018 05:10) (16 - 17)  SpO2: 98% (07 Jan 2018 05:10) (97% - 98%)    I&O's Summary    06 Jan 2018 07:01  -  07 Jan 2018 07:00  --------------------------------------------------------  IN: 852 mL / OUT: 2350 mL / NET: -1498 mL        PHYSICAL EXAMINATION    Abdomen: softly distended, tender to left hemiabdomen, but mild      LABS                        10.9   7.0   )-----------( 203      ( 06 Jan 2018 08:33 )             33.9     01-07    134<L>  |  102  |  14  ----------------------------<  166<H>  4.0   |  28  |  0.64    Ca    8.6      07 Jan 2018 07:03  Phos  3.3     01-07  Mg     1.6     01-07    TPro  6.6  /  Alb  2.4<L>  /  TBili  0.5  /  DBili  x   /  AST  50<H>  /  ALT  90<H>  /  AlkPhos  98  01-07     LIVER FUNCTIONS - ( 07 Jan 2018 07:03 )  Alb: 2.4 g/dL / Pro: 6.6 g/dL / ALK PHOS: 98 U/L / ALT: 90 U/L DA / AST: 50 U/L / GGT: x             MEDICATIONS  MEDICATIONS  (STANDING):  dextrose 5%. 1000 milliLiter(s) (50 mL/Hr) IV Continuous <Continuous>  dextrose 50% Injectable 12.5 Gram(s) IV Push once  dextrose 50% Injectable 25 Gram(s) IV Push once  dextrose 50% Injectable 25 Gram(s) IV Push once  enoxaparin Injectable 40 milliGRAM(s) SubCutaneous every 24 hours  fat emulsion (Fish Oil and Plant Based) 20% Infusion 0.3 Gm/kG/Day (5 mL/Hr) IV Continuous <Continuous>  insulin lispro (HumaLOG) corrective regimen sliding scale   SubCutaneous every 6 hours  Parenteral Nutrition - Adult 1 Each (83 mL/Hr) TPN Continuous <Continuous>  sodium chloride 0.9% lock flush 20 milliLiter(s) IV Push once    MEDICATIONS  (PRN):  acetaminophen   Tablet 650 milliGRAM(s) Oral every 6 hours PRN mild pain  dextrose Gel 1 Dose(s) Oral once PRN Blood Glucose LESS THAN 70 milliGRAM(s)/deciliter  glucagon  Injectable 1 milliGRAM(s) IntraMuscular once PRN Glucose LESS THAN 70 milligrams/deciliter  morphine  - Injectable 2 milliGRAM(s) IV Push every 4 hours PRN Severe Pain (7 - 10)  ondansetron Injectable 4 milliGRAM(s) IV Push every 8 hours PRN Nausea and/or Vomiting  oxyCODONE    5 mG/acetaminophen 325 mG 1 Tablet(s) Oral every 6 hours PRN Moderate Pain (4 - 6)  sodium chloride 0.9% lock flush 10 milliLiter(s) IV Push every 1 hour PRN After each medication administration  sodium chloride 0.9% lock flush 10 milliLiter(s) IV Push every 12 hours PRN Lumen of catheter NOT used

## 2018-01-07 NOTE — PROGRESS NOTE ADULT - SUBJECTIVE AND OBJECTIVE BOX
Patient is a 62y old  Male who presents with a chief complaint of I have gastric cancer (12 Dec 2017 11:41)    pt seen in icu [ ], reg med floor [ x  ], bed [ ], chair at bedside [x ], a+o x3 [ x ], lethargic [  ],   nad [ x ], on tpn via left upper arm picc line [x],        Allergies    No Known Allergies        Vitals    T(F): 98.2 (01-07-18 @ 05:10), Max: 98.2 (01-06-18 @ 14:25)  HR: 91 (01-07-18 @ 05:10) (87 - 91)  BP: 112/70 (01-07-18 @ 05:10) (108/58 - 112/70)  RR: 16 (01-07-18 @ 05:10) (16 - 17)  SpO2: 98% (01-07-18 @ 05:10) (97% - 98%)  Wt(kg): --  CAPILLARY BLOOD GLUCOSE      POCT Blood Glucose.: 174 mg/dL (07 Jan 2018 06:13)      Labs                          10.9   7.0   )-----------( 203      ( 06 Jan 2018 08:33 )             33.9       01-06    136  |  102  |  13  ----------------------------<  167<H>  4.2   |  26  |  0.76    Ca    8.9      06 Jan 2018 08:33  Phos  3.6     01-06  Mg     1.7     01-06    TPro  7.4  /  Alb  2.5<L>  /  TBili  0.4  /  DBili  x   /  AST  47<H>  /  ALT  89<H>  /  AlkPhos  105  01-06            .Urine Clean Catch (Midstream)  12-21 @ 11:20   10,000 - 49,000 CFU/mL Escherichia coli  <10,000 CFU/ml Normal Urogenital keila present  --  Escherichia coli      .Blood  12-21 @ 11:19   No growth at 5 days.  --  --      .Blood Blood  12-21 @ 11:17   No growth at 5 days.  --  --          Radiology Results      Meds    MEDICATIONS  (STANDING):  dextrose 5%. 1000 milliLiter(s) (50 mL/Hr) IV Continuous <Continuous>  dextrose 50% Injectable 12.5 Gram(s) IV Push once  dextrose 50% Injectable 25 Gram(s) IV Push once  dextrose 50% Injectable 25 Gram(s) IV Push once  enoxaparin Injectable 40 milliGRAM(s) SubCutaneous every 24 hours  fat emulsion (Fish Oil and Plant Based) 20% Infusion 0.3 Gm/kG/Day (5 mL/Hr) IV Continuous <Continuous>  insulin lispro (HumaLOG) corrective regimen sliding scale   SubCutaneous every 6 hours  Parenteral Nutrition - Adult 1 Each (83 mL/Hr) TPN Continuous <Continuous>  sodium chloride 0.9% lock flush 20 milliLiter(s) IV Push once      MEDICATIONS  (PRN):  acetaminophen   Tablet 650 milliGRAM(s) Oral every 6 hours PRN mild pain  dextrose Gel 1 Dose(s) Oral once PRN Blood Glucose LESS THAN 70 milliGRAM(s)/deciliter  glucagon  Injectable 1 milliGRAM(s) IntraMuscular once PRN Glucose LESS THAN 70 milligrams/deciliter  morphine  - Injectable 2 milliGRAM(s) IV Push every 4 hours PRN Severe Pain (7 - 10)  ondansetron Injectable 4 milliGRAM(s) IV Push every 8 hours PRN Nausea and/or Vomiting  oxyCODONE    5 mG/acetaminophen 325 mG 1 Tablet(s) Oral every 6 hours PRN Moderate Pain (4 - 6)  sodium chloride 0.9% lock flush 10 milliLiter(s) IV Push every 1 hour PRN After each medication administration  sodium chloride 0.9% lock flush 10 milliLiter(s) IV Push every 12 hours PRN Lumen of catheter NOT used      Physical Exam    Neuro :  no focal deficits  Respiratory: CTA B/L  CV: RRR, S1S2, no murmurs,   Abdominal: Soft, ND, incision w wound vac in place, clean and dry,   Extremities: No edema, + peripheral pulses    ASSESSMENT    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   h/o Prediabetes  HLD (hyperlipidemia)  HTN (hypertension)  Malignant neoplasm of stomach, unspecified location  H/O prostate biopsy  History of arthroscopy of right shoulder  History of arthroscopy of left knee  History of appendectomy        PLAN      s/p total gatrectomy with cristobal-en-y esophagojejunal anastomosis  s/p ex lap with nicole  heme onc f/u  no further c/o nausea/ vomiting  flattus but no bm as yet  pt started on clears  cont tpn  renal f/u  gi and dvt prophylaxis  incentive spirometer  pulm f/u  cont pain control  pain mgmt f/u  oob to chair  cont current meds Patient is a 62y old  Male who presents with a chief complaint of I have gastric cancer (12 Dec 2017 11:41)    pt seen in icu [ ], reg med floor [ x  ], bed [ ], chair at bedside [x ], a+o x3 [ x ], lethargic [  ],   nad [ x ], on tpn via left upper arm picc line [x],        Allergies    No Known Allergies        Vitals    T(F): 98.2 (01-07-18 @ 05:10), Max: 98.2 (01-06-18 @ 14:25)  HR: 91 (01-07-18 @ 05:10) (87 - 91)  BP: 112/70 (01-07-18 @ 05:10) (108/58 - 112/70)  RR: 16 (01-07-18 @ 05:10) (16 - 17)  SpO2: 98% (01-07-18 @ 05:10) (97% - 98%)  Wt(kg): --  CAPILLARY BLOOD GLUCOSE      POCT Blood Glucose.: 174 mg/dL (07 Jan 2018 06:13)      Labs                          10.9   7.0   )-----------( 203      ( 06 Jan 2018 08:33 )             33.9       01-06    136  |  102  |  13  ----------------------------<  167<H>  4.2   |  26  |  0.76    Ca    8.9      06 Jan 2018 08:33  Phos  3.6     01-06  Mg     1.7     01-06    TPro  7.4  /  Alb  2.5<L>  /  TBili  0.4  /  DBili  x   /  AST  47<H>  /  ALT  89<H>  /  AlkPhos  105  01-06            .Urine Clean Catch (Midstream)  12-21 @ 11:20   10,000 - 49,000 CFU/mL Escherichia coli  <10,000 CFU/ml Normal Urogenital keila present  --  Escherichia coli      .Blood  12-21 @ 11:19   No growth at 5 days.  --  --      .Blood Blood  12-21 @ 11:17   No growth at 5 days.  --  --          Radiology Results      Meds    MEDICATIONS  (STANDING):  dextrose 5%. 1000 milliLiter(s) (50 mL/Hr) IV Continuous <Continuous>  dextrose 50% Injectable 12.5 Gram(s) IV Push once  dextrose 50% Injectable 25 Gram(s) IV Push once  dextrose 50% Injectable 25 Gram(s) IV Push once  enoxaparin Injectable 40 milliGRAM(s) SubCutaneous every 24 hours  fat emulsion (Fish Oil and Plant Based) 20% Infusion 0.3 Gm/kG/Day (5 mL/Hr) IV Continuous <Continuous>  insulin lispro (HumaLOG) corrective regimen sliding scale   SubCutaneous every 6 hours  Parenteral Nutrition - Adult 1 Each (83 mL/Hr) TPN Continuous <Continuous>  sodium chloride 0.9% lock flush 20 milliLiter(s) IV Push once      MEDICATIONS  (PRN):  acetaminophen   Tablet 650 milliGRAM(s) Oral every 6 hours PRN mild pain  dextrose Gel 1 Dose(s) Oral once PRN Blood Glucose LESS THAN 70 milliGRAM(s)/deciliter  glucagon  Injectable 1 milliGRAM(s) IntraMuscular once PRN Glucose LESS THAN 70 milligrams/deciliter  morphine  - Injectable 2 milliGRAM(s) IV Push every 4 hours PRN Severe Pain (7 - 10)  ondansetron Injectable 4 milliGRAM(s) IV Push every 8 hours PRN Nausea and/or Vomiting  oxyCODONE    5 mG/acetaminophen 325 mG 1 Tablet(s) Oral every 6 hours PRN Moderate Pain (4 - 6)  sodium chloride 0.9% lock flush 10 milliLiter(s) IV Push every 1 hour PRN After each medication administration  sodium chloride 0.9% lock flush 10 milliLiter(s) IV Push every 12 hours PRN Lumen of catheter NOT used      Physical Exam    Neuro :  no focal deficits  Respiratory: CTA B/L  CV: RRR, S1S2, no murmurs,   Abdominal: Soft, ND, incision w wound vac in place, clean and dry,   Extremities: No edema, + peripheral pulses    ASSESSMENT    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   h/o Prediabetes  HLD (hyperlipidemia)  HTN (hypertension)  Malignant neoplasm of stomach, unspecified location  H/O prostate biopsy  History of arthroscopy of right shoulder  History of arthroscopy of left knee  History of appendectomy        PLAN      s/p total gatrectomy with cristobal-en-y esophagojejunal anastomosis  s/p ex lap with nicole  heme onc f/u  no further c/o nausea/ vomiting  flattus but no bm as yet  pt started on clears but has some pain with cold liquids  cont tpn  renal f/u  gi and dvt prophylaxis  incentive spirometer  pulm f/u  cont pain control  pain mgmt f/u  oob to chair  cont current meds

## 2018-01-07 NOTE — PROGRESS NOTE ADULT - ASSESSMENT
62M s/p total gastrectomy and esophagojejunostomy and return to OR from SBO caused by reaction to SOSA drains. Now tolerating PO.    1) keep on clears with clear protein shakes

## 2018-01-08 LAB
ALBUMIN SERPL ELPH-MCNC: 2.5 G/DL — LOW (ref 3.5–5)
ALP SERPL-CCNC: 105 U/L — SIGNIFICANT CHANGE UP (ref 40–120)
ALT FLD-CCNC: 129 U/L DA — HIGH (ref 10–60)
ANION GAP SERPL CALC-SCNC: 8 MMOL/L — SIGNIFICANT CHANGE UP (ref 5–17)
AST SERPL-CCNC: 83 U/L — HIGH (ref 10–40)
BILIRUB SERPL-MCNC: 0.7 MG/DL — SIGNIFICANT CHANGE UP (ref 0.2–1.2)
BUN SERPL-MCNC: 14 MG/DL — SIGNIFICANT CHANGE UP (ref 7–18)
CA-I BLD-SCNC: 1.22 MMOL/L — SIGNIFICANT CHANGE UP (ref 1.12–1.3)
CALCIUM SERPL-MCNC: 8.5 MG/DL — SIGNIFICANT CHANGE UP (ref 8.4–10.5)
CHLORIDE SERPL-SCNC: 101 MMOL/L — SIGNIFICANT CHANGE UP (ref 96–108)
CO2 SERPL-SCNC: 28 MMOL/L — SIGNIFICANT CHANGE UP (ref 22–31)
CREAT SERPL-MCNC: 0.68 MG/DL — SIGNIFICANT CHANGE UP (ref 0.5–1.3)
GLUCOSE BLDC GLUCOMTR-MCNC: 160 MG/DL — HIGH (ref 70–99)
GLUCOSE BLDC GLUCOMTR-MCNC: 172 MG/DL — HIGH (ref 70–99)
GLUCOSE SERPL-MCNC: 158 MG/DL — HIGH (ref 70–99)
MAGNESIUM SERPL-MCNC: 1.8 MG/DL — SIGNIFICANT CHANGE UP (ref 1.6–2.6)
PHOSPHATE SERPL-MCNC: 3.2 MG/DL — SIGNIFICANT CHANGE UP (ref 2.5–4.5)
POTASSIUM SERPL-MCNC: 4 MMOL/L — SIGNIFICANT CHANGE UP (ref 3.5–5.3)
POTASSIUM SERPL-SCNC: 4 MMOL/L — SIGNIFICANT CHANGE UP (ref 3.5–5.3)
PROT SERPL-MCNC: 6.7 G/DL — SIGNIFICANT CHANGE UP (ref 6–8.3)
SODIUM SERPL-SCNC: 137 MMOL/L — SIGNIFICANT CHANGE UP (ref 135–145)

## 2018-01-08 RX ADMIN — I.V. FAT EMULSION 5 GM/KG/DAY: 20 EMULSION INTRAVENOUS at 00:48

## 2018-01-08 RX ADMIN — ENOXAPARIN SODIUM 40 MILLIGRAM(S): 100 INJECTION SUBCUTANEOUS at 05:57

## 2018-01-08 RX ADMIN — Medication 2: at 06:15

## 2018-01-08 NOTE — PROGRESS NOTE ADULT - NSHPATTENDINGPLANDISCUSS_GEN_ALL_CORE
Dr Flynn
surgical team and patient; patient agrees with plan of care
icu team on rounds
Sari SELLERS

## 2018-01-08 NOTE — PROGRESS NOTE ADULT - SUBJECTIVE AND OBJECTIVE BOX
Patient is a 62y old  Male who presents with a chief complaint of I have gastric cancer (12 Dec 2017 11:41)  Awake, alert, comfortable in NAD. No further abdominal pain. Tolerating clear liquid diet.    INTERVAL HPI/OVERNIGHT EVENTS:      VITAL SIGNS:  T(F): 98.9 (01-08-18 @ 06:26)  HR: 84 (01-08-18 @ 06:26)  BP: 116/71 (01-08-18 @ 06:26)  RR: 18 (01-08-18 @ 06:26)  SpO2: 97% (01-08-18 @ 06:26)  Wt(kg): --  I&O's Detail    07 Jan 2018 07:01  -  08 Jan 2018 07:00  --------------------------------------------------------  IN:    fat emulsion (Fish Oil and Plant Based) 20% Infusion: 25 mL    TPN (Total Parenteral Nutrition): 581 mL  Total IN: 606 mL    OUT:    Voided: 600 mL  Total OUT: 600 mL    Total NET: 6 mL              REVIEW OF SYSTEMS:    CONSTITUTIONAL:  No fevers, chills, sweats    HEENT:  Eyes:  No diplopia or blurred vision. ENT:  No earache, sore throat or runny nose.    CARDIOVASCULAR:  No pressure, squeezing, tightness, or heaviness about the chest; no palpitations.    RESPIRATORY:  Per HPI    GASTROINTESTINAL:  No abdominal pain, nausea, vomiting or diarrhea.    GENITOURINARY:  No dysuria, frequency or urgency.    NEUROLOGIC:  No paresthesias, fasciculations, seizures or weakness.    PSYCHIATRIC:  No disorder of thought or mood.      PHYSICAL EXAM:    Constitutional: Well developed and nourished  Eyes:Perrla  ENMT: normal  Neck:supple  Respiratory: good air entry  Cardiovascular: S1 S2 regular  Gastrointestinal: Soft, incisional tenderness  Extremities: No edema  Vascular:normal  Neurological:Awake, alert,Ox3  Musculoskeletal:Normal      MEDICATIONS  (STANDING):  dextrose 5%. 1000 milliLiter(s) (50 mL/Hr) IV Continuous <Continuous>  dextrose 50% Injectable 12.5 Gram(s) IV Push once  dextrose 50% Injectable 25 Gram(s) IV Push once  dextrose 50% Injectable 25 Gram(s) IV Push once  enoxaparin Injectable 40 milliGRAM(s) SubCutaneous every 24 hours  fat emulsion (Fish Oil and Plant Based) 20% Infusion 0.3 Gm/kG/Day (5 mL/Hr) IV Continuous <Continuous>  insulin lispro (HumaLOG) corrective regimen sliding scale   SubCutaneous every 6 hours  Parenteral Nutrition - Adult 1 Each (83 mL/Hr) TPN Continuous <Continuous>  sodium chloride 0.9% lock flush 20 milliLiter(s) IV Push once    MEDICATIONS  (PRN):  acetaminophen   Tablet 650 milliGRAM(s) Oral every 6 hours PRN mild pain  dextrose Gel 1 Dose(s) Oral once PRN Blood Glucose LESS THAN 70 milliGRAM(s)/deciliter  glucagon  Injectable 1 milliGRAM(s) IntraMuscular once PRN Glucose LESS THAN 70 milligrams/deciliter  morphine  - Injectable 2 milliGRAM(s) IV Push every 4 hours PRN Severe Pain (7 - 10)  ondansetron Injectable 4 milliGRAM(s) IV Push every 8 hours PRN Nausea and/or Vomiting  oxyCODONE    5 mG/acetaminophen 325 mG 1 Tablet(s) Oral every 6 hours PRN Moderate Pain (4 - 6)  sodium chloride 0.9% lock flush 10 milliLiter(s) IV Push every 1 hour PRN After each medication administration  sodium chloride 0.9% lock flush 10 milliLiter(s) IV Push every 12 hours PRN Lumen of catheter NOT used      Allergies    No Known Allergies    Intolerances        LABS:    01-08    137  |  101  |  14  ----------------------------<  158<H>  4.0   |  28  |  0.68    Ca    8.5      08 Jan 2018 07:04  Phos  3.2     01-08  Mg     1.8     01-08    TPro  6.7  /  Alb  2.5<L>  /  TBili  0.7  /  DBili  x   /  AST  83<H>  /  ALT  129<H>  /  AlkPhos  105  01-08              CAPILLARY BLOOD GLUCOSE      POCT Blood Glucose.: 160 mg/dL (08 Jan 2018 06:04)  POCT Blood Glucose.: 131 mg/dL (07 Jan 2018 23:33)  POCT Blood Glucose.: 169 mg/dL (07 Jan 2018 17:37)  POCT Blood Glucose.: 172 mg/dL (07 Jan 2018 11:58)        RADIOLOGY & ADDITIONAL TESTS:    CXR:    Ct scan chest:    ekg;    echo:

## 2018-01-08 NOTE — PROGRESS NOTE ADULT - ATTENDING COMMENTS
-POD #1 for gastrectomy. intubated on vented support  -SAT/SBT for extubation  -pain control  -hemodynamic support
As Above  continues to improve      Afebrile OOB    Abd soft non distended, + BM  wound clean and dry     NGT decreased to 650m( 1200)  SOSA minimal     WBC 10,000   Imp  Post op obstruction resolving   Plan  Continue TPN,NGT
Agree with above  s/p gastrectomy with partial SBO, and persistent vomiting.  On standing reglan now    Continue TPN and NPO at this time
As above  Pt with recent total gastrectomy and post op bstruction now on TPN    Afebrile, comfortable ambulating    Abd  softly distended, non tender  + BM  NGT drainage decreased to 1200 from 2100      Labs ok    Imp  Post op obstruction appears resolving  Plan  Continue TPN, NGT
patient seen and examined. Doing better, denies nausea at this time.  Agree with above assessment and plan.
Pt with obstruction following total gastrectomy, likely at jenu-jejunostomy  CT scan done shows obstruction still present but partial.  Has not had an NGT for 72 hrs vomits small amount less than daily.  WBC 12,00    SBO immediate poet op period should improve      Plan  Continue npo, TPN
[facility] NEPHROLOGY  Maik Chance M.D.  Tommie Washington D.O.  Emily Calvo M.D.  Love Winston, MSN, ANP-C  [phone]    [address] [place] [address]
[facility] NEPHROLOGY  Maik Chance M.D.  Tommie Washington D.O.  Emily Calvo M.D.  Love Winston, MSN, ANP-C  [phone]    [address] [place] [address]
[facility] NEPHROLOGY  Maik Chance M.D.  Tommie Washington D.O.  Emily Calvo M.D.  Love Winston, MSN, ANP-C  [phone]    [address] [place] [address]
[facility] NEPHROLOGY  Maik Chance M.D.  Tommie Washington D.O.  Emily Calvo M.D.  Love Winston, MSN, ANP-C  [phone]    [address] [place] [address]
[facility] NEPHROLOGY  Maik Chance M.D.  Tommie Washington D.O.  Emily Calvo M.D.  Love Winston, MSN, ANP-C  [phone]    [address] [place] [address]
[facility] NEPHROLOGY  Maik Chance M.D.  Tommie Washington D.O.  Emily Calvo M.D.  Love Winston, MSN, ANP-C  [phone]    [address] [place] [address]
[facility] NEPHROLOGY  Maik Chance M.D.  Tommie Washington D.O.  Emily Calvo M.D.  Love Winston, MSN, ANP-C  [phone]    [address] [place] [address]
[facility] NEPHROLOGY  Maik Chance M.D.  Tommie Washington D.O.  Emily Calvo M.D.  Love Winston, MSN, ANP-C  [phone]    [address] [place] [address]
[facility] NEPHROLOGY  Maik Chance M.D.  Tommie Washington D.O.  Emily Calvo M.D.  Love Winston, MSN, ANP-C  [phone]    [address] [place] [address]
[facility] NEPHROLOGY  Maik Chance M.D.  Tommie Washington D.O.  Emily Calvo M.D.  Love Winston, MSN, ANP-C  [phone]    [address] [place] [address]
[facility] NEPHROLOGY  Maik Chance M.D.  Tommie Washington D.O.  Emily Calvo M.D.  Love Winston, MSN, ANP-C  [phone]    [address] [place] [address]
[facility] NEPHROLOGY  Maik Chance M.D.  Tommie Washington D.O.  Emily Calvo M.D.  Love Winston, MSN, ANP-C  [phone]    [address] [place] [address]
[facility] NEPHROLOGY  Maik Chance M.D.  Tommie Washington D.O.  Emily Calvo M.D.  Love Winston, MSN, ANP-C  [phone]    [address] [place] [address]
Pt now off TPN. Will sign-off. Please re-consult as needed.   [facility] NEPHROLOGY  Maik Chance M.D.  Tommie Washington D.O.  Emily Calvo M.D.  Love Winston, MSN, ANP-C  [phone]    [address] [place] [id number]
[facility] NEPHROLOGY  Maik Chance M.D.  Tommie Washington D.O.  Emily Calvo M.D.  Love Winston, MSN, ANP-C  [phone]    [address] [place] [address]
[facility] NEPHROLOGY  Maik Chance M.D.  Tommie Washington D.O.  Emily Calvo M.D.  Love Winston, MSN, ANP-C  [phone]    [address] [place] [address]
[facility] NEPHROLOGY  Maik Chance M.D.  Tommie Washington D.O.  Emily Calvo M.D.  Love Winston, MSN, ANP-C  [phone]    [address] [place] [address]

## 2018-01-08 NOTE — PROGRESS NOTE ADULT - SUBJECTIVE AND OBJECTIVE BOX
INTERVAL HPI/OVERNIGHT EVENTS:  Pt stable. Afebrile this AM  Tolerating diet.   flatus/BM.    Vital Signs Last 24 Hrs  T(C): 37.2 (08 Jan 2018 06:26), Max: 38.8 (08 Jan 2018 00:24)  T(F): 98.9 (08 Jan 2018 06:26), Max: 101.8 (08 Jan 2018 00:24)  HR: 84 (08 Jan 2018 06:26) (84 - 103)  BP: 116/71 (08 Jan 2018 06:26) (94/74 - 116/71)  BP(mean): --  RR: 18 (08 Jan 2018 06:26) (17 - 18)  SpO2: 97% (08 Jan 2018 06:26) (93% - 99%)    Physical:  Abdomen: Soft nondistended, nontender.  Wound clean    I&O's Summary    07 Jan 2018 07:01  -  08 Jan 2018 07:00  --------------------------------------------------------  IN: 606 mL / OUT: 600 mL / NET: 6 mL        LABS:            01-08    137  |  101  |  14  ----------------------------<  158<H>  4.0   |  28  |  0.68    Ca    8.5      08 Jan 2018 07:04  Phos  3.2     01-08  Mg     1.8     01-08    TPro  6.7  /  Alb  2.5<L>  /  TBili  0.7  /  DBili  x   /  AST  83<H>  /  ALT  129<H>  /  AlkPhos  105  01-08

## 2018-01-08 NOTE — PROGRESS NOTE ADULT - PROBLEM SELECTOR PLAN 1
s/p total gastrectomy. Now with partial SBO s/p lysis of adhesions 1/3. Initiated on TPN on 12/22. Pt tolerating CLD, plan to advance to soft today. Pt had fever O/N. TPN held and PICC line removed. Recc Blood culture.   No need for further TPN if pt tolerates soft diet.   Monitor lytes.

## 2018-01-08 NOTE — PROGRESS NOTE ADULT - SUBJECTIVE AND OBJECTIVE BOX
Patient is a 62y old  Male who presents with a chief complaint of I have gastric cancer (12 Dec 2017 11:41)    pt seen in icu [ ], reg med floor [ x  ], bed [ ], chair at bedside [x ], a+o x3 [ x ], lethargic [  ],   nad [ x ], on tpn via left upper arm picc line [x],      Allergies    No Known Allergies        Vitals    T(F): 98.9 (01-08-18 @ 06:26), Max: 101.8 (01-08-18 @ 00:24)  HR: 84 (01-08-18 @ 06:26) (84 - 103)  BP: 116/71 (01-08-18 @ 06:26) (94/74 - 116/71)  RR: 18 (01-08-18 @ 06:26) (17 - 18)  SpO2: 97% (01-08-18 @ 06:26) (93% - 99%)  Wt(kg): --  CAPILLARY BLOOD GLUCOSE      POCT Blood Glucose.: 160 mg/dL (08 Jan 2018 06:04)      Labs                          10.9   7.0   )-----------( 203      ( 06 Jan 2018 08:33 )             33.9       01-08    137  |  101  |  14  ----------------------------<  158<H>  4.0   |  28  |  0.68    Ca    8.5      08 Jan 2018 07:04  Phos  3.2     01-08  Mg     1.8     01-08    TPro  6.7  /  Alb  2.5<L>  /  TBili  0.7  /  DBili  x   /  AST  83<H>  /  ALT  129<H>  /  AlkPhos  105  01-08            .Urine Clean Catch (Midstream)  12-21 @ 11:20   10,000 - 49,000 CFU/mL Escherichia coli  <10,000 CFU/ml Normal Urogenital keila present  --  Escherichia coli      .Blood  12-21 @ 11:19   No growth at 5 days.  --  --      .Blood Blood  12-21 @ 11:17   No growth at 5 days.  --  --          Radiology Results      Meds    MEDICATIONS  (STANDING):  dextrose 5%. 1000 milliLiter(s) (50 mL/Hr) IV Continuous <Continuous>  dextrose 50% Injectable 12.5 Gram(s) IV Push once  dextrose 50% Injectable 25 Gram(s) IV Push once  dextrose 50% Injectable 25 Gram(s) IV Push once  enoxaparin Injectable 40 milliGRAM(s) SubCutaneous every 24 hours  fat emulsion (Fish Oil and Plant Based) 20% Infusion 0.3 Gm/kG/Day (5 mL/Hr) IV Continuous <Continuous>  insulin lispro (HumaLOG) corrective regimen sliding scale   SubCutaneous every 6 hours  Parenteral Nutrition - Adult 1 Each (83 mL/Hr) TPN Continuous <Continuous>  sodium chloride 0.9% lock flush 20 milliLiter(s) IV Push once      MEDICATIONS  (PRN):  acetaminophen   Tablet 650 milliGRAM(s) Oral every 6 hours PRN mild pain  dextrose Gel 1 Dose(s) Oral once PRN Blood Glucose LESS THAN 70 milliGRAM(s)/deciliter  glucagon  Injectable 1 milliGRAM(s) IntraMuscular once PRN Glucose LESS THAN 70 milligrams/deciliter  morphine  - Injectable 2 milliGRAM(s) IV Push every 4 hours PRN Severe Pain (7 - 10)  ondansetron Injectable 4 milliGRAM(s) IV Push every 8 hours PRN Nausea and/or Vomiting  oxyCODONE    5 mG/acetaminophen 325 mG 1 Tablet(s) Oral every 6 hours PRN Moderate Pain (4 - 6)  sodium chloride 0.9% lock flush 10 milliLiter(s) IV Push every 1 hour PRN After each medication administration  sodium chloride 0.9% lock flush 10 milliLiter(s) IV Push every 12 hours PRN Lumen of catheter NOT used      Physical Exam      Neuro :  no focal deficits  Respiratory: CTA B/L  CV: RRR, S1S2, no murmurs,   Abdominal: Soft, ND, incision w wound vac in place, clean and dry,   Extremities: No edema, + peripheral pulses    ASSESSMENT    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   h/o Prediabetes  HLD (hyperlipidemia)  HTN (hypertension)  Malignant neoplasm of stomach, unspecified location  H/O prostate biopsy  History of arthroscopy of right shoulder  History of arthroscopy of left knee  History of appendectomy        PLAN      s/p total gatrectomy with cristobal-en-y esophagojejunal anastomosis  s/p ex lap with nicole  heme onc f/u  no further c/o nausea/ vomiting  flattus but no bm as yet  con clears   cont tpn  renal f/u  gi and dvt prophylaxis  incentive spirometer  pulm f/u  cont pain control  pain mgmt f/u  oob to chair  cont current meds Patient is a 62y old  Male who presents with a chief complaint of I have gastric cancer (12 Dec 2017 11:41)    pt seen in icu [ ], reg med floor [ x  ], bed [ ], chair at bedside [x ], a+o x3 [ x ], lethargic [  ],   nad [ x ], on tpn via left upper arm picc line [x],      Allergies    No Known Allergies        Vitals    T(F): 98.9 (01-08-18 @ 06:26), Max: 101.8 (01-08-18 @ 00:24)  HR: 84 (01-08-18 @ 06:26) (84 - 103)  BP: 116/71 (01-08-18 @ 06:26) (94/74 - 116/71)  RR: 18 (01-08-18 @ 06:26) (17 - 18)  SpO2: 97% (01-08-18 @ 06:26) (93% - 99%)  Wt(kg): --  CAPILLARY BLOOD GLUCOSE      POCT Blood Glucose.: 160 mg/dL (08 Jan 2018 06:04)      Labs                          10.9   7.0   )-----------( 203      ( 06 Jan 2018 08:33 )             33.9       01-08    137  |  101  |  14  ----------------------------<  158<H>  4.0   |  28  |  0.68    Ca    8.5      08 Jan 2018 07:04  Phos  3.2     01-08  Mg     1.8     01-08    TPro  6.7  /  Alb  2.5<L>  /  TBili  0.7  /  DBili  x   /  AST  83<H>  /  ALT  129<H>  /  AlkPhos  105  01-08            .Urine Clean Catch (Midstream)  12-21 @ 11:20   10,000 - 49,000 CFU/mL Escherichia coli  <10,000 CFU/ml Normal Urogenital keila present  --  Escherichia coli      .Blood  12-21 @ 11:19   No growth at 5 days.  --  --      .Blood Blood  12-21 @ 11:17   No growth at 5 days.  --  --          Radiology Results      Meds    MEDICATIONS  (STANDING):  dextrose 5%. 1000 milliLiter(s) (50 mL/Hr) IV Continuous <Continuous>  dextrose 50% Injectable 12.5 Gram(s) IV Push once  dextrose 50% Injectable 25 Gram(s) IV Push once  dextrose 50% Injectable 25 Gram(s) IV Push once  enoxaparin Injectable 40 milliGRAM(s) SubCutaneous every 24 hours  fat emulsion (Fish Oil and Plant Based) 20% Infusion 0.3 Gm/kG/Day (5 mL/Hr) IV Continuous <Continuous>  insulin lispro (HumaLOG) corrective regimen sliding scale   SubCutaneous every 6 hours  Parenteral Nutrition - Adult 1 Each (83 mL/Hr) TPN Continuous <Continuous>  sodium chloride 0.9% lock flush 20 milliLiter(s) IV Push once      MEDICATIONS  (PRN):  acetaminophen   Tablet 650 milliGRAM(s) Oral every 6 hours PRN mild pain  dextrose Gel 1 Dose(s) Oral once PRN Blood Glucose LESS THAN 70 milliGRAM(s)/deciliter  glucagon  Injectable 1 milliGRAM(s) IntraMuscular once PRN Glucose LESS THAN 70 milligrams/deciliter  morphine  - Injectable 2 milliGRAM(s) IV Push every 4 hours PRN Severe Pain (7 - 10)  ondansetron Injectable 4 milliGRAM(s) IV Push every 8 hours PRN Nausea and/or Vomiting  oxyCODONE    5 mG/acetaminophen 325 mG 1 Tablet(s) Oral every 6 hours PRN Moderate Pain (4 - 6)  sodium chloride 0.9% lock flush 10 milliLiter(s) IV Push every 1 hour PRN After each medication administration  sodium chloride 0.9% lock flush 10 milliLiter(s) IV Push every 12 hours PRN Lumen of catheter NOT used      Physical Exam      Neuro :  no focal deficits  Respiratory: CTA B/L  CV: RRR, S1S2, no murmurs,   Abdominal: Soft, ND, incision w wound vac in place, clean and dry,   Extremities: No edema, + peripheral pulses    ASSESSMENT    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   h/o Prediabetes  HLD (hyperlipidemia)  HTN (hypertension)  Malignant neoplasm of stomach, unspecified location  H/O prostate biopsy  History of arthroscopy of right shoulder  History of arthroscopy of left knee  History of appendectomy        PLAN      s/p total gatrectomy with cristobal-en-y esophagojejunal anastomosis  s/p ex lap with nicole  heme onc f/u  no further c/o nausea/ vomiting  pt having flattus and bm   cont clears   cont tpn  renal f/u  gi and dvt prophylaxis  incentive spirometer  pulm f/u  cont pain control  pain mgmt f/u  oob to chair  cont current meds

## 2018-01-08 NOTE — PROGRESS NOTE ADULT - SUBJECTIVE AND OBJECTIVE BOX
[facility] NEPHROLOGY- METABOLIC SUPPORT SERVICE PROGRESS NOTE    62 yr old male with gastric adenocarcinoma s/p Total gastrectomy with Kelby-en-Y esophagojejunal anastomosis  12/13/2017 . Hosp cb partial small bowel obstruction distal to the anastomosis. Now on TPN. s/p lysis of adhesions 1/3.    Pt tolerating clear liquid diet. Pt developed fever O/N. PICC line removed and TPN held.       Hospital Medications: Medications reviewed.  REVIEW OF SYSTEMS:  CONSTITUTIONAL: + fever, No chills  RESPIRATORY: No shortness of breath  CARDIOVASCULAR: No chest pain.  GASTROINTESTINAL: No nausea & vomiting. No flatus or abdominal pain. +BM x2 today  VASCULAR: No bilateral lower extremity edema.     VITALS:  T(F): 98.9 (01-08-18 @ 06:26), Max: 101.8 (01-08-18 @ 00:24)  HR: 84 (01-08-18 @ 06:26)  BP: 116/71 (01-08-18 @ 06:26)  RR: 18 (01-08-18 @ 06:26)  SpO2: 97% (01-08-18 @ 06:26)  Wt(kg): --    01-07 @ 07:01  -  01-08 @ 07:00  --------------------------------------------------------  IN: 606 mL / OUT: 600 mL / NET: 6 mL      PHYSICAL EXAM:  Constitutional: NAD,   Respiratory: CTAB, no wheezes, rales or rhonchi  Cardiovascular: S1, S2, RRR  Gastrointestinal: BS+, soft, +wound vac  Extremities: No peripheral edema    LABS:  01-08    137  |  101  |  14  ----------------------------<  158<H>  4.0   |  28  |  0.68    Ca    8.5      08 Jan 2018 07:04  Phos  3.2     01-08  Mg     1.8     01-08    TPro  6.7  /  Alb  2.5<L>  /  TBili  0.7  /  DBili      /  AST  83<H>  /  ALT  129<H>  /  AlkPhos  105  01-08    Creatinine Trend: 0.68 <--, 0.64 <--, 0.76 <--, 0.50 <--, 0.66 <--, 0.72 <--, 0.72 <--    Urine Studies:

## 2018-01-08 NOTE — PROGRESS NOTE ADULT - PROBLEM SELECTOR PLAN 3
Pre-renal MEKHI resolved s/p IVF  Renal US with no hydro.  Strict I/Os. Avoid nephrotoxins/ NSAIDs/ RCA. Monitor BMP.

## 2018-01-08 NOTE — PROGRESS NOTE ADULT - SUBJECTIVE AND OBJECTIVE BOX
Patient examined at bedside, no complaints  No nausea, no vomiting  Tolerating clear liquid diet    T(C): 37.2 (01-08-18 @ 06:26), Max: 38.8 (01-08-18 @ 00:24)  HR: 84 (01-08-18 @ 06:26) (84 - 103)  BP: 116/71 (01-08-18 @ 06:26) (94/74 - 116/71)  RR: 18 (01-08-18 @ 06:26) (17 - 18)  SpO2: 97% (01-08-18 @ 06:26) (93% - 99%)  Wt(kg): --      01-07 @ 07:01  -  01-08 @ 07:00  --------------------------------------------------------  IN: 606 mL / OUT: 600 mL / NET: 6 mL        Physical Exam  General: AAOx3, No acute distress  Skin: No jaundice, no icterus  Abdomen: soft, nontender, nondistended, john dressing in place  Extremities: non edematous, no calf pain bilaterally                     01-08    137  |  101  |  14  ----------------------------<  158<H>  4.0   |  28  |  0.68    Ca    8.5      08 Jan 2018 07:04  Phos  3.2     01-08  Mg     1.8     01-08    TPro  6.7  /  Alb  2.5<L>  /  TBili  0.7  /  DBili  x   /  AST  83<H>  /  ALT  129<H>  /  AlkPhos  105  01-08

## 2018-01-09 LAB — GLUCOSE BLDC GLUCOMTR-MCNC: 98 MG/DL — SIGNIFICANT CHANGE UP (ref 70–99)

## 2018-01-09 RX ADMIN — OXYCODONE AND ACETAMINOPHEN 1 TABLET(S): 5; 325 TABLET ORAL at 01:45

## 2018-01-09 RX ADMIN — OXYCODONE AND ACETAMINOPHEN 1 TABLET(S): 5; 325 TABLET ORAL at 00:43

## 2018-01-09 RX ADMIN — ENOXAPARIN SODIUM 40 MILLIGRAM(S): 100 INJECTION SUBCUTANEOUS at 06:13

## 2018-01-09 NOTE — PROGRESS NOTE ADULT - SUBJECTIVE AND OBJECTIVE BOX
Patient is a 62y old  Male who presents with a chief complaint of I have gastric cancer (12 Dec 2017 11:41)  Awake, alert, comfortable in NAD. Off TPN,  tolerating regular diet.    INTERVAL HPI/OVERNIGHT EVENTS:      VITAL SIGNS:  T(F): 98.1 (01-09-18 @ 05:46)  HR: 72 (01-09-18 @ 05:46)  BP: 109/57 (01-09-18 @ 05:46)  RR: 16 (01-09-18 @ 05:46)  SpO2: 99% (01-09-18 @ 05:46)  Wt(kg): --  I&O's Detail          REVIEW OF SYSTEMS:    CONSTITUTIONAL:  No fevers, chills, sweats    HEENT:  Eyes:  No diplopia or blurred vision. ENT:  No earache, sore throat or runny nose.    CARDIOVASCULAR:  No pressure, squeezing, tightness, or heaviness about the chest; no palpitations.    RESPIRATORY:  Per HPI    GASTROINTESTINAL:  No abdominal pain, nausea, vomiting or diarrhea.    GENITOURINARY:  No dysuria, frequency or urgency.    NEUROLOGIC:  No paresthesias, fasciculations, seizures or weakness.    PSYCHIATRIC:  No disorder of thought or mood.      PHYSICAL EXAM:    Constitutional: Well developed and nourished  Eyes:Perrla  ENMT: normal  Neck:supple  Respiratory: good air entry  Cardiovascular: S1 S2 regular  Gastrointestinal: Soft, Incisional tenderness  Extremities: No edema  Vascular:normal  Neurological:Awake, alert,Ox3  Musculoskeletal:Normal      MEDICATIONS  (STANDING):  dextrose 5%. 1000 milliLiter(s) (50 mL/Hr) IV Continuous <Continuous>  dextrose 50% Injectable 12.5 Gram(s) IV Push once  dextrose 50% Injectable 25 Gram(s) IV Push once  dextrose 50% Injectable 25 Gram(s) IV Push once  enoxaparin Injectable 40 milliGRAM(s) SubCutaneous every 24 hours    MEDICATIONS  (PRN):  acetaminophen   Tablet 650 milliGRAM(s) Oral every 6 hours PRN mild pain  dextrose Gel 1 Dose(s) Oral once PRN Blood Glucose LESS THAN 70 milliGRAM(s)/deciliter  glucagon  Injectable 1 milliGRAM(s) IntraMuscular once PRN Glucose LESS THAN 70 milligrams/deciliter  morphine  - Injectable 2 milliGRAM(s) IV Push every 4 hours PRN Severe Pain (7 - 10)  ondansetron Injectable 4 milliGRAM(s) IV Push every 8 hours PRN Nausea and/or Vomiting  oxyCODONE    5 mG/acetaminophen 325 mG 1 Tablet(s) Oral every 6 hours PRN Moderate Pain (4 - 6)      Allergies    No Known Allergies    Intolerances        LABS:    01-08    137  |  101  |  14  ----------------------------<  158<H>  4.0   |  28  |  0.68    Ca    8.5      08 Jan 2018 07:04  Phos  3.2     01-08  Mg     1.8     01-08    TPro  6.7  /  Alb  2.5<L>  /  TBili  0.7  /  DBili  x   /  AST  83<H>  /  ALT  129<H>  /  AlkPhos  105  01-08              CAPILLARY BLOOD GLUCOSE      POCT Blood Glucose.: 98 mg/dL (09 Jan 2018 01:21)  POCT Blood Glucose.: 172 mg/dL (08 Jan 2018 11:53)        RADIOLOGY & ADDITIONAL TESTS:    CXR:    Ct scan chest:    ekg;    echo:

## 2018-01-09 NOTE — PROGRESS NOTE ADULT - SUBJECTIVE AND OBJECTIVE BOX
Patient is a 62y old  Male who presents with a chief complaint of I have gastric cancer (12 Dec 2017 11:41)    pt seen in icu [ ], reg med floor [ x  ], bed [ ], chair at bedside [x ], a+o x3 [ x ], lethargic [  ],   nad [ x ]      Allergies    No Known Allergies        Vitals    T(F): 98.1 (01-09-18 @ 05:46), Max: 98.9 (01-08-18 @ 21:54)  HR: 72 (01-09-18 @ 05:46) (72 - 90)  BP: 109/57 (01-09-18 @ 05:46) (107/60 - 114/67)  RR: 16 (01-09-18 @ 05:46) (16 - 17)  SpO2: 99% (01-09-18 @ 05:46) (97% - 99%)  Wt(kg): --  CAPILLARY BLOOD GLUCOSE      POCT Blood Glucose.: 98 mg/dL (09 Jan 2018 01:21)      Labs        01-08    137  |  101  |  14  ----------------------------<  158<H>  4.0   |  28  |  0.68    Ca    8.5      08 Jan 2018 07:04  Phos  3.2     01-08  Mg     1.8     01-08    TPro  6.7  /  Alb  2.5<L>  /  TBili  0.7  /  DBili  x   /  AST  83<H>  /  ALT  129<H>  /  AlkPhos  105  01-08            .Urine Clean Catch (Midstream)  12-21 @ 11:20   10,000 - 49,000 CFU/mL Escherichia coli  <10,000 CFU/ml Normal Urogenital keila present  --  Escherichia coli      .Blood  12-21 @ 11:19   No growth at 5 days.  --  --      .Blood Blood  12-21 @ 11:17   No growth at 5 days.  --  --          Radiology Results      Meds    MEDICATIONS  (STANDING):  dextrose 5%. 1000 milliLiter(s) (50 mL/Hr) IV Continuous <Continuous>  dextrose 50% Injectable 12.5 Gram(s) IV Push once  dextrose 50% Injectable 25 Gram(s) IV Push once  dextrose 50% Injectable 25 Gram(s) IV Push once  enoxaparin Injectable 40 milliGRAM(s) SubCutaneous every 24 hours      MEDICATIONS  (PRN):  acetaminophen   Tablet 650 milliGRAM(s) Oral every 6 hours PRN mild pain  dextrose Gel 1 Dose(s) Oral once PRN Blood Glucose LESS THAN 70 milliGRAM(s)/deciliter  glucagon  Injectable 1 milliGRAM(s) IntraMuscular once PRN Glucose LESS THAN 70 milligrams/deciliter  morphine  - Injectable 2 milliGRAM(s) IV Push every 4 hours PRN Severe Pain (7 - 10)  ondansetron Injectable 4 milliGRAM(s) IV Push every 8 hours PRN Nausea and/or Vomiting  oxyCODONE    5 mG/acetaminophen 325 mG 1 Tablet(s) Oral every 6 hours PRN Moderate Pain (4 - 6)      Physical Exam    Neuro :  no focal deficits  Respiratory: CTA B/L  CV: RRR, S1S2, no murmurs,   Abdominal: Soft, ND, incision w wound vac in place, clean and dry,   Extremities: No edema, + peripheral pulses    ASSESSMENT    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   h/o Prediabetes  HLD (hyperlipidemia)  HTN (hypertension)  Malignant neoplasm of stomach, unspecified location  H/O prostate biopsy  History of arthroscopy of right shoulder  History of arthroscopy of left knee  History of appendectomy        PLAN      s/p total gatrectomy with cristobal-en-y esophagojejunal anastomosis  s/p ex lap with nicole  heme onc f/u  no further c/o nausea/ vomiting  pt having flattus and bm   diet advanced as per surg  tpn and picc line d/c'd  renal f/u  gi and dvt prophylaxis  incentive spirometer  pulm f/u  oob to chair  cont current meds

## 2018-01-09 NOTE — PROGRESS NOTE ADULT - PROBLEM SELECTOR PLAN 2
Pain control  Surgery and Hem/Onc follow up    Clear liquid diet Pain control  Surgery and Hem/Onc follow up

## 2018-01-09 NOTE — PROGRESS NOTE ADULT - SUBJECTIVE AND OBJECTIVE BOX
INTERVAL HPI/OVERNIGHT EVENTS:  Pt stable.   Tolerating diet, regular  flatus/BM.    Vital Signs Last 24 Hrs  T(C): 36.7 (09 Jan 2018 05:46), Max: 37.2 (08 Jan 2018 21:54)  T(F): 98.1 (09 Jan 2018 05:46), Max: 98.9 (08 Jan 2018 21:54)  HR: 72 (09 Jan 2018 05:46) (72 - 90)  BP: 109/57 (09 Jan 2018 05:46) (107/60 - 114/67)  BP(mean): --  RR: 16 (09 Jan 2018 05:46) (16 - 17)  SpO2: 99% (09 Jan 2018 05:46) (97% - 99%)    Physical:  Abdomen: Soft nondistended, nontender.  Wound clean    I&O's Summary      LABS:            01-08    137  |  101  |  14  ----------------------------<  158<H>  4.0   |  28  |  0.68    Ca    8.5      08 Jan 2018 07:04  Phos  3.2     01-08  Mg     1.8     01-08    TPro  6.7  /  Alb  2.5<L>  /  TBili  0.7  /  DBili  x   /  AST  83<H>  /  ALT  129<H>  /  AlkPhos  105  01-08

## 2018-01-10 ENCOUNTER — TRANSCRIPTION ENCOUNTER (OUTPATIENT)
Age: 63
End: 2018-01-10

## 2018-01-10 LAB
-  COAGULASE NEGATIVE STAPHYLOCOCCUS: SIGNIFICANT CHANGE UP
GRAM STN FLD: SIGNIFICANT CHANGE UP
METHOD TYPE: SIGNIFICANT CHANGE UP

## 2018-01-10 RX ORDER — LANSOPRAZOLE 15 MG/1
1 CAPSULE, DELAYED RELEASE ORAL
Qty: 0 | Refills: 0 | COMMUNITY

## 2018-01-10 RX ORDER — IBUPROFEN 200 MG
1 TABLET ORAL
Qty: 20 | Refills: 0 | OUTPATIENT
Start: 2018-01-10 | End: 2018-01-14

## 2018-01-10 RX ORDER — VANCOMYCIN HCL 1 G
1000 VIAL (EA) INTRAVENOUS EVERY 12 HOURS
Qty: 0 | Refills: 0 | Status: DISCONTINUED | OUTPATIENT
Start: 2018-01-10 | End: 2018-01-11

## 2018-01-10 RX ORDER — VANCOMYCIN HCL 1 G
1000 VIAL (EA) INTRAVENOUS ONCE
Qty: 0 | Refills: 0 | Status: COMPLETED | OUTPATIENT
Start: 2018-01-10 | End: 2018-01-10

## 2018-01-10 RX ORDER — VANCOMYCIN HCL 1 G
VIAL (EA) INTRAVENOUS
Qty: 0 | Refills: 0 | Status: DISCONTINUED | OUTPATIENT
Start: 2018-01-10 | End: 2018-01-11

## 2018-01-10 RX ADMIN — Medication 250 MILLIGRAM(S): at 10:55

## 2018-01-10 RX ADMIN — OXYCODONE AND ACETAMINOPHEN 1 TABLET(S): 5; 325 TABLET ORAL at 04:20

## 2018-01-10 RX ADMIN — ENOXAPARIN SODIUM 40 MILLIGRAM(S): 100 INJECTION SUBCUTANEOUS at 05:32

## 2018-01-10 RX ADMIN — Medication 250 MILLIGRAM(S): at 19:28

## 2018-01-10 NOTE — DISCHARGE NOTE ADULT - CARE PLAN
Principal Discharge DX:	Malignant neoplasm of stomach, unspecified location  Goal:	wound healing  Instructions for follow-up, activity and diet:	Please follow up with Dr. Ramirez within 1 week   no heavy lifting or straining   Diet as tolerated

## 2018-01-10 NOTE — PROGRESS NOTE ADULT - ASSESSMENT
s/p exploratory laparotomy and lysis of adhesions POD # 7 s/p exploratory laparotomy and lysis of adhesions POD # 7 now with gram positive bacteremia

## 2018-01-10 NOTE — DISCHARGE NOTE ADULT - PATIENT PORTAL LINK FT
“You can access the [facility] Patient Portal, offered by [facility], by registering with the following website: [email or web address]”

## 2018-01-10 NOTE — DISCHARGE NOTE ADULT - PLAN OF CARE
wound healing Please follow up with Dr. Ramirez within 1 week   no heavy lifting or straining   Diet as tolerated

## 2018-01-10 NOTE — PROGRESS NOTE ADULT - SUBJECTIVE AND OBJECTIVE BOX
Patient is a 62y old  Male who presents with a chief complaint of I have gastric cancer (12 Dec 2017 11:41)    pt seen in icu [ ], reg med floor [ x  ], bed [ ], chair at bedside [x ], a+o x3 [ x ], lethargic [  ],   nad [ x ]    Allergies    No Known Allergies        Vitals    T(F): 98.4 (01-10-18 @ 05:30), Max: 98.6 (01-09-18 @ 21:30)  HR: 80 (01-10-18 @ 05:30) (80 - 93)  BP: 100/54 (01-10-18 @ 05:30) (100/54 - 109/67)  RR: 16 (01-10-18 @ 05:30) (16 - 17)  SpO2: 98% (01-10-18 @ 05:30) (98% - 100%)  Wt(kg): --  CAPILLARY BLOOD GLUCOSE      POCT Blood Glucose.: 98 mg/dL (09 Jan 2018 01:21)      Labs      .Blood Blood-Peripheral  01-08 @ 23:54   No growth to date.  --  --      .Blood Blood-Peripheral  01-08 @ 17:35   No growth to date.  --  --      .Urine Clean Catch (Midstream)  12-21 @ 11:20   10,000 - 49,000 CFU/mL Escherichia coli  <10,000 CFU/ml Normal Urogenital keila present  --  Escherichia coli      .Blood  12-21 @ 11:19   No growth at 5 days.  --  --      .Blood Blood  12-21 @ 11:17   No growth at 5 days.  --  --          Radiology Results      Meds    MEDICATIONS  (STANDING):  dextrose 5%. 1000 milliLiter(s) (50 mL/Hr) IV Continuous <Continuous>  dextrose 50% Injectable 12.5 Gram(s) IV Push once  dextrose 50% Injectable 25 Gram(s) IV Push once  dextrose 50% Injectable 25 Gram(s) IV Push once  enoxaparin Injectable 40 milliGRAM(s) SubCutaneous every 24 hours      MEDICATIONS  (PRN):  acetaminophen   Tablet 650 milliGRAM(s) Oral every 6 hours PRN mild pain  dextrose Gel 1 Dose(s) Oral once PRN Blood Glucose LESS THAN 70 milliGRAM(s)/deciliter  glucagon  Injectable 1 milliGRAM(s) IntraMuscular once PRN Glucose LESS THAN 70 milligrams/deciliter  morphine  - Injectable 2 milliGRAM(s) IV Push every 4 hours PRN Severe Pain (7 - 10)  ondansetron Injectable 4 milliGRAM(s) IV Push every 8 hours PRN Nausea and/or Vomiting  oxyCODONE    5 mG/acetaminophen 325 mG 1 Tablet(s) Oral every 6 hours PRN Moderate Pain (4 - 6)      Physical Exam      Neuro :  no focal deficits  Respiratory: CTA B/L  CV: RRR, S1S2, no murmurs,   Abdominal: Soft, ND, incision w wound vac in place, clean and dry,   Extremities: No edema, + peripheral pulses    ASSESSMENT    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   h/o Prediabetes  HLD (hyperlipidemia)  HTN (hypertension)  Malignant neoplasm of stomach, unspecified location  H/O prostate biopsy  History of arthroscopy of right shoulder  History of arthroscopy of left knee  History of appendectomy        PLAN      s/p total gatrectomy with cristobal-en-y esophagojejunal anastomosis  s/p ex lap with nicole  heme onc f/u  no further c/o nausea/ vomiting  pt having flattus and bm   diet tolerated  gi and dvt prophylaxis  incentive spirometer  pulm f/u  oob to ambulate as toleratedchair  cont current meds  d/c plan as per surgery

## 2018-01-10 NOTE — DISCHARGE NOTE ADULT - MEDICATION SUMMARY - MEDICATIONS TO TAKE
I will START or STAY ON the medications listed below when I get home from the hospital:     mg oral tablet  -- 1 tab(s) by mouth every 6 hours, As Needed -for moderate pain MDD:5 tabs   -- Do not take this drug if you are pregnant.  It is very important that you take or use this exactly as directed.  Do not skip doses or discontinue unless directed by your doctor.  May cause drowsiness or dizziness.  Obtain medical advice before taking any non-prescription drugs as some may affect the action of this medication.  Take with food or milk.    -- Indication: For Prn pain     tamsulosin  -- 0.4  by mouth once a day  -- Indication: For bph

## 2018-01-10 NOTE — DISCHARGE NOTE ADULT - MEDICATION SUMMARY - MEDICATIONS TO STOP TAKING
I will STOP taking the medications listed below when I get home from the hospital:    lansoprazole 30 mg oral delayed release capsule  -- 1 cap(s) by mouth once a day

## 2018-01-10 NOTE — DISCHARGE NOTE ADULT - CONDITIONS AT DISCHARGE
Pt AOx3 breathing unlabored no c/o resp. distress chest pain or SOB. Pt S/P Total Gastrectomy Abdomen with heeled wound intact Abdomen soft obese in size non distended non tender BS present in all 4 quadrants Pt tolerating diet denies any N/V. Cap refill less than 3 seconds pulses present in all extremities Pt with positive sensation and mobility no neurovascular deficit noted. Pt OOB ambulating voiding without any difficulties. Pt for DC home today verbalizes understanding and agreement with DC.

## 2018-01-10 NOTE — DISCHARGE NOTE ADULT - HOSPITAL COURSE
62 yr old male with PMH of Hypertension, prediabetes, herniated lumbar disc, gastric adenocarcinoma. Patient was admitted and underwent total gastrectomy 12/13/17. Post op course complicated by mechanical obstruction. Patient was placed on TPN for malnutrition. Patient was taken back to the OR on 1/03/18 and underwent exploratory laparotomy, lysis of adhesions and removal of JPs. Patient tolerated procedure well. Diet was advanced and tolerated. Patient for d/c home with outpatient follow up. 62 yr old male with PMH of Hypertension, prediabetes, herniated lumbar disc, gastric adenocarcinoma. Patient was admitted and underwent total gastrectomy 12/13/17. Post op course complicated by mechanical obstruction. Patient was placed on TPN for malnutrition. Patient was taken back to the OR on 1/03/18 and underwent exploratory laparotomy, lysis of adhesions and removal of JPs. Patient tolerated procedure well. Diet was advanced and tolerated. Patient for d/c home with outpatient follow up.   bacteremia likely contaminate.

## 2018-01-10 NOTE — PROGRESS NOTE ADULT - PROBLEM SELECTOR PLAN 1
discharge home today with outpatient follow up start vanco for bacteremia   ID consult   repeat blood cultures in am

## 2018-01-10 NOTE — PROGRESS NOTE ADULT - SUBJECTIVE AND OBJECTIVE BOX
Patient is a 62y old  Male who presents with a chief complaint of I have gastric cancer (12 Dec 2017 11:41)  Patient is awake, alert, comfortable in bed in NAD. Tolerating po food. No further abdominal pain.    INTERVAL HPI/OVERNIGHT EVENTS:      VITAL SIGNS:  T(F): 98.4 (01-10-18 @ 05:30)  HR: 80 (01-10-18 @ 05:30)  BP: 100/54 (01-10-18 @ 05:30)  RR: 16 (01-10-18 @ 05:30)  SpO2: 98% (01-10-18 @ 05:30)  Wt(kg): --  I&O's Detail          REVIEW OF SYSTEMS:    CONSTITUTIONAL:  No fevers, chills, sweats    HEENT:  Eyes:  No diplopia or blurred vision. ENT:  No earache, sore throat or runny nose.    CARDIOVASCULAR:  No pressure, squeezing, tightness, or heaviness about the chest; no palpitations.    RESPIRATORY:  Per HPI    GASTROINTESTINAL:  No abdominal pain, nausea, vomiting or diarrhea.    GENITOURINARY:  No dysuria, frequency or urgency.    NEUROLOGIC:  No paresthesias, fasciculations, seizures or weakness.    PSYCHIATRIC:  No disorder of thought or mood.      PHYSICAL EXAM:    Constitutional: Well developed and nourished  Eyes:Perrla  ENMT: normal  Neck:supple  Respiratory: good air entry  Cardiovascular: S1 S2 regular  Gastrointestinal: Soft, Non tender  Extremities: No edema  Vascular:normal  Neurological:Awake, alert,Ox3  Musculoskeletal:Normal      MEDICATIONS  (STANDING):  dextrose 5%. 1000 milliLiter(s) (50 mL/Hr) IV Continuous <Continuous>  dextrose 50% Injectable 12.5 Gram(s) IV Push once  dextrose 50% Injectable 25 Gram(s) IV Push once  dextrose 50% Injectable 25 Gram(s) IV Push once  enoxaparin Injectable 40 milliGRAM(s) SubCutaneous every 24 hours  vancomycin  IVPB 1000 milliGRAM(s) IV Intermittent every 12 hours  vancomycin  IVPB        MEDICATIONS  (PRN):  acetaminophen   Tablet 650 milliGRAM(s) Oral every 6 hours PRN mild pain  dextrose Gel 1 Dose(s) Oral once PRN Blood Glucose LESS THAN 70 milliGRAM(s)/deciliter  glucagon  Injectable 1 milliGRAM(s) IntraMuscular once PRN Glucose LESS THAN 70 milligrams/deciliter  morphine  - Injectable 2 milliGRAM(s) IV Push every 4 hours PRN Severe Pain (7 - 10)  ondansetron Injectable 4 milliGRAM(s) IV Push every 8 hours PRN Nausea and/or Vomiting  oxyCODONE    5 mG/acetaminophen 325 mG 1 Tablet(s) Oral every 6 hours PRN Moderate Pain (4 - 6)      Allergies    No Known Allergies    Intolerances        LABS:                    CAPILLARY BLOOD GLUCOSE            RADIOLOGY & ADDITIONAL TESTS:    CXR:    Ct scan chest:    ekg;    echo:

## 2018-01-10 NOTE — PROGRESS NOTE ADULT - SUBJECTIVE AND OBJECTIVE BOX
INTERVAL HPI/OVERNIGHT EVENTS:  Pt stable.   Tolerating diet.   flatus/BM.  Blood cultures noted    Vital Signs Last 24 Hrs  T(C): 36.9 (10 [place]on 2018 13:32), Max: 37 (09 Jan 2018 21:30)  T(F): 98.5 (10 Weston 2018 13:32), Max: 98.6 (09 Jan 2018 21:30)  HR: 88 (10 Weston 2018 13:32) (80 - 93)  BP: 103/56 (10 Weston 2018 13:32) (100/54 - 109/67)  BP(mean): --  RR: 16 (10 Weston 2018 13:32) (16 - 17)  SpO2: 99% (10 Weston 2018 13:32) (98% - 100%)    Physical:  Abdomen: Soft nondistended, nontender.  Wound clean    I&O's Summary      LABS:

## 2018-01-11 LAB
CULTURE RESULTS: SIGNIFICANT CHANGE UP
ORGANISM # SPEC MICROSCOPIC CNT: SIGNIFICANT CHANGE UP
ORGANISM # SPEC MICROSCOPIC CNT: SIGNIFICANT CHANGE UP
SPECIMEN SOURCE: SIGNIFICANT CHANGE UP

## 2018-01-11 RX ADMIN — Medication 250 MILLIGRAM(S): at 05:43

## 2018-01-11 RX ADMIN — Medication 650 MILLIGRAM(S): at 11:21

## 2018-01-11 RX ADMIN — ENOXAPARIN SODIUM 40 MILLIGRAM(S): 100 INJECTION SUBCUTANEOUS at 05:43

## 2018-01-11 NOTE — PROGRESS NOTE ADULT - SUBJECTIVE AND OBJECTIVE BOX
INTERVAL HPI/OVERNIGHT EVENTS:  Pt stable.   Tolerating diet.   flatus/BM.    Vital Signs Last 24 Hrs  T(C): 36.7 (11 Jan 2018 05:30), Max: 37.1 (10 Weston 2018 22:23)  T(F): 98.1 (11 Jan 2018 05:30), Max: 98.8 (10 Weston 2018 22:23)  HR: 81 (11 Jan 2018 05:30) (77 - 88)  BP: 106/59 (11 Jan 2018 05:30) (103/56 - 143/79)  BP(mean): --  RR: 16 (11 Jan 2018 05:30) (16 - 18)  SpO2: 98% (11 Jan 2018 05:30) (98% - 99%)    Physical:  Abdomen: Soft nondistended, nontender.  No complaints    I&O's Summary      LABS:

## 2018-01-11 NOTE — CONSULT NOTE ADULT - ATTENDING COMMENTS
62 year old male with PMH of Hypertension, prediabetes, herniated lumbar disc presents with a chief complaint of weight loss, recently Dx have gastric cancer presented for surgical treatment. Pt reported gaining weight back and increased appetite after undergoing chemotherapy for 3 months. Today 12/13 pt has undergone through total gastrectomy with Kelby-en-Y esophagojejunal anastomosis, pathology was sent for lesion of the lesser curvature, proximal margin was sent for frozen section and pathology saw atypical cells during procedure. Pt had EBL of 500 cc, Input was 3200 cc crystalloids and 1 U PRBC, output was 2300 cc. Pt had Hx of sleep apnea as per anesthesiologist and required to be intubated after OR and ICU consulted for post op monitoring.      Admit to ICU for post op Monitoring s/p total gastrectomy with Kelby-en-Y esophagojejunal anastomosis  POD - 0  c/w DVT and GI ppx - heparin and protonix  s/p intubation , c/w mechanical intubation   c/w sedation - fentanyl  strict I/O monitoring   post op abx  - zosyn  c/w LR @ 100 cc/hr   f/u am labs, am ABG, CXR  weaning trial tomorrow
[facility] NEPHROLOGY  Maik Chance M.D.  Tommie Washington D.O.  Emily Calvo M.D.  Love Winston, MSN, ANP-C  [phone]    [address] [place] [address]
pt seen and examined with ID resident

## 2018-01-11 NOTE — CONSULT NOTE ADULT - SUBJECTIVE AND OBJECTIVE BOX
HPI:  62 yr old male with PMH of Hypertension, Hypertension, prediabetes, herniated lumbar disc presents with c/o gastric cancer admitted to surgical services for gastrectomy. Patient underwent total gastrectomy 12/13/17. Post op course complicated by mechanical obstruction. Patient was placed on TPN for malnutrition. Patient was taken back to the OR on 1/03/18 and underwent exploratory laparotomy, lysis of adhesions and removal of JPs. Patient tolerated procedure well. Diet was advanced and tolerated.   Patient had fever of 101.8 on 8th Jan. Blood cultures were drawn and came back positive for Coag negative staph. No leucocytosis. PICC line was placed on 21 december, but that was removed suspecting source of infection. Patient was started on vancomycin.       PAST MEDICAL & SURGICAL HISTORY:  Prediabetes  HLD (hyperlipidemia)  HTN (hypertension)  Malignant neoplasm of stomach, unspecified location  H/O prostate biopsy  History of arthroscopy of right shoulder: repair for rotator cuff syndrome  History of arthroscopy of left knee: meniscal repair  History of appendectomy      No Known Allergies      Meds:  acetaminophen   Tablet 650 milliGRAM(s) Oral every 6 hours PRN  enoxaparin Injectable 40 milliGRAM(s) SubCutaneous every 24 hours  morphine  - Injectable 2 milliGRAM(s) IV Push every 4 hours PRN  ondansetron Injectable 4 milliGRAM(s) IV Push every 8 hours PRN  oxyCODONE    5 mG/acetaminophen 325 mG 1 Tablet(s) Oral every 6 hours PRN  vancomycin  IVPB 1000 milliGRAM(s) IV Intermittent every 12 hours  vancomycin  IVPB          SOCIAL HISTORY:  Smoker:  YES / NO        PACK YEARS:                         WHEN QUIT?  ETOH use:  YES / NO               FREQUENCY / QUANTITY:  Ilicit Drug use:  YES / NO  Occupation:  Assisted device use (Cane / Walker):  Live with:    FAMILY HISTORY:  Cerebrovascular accident (Mother)      VITALS:  Vital Signs Last 24 Hrs  T(C): 36.7 (11 Jan 2018 05:30), Max: 37.1 (10 Weston 2018 22:23)  T(F): 98.1 (11 Jan 2018 05:30), Max: 98.8 (10 Weston 2018 22:23)  HR: 81 (11 Jan 2018 05:30) (77 - 88)  BP: 106/59 (11 Jan 2018 05:30) (103/56 - 143/79)  BP(mean): --  RR: 16 (11 Jan 2018 05:30) (16 - 18)  SpO2: 98% (11 Jan 2018 05:30) (98% - 99%)    LABS/DIAGNOSTIC TESTS:                              LACTATE:    ABG -     CULTURES:   .Blood Blood-Peripheral  01-08 @ 23:54   No growth to date.  --  --      .Blood Blood-Peripheral  01-08 @ 17:35   Growth in aerobic bottle: Coag Negative Staphylococcus  Single set isolate, possible contaminant. Contact  Microbiology if susceptibility testing clinically  indicated.  ***Blood Panel PCR results on this specimen are available  approximately 3 hours after the Gram stain result.***  Gram stain, PCR, and/or culture results may not always  correspond due to difference in methodologies.  ************************************************************  This PCR assay was performed using [employer].  The following targets are tested for: Enterococcus,  vancomycin resistant enterococci, Listeria monocytogenes,  coagulase negative staphylococci, S. aureus,  methicillin resistant S. aureus, Streptococcus agalactiae  (Group B), S. pneumoniae, S.pyogenes (Group A),  Acinetobacter baumannii, Enterobacter cloacae, E. coli,  Klebsiella oxytoca, K. pneumoniae, Proteus sp.,  Serratia marcescens, Haemophilus influenzae,  Neisseria meningitidis, Pseudomonas aeruginosa, Candida  albicans, C. glabrata, C krusei, C parapsilosis,  C. tropicalis and the KPC resistance gene.  --  Blood Culture PCR      .Urine Clean Catch (Midstream)  12-21 @ 11:20   10,000 - 49,000 CFU/mL Escherichia coli  <10,000 CFU/ml Normal Urogenital keila present  --  Escherichia coli      .Blood  12-21 @ 11:19   No growth at 5 days.  --  --      .Blood Blood  12-21 @ 11:17   No growth at 5 days.  --  --          RADIOLOGY:      ROS  [  ] UNABLE TO ELICIT HPI:  62 yr old male with PMH of Hypertension, Hypertension, prediabetes, herniated lumbar disc presents with c/o gastric cancer admitted to surgical services for gastrectomy. Patient underwent total gastrectomy 12/13/17. Post op course complicated by mechanical obstruction. Patient was placed on TPN for malnutrition. Patient was taken back to the OR on 1/03/18 and underwent exploratory laparotomy, lysis of adhesions and removal of JPs. Patient tolerated procedure well. Diet was advanced and tolerated.   Patient had fever of 101.8 on 8th Jan. Blood cultures were drawn and came back positive for Coag negative staph. No leucocytosis. PICC line was placed on 21 december, but that was removed suspecting source of infection. Patient was started on vancomycin. Pt has not been febrile since his initial fever and has been asymptomatic otherwise.      PAST MEDICAL & SURGICAL HISTORY:  Prediabetes  HLD (hyperlipidemia)  HTN (hypertension)  Malignant neoplasm of stomach, unspecified location  H/O prostate biopsy  History of arthroscopy of right shoulder: repair for rotator cuff syndrome  History of arthroscopy of left knee: meniscal repair  History of appendectomy      No Known Allergies      Meds:  acetaminophen   Tablet 650 milliGRAM(s) Oral every 6 hours PRN  enoxaparin Injectable 40 milliGRAM(s) SubCutaneous every 24 hours  morphine  - Injectable 2 milliGRAM(s) IV Push every 4 hours PRN  ondansetron Injectable 4 milliGRAM(s) IV Push every 8 hours PRN  oxyCODONE    5 mG/acetaminophen 325 mG 1 Tablet(s) Oral every 6 hours PRN  vancomycin  IVPB 1000 milliGRAM(s) IV Intermittent every 12 hours  vancomycin  IVPB          SOCIAL HISTORY:  Smoker:  no  ETOH use:  no    FAMILY HISTORY:  Cerebrovascular accident (Mother)      VITALS:  Vital Signs Last 24 Hrs  T(C): 36.7 (11 Jan 2018 05:30), Max: 37.1 (10 [place]on 2018 22:23)  T(F): 98.1 (11 Jan 2018 05:30), Max: 98.8 (10 Weston 2018 22:23)  HR: 81 (11 Jan 2018 05:30) (77 - 88)  BP: 106/59 (11 Jan 2018 05:30) (103/56 - 143/79)  BP(mean): --  RR: 16 (11 Jan 2018 05:30) (16 - 18)  SpO2: 98% (11 Jan 2018 05:30) (98% - 99%)    LABS/DIAGNOSTIC TESTS:                              LACTATE:    ABG -     CULTURES:   .Blood Blood-Peripheral  01-08 @ 23:54   No growth to date.  --  --      .Blood Blood-Peripheral  01-08 @ 17:35   Growth in aerobic bottle: Coag Negative Staphylococcus  Single set isolate, possible contaminant. Contact  Microbiology if susceptibility testing clinically  indicated.  ***Blood Panel PCR results on this specimen are available  approximately 3 hours after the Gram stain result.***  Gram stain, PCR, and/or culture results may not always  correspond due to difference in methodologies.  ************************************************************  This PCR assay was performed using [employer].  The following targets are tested for: Enterococcus,  vancomycin resistant enterococci, Listeria monocytogenes,  coagulase negative staphylococci, S. aureus,  methicillin resistant S. aureus, Streptococcus agalactiae  (Group B), S. pneumoniae, S.pyogenes (Group A),  Acinetobacter baumannii, Enterobacter cloacae, E. coli,  Klebsiella oxytoca, K. pneumoniae, Proteus sp.,  Serratia marcescens, Haemophilus influenzae,  Neisseria meningitidis, Pseudomonas aeruginosa, Candida  albicans, C. glabrata, C krusei, C parapsilosis,  C. tropicalis and the KPC resistance gene.  --  Blood Culture PCR      .Urine Clean Catch (Midstream)  12-21 @ 11:20   10,000 - 49,000 CFU/mL Escherichia coli  <10,000 CFU/ml Normal Urogenital keila present  --  Escherichia coli      .Blood  12-21 @ 11:19   No growth at 5 days.  --  --      .Blood Blood  12-21 @ 11:17   No growth at 5 days.  --  --          RADIOLOGY:      ROS  [  ] UNABLE TO ELICIT

## 2018-01-11 NOTE — PROGRESS NOTE ADULT - SUBJECTIVE AND OBJECTIVE BOX
Patient is a 62y old  Male who presents with a chief complaint of I have gastric cancer (12 Dec 2017 11:41)  Awake, alert, comfortable in NAD. BC reported coag neg staph. It will be repeated today. No fever or chills. Tolerating po food.  INTERVAL HPI/OVERNIGHT EVENTS:      VITAL SIGNS:  T(F): 98.1 (01-11-18 @ 05:30)  HR: 81 (01-11-18 @ 05:30)  BP: 106/59 (01-11-18 @ 05:30)  RR: 16 (01-11-18 @ 05:30)  SpO2: 98% (01-11-18 @ 05:30)  Wt(kg): --  I&O's Detail          REVIEW OF SYSTEMS:    CONSTITUTIONAL:  No fevers, chills, sweats    HEENT:  Eyes:  No diplopia or blurred vision. ENT:  No earache, sore throat or runny nose.    CARDIOVASCULAR:  No pressure, squeezing, tightness, or heaviness about the chest; no palpitations.    RESPIRATORY:  Per HPI    GASTROINTESTINAL:  No abdominal pain, nausea, vomiting or diarrhea.    GENITOURINARY:  No dysuria, frequency or urgency.    NEUROLOGIC:  No paresthesias, fasciculations, seizures or weakness.    PSYCHIATRIC:  No disorder of thought or mood.      PHYSICAL EXAM:    Constitutional: Well developed and nourished  Eyes:Perrla  ENMT: normal  Neck:supple  Respiratory: good air entry  Cardiovascular: S1 S2 regular  Gastrointestinal: Soft, Non tender  Extremities: No edema  Vascular:normal  Neurological:Awake, alert,Ox3  Musculoskeletal:Normal      MEDICATIONS  (STANDING):  dextrose 5%. 1000 milliLiter(s) (50 mL/Hr) IV Continuous <Continuous>  dextrose 50% Injectable 12.5 Gram(s) IV Push once  dextrose 50% Injectable 25 Gram(s) IV Push once  dextrose 50% Injectable 25 Gram(s) IV Push once  enoxaparin Injectable 40 milliGRAM(s) SubCutaneous every 24 hours  vancomycin  IVPB 1000 milliGRAM(s) IV Intermittent every 12 hours  vancomycin  IVPB        MEDICATIONS  (PRN):  acetaminophen   Tablet 650 milliGRAM(s) Oral every 6 hours PRN mild pain  dextrose Gel 1 Dose(s) Oral once PRN Blood Glucose LESS THAN 70 milliGRAM(s)/deciliter  glucagon  Injectable 1 milliGRAM(s) IntraMuscular once PRN Glucose LESS THAN 70 milligrams/deciliter  morphine  - Injectable 2 milliGRAM(s) IV Push every 4 hours PRN Severe Pain (7 - 10)  ondansetron Injectable 4 milliGRAM(s) IV Push every 8 hours PRN Nausea and/or Vomiting  oxyCODONE    5 mG/acetaminophen 325 mG 1 Tablet(s) Oral every 6 hours PRN Moderate Pain (4 - 6)      Allergies    No Known Allergies    Intolerances        LABS:                    CAPILLARY BLOOD GLUCOSE            RADIOLOGY & ADDITIONAL TESTS:    CXR:    Ct scan chest:    ekg;    echo:

## 2018-01-11 NOTE — CONSULT NOTE ADULT - GASTROINTESTINAL COMMENTS
Surgical scar covered with sterile dressing Surgical scar covered with sterile dressing, palpable area of induration or a hematoma? under right side of incision site which is non tender

## 2018-01-11 NOTE — CONSULT NOTE ADULT - CONSULT REQUESTED DATE/TIME
11-Jan-2018 12:23
13-Dec-2017 12:41
14-Dec-2017 15:23
21-Dec-2017 16:08
27-Dec-2017 07:58
13-Dec-2017 15:07

## 2018-01-11 NOTE — PROGRESS NOTE ADULT - SUBJECTIVE AND OBJECTIVE BOX
Patient is a 62y old  Male who presents with a chief complaint of I have gastric cancer (12 Dec 2017 11:41)    pt seen in icu [ ], reg med floor [ x  ], bed [ ], chair at bedside [x ], a+o x3 [ x ], lethargic [  ],   nad [ x ]      Allergies    No Known Allergies        Vitals    T(F): 98.1 (01-11-18 @ 05:30), Max: 98.8 (01-10-18 @ 22:23)  HR: 81 (01-11-18 @ 05:30) (77 - 88)  BP: 106/59 (01-11-18 @ 05:30) (103/56 - 143/79)  RR: 16 (01-11-18 @ 05:30) (16 - 18)  SpO2: 98% (01-11-18 @ 05:30) (98% - 99%)  Wt(kg): --  CAPILLARY BLOOD GLUCOSE          Labs        .Blood Blood-Peripheral  01-08 @ 23:54   No growth to date.  --  --      .Blood Blood-Peripheral  01-08 @ 17:35   Growth in aerobic bottle: Gram Positive Cocci in Clusters  ***Blood Panel PCR results on this specimen are available  approximately 3 hours after the Gram stain result.***  Gram stain, PCR, and/or culture results may not always  correspond due to difference in methodologies.  ************************************************************  This PCR assay was performed using [employer].  The following targets are tested for: Enterococcus,  vancomycin resistant enterococci, Listeria monocytogenes,  coagulase negative staphylococci, S. aureus,  methicillin resistant S. aureus, Streptococcus agalactiae  (Group B), S. pneumoniae, S. pyogenes (Group A),  Acinetobacter baumannii, Enterobacter cloacae, E. coli,  Klebsiella oxytoca, K. pneumoniae, Proteus sp.,  Serratia marcescens, Haemophilus influenzae,  Neisseria meningitidis, Pseudomonas aeruginosa, Candida  albicans, C. glabrata, C krusei, C parapsilosis,  C. tropicalis and the KPC resistance gene.  --  Blood Culture PCR    -  Coagulase negative Staphylococcus: Detec (01.08.18 @ 17:35)        .Urine Clean Catch (Midstream)  12-21 @ 11:20   10,000 - 49,000 CFU/mL Escherichia coli  <10,000 CFU/ml Normal Urogenital keila present  --  Escherichia coli      .Blood  12-21 @ 11:19   No growth at 5 days.  --  --      .Blood Blood  12-21 @ 11:17   No growth at 5 days.  --  --          Radiology Results      Meds    MEDICATIONS  (STANDING):  dextrose 5%. 1000 milliLiter(s) (50 mL/Hr) IV Continuous <Continuous>  dextrose 50% Injectable 12.5 Gram(s) IV Push once  dextrose 50% Injectable 25 Gram(s) IV Push once  dextrose 50% Injectable 25 Gram(s) IV Push once  enoxaparin Injectable 40 milliGRAM(s) SubCutaneous every 24 hours  vancomycin  IVPB 1000 milliGRAM(s) IV Intermittent every 12 hours  vancomycin  IVPB          MEDICATIONS  (PRN):  acetaminophen   Tablet 650 milliGRAM(s) Oral every 6 hours PRN mild pain  dextrose Gel 1 Dose(s) Oral once PRN Blood Glucose LESS THAN 70 milliGRAM(s)/deciliter  glucagon  Injectable 1 milliGRAM(s) IntraMuscular once PRN Glucose LESS THAN 70 milligrams/deciliter  morphine  - Injectable 2 milliGRAM(s) IV Push every 4 hours PRN Severe Pain (7 - 10)  ondansetron Injectable 4 milliGRAM(s) IV Push every 8 hours PRN Nausea and/or Vomiting  oxyCODONE    5 mG/acetaminophen 325 mG 1 Tablet(s) Oral every 6 hours PRN Moderate Pain (4 - 6)      Physical Exam      Neuro :  no focal deficits  Respiratory: CTA B/L  CV: RRR, S1S2, no murmurs,   Abdominal: Soft, ND, incision w wound vac in place, clean and dry,   Extremities: No edema, + peripheral pulses    ASSESSMENT    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   h/o Prediabetes  HLD (hyperlipidemia)  HTN (hypertension)  Malignant neoplasm of stomach, unspecified location  H/O prostate biopsy  History of arthroscopy of right shoulder  History of arthroscopy of left knee  History of appendectomy        PLAN      s/p total gatrectomy with cristobal-en-y esophagojejunal anastomosis  s/p ex lap with nicole  heme onc f/u  no further c/o nausea/ vomiting  pt having flattus and bm   diet tolerated  gi and dvt prophylaxis  incentive spirometer  pulm f/u  oob to ambulate as tolerated  bld cx in one bottle positive for coagulase neg staph noted above possibly contaminant  pt afebrile  pt started on vanco  id cons  rept bld cx  cont current meds

## 2018-01-11 NOTE — CONSULT NOTE ADULT - ASSESSMENT
fever - x 1 - ?source  bacteremia - coag neg staph in 1 of 4 bottles only, likely a contaminant    plan - dc vanco  if repeat cult is neg then dc planning

## 2018-01-12 VITALS
TEMPERATURE: 99 F | HEART RATE: 93 BPM | DIASTOLIC BLOOD PRESSURE: 67 MMHG | SYSTOLIC BLOOD PRESSURE: 121 MMHG | OXYGEN SATURATION: 100 % | RESPIRATION RATE: 16 BRPM

## 2018-01-12 LAB — GLUCOSE BLDC GLUCOMTR-MCNC: 94 MG/DL — SIGNIFICANT CHANGE UP (ref 70–99)

## 2018-01-12 PROCEDURE — 88112 CYTOPATH CELL ENHANCE TECH: CPT

## 2018-01-12 PROCEDURE — 87040 BLOOD CULTURE FOR BACTERIA: CPT

## 2018-01-12 PROCEDURE — 76775 US EXAM ABDO BACK WALL LIM: CPT

## 2018-01-12 PROCEDURE — 88309 TISSUE EXAM BY PATHOLOGIST: CPT

## 2018-01-12 PROCEDURE — 85027 COMPLETE CBC AUTOMATED: CPT

## 2018-01-12 PROCEDURE — 88333 PATH CONSLTJ SURG CYTO XM 1: CPT

## 2018-01-12 PROCEDURE — 82962 GLUCOSE BLOOD TEST: CPT

## 2018-01-12 PROCEDURE — 82436 ASSAY OF URINE CHLORIDE: CPT

## 2018-01-12 PROCEDURE — 74020: CPT

## 2018-01-12 PROCEDURE — 88305 TISSUE EXAM BY PATHOLOGIST: CPT

## 2018-01-12 PROCEDURE — 97116 GAIT TRAINING THERAPY: CPT

## 2018-01-12 PROCEDURE — 80048 BASIC METABOLIC PNL TOTAL CA: CPT

## 2018-01-12 PROCEDURE — 36430 TRANSFUSION BLD/BLD COMPNT: CPT

## 2018-01-12 PROCEDURE — 94003 VENT MGMT INPAT SUBQ DAY: CPT

## 2018-01-12 PROCEDURE — 86850 RBC ANTIBODY SCREEN: CPT

## 2018-01-12 PROCEDURE — 83036 HEMOGLOBIN GLYCOSYLATED A1C: CPT

## 2018-01-12 PROCEDURE — 84100 ASSAY OF PHOSPHORUS: CPT

## 2018-01-12 PROCEDURE — 86901 BLOOD TYPING SEROLOGIC RH(D): CPT

## 2018-01-12 PROCEDURE — 87086 URINE CULTURE/COLONY COUNT: CPT

## 2018-01-12 PROCEDURE — 88300 SURGICAL PATH GROSS: CPT

## 2018-01-12 PROCEDURE — 86900 BLOOD TYPING SEROLOGIC ABO: CPT

## 2018-01-12 PROCEDURE — 74250 X-RAY XM SM INT 1CNTRST STD: CPT

## 2018-01-12 PROCEDURE — 74018 RADEX ABDOMEN 1 VIEW: CPT

## 2018-01-12 PROCEDURE — 97110 THERAPEUTIC EXERCISES: CPT

## 2018-01-12 PROCEDURE — 84300 ASSAY OF URINE SODIUM: CPT

## 2018-01-12 PROCEDURE — P9040: CPT

## 2018-01-12 PROCEDURE — 87150 DNA/RNA AMPLIFIED PROBE: CPT

## 2018-01-12 PROCEDURE — 82330 ASSAY OF CALCIUM: CPT

## 2018-01-12 PROCEDURE — 87186 SC STD MICRODIL/AGAR DIL: CPT

## 2018-01-12 PROCEDURE — 77001 FLUOROGUIDE FOR VEIN DEVICE: CPT

## 2018-01-12 PROCEDURE — 80053 COMPREHEN METABOLIC PANEL: CPT

## 2018-01-12 PROCEDURE — 82803 BLOOD GASES ANY COMBINATION: CPT

## 2018-01-12 PROCEDURE — 76937 US GUIDE VASCULAR ACCESS: CPT

## 2018-01-12 PROCEDURE — 88304 TISSUE EXAM BY PATHOLOGIST: CPT

## 2018-01-12 PROCEDURE — 82570 ASSAY OF URINE CREATININE: CPT

## 2018-01-12 PROCEDURE — 84134 ASSAY OF PREALBUMIN: CPT

## 2018-01-12 PROCEDURE — C1751: CPT

## 2018-01-12 PROCEDURE — 97530 THERAPEUTIC ACTIVITIES: CPT

## 2018-01-12 PROCEDURE — 84478 ASSAY OF TRIGLYCERIDES: CPT

## 2018-01-12 PROCEDURE — 36569 INSJ PICC 5 YR+ W/O IMAGING: CPT

## 2018-01-12 PROCEDURE — 83735 ASSAY OF MAGNESIUM: CPT

## 2018-01-12 PROCEDURE — 71045 X-RAY EXAM CHEST 1 VIEW: CPT

## 2018-01-12 PROCEDURE — 74177 CT ABD & PELVIS W/CONTRAST: CPT

## 2018-01-12 PROCEDURE — 88331 PATH CONSLTJ SURG 1 BLK 1SPC: CPT

## 2018-01-12 RX ADMIN — OXYCODONE AND ACETAMINOPHEN 1 TABLET(S): 5; 325 TABLET ORAL at 02:08

## 2018-01-12 RX ADMIN — ENOXAPARIN SODIUM 40 MILLIGRAM(S): 100 INJECTION SUBCUTANEOUS at 10:00

## 2018-01-12 NOTE — PROGRESS NOTE ADULT - ASSESSMENT
61 y/o Male s/p exploratory laparotomy and lysis of adhesions 1/3 now with gram positive bacteremia       -f/u final rpt bcx   -Reg diet   -OOB/Ambulate   -DVT phx   -poss dc today

## 2018-01-12 NOTE — PROGRESS NOTE ADULT - PROBLEM SELECTOR PROBLEM 1
Respiratory failure
Atelectasis
R/O Small bowel obstruction
Respiratory failure
S/P gastrectomy
Severe protein-calorie malnutrition
Malignant neoplasm of stomach, unspecified location
Malignant neoplasm of stomach, unspecified location
R/O Small bowel obstruction
Respiratory failure
S/P gastrectomy
Severe protein-calorie malnutrition
Atelectasis
Respiratory failure
Respiratory failure
Atelectasis
Respiratory failure
Atelectasis
Respiratory failure
Acute post-operative pain

## 2018-01-12 NOTE — PROGRESS NOTE ADULT - PROBLEM SELECTOR PLAN 7
Monitor LFTs
Monitor BP  Cont med
Monitor LFTs
Monitor LFTs
Monitor BP  Cont med
Monitor LFTs

## 2018-01-12 NOTE — PROGRESS NOTE ADULT - PROBLEM SELECTOR PLAN 6
Monitor BP  Cont med
Statin  Lipid profile
Monitor BP  Cont med
Statin  Lipid profile
Monitor BP  Cont med
Statin  Lipid profile
Monitor BP  Cont med
Monitor BP  Cont med

## 2018-01-12 NOTE — PROGRESS NOTE ADULT - PROBLEM SELECTOR PROBLEM 6
HLD (hyperlipidemia)
HTN (hypertension)
HLD (hyperlipidemia)
HTN (hypertension)
HLD (hyperlipidemia)
HTN (hypertension)

## 2018-01-12 NOTE — PROGRESS NOTE ADULT - SUBJECTIVE AND OBJECTIVE BOX
Patient is a 62y old  Male who presents with a chief complaint of I have gastric cancer (12 Dec 2017 11:41)  Awake, alert, comfortable in bed in NAD. BC repeated because of coag neg staph in previous one. Otherwise patient is stable.    INTERVAL HPI/OVERNIGHT EVENTS:      VITAL SIGNS:  T(F): 98.1 (01-12-18 @ 06:00)  HR: 79 (01-12-18 @ 06:00)  BP: 100/49 (01-12-18 @ 06:00)  RR: 16 (01-12-18 @ 06:00)  SpO2: 97% (01-12-18 @ 06:00)  Wt(kg): --  I&O's Detail          REVIEW OF SYSTEMS:    CONSTITUTIONAL:  No fevers, chills, sweats    HEENT:  Eyes:  No diplopia or blurred vision. ENT:  No earache, sore throat or runny nose.    CARDIOVASCULAR:  No pressure, squeezing, tightness, or heaviness about the chest; no palpitations.    RESPIRATORY:  Per HPI    GASTROINTESTINAL:  No abdominal pain, nausea, vomiting or diarrhea.    GENITOURINARY:  No dysuria, frequency or urgency.    NEUROLOGIC:  No paresthesias, fasciculations, seizures or weakness.    PSYCHIATRIC:  No disorder of thought or mood.      PHYSICAL EXAM:    Constitutional: Well developed and nourished  Eyes:Perrla  ENMT: normal  Neck:supple  Respiratory: good air entry  Cardiovascular: S1 S2 regular  Gastrointestinal: Soft, Non tender  Extremities: No edema  Vascular:normal  Neurological:Awake, alert,Ox3  Musculoskeletal:Normal      MEDICATIONS  (STANDING):  enoxaparin Injectable 40 milliGRAM(s) SubCutaneous every 24 hours    MEDICATIONS  (PRN):  acetaminophen   Tablet 650 milliGRAM(s) Oral every 6 hours PRN mild pain  morphine  - Injectable 2 milliGRAM(s) IV Push every 4 hours PRN Severe Pain (7 - 10)  ondansetron Injectable 4 milliGRAM(s) IV Push every 8 hours PRN Nausea and/or Vomiting  oxyCODONE    5 mG/acetaminophen 325 mG 1 Tablet(s) Oral every 6 hours PRN Moderate Pain (4 - 6)      Allergies    No Known Allergies    Intolerances        LABS:                    CAPILLARY BLOOD GLUCOSE      POCT Blood Glucose.: 94 mg/dL (12 Jan 2018 07:32)        RADIOLOGY & ADDITIONAL TESTS:    CXR:    Ct scan chest:    ekg;    echo:

## 2018-01-12 NOTE — PROGRESS NOTE ADULT - PROVIDER SPECIALTY LIST ADULT
Critical Care
Internal Medicine
Nephrology
Pain Medicine
Pulmonology
Surgery
Internal Medicine
Pulmonology
Surgery
Pulmonology
Surgery
Pulmonology
Nephrology
Pulmonology

## 2018-01-12 NOTE — PROGRESS NOTE ADULT - SUBJECTIVE AND OBJECTIVE BOX
Patient is a 62y old  Male who presents with a chief complaint of I have gastric cancer (12 Dec 2017 11:41)    pt seen in icu [ ], reg med floor [ x  ], bed [ ], chair at bedside [x ], a+o x3 [ x ], lethargic [  ],   nad [ x ]      Allergies    No Known Allergies        Vitals    T(F): 98.1 (01-12-18 @ 06:00), Max: 99.1 (01-11-18 @ 14:26)  HR: 79 (01-12-18 @ 06:00) (79 - 91)  BP: 100/49 (01-12-18 @ 06:00) (100/49 - 106/70)  RR: 16 (01-12-18 @ 06:00) (16 - 18)  SpO2: 97% (01-12-18 @ 06:00) (97% - 99%)  Wt(kg): --  CAPILLARY BLOOD GLUCOSE      POCT Blood Glucose.: 94 mg/dL (12 Jan 2018 07:32)      Labs      .Blood Blood-Peripheral  01-08 @ 23:54   No growth to date.  --  --      .Blood Blood-Peripheral  01-08 @ 17:35   Growth in aerobic bottle: Coag Negative Staphylococcus  Single set isolate, possible contaminant. Contact  Microbiology if susceptibility testing clinically  indicated.  ***Blood Panel PCR results on this specimen are available  approximately 3 hours after the Gram stain result.***  Gram stain, PCR, and/or culture results may not always  correspond due to difference in methodologies.  ************************************************************  This PCR assay was performed using [employer].  The following targets are tested for: Enterococcus,  vancomycin resistant enterococci, Listeria monocytogenes,  coagulase negative staphylococci, S. aureus,  methicillin resistant S. aureus, Streptococcus agalactiae  (Group B), S. pneumoniae, S.pyogenes (Group A),  Acinetobacter baumannii, Enterobacter cloacae, E. coli,  Klebsiella oxytoca, K. pneumoniae, Proteus sp.,  Serratia marcescens, Haemophilus influenzae,  Neisseria meningitidis, Pseudomonas aeruginosa, Candida  albicans, C. glabrata, C krusei, C parapsilosis,  C. tropicalis and the KPC resistance gene.  --  Blood Culture PCR      .Urine Clean Catch (Midstream)  12-21 @ 11:20   10,000 - 49,000 CFU/mL Escherichia coli  <10,000 CFU/ml Normal Urogenital keila present  --  Escherichia coli      .Blood  12-21 @ 11:19   No growth at 5 days.  --  --      .Blood Blood  12-21 @ 11:17   No growth at 5 days.  --  --          Radiology Results      Meds    MEDICATIONS  (STANDING):  enoxaparin Injectable 40 milliGRAM(s) SubCutaneous every 24 hours      MEDICATIONS  (PRN):  acetaminophen   Tablet 650 milliGRAM(s) Oral every 6 hours PRN mild pain  morphine  - Injectable 2 milliGRAM(s) IV Push every 4 hours PRN Severe Pain (7 - 10)  ondansetron Injectable 4 milliGRAM(s) IV Push every 8 hours PRN Nausea and/or Vomiting  oxyCODONE    5 mG/acetaminophen 325 mG 1 Tablet(s) Oral every 6 hours PRN Moderate Pain (4 - 6)      Physical Exam      Neuro :  no focal deficits  Respiratory: CTA B/L  CV: RRR, S1S2, no murmurs,   Abdominal: Soft, ND, incision w wound vac in place, clean and dry,   Extremities: No edema, + peripheral pulses    ASSESSMENT    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   h/o Prediabetes  HLD (hyperlipidemia)  HTN (hypertension)  Malignant neoplasm of stomach, unspecified location  H/O prostate biopsy  History of arthroscopy of right shoulder  History of arthroscopy of left knee  History of appendectomy        PLAN      s/p total gatrectomy with cristobal-en-y esophagojejunal anastomosis  s/p ex lap with nicole  heme onc f/u  no further c/o nausea/ vomiting  pt having flattus and bm   diet tolerated  gi and dvt prophylaxis  incentive spirometer  pulm f/u  oob to ambulate as tolerated  bld cx in one bottle positive for coagulase neg staph noted above possibly contaminant  pt afebrile  id cons noted  vanco d/c'd  f/u rept bld cx  cont current meds  d/c plan pend neg rept bld cx

## 2018-01-12 NOTE — PROGRESS NOTE ADULT - SUBJECTIVE AND OBJECTIVE BOX
INTERVAL HPI/OVERNIGHT EVENTS:    s/p exploratory laparotomy and lysis of adhesions 1/3. Pt seen and examined at bedside. Offers no acute complaints at this time.     Vital Signs Last 24 Hrs  T(C): 36.7 (12 Jan 2018 06:00), Max: 37.3 (11 Jan 2018 14:26)  T(F): 98.1 (12 Jan 2018 06:00), Max: 99.1 (11 Jan 2018 14:26)  HR: 79 (12 Jan 2018 06:00) (79 - 91)  BP: 100/49 (12 Jan 2018 06:00) (100/49 - 106/70)  BP(mean): --  RR: 16 (12 Jan 2018 06:00) (16 - 18)  SpO2: 97% (12 Jan 2018 06:00) (97% - 99%)  I&O's Detail        Physical Exam  General: AAOx3, No acute distress  Skin: No jaundice, no icterus  Abdomen: soft, nondistended, nontender, no rebound tenderness, upper abd wound well healed, no active drainage, no erythema  Extremities: non edematous, no calf pain bilaterally

## 2018-01-12 NOTE — PROGRESS NOTE ADULT - PROBLEM SELECTOR PROBLEM 7
Transaminitis
HTN (hypertension)
Transaminitis
Transaminitis
HTN (hypertension)
Transaminitis

## 2018-01-14 LAB
CULTURE RESULTS: SIGNIFICANT CHANGE UP
SPECIMEN SOURCE: SIGNIFICANT CHANGE UP

## 2018-01-16 ENCOUNTER — EMERGENCY (EMERGENCY)
Facility: HOSPITAL | Age: 63
LOS: 1 days | Discharge: HOME | End: 2018-01-16
Attending: EMERGENCY MEDICINE
Payer: COMMERCIAL

## 2018-01-16 VITALS
HEIGHT: 66 IN | RESPIRATION RATE: 18 BRPM | OXYGEN SATURATION: 99 % | WEIGHT: 171.08 LBS | HEART RATE: 97 BPM | TEMPERATURE: 98 F | SYSTOLIC BLOOD PRESSURE: 125 MMHG | DIASTOLIC BLOOD PRESSURE: 84 MMHG

## 2018-01-16 VITALS
OXYGEN SATURATION: 99 % | RESPIRATION RATE: 18 BRPM | DIASTOLIC BLOOD PRESSURE: 71 MMHG | SYSTOLIC BLOOD PRESSURE: 121 MMHG | TEMPERATURE: 98 F | HEART RATE: 82 BPM

## 2018-01-16 DIAGNOSIS — Z98.890 OTHER SPECIFIED POSTPROCEDURAL STATES: Chronic | ICD-10-CM

## 2018-01-16 DIAGNOSIS — Z90.3 ACQUIRED ABSENCE OF STOMACH [PART OF]: Chronic | ICD-10-CM

## 2018-01-16 DIAGNOSIS — Z90.49 ACQUIRED ABSENCE OF OTHER SPECIFIED PARTS OF DIGESTIVE TRACT: Chronic | ICD-10-CM

## 2018-01-16 LAB
ALBUMIN SERPL ELPH-MCNC: 3.1 G/DL — LOW (ref 3.5–5)
ALP SERPL-CCNC: 80 U/L — SIGNIFICANT CHANGE UP (ref 40–120)
ALT FLD-CCNC: 78 U/L DA — HIGH (ref 10–60)
ANION GAP SERPL CALC-SCNC: 7 MMOL/L — SIGNIFICANT CHANGE UP (ref 5–17)
AST SERPL-CCNC: 42 U/L — HIGH (ref 10–40)
BASOPHILS # BLD AUTO: 0.1 K/UL — SIGNIFICANT CHANGE UP (ref 0–0.2)
BASOPHILS NFR BLD AUTO: 0.9 % — SIGNIFICANT CHANGE UP (ref 0–2)
BILIRUB SERPL-MCNC: 0.4 MG/DL — SIGNIFICANT CHANGE UP (ref 0.2–1.2)
BUN SERPL-MCNC: 9 MG/DL — SIGNIFICANT CHANGE UP (ref 7–18)
CALCIUM SERPL-MCNC: 9.2 MG/DL — SIGNIFICANT CHANGE UP (ref 8.4–10.5)
CHLORIDE SERPL-SCNC: 103 MMOL/L — SIGNIFICANT CHANGE UP (ref 96–108)
CO2 SERPL-SCNC: 27 MMOL/L — SIGNIFICANT CHANGE UP (ref 22–31)
CREAT SERPL-MCNC: 0.86 MG/DL — SIGNIFICANT CHANGE UP (ref 0.5–1.3)
CULTURE RESULTS: SIGNIFICANT CHANGE UP
EOSINOPHIL # BLD AUTO: 0.1 K/UL — SIGNIFICANT CHANGE UP (ref 0–0.5)
EOSINOPHIL NFR BLD AUTO: 1.3 % — SIGNIFICANT CHANGE UP (ref 0–6)
GLUCOSE SERPL-MCNC: 92 MG/DL — SIGNIFICANT CHANGE UP (ref 70–99)
HCT VFR BLD CALC: 33.9 % — LOW (ref 39–50)
HGB BLD-MCNC: 11.1 G/DL — LOW (ref 13–17)
LYMPHOCYTES # BLD AUTO: 2.1 K/UL — SIGNIFICANT CHANGE UP (ref 1–3.3)
LYMPHOCYTES # BLD AUTO: 26.8 % — SIGNIFICANT CHANGE UP (ref 13–44)
MCHC RBC-ENTMCNC: 30.9 PG — SIGNIFICANT CHANGE UP (ref 27–34)
MCHC RBC-ENTMCNC: 32.8 GM/DL — SIGNIFICANT CHANGE UP (ref 32–36)
MCV RBC AUTO: 94.2 FL — SIGNIFICANT CHANGE UP (ref 80–100)
MONOCYTES # BLD AUTO: 0.7 K/UL — SIGNIFICANT CHANGE UP (ref 0–0.9)
MONOCYTES NFR BLD AUTO: 8.3 % — SIGNIFICANT CHANGE UP (ref 2–14)
NEUTROPHILS # BLD AUTO: 5 K/UL — SIGNIFICANT CHANGE UP (ref 1.8–7.4)
NEUTROPHILS NFR BLD AUTO: 62.8 % — SIGNIFICANT CHANGE UP (ref 43–77)
PLATELET # BLD AUTO: 330 K/UL — SIGNIFICANT CHANGE UP (ref 150–400)
POTASSIUM SERPL-MCNC: 4.2 MMOL/L — SIGNIFICANT CHANGE UP (ref 3.5–5.3)
POTASSIUM SERPL-SCNC: 4.2 MMOL/L — SIGNIFICANT CHANGE UP (ref 3.5–5.3)
PROT SERPL-MCNC: 8 G/DL — SIGNIFICANT CHANGE UP (ref 6–8.3)
RBC # BLD: 3.6 M/UL — LOW (ref 4.2–5.8)
RBC # FLD: 12.9 % — SIGNIFICANT CHANGE UP (ref 10.3–14.5)
SODIUM SERPL-SCNC: 137 MMOL/L — SIGNIFICANT CHANGE UP (ref 135–145)
SPECIMEN SOURCE: SIGNIFICANT CHANGE UP
WBC # BLD: 7.9 K/UL — SIGNIFICANT CHANGE UP (ref 3.8–10.5)
WBC # FLD AUTO: 7.9 K/UL — SIGNIFICANT CHANGE UP (ref 3.8–10.5)

## 2018-01-16 PROCEDURE — 85027 COMPLETE CBC AUTOMATED: CPT

## 2018-01-16 PROCEDURE — 99284 EMERGENCY DEPT VISIT MOD MDM: CPT

## 2018-01-16 PROCEDURE — 82962 GLUCOSE BLOOD TEST: CPT

## 2018-01-16 PROCEDURE — 99284 EMERGENCY DEPT VISIT MOD MDM: CPT | Mod: 25

## 2018-01-16 PROCEDURE — 80053 COMPREHEN METABOLIC PANEL: CPT

## 2018-01-16 PROCEDURE — 87070 CULTURE OTHR SPECIMN AEROBIC: CPT

## 2018-01-16 PROCEDURE — 10180 I&D COMPLEX PO WOUND INFCTJ: CPT

## 2018-01-16 RX ORDER — AZTREONAM 2 G
1 VIAL (EA) INJECTION
Qty: 20 | Refills: 0 | OUTPATIENT
Start: 2018-01-16 | End: 2018-01-25

## 2018-01-16 NOTE — ED PROVIDER NOTE - PHYSICAL EXAMINATION
horizontal well approximated abdominal incision site, pustulant drainage from bl corners, minimal fluctuance, no induration, surrounding erythema/streaking or heat

## 2018-01-16 NOTE — ED PROVIDER NOTE - ATTENDING CONTRIBUTION TO CARE
63 y/o male presents with purulent drainage from abdominal incision site x1 day. PE reveals healing horizontal abdominal incision site, purulent drainage from bl corners; no induration, surrounding erythema or warmth. Surgery consult called. No leukocytosis on labs. No evidence of abscess. Will d/c home with abx's and f/u as outpt with Dr. Arvizu.

## 2018-01-16 NOTE — CONSULT NOTE ADULT - ADDITIONAL PE
Abdomen: in sterile fashion, wound cleaned w/ Povidone Iodine Prep Solution, both open distal aspects of the wound probed, loculations broken up, purulent fluid expressed, cx obtained, wound packed w/ packing. Dressing applied.

## 2018-01-16 NOTE — ED ADULT NURSE NOTE - OBJECTIVE STATEMENT
AOX3 +ambulatory patient reports s/p surgery complaining of draining on his surgical site. patient denies any fevers or chills

## 2018-01-16 NOTE — ED PROVIDER NOTE - MEDICAL DECISION MAKING DETAILS
pt well appearing, vss afebrile, presents with pustulant drainage from abdominal incision site, pt did not contact Dr. Ramirez, state RN told pt to immediately return to ED with any concerns. Denies fevers at home, incision site well approximated, consulted surgery PA for purulent drainage and concerns for possible abscess, PA states CT abd r/o abscess not required, requesting basic labs r/o leukocytosis and dc home with abx, will re-assess.

## 2018-01-16 NOTE — ED PROVIDER NOTE - PMH
HLD (hyperlipidemia)    HTN (hypertension)    Malignant neoplasm of stomach, unspecified location    Prediabetes

## 2018-01-16 NOTE — ED PROVIDER NOTE - NOTES
Surgery PA collected sample, states CT abdomen to r/o abscess not necessary, requesting basic labs r/o leukocytosis and send home with abx, did not have preference

## 2018-01-16 NOTE — CONSULT NOTE ADULT - ASSESSMENT
61 y/o Male w/ hx of gastric adenocarcinoma s/p total gastrectomy s/p exploratory laparotomy, SHAR, removal of JPs now w/ infection of the incision site     -No need for CT abd/pelvis at this time; Wound probed and loculations broken up, purulent fluid drained at bedside, wound packed, cultures obtained  -Daily dressing changes   -PO abx   -It was advised if pt develops abd pain, n/v, fever or chills to return to ED promptly  -Pt to follow up w/ Dr Ramirez in office   -Dc home   -D/w Dr Ramirez and agrees

## 2018-01-16 NOTE — CONSULT NOTE ADULT - GASTROINTESTINAL COMMENTS
upper abdomen wound, distal aspects of the wound w/ 2 cm openings w/ active seropurulent drainage, min erythema, no TTP

## 2018-01-16 NOTE — ED PROVIDER NOTE - SKIN WOUND TYPE
INCISION(S)/horizontal well approximated abdominal incision site, pustulant drainage from bl corners, minimal fluctuance, no induration, surrounding erythema/streaking or heat

## 2018-01-16 NOTE — CONSULT NOTE ADULT - SUBJECTIVE AND OBJECTIVE BOX
Attending:  Dr Ramirez     HPI:  61 y/o Male w/ PMH of Hypertension, prediabetes, herniated lumbar disc, gastric adenocarcinoma s/p total gastrectomy 12/13/17, complicated w/ mechanical obstruction s/p exploratory laparotomy, lysis of adhesions and removal of JPs 1/03/18 p/w c/o purulent drainage from the incision site x1day. Denies abdominal pain, no nausea, vomiting. Denies fever, chills, SOB or CP. Pt offers no other complaints.      PAST MEDICAL & SURGICAL HISTORY:  Prediabetes  HLD (hyperlipidemia)  HTN (hypertension)  Malignant neoplasm of stomach, unspecified location  H/O prostate biopsy  History of arthroscopy of right shoulder: repair for rotator cuff syndrome  History of arthroscopy of left knee: meniscal repair  History of appendectomy  s/p total gastrectomy  s/p exploratory laparotomy, lysis of adhesions and removal of JPs       Allergies  No Known Allergies  Intolerances        FAMILY HISTORY:  Cerebrovascular accident (Mother)      Vital Signs Last 24 Hrs  T(C): 36.7 (16 Jan 2018 17:49), Max: 36.8 (16 Jan 2018 13:56)  T(F): 98 (16 Jan 2018 17:49), Max: 98.3 (16 Jan 2018 13:56)  HR: 82 (16 Jan 2018 17:49) (82 - 97)  BP: 121/71 (16 Jan 2018 17:49) (121/71 - 125/84)  BP(mean): --  RR: 18 (16 Jan 2018 17:49) (18 - 18)  SpO2: 99% (16 Jan 2018 17:49) (99% - 99%)      LABS:                        11.1   7.9   )-----------( 330      ( 16 Jan 2018 16:52 )             33.9     01-16    137  |  103  |  9   ----------------------------<  92  4.2   |  27  |  0.86    Ca    9.2      16 Jan 2018 16:52    TPro  8.0  /  Alb  3.1<L>  /  TBili  0.4  /  DBili  x   /  AST  42<H>  /  ALT  78<H>  /  AlkPhos  80  01-16        POCT Blood Glucose.: 95 mg/dL (16 Jan 2018 14:00)    LIVER FUNCTIONS - ( 16 Jan 2018 16:52 )  Alb: 3.1 g/dL / Pro: 8.0 g/dL / ALK PHOS: 80 U/L / ALT: 78 U/L DA / AST: 42 U/L / GGT: x

## 2018-01-16 NOTE — ED PROVIDER NOTE - OBJECTIVE STATEMENT
63 y/o male, pmhx malignant stomach neoplasm (surgery 1/12/18), HLD, HTN, returns x3 days for pustulant drainage from incision site x1 day. Denies fever/chill, abdominal pain, N/V or any other concerns.

## 2018-01-18 LAB
CULTURE RESULTS: SIGNIFICANT CHANGE UP
SPECIMEN SOURCE: SIGNIFICANT CHANGE UP

## 2018-01-30 PROBLEM — Z85.028 HISTORY OF MALIGNANT NEOPLASM OF STOMACH: Status: RESOLVED | Noted: 2017-11-13 | Resolved: 2018-01-30

## 2018-01-30 PROBLEM — Z78.9 NON-SMOKER: Status: ACTIVE | Noted: 2017-08-10

## 2018-01-30 PROBLEM — Z87.19 HISTORY OF SMALL BOWEL OBSTRUCTION: Status: RESOLVED | Noted: 2018-01-30 | Resolved: 2018-01-30

## 2018-01-30 PROBLEM — Z82.3 FAMILY HISTORY OF CEREBROVASCULAR ACCIDENT (CVA): Status: ACTIVE | Noted: 2018-01-30

## 2018-01-30 PROBLEM — R73.03 PREDIABETES: Status: RESOLVED | Noted: 2018-01-30 | Resolved: 2018-01-30

## 2018-01-30 PROBLEM — Z86.79 HISTORY OF ESSENTIAL HYPERTENSION: Status: RESOLVED | Noted: 2018-01-30 | Resolved: 2018-01-30

## 2018-01-30 PROBLEM — Z86.39 HISTORY OF HIGH CHOLESTEROL: Status: RESOLVED | Noted: 2018-01-30 | Resolved: 2018-01-30

## 2018-02-01 ENCOUNTER — APPOINTMENT (OUTPATIENT)
Dept: SURGERY | Facility: CLINIC | Age: 63
End: 2018-02-01
Payer: MEDICARE

## 2018-02-01 VITALS
BODY MASS INDEX: 28.12 KG/M2 | OXYGEN SATURATION: 100 % | TEMPERATURE: 98.3 F | SYSTOLIC BLOOD PRESSURE: 115 MMHG | HEART RATE: 68 BPM | WEIGHT: 175 LBS | HEIGHT: 66 IN | DIASTOLIC BLOOD PRESSURE: 76 MMHG

## 2018-02-01 DIAGNOSIS — Z82.3 FAMILY HISTORY OF STROKE: ICD-10-CM

## 2018-02-01 DIAGNOSIS — Z85.028 PERSONAL HISTORY OF OTHER MALIGNANT NEOPLASM OF STOMACH: ICD-10-CM

## 2018-02-01 DIAGNOSIS — R73.03 PREDIABETES.: ICD-10-CM

## 2018-02-01 DIAGNOSIS — Z86.79 PERSONAL HISTORY OF OTHER DISEASES OF THE CIRCULATORY SYSTEM: ICD-10-CM

## 2018-02-01 DIAGNOSIS — Z78.9 OTHER SPECIFIED HEALTH STATUS: ICD-10-CM

## 2018-02-01 DIAGNOSIS — Z87.19 PERSONAL HISTORY OF OTHER DISEASES OF THE DIGESTIVE SYSTEM: ICD-10-CM

## 2018-02-01 DIAGNOSIS — C16.9 MALIGNANT NEOPLASM OF STOMACH, UNSPECIFIED: ICD-10-CM

## 2018-02-01 DIAGNOSIS — Z86.39 PERSONAL HISTORY OF OTHER ENDOCRINE, NUTRITIONAL AND METABOLIC DISEASE: ICD-10-CM

## 2018-02-01 PROCEDURE — 99024 POSTOP FOLLOW-UP VISIT: CPT

## 2018-02-15 ENCOUNTER — APPOINTMENT (OUTPATIENT)
Dept: SURGERY | Facility: CLINIC | Age: 63
End: 2018-02-15
Payer: MEDICARE

## 2018-02-15 VITALS
HEIGHT: 66 IN | WEIGHT: 175 LBS | SYSTOLIC BLOOD PRESSURE: 134 MMHG | OXYGEN SATURATION: 98 % | HEART RATE: 64 BPM | TEMPERATURE: 97.6 F | DIASTOLIC BLOOD PRESSURE: 88 MMHG | BODY MASS INDEX: 28.12 KG/M2

## 2018-02-15 PROCEDURE — 99024 POSTOP FOLLOW-UP VISIT: CPT

## 2018-05-17 ENCOUNTER — APPOINTMENT (OUTPATIENT)
Dept: SURGERY | Facility: CLINIC | Age: 63
End: 2018-05-17

## 2018-12-20 ENCOUNTER — RESULT REVIEW (OUTPATIENT)
Age: 63
End: 2018-12-20

## 2018-12-20 ENCOUNTER — APPOINTMENT (OUTPATIENT)
Dept: SURGERY | Facility: CLINIC | Age: 63
End: 2018-12-20
Payer: MEDICARE

## 2018-12-20 VITALS
DIASTOLIC BLOOD PRESSURE: 80 MMHG | WEIGHT: 130 LBS | HEIGHT: 66 IN | BODY MASS INDEX: 20.89 KG/M2 | SYSTOLIC BLOOD PRESSURE: 119 MMHG | HEART RATE: 71 BPM | TEMPERATURE: 97.9 F

## 2018-12-20 PROBLEM — R73.03 PREDIABETES: Chronic | Status: ACTIVE | Noted: 2017-12-12

## 2018-12-20 PROBLEM — E78.5 HYPERLIPIDEMIA, UNSPECIFIED: Chronic | Status: ACTIVE | Noted: 2017-12-12

## 2018-12-20 PROBLEM — I10 ESSENTIAL (PRIMARY) HYPERTENSION: Chronic | Status: ACTIVE | Noted: 2017-12-12

## 2018-12-20 PROBLEM — C16.9 MALIGNANT NEOPLASM OF STOMACH, UNSPECIFIED: Chronic | Status: ACTIVE | Noted: 2017-12-12

## 2018-12-20 PROCEDURE — 99213 OFFICE O/P EST LOW 20 MIN: CPT

## 2018-12-24 ENCOUNTER — INPATIENT (INPATIENT)
Facility: HOSPITAL | Age: 63
LOS: 21 days | Discharge: ANOTHER HOSPITAL | DRG: 304 | End: 2019-01-15
Attending: SPECIALIST | Admitting: SPECIALIST
Payer: COMMERCIAL

## 2018-12-24 VITALS
TEMPERATURE: 97 F | RESPIRATION RATE: 18 BRPM | SYSTOLIC BLOOD PRESSURE: 118 MMHG | DIASTOLIC BLOOD PRESSURE: 84 MMHG | OXYGEN SATURATION: 98 % | HEART RATE: 88 BPM

## 2018-12-24 DIAGNOSIS — K31.1 ADULT HYPERTROPHIC PYLORIC STENOSIS: ICD-10-CM

## 2018-12-24 DIAGNOSIS — Z90.49 ACQUIRED ABSENCE OF OTHER SPECIFIED PARTS OF DIGESTIVE TRACT: Chronic | ICD-10-CM

## 2018-12-24 DIAGNOSIS — Z90.3 ACQUIRED ABSENCE OF STOMACH [PART OF]: Chronic | ICD-10-CM

## 2018-12-24 DIAGNOSIS — Z98.890 OTHER SPECIFIED POSTPROCEDURAL STATES: Chronic | ICD-10-CM

## 2018-12-24 DIAGNOSIS — C16.9 MALIGNANT NEOPLASM OF STOMACH, UNSPECIFIED: ICD-10-CM

## 2018-12-24 DIAGNOSIS — N17.9 ACUTE KIDNEY FAILURE, UNSPECIFIED: ICD-10-CM

## 2018-12-24 LAB
ALBUMIN SERPL ELPH-MCNC: 4.6 G/DL — SIGNIFICANT CHANGE UP (ref 3.5–5)
ALP SERPL-CCNC: 85 U/L — SIGNIFICANT CHANGE UP (ref 40–120)
ALT FLD-CCNC: 36 U/L DA — SIGNIFICANT CHANGE UP (ref 10–60)
ANION GAP SERPL CALC-SCNC: 11 MMOL/L — SIGNIFICANT CHANGE UP (ref 5–17)
APPEARANCE UR: CLEAR — SIGNIFICANT CHANGE UP
AST SERPL-CCNC: 42 U/L — HIGH (ref 10–40)
BASOPHILS # BLD AUTO: 0 K/UL — SIGNIFICANT CHANGE UP (ref 0–0.2)
BASOPHILS NFR BLD AUTO: 0.7 % — SIGNIFICANT CHANGE UP (ref 0–2)
BILIRUB SERPL-MCNC: 1.1 MG/DL — SIGNIFICANT CHANGE UP (ref 0.2–1.2)
BILIRUB UR-MCNC: NEGATIVE — SIGNIFICANT CHANGE UP
BUN SERPL-MCNC: 48 MG/DL — HIGH (ref 7–18)
CALCIUM SERPL-MCNC: 10 MG/DL — SIGNIFICANT CHANGE UP (ref 8.4–10.5)
CHLORIDE SERPL-SCNC: 105 MMOL/L — SIGNIFICANT CHANGE UP (ref 96–108)
CO2 SERPL-SCNC: 30 MMOL/L — SIGNIFICANT CHANGE UP (ref 22–31)
COLOR SPEC: YELLOW — SIGNIFICANT CHANGE UP
CREAT SERPL-MCNC: 1.6 MG/DL — HIGH (ref 0.5–1.3)
DIFF PNL FLD: ABNORMAL
EOSINOPHIL # BLD AUTO: 0 K/UL — SIGNIFICANT CHANGE UP (ref 0–0.5)
EOSINOPHIL NFR BLD AUTO: 0.7 % — SIGNIFICANT CHANGE UP (ref 0–6)
GLUCOSE SERPL-MCNC: 119 MG/DL — HIGH (ref 70–99)
GLUCOSE UR QL: NEGATIVE — SIGNIFICANT CHANGE UP
HCT VFR BLD CALC: 48.6 % — SIGNIFICANT CHANGE UP (ref 39–50)
HGB BLD-MCNC: 16.7 G/DL — SIGNIFICANT CHANGE UP (ref 13–17)
KETONES UR-MCNC: ABNORMAL
LACTATE SERPL-SCNC: 1.5 MMOL/L — SIGNIFICANT CHANGE UP (ref 0.7–2)
LEUKOCYTE ESTERASE UR-ACNC: NEGATIVE — SIGNIFICANT CHANGE UP
LIDOCAIN IGE QN: 67 U/L — LOW (ref 73–393)
LYMPHOCYTES # BLD AUTO: 0.6 K/UL — LOW (ref 1–3.3)
LYMPHOCYTES # BLD AUTO: 9.9 % — LOW (ref 13–44)
MCHC RBC-ENTMCNC: 31.3 PG — SIGNIFICANT CHANGE UP (ref 27–34)
MCHC RBC-ENTMCNC: 34.3 GM/DL — SIGNIFICANT CHANGE UP (ref 32–36)
MCV RBC AUTO: 91.3 FL — SIGNIFICANT CHANGE UP (ref 80–100)
MONOCYTES # BLD AUTO: 0.6 K/UL — SIGNIFICANT CHANGE UP (ref 0–0.9)
MONOCYTES NFR BLD AUTO: 9.3 % — SIGNIFICANT CHANGE UP (ref 2–14)
NEUTROPHILS # BLD AUTO: 4.9 K/UL — SIGNIFICANT CHANGE UP (ref 1.8–7.4)
NEUTROPHILS NFR BLD AUTO: 79.5 % — HIGH (ref 43–77)
NITRITE UR-MCNC: NEGATIVE — SIGNIFICANT CHANGE UP
PH UR: 6.5 — SIGNIFICANT CHANGE UP (ref 5–8)
PLATELET # BLD AUTO: 128 K/UL — LOW (ref 150–400)
POTASSIUM SERPL-MCNC: 3.9 MMOL/L — SIGNIFICANT CHANGE UP (ref 3.5–5.3)
POTASSIUM SERPL-SCNC: 3.9 MMOL/L — SIGNIFICANT CHANGE UP (ref 3.5–5.3)
PROT SERPL-MCNC: 9.2 G/DL — HIGH (ref 6–8.3)
PROT UR-MCNC: 30 MG/DL
RBC # BLD: 5.32 M/UL — SIGNIFICANT CHANGE UP (ref 4.2–5.8)
RBC # FLD: 11.6 % — SIGNIFICANT CHANGE UP (ref 10.3–14.5)
SODIUM SERPL-SCNC: 146 MMOL/L — HIGH (ref 135–145)
SP GR SPEC: 1.01 — SIGNIFICANT CHANGE UP (ref 1.01–1.02)
UROBILINOGEN FLD QL: 1
WBC # BLD: 6.1 K/UL — SIGNIFICANT CHANGE UP (ref 3.8–10.5)
WBC # FLD AUTO: 6.1 K/UL — SIGNIFICANT CHANGE UP (ref 3.8–10.5)

## 2018-12-24 PROCEDURE — 74177 CT ABD & PELVIS W/CONTRAST: CPT | Mod: 26

## 2018-12-24 PROCEDURE — 99285 EMERGENCY DEPT VISIT HI MDM: CPT

## 2018-12-24 RX ORDER — SODIUM CHLORIDE 9 MG/ML
1000 INJECTION, SOLUTION INTRAVENOUS ONCE
Qty: 0 | Refills: 0 | Status: COMPLETED | OUTPATIENT
Start: 2018-12-24 | End: 2018-12-24

## 2018-12-24 RX ORDER — IOHEXOL 300 MG/ML
30 INJECTION, SOLUTION INTRAVENOUS ONCE
Qty: 0 | Refills: 0 | Status: COMPLETED | OUTPATIENT
Start: 2018-12-24 | End: 2018-12-24

## 2018-12-24 RX ORDER — HEPARIN SODIUM 5000 [USP'U]/ML
5000 INJECTION INTRAVENOUS; SUBCUTANEOUS EVERY 8 HOURS
Qty: 0 | Refills: 0 | Status: DISCONTINUED | OUTPATIENT
Start: 2018-12-24 | End: 2019-01-02

## 2018-12-24 RX ORDER — ONDANSETRON 8 MG/1
4 TABLET, FILM COATED ORAL ONCE
Qty: 0 | Refills: 0 | Status: COMPLETED | OUTPATIENT
Start: 2018-12-24 | End: 2018-12-24

## 2018-12-24 RX ORDER — SODIUM CHLORIDE 9 MG/ML
1000 INJECTION, SOLUTION INTRAVENOUS
Qty: 0 | Refills: 0 | Status: DISCONTINUED | OUTPATIENT
Start: 2018-12-24 | End: 2018-12-27

## 2018-12-24 RX ORDER — PANTOPRAZOLE SODIUM 20 MG/1
40 TABLET, DELAYED RELEASE ORAL DAILY
Qty: 0 | Refills: 0 | Status: DISCONTINUED | OUTPATIENT
Start: 2018-12-24 | End: 2018-12-28

## 2018-12-24 RX ADMIN — SODIUM CHLORIDE 100 MILLILITER(S): 9 INJECTION, SOLUTION INTRAVENOUS at 20:05

## 2018-12-24 RX ADMIN — SODIUM CHLORIDE 2000 MILLILITER(S): 9 INJECTION, SOLUTION INTRAVENOUS at 15:51

## 2018-12-24 RX ADMIN — IOHEXOL 30 MILLILITER(S): 300 INJECTION, SOLUTION INTRAVENOUS at 15:52

## 2018-12-24 RX ADMIN — ONDANSETRON 4 MILLIGRAM(S): 8 TABLET, FILM COATED ORAL at 15:52

## 2018-12-24 NOTE — H&P ADULT - PROBLEM SELECTOR PLAN 1
CT abdomen and pelvis pending, will follow up results  NGT to be placed for decompression  NPO, IVF CT abdomen and pelvis pending, will follow up results  NGT to be placed for decompression  NPO, IVF  Possible dilation of obstructed area with Dr. Irvnig on Wednesday CT abdomen and pelvis pending, will follow up results  Will place NGT pending CT results  NPO, IVF  Possible dilation of obstructed area with Dr. Irving on Wednesday

## 2018-12-24 NOTE — ED PROVIDER NOTE - PROGRESS NOTE DETAILS
Discussed with Noelle of surgery who will evaluate patient. Discussed with Paula of gen surgery who will evaluate patient.

## 2018-12-24 NOTE — ED PROVIDER NOTE - CARE PLAN
Principal Discharge DX:	Gastric outlet obstruction  Secondary Diagnosis:	MEKHI (acute kidney injury)  Secondary Diagnosis:	Protein-calorie malnutrition

## 2018-12-24 NOTE — ED PROVIDER NOTE - OBJECTIVE STATEMENT
62 y/o with PMHx of stomach cancer s/p chemotherapy, radiation therapy in remission presents to the ED per Dr. Leon for bowel obstruction. As per wife at bedside, patient had endoscopy 4 days ago at which time was advised to present to Dr. Ramirez for further evaluation. Patient states they went to the office, which was closed thus came to the ED. Patient states he has been unable to eat x 3 weeks; has only been drinking liquids. Reports several episodes of vomiting. Last normal bowel movement was last week, but patient passing gas. Patient reports mild intermittent abdominal pain. Patient denies blood in stool or any other acute complaints. NKDA. Wife translates. 64 y/o with PMHx of gastric cancer s/p resection 1 yr ago by Dr. Ramirez, chemotherapy, radiation therapy, in remission presents to the ED for bowel obstruction. As per wife at bedside, patient had endoscopy 4 days ago by Dr. Moisés Leon where he was told he had an obstruction and was advised to present to Dr. Ramirez's office for further evaluation. Patient states they went to the office, which was closed thus came to the ED. Patient states he has been vomiting all intake x 3 weeks; has only been drinking liquids, but this comes right back up. Reports several episodes of NBNB clear vomiting. Last normal bowel movement was last week, but patient passing gas. Patient reports mild intermittent epigastric abdominal pain. Patient denies blood in stool, fever, or any other acute complaints. NKDA.

## 2018-12-24 NOTE — H&P ADULT - NSHPPHYSICALEXAM_GEN_ALL_CORE
Vital Signs Last 24 Hrs  T(C): 36.2 (24 Dec 2018 14:45), Max: 36.2 (24 Dec 2018 14:45)  T(F): 97.2 (24 Dec 2018 14:45), Max: 97.2 (24 Dec 2018 14:45)  HR: 88 (24 Dec 2018 14:45) (88 - 88)  BP: 118/84 (24 Dec 2018 14:45) (118/84 - 118/84)  RR: 18 (24 Dec 2018 14:45) (18 - 18)  SpO2: 98% (24 Dec 2018 14:45) (98% - 98%)    Physical Exam:    General:  Appears stated age, well-groomed, well-nourished, no distress, cachectic  Eyes: EOMI  HENT:  WNL, no JVD  Chest: respirations nonlabored  Abdomen: scaphoid abdomen, soft, NT/ND, LUQ surgical scar well healed  Extremities: no edema or erythema to bilateral lower extremities  Skin: warm and dry   Musculoskeletal: no calf tenderness  Neuro:  Alert, oriented to time, place and person   Psych: normal affect

## 2018-12-24 NOTE — ED ADULT NURSE NOTE - OBJECTIVE STATEMENT
as per wife, pt. unable to eat for 3 weeks. Pt. vomiting but denies any abdominal pain. Pt. has hx of stomach cancer.

## 2018-12-24 NOTE — H&P ADULT - NSHPLABSRESULTS_GEN_ALL_CORE
LABS:                        16.7   6.1   )-----------( 128      ( 24 Dec 2018 15:42 )             48.6     12-24    146<H>  |  105  |  48<H>  ----------------------------<  119<H>  3.9   |  30  |  1.60<H>    Ca    10.0      24 Dec 2018 15:42    TPro  9.2<H>  /  Alb  4.6  /  TBili  1.1  /  DBili  x   /  AST  42<H>  /  ALT  36  /  AlkPhos  85  12-24    RADIOLOGY & ADDITIONAL STUDIES:

## 2018-12-24 NOTE — H&P ADULT - HISTORY OF PRESENT ILLNESS
63 year old male with PMH of metastatic gastric adenocarcinoma s/p total gastrectomy with [place] en Y esophagojejunal anastomosis on 12/13/17 and Lysis of adhesions on 01/03/18 due to Small Bowel Obstruction presents to ED complaining of persistent nausea and vomiting with inability to tolerate oral intake for the past 3 weeks. Patient states that he had an endoscopy with Dr. Moisés Simms on 12/20/18 which revealed an obstruction. Last BM was one week ago, admits to minimal flatus. Admits to 14pound weight loss over the past 2 weeks. Complains of fatigue and weakness which has limited his ability to preform daily tasks. Patient received chemo and radiation which he completed in July on 2018 with Dr. Chin. Denies chest pain, shortness of breath, abdominal pain, fever, chills, and dysuria. 63 year old male with PMH of metastatic gastric adenocarcinoma s/p total gastrectomy with [place] en Y esophagojejunal anastomosis on 12/13/17 and Lysis of adhesions on 01/03/18 due to Small Bowel Obstruction presents to ED complaining of persistent nausea and vomiting with inability to tolerate oral intake for the past 3 weeks. Patient states that he had an endoscopy with Dr. Moisés Simms on 12/20/18 which revealed an obstruction. Last BM was one week ago, admits to minimal flatus. Admits to 14pound weight loss over the past 2 weeks. Complains of fatigue and weakness which has limited his ability to preform daily tasks. Patient received chemo and radiation which he completed in July on 2018 with Dr. Chin. Denies chest pain, shortness of breath, abdominal pain, fever, chills, and dysuria. Discussed with Dr. Chin who states that he had discussed the patient with Dr. Irving. Discussed with Dr. Irving, who was called to possibly dilate occluded area. Patient was previously in EMR under different MR: [id number].

## 2018-12-24 NOTE — ED PROVIDER NOTE - PHYSICAL EXAMINATION
Afebrile, hemodynamically stable, saturating well  NAD, cachectic, non toxic appearing  Head NCAT  EOMI grossly, anicteric  MM dry  RRR, nml S1/S2, no m/r/g  Lungs CTAB, no w/r/r  Abd soft, mild epigastric TTP, no guarding, ND  AAO, CN's 3-12 grossly intact  GONSALEZ spontaneously, no leg cyanosis or edema  Skin warm, dry, no rashes or hives

## 2018-12-24 NOTE — ED ADULT NURSE REASSESSMENT NOTE - NS ED NURSE REASSESS COMMENT FT1
Pt. is stable at this time. No complaints voiced. Pt. informed of NPO status. Pt. verbalized understanding. Endorsed to NIEVES Nevarez in ED holding for continued care.

## 2018-12-24 NOTE — H&P ADULT - ASSESSMENT
63 year old male with bowel obstruction, MEKHI likely secondary to dehydration, PMH metastatic gastric adenocarcinoma, PSH total gastrectomy with Kelby en Y esophagojejunal anastomosis on 12/13/17 and Lysis of adhesions on 01/03/18 due to Small Bowel Obstruction

## 2018-12-24 NOTE — ED ADULT NURSE NOTE - NSIMPLEMENTINTERV_GEN_ALL_ED
Implemented All Universal Safety Interventions:  [place] to call system. Call bell, personal items and telephone within reach. Instruct patient to call for assistance. Room bathroom lighting operational. Non-slip footwear when patient is off stretcher. Physically safe environment: no spills, clutter or unnecessary equipment. Stretcher in lowest position, wheels locked, appropriate side rails in place.

## 2018-12-24 NOTE — ED PROVIDER NOTE - MEDICAL DECISION MAKING DETAILS
Surgery evaluated pt and concerned for gastric outlet obstruction, requesting admission at this time and they will f/u the CT. Character low suspicion for mesenteric ischemia or AAA as cause of symptoms. Pt tolerating the contrast at this time, is hemodynamically stable, afebrile. Hydrated. Admitted to surgery at this time.

## 2018-12-25 ENCOUNTER — INPATIENT (INPATIENT)
Facility: HOSPITAL | Age: 63
LOS: 37 days | Discharge: OTHER INSTITUTION | End: 2019-02-01
Attending: SPECIALIST | Admitting: SPECIALIST
Payer: MEDICARE

## 2018-12-25 DIAGNOSIS — Z98.890 OTHER SPECIFIED POSTPROCEDURAL STATES: Chronic | ICD-10-CM

## 2018-12-25 DIAGNOSIS — Z90.49 ACQUIRED ABSENCE OF OTHER SPECIFIED PARTS OF DIGESTIVE TRACT: Chronic | ICD-10-CM

## 2018-12-25 DIAGNOSIS — Z90.3 ACQUIRED ABSENCE OF STOMACH [PART OF]: Chronic | ICD-10-CM

## 2018-12-25 RX ADMIN — SODIUM CHLORIDE 100 MILLILITER(S): 9 INJECTION, SOLUTION INTRAVENOUS at 08:12

## 2018-12-25 RX ADMIN — HEPARIN SODIUM 5000 UNIT(S): 5000 INJECTION INTRAVENOUS; SUBCUTANEOUS at 16:27

## 2018-12-25 RX ADMIN — PANTOPRAZOLE SODIUM 40 MILLIGRAM(S): 20 TABLET, DELAYED RELEASE ORAL at 11:41

## 2018-12-25 RX ADMIN — HEPARIN SODIUM 5000 UNIT(S): 5000 INJECTION INTRAVENOUS; SUBCUTANEOUS at 08:07

## 2018-12-25 RX ADMIN — SODIUM CHLORIDE 100 MILLILITER(S): 9 INJECTION, SOLUTION INTRAVENOUS at 06:34

## 2018-12-25 RX ADMIN — HEPARIN SODIUM 5000 UNIT(S): 5000 INJECTION INTRAVENOUS; SUBCUTANEOUS at 01:36

## 2018-12-25 NOTE — PROGRESS NOTE ADULT - SUBJECTIVE AND OBJECTIVE BOX
Pt 1 yr s/p total gastrectomy with 3/16 nodes +  Completed chemo June,18   was noted to develop stricture at gasro-jejunostomy    Now with almost complete obstruction    Dr. Irving aware of patient from outpatient, will do endoscopy with attempt at dilatation of anastomosis in am

## 2018-12-26 LAB
ANION GAP SERPL CALC-SCNC: 4 MMOL/L — LOW (ref 5–17)
BUN SERPL-MCNC: 18 MG/DL — SIGNIFICANT CHANGE UP (ref 7–18)
CALCIUM SERPL-MCNC: 8.9 MG/DL — SIGNIFICANT CHANGE UP (ref 8.4–10.5)
CHLORIDE SERPL-SCNC: 116 MMOL/L — HIGH (ref 96–108)
CO2 SERPL-SCNC: 33 MMOL/L — HIGH (ref 22–31)
CREAT SERPL-MCNC: 0.8 MG/DL — SIGNIFICANT CHANGE UP (ref 0.5–1.3)
GLUCOSE SERPL-MCNC: 114 MG/DL — HIGH (ref 70–99)
HCT VFR BLD CALC: 39.7 % — SIGNIFICANT CHANGE UP (ref 39–50)
HGB BLD-MCNC: 13.5 G/DL — SIGNIFICANT CHANGE UP (ref 13–17)
MCHC RBC-ENTMCNC: 30.4 PG — SIGNIFICANT CHANGE UP (ref 27–34)
MCHC RBC-ENTMCNC: 34 GM/DL — SIGNIFICANT CHANGE UP (ref 32–36)
MCV RBC AUTO: 89.2 FL — SIGNIFICANT CHANGE UP (ref 80–100)
PLATELET # BLD AUTO: 82 K/UL — LOW (ref 150–400)
POTASSIUM SERPL-MCNC: 3.4 MMOL/L — LOW (ref 3.5–5.3)
POTASSIUM SERPL-SCNC: 3.4 MMOL/L — LOW (ref 3.5–5.3)
RBC # BLD: 4.45 M/UL — SIGNIFICANT CHANGE UP (ref 4.2–5.8)
RBC # FLD: 11.4 % — SIGNIFICANT CHANGE UP (ref 10.3–14.5)
SODIUM SERPL-SCNC: 153 MMOL/L — HIGH (ref 135–145)
WBC # BLD: 4.4 K/UL — SIGNIFICANT CHANGE UP (ref 3.8–10.5)
WBC # FLD AUTO: 4.4 K/UL — SIGNIFICANT CHANGE UP (ref 3.8–10.5)

## 2018-12-26 PROCEDURE — 74241: CPT | Mod: 26

## 2018-12-26 PROCEDURE — 99223 1ST HOSP IP/OBS HIGH 75: CPT

## 2018-12-26 RX ADMIN — HEPARIN SODIUM 5000 UNIT(S): 5000 INJECTION INTRAVENOUS; SUBCUTANEOUS at 21:29

## 2018-12-26 RX ADMIN — PANTOPRAZOLE SODIUM 40 MILLIGRAM(S): 20 TABLET, DELAYED RELEASE ORAL at 12:11

## 2018-12-26 RX ADMIN — SODIUM CHLORIDE 100 MILLILITER(S): 9 INJECTION, SOLUTION INTRAVENOUS at 21:30

## 2018-12-26 NOTE — PROGRESS NOTE ADULT - SUBJECTIVE AND OBJECTIVE BOX
62 y/o male with history of gastric adeno ca s/p Kelby en Y gastrectomy one year ago. Patient presents with nausea and vomiting. Gastrojej now almost completely obstructed. Patient seen and examined. States nausea improved. No further vomiting   currently NPO    T(F): 97.6 (12-26-18 @ 06:01), Max: 98.8 (12-25-18 @ 14:44)  HR: 50 (12-26-18 @ 06:01) (50 - 57)  BP: 115/71 (12-26-18 @ 06:01) (115/71 - 125/65)  RR: 16 (12-26-18 @ 06:01) (16 - 16)  SpO2: 100% (12-26-18 @ 06:01) (99% - 100%)  Wt(kg): --                            16.7   6.1   )-----------( 128      ( 24 Dec 2018 15:42 )             48.6   12-24    146<H>  |  105  |  48<H>  ----------------------------<  119<H>  3.9   |  30  |  1.60<H>    Ca    10.0      24 Dec 2018 15:42    TPro  9.2<H>  /  Alb  4.6  /  TBili  1.1  /  DBili  x   /  AST  42<H>  /  ALT  36  /  AlkPhos  85  12-24        Physical Exam  General: AAOx3, No acute distress  Skin: No jaundice, no icterus  Abdomen: soft, nontender, nondistended, no rebound tenderness, no guarding, no palpable masses  : Normal external genitalia  Extremities: non edematous, no calf pain bilaterally

## 2018-12-26 NOTE — CONSULT NOTE ADULT - PROBLEM SELECTOR RECOMMENDATION 9
noted to develop stricture at gasro-jejunostomy  NPO  plan for Endoscopy and dilatation noted to develop stricture at gasro-jejunostomy  NPO  UGI series noted to develop stricture at gasro-jejunostomy  NPO  UGI series today UGI series done this am showed high-grade stricture at the GE junction measuring 3 cm in length   full liquid diet  NPO after MN for EGD and possible dilation   if necessary stent placement on 12/31.

## 2018-12-26 NOTE — PROGRESS NOTE ADULT - ASSESSMENT
62 y/o male with history of gastric adeno ca s/p Kelby en Y gastrectomy one year ago. Patient presents with nausea and vomiting. Gastrojej now almost completely obstructed.     1. npo/ IVF   2. f/u am labs   3. for endoscopy and dilatation today

## 2018-12-27 ENCOUNTER — RESULT REVIEW (OUTPATIENT)
Age: 63
End: 2018-12-27

## 2018-12-27 LAB
ANION GAP SERPL CALC-SCNC: 8 MMOL/L — SIGNIFICANT CHANGE UP (ref 5–17)
BUN SERPL-MCNC: 15 MG/DL — SIGNIFICANT CHANGE UP (ref 7–18)
CALCIUM SERPL-MCNC: 9.1 MG/DL — SIGNIFICANT CHANGE UP (ref 8.4–10.5)
CHLORIDE SERPL-SCNC: 117 MMOL/L — HIGH (ref 96–108)
CO2 SERPL-SCNC: 31 MMOL/L — SIGNIFICANT CHANGE UP (ref 22–31)
CREAT SERPL-MCNC: 0.85 MG/DL — SIGNIFICANT CHANGE UP (ref 0.5–1.3)
GLUCOSE SERPL-MCNC: 107 MG/DL — HIGH (ref 70–99)
HCT VFR BLD CALC: 41.4 % — SIGNIFICANT CHANGE UP (ref 39–50)
HGB BLD-MCNC: 13.6 G/DL — SIGNIFICANT CHANGE UP (ref 13–17)
MCHC RBC-ENTMCNC: 29.7 PG — SIGNIFICANT CHANGE UP (ref 27–34)
MCHC RBC-ENTMCNC: 33 GM/DL — SIGNIFICANT CHANGE UP (ref 32–36)
MCV RBC AUTO: 90.2 FL — SIGNIFICANT CHANGE UP (ref 80–100)
PLATELET # BLD AUTO: 89 K/UL — LOW (ref 150–400)
POTASSIUM SERPL-MCNC: 3.7 MMOL/L — SIGNIFICANT CHANGE UP (ref 3.5–5.3)
POTASSIUM SERPL-SCNC: 3.7 MMOL/L — SIGNIFICANT CHANGE UP (ref 3.5–5.3)
RBC # BLD: 4.59 M/UL — SIGNIFICANT CHANGE UP (ref 4.2–5.8)
RBC # FLD: 11.8 % — SIGNIFICANT CHANGE UP (ref 10.3–14.5)
SODIUM SERPL-SCNC: 156 MMOL/L — HIGH (ref 135–145)
WBC # BLD: 3.2 K/UL — LOW (ref 3.8–10.5)
WBC # FLD AUTO: 3.2 K/UL — LOW (ref 3.8–10.5)

## 2018-12-27 PROCEDURE — 88305 TISSUE EXAM BY PATHOLOGIST: CPT | Mod: 26

## 2018-12-27 PROCEDURE — 43249 ESOPH EGD DILATION <30 MM: CPT

## 2018-12-27 RX ORDER — SODIUM CHLORIDE 9 MG/ML
1000 INJECTION, SOLUTION INTRAVENOUS
Qty: 0 | Refills: 0 | Status: DISCONTINUED | OUTPATIENT
Start: 2018-12-27 | End: 2018-12-28

## 2018-12-27 RX ADMIN — HEPARIN SODIUM 5000 UNIT(S): 5000 INJECTION INTRAVENOUS; SUBCUTANEOUS at 13:14

## 2018-12-27 RX ADMIN — HEPARIN SODIUM 5000 UNIT(S): 5000 INJECTION INTRAVENOUS; SUBCUTANEOUS at 22:08

## 2018-12-27 RX ADMIN — SODIUM CHLORIDE 100 MILLILITER(S): 9 INJECTION, SOLUTION INTRAVENOUS at 18:13

## 2018-12-27 RX ADMIN — SODIUM CHLORIDE 100 MILLILITER(S): 9 INJECTION, SOLUTION INTRAVENOUS at 22:08

## 2018-12-27 RX ADMIN — SODIUM CHLORIDE 100 MILLILITER(S): 9 INJECTION, SOLUTION INTRAVENOUS at 11:56

## 2018-12-27 RX ADMIN — PANTOPRAZOLE SODIUM 40 MILLIGRAM(S): 20 TABLET, DELAYED RELEASE ORAL at 11:55

## 2018-12-27 NOTE — CHART NOTE - NSCHARTNOTEFT_GEN_A_CORE
Esophagogastroduodenoscopy Report  Indication: Anemia   Referring MD: Dr. Lam    Instrument:  #[id number]  Anesthesia: MAC  Consent:  Informed consent was obtained from the patient after providing any opportunity for questions  Procedure: The gastroscope was gently passed through the incisoral orifice into the oral cavity and under direct visualization the esophagus was intubated. The endoscope was passed down the esophagus, through the stomach and into the 3rd portion. The scope was then advanced into the jejunum.  Color, texture, mucosa and anatomy of the esophagus, stomach, and duodenum were carefully examined with the scope. The patient tolerated the procedure well. After completion of the examination, the patient was transferred to the recovery room.   Preparation: NPO   Findings:   Oropharynx	Normal  Esophagus	Normal   EG-junction	Normal  Cardia	  Body	S.p Gastro jejunosotomy.  At the Anastomosis site resistance was met with  the standard gastroscope, however, with gentle pressure the scope was able to advance.  The area was biopsied [1]. The area was then dilated in a controlled sequential manner – 11-12 -13.5 mm holding at each measurement for 60 seconds.   Antrum	   Pylorus	  Duodenal Bulb	  2nd portion	  3rd portion	  Date and time: 12/27/2018 2:39:27 PM  EBL:0  Impression: 1- Gastro-jejunostomy anastomosis stricture. 2- No recurrence of mass although strictured area was edematous.     Plan: 1- Advance diet as tolerated. 2- Aspiration precautions 3- If still not tolerating diet may consider stent placement     Procedure Start Time:  14:16  Procedure End Time:   14:28    Attending:      Felton Rogel M.D.  Date and Time: 12/27/2018 2:39:27 PM

## 2018-12-27 NOTE — PROGRESS NOTE ADULT - ASSESSMENT
64 y/o male with history of gastric adeno ca s/p Kelby en Y gastrectomy one year ago. Patient presents with nausea and vomiting. Gastrojej now almost completely obstructed.     1. npo/ IVF   2. for endoscopy and dilatation today

## 2018-12-27 NOTE — PROGRESS NOTE ADULT - SUBJECTIVE AND OBJECTIVE BOX
64 y/o male with history of gastric adeno ca s/p Kelby en Y gastrectomy one year ago. Patient presents with nausea and vomiting. Gastrojej now almost completely obstructed seen on CT and upper GI. Patient seen and examined.  No further nausea, vomiting tolerated clears       ICU Vital Signs Last 24 Hrs  T(C): 36.6 (27 Dec 2018 05:58), Max: 36.6 (26 Dec 2018 21:39)  T(F): 97.9 (27 Dec 2018 05:58), Max: 97.9 (27 Dec 2018 05:58)  HR: 51 (27 Dec 2018 05:58) (51 - 51)  BP: 137/89 (27 Dec 2018 05:58) (124/60 - 137/89)  BP(mean): --  ABP: --  ABP(mean): --  RR: 16 (27 Dec 2018 05:58) (16 - 16)  SpO2: 100% (27 Dec 2018 05:58) (100% - 100%)                          13.5   4.4   )-----------( 82       ( 26 Dec 2018 09:41 )             39.7   12-26    153<H>  |  116<H>  |  18  ----------------------------<  114<H>  3.4<L>   |  33<H>  |  0.80    Ca    8.9      26 Dec 2018 09:41            Physical Exam  General: AAOx3, No acute distress  Skin: No jaundice, no icterus  Abdomen: soft, nontender, nondistended, no rebound tenderness, no guarding, no palpable masses  : Normal external genitalia  Extremities: non edematous, no calf pain bilaterally

## 2018-12-28 LAB
ANION GAP SERPL CALC-SCNC: 4 MMOL/L — LOW (ref 5–17)
BUN SERPL-MCNC: 9 MG/DL — SIGNIFICANT CHANGE UP (ref 7–18)
CALCIUM SERPL-MCNC: 9.1 MG/DL — SIGNIFICANT CHANGE UP (ref 8.4–10.5)
CHLORIDE SERPL-SCNC: 111 MMOL/L — HIGH (ref 96–108)
CO2 SERPL-SCNC: 33 MMOL/L — HIGH (ref 22–31)
CREAT SERPL-MCNC: 0.95 MG/DL — SIGNIFICANT CHANGE UP (ref 0.5–1.3)
GLUCOSE SERPL-MCNC: 98 MG/DL — SIGNIFICANT CHANGE UP (ref 70–99)
HCT VFR BLD CALC: 43 % — SIGNIFICANT CHANGE UP (ref 39–50)
HGB BLD-MCNC: 14.1 G/DL — SIGNIFICANT CHANGE UP (ref 13–17)
MCHC RBC-ENTMCNC: 30.3 PG — SIGNIFICANT CHANGE UP (ref 27–34)
MCHC RBC-ENTMCNC: 32.7 GM/DL — SIGNIFICANT CHANGE UP (ref 32–36)
MCV RBC AUTO: 92.8 FL — SIGNIFICANT CHANGE UP (ref 80–100)
PLATELET # BLD AUTO: 82 K/UL — LOW (ref 150–400)
POTASSIUM SERPL-MCNC: 3.9 MMOL/L — SIGNIFICANT CHANGE UP (ref 3.5–5.3)
POTASSIUM SERPL-SCNC: 3.9 MMOL/L — SIGNIFICANT CHANGE UP (ref 3.5–5.3)
RBC # BLD: 4.64 M/UL — SIGNIFICANT CHANGE UP (ref 4.2–5.8)
RBC # FLD: 12.1 % — SIGNIFICANT CHANGE UP (ref 10.3–14.5)
SODIUM SERPL-SCNC: 148 MMOL/L — HIGH (ref 135–145)
WBC # BLD: 4.2 K/UL — SIGNIFICANT CHANGE UP (ref 3.8–10.5)
WBC # FLD AUTO: 4.2 K/UL — SIGNIFICANT CHANGE UP (ref 3.8–10.5)

## 2018-12-28 RX ORDER — ONDANSETRON 8 MG/1
4 TABLET, FILM COATED ORAL EVERY 6 HOURS
Qty: 0 | Refills: 0 | Status: DISCONTINUED | OUTPATIENT
Start: 2018-12-28 | End: 2019-01-02

## 2018-12-28 RX ORDER — SODIUM CHLORIDE 9 MG/ML
1000 INJECTION, SOLUTION INTRAVENOUS
Qty: 0 | Refills: 0 | Status: DISCONTINUED | OUTPATIENT
Start: 2018-12-28 | End: 2018-12-31

## 2018-12-28 RX ADMIN — HEPARIN SODIUM 5000 UNIT(S): 5000 INJECTION INTRAVENOUS; SUBCUTANEOUS at 21:41

## 2018-12-28 RX ADMIN — HEPARIN SODIUM 5000 UNIT(S): 5000 INJECTION INTRAVENOUS; SUBCUTANEOUS at 06:06

## 2018-12-28 RX ADMIN — HEPARIN SODIUM 5000 UNIT(S): 5000 INJECTION INTRAVENOUS; SUBCUTANEOUS at 13:25

## 2018-12-28 RX ADMIN — SODIUM CHLORIDE 100 MILLILITER(S): 9 INJECTION, SOLUTION INTRAVENOUS at 06:05

## 2018-12-28 NOTE — CHART NOTE - NSCHARTNOTEFT_GEN_A_CORE
Upon Nutritional Assessment by the Registered Dietitian your patient was determined to meet criteria / has evidence of the following diagnosis/diagnoses:          [ ]  Mild Protein Calorie Malnutrition        [ x]  Moderate Protein Calorie Malnutrition        [ ] Severe Protein Calorie Malnutrition        [ ] Unspecified Protein Calorie Malnutrition        [ ] Underweight / BMI <19        [ ] Morbid Obesity / BMI > 40      Findings as based on:  •  Comprehensive nutrition assessment and consultation  •  Calorie counts (nutrient intake analysis)  •  Food acceptance and intake status from observations by staff  •  Follow up  •  Patient education  •  Intervention secondary to interdisciplinary rounds  •   concerns      Treatment:    The following diet has been recommended:      PROVIDER Section:     By signing this assessment you are acknowledging and agree with the diagnosis/diagnoses assigned by the Registered Dietitian    Comments: Advance diet to solids, if diet cannot be advanced, consider nutrition support

## 2018-12-28 NOTE — DIETITIAN INITIAL EVALUATION ADULT. - PROBLEM SELECTOR PLAN 1
CT abdomen and pelvis pending, will follow up results  Will place NGT pending CT results  NPO, IVF  Possible dilation of obstructed area with Dr. Irving on Wednesday

## 2018-12-28 NOTE — DIETITIAN INITIAL EVALUATION ADULT. - FACTORS AFF FOOD INTAKE
persistent lack of appetite/vomiting not tolerating clear liquid diet/persistent lack of appetite/vomiting

## 2018-12-28 NOTE — PROGRESS NOTE ADULT - ASSESSMENT
62 y/o male with history of gastric adeno ca s/p Kelby en Y gastrectomy one year ago. Patient presents with nausea and vomiting. Gastrojej now almost completely obstructed, s/p EGD with dilation    1.clears  2. plan for EGD with stent placement Monday 12/31 64 y/o male with history of gastric adeno ca s/p Kelby en Y gastrectomy one year ago. Patient presents with nausea and vomiting. Gastrojej now almost completely obstructed, s/p EGD with dilation    1.clears  2. plan for EGD with stent placement Monday 12/31  3) oob ambulate 64 y/o male with history of gastric adeno ca s/p Kelby en Y gastrectomy one year ago. Patient presents with nausea and vomiting. Gastrojej now almost completely obstructed, s/p EGD with dilation, hypernatremia     1.clears  2. plan for EGD with stent placement Monday 12/31  3) oob ambulate  4) correct hypernatremia with D5W 50ml/Hr IVF

## 2018-12-28 NOTE — PROGRESS NOTE ADULT - SUBJECTIVE AND OBJECTIVE BOX
pt seen at bedside, no complaints  Gastrojej obstruction seen on CT and upper GI with history of gastric adeno ca s/p Kelby en Y gastrectomy one year ago  Pt on clears and tolerating  s/p EGD with dilation 12/27    Vital Signs Last 24 Hrs  T(C): 36.6 (28 Dec 2018 06:36), Max: 36.6 (28 Dec 2018 06:36)  T(F): 97.9 (28 Dec 2018 06:36), Max: 97.9 (28 Dec 2018 06:36)  HR: 51 (28 Dec 2018 06:36) (46 - 51)  BP: 128/78 (28 Dec 2018 06:36) (128/78 - 148/71)  BP(mean): --  RR: 16 (28 Dec 2018 06:36) (16 - 18)  SpO2: 99% (28 Dec 2018 06:36) (99% - 100%)      Physical Exam  General: AAOx3, No acute distress  Skin: No jaundice, no icterus  Abdomen: soft, nontender, nondistended, no masses  Extremities: non edematous, no calf pain bilaterally pt seen at bedside, no complaints  Gastrojej obstruction seen on CT and upper GI with history of gastric adeno ca s/p Kelby en Y gastrectomy one year ago  Pt on clears and tolerating, +flatus and +BM  s/p EGD with dilation 12/27    Vital Signs Last 24 Hrs  T(C): 36.6 (28 Dec 2018 06:36), Max: 36.6 (28 Dec 2018 06:36)  T(F): 97.9 (28 Dec 2018 06:36), Max: 97.9 (28 Dec 2018 06:36)  HR: 51 (28 Dec 2018 06:36) (46 - 51)  BP: 128/78 (28 Dec 2018 06:36) (128/78 - 148/71)  BP(mean): --  RR: 16 (28 Dec 2018 06:36) (16 - 18)  SpO2: 99% (28 Dec 2018 06:36) (99% - 100%)      Physical Exam  General: AAOx3, No acute distress  Skin: No jaundice, no icterus  Abdomen: soft, nontender, nondistended, no masses  Extremities: non edematous, no calf pain bilaterally

## 2018-12-29 LAB
ANION GAP SERPL CALC-SCNC: 7 MMOL/L — SIGNIFICANT CHANGE UP (ref 5–17)
BUN SERPL-MCNC: 13 MG/DL — SIGNIFICANT CHANGE UP (ref 7–18)
CALCIUM SERPL-MCNC: 9.2 MG/DL — SIGNIFICANT CHANGE UP (ref 8.4–10.5)
CHLORIDE SERPL-SCNC: 110 MMOL/L — HIGH (ref 96–108)
CO2 SERPL-SCNC: 30 MMOL/L — SIGNIFICANT CHANGE UP (ref 22–31)
CREAT SERPL-MCNC: 0.86 MG/DL — SIGNIFICANT CHANGE UP (ref 0.5–1.3)
GLUCOSE SERPL-MCNC: 89 MG/DL — SIGNIFICANT CHANGE UP (ref 70–99)
HCT VFR BLD CALC: 38.6 % — LOW (ref 39–50)
HGB BLD-MCNC: 13 G/DL — SIGNIFICANT CHANGE UP (ref 13–17)
MCHC RBC-ENTMCNC: 30.1 PG — SIGNIFICANT CHANGE UP (ref 27–34)
MCHC RBC-ENTMCNC: 33.5 GM/DL — SIGNIFICANT CHANGE UP (ref 32–36)
MCV RBC AUTO: 89.6 FL — SIGNIFICANT CHANGE UP (ref 80–100)
PLATELET # BLD AUTO: 75 K/UL — LOW (ref 150–400)
POTASSIUM SERPL-MCNC: 3.8 MMOL/L — SIGNIFICANT CHANGE UP (ref 3.5–5.3)
POTASSIUM SERPL-SCNC: 3.8 MMOL/L — SIGNIFICANT CHANGE UP (ref 3.5–5.3)
RBC # BLD: 4.31 M/UL — SIGNIFICANT CHANGE UP (ref 4.2–5.8)
RBC # FLD: 11.5 % — SIGNIFICANT CHANGE UP (ref 10.3–14.5)
SODIUM SERPL-SCNC: 147 MMOL/L — HIGH (ref 135–145)
WBC # BLD: 3.1 K/UL — LOW (ref 3.8–10.5)
WBC # FLD AUTO: 3.1 K/UL — LOW (ref 3.8–10.5)

## 2018-12-29 RX ADMIN — HEPARIN SODIUM 5000 UNIT(S): 5000 INJECTION INTRAVENOUS; SUBCUTANEOUS at 05:26

## 2018-12-29 RX ADMIN — HEPARIN SODIUM 5000 UNIT(S): 5000 INJECTION INTRAVENOUS; SUBCUTANEOUS at 14:11

## 2018-12-29 RX ADMIN — HEPARIN SODIUM 5000 UNIT(S): 5000 INJECTION INTRAVENOUS; SUBCUTANEOUS at 22:16

## 2018-12-29 NOTE — PROGRESS NOTE ADULT - ASSESSMENT
62 y/o male with Hx of Gastric Adenocarcinoma s/p Total Gastrectomy w/ Esophagojejunal Anastomosis now with an anastomotic stricture & Hypernatremia (improving)       1. Liquids as tolerated  2. Continue to slowly correct the hypernatremia w/ D5W  3. DVT PPx  4. For esophageal stent monday with Dr. Irving

## 2018-12-29 NOTE — PROGRESS NOTE ADULT - SUBJECTIVE AND OBJECTIVE BOX
s/p EGD & Stricture Dilation 12/27      Patient examined at bedside, unable to tolerate liquids  Says he just rinses his mouth & spis them out   No nausea, no vomiting  Hypernatremia is improving, 148 today down from 156        T(F): 98 (12-29-18 @ 06:19), Max: 98.3 (12-28-18 @ 21:31)  HR: 50 (12-29-18 @ 06:19) (50 - 60)  BP: 112/60 (12-29-18 @ 06:19) (112/60 - 123/76)  RR: 16 (12-29-18 @ 06:19) (16 - 17)  SpO2: 100% (12-29-18 @ 06:19) (100% - 100%)          Physical Exam  General: AAOx3, No acute distress  Skin: No jaundice, no icterus  Abdomen: soft, nontender, nondistended, no rebound tenderness, no guarding, no palpable masses  Extremities: non edematous, no calf pain bilaterally

## 2018-12-30 LAB
ANION GAP SERPL CALC-SCNC: 10 MMOL/L — SIGNIFICANT CHANGE UP (ref 5–17)
BUN SERPL-MCNC: 14 MG/DL — SIGNIFICANT CHANGE UP (ref 7–18)
CALCIUM SERPL-MCNC: 8.7 MG/DL — SIGNIFICANT CHANGE UP (ref 8.4–10.5)
CHLORIDE SERPL-SCNC: 104 MMOL/L — SIGNIFICANT CHANGE UP (ref 96–108)
CO2 SERPL-SCNC: 28 MMOL/L — SIGNIFICANT CHANGE UP (ref 22–31)
CREAT SERPL-MCNC: 0.81 MG/DL — SIGNIFICANT CHANGE UP (ref 0.5–1.3)
GLUCOSE SERPL-MCNC: 89 MG/DL — SIGNIFICANT CHANGE UP (ref 70–99)
POTASSIUM SERPL-MCNC: 3.5 MMOL/L — SIGNIFICANT CHANGE UP (ref 3.5–5.3)
POTASSIUM SERPL-SCNC: 3.5 MMOL/L — SIGNIFICANT CHANGE UP (ref 3.5–5.3)
SODIUM SERPL-SCNC: 142 MMOL/L — SIGNIFICANT CHANGE UP (ref 135–145)

## 2018-12-30 PROCEDURE — 99233 SBSQ HOSP IP/OBS HIGH 50: CPT

## 2018-12-30 RX ADMIN — HEPARIN SODIUM 5000 UNIT(S): 5000 INJECTION INTRAVENOUS; SUBCUTANEOUS at 05:35

## 2018-12-30 RX ADMIN — HEPARIN SODIUM 5000 UNIT(S): 5000 INJECTION INTRAVENOUS; SUBCUTANEOUS at 13:17

## 2018-12-30 RX ADMIN — HEPARIN SODIUM 5000 UNIT(S): 5000 INJECTION INTRAVENOUS; SUBCUTANEOUS at 21:56

## 2018-12-30 RX ADMIN — SODIUM CHLORIDE 50 MILLILITER(S): 9 INJECTION, SOLUTION INTRAVENOUS at 21:56

## 2018-12-30 NOTE — PROGRESS NOTE ADULT - SUBJECTIVE AND OBJECTIVE BOX
Subjective:     Still not tolerating Liquid diet   Objective:    MEDICATIONS  (STANDING):  dextrose 5% 1000 milliLiter(s) (50 mL/Hr) IV Continuous <Continuous>  heparin  Injectable 5000 Unit(s) SubCutaneous every 8 hours    MEDICATIONS  (PRN):  ondansetron Injectable 4 milliGRAM(s) IV Push every 6 hours PRN Nausea and/or Vomiting              Vital Signs Last 24 Hrs  T(C): 36.3 (30 Dec 2018 05:56), Max: 36.7 (29 Dec 2018 22:36)  T(F): 97.4 (30 Dec 2018 05:56), Max: 98.1 (29 Dec 2018 22:36)  HR: 51 (30 Dec 2018 05:56) (50 - 52)  BP: 116/70 (30 Dec 2018 05:56) (116/70 - 119/70)  BP(mean): --  RR: 17 (30 Dec 2018 05:56) (17 - 18)  SpO2: 99% (30 Dec 2018 05:56) (99% - 100%)      General:  Well developed, well nourished, alert and active, no pallor, NAD.  HEENT:    Normal appearance of conjunctiva, ears, nose, lips, oropharynx, and oral mucosa, anicteric.  Neck:  No masses, no asymmetry.  Lymph Nodes:  No lymphadenopathy.   Cardiovascular:  RRR normal S1/S2, no murmur.  Respiratory:  CTA B/L, normal respiratory effort.   Abdominal:   soft, no masses or tenderness, normoactive BS, NT/ND, no HSM.  Extremities:   No clubbing or cyanosis, normal capillary refill, no edema.   Skin:   No rash, jaundice, lesions, eczema.   Musculoskeletal:  No joint swelling, erythema or tenderness.   Neuro: No focal deficits.   Other:       LABS:                        13.0   3.1   )-----------( 75       ( 29 Dec 2018 06:56 )             38.6     12-30    142  |  104  |  14  ----------------------------<  89  3.5   |  28  |  0.81    Ca    8.7      30 Dec 2018 06:56            RADIOLOGY & ADDITIONAL TESTS:

## 2018-12-30 NOTE — PROGRESS NOTE ADULT - SUBJECTIVE AND OBJECTIVE BOX
SUBJECTIVE    Nausea [X ] YES [ ] NO  Vomiting [X ] YES [ ] NO  Voiding normally [ ] YES [ X] NO  Flatus [X ] YES [ ] NO  BM [ ] YES [X ] NO       Diarrhea [ ] YES [X ] NO  Pain under control [X ] YES [ ] NO  can only take small sips, everything else he vomits  Ambulated [ X] YES NO [ ]        VITALS    ICU Vital Signs Last 24 Hrs  T(C): 36.3 (30 Dec 2018 05:56), Max: 36.7 (29 Dec 2018 22:36)  T(F): 97.4 (30 Dec 2018 05:56), Max: 98.1 (29 Dec 2018 22:36)  HR: 51 (30 Dec 2018 05:56) (50 - 52)  BP: 116/70 (30 Dec 2018 05:56) (116/70 - 119/70)  BP(mean): --  ABP: --  ABP(mean): --  RR: 17 (30 Dec 2018 05:56) (17 - 18)  SpO2: 99% (30 Dec 2018 05:56) (99% - 100%)    I&O's Detail    29 Dec 2018 07:01  -  30 Dec 2018 07:00  --------------------------------------------------------  IN:    dextrose 5%: 600 mL  Total IN: 600 mL    OUT:  Total OUT: 0 mL    Total NET: 600 mL            PHYSICAL EXAMINATION    Abdomen: soft, NT, ND well healed scar    I&O's Summary    29 Dec 2018 07:01  -  30 Dec 2018 07:00  --------------------------------------------------------  IN: 600 mL / OUT: 0 mL / NET: 600 mL        LABS                        13.0   3.1   )-----------( 75       ( 29 Dec 2018 06:56 )             38.6             12-30    142  |  104  |  14  ----------------------------<  89  3.5   |  28  |  0.81    Ca    8.7      30 Dec 2018 06:56          MEDICATIONS:  MEDICATIONS  (STANDING):  dextrose 5% 1000 milliLiter(s) (50 mL/Hr) IV Continuous <Continuous>  heparin  Injectable 5000 Unit(s) SubCutaneous every 8 hours    MEDICATIONS  (PRN):  ondansetron Injectable 4 milliGRAM(s) IV Push every 6 hours PRN Nausea and/or Vomiting

## 2018-12-30 NOTE — PROGRESS NOTE ADULT - ASSESSMENT
63M with esophagojejunostomy stricture. S/p dilatation with no effect.    1) EGD tomorrow with stent

## 2018-12-30 NOTE — PROGRESS NOTE ADULT - ASSESSMENT
Continue current diet   NPO post midnight , I will attempt for EGD with stent placement pending stent availability and OR space.

## 2018-12-31 LAB
APTT BLD: 58.1 SEC — HIGH (ref 27.5–36.3)
INR BLD: 1.43 RATIO — HIGH (ref 0.88–1.16)
PROTHROM AB SERPL-ACNC: 16.1 SEC — HIGH (ref 10–12.9)

## 2018-12-31 RX ORDER — SODIUM CHLORIDE 9 MG/ML
1000 INJECTION, SOLUTION INTRAVENOUS
Qty: 0 | Refills: 0 | Status: DISCONTINUED | OUTPATIENT
Start: 2018-12-31 | End: 2019-01-02

## 2018-12-31 RX ADMIN — HEPARIN SODIUM 5000 UNIT(S): 5000 INJECTION INTRAVENOUS; SUBCUTANEOUS at 05:39

## 2018-12-31 RX ADMIN — ONDANSETRON 4 MILLIGRAM(S): 8 TABLET, FILM COATED ORAL at 18:05

## 2018-12-31 RX ADMIN — HEPARIN SODIUM 5000 UNIT(S): 5000 INJECTION INTRAVENOUS; SUBCUTANEOUS at 13:48

## 2018-12-31 RX ADMIN — ONDANSETRON 4 MILLIGRAM(S): 8 TABLET, FILM COATED ORAL at 03:56

## 2018-12-31 RX ADMIN — SODIUM CHLORIDE 65 MILLILITER(S): 9 INJECTION, SOLUTION INTRAVENOUS at 23:24

## 2018-12-31 RX ADMIN — ONDANSETRON 4 MILLIGRAM(S): 8 TABLET, FILM COATED ORAL at 23:24

## 2018-12-31 RX ADMIN — HEPARIN SODIUM 5000 UNIT(S): 5000 INJECTION INTRAVENOUS; SUBCUTANEOUS at 23:23

## 2018-12-31 RX ADMIN — SODIUM CHLORIDE 50 MILLILITER(S): 9 INJECTION, SOLUTION INTRAVENOUS at 05:39

## 2018-12-31 NOTE — PROGRESS NOTE ADULT - SUBJECTIVE AND OBJECTIVE BOX
Summary:  63y old  Male who presents with a chief complaint of persistent nausea/ vomiting and inability to tolerate oral intake for the past 3 weeks.    Subjective:     Objective:    MEDICATIONS  (STANDING):  dextrose 5% + sodium chloride 0.45% 1000 milliLiter(s) (65 mL/Hr) IV Continuous <Continuous>  heparin  Injectable 5000 Unit(s) SubCutaneous every 8 hours    MEDICATIONS  (PRN):  ondansetron Injectable 4 milliGRAM(s) IV Push every 6 hours PRN Nausea and/or Vomiting              Vital Signs Last 24 Hrs  T(C): 36.4 (31 Dec 2018 06:01), Max: 37.2 (30 Dec 2018 22:35)  T(F): 97.6 (31 Dec 2018 06:01), Max: 98.9 (30 Dec 2018 22:35)  HR: 53 (31 Dec 2018 06:01) (50 - 56)  BP: 103/62 (31 Dec 2018 06:01) (103/62 - 118/71)  BP(mean): --  RR: 16 (31 Dec 2018 06:01) (16 - 17)  SpO2: 99% (31 Dec 2018 06:01) (99% - 100%)      General:  Well developed, well nourished, alert and active, no pallor, NAD.  HEENT:    Normal appearance of conjunctiva, ears, nose, lips, oropharynx, and oral mucosa, anicteric.  Neck:  No masses, no asymmetry.  Lymph Nodes:  No lymphadenopathy.   Cardiovascular:  RRR normal S1/S2, no murmur.  Respiratory:  CTA B/L, normal respiratory effort.   Abdominal:   soft, no masses or tenderness, normoactive BS, NT/ND, no HSM.  Extremities:   No clubbing or cyanosis, normal capillary refill, no edema.   Skin:   No rash, jaundice, lesions, eczema.   Musculoskeletal:  No joint swelling, erythema or tenderness.   Neuro: No focal deficits.   Other:       LABS:    12-30    142  |  104  |  14  ----------------------------<  89  3.5   |  28  |  0.81    Ca    8.7      30 Dec 2018 06:56      PT/INR - ( 31 Dec 2018 07:03 )   PT: 16.1 sec;   INR: 1.43 ratio         PTT - ( 31 Dec 2018 07:03 )  PTT:58.1 sec      RADIOLOGY & ADDITIONAL TESTS:

## 2018-12-31 NOTE — PROGRESS NOTE ADULT - ASSESSMENT
63y old Male with EJ stricture s/p dilation by GI 12/27    - NPO for possible stenting by GI today  - will give trial of clears after procedure  - continue IVF

## 2018-12-31 NOTE — PROGRESS NOTE ADULT - PROBLEM SELECTOR PLAN 1
persistent nausea/ vomiting, likely bowel obstruction  Continue current diet   EGD with stent placement 1/2/19  NPO after mn on 1/1/19 persistent nausea/ vomiting, likely bowel obstruction  s/p EGD with dilation on 12/27 however still not tolerating po  plan for EGD with stent placement on 1/2/19  NPO after mn on 1/1/19

## 2018-12-31 NOTE — PROGRESS NOTE ADULT - ASSESSMENT
63y Male presents with a chief complaint of persistent N/V. The patient has a significant past medical history of Gastric Ca. Seen and examined sitting OOB-Chair in NAD. Continues with nausea and states vomited yesterday. No diarrhea, no abdominal pain, no constipation. Afebrile VSS 63y Male presents with a chief complaint of persistent N/V. The patient has a significant past medical history of Gastric Ca. Seen and examined sitting OOB-Chair in NAD. Continues with nausea and states vomited yesterday.   Afebrile VSS

## 2018-12-31 NOTE — PROGRESS NOTE ADULT - SUBJECTIVE AND OBJECTIVE BOX
Patient seen and examined at bedside with no complaints.   Only able to tolerate small amount of sips.     Vital Signs Last 24 Hrs  T(F): 97.6 (12-31-18 @ 06:01), Max: 98.9 (12-30-18 @ 22:35)  HR: 53 (12-31-18 @ 06:01)  BP: 103/62 (12-31-18 @ 06:01)  RR: 16 (12-31-18 @ 06:01)  SpO2: 99% (12-31-18 @ 06:01)    GENERAL: Alert, NAD  HEART: S1S2, Regular rate and rhythm;   ABDOMEN: soft, Nontender, Nondistended    I&O's Detail    30 Dec 2018 07:01  -  31 Dec 2018 07:00  --------------------------------------------------------  IN:    dextrose 5%: 400 mL  Total IN: 400 mL    OUT:  Total OUT: 0 mL    Total NET: 400 mL    LABS:    12-30    142  |  104  |  14  ----------------------------<  89  3.5   |  28  |  0.81    Ca    8.7      30 Dec 2018 06:56    PT/INR - ( 31 Dec 2018 07:03 )   PT: 16.1 sec;   INR: 1.43 ratio       PTT - ( 31 Dec 2018 07:03 )  PTT:58.1 sec

## 2019-01-01 LAB
ANION GAP SERPL CALC-SCNC: 8 MMOL/L — SIGNIFICANT CHANGE UP (ref 5–17)
BUN SERPL-MCNC: 13 MG/DL — SIGNIFICANT CHANGE UP (ref 7–18)
CALCIUM SERPL-MCNC: 8.4 MG/DL — SIGNIFICANT CHANGE UP (ref 8.4–10.5)
CHLORIDE SERPL-SCNC: 101 MMOL/L — SIGNIFICANT CHANGE UP (ref 96–108)
CO2 SERPL-SCNC: 27 MMOL/L — SIGNIFICANT CHANGE UP (ref 22–31)
CREAT SERPL-MCNC: 0.88 MG/DL — SIGNIFICANT CHANGE UP (ref 0.5–1.3)
GLUCOSE SERPL-MCNC: 79 MG/DL — SIGNIFICANT CHANGE UP (ref 70–99)
HCT VFR BLD CALC: 36.4 % — LOW (ref 39–50)
HGB BLD-MCNC: 12.7 G/DL — LOW (ref 13–17)
MCHC RBC-ENTMCNC: 31.1 PG — SIGNIFICANT CHANGE UP (ref 27–34)
MCHC RBC-ENTMCNC: 34.9 GM/DL — SIGNIFICANT CHANGE UP (ref 32–36)
MCV RBC AUTO: 89 FL — SIGNIFICANT CHANGE UP (ref 80–100)
PLATELET # BLD AUTO: 87 K/UL — LOW (ref 150–400)
POTASSIUM SERPL-MCNC: 3.7 MMOL/L — SIGNIFICANT CHANGE UP (ref 3.5–5.3)
POTASSIUM SERPL-SCNC: 3.7 MMOL/L — SIGNIFICANT CHANGE UP (ref 3.5–5.3)
RBC # BLD: 4.09 M/UL — LOW (ref 4.2–5.8)
RBC # FLD: 11.3 % — SIGNIFICANT CHANGE UP (ref 10.3–14.5)
SODIUM SERPL-SCNC: 136 MMOL/L — SIGNIFICANT CHANGE UP (ref 135–145)
WBC # BLD: 2.9 K/UL — LOW (ref 3.8–10.5)
WBC # FLD AUTO: 2.9 K/UL — LOW (ref 3.8–10.5)

## 2019-01-01 RX ADMIN — HEPARIN SODIUM 5000 UNIT(S): 5000 INJECTION INTRAVENOUS; SUBCUTANEOUS at 22:28

## 2019-01-01 RX ADMIN — HEPARIN SODIUM 5000 UNIT(S): 5000 INJECTION INTRAVENOUS; SUBCUTANEOUS at 06:55

## 2019-01-02 LAB — ALLERGY+IMMUNOLOGY DIAG STUDY NOTE: SIGNIFICANT CHANGE UP

## 2019-01-02 PROCEDURE — 43266 EGD ENDOSCOPIC STENT PLACE: CPT

## 2019-01-02 RX ORDER — SODIUM CHLORIDE 9 MG/ML
1000 INJECTION, SOLUTION INTRAVENOUS
Qty: 0 | Refills: 0 | Status: DISCONTINUED | OUTPATIENT
Start: 2019-01-02 | End: 2019-01-10

## 2019-01-02 RX ORDER — MORPHINE SULFATE 50 MG/1
2 CAPSULE, EXTENDED RELEASE ORAL ONCE
Qty: 0 | Refills: 0 | Status: DISCONTINUED | OUTPATIENT
Start: 2019-01-02 | End: 2019-01-02

## 2019-01-02 RX ORDER — ONDANSETRON 8 MG/1
4 TABLET, FILM COATED ORAL ONCE
Qty: 0 | Refills: 0 | Status: DISCONTINUED | OUTPATIENT
Start: 2019-01-02 | End: 2019-01-02

## 2019-01-02 RX ORDER — ONDANSETRON 8 MG/1
4 TABLET, FILM COATED ORAL EVERY 6 HOURS
Qty: 0 | Refills: 0 | Status: DISCONTINUED | OUTPATIENT
Start: 2019-01-02 | End: 2019-01-15

## 2019-01-02 RX ORDER — HYDROMORPHONE HYDROCHLORIDE 2 MG/ML
0.5 INJECTION INTRAMUSCULAR; INTRAVENOUS; SUBCUTANEOUS
Qty: 0 | Refills: 0 | Status: DISCONTINUED | OUTPATIENT
Start: 2019-01-02 | End: 2019-01-02

## 2019-01-02 RX ORDER — SODIUM CHLORIDE 9 MG/ML
1000 INJECTION INTRAMUSCULAR; INTRAVENOUS; SUBCUTANEOUS
Qty: 0 | Refills: 0 | Status: DISCONTINUED | OUTPATIENT
Start: 2019-01-02 | End: 2019-01-02

## 2019-01-02 RX ORDER — HYDROMORPHONE HYDROCHLORIDE 2 MG/ML
0.5 INJECTION INTRAMUSCULAR; INTRAVENOUS; SUBCUTANEOUS ONCE
Qty: 0 | Refills: 0 | Status: DISCONTINUED | OUTPATIENT
Start: 2019-01-02 | End: 2019-01-02

## 2019-01-02 RX ORDER — HEPARIN SODIUM 5000 [USP'U]/ML
5000 INJECTION INTRAVENOUS; SUBCUTANEOUS EVERY 8 HOURS
Qty: 0 | Refills: 0 | Status: DISCONTINUED | OUTPATIENT
Start: 2019-01-02 | End: 2019-01-15

## 2019-01-02 RX ADMIN — HYDROMORPHONE HYDROCHLORIDE 0.5 MILLIGRAM(S): 2 INJECTION INTRAMUSCULAR; INTRAVENOUS; SUBCUTANEOUS at 10:18

## 2019-01-02 RX ADMIN — HYDROMORPHONE HYDROCHLORIDE 0.5 MILLIGRAM(S): 2 INJECTION INTRAMUSCULAR; INTRAVENOUS; SUBCUTANEOUS at 18:22

## 2019-01-02 RX ADMIN — HYDROMORPHONE HYDROCHLORIDE 0.5 MILLIGRAM(S): 2 INJECTION INTRAMUSCULAR; INTRAVENOUS; SUBCUTANEOUS at 18:37

## 2019-01-02 RX ADMIN — SODIUM CHLORIDE 65 MILLILITER(S): 9 INJECTION, SOLUTION INTRAVENOUS at 05:25

## 2019-01-02 RX ADMIN — HEPARIN SODIUM 5000 UNIT(S): 5000 INJECTION INTRAVENOUS; SUBCUTANEOUS at 18:23

## 2019-01-02 RX ADMIN — HYDROMORPHONE HYDROCHLORIDE 0.5 MILLIGRAM(S): 2 INJECTION INTRAMUSCULAR; INTRAVENOUS; SUBCUTANEOUS at 13:49

## 2019-01-02 RX ADMIN — HEPARIN SODIUM 5000 UNIT(S): 5000 INJECTION INTRAVENOUS; SUBCUTANEOUS at 21:16

## 2019-01-02 RX ADMIN — HYDROMORPHONE HYDROCHLORIDE 0.5 MILLIGRAM(S): 2 INJECTION INTRAMUSCULAR; INTRAVENOUS; SUBCUTANEOUS at 10:48

## 2019-01-02 RX ADMIN — HYDROMORPHONE HYDROCHLORIDE 0.5 MILLIGRAM(S): 2 INJECTION INTRAMUSCULAR; INTRAVENOUS; SUBCUTANEOUS at 13:34

## 2019-01-02 NOTE — PROGRESS NOTE ADULT - SUBJECTIVE AND OBJECTIVE BOX
Subjective:     No tolerating PO   Objective:    MEDICATIONS  (STANDING):  dextrose 5% + sodium chloride 0.45% 1000 milliLiter(s) (65 mL/Hr) IV Continuous <Continuous>  heparin  Injectable 5000 Unit(s) SubCutaneous every 8 hours    MEDICATIONS  (PRN):  ondansetron Injectable 4 milliGRAM(s) IV Push every 6 hours PRN Nausea and/or Vomiting              Vital Signs Last 24 Hrs  T(C): 36.5 (02 Jan 2019 05:36), Max: 36.7 (01 Jan 2019 21:27)  T(F): 97.7 (02 Jan 2019 05:36), Max: 98 (01 Jan 2019 21:27)  HR: 50 (02 Jan 2019 05:36) (50 - 97)  BP: 120/64 (02 Jan 2019 05:36) (109/60 - 120/64)  BP(mean): --  RR: 16 (02 Jan 2019 05:36) (16 - 17)  SpO2: 100% (02 Jan 2019 05:36) (99% - 100%)      General:  Well developed, well nourished, alert and active, no pallor, NAD.  HEENT:    Normal appearance of conjunctiva, ears, nose, lips, oropharynx, and oral mucosa, anicteric.  Neck:  No masses, no asymmetry.  Lymph Nodes:  No lymphadenopathy.   Cardiovascular:  RRR normal S1/S2, no murmur.  Respiratory:  CTA B/L, normal respiratory effort.   Abdominal:   soft, no masses or tenderness, normoactive BS, NT/ND, no HSM.  Extremities:   No clubbing or cyanosis, normal capillary refill, no edema.         LABS:                        12.7   2.9   )-----------( 87       ( 01 Jan 2019 07:34 )             36.4     01-01    136  |  101  |  13  ----------------------------<  79  3.7   |  27  |  0.88    Ca    8.4      01 Jan 2019 07:34            RADIOLOGY & ADDITIONAL TESTS:

## 2019-01-02 NOTE — PROGRESS NOTE ADULT - ASSESSMENT
63y old Male with EJ stricture s/p dilation by GI 12/27    - for GI stenting btoday  - will give trial of clears after procedure  - continue IVF

## 2019-01-02 NOTE — CHART NOTE - NSCHARTNOTEFT_GEN_A_CORE
Esophagogastroduodenoscopy Report  Indication: GJ stricture    Referring MD: Dr. Hein   Instrument:  #[id number]  Anesthesia: MAC  Consent:  Informed consent was obtained from the patient after providing any opportunity for questions  Procedure: The gastroscope was gently passed through the incisoral orifice into the oral cavity and under direct visualization the esophagus was intubated. The endoscope was passed down the esophagus, through the stomach and into the 3rd portion. The scope was then advanced into the jejunum.  Color, texture, mucosa and anatomy of the esophagus, stomach, and duodenum were carefully examined with the scope. The patient tolerated the procedure well. After completion of the examination, the patient was transferred to the recovery room.   Preparation: NPO   Findings:   Oropharynx	Normal  Esophagus	Normal   EG-junction	Normal  Cardia	Normal   Body	GJ anastomosis identified. The gastroscope was able to pass the stricture with mild pressure. The stricture measured 9 cm   A guidewire was left in place. A 12 cm BS fully covered esophageal stent was placed over the guidewire and with fluoroscopy.  The stent was confirmed in place with second look endoscopy.   Antrum	  Pylorus	  Duodenal Bulb	  2nd portion	  3rd portion	  Date and time: 1/2/2019 9:52:13 AM  EBL:0  Impression: 1- GJ anastomotic stricture  s.p. Fully covered esophageal stent.     Plan: 1- Clears today 2- Check Upper GI series tomorrow morning- IF stent is patent then can advance to soft pureed diet   Procedure Start Time:  9:07 am  Procedure End Time:   9:43 am            Attending:      Felton Rogel M.D.  Date and Time: 1/2/2019 9:52:13 AM

## 2019-01-02 NOTE — PROGRESS NOTE ADULT - SUBJECTIVE AND OBJECTIVE BOX
Patient seen and examined at bedside with no complaints.   Only able to tolerate small amount of sips.     Vital Signs Last 24 Hrs  T(C): 36.6 (02 Jan 2019 08:23), Max: 36.7 (01 Jan 2019 21:27)  T(F): 97.8 (02 Jan 2019 08:23), Max: 98 (01 Jan 2019 21:27)  HR: 50 (02 Jan 2019 08:23) (50 - 97)  BP: 128/75 (02 Jan 2019 08:23) (109/60 - 128/75)  BP(mean): --  RR: 17 (02 Jan 2019 08:23) (16 - 17)  SpO2: 100% (02 Jan 2019 08:23) (99% - 100%)    GENERAL: Alert, NAD  HEART: S1S2, Regular rate and rhythm;   ABDOMEN: soft, Nontender, Nondistended                          12.7   2.9   )-----------( 87       ( 01 Jan 2019 07:34 )             36.4   01-01    136  |  101  |  13  ----------------------------<  79  3.7   |  27  |  0.88    Ca    8.4      01 Jan 2019 07:34

## 2019-01-03 PROCEDURE — 74241: CPT | Mod: 26

## 2019-01-03 PROCEDURE — 99232 SBSQ HOSP IP/OBS MODERATE 35: CPT

## 2019-01-03 RX ORDER — HYDROMORPHONE HYDROCHLORIDE 2 MG/ML
0.5 INJECTION INTRAMUSCULAR; INTRAVENOUS; SUBCUTANEOUS ONCE
Qty: 0 | Refills: 0 | Status: DISCONTINUED | OUTPATIENT
Start: 2019-01-03 | End: 2019-01-03

## 2019-01-03 RX ORDER — KETOROLAC TROMETHAMINE 30 MG/ML
30 SYRINGE (ML) INJECTION EVERY 6 HOURS
Qty: 0 | Refills: 0 | Status: DISCONTINUED | OUTPATIENT
Start: 2019-01-03 | End: 2019-01-04

## 2019-01-03 RX ADMIN — ONDANSETRON 4 MILLIGRAM(S): 8 TABLET, FILM COATED ORAL at 21:25

## 2019-01-03 RX ADMIN — Medication 30 MILLIGRAM(S): at 10:05

## 2019-01-03 RX ADMIN — HEPARIN SODIUM 5000 UNIT(S): 5000 INJECTION INTRAVENOUS; SUBCUTANEOUS at 05:10

## 2019-01-03 RX ADMIN — Medication 30 MILLIGRAM(S): at 10:20

## 2019-01-03 RX ADMIN — HYDROMORPHONE HYDROCHLORIDE 0.5 MILLIGRAM(S): 2 INJECTION INTRAMUSCULAR; INTRAVENOUS; SUBCUTANEOUS at 05:39

## 2019-01-03 RX ADMIN — HYDROMORPHONE HYDROCHLORIDE 0.5 MILLIGRAM(S): 2 INJECTION INTRAMUSCULAR; INTRAVENOUS; SUBCUTANEOUS at 05:09

## 2019-01-03 RX ADMIN — HEPARIN SODIUM 5000 UNIT(S): 5000 INJECTION INTRAVENOUS; SUBCUTANEOUS at 12:24

## 2019-01-03 RX ADMIN — Medication 30 MILLIGRAM(S): at 21:25

## 2019-01-03 RX ADMIN — HEPARIN SODIUM 5000 UNIT(S): 5000 INJECTION INTRAVENOUS; SUBCUTANEOUS at 21:26

## 2019-01-03 RX ADMIN — Medication 30 MILLIGRAM(S): at 22:00

## 2019-01-03 NOTE — CHART NOTE - NSCHARTNOTEFT_GEN_A_CORE
Upon Nutritional Assessment by the Registered Dietitian your patient was determined to meet criteria / has evidence of the following diagnosis/diagnoses:          [ ]  Mild Protein Calorie Malnutrition        [ ]  Moderate Protein Calorie Malnutrition        [x ] Severe Protein Calorie Malnutrition        [ ] Unspecified Protein Calorie Malnutrition        [ ] Underweight / BMI <19        [ ] Morbid Obesity / BMI > 40      Findings as based on:  •  Comprehensive nutrition assessment and consultation  •  Calorie counts (nutrient intake analysis)  •  Food acceptance and intake status from observations by staff  •  Follow up  •  Patient education  •  Intervention secondary to interdisciplinary rounds  •   concerns      Treatment:    The following diet has been recommended:  If unable to tolerate PO< consider alternate route. If able to tolerate PO, add PO supplement.    PROVIDER Section:     By signing this assessment you are acknowledging and agree with the diagnosis/diagnoses assigned by the Registered Dietitian    Comments:

## 2019-01-03 NOTE — PROGRESS NOTE ADULT - PROBLEM SELECTOR PLAN 1
GJ anastomotic stricture; s/p esophageal stent placement 1/2/19  UGI this am showing persistent stricture at the level of the esophagojejunostomy   anastomosis with associated obstruction  Dr. Irving to f/u

## 2019-01-03 NOTE — PROGRESS NOTE ADULT - SUBJECTIVE AND OBJECTIVE BOX
s/p GJ stent placement, Patient seen and examined at bedside complains of epigastric pain. with nausea     Vital Signs Last 24 Hrs  T(C): 36.9 (03 Jan 2019 05:53), Max: 36.9 (03 Jan 2019 05:53)  T(F): 98.5 (03 Jan 2019 05:53), Max: 98.5 (03 Jan 2019 05:53)  HR: 67 (03 Jan 2019 05:53) (51 - 67)  BP: 115/68 (03 Jan 2019 05:53) (115/68 - 148/85)  BP(mean): 100 (02 Jan 2019 12:03) (100 - 106)  RR: 16 (03 Jan 2019 05:53) (11 - 19)  SpO2: 99% (03 Jan 2019 05:53) (99% - 100%)    GENERAL: Alert, NAD  HEART: S1S2, Regular rate and rhythm;   ABDOMEN: soft, Nontender, Nondistended    I&O's Detail    02 Jan 2019 07:01  -  03 Jan 2019 07:00  --------------------------------------------------------  IN:    0.9% NaCl: 700 mL  Total IN: 700 mL    OUT:    Voided: 310 mL  Total OUT: 310 mL    Total NET: 390 mL

## 2019-01-03 NOTE — CHART NOTE - NSCHARTNOTEFT_GEN_A_CORE
Assessment:   Patient is a 63y old  Male who presents with a chief complaint of persistent nausea/ vomiting, likely bowel obstruction (2019 11:58). Pt seen this PM (was on clears/ Ensure clears). Pt asllep. Tray seems untouched. Discussed with RN who reports pt not tolerating PO. Pt now NPO for test/ procure tomorrow       Factors impacting intake: [ ] none [ ] nausea  [ ] vomiting [ ] diarrhea [ ] constipation  [ ]chewing problems [ ] swallowing issues  [x ] other: altered mechanism to tolerate PO (hx  metastatic CA)    Diet Presciption: Diet, NPO (19 @ 17:18)    Intake:     Daily Height in cm: 167.64 (2019 03:49)  Height in cm: 167.64 (24 Dec 2018 14:46)      Daily Weight in k.4 (28 Dec 2018 16:53)    % Weight Change    Pertinent Medications: MEDICATIONS  (STANDING):  dextrose 5% + sodium chloride 0.45% 1000 milliLiter(s) (65 mL/Hr) IV Continuous <Continuous>  heparin  Injectable 5000 Unit(s) SubCutaneous every 8 hours    MEDICATIONS  (PRN):  ketorolac   Injectable 30 milliGRAM(s) IV Push every 6 hours PRN Moderate Pain (4 - 6)  ondansetron Injectable 4 milliGRAM(s) IV Push every 6 hours PRN Nausea and/or Vomiting    Pertinent Labs:  Na136 mmol/L Glu 79 mg/dL K+ 3.7 mmol/L Cr  0.88 mg/dL BUN 13 mg/dL     CAPILLARY BLOOD GLUCOSE        Skin:     Estimated Needs:   [ ] no change since previous assessment  [ ] recalculated:       Previous Nutrition Diagnosis:   [ ] Altered GI function  [ ]Inadequate Oral Intake [ ] Swallowing Difficulty   [ ] Altered nutrition related labs [ ] Increased Nutrient Needs [ ] Overweight/Obesity   [ ] Unintended Weight Loss [ ] Food & Nutrition Related Knowledge Deficit [x ] Malnutrition (?moderate)  [ ] Other:     Nutrition Diagnosis is : malnutrition (severe) related to altered GI fx as evidenced by 14# weight loss/ over 2 weeks (H&P), inadequate intake PTA and inadequate intake in  hospital.     New Nutrition Diagnosis: [ ] not applicable       Interventions:   Recommend  [ ] Change Diet To:  [ ] Nutrition Supplement  [ ] Nutrition Support  [X ] Other: If pt continues to not tolerate PO, consider alternate route. If able to tolearate PO, consider PO supplement. MD to monitor. RD available.     Monitoring and Evaluation:   [x ] PO intake [ x ] Tolerance to diet prescription [ x ] weights [ x ] labs[ x ] follow up per protocol  [ ] other:

## 2019-01-03 NOTE — PROGRESS NOTE ADULT - ASSESSMENT
63y old Male with EJ stricture s/p dilation by GI 12/27 with epigastric pain     1. upper gi today   2. prn pain control   3. will advance diet based on upper gi

## 2019-01-03 NOTE — PROGRESS NOTE ADULT - ASSESSMENT
63 year old male with PMH of metastatic gastric adenocarcinoma completed chemo June,18 s/p total gastrectomy with Kelby en Y esophagojejunal anastomosis on 12/13/17 and Lysis of adhesions on 01/03/18 2/2 SBO. Presents to ED complaining of persistent nausea and vomiting with inability to tolerate po intake. Patient is s/p EGD with esophageal stent placement on 1/2/19. Patient seen vomiting white frothy vomitus this am and reports pain to epigastric region.

## 2019-01-03 NOTE — PROGRESS NOTE ADULT - SUBJECTIVE AND OBJECTIVE BOX
Summary:   63y Male presents with a chief complaint of persistent N/V and inability to tolerate oral intake. The patient has a significant past medical history of Gastric Ca    Subjective: + epigastric pain,  +nausea, + vomiting.     Objective:    MEDICATIONS  (STANDING):  dextrose 5% + sodium chloride 0.45% 1000 milliLiter(s) (65 mL/Hr) IV Continuous <Continuous>  heparin  Injectable 5000 Unit(s) SubCutaneous every 8 hours    MEDICATIONS  (PRN):  ketorolac   Injectable 30 milliGRAM(s) IV Push every 6 hours PRN Moderate Pain (4 - 6)  ondansetron Injectable 4 milliGRAM(s) IV Push every 6 hours PRN Nausea and/or Vomiting              Vital Signs Last 24 Hrs  T(C): 36.9 (03 Jan 2019 05:53), Max: 36.9 (03 Jan 2019 05:53)  T(F): 98.5 (03 Jan 2019 05:53), Max: 98.5 (03 Jan 2019 05:53)  HR: 67 (03 Jan 2019 05:53) (51 - 67)  BP: 115/68 (03 Jan 2019 05:53) (115/68 - 148/72)  BP(mean): 100 (02 Jan 2019 12:03) (100 - 100)  RR: 16 (03 Jan 2019 05:53) (12 - 17)  SpO2: 99% (03 Jan 2019 05:53) (99% - 100%)      General:  alert and active, no pallor  HEENT:    Normal appearance of conjunctiva, ears, nose, lips, oropharynx, and oral mucosa, anicteric.  Neck:  No masses, no asymmetry.  Lymph Nodes:  No lymphadenopathy.   Cardiovascular:  RRR normal S1/S2, no murmur.  Respiratory:  CTA B/L, normal respiratory effort.   Abdominal:   soft, no masses or tenderness, normoactive BS, NT/ND, no HSM.  Extremities:   No clubbing or cyanosis, normal capillary refill, no edema.   Skin:   No rash, jaundice, lesions, eczema.   Musculoskeletal:  No joint swelling, erythema or tenderness.   Neuro: No focal deficits.   Other:       LABS:                RADIOLOGY & ADDITIONAL TESTS:

## 2019-01-04 PROCEDURE — 43249 ESOPH EGD DILATION <30 MM: CPT

## 2019-01-04 PROCEDURE — 43247 EGD REMOVE FOREIGN BODY: CPT

## 2019-01-04 RX ORDER — HYDROMORPHONE HYDROCHLORIDE 2 MG/ML
0.5 INJECTION INTRAMUSCULAR; INTRAVENOUS; SUBCUTANEOUS
Qty: 0 | Refills: 0 | Status: DISCONTINUED | OUTPATIENT
Start: 2019-01-04 | End: 2019-01-04

## 2019-01-04 RX ORDER — SODIUM CHLORIDE 9 MG/ML
1000 INJECTION INTRAMUSCULAR; INTRAVENOUS; SUBCUTANEOUS
Qty: 0 | Refills: 0 | Status: DISCONTINUED | OUTPATIENT
Start: 2019-01-04 | End: 2019-01-10

## 2019-01-04 RX ORDER — ONDANSETRON 8 MG/1
4 TABLET, FILM COATED ORAL ONCE
Qty: 0 | Refills: 0 | Status: DISCONTINUED | OUTPATIENT
Start: 2019-01-04 | End: 2019-01-15

## 2019-01-04 RX ADMIN — HEPARIN SODIUM 5000 UNIT(S): 5000 INJECTION INTRAVENOUS; SUBCUTANEOUS at 21:15

## 2019-01-04 RX ADMIN — HEPARIN SODIUM 5000 UNIT(S): 5000 INJECTION INTRAVENOUS; SUBCUTANEOUS at 13:11

## 2019-01-04 RX ADMIN — Medication 30 MILLIGRAM(S): at 05:16

## 2019-01-04 RX ADMIN — Medication 30 MILLIGRAM(S): at 05:30

## 2019-01-04 RX ADMIN — HEPARIN SODIUM 5000 UNIT(S): 5000 INJECTION INTRAVENOUS; SUBCUTANEOUS at 05:16

## 2019-01-04 NOTE — CHART NOTE - NSCHARTNOTEFT_GEN_A_CORE
Esophagogastroduodenoscopy Report  Indication: Stent migration, GJ stricture    Referring MD: JESSE    Instrument:    Anesthesia: MAC  Consent:  Informed consent was obtained from the patient after providing any opportunity for questions  Procedure: See below for full description   Preparation: NPO   Findings:   Oropharynx	Normal  Esophagus	Normal   EG-junction	The esophageal stent was appreciated. It appeared it migrated to the mid esophagus. The stent was grabbed with the RAT Tooth forceps and removed.  The stricture was again examined. The gastroscope was able to pass with minimal pressure. The stricture was again dilated in a sequential manner to 15mm-16.6 mm and finally 18mm (holding at each measurement for 2 minutes)   Cardia	  Body	   Antrum	  Pylorus	Normal  Duodenal Bulb	  2nd portion	  3rd portion	  Date and time:   EBL:0  Impression: 1- Stent removal 2- Repeat dilation to 18 mm     Plan: 1- Trial of clear liquid diet 2- Aspiration precautions 3- Repeat UGI   4- Carafate 1 gram BID         Attending:      Felton Rogel M.D.  Date and Time:

## 2019-01-04 NOTE — PROGRESS NOTE ADULT - SUBJECTIVE AND OBJECTIVE BOX
s/p GJ stent placement 1/2  Stent migrated and now proximal to obstruction  Pt not tolerating clears     Vital Signs Last 24 Hrs  T(C): 37.1 (04 Jan 2019 05:49), Max: 37.1 (04 Jan 2019 05:49)  T(F): 98.7 (04 Jan 2019 05:49), Max: 98.7 (04 Jan 2019 05:49)  HR: 86 (04 Jan 2019 05:49) (76 - 88)  BP: 113/75 (04 Jan 2019 05:49) (113/75 - 130/86)  BP(mean): --  RR: 16 (04 Jan 2019 05:49) (16 - 16)  SpO2: 100% (04 Jan 2019 05:49) (100% - 100%)    GENERAL: Alert, NAD  HEART: S1S2, Regular rate and rhythm  ABDOMEN: soft, Nontender, Nondistended

## 2019-01-04 NOTE — PROGRESS NOTE ADULT - SUBJECTIVE AND OBJECTIVE BOX
Subjective:   Comfortable but unable to tolerate diet       Objective:    MEDICATIONS  (STANDING):  dextrose 5% + sodium chloride 0.45% 1000 milliLiter(s) (65 mL/Hr) IV Continuous <Continuous>  heparin  Injectable 5000 Unit(s) SubCutaneous every 8 hours    MEDICATIONS  (PRN):  ketorolac   Injectable 30 milliGRAM(s) IV Push every 6 hours PRN Moderate Pain (4 - 6)  ondansetron Injectable 4 milliGRAM(s) IV Push every 6 hours PRN Nausea and/or Vomiting              Vital Signs Last 24 Hrs  T(C): 37.1 (04 Jan 2019 05:49), Max: 37.1 (04 Jan 2019 05:49)  T(F): 98.7 (04 Jan 2019 05:49), Max: 98.7 (04 Jan 2019 05:49)  HR: 86 (04 Jan 2019 05:49) (76 - 88)  BP: 113/75 (04 Jan 2019 05:49) (113/75 - 130/86)  BP(mean): --  RR: 16 (04 Jan 2019 05:49) (16 - 16)  SpO2: 100% (04 Jan 2019 05:49) (100% - 100%)      General:  Well developed, well nourished, alert and active, no pallor, NAD.  HEENT:    Normal appearance of conjunctiva, ears, nose, lips, oropharynx, and oral mucosa, anicteric.  Neck:  No masses, no asymmetry.  Lymph Nodes:  No lymphadenopathy.   Cardiovascular:  RRR normal S1/S2, no murmur.  Respiratory:  CTA B/L, normal respiratory effort.   Abdominal:   soft, no masses or tenderness, normoactive BS, NT/ND, no HSM.  Extremities:   No clubbing or cyanosis, normal capillary refill, no edema.   Skin:   No rash, jaundice, lesions, eczema.   Musculoskeletal:  No joint swelling, erythema or tenderness.   Neuro: No focal deficits.   Other:       LABS:                RADIOLOGY & ADDITIONAL TESTS:

## 2019-01-04 NOTE — PROGRESS NOTE ADULT - ASSESSMENT
63y old Male with EJ stricture s/p dilation by GI 12/27, s/p stent placement 1/2 now with migration of stent    1. plan for stent removal and possible dilation by GI today  2) npo  3) ivf   4) oob ambulate encouraged

## 2019-01-05 PROCEDURE — 99232 SBSQ HOSP IP/OBS MODERATE 35: CPT

## 2019-01-05 RX ADMIN — HEPARIN SODIUM 5000 UNIT(S): 5000 INJECTION INTRAVENOUS; SUBCUTANEOUS at 14:18

## 2019-01-05 RX ADMIN — HEPARIN SODIUM 5000 UNIT(S): 5000 INJECTION INTRAVENOUS; SUBCUTANEOUS at 21:11

## 2019-01-05 RX ADMIN — HEPARIN SODIUM 5000 UNIT(S): 5000 INJECTION INTRAVENOUS; SUBCUTANEOUS at 05:28

## 2019-01-05 NOTE — PROGRESS NOTE ADULT - ASSESSMENT
63y old Male s/p esophageal dilation, stent placement and removal s/p migration of stent, stricture re-dilated 1/4    - continue clear liquid diet  - if continues to tolerate will advance to full liquids  - GI follow up

## 2019-01-05 NOTE — PROGRESS NOTE ADULT - SUBJECTIVE AND OBJECTIVE BOX
Patient seen and examined at bedside with no complaints.   Denies pain and nausea  Admits to spitting up small amounts of red sputum.   Tolerating clears    Vital Signs Last 24 Hrs  T(F): 97.8 (01-05-19 @ 05:59), Max: 98.7 (01-04-19 @ 15:50)  HR: 63 (01-05-19 @ 05:59)  BP: 112/69 (01-05-19 @ 05:59)  RR: 16 (01-05-19 @ 05:59)  SpO2: 99% (01-05-19 @ 05:59)    GENERAL: Alert, NAD  CHEST/LUNG: respirations nonlabored  ABDOMEN: soft, Nontender, Nondistended    I&O's Detail    04 Jan 2019 07:01  -  05 Jan 2019 07:00  --------------------------------------------------------  IN:    sodium chloride 0.9%.: 700 mL  Total IN: 700 mL    OUT:  Total OUT: 0 mL    Total NET: 700 mL

## 2019-01-06 LAB
ANION GAP SERPL CALC-SCNC: 10 MMOL/L — SIGNIFICANT CHANGE UP (ref 5–17)
BUN SERPL-MCNC: 28 MG/DL — HIGH (ref 7–18)
CALCIUM SERPL-MCNC: 10.1 MG/DL — SIGNIFICANT CHANGE UP (ref 8.4–10.5)
CHLORIDE SERPL-SCNC: 108 MMOL/L — SIGNIFICANT CHANGE UP (ref 96–108)
CO2 SERPL-SCNC: 25 MMOL/L — SIGNIFICANT CHANGE UP (ref 22–31)
CREAT SERPL-MCNC: 1.01 MG/DL — SIGNIFICANT CHANGE UP (ref 0.5–1.3)
GLUCOSE SERPL-MCNC: 66 MG/DL — LOW (ref 70–99)
HCT VFR BLD CALC: 46.5 % — SIGNIFICANT CHANGE UP (ref 39–50)
HGB BLD-MCNC: 15.3 G/DL — SIGNIFICANT CHANGE UP (ref 13–17)
MCHC RBC-ENTMCNC: 30.3 PG — SIGNIFICANT CHANGE UP (ref 27–34)
MCHC RBC-ENTMCNC: 32.8 GM/DL — SIGNIFICANT CHANGE UP (ref 32–36)
MCV RBC AUTO: 92.1 FL — SIGNIFICANT CHANGE UP (ref 80–100)
PLATELET # BLD AUTO: 137 K/UL — LOW (ref 150–400)
POTASSIUM SERPL-MCNC: 4.6 MMOL/L — SIGNIFICANT CHANGE UP (ref 3.5–5.3)
POTASSIUM SERPL-SCNC: 4.6 MMOL/L — SIGNIFICANT CHANGE UP (ref 3.5–5.3)
RBC # BLD: 5.05 M/UL — SIGNIFICANT CHANGE UP (ref 4.2–5.8)
RBC # FLD: 12.3 % — SIGNIFICANT CHANGE UP (ref 10.3–14.5)
SODIUM SERPL-SCNC: 143 MMOL/L — SIGNIFICANT CHANGE UP (ref 135–145)
WBC # BLD: 4.6 K/UL — SIGNIFICANT CHANGE UP (ref 3.8–10.5)
WBC # FLD AUTO: 4.6 K/UL — SIGNIFICANT CHANGE UP (ref 3.8–10.5)

## 2019-01-06 PROCEDURE — 99232 SBSQ HOSP IP/OBS MODERATE 35: CPT

## 2019-01-06 RX ADMIN — SODIUM CHLORIDE 65 MILLILITER(S): 9 INJECTION, SOLUTION INTRAVENOUS at 22:57

## 2019-01-06 RX ADMIN — HEPARIN SODIUM 5000 UNIT(S): 5000 INJECTION INTRAVENOUS; SUBCUTANEOUS at 14:10

## 2019-01-06 RX ADMIN — ONDANSETRON 4 MILLIGRAM(S): 8 TABLET, FILM COATED ORAL at 09:40

## 2019-01-06 RX ADMIN — HEPARIN SODIUM 5000 UNIT(S): 5000 INJECTION INTRAVENOUS; SUBCUTANEOUS at 22:57

## 2019-01-06 RX ADMIN — HEPARIN SODIUM 5000 UNIT(S): 5000 INJECTION INTRAVENOUS; SUBCUTANEOUS at 05:51

## 2019-01-06 NOTE — PROGRESS NOTE ADULT - ASSESSMENT
63y old Male s/p esophageal dilation, stent placement and removal s/p migration of stent, stricture re-dilated 1/4    - continue clear liquid diet  - will obtain UGIS as patient not tolerating clear liquids  - GI follow up

## 2019-01-06 NOTE — PROGRESS NOTE ADULT - SUBJECTIVE AND OBJECTIVE BOX
Patient seen and examined at bedside with no complaints.   Denies pain and nausea  Admits to vomiting moderate amounts of blood.     Vital Signs Last 24 Hrs  T(F): 97.7 (01-06-19 @ 05:52), Max: 98 (01-05-19 @ 13:10)  HR: 65 (01-06-19 @ 05:52)  BP: 123/74 (01-06-19 @ 05:52)  RR: 16 (01-06-19 @ 05:52)  SpO2: 98% (01-06-19 @ 05:52)    GENERAL: Alert, NAD  CHEST/LUNG: Clear to auscultation bilaterally, respirations nonlabored  ABDOMEN: + Bowel sounds, soft, Nontender, Nondistended    I&O's Detail    LABS:                        15.3   4.6   )-----------( 137      ( 06 Jan 2019 09:03 )             46.5     01-06    143  |  108  |  28<H>  ----------------------------<  66<L>  4.6   |  25  |  1.01    Ca    10.1      06 Jan 2019 09:03

## 2019-01-07 DIAGNOSIS — R11.11 VOMITING WITHOUT NAUSEA: ICD-10-CM

## 2019-01-07 DIAGNOSIS — E43 UNSPECIFIED SEVERE PROTEIN-CALORIE MALNUTRITION: ICD-10-CM

## 2019-01-07 PROCEDURE — 74241: CPT | Mod: 26

## 2019-01-07 RX ORDER — METOCLOPRAMIDE HCL 10 MG
10 TABLET ORAL EVERY 6 HOURS
Qty: 0 | Refills: 0 | Status: COMPLETED | OUTPATIENT
Start: 2019-01-07 | End: 2019-01-09

## 2019-01-07 RX ADMIN — Medication 10 MILLIGRAM(S): at 17:16

## 2019-01-07 RX ADMIN — HEPARIN SODIUM 5000 UNIT(S): 5000 INJECTION INTRAVENOUS; SUBCUTANEOUS at 13:01

## 2019-01-07 RX ADMIN — HEPARIN SODIUM 5000 UNIT(S): 5000 INJECTION INTRAVENOUS; SUBCUTANEOUS at 22:52

## 2019-01-07 RX ADMIN — HEPARIN SODIUM 5000 UNIT(S): 5000 INJECTION INTRAVENOUS; SUBCUTANEOUS at 06:01

## 2019-01-07 RX ADMIN — Medication 10 MILLIGRAM(S): at 11:28

## 2019-01-07 RX ADMIN — Medication 10 MILLIGRAM(S): at 13:01

## 2019-01-07 NOTE — PROGRESS NOTE ADULT - ASSESSMENT
63y old Male s/p esophageal dilation, stent placement and removal s/p migration of stent, stricture re-dilated 1/4    - will obtain UGIS today  - oob ambulate encouraged

## 2019-01-07 NOTE — CONSULT NOTE ADULT - SUBJECTIVE AND OBJECTIVE BOX
[facility] NEPHROLOGY- METABOLIC SUPPORT SERVICE CONSULTATION NOTE    As per HPI:   63 year old male with PMH of metastatic gastric adenocarcinoma s/p total gastrectomy with Kelby en Y esophagojejunal anastomosis on 12/13/17 and Lysis of adhesions on 01/03/18 due to Small Bowel Obstruction presents to ED complaining of persistent nausea and vomiting with inability to tolerate oral intake for the past 3 weeks. Patient states that he had an endoscopy with Dr. Moisés Simms on 12/20/18 which revealed an obstruction. Last BM was one week ago, admits to minimal flatus. Admits to 14pound weight loss over the past 2 weeks. Complains of fatigue and weakness which has limited his ability to preform daily tasks. Patient received chemo and radiation which he completed in July on 2018 with Dr. Chin. Denies chest pain, shortness of breath, abdominal pain, fever, chills, and dysuria. Discussed with Dr. Chin who states that he had discussed the patient with Dr. Irving. Discussed with Dr. Irving, who was called to possibly dilate occluded area. Patient was previously in EMR under different MR: [id number].    As per pt and wife. Pt with 20 lb weight loss/ 1 month and unable to tolerate solids and liquid since the 1st week of Dec.     PAST MEDICAL & SURGICAL HISTORY:  Stomach cancer  S/P total gastrectomy and Kelby-en-Y esophagojejunal anastomosis    No Known Allergies    Home Medications Reviewed  Hospital Medications:   MEDICATIONS  (STANDING):  dextrose 5% + sodium chloride 0.45% 1000 milliLiter(s) (65 mL/Hr) IV Continuous <Continuous>  heparin  Injectable 5000 Unit(s) SubCutaneous every 8 hours  metoclopramide Injectable 10 milliGRAM(s) IV Push every 6 hours  sodium chloride 0.9%. 1000 milliLiter(s) (100 mL/Hr) IV Continuous <Continuous>    SOCIAL HISTORY:  Denies ETOh,Smoking,   FAMILY HISTORY:  No pertinent family history in first degree relatives    REVIEW OF SYSTEMS:  CONSTITUTIONAL: No weakness, fevers or chills  EYES/ENT: No visual changes;  No vertigo or throat pain   NECK: No pain or stiffness  RESPIRATORY: No cough, wheezing, hemoptysis; No shortness of breath  CARDIOVASCULAR: No chest pain or palpitations.  GASTROINTESTINAL: No abdominal or epigastric pain. No nausea, vomiting, or hematemesis; No diarrhea or constipation. No melena or hematochezia.  GENITOURINARY: No dysuria, frequency, foamy urine, urinary urgency, incontinence or hematuria  NEUROLOGICAL: No numbness or weakness  SKIN: No itching, burning, rashes, or lesions   VASCULAR: No bilateral lower extremity edema.   All other review of systems is negative unless indicated above.    VITALS:  T(F): 98 (01-07-19 @ 06:00), Max: 98.4 (01-06-19 @ 21:14)  HR: 66 (01-07-19 @ 06:00)  BP: 113/68 (01-07-19 @ 06:00)  RR: 16 (01-07-19 @ 06:00)  SpO2: 100% (01-07-19 @ 06:00)  Wt(kg): --  Height (cm): 167.64 (01-02 @ 08:16)  Weight (kg): 54.4 (01-02 @ 08:16)  BMI (kg/m2): 19.4 (01-02 @ 08:16)  BSA (m2): 1.61 (01-02 @ 08:16)    PHYSICAL EXAM:  Constitutional: NAD, [emaciated/debilitated]  HEENT: anicteric sclera, oropharynx clear, MMM  Neck: No JVD  Respiratory: CTAB, no wheezes, rales or rhonchi  Cardiovascular: S1, S2, RRR  Gastrointestinal: BS+, soft, NT/ND  Extremities: No cyanosis or clubbing. No peripheral edema  Neurological: A/O x 3, no focal deficits  Psychiatric: Normal mood, normal affect  : No CVA tenderness. No coto.   Skin: No rashes  Vascular Access: PICC benign, lumen saved for TPN    LABS:  01-06    143  |  108  |  28<H>  ----------------------------<  66<L>  4.6   |  25  |  1.01    Ca    10.1      06 Jan 2019 09:03      Creatinine Trend: 1.01 <--, 0.88 <--                        15.3   4.6   )-----------( 137      ( 06 Jan 2019 09:03 )             46.5     Urine Studies:      RADIOLOGY & ADDITIONAL STUDIES: [facility] NEPHROLOGY- METABOLIC SUPPORT SERVICE CONSULTATION NOTE    Pt is a 63 year old male with h/o metastatic gastric adenocarcinoma s/p total gastrectomy with Kelby en Y esophagojejunal anastomosis on 12/13/17 and Lysis of adhesions on 01/03/18 due to Small Bowel Obstruction presents with persistent nausea and vomiting with inability to tolerate oral intake for the past 3 weeks. Pt had EGD as an outpt on 12/20/18 which showed a gastric outlet obstruction.   Hospital course reviewed:   12/26- UGI series showed high-grade stricture at the GE junction measuring 3 cm in length   12/27- s/p EGD with dilatation (1st)  1/2- s/p EGD with esophageal stent placement  Complicated by stent migration to mid esophagus  1/4- s/p EGD with stent removal and dilatation (2nd)  1/6- Upper GI series: High-grade stricture at the esophagojejunostomy; however no complete obstruction.    Pt with persistent vomiting. Surgery recc TPN    As per pt and his wife. Pt has had 20 lbs weight loss over 1 month. Pt vomited this am with small amt of blood. Pt unable tolerated CLD.       HISTORY:  Stomach cancer  S/P total gastrectomy and Kelby-en-Y esophagojejunal anastomosis    No Known Allergies    Home Medications Reviewed  Hospital Medications:   MEDICATIONS  (STANDING):  dextrose 5% + sodium chloride 0.45% 1000 milliLiter(s) (65 mL/Hr) IV Continuous <Continuous>  heparin  Injectable 5000 Unit(s) SubCutaneous every 8 hours  metoclopramide Injectable 10 milliGRAM(s) IV Push every 6 hours  sodium chloride 0.9%. 1000 milliLiter(s) (100 mL/Hr) IV Continuous <Continuous>    FAMILY HISTORY:  No pertinent family history in first degree relatives    REVIEW OF SYSTEMS:  CONSTITUTIONAL: + weakness, No fevers or chills  EYES/ENT: No visual changes;  No vertigo or throat pain   NECK: No pain or stiffness  RESPIRATORY: No cough, wheezing, hemoptysis; No shortness of breath  CARDIOVASCULAR: No chest pain or palpitations.  GASTROINTESTINAL: No abdominal or epigastric pain. No nausea, No diarrhea or constipation. No melena or hematochezia. +vomiting with small amt of blood  GENITOURINARY: No dysuria, frequency, foamy urine, urinary urgency, incontinence or hematuria  NEUROLOGICAL: No numbness or weakness  SKIN: No itching, burning, rashes, or lesions   VASCULAR: No bilateral lower extremity edema.   All other review of systems is negative unless indicated above.    VITALS:  T(F): 98 (01-07-19 @ 06:00), Max: 98.4 (01-06-19 @ 21:14)  HR: 66 (01-07-19 @ 06:00)  BP: 113/68 (01-07-19 @ 06:00)  RR: 16 (01-07-19 @ 06:00)  SpO2: 100% (01-07-19 @ 06:00)  Wt(kg): --  Height (cm): 167.64 (01-02 @ 08:16)  Weight (kg): 54.4 (01-02 @ 08:16)  BMI (kg/m2): 19.4 (01-02 @ 08:16)  BSA (m2): 1.61 (01-02 @ 08:16)    PHYSICAL EXAM:  Constitutional: NAD, cachectic  HEENT: anicteric sclera,   Neck: No JVD  Respiratory: CTAB, no wheezes, rales or rhonchi  Cardiovascular: S1, S2, RRR  Gastrointestinal: BS+, soft, NT/ND +upper abd incision scar  Extremities: No cyanosis or clubbing. No peripheral edema  Neurological: A/O x 3, no focal deficits  Psychiatric: Normal mood, normal affect  : No CVA tenderness. No coto.   Skin: No rashes    LABS:  01-06    143  |  108  |  28<H>  ----------------------------<  66<L>  4.6   |  25  |  1.01    Ca    10.1      06 Jan 2019 09:03      Creatinine Trend: 1.01 <--, 0.88 <--                        15.3   4.6   )-----------( 137      ( 06 Jan 2019 09:03 )             46.5     Urine Studies:      RADIOLOGY & ADDITIONAL STUDIES:

## 2019-01-07 NOTE — PROGRESS NOTE ADULT - SUBJECTIVE AND OBJECTIVE BOX
Patient seen and examined at bedside with no complaints.   reports vomiting after having clear liquids    Vital Signs Last 24 Hrs  T(C): 36.7 (07 Jan 2019 06:00), Max: 36.9 (06 Jan 2019 21:14)  T(F): 98 (07 Jan 2019 06:00), Max: 98.4 (06 Jan 2019 21:14)  HR: 66 (07 Jan 2019 06:00) (64 - 66)  BP: 113/68 (07 Jan 2019 06:00) (113/68 - 120/72)  BP(mean): --  RR: 16 (07 Jan 2019 06:00) (16 - 16)  SpO2: 100% (07 Jan 2019 06:00) (100% - 100%)    GENERAL: Alert, NAD  CHEST/LUNG: Clear to auscultation bilaterally, respirations nonlabored  ABDOMEN: + Bowel sounds, soft, Nontender, Nondistended

## 2019-01-07 NOTE — PROGRESS NOTE ADULT - SUBJECTIVE AND OBJECTIVE BOX
Summary:   63y Male presents with a chief complaint of persistent N/V and inability to tolerate oral intake. The patient has a significant past medical history of Gastric Ca    Subjective: Denies pain or nausea but reports vomiting small amounts of blood.           Objective:    MEDICATIONS  (STANDING):  dextrose 5% + sodium chloride 0.45% 1000 milliLiter(s) (65 mL/Hr) IV Continuous <Continuous>  heparin  Injectable 5000 Unit(s) SubCutaneous every 8 hours  metoclopramide Injectable 10 milliGRAM(s) IV Push every 6 hours  sodium chloride 0.9%. 1000 milliLiter(s) (100 mL/Hr) IV Continuous <Continuous>    MEDICATIONS  (PRN):  HYDROmorphone  Injectable 0.5 milliGRAM(s) IV Push every 10 minutes PRN Moderate Pain (4 - 6)  ketorolac   Injectable 30 milliGRAM(s) IV Push every 6 hours PRN Moderate Pain (4 - 6)  ondansetron Injectable 4 milliGRAM(s) IV Push every 6 hours PRN Nausea and/or Vomiting  ondansetron Injectable 4 milliGRAM(s) IV Push once PRN Nausea and/or Vomiting              Vital Signs Last 24 Hrs  T(C): 36.7 (07 Jan 2019 06:00), Max: 36.9 (06 Jan 2019 21:14)  T(F): 98 (07 Jan 2019 06:00), Max: 98.4 (06 Jan 2019 21:14)  HR: 66 (07 Jan 2019 06:00) (64 - 66)  BP: 113/68 (07 Jan 2019 06:00) (113/68 - 120/72)  BP(mean): --  RR: 16 (07 Jan 2019 06:00) (16 - 16)  SpO2: 100% (07 Jan 2019 06:00) (100% - 100%)      General:  alert and active, no pallor, NAD.  HEENT:    Normal appearance of conjunctiva, ears, nose, lips, oropharynx, and oral mucosa, anicteric.  Neck:  No masses, no asymmetry.  Lymph Nodes:  No lymphadenopathy.   Cardiovascular:  RRR normal S1/S2, no murmur.  Respiratory:  CTA B/L, normal respiratory effort.   Abdominal:   soft, no masses or tenderness, normoactive BS, NT/ND, no HSM.  Extremities:   No clubbing or cyanosis, normal capillary refill, no edema.   Skin:   No rash, jaundice, lesions, eczema.   Musculoskeletal:  No joint swelling, erythema or tenderness.   Neuro: No focal deficits.   Other:       LABS:                        15.3   4.6   )-----------( 137      ( 06 Jan 2019 09:03 )             46.5     01-06    143  |  108  |  28<H>  ----------------------------<  66<L>  4.6   |  25  |  1.01    Ca    10.1      06 Jan 2019 09:03            RADIOLOGY & ADDITIONAL TESTS:

## 2019-01-07 NOTE — CONSULT NOTE ADULT - PROBLEM SELECTOR RECOMMENDATION 2
improved with IV hydration
Management as per GI/ Surgery. If unable to successfully dilate and/or place stent, consider J-tube as long term plan.

## 2019-01-07 NOTE — PROGRESS NOTE ADULT - ASSESSMENT
63y old Male s/p esophageal dilation, stent placement and removal s/p migration of stent, stricture re-dilated 1/4. Patient seen lying in bed in NAD. Denies abdominal pain. + vomiting small amount of blood this am

## 2019-01-07 NOTE — CONSULT NOTE ADULT - PROBLEM SELECTOR RECOMMENDATION 9
Pt NPO > 3 wks; pt reports 20 lb weight loss over 1 month  Last albumin 4.6. Appropriate for TPN. However, pt resistant to start TPN due to frequent blood glucose checks and insulin. If pt agrees for TPN, please reconsult. Pt will also need PICC line to start.   Check CMP, Mg, Phos, Ionized calcium and TG in AM. Daily weights. Strict I/Os. Agree with D5 1/2 NS but would increase rate to 100cc/ hr and d/c NS IVF.

## 2019-01-07 NOTE — CONSULT NOTE ADULT - ATTENDING COMMENTS
[facility] NEPHROLOGY  Maik Chance M.D.  Tommie Washington D.O.  Emily Calvo M.D.  Love Winston, MSN, ANP-C  [phone]    [id number] [place] [address]
I was physically present for the key portions of the evaluation and management (E/M) service provided.  The patient was personally seen and examined at bedside.  I reviewed the resident's/NP  H& P , ROS,  Exam, assessment and plan. VS and labs  were personally reviewed.   I agree with  the all of the above with the following additions:    Summary:  Patient seen and examined. Upper Gi series reviewed. Will attempt EGD with possible dilation today. May also need esophageal stent. will assess during EGD.             Thank you for your consultation and allowing  me to participate in the care of your patients. If you have further questions please contact me at [phone].     Felton Bedoya M.D.

## 2019-01-07 NOTE — CHART NOTE - NSCHARTNOTEFT_GEN_A_CORE
Assessment:   Patient is a 63y old  Male who presents with a chief complaint of persistent nausea/ vomiting, likely bowel obstruction (2019 11:57). Pt continues to not tlerate PO ans Sx Attending notes for TPN. Per Sx PA, pt refused. Pt then visited. Wife present. Pt reports refusing because of the sugar causing diabetes. Discussed this with him. Pt will consider, but first wants to know the results of his test taken earlier in day and wants to talk to his Attending surgeon, Discussed with Syosvany SELLERS.      Factors impacting intake: [ ] none [ ] nausea  [ ] vomiting [ ] diarrhea [ ] constipation  [ ]chewing problems [ ] swallowing issues  [x ] other: altered GI fx    Diet Presciption: Diet, Clear Liquid:   Supplement Feeding Modality:  Oral  Ensure Clear Cans or Servings Per Day:  1       Frequency:  Three Times a day (19 @ 11:12)    Intake:     Daily Height in cm: 167.64 (2019 03:49)  Height in cm: 167.64 (24 Dec 2018 14:46)      Daily Weight in k.4 (28 Dec 2018 16:53)    % Weight Change    Pertinent Medications: MEDICATIONS  (STANDING):  dextrose 5% + sodium chloride 0.45% 1000 milliLiter(s) (65 mL/Hr) IV Continuous <Continuous>  heparin  Injectable 5000 Unit(s) SubCutaneous every 8 hours  metoclopramide Injectable 10 milliGRAM(s) IV Push every 6 hours  sodium chloride 0.9%. 1000 milliLiter(s) (100 mL/Hr) IV Continuous <Continuous>    MEDICATIONS  (PRN):  HYDROmorphone  Injectable 0.5 milliGRAM(s) IV Push every 10 minutes PRN Moderate Pain (4 - 6)  ketorolac   Injectable 30 milliGRAM(s) IV Push every 6 hours PRN Moderate Pain (4 - 6)  ondansetron Injectable 4 milliGRAM(s) IV Push every 6 hours PRN Nausea and/or Vomiting  ondansetron Injectable 4 milliGRAM(s) IV Push once PRN Nausea and/or Vomiting    Pertinent Labs:  Na143 mmol/L Glu 66 mg/dL<L> K+ 4.6 mmol/L Cr  1.01 mg/dL BUN 28 mg/dL<H>     CAPILLARY BLOOD GLUCOSE        Skin:     Estimated Needs:   [ ] no change since previous assessment  [x ] recalculated: Kcal needs ~ 3311-7093 kcals/day (~25-30 kcals/kg wt @54.4kg)    If for TPN, consider initial goal closer to 1360 kcals/day with protein@ ~ 1.5 gm/kg (82 gm/ day).    Previous Nutrition Diagnosis:   [ ] Altered GI function  [ ]Inadequate Oral Intake [ ] Swallowing Difficulty   [ ] Altered nutrition related labs [ ] Increased Nutrient Needs [ ] Overweight/Obesity   [ ] Unintended Weight Loss [ ] Food & Nutrition Related Knowledge Deficit [x ] Malnutrition (severe)  [ ] Other:     Nutrition Diagnosis is [ x] ongoing  [ ] resolved [ ] not applicable     New Nutrition Diagnosis: [ ] not applicable       Interventions:   Recommend  [ ] Change Diet To:  [ ] Nutrition Supplement  [x ] Nutrition Support: TPN as medically feasible and if pt accepts (see TPN calculations above)  [ ] Other:     Monitoring and Evaluation:   [ ] PO intake [ x ] Tolerance to diet prescription [ x ] weights [ x ] labs[ x ] follow up per protocol  [ ] other:

## 2019-01-07 NOTE — CONSULT NOTE ADULT - REASON FOR ADMISSION
persistent nausea/ vomiting, likely bowel obstruction
persistent nausea/ vomiting, likely bowel obstruction

## 2019-01-07 NOTE — CONSULT NOTE ADULT - ASSESSMENT
63 year old male with PMH of metastatic gastric adenocarcinoma completed chemo June,18 s/p total gastrectomy with Kelby en Y esophagojejunal anastomosis on 12/13/17 and Lysis of adhesions on 01/03/18 2/2 SBO. Presents to ED complaining of persistent nausea and vomiting with inability to tolerate po intake. Patients reports several episodes of NBNB clear vomiting. Last normal bowel movement was last week, but passing gas. Patient seen and examined lying in bed in NAD. Denies chest pain, dizziness, SOB or palpitations. Denies abdominal pain, nausea or vomiting. GI consulted for concern for gastric outlet obstruction.
Pt is a 63 year old male with h/o metastatic gastric adenocarcinoma s/p total gastrectomy with Kelby en Y esophagojejunal anastomosis on 12/13/17 and Lysis of adhesions on 01/03/18 due to Small Bowel Obstruction p/w N/V x 3 wks a/w gastric outlet obstruction s/p 2 dilations. Pt with persistent vomiting. Consulted for TPN

## 2019-01-08 ENCOUNTER — APPOINTMENT (OUTPATIENT)
Dept: GASTROENTEROLOGY | Facility: CLINIC | Age: 64
End: 2019-01-08

## 2019-01-08 DIAGNOSIS — K22.2 ESOPHAGEAL OBSTRUCTION: ICD-10-CM

## 2019-01-08 LAB
ALBUMIN SERPL ELPH-MCNC: 3.4 G/DL — LOW (ref 3.5–5)
ALP SERPL-CCNC: 76 U/L — SIGNIFICANT CHANGE UP (ref 40–120)
ALT FLD-CCNC: 43 U/L DA — SIGNIFICANT CHANGE UP (ref 10–60)
ANION GAP SERPL CALC-SCNC: 9 MMOL/L — SIGNIFICANT CHANGE UP (ref 5–17)
AST SERPL-CCNC: 36 U/L — SIGNIFICANT CHANGE UP (ref 10–40)
BILIRUB SERPL-MCNC: 1.2 MG/DL — SIGNIFICANT CHANGE UP (ref 0.2–1.2)
BUN SERPL-MCNC: 25 MG/DL — HIGH (ref 7–18)
CA-I BLD-SCNC: 1.35 MMOL/L — HIGH (ref 1.12–1.3)
CALCIUM SERPL-MCNC: 9.3 MG/DL — SIGNIFICANT CHANGE UP (ref 8.4–10.5)
CHLORIDE SERPL-SCNC: 108 MMOL/L — SIGNIFICANT CHANGE UP (ref 96–108)
CO2 SERPL-SCNC: 27 MMOL/L — SIGNIFICANT CHANGE UP (ref 22–31)
CREAT SERPL-MCNC: 0.95 MG/DL — SIGNIFICANT CHANGE UP (ref 0.5–1.3)
GLUCOSE SERPL-MCNC: 99 MG/DL — SIGNIFICANT CHANGE UP (ref 70–99)
HCT VFR BLD CALC: 40.4 % — SIGNIFICANT CHANGE UP (ref 39–50)
HGB BLD-MCNC: 13.8 G/DL — SIGNIFICANT CHANGE UP (ref 13–17)
MAGNESIUM SERPL-MCNC: 2.4 MG/DL — SIGNIFICANT CHANGE UP (ref 1.6–2.6)
MCHC RBC-ENTMCNC: 30.6 PG — SIGNIFICANT CHANGE UP (ref 27–34)
MCHC RBC-ENTMCNC: 34.3 GM/DL — SIGNIFICANT CHANGE UP (ref 32–36)
MCV RBC AUTO: 89.4 FL — SIGNIFICANT CHANGE UP (ref 80–100)
PHOSPHATE SERPL-MCNC: 3.1 MG/DL — SIGNIFICANT CHANGE UP (ref 2.5–4.5)
PLATELET # BLD AUTO: 138 K/UL — LOW (ref 150–400)
POTASSIUM SERPL-MCNC: 3.7 MMOL/L — SIGNIFICANT CHANGE UP (ref 3.5–5.3)
POTASSIUM SERPL-SCNC: 3.7 MMOL/L — SIGNIFICANT CHANGE UP (ref 3.5–5.3)
PROT SERPL-MCNC: 7.5 G/DL — SIGNIFICANT CHANGE UP (ref 6–8.3)
RBC # BLD: 4.51 M/UL — SIGNIFICANT CHANGE UP (ref 4.2–5.8)
RBC # FLD: 12.2 % — SIGNIFICANT CHANGE UP (ref 10.3–14.5)
SODIUM SERPL-SCNC: 144 MMOL/L — SIGNIFICANT CHANGE UP (ref 135–145)
TRIGL SERPL-MCNC: 86 MG/DL — SIGNIFICANT CHANGE UP (ref 10–149)
WBC # BLD: 4.7 K/UL — SIGNIFICANT CHANGE UP (ref 3.8–10.5)
WBC # FLD AUTO: 4.7 K/UL — SIGNIFICANT CHANGE UP (ref 3.8–10.5)

## 2019-01-08 RX ADMIN — Medication 10 MILLIGRAM(S): at 06:47

## 2019-01-08 RX ADMIN — Medication 10 MILLIGRAM(S): at 00:20

## 2019-01-08 RX ADMIN — HEPARIN SODIUM 5000 UNIT(S): 5000 INJECTION INTRAVENOUS; SUBCUTANEOUS at 13:01

## 2019-01-08 RX ADMIN — Medication 10 MILLIGRAM(S): at 11:16

## 2019-01-08 RX ADMIN — Medication 10 MILLIGRAM(S): at 16:23

## 2019-01-08 RX ADMIN — HEPARIN SODIUM 5000 UNIT(S): 5000 INJECTION INTRAVENOUS; SUBCUTANEOUS at 23:17

## 2019-01-08 RX ADMIN — HEPARIN SODIUM 5000 UNIT(S): 5000 INJECTION INTRAVENOUS; SUBCUTANEOUS at 06:47

## 2019-01-08 RX ADMIN — Medication 10 MILLIGRAM(S): at 23:17

## 2019-01-08 NOTE — PROGRESS NOTE ADULT - PROBLEM SELECTOR PLAN 2
Management as per GI/ Surgery. If unable to successfully dilate and/or place stent, consider J-tube as long term plan.

## 2019-01-08 NOTE — PROGRESS NOTE ADULT - ASSESSMENT
Pt is a 63 year old male with h/o metastatic gastric adenocarcinoma s/p total gastrectomy with Kelby en Y esophagojejunal anastomosis on 12/13/17 and Lysis of adhesions on 01/03/18 due to Small Bowel Obstruction p/w N/V x 3 wks a/w gastric outlet obstruction s/p 2 dilations. Pt with persistent vomiting. Consulted for TPN Pt is a 63 year old male with h/o metastatic gastric adenocarcinoma s/p total gastrectomy with Kelby en Y esophagojejunal anastomosis on 12/13/17 and Lysis of adhesions on 01/03/18 due to Small Bowel Obstruction p/w N/V x 3 wks a/w gastric outlet obstruction s/p 2 dilations. Pt with persistent vomiting. Consulted for TPN    TPN calculations  1360 kcal  Fat 24 g = 240 kcal  Protein 82 g = 328 kcal  Dextrose 232g = 792 kcal

## 2019-01-08 NOTE — PROGRESS NOTE ADULT - SUBJECTIVE AND OBJECTIVE BOX
on clears  some nausea/vomitting overnight    Vital Signs:  T(C): 36.4 (01-08-19 @ 05:54), Max: 36.6 (01-07-19 @ 22:08)  HR: 66 (01-08-19 @ 05:54) (64 - 70)  BP: 129/69 (01-08-19 @ 05:54) (113/64 - 129/69)  RR: 18 (01-08-19 @ 05:54) (18 - 18)  SpO2: 98% (01-08-19 @ 05:54) (98% - 100%)  Wt(kg): --    Physical Exam:  General: NAD, comfortable  Abdomen: soft, NT/ND    Ins/Outs:    01-07 @ 07:01  -  01-08 @ 07:00  --------------------------------------------------------  IN:    dextrose 5% + sodium chloride 0.45%: 780 mL  Total IN: 780 mL    OUT:  Total OUT: 0 mL    Total NET: 780 mL                          13.8   4.7   )-----------( 138      ( 08 Jan 2019 07:41 )             40.4     < from: Xray Upper GI Series (01.07.19 @ 12:42) >  Again, contrast flows freely through the esophagus to the   esophagojejunostomy where high-grade obstruction is noted. Contrast does   not pass  beyond the anastomosis until approximately 5 minutes. Contrast eventually   passes into the jejunum. Retained contrast is present in the esophagus   for approximately 15 minutes. At the end of the examination,   approximately 30 % of the contrast remained in the esophagus.     The contrast in the jejunum is significantly diluted.    IMPRESSION:    High-grade stricture at the esophagojejunostomy as above. No evidence of   complete obstruction however.    < end of copied text >

## 2019-01-08 NOTE — PROGRESS NOTE ADULT - ASSESSMENT
63y old Male s/p esophageal dilation, stent placement and removal s/p migration of stent, stricture re-dilated 1/4. Patient seen lying in bed in NAD. Denies abdominal pain.  States he is still having difficulty tolerating po with small amount of vomiting;  + nausea. denies bloody vomitus.

## 2019-01-08 NOTE — PROGRESS NOTE ADULT - PROBLEM SELECTOR PLAN 1
s/p UGI showing high-grade stricture at the esophagojejunostomy; No evidence of   complete obstruction   small frequent meals as tolerated s/p UGI showing high-grade stricture at the esophagojejunostomy; No evidence of   complete obstruction   small frequent meals as tolerated  TPN as per sx recc

## 2019-01-08 NOTE — PROGRESS NOTE ADULT - SUBJECTIVE AND OBJECTIVE BOX
Summary:   63y Male presents with a chief complaint of persistent N/V and inability to tolerate oral intake. The patient has a significant past medical history of Gastric Ca    Subjective:       Objective:    MEDICATIONS  (STANDING):  dextrose 5% + sodium chloride 0.45% 1000 milliLiter(s) (65 mL/Hr) IV Continuous <Continuous>  heparin  Injectable 5000 Unit(s) SubCutaneous every 8 hours  metoclopramide Injectable 10 milliGRAM(s) IV Push every 6 hours  sodium chloride 0.9%. 1000 milliLiter(s) (100 mL/Hr) IV Continuous <Continuous>    MEDICATIONS  (PRN):  HYDROmorphone  Injectable 0.5 milliGRAM(s) IV Push every 10 minutes PRN Moderate Pain (4 - 6)  ketorolac   Injectable 30 milliGRAM(s) IV Push every 6 hours PRN Moderate Pain (4 - 6)  ondansetron Injectable 4 milliGRAM(s) IV Push every 6 hours PRN Nausea and/or Vomiting  ondansetron Injectable 4 milliGRAM(s) IV Push once PRN Nausea and/or Vomiting        Vital Signs Last 24 Hrs  T(C): 36.4 (08 Jan 2019 05:54), Max: 36.6 (07 Jan 2019 22:08)  T(F): 97.5 (08 Jan 2019 05:54), Max: 97.9 (07 Jan 2019 22:08)  HR: 66 (08 Jan 2019 05:54) (64 - 70)  BP: 129/69 (08 Jan 2019 05:54) (113/64 - 129/69)  BP(mean): --  RR: 18 (08 Jan 2019 05:54) (18 - 18)  SpO2: 98% (08 Jan 2019 05:54) (98% - 100%)      General:  NAD.  HEENT:    Normal appearance of conjunctiva, ears, nose, lips, oropharynx, and oral mucosa, anicteric.  Neck:  No masses, no asymmetry.  Lymph Nodes:  No lymphadenopathy.   Cardiovascular:  RRR normal S1/S2, no murmur.  Respiratory:  CTA B/L, normal respiratory effort.   Abdominal:   soft, no masses or tenderness, normoactive BS, NT/ND, no HSM.  Extremities:   No clubbing or cyanosis, normal capillary refill, no edema.   Skin:   No rash, jaundice, lesions, eczema.   Musculoskeletal:  No joint swelling, erythema or tenderness.   Neuro: No focal deficits.   Other:       LABS:                        13.8   4.7   )-----------( 138      ( 08 Jan 2019 07:41 )             40.4     01-08    144  |  108  |  25<H>  ----------------------------<  99  3.7   |  27  |  0.95    Ca    9.3      08 Jan 2019 07:41  Phos  3.1     01-08  Mg     2.4     01-08    TPro  7.5  /  Alb  3.4<L>  /  TBili  1.2  /  DBili  x   /  AST  36  /  ALT  43  /  AlkPhos  76  01-08          RADIOLOGY & ADDITIONAL TESTS: Summary:   63y Male presents with a chief complaint of persistent N/V and inability to tolerate oral intake. The patient has a significant past medical history of Gastric Ca    Subjective: continues to have difficulty tolerating po vomiting small amounts; no blood, no pain   Objective:    MEDICATIONS  (STANDING):  dextrose 5% + sodium chloride 0.45% 1000 milliLiter(s) (65 mL/Hr) IV Continuous <Continuous>  heparin  Injectable 5000 Unit(s) SubCutaneous every 8 hours  metoclopramide Injectable 10 milliGRAM(s) IV Push every 6 hours  sodium chloride 0.9%. 1000 milliLiter(s) (100 mL/Hr) IV Continuous <Continuous>    MEDICATIONS  (PRN):  HYDROmorphone  Injectable 0.5 milliGRAM(s) IV Push every 10 minutes PRN Moderate Pain (4 - 6)  ketorolac   Injectable 30 milliGRAM(s) IV Push every 6 hours PRN Moderate Pain (4 - 6)  ondansetron Injectable 4 milliGRAM(s) IV Push every 6 hours PRN Nausea and/or Vomiting  ondansetron Injectable 4 milliGRAM(s) IV Push once PRN Nausea and/or Vomiting        Vital Signs Last 24 Hrs  T(C): 36.4 (08 Jan 2019 05:54), Max: 36.6 (07 Jan 2019 22:08)  T(F): 97.5 (08 Jan 2019 05:54), Max: 97.9 (07 Jan 2019 22:08)  HR: 66 (08 Jan 2019 05:54) (64 - 70)  BP: 129/69 (08 Jan 2019 05:54) (113/64 - 129/69)  BP(mean): --  RR: 18 (08 Jan 2019 05:54) (18 - 18)  SpO2: 98% (08 Jan 2019 05:54) (98% - 100%)      General:  NAD.  HEENT:    Normal appearance of conjunctiva, ears, nose, lips, oropharynx, and oral mucosa, anicteric.  Neck:  No masses, no asymmetry.  Lymph Nodes:  No lymphadenopathy.   Cardiovascular:  RRR normal S1/S2, no murmur.  Respiratory:  CTA B/L, normal respiratory effort.   Abdominal:   soft, no masses or tenderness, normoactive BS, NT/ND, no HSM.  Extremities:   No clubbing or cyanosis, normal capillary refill, no edema.   Skin:   No rash, jaundice, lesions, eczema.   Musculoskeletal:  No joint swelling, erythema or tenderness.   Neuro: No focal deficits.   Other:       LABS:                        13.8   4.7   )-----------( 138      ( 08 Jan 2019 07:41 )             40.4     01-08    144  |  108  |  25<H>  ----------------------------<  99  3.7   |  27  |  0.95    Ca    9.3      08 Jan 2019 07:41  Phos  3.1     01-08  Mg     2.4     01-08    TPro  7.5  /  Alb  3.4<L>  /  TBili  1.2  /  DBili  x   /  AST  36  /  ALT  43  /  AlkPhos  76  01-08          RADIOLOGY & ADDITIONAL TESTS:

## 2019-01-08 NOTE — PROGRESS NOTE ADULT - ASSESSMENT
63y old Male s/p esophageal dilation, stent placement and removal s/p migration of stent, stricture re-dilated 1/4  UGI shows high grade obstruction at EJ junction    - reccomend TPN  - cont clears if tolerating  - GI to re-dilate

## 2019-01-08 NOTE — PROGRESS NOTE ADULT - SUBJECTIVE AND OBJECTIVE BOX
[facility] NEPHROLOGY-METABOLIC PROGRESS NOTE    Pt is a 63 year old male with h/o metastatic gastric adenocarcinoma s/p total gastrectomy with Kelby en Y esophagojejunal anastomosis on 12/13/17 and Lysis of adhesions on 01/03/18 due to Small Bowel Obstruction p/w N/V x 3 wks a/w gastric outlet obstruction s/p 2 dilations. Pt with persistent vomiting. Consulted for TPN    Hospital Medications: Medications reviewed.  REVIEW OF SYSTEMS:  CONSTITUTIONAL: No fevers or chills  RESPIRATORY: No shortness of breath  CARDIOVASCULAR: No chest pain.  GASTROINTESTINAL: No nausea,  diarrhea or abdominal pain. + vomiting (no blood)  VASCULAR: No bilateral lower extremity edema.     VITALS:  T(F): 97.9 (01-08-19 @ 14:30), Max: 97.9 (01-07-19 @ 22:08)  HR: 64 (01-08-19 @ 14:30)  BP: 130/70 (01-08-19 @ 14:30)  RR: 18 (01-08-19 @ 14:30)  SpO2: 99% (01-08-19 @ 14:30)  Wt(kg): --  Height (cm): 167.64 (01-02 @ 08:16)  Weight (kg): 54.4 (01-02 @ 08:16)  BMI (kg/m2): 19.4 (01-02 @ 08:16)  BSA (m2): 1.61 (01-02 @ 08:16)    01-07 @ 07:01  -  01-08 @ 07:00  --------------------------------------------------------  IN: 780 mL / OUT: 0 mL / NET: 780 mL      PHYSICAL EXAM:  Constitutional: NAD, cachectic  Respiratory: CTAB, no wheezes, rales or rhonchi  Cardiovascular: S1, S2, RRR  Gastrointestinal: BS+, soft, NT/ND +upper abd incision scar  Extremities:  No peripheral edema      LABS:  01-08    144  |  108  |  25<H>  ----------------------------<  99  3.7   |  27  |  0.95    Ca    9.3      08 Jan 2019 07:41  Phos  3.1     01-08  Mg     2.4     01-08    TPro  7.5  /  Alb  3.4<L>  /  TBili  1.2  /  DBili      /  AST  36  /  ALT  43  /  AlkPhos  76  01-08    Creatinine Trend: 0.95 <--, 1.01 <--                        13.8   4.7   )-----------( 138      ( 08 Jan 2019 07:41 )             40.4     Urine Studies:    Triglycerides, Serum: 86 mg/dL (01.08.19 @ 07:41)          RADIOLOGY & ADDITIONAL STUDIES:

## 2019-01-08 NOTE — CHART NOTE - NSCHARTNOTEFT_GEN_A_CORE
Assessment:   Patient is a 63y old  Male who presents with a chief complaint of persistent nausea/ vomiting, likely bowel obstruction (2019 11:28). This PM, after speaking to Sx PMD, pt agrees to TPN (as per wife).  Pt for PICC line in AM. KARLOS MD aware. RN reports pt's weight as per weighing in AM.      Factors impacting intake: [ ] none [ ] nausea  [ ] vomiting [ ] diarrhea [ ] constipation  [ ]chewing problems [ ] swallowing issues  [ ] other:     Diet Presciption: Diet, Clear Liquid:   Supplement Feeding Modality:  Oral  Ensure Clear Cans or Servings Per Day:  1       Frequency:  Three Times a day (19 @ 11:12)    Intake:     Daily Height in cm: 167.64 (2019 03:49)  Height in cm: 167.64 (24 Dec 2018 14:46)      Daily Weight in k.4 (2019 11:01)  Weight in k.4 (28 Dec 2018 16:53)    % Weight Change    Pertinent Medications: MEDICATIONS  (STANDING):  dextrose 5% + sodium chloride 0.45% 1000 milliLiter(s) (65 mL/Hr) IV Continuous <Continuous>  heparin  Injectable 5000 Unit(s) SubCutaneous every 8 hours  metoclopramide Injectable 10 milliGRAM(s) IV Push every 6 hours  sodium chloride 0.9%. 1000 milliLiter(s) (100 mL/Hr) IV Continuous <Continuous>    MEDICATIONS  (PRN):  HYDROmorphone  Injectable 0.5 milliGRAM(s) IV Push every 10 minutes PRN Moderate Pain (4 - 6)  ketorolac   Injectable 30 milliGRAM(s) IV Push every 6 hours PRN Moderate Pain (4 - 6)  ondansetron Injectable 4 milliGRAM(s) IV Push every 6 hours PRN Nausea and/or Vomiting  ondansetron Injectable 4 milliGRAM(s) IV Push once PRN Nausea and/or Vomiting    Pertinent Labs:  Na144 mmol/L Glu 99 mg/dL K+ 3.7 mmol/L Cr  0.95 mg/dL BUN 25 mg/dL<H>  Phos 3.1 mg/dL  Alb 3.4 g/dL<L>  Chol --    LDL --    HDL --    Trig 86 mg/dL     CAPILLARY BLOOD GLUCOSE        Skin:     Estimated Needs:   [x ] no change since previous assessment () 1360 kcals, 82 gm protein.   [ ] recalculated:       Previous Nutrition Diagnosis:   [ ] Altered GI function  [ ]Inadequate Oral Intake [ ] Swallowing Difficulty   [ ] Altered nutrition related labs [ ] Increased Nutrient Needs [ ] Overweight/Obesity   [ ] Unintended Weight Loss [ ] Food & Nutrition Related Knowledge Deficit [x ] Malnutrition (severe)  [ ] Other:     Nutrition Diagnosis is [x ] ongoing  [ ] resolved [ ] not applicable     New Nutrition Diagnosis: [ ] not applicable       Interventions:   Recommend  [ ] Change Diet To:  [ ] Nutrition Supplement  [x ] Nutrition Support; : TPN once PICC, orders per KARLOS MD  [x ] Other: MD to monitor. RD available.     Monitoring and Evaluation:   [ ] PO intake [ x ] Tolerance to diet prescription [ x ] weights [ x ] labs[ x ] follow up per protocol  [ ] other:

## 2019-01-08 NOTE — PROGRESS NOTE ADULT - PROBLEM SELECTOR PLAN 1
Pt NPO > 3 wks; pt reports 20 lb weight loss over 1 month  Last albumin 4.6. Appropriate for TPN. Pt now agreeable. Awaiting PICC line placement; possible 1/9 and then will start TPN.   Check CMP, Mg, Phos, Ionized calcium. Daily weights. Strict I/Os. Agree with D5 1/2 NS but would increase rate to 100cc/ hr and d/c NS IVF.

## 2019-01-09 PROCEDURE — 36573 INSJ PICC RS&I 5 YR+: CPT

## 2019-01-09 RX ORDER — SODIUM CHLORIDE 9 MG/ML
20 INJECTION INTRAMUSCULAR; INTRAVENOUS; SUBCUTANEOUS ONCE
Qty: 0 | Refills: 0 | Status: DISCONTINUED | OUTPATIENT
Start: 2019-01-09 | End: 2019-01-15

## 2019-01-09 RX ORDER — SODIUM CHLORIDE 9 MG/ML
10 INJECTION INTRAMUSCULAR; INTRAVENOUS; SUBCUTANEOUS EVERY 12 HOURS
Qty: 0 | Refills: 0 | Status: DISCONTINUED | OUTPATIENT
Start: 2019-01-09 | End: 2019-01-15

## 2019-01-09 RX ORDER — I.V. FAT EMULSION 20 G/100ML
0.44 EMULSION INTRAVENOUS
Qty: 23.94 | Refills: 0 | Status: DISCONTINUED | OUTPATIENT
Start: 2019-01-09 | End: 2019-01-09

## 2019-01-09 RX ORDER — ELECTROLYTE SOLUTION,INJ
1 VIAL (ML) INTRAVENOUS
Qty: 0 | Refills: 0 | Status: DISCONTINUED | OUTPATIENT
Start: 2019-01-09 | End: 2019-01-09

## 2019-01-09 RX ORDER — SODIUM CHLORIDE 9 MG/ML
10 INJECTION INTRAMUSCULAR; INTRAVENOUS; SUBCUTANEOUS
Qty: 0 | Refills: 0 | Status: DISCONTINUED | OUTPATIENT
Start: 2019-01-09 | End: 2019-01-15

## 2019-01-09 RX ORDER — CHLORHEXIDINE GLUCONATE 213 G/1000ML
1 SOLUTION TOPICAL DAILY
Qty: 0 | Refills: 0 | Status: DISCONTINUED | OUTPATIENT
Start: 2019-01-09 | End: 2019-01-15

## 2019-01-09 RX ADMIN — I.V. FAT EMULSION 9.97 GM/KG/DAY: 20 EMULSION INTRAVENOUS at 23:10

## 2019-01-09 RX ADMIN — HEPARIN SODIUM 5000 UNIT(S): 5000 INJECTION INTRAVENOUS; SUBCUTANEOUS at 13:06

## 2019-01-09 RX ADMIN — CHLORHEXIDINE GLUCONATE 1 APPLICATION(S): 213 SOLUTION TOPICAL at 12:34

## 2019-01-09 RX ADMIN — HEPARIN SODIUM 5000 UNIT(S): 5000 INJECTION INTRAVENOUS; SUBCUTANEOUS at 23:10

## 2019-01-09 RX ADMIN — Medication 10 MILLIGRAM(S): at 06:40

## 2019-01-09 RX ADMIN — SODIUM CHLORIDE 65 MILLILITER(S): 9 INJECTION, SOLUTION INTRAVENOUS at 19:00

## 2019-01-09 RX ADMIN — SODIUM CHLORIDE 10 MILLILITER(S): 9 INJECTION INTRAMUSCULAR; INTRAVENOUS; SUBCUTANEOUS at 22:33

## 2019-01-09 NOTE — PROGRESS NOTE ADULT - SUBJECTIVE AND OBJECTIVE BOX
Patient seen and examined at bedside.    Vital Signs Last 24 Hrs  T(F): 97.9 (01-09-19 @ 05:51), Max: 98.2 (01-08-19 @ 22:25)  HR: 59 (01-09-19 @ 05:51)  BP: 123/64 (01-09-19 @ 05:51)  RR: 16 (01-09-19 @ 05:51)  SpO2: 100% (01-09-19 @ 05:51)    GENERAL: Alert, NAD  CHEST/LUNG: respirations nonlabored  ABDOMEN: soft, Nontender, Nondistended    I&O's Detail    08 Jan 2019 07:01  -  09 Jan 2019 07:00  --------------------------------------------------------  IN:    dextrose 5% + sodium chloride 0.45%: 715 mL  Total IN: 715 mL    OUT:  Total OUT: 0 mL    Total NET: 715 mL    LABS:                        13.8   4.7   )-----------( 138      ( 08 Jan 2019 07:41 )             40.4     01-08    144  |  108  |  25<H>  ----------------------------<  99  3.7   |  27  |  0.95    Ca    9.3      08 Jan 2019 07:41  Phos  3.1     01-08  Mg     2.4     01-08    TPro  7.5  /  Alb  3.4<L>  /  TBili  1.2  /  DBili  x   /  AST  36  /  ALT  43  /  AlkPhos  76  01-08

## 2019-01-09 NOTE — PROGRESS NOTE ADULT - PROBLEM SELECTOR PLAN 1
Pt NPO > 3 wks; pt reports 20 lb weight loss over 1 month  Last albumin 4.6. s/p Left arm PICC today. Plan to start TPN with lipids (1000 ml) today. Check FS q 6hrs. Recc ISS.   Daily weights. Strict I/Os. Check CMP/ Mg/ Phos/ ionized calcium daily.    Check CMP, Mg, Phos, Ionized calcium. Daily weights. Strict I/Os. Agree with D5 1/2 NS but would increase rate to 100cc/ hr and d/c NS IVF.

## 2019-01-09 NOTE — PROGRESS NOTE ADULT - ASSESSMENT
63y old Male s/p esophageal dilation, stent placement and removal s/p migration of stent, stricture re-dilated 1/4    - continue clear liquid diet  - PICC line pending for TPN

## 2019-01-09 NOTE — PROGRESS NOTE ADULT - SUBJECTIVE AND OBJECTIVE BOX
INTERVAL HPI/OVERNIGHT EVENTS:  Pt stable.   NPO   flatus, no BM  Persistent vomiting    Vital Signs Last 24 Hrs  T(C): 36.3 (09 Jan 2019 13:00), Max: 36.8 (08 Jan 2019 22:25)  T(F): 97.4 (09 Jan 2019 13:00), Max: 98.2 (08 Jan 2019 22:25)  HR: 69 (09 Jan 2019 13:00) (59 - 69)  BP: 123/70 (09 Jan 2019 13:00) (123/64 - 124/70)  BP(mean): --  RR: 16 (09 Jan 2019 13:00) (16 - 18)  SpO2: 100% (09 Jan 2019 13:00) (100% - 100%)    Physical:  Abdomen: Soft nondistended, nontender.  PICC line inserted    I&O's Summary    08 Jan 2019 07:01  -  09 Jan 2019 07:00  --------------------------------------------------------  IN: 715 mL / OUT: 0 mL / NET: 715 mL        LABS:                        13.8   4.7   )-----------( 138      ( 08 Jan 2019 07:41 )             40.4             01-08    144  |  108  |  25<H>  ----------------------------<  99  3.7   |  27  |  0.95    Ca    9.3      08 Jan 2019 07:41  Phos  3.1     01-08  Mg     2.4     01-08    TPro  7.5  /  Alb  3.4<L>  /  TBili  1.2  /  DBili  x   /  AST  36  /  ALT  43  /  AlkPhos  76  01-08

## 2019-01-09 NOTE — PROGRESS NOTE ADULT - ASSESSMENT
Pt is a 63 year old male with h/o metastatic gastric adenocarcinoma s/p total gastrectomy with Kelby en Y esophagojejunal anastomosis on 12/13/17 and Lysis of adhesions on 01/03/18 due to Small Bowel Obstruction p/w N/V x 3 wks a/w gastric outlet obstruction s/p 2 dilations. Pt with persistent vomiting. Consulted for TPN    TPN calculations  1360 kcal  Fat 24 g = 240 kcal  Protein 82 g = 328 kcal  Dextrose 232g = 792 kcal

## 2019-01-09 NOTE — PROGRESS NOTE ADULT - SUBJECTIVE AND OBJECTIVE BOX
[facility] NEPHROLOGY-METABOLIC PROGRESS NOTE    Pt is a 63 year old male with h/o metastatic gastric adenocarcinoma s/p total gastrectomy with Kelby en Y esophagojejunal anastomosis on 12/13/17 and Lysis of adhesions on 01/03/18 due to Small Bowel Obstruction p/w N/V x 3 wks a/w gastric outlet obstruction s/p 2 dilations. Pt with persistent vomiting. Consulted for TPN    Hospital Medications: Medications reviewed.  REVIEW OF SYSTEMS:  CONSTITUTIONAL: No fevers or chills  RESPIRATORY: No shortness of breath  CARDIOVASCULAR: No chest pain.  GASTROINTESTINAL: No nausea,  diarrhea or abdominal pain. + vomiting (little blood)  VASCULAR: No bilateral lower extremity edema.     VITALS:  T(F): 97.9 (01-09-19 @ 05:51), Max: 98.2 (01-08-19 @ 22:25)  HR: 59 (01-09-19 @ 05:51)  BP: 123/64 (01-09-19 @ 05:51)  RR: 16 (01-09-19 @ 05:51)  SpO2: 100% (01-09-19 @ 05:51)  Wt(kg): --    01-08 @ 07:01  -  01-09 @ 07:00  --------------------------------------------------------  IN: 715 mL / OUT: 0 mL / NET: 715 mL      PHYSICAL EXAM:  Constitutional: NAD, cachectic  Respiratory: CTAB, no wheezes, rales or rhonchi  Cardiovascular: S1, S2, RRR  Gastrointestinal: BS+, soft, NT/ND +upper abd incision scar  Extremities:  No peripheral edema  Access: Left arm  PICC - for TPN      LABS:  01-08    144  |  108  |  25<H>  ----------------------------<  99  3.7   |  27  |  0.95    Ca    9.3      08 Jan 2019 07:41  Phos  3.1     01-08  Mg     2.4     01-08    TPro  7.5  /  Alb  3.4<L>  /  TBili  1.2  /  DBili      /  AST  36  /  ALT  43  /  AlkPhos  76  01-08    Creatinine Trend: 0.95 <--, 1.01 <--                        13.8   4.7   )-----------( 138      ( 08 Jan 2019 07:41 )             40.4     Urine Studies:                RADIOLOGY & ADDITIONAL STUDIES:

## 2019-01-10 DIAGNOSIS — R74.0 NONSPECIFIC ELEVATION OF LEVELS OF TRANSAMINASE AND LACTIC ACID DEHYDROGENASE [LDH]: ICD-10-CM

## 2019-01-10 LAB
ALBUMIN SERPL ELPH-MCNC: 3.3 G/DL — LOW (ref 3.5–5)
ALP SERPL-CCNC: 73 U/L — SIGNIFICANT CHANGE UP (ref 40–120)
ALT FLD-CCNC: 94 U/L DA — HIGH (ref 10–60)
ANION GAP SERPL CALC-SCNC: 10 MMOL/L — SIGNIFICANT CHANGE UP (ref 5–17)
AST SERPL-CCNC: 98 U/L — HIGH (ref 10–40)
BILIRUB SERPL-MCNC: 1.5 MG/DL — HIGH (ref 0.2–1.2)
BUN SERPL-MCNC: 16 MG/DL — SIGNIFICANT CHANGE UP (ref 7–18)
CA-I BLD-SCNC: 1.3 MMOL/L — SIGNIFICANT CHANGE UP (ref 1.12–1.3)
CALCIUM SERPL-MCNC: 9 MG/DL — SIGNIFICANT CHANGE UP (ref 8.4–10.5)
CHLORIDE SERPL-SCNC: 109 MMOL/L — HIGH (ref 96–108)
CO2 SERPL-SCNC: 27 MMOL/L — SIGNIFICANT CHANGE UP (ref 22–31)
CREAT SERPL-MCNC: 0.76 MG/DL — SIGNIFICANT CHANGE UP (ref 0.5–1.3)
GLUCOSE BLDC GLUCOMTR-MCNC: 115 MG/DL — HIGH (ref 70–99)
GLUCOSE BLDC GLUCOMTR-MCNC: 121 MG/DL — HIGH (ref 70–99)
GLUCOSE SERPL-MCNC: 103 MG/DL — HIGH (ref 70–99)
MAGNESIUM SERPL-MCNC: 2.1 MG/DL — SIGNIFICANT CHANGE UP (ref 1.6–2.6)
PHOSPHATE SERPL-MCNC: 3.4 MG/DL — SIGNIFICANT CHANGE UP (ref 2.5–4.5)
POTASSIUM SERPL-MCNC: 3.9 MMOL/L — SIGNIFICANT CHANGE UP (ref 3.5–5.3)
POTASSIUM SERPL-SCNC: 3.9 MMOL/L — SIGNIFICANT CHANGE UP (ref 3.5–5.3)
PROT SERPL-MCNC: 7.1 G/DL — SIGNIFICANT CHANGE UP (ref 6–8.3)
SODIUM SERPL-SCNC: 146 MMOL/L — HIGH (ref 135–145)

## 2019-01-10 PROCEDURE — 99232 SBSQ HOSP IP/OBS MODERATE 35: CPT

## 2019-01-10 RX ORDER — ELECTROLYTE SOLUTION,INJ
1 VIAL (ML) INTRAVENOUS
Qty: 0 | Refills: 0 | Status: DISCONTINUED | OUTPATIENT
Start: 2019-01-10 | End: 2019-01-10

## 2019-01-10 RX ORDER — I.V. FAT EMULSION 20 G/100ML
0.44 EMULSION INTRAVENOUS
Qty: 23.94 | Refills: 0 | Status: DISCONTINUED | OUTPATIENT
Start: 2019-01-10 | End: 2019-01-10

## 2019-01-10 RX ADMIN — HEPARIN SODIUM 5000 UNIT(S): 5000 INJECTION INTRAVENOUS; SUBCUTANEOUS at 05:05

## 2019-01-10 RX ADMIN — CHLORHEXIDINE GLUCONATE 1 APPLICATION(S): 213 SOLUTION TOPICAL at 13:43

## 2019-01-10 RX ADMIN — Medication 1 EACH: at 22:29

## 2019-01-10 RX ADMIN — SODIUM CHLORIDE 10 MILLILITER(S): 9 INJECTION INTRAMUSCULAR; INTRAVENOUS; SUBCUTANEOUS at 18:38

## 2019-01-10 RX ADMIN — HEPARIN SODIUM 5000 UNIT(S): 5000 INJECTION INTRAVENOUS; SUBCUTANEOUS at 22:29

## 2019-01-10 RX ADMIN — HEPARIN SODIUM 5000 UNIT(S): 5000 INJECTION INTRAVENOUS; SUBCUTANEOUS at 13:51

## 2019-01-10 RX ADMIN — I.V. FAT EMULSION 4.99 GM/KG/DAY: 20 EMULSION INTRAVENOUS at 22:28

## 2019-01-10 NOTE — CHART NOTE - NSCHARTNOTEFT_GEN_A_CORE
Assessment:   Patient is a 63y old  Male who presents with a chief complaint of persistent nausea/ vomiting, likely bowel obstruction (10 [place] 2019 12:27)Pt seen in AM, TPN/lipids infusing, as ordered. Discussed with KARLOS MENESES, maintaining current 1/2 dose TPN today, GI consult, LFT monitoring.       Factors impacting intake: [ ] none [ ] nausea  [ ] vomiting [ ] diarrhea [ ] constipation  [ ]chewing problems [ ] swallowing issues  [ ] other: alted GI function    Diet Presciption: Diet, Clear Liquid:   Supplement Feeding Modality:  Oral  Ensure Clear Cans or Servings Per Day:  1       Frequency:  Three Times a day (19 @ 11:12)    Intake:     Daily Height in cm: 167.64 (2019 03:49)  Height in cm: 167.64 (24 Dec 2018 14:46)      Daily Weight in k.9 (10 [place] 2019 15:57)  Weight in k.4 (10 [place] 2019 05:01)  Weight in k.4 (2019 14:44)  Weight in k.4 (2019 11:01)  Weight in k.4 (28 Dec 2018 16:53)    % Weight Change    Pertinent Medications: MEDICATIONS  (STANDING):  chlorhexidine 2% Cloths 1 Application(s) Topical daily  fat emulsion (Fish Oil and Plant Based) 20% Infusion 0.44 Gm/kG/Day (4.987 mL/Hr) IV Continuous <Continuous>  heparin  Injectable 5000 Unit(s) SubCutaneous every 8 hours  Parenteral Nutrition - Adult 1 Each (42 mL/Hr) TPN Continuous <Continuous>  Parenteral Nutrition - Adult 1 Each (42 mL/Hr) TPN Continuous <Continuous>  sodium chloride 0.9% lock flush 20 milliLiter(s) IV Push once    MEDICATIONS  (PRN):  HYDROmorphone  Injectable 0.5 milliGRAM(s) IV Push every 10 minutes PRN Moderate Pain (4 - 6)  ondansetron Injectable 4 milliGRAM(s) IV Push every 6 hours PRN Nausea and/or Vomiting  ondansetron Injectable 4 milliGRAM(s) IV Push once PRN Nausea and/or Vomiting  sodium chloride 0.9% lock flush 10 milliLiter(s) IV Push every 1 hour PRN After each medication administration  sodium chloride 0.9% lock flush 10 milliLiter(s) IV Push every 12 hours PRN Lumen of catheter NOT used    Pertinent Labs: 01-10 Na146 mmol/L<H> Glu 103 mg/dL<H> K+ 3.9 mmol/L Cr  0.76 mg/dL BUN 16 mg/dL 01-10 Phos 3.4 mg/dL 01-10 Alb 3.3 g/dL<L>  Chol --    LDL --    HDL --    Trig 86 mg/dL     CAPILLARY BLOOD GLUCOSE      POCT Blood Glucose.: 121 mg/dL (10 [place] 2019 11:22)    Skin:     Estimated Needs:   [x ] no change since previous assessment  [ ] recalculated:       Previous Nutrition Diagnosis:   [ ] Altered GI function  [ ]Inadequate Oral Intake [ ] Swallowing Difficulty   [ ] Altered nutrition related labs [ ] Increased Nutrient Needs [ ] Overweight/Obesity   [ ] Unintended Weight Loss [ ] Food & Nutrition Related Knowledge Deficit [x ] Malnutrition (severe)  [ ] Other:     Nutrition Diagnosis is [x ] ongoing  [ ] resolved [ ] not applicable     New Nutrition Diagnosis: [ ] not applicable       Interventions:   Recommend  [ ] Change Diet To:  [ ] Nutrition Supplement  [x ] Nutrition Support; TPN/ lipids as per KARLOS MD  [x] Other: MD to monitor. RD available.     Monitoring and Evaluation:   [ ] PO intake [ x ] Tolerance to diet prescription [ x ] weights [ x ] labs[ x ] follow up per protocol  [ ] other:

## 2019-01-10 NOTE — PROGRESS NOTE ADULT - SUBJECTIVE AND OBJECTIVE BOX
[facility] NEPHROLOGY-METABOLIC PROGRESS NOTE    Pt is a 63 year old male with h/o metastatic gastric adenocarcinoma s/p total gastrectomy with Kelby en Y esophagojejunal anastomosis on 12/13/17 and Lysis of adhesions on 01/03/18 due to Small Bowel Obstruction p/w N/V x 3 wks a/w gastric outlet obstruction s/p 2 dilations. Pt with persistent vomiting. Consulted for TPN  s/p IR left arm PICC    Hospital Medications: Medications reviewed.  REVIEW OF SYSTEMS:  CONSTITUTIONAL: No fevers or chills  RESPIRATORY: No shortness of breath  CARDIOVASCULAR: No chest pain.  GASTROINTESTINAL: No nausea,  diarrhea or abdominal pain. + vomiting (no blood)  VASCULAR: No bilateral lower extremity edema.     VITALS:  T(F): 98.3 (01-10-19 @ 05:01), Max: 98.3 (01-10-19 @ 05:01)  HR: 59 (01-10-19 @ 05:01)  BP: 126/73 (01-10-19 @ 05:01)  RR: 17 (01-10-19 @ 05:01)  SpO2: 99% (01-10-19 @ 05:01)  Wt(kg): --    01-09 @ 07:01  -  01-10 @ 07:00  --------------------------------------------------------  IN: 1391 mL / OUT: 0 mL / NET: 1391 mL      PHYSICAL EXAM:  Constitutional: NAD, cachectic  Respiratory: CTAB, no wheezes, rales or rhonchi  Cardiovascular: S1, S2, RRR  Gastrointestinal: BS+, soft, NT/ND +upper abd incision scar  Extremities:  No peripheral edema  Access: Left arm  PICC - benign       LABS:  01-10    146<H>  |  109<H>  |  16  ----------------------------<  103<H>  3.9   |  27  |  0.76    Ca    9.0      10 [place] 2019 07:39  Phos  3.4     01-10  Mg     2.1     01-10    TPro  7.1  /  Alb  3.3<L>  /  TBili  1.5<H>  /  DBili      /  AST  98<H>  /  ALT  94<H>  /  AlkPhos  73  01-10    Creatinine Trend: 0.76 <--, 0.95 <--, 1.01 <--    Urine Studies:                    RADIOLOGY & ADDITIONAL STUDIES:

## 2019-01-10 NOTE — PROGRESS NOTE ADULT - PROBLEM SELECTOR PLAN 2
patient with Severe protein-calorie malnutrition and started on TPN  LFT's AST 42-->36-->98       ALT36-->43-->94  Tbili 1.5 and normal ALK Phos  LFT elevation ? 2/2 to TPN  ? 2/2 lack of enteral nutrition since TPN only started yesterday ginny  trend LFT's patient with Severe protein-calorie malnutrition and started on TPN  LFT's AST 42-->36-->98       ALT36-->43-->94  Tbili 1.5 and ALK Phos WNL  LFT elevation ? 2/2 to TPN  ? 2/2 lack of enteral nutrition since TPN only started yesterday ginny  trend LFT's

## 2019-01-10 NOTE — PROGRESS NOTE ADULT - ASSESSMENT
63y old Male s/p esophageal dilation, stent placement and removal s/p migration of stent, stricture re-dilated 1/4. PICC line placed 1/9    - continue clear liquid diet  - continue TPN via PICC

## 2019-01-10 NOTE — PROGRESS NOTE ADULT - PROBLEM SELECTOR PLAN 1
Pt NPO > 3 wks; pt reports 20 lb weight loss over 1 month  s/p Left arm PICC and initiated on TPN 1/9. Pt with acute liver injury/ elevated LFTs with mild hyperbilirubinemia today. Will c/w 1/2 dose of TPN with lipids (1000 ml) today. FS acceptable.  Check FS q 6hrs. Recc ISS.   Daily weights. Strict I/Os. Check CMP/ Mg/ Phos/ ionized calcium daily.    Check CMP, Mg, Phos, Ionized calcium. Daily weights. Strict I/Os. Agree with D5 1/2 NS but would increase rate to 100cc/ hr and d/c NS IVF. Pt NPO > 3 wks; pt reports 20 lb weight loss over 1 month  s/p Left arm PICC and initiated on TPN 1/9. Pt with acute liver injury/ elevated LFTs with mild hyperbilirubinemia today. f/u GI. Will c/w 1/2 dose of TPN with lipids (1000 ml) today. FS acceptable.  Check FS q 6hrs. Recc ISS. Please d/c IVF.   Daily weights. Strict I/Os. Check CMP/ Mg/ Phos/ ionized calcium daily.

## 2019-01-10 NOTE — PROGRESS NOTE ADULT - SUBJECTIVE AND OBJECTIVE BOX
Summary:   63y Male presents with a chief complaint of persistent N/V and inability to tolerate oral intake. The patient has a significant past medical history of Gastric Ca    Subjective: patient seen and examined sitting OOB-chair in NAD. Continues with vomiting after po intake. Denies pain or discomfort      Objective: VSS, afebrile    MEDICATIONS  (STANDING):  chlorhexidine 2% Cloths 1 Application(s) Topical daily  dextrose 5% + sodium chloride 0.45% 1000 milliLiter(s) (65 mL/Hr) IV Continuous <Continuous>  fat emulsion (Fish Oil and Plant Based) 20% Infusion 0.44 Gm/kG/Day (9.973 mL/Hr) IV Continuous <Continuous>  fat emulsion (Fish Oil and Plant Based) 20% Infusion 0.44 Gm/kG/Day (4.987 mL/Hr) IV Continuous <Continuous>  heparin  Injectable 5000 Unit(s) SubCutaneous every 8 hours  Parenteral Nutrition - Adult 1 Each (42 mL/Hr) TPN Continuous <Continuous>  Parenteral Nutrition - Adult 1 Each (42 mL/Hr) TPN Continuous <Continuous>  sodium chloride 0.9% lock flush 20 milliLiter(s) IV Push once  sodium chloride 0.9%. 1000 milliLiter(s) (100 mL/Hr) IV Continuous <Continuous>    MEDICATIONS  (PRN):  HYDROmorphone  Injectable 0.5 milliGRAM(s) IV Push every 10 minutes PRN Moderate Pain (4 - 6)  ondansetron Injectable 4 milliGRAM(s) IV Push every 6 hours PRN Nausea and/or Vomiting  ondansetron Injectable 4 milliGRAM(s) IV Push once PRN Nausea and/or Vomiting  sodium chloride 0.9% lock flush 10 milliLiter(s) IV Push every 1 hour PRN After each medication administration  sodium chloride 0.9% lock flush 10 milliLiter(s) IV Push every 12 hours PRN Lumen of catheter NOT used      Vital Signs Last 24 Hrs  T(C): 36.8 (10 [place] 2019 05:01), Max: 36.8 (09 Jan 2019 22:00)  T(F): 98.3 (10 [place] 2019 05:01), Max: 98.3 (10 [place] 2019 05:01)  HR: 59 (10 [place] 2019 05:01) (59 - 69)  BP: 126/73 (10 [place] 2019 05:01) (121/70 - 126/73)  BP(mean): --  RR: 17 (10 [place] 2019 05:01) (16 - 17)  SpO2: 99% (10 [place] 2019 05:01) (99% - 100%)      General: alert and active, no pallor, NAD.  HEENT:    Normal appearance of conjunctiva, ears, nose, lips, oropharynx, and oral mucosa, anicteric.  Neck:  No masses, no asymmetry.  Lymph Nodes:  No lymphadenopathy.   Cardiovascular:  RRR normal S1/S2, no murmur.  Respiratory:  CTA B/L, normal respiratory effort.   Abdominal:   soft, no masses or tenderness, normoactive BS, NT/ND, no HSM.  Extremities:   No clubbing or cyanosis, normal capillary refill, no edema.   Skin:   No rash, jaundice, lesions, eczema.   Musculoskeletal:  No joint swelling, erythema or tenderness.   Neuro: No focal deficits.   Other:       LABS:    01-10    146<H>  |  109<H>  |  16  ----------------------------<  103<H>  3.9   |  27  |  0.76    Ca    9.0      10 [place] 2019 07:39  Phos  3.4     01-10  Mg     2.1     01-10    TPro  7.1  /  Alb  3.3<L>  /  TBili  1.5<H>  /  DBili  x   /  AST  98<H>  /  ALT  94<H>  /  AlkPhos  73  01-10          RADIOLOGY & ADDITIONAL TESTS:

## 2019-01-10 NOTE — PROGRESS NOTE ADULT - SUBJECTIVE AND OBJECTIVE BOX
Patient seen and examined at bedside with no complaints.   Admits to vomiting, able to tolerate very small portions of clears.  On TPN.    Vital Signs Last 24 Hrs  T(F): 98.3 (01-10-19 @ 05:01), Max: 98.3 (01-10-19 @ 05:01)  HR: 59 (01-10-19 @ 05:01)  BP: 126/73 (01-10-19 @ 05:01)  RR: 17 (01-10-19 @ 05:01)  SpO2: 99% (01-10-19 @ 05:01)    GENERAL: Alert, NAD  CHEST/LUNG: respirations nonlabored  ABDOMEN: Soft, Nontender, Nondistended    I&O's Detail    09 Jan 2019 07:01  -  10 [place] 2019 07:00  --------------------------------------------------------  IN:    dextrose 5% + sodium chloride 0.45%: 975 mL    fat emulsion (Fish Oil and Plant Based) 20% Infusion: 80 mL    TPN (Total Parenteral Nutrition): 336 mL  Total IN: 1391 mL    OUT:  Total OUT: 0 mL    Total NET: 1391 mL    LABS:    01-10    146<H>  |  109<H>  |  16  ----------------------------<  103<H>  3.9   |  27  |  0.76    Ca    9.0      10 [place] 2019 07:39  Phos  3.4     01-10  Mg     2.1     01-10    TPro  7.1  /  Alb  3.3<L>  /  TBili  1.5<H>  /  DBili  x   /  AST  98<H>  /  ALT  94<H>  /  AlkPhos  73  01-10

## 2019-01-10 NOTE — PROGRESS NOTE ADULT - ASSESSMENT
63y old Male s/p esophageal dilation, stent placement and removal s/p migration of stent, stricture re-dilated 1/4. Patient seen sitting in chair in NAD. Denies abdominal pain. + vomiting. No hematemesis.

## 2019-01-11 LAB
ALBUMIN SERPL ELPH-MCNC: 3.2 G/DL — LOW (ref 3.5–5)
ALP SERPL-CCNC: 76 U/L — SIGNIFICANT CHANGE UP (ref 40–120)
ALT FLD-CCNC: 115 U/L DA — HIGH (ref 10–60)
ANION GAP SERPL CALC-SCNC: 5 MMOL/L — SIGNIFICANT CHANGE UP (ref 5–17)
AST SERPL-CCNC: 99 U/L — HIGH (ref 10–40)
BILIRUB SERPL-MCNC: 1.8 MG/DL — HIGH (ref 0.2–1.2)
BUN SERPL-MCNC: 20 MG/DL — HIGH (ref 7–18)
CA-I BLD-SCNC: 1.29 MMOL/L — SIGNIFICANT CHANGE UP (ref 1.12–1.3)
CALCIUM SERPL-MCNC: 9.3 MG/DL — SIGNIFICANT CHANGE UP (ref 8.4–10.5)
CHLORIDE SERPL-SCNC: 110 MMOL/L — HIGH (ref 96–108)
CO2 SERPL-SCNC: 30 MMOL/L — SIGNIFICANT CHANGE UP (ref 22–31)
CREAT SERPL-MCNC: 0.81 MG/DL — SIGNIFICANT CHANGE UP (ref 0.5–1.3)
GLUCOSE BLDC GLUCOMTR-MCNC: 114 MG/DL — HIGH (ref 70–99)
GLUCOSE BLDC GLUCOMTR-MCNC: 154 MG/DL — HIGH (ref 70–99)
GLUCOSE BLDC GLUCOMTR-MCNC: 87 MG/DL — SIGNIFICANT CHANGE UP (ref 70–99)
GLUCOSE BLDC GLUCOMTR-MCNC: 90 MG/DL — SIGNIFICANT CHANGE UP (ref 70–99)
GLUCOSE SERPL-MCNC: 88 MG/DL — SIGNIFICANT CHANGE UP (ref 70–99)
MAGNESIUM SERPL-MCNC: 2.2 MG/DL — SIGNIFICANT CHANGE UP (ref 1.6–2.6)
PHOSPHATE SERPL-MCNC: 3.7 MG/DL — SIGNIFICANT CHANGE UP (ref 2.5–4.5)
POTASSIUM SERPL-MCNC: 4.3 MMOL/L — SIGNIFICANT CHANGE UP (ref 3.5–5.3)
POTASSIUM SERPL-SCNC: 4.3 MMOL/L — SIGNIFICANT CHANGE UP (ref 3.5–5.3)
PROT SERPL-MCNC: 7.1 G/DL — SIGNIFICANT CHANGE UP (ref 6–8.3)
SODIUM SERPL-SCNC: 145 MMOL/L — SIGNIFICANT CHANGE UP (ref 135–145)

## 2019-01-11 RX ORDER — GLUCAGON INJECTION, SOLUTION 0.5 MG/.1ML
1 INJECTION, SOLUTION SUBCUTANEOUS ONCE
Qty: 0 | Refills: 0 | Status: DISCONTINUED | OUTPATIENT
Start: 2019-01-11 | End: 2019-01-15

## 2019-01-11 RX ORDER — DEXTROSE 50 % IN WATER 50 %
12.5 SYRINGE (ML) INTRAVENOUS ONCE
Qty: 0 | Refills: 0 | Status: DISCONTINUED | OUTPATIENT
Start: 2019-01-11 | End: 2019-01-15

## 2019-01-11 RX ORDER — INSULIN LISPRO 100/ML
VIAL (ML) SUBCUTANEOUS EVERY 6 HOURS
Qty: 0 | Refills: 0 | Status: DISCONTINUED | OUTPATIENT
Start: 2019-01-11 | End: 2019-01-15

## 2019-01-11 RX ORDER — ELECTROLYTE SOLUTION,INJ
1 VIAL (ML) INTRAVENOUS
Qty: 0 | Refills: 0 | Status: DISCONTINUED | OUTPATIENT
Start: 2019-01-11 | End: 2019-01-11

## 2019-01-11 RX ORDER — I.V. FAT EMULSION 20 G/100ML
0.44 EMULSION INTRAVENOUS
Qty: 23.94 | Refills: 0 | Status: DISCONTINUED | OUTPATIENT
Start: 2019-01-11 | End: 2019-01-11

## 2019-01-11 RX ORDER — DEXTROSE 50 % IN WATER 50 %
25 SYRINGE (ML) INTRAVENOUS ONCE
Qty: 0 | Refills: 0 | Status: DISCONTINUED | OUTPATIENT
Start: 2019-01-11 | End: 2019-01-15

## 2019-01-11 RX ORDER — DEXTROSE 50 % IN WATER 50 %
15 SYRINGE (ML) INTRAVENOUS ONCE
Qty: 0 | Refills: 0 | Status: DISCONTINUED | OUTPATIENT
Start: 2019-01-11 | End: 2019-01-15

## 2019-01-11 RX ORDER — SODIUM CHLORIDE 9 MG/ML
1000 INJECTION, SOLUTION INTRAVENOUS
Qty: 0 | Refills: 0 | Status: DISCONTINUED | OUTPATIENT
Start: 2019-01-11 | End: 2019-01-15

## 2019-01-11 RX ADMIN — Medication 83 EACH: at 22:01

## 2019-01-11 RX ADMIN — HEPARIN SODIUM 5000 UNIT(S): 5000 INJECTION INTRAVENOUS; SUBCUTANEOUS at 21:27

## 2019-01-11 RX ADMIN — HEPARIN SODIUM 5000 UNIT(S): 5000 INJECTION INTRAVENOUS; SUBCUTANEOUS at 06:17

## 2019-01-11 RX ADMIN — I.V. FAT EMULSION 4.99 GM/KG/DAY: 20 EMULSION INTRAVENOUS at 22:01

## 2019-01-11 RX ADMIN — Medication 1: at 23:48

## 2019-01-11 RX ADMIN — CHLORHEXIDINE GLUCONATE 1 APPLICATION(S): 213 SOLUTION TOPICAL at 12:58

## 2019-01-11 NOTE — PROGRESS NOTE ADULT - SUBJECTIVE AND OBJECTIVE BOX
INTERVAL HPI/OVERNIGHT EVENTS:  Pt stable.   Tolerating liquids better  On TPN    Vital Signs Last 24 Hrs  T(C): 36.5 (11 Jan 2019 05:53), Max: 36.5 (11 Jan 2019 05:53)  T(F): 97.7 (11 Jan 2019 05:53), Max: 97.7 (11 Jan 2019 05:53)  HR: 62 (11 Jan 2019 05:53) (62 - 78)  BP: 106/56 (11 Jan 2019 05:53) (106/56 - 120/85)  BP(mean): --  RR: 16 (11 Jan 2019 05:53) (16 - 17)  SpO2: 98% (11 Jan 2019 05:53) (98% - 100%)    Physical:  Abdomen: Soft nondistended, nontender.    I&O's Summary    10 [place] 2019 07:01  -  11 Jan 2019 07:00  --------------------------------------------------------  IN: 1004 mL / OUT: 700 mL / NET: 304 mL        LABS:            01-11    145  |  110<H>  |  20<H>  ----------------------------<  88  4.3   |  30  |  0.81    Ca    9.3      11 Jan 2019 07:06  Phos  3.7     01-11  Mg     2.2     01-11    TPro  7.1  /  Alb  3.2<L>  /  TBili  1.8<H>  /  DBili  x   /  AST  99<H>  /  ALT  115<H>  /  AlkPhos  76  01-11

## 2019-01-11 NOTE — PROGRESS NOTE ADULT - PROBLEM SELECTOR PLAN 1
Pt NPO > 3 wks; pt reports 20 lb weight loss over 1 month  s/p Left arm PICC and initiated on TPN 1/9. Pt with acute liver injury/ elevated LFTs with mild hyperbilirubinemia. GI following; not overly concerning as per GI.   Will give full dose TPN with lipids (2000 ml) today. FS acceptable.  Check FS q 6hrs. Recc ISS.   Daily weights. Strict I/Os. Check CMP/ Mg/ Phos/ ionized calcium daily.

## 2019-01-11 NOTE — PROGRESS NOTE ADULT - SUBJECTIVE AND OBJECTIVE BOX
[facility] NEPHROLOGY-METABOLIC PROGRESS NOTE    Pt is a 63 year old male with h/o metastatic gastric adenocarcinoma s/p total gastrectomy with Kelby en Y esophagojejunal anastomosis on 12/13/17 and Lysis of adhesions on 01/03/18 due to Small Bowel Obstruction p/w N/V x 3 wks a/w gastric outlet obstruction s/p 2 dilations. Pt with persistent vomiting. Consulted for TPN  s/p IR left arm PICC    Hospital Medications: Medications reviewed.  REVIEW OF SYSTEMS:  CONSTITUTIONAL: No fevers or chills  RESPIRATORY: No shortness of breath  CARDIOVASCULAR: No chest pain.  GASTROINTESTINAL: No nausea,  diarrhea or abdominal pain. + vomiting (no blood), no BM (+flatulence)  VASCULAR: No bilateral lower extremity edema.     VITALS:  T(F): 97.7 (01-11-19 @ 05:53), Max: 97.7 (01-11-19 @ 05:53)  HR: 62 (01-11-19 @ 05:53)  BP: 106/56 (01-11-19 @ 05:53)  RR: 16 (01-11-19 @ 05:53)  SpO2: 98% (01-11-19 @ 05:53)  Wt(kg): --    01-10 @ 07:01  -  01-11 @ 07:00  --------------------------------------------------------  IN: 1004 mL / OUT: 700 mL / NET: 304 mL      PHYSICAL EXAM:  Constitutional: NAD, cachectic  Respiratory: CTAB, no wheezes, rales or rhonchi  Cardiovascular: S1, S2, RRR  Gastrointestinal: BS+, soft, ND, mild epigastric tenderness +upper abd incision scar  Extremities:  No peripheral edema  Access: Left arm  PICC - benign     LABS:  01-11    145  |  110<H>  |  20<H>  ----------------------------<  88  4.3   |  30  |  0.81    Ca    9.3      11 Jan 2019 07:06  Phos  3.7     01-11  Mg     2.2     01-11    TPro  7.1  /  Alb  3.2<L>  /  TBili  1.8<H>  /  DBili      /  AST  99<H>  /  ALT  115<H>  /  AlkPhos  76  01-11    Creatinine Trend: 0.81 <--, 0.76 <--, 0.95 <--, 1.01 <--    Urine Studies:                        RADIOLOGY & ADDITIONAL STUDIES:

## 2019-01-11 NOTE — CHART NOTE - NSCHARTNOTEFT_GEN_A_CORE
Assessment:   Patient is a 63y old  Male who presents with a chief complaint of persistent nausea/ vomiting, likely bowel obstruction (2019 13:40). Pt seen earlier in day. PN infusing. Discussed with KARLOS MENESES earlier, pt ordered for "full dose" PN. Discussed with Quoc SELLERS.       Factors impacting intake: [ ] none [ ] nausea  [ ] vomiting [ ] diarrhea [ ] constipation  [ ]chewing problems [ ] swallowing issues  [x ] other: altered GI fx    Diet Presciption: Diet, Clear Liquid:   Supplement Feeding Modality:  Oral  Ensure Clear Cans or Servings Per Day:  1       Frequency:  Three Times a day (19 @ 11:12)    Intake:     Daily Height in cm: 167.64 (2019 03:49)  Height in cm: 167.64 (24 Dec 2018 14:46)      Daily Weight in k.3 (2019 09:00)  Weight in k.9 (10 [place] 2019 15:57)  Weight in k.4 (10 [place] 2019 05:01)  Weight in k.4 (2019 14:44)  Weight in k.4 (2019 11:01)  Weight in k.4 (28 Dec 2018 16:53)    % Weight Change    Pertinent Medications: MEDICATIONS  (STANDING):  chlorhexidine 2% Cloths 1 Application(s) Topical daily  dextrose 5%. 1000 milliLiter(s) (50 mL/Hr) IV Continuous <Continuous>  dextrose 50% Injectable 12.5 Gram(s) IV Push once  dextrose 50% Injectable 25 Gram(s) IV Push once  dextrose 50% Injectable 25 Gram(s) IV Push once  fat emulsion (Fish Oil and Plant Based) 20% Infusion 0.44 Gm/kG/Day (4.987 mL/Hr) IV Continuous <Continuous>  fat emulsion (Fish Oil and Plant Based) 20% Infusion 0.44 Gm/kG/Day (4.987 mL/Hr) IV Continuous <Continuous>  heparin  Injectable 5000 Unit(s) SubCutaneous every 8 hours  insulin lispro (HumaLOG) corrective regimen sliding scale   SubCutaneous every 6 hours  Parenteral Nutrition - Adult 1 Each (83 mL/Hr) TPN Continuous <Continuous>  Parenteral Nutrition - Adult 1 Each (42 mL/Hr) TPN Continuous <Continuous>  sodium chloride 0.9% lock flush 20 milliLiter(s) IV Push once    MEDICATIONS  (PRN):  dextrose 40% Gel 15 Gram(s) Oral once PRN Blood Glucose LESS THAN 70 milliGRAM(s)/deciliter  glucagon  Injectable 1 milliGRAM(s) IntraMuscular once PRN Glucose LESS THAN 70 milligrams/deciliter  HYDROmorphone  Injectable 0.5 milliGRAM(s) IV Push every 10 minutes PRN Moderate Pain (4 - 6)  ondansetron Injectable 4 milliGRAM(s) IV Push every 6 hours PRN Nausea and/or Vomiting  ondansetron Injectable 4 milliGRAM(s) IV Push once PRN Nausea and/or Vomiting  sodium chloride 0.9% lock flush 10 milliLiter(s) IV Push every 1 hour PRN After each medication administration  sodium chloride 0.9% lock flush 10 milliLiter(s) IV Push every 12 hours PRN Lumen of catheter NOT used    Pertinent Labs:  Na145 mmol/L Glu 88 mg/dL K+ 4.3 mmol/L Cr  0.81 mg/dL BUN 20 mg/dL<H>  Phos 3.7 mg/dL  Alb 3.2 g/dL<L>  Chol --    LDL --    HDL --    Trig 86 mg/dL     CAPILLARY BLOOD GLUCOSE      POCT Blood Glucose.: 114 mg/dL (2019 11:22)  POCT Blood Glucose.: 90 mg/dL (2019 06:11)  POCT Blood Glucose.: 115 mg/dL (10 [place] 2019 23:51)    Skin:     Estimated Needs:   [x ] no change since previous assessment  [ ] recalculated:       Previous Nutrition Diagnosis:   [ ] Altered GI function  [ ]Inadequate Oral Intake [ ] Swallowing Difficulty   [ ] Altered nutrition related labs [ ] Increased Nutrient Needs [ ] Overweight/Obesity   [ ] Unintended Weight Loss [ ] Food & Nutrition Related Knowledge Deficit [x ] Malnutrition (severe)  [ ] Other:     Nutrition Diagnosis is [x ] ongoing  [ ] resolved [ ] not applicable     New Nutrition Diagnosis: [ ] not applicable       Interventions:   Recommend  [ ] Change Diet To:  [ ] Nutrition Supplement  [x ] Nutrition Support; TPN/ lipids per KARLOS MD  [x ] Other: MD to monitor. RD available.     Monitoring and Evaluation:   [x ] PO intake [ x ] Tolerance to diet prescription [ x ] weights [ x ] labs[ x ] follow up per protocol  [ ] other:

## 2019-01-11 NOTE — PROGRESS NOTE ADULT - SUBJECTIVE AND OBJECTIVE BOX
Patient seen and examined at bedside with no complaints.   Admits to vomiting, able to tolerate very small portions of clears.  On TPN.    Vital Signs Last 24 Hrs  T(C): 36.5 (11 Jan 2019 05:53), Max: 36.5 (11 Jan 2019 05:53)  T(F): 97.7 (11 Jan 2019 05:53), Max: 97.7 (11 Jan 2019 05:53)  HR: 62 (11 Jan 2019 05:53) (62 - 78)  BP: 106/56 (11 Jan 2019 05:53) (106/56 - 120/85)  BP(mean): --  RR: 16 (11 Jan 2019 05:53) (16 - 17)  SpO2: 98% (11 Jan 2019 05:53) (98% - 100%)    GENERAL: Alert, NAD  CHEST/LUNG: respirations nonlabored  ABDOMEN: Soft, Nontender, Nondistended    01-11    145  |  110<H>  |  20<H>  ----------------------------<  88  4.3   |  30  |  0.81    Ca    9.3      11 Jan 2019 07:06  Phos  3.7     01-11  Mg     2.2     01-11    TPro  7.1  /  Alb  3.2<L>  /  TBili  1.8<H>  /  DBili  x   /  AST  99<H>  /  ALT  115<H>  /  AlkPhos  76  01-11

## 2019-01-12 LAB
ALBUMIN SERPL ELPH-MCNC: 3.2 G/DL — LOW (ref 3.5–5)
ALP SERPL-CCNC: 73 U/L — SIGNIFICANT CHANGE UP (ref 40–120)
ALT FLD-CCNC: 111 U/L DA — HIGH (ref 10–60)
ANION GAP SERPL CALC-SCNC: 5 MMOL/L — SIGNIFICANT CHANGE UP (ref 5–17)
AST SERPL-CCNC: 76 U/L — HIGH (ref 10–40)
BASOPHILS # BLD AUTO: 0 K/UL — SIGNIFICANT CHANGE UP (ref 0–0.2)
BASOPHILS NFR BLD AUTO: 0.9 % — SIGNIFICANT CHANGE UP (ref 0–2)
BILIRUB SERPL-MCNC: 1.3 MG/DL — HIGH (ref 0.2–1.2)
BUN SERPL-MCNC: 24 MG/DL — HIGH (ref 7–18)
CA-I BLD-SCNC: 1.27 MMOL/L — SIGNIFICANT CHANGE UP (ref 1.12–1.3)
CALCIUM SERPL-MCNC: 8.9 MG/DL — SIGNIFICANT CHANGE UP (ref 8.4–10.5)
CHLORIDE SERPL-SCNC: 108 MMOL/L — SIGNIFICANT CHANGE UP (ref 96–108)
CO2 SERPL-SCNC: 32 MMOL/L — HIGH (ref 22–31)
CREAT SERPL-MCNC: 0.83 MG/DL — SIGNIFICANT CHANGE UP (ref 0.5–1.3)
EOSINOPHIL # BLD AUTO: 0.1 K/UL — SIGNIFICANT CHANGE UP (ref 0–0.5)
EOSINOPHIL NFR BLD AUTO: 2.4 % — SIGNIFICANT CHANGE UP (ref 0–6)
GLUCOSE BLDC GLUCOMTR-MCNC: 116 MG/DL — HIGH (ref 70–99)
GLUCOSE BLDC GLUCOMTR-MCNC: 120 MG/DL — HIGH (ref 70–99)
GLUCOSE BLDC GLUCOMTR-MCNC: 131 MG/DL — HIGH (ref 70–99)
GLUCOSE BLDC GLUCOMTR-MCNC: 138 MG/DL — HIGH (ref 70–99)
GLUCOSE SERPL-MCNC: 103 MG/DL — HIGH (ref 70–99)
HBA1C BLD-MCNC: 5.2 % — SIGNIFICANT CHANGE UP (ref 4–5.6)
HCT VFR BLD CALC: 35.9 % — LOW (ref 39–50)
HGB BLD-MCNC: 12.2 G/DL — LOW (ref 13–17)
LYMPHOCYTES # BLD AUTO: 0.5 K/UL — LOW (ref 1–3.3)
LYMPHOCYTES # BLD AUTO: 14.8 % — SIGNIFICANT CHANGE UP (ref 13–44)
MAGNESIUM SERPL-MCNC: 2.1 MG/DL — SIGNIFICANT CHANGE UP (ref 1.6–2.6)
MCHC RBC-ENTMCNC: 30.5 PG — SIGNIFICANT CHANGE UP (ref 27–34)
MCHC RBC-ENTMCNC: 34 GM/DL — SIGNIFICANT CHANGE UP (ref 32–36)
MCV RBC AUTO: 89.7 FL — SIGNIFICANT CHANGE UP (ref 80–100)
MONOCYTES # BLD AUTO: 0.4 K/UL — SIGNIFICANT CHANGE UP (ref 0–0.9)
MONOCYTES NFR BLD AUTO: 10.5 % — SIGNIFICANT CHANGE UP (ref 2–14)
NEUTROPHILS # BLD AUTO: 2.4 K/UL — SIGNIFICANT CHANGE UP (ref 1.8–7.4)
NEUTROPHILS NFR BLD AUTO: 71.3 % — SIGNIFICANT CHANGE UP (ref 43–77)
PHOSPHATE SERPL-MCNC: 3.7 MG/DL — SIGNIFICANT CHANGE UP (ref 2.5–4.5)
PLATELET # BLD AUTO: 105 K/UL — LOW (ref 150–400)
POTASSIUM SERPL-MCNC: 4.1 MMOL/L — SIGNIFICANT CHANGE UP (ref 3.5–5.3)
POTASSIUM SERPL-SCNC: 4.1 MMOL/L — SIGNIFICANT CHANGE UP (ref 3.5–5.3)
PROT SERPL-MCNC: 7.1 G/DL — SIGNIFICANT CHANGE UP (ref 6–8.3)
RBC # BLD: 4 M/UL — LOW (ref 4.2–5.8)
RBC # FLD: 12.3 % — SIGNIFICANT CHANGE UP (ref 10.3–14.5)
SODIUM SERPL-SCNC: 145 MMOL/L — SIGNIFICANT CHANGE UP (ref 135–145)
WBC # BLD: 3.4 K/UL — LOW (ref 3.8–10.5)
WBC # FLD AUTO: 3.4 K/UL — LOW (ref 3.8–10.5)

## 2019-01-12 RX ORDER — I.V. FAT EMULSION 20 G/100ML
0.44 EMULSION INTRAVENOUS
Qty: 23.94 | Refills: 0 | Status: DISCONTINUED | OUTPATIENT
Start: 2019-01-12 | End: 2019-01-12

## 2019-01-12 RX ORDER — ELECTROLYTE SOLUTION,INJ
1 VIAL (ML) INTRAVENOUS
Qty: 0 | Refills: 0 | Status: DISCONTINUED | OUTPATIENT
Start: 2019-01-12 | End: 2019-01-12

## 2019-01-12 RX ADMIN — I.V. FAT EMULSION 4.99 GM/KG/DAY: 20 EMULSION INTRAVENOUS at 22:02

## 2019-01-12 RX ADMIN — HEPARIN SODIUM 5000 UNIT(S): 5000 INJECTION INTRAVENOUS; SUBCUTANEOUS at 22:02

## 2019-01-12 RX ADMIN — HEPARIN SODIUM 5000 UNIT(S): 5000 INJECTION INTRAVENOUS; SUBCUTANEOUS at 14:43

## 2019-01-12 RX ADMIN — CHLORHEXIDINE GLUCONATE 1 APPLICATION(S): 213 SOLUTION TOPICAL at 11:38

## 2019-01-12 RX ADMIN — HEPARIN SODIUM 5000 UNIT(S): 5000 INJECTION INTRAVENOUS; SUBCUTANEOUS at 05:53

## 2019-01-12 RX ADMIN — Medication 83 EACH: at 22:04

## 2019-01-12 NOTE — PROGRESS NOTE ADULT - ASSESSMENT
63M with hx of total gastrectomy and esophagojejunostomy with obstruction at anastomosis. Prior dilatation and stent have not worked much.    1) for repeat dilatation Monday

## 2019-01-12 NOTE — PROGRESS NOTE ADULT - SUBJECTIVE AND OBJECTIVE BOX
SUBJECTIVE    Nausea [ ] YES [ X] NO  Vomiting [X ] YES [ ] NO--vomited overnight after drinking  Voiding normally [ X] YES [ ] NO  Flatus [ X] YES [ ] NO  Pain under control [X ] YES [ ] NO  Not tolerating PO well  Ambulated [X ] YES NO [ ]        VITALS    ICU Vital Signs Last 24 Hrs  T(C): 36.7 (12 Jan 2019 05:44), Max: 36.7 (11 Jan 2019 21:33)  T(F): 98.1 (12 Jan 2019 05:44), Max: 98.1 (12 Jan 2019 05:44)  HR: 55 (12 Jan 2019 05:44) (55 - 66)  BP: 106/59 (12 Jan 2019 05:44) (106/59 - 120/72)  BP(mean): --  ABP: --  ABP(mean): --  RR: 16 (12 Jan 2019 05:44) (16 - 17)  SpO2: 100% (12 Jan 2019 05:44) (96% - 100%)    I&O's Detail    11 Jan 2019 07:01  -  12 Jan 2019 07:00  --------------------------------------------------------  IN:    fat emulsion (Fish Oil and Plant Based) 20% Infusion: 55 mL    fat emulsion (Fish Oil and Plant Based) 20% Infusion: 522 mL    TPN (Total Parenteral Nutrition): 873 mL  Total IN: 1450 mL    OUT:    Emesis: 500 mL    Voided: 350 mL  Total OUT: 850 mL    Total NET: 600 mL            PHYSICAL EXAMINATION    Abdomen: soft, NT, ND    I&O's Summary    11 Jan 2019 07:01  -  12 Jan 2019 07:00  --------------------------------------------------------  IN: 1450 mL / OUT: 850 mL / NET: 600 mL        LABS                        12.2   3.4   )-----------( 105      ( 12 Jan 2019 07:19 )             35.9             01-12    145  |  108  |  24<H>  ----------------------------<  103<H>  4.1   |  32<H>  |  0.83    Ca    8.9      12 Jan 2019 07:19  Phos  3.7     01-12  Mg     2.1     01-12    TPro  7.1  /  Alb  3.2<L>  /  TBili  1.3<H>  /  DBili  x   /  AST  76<H>  /  ALT  111<H>  /  AlkPhos  73  01-12    LIVER FUNCTIONS - ( 12 Jan 2019 07:19 )  Alb: 3.2 g/dL / Pro: 7.1 g/dL / ALK PHOS: 73 U/L / ALT: 111 U/L DA / AST: 76 U/L / GGT: x             MEDICATIONS:  MEDICATIONS  (STANDING):  chlorhexidine 2% Cloths 1 Application(s) Topical daily  dextrose 5%. 1000 milliLiter(s) (50 mL/Hr) IV Continuous <Continuous>  dextrose 50% Injectable 12.5 Gram(s) IV Push once  dextrose 50% Injectable 25 Gram(s) IV Push once  dextrose 50% Injectable 25 Gram(s) IV Push once  fat emulsion (Fish Oil and Plant Based) 20% Infusion 0.44 Gm/kG/Day (4.987 mL/Hr) IV Continuous <Continuous>  heparin  Injectable 5000 Unit(s) SubCutaneous every 8 hours  insulin lispro (HumaLOG) corrective regimen sliding scale   SubCutaneous every 6 hours  Parenteral Nutrition - Adult 1 Each (83 mL/Hr) TPN Continuous <Continuous>  sodium chloride 0.9% lock flush 20 milliLiter(s) IV Push once    MEDICATIONS  (PRN):  dextrose 40% Gel 15 Gram(s) Oral once PRN Blood Glucose LESS THAN 70 milliGRAM(s)/deciliter  glucagon  Injectable 1 milliGRAM(s) IntraMuscular once PRN Glucose LESS THAN 70 milligrams/deciliter  ondansetron Injectable 4 milliGRAM(s) IV Push every 6 hours PRN Nausea and/or Vomiting  ondansetron Injectable 4 milliGRAM(s) IV Push once PRN Nausea and/or Vomiting  sodium chloride 0.9% lock flush 10 milliLiter(s) IV Push every 1 hour PRN After each medication administration  sodium chloride 0.9% lock flush 10 milliLiter(s) IV Push every 12 hours PRN Lumen of catheter NOT used

## 2019-01-12 NOTE — PROGRESS NOTE ADULT - SUBJECTIVE AND OBJECTIVE BOX
[facility] NEPHROLOGY-METABOLIC PROGRESS NOTE    Pt is a 63 year old male with h/o metastatic gastric adenocarcinoma s/p total gastrectomy with Kelby en Y esophagojejunal anastomosis on 12/13/17 and Lysis of adhesions on 01/03/18 due to Small Bowel Obstruction p/w N/V x 3 wks a/w gastric outlet obstruction s/p 2 dilations. Pt with persistent vomiting. Consulted for TPN  s/p IR left arm PICC    Hospital Medications: Medications reviewed.  REVIEW OF SYSTEMS:  CONSTITUTIONAL: No fevers or chills  RESPIRATORY: No shortness of breath  CARDIOVASCULAR: No chest pain.  GASTROINTESTINAL: No nausea,  diarrhea or abdominal pain. + vomiting (no blood), no BM (+flatulence)  VASCULAR: No bilateral lower extremity edema.       VITALS:  T(F): 97.8 (01-12-19 @ 14:37), Max: 98.1 (01-12-19 @ 05:44)  HR: 57 (01-12-19 @ 14:37)  BP: 120/64 (01-12-19 @ 14:37)  RR: 16 (01-12-19 @ 14:37)  SpO2: 100% (01-12-19 @ 14:37)  Wt(kg): --    01-11 @ 07:01  -  01-12 @ 07:00  --------------------------------------------------------  IN: 1450 mL / OUT: 850 mL / NET: 600 mL      PHYSICAL EXAM:  Constitutional: NAD, cachectic  Respiratory: CTAB, no wheezes, rales or rhonchi  Cardiovascular: S1, S2, RRR  Gastrointestinal: BS+, soft, ND, mild epigastric tenderness +upper abd incision scar  Extremities:  No peripheral edema  Access: Left arm  PICC - benign     LABS:  01-12    145  |  108  |  24<H>  ----------------------------<  103<H>  4.1   |  32<H>  |  0.83    Ca    8.9      12 Jan 2019 07:19  Phos  3.7     01-12  Mg     2.1     01-12    TPro  7.1  /  Alb  3.2<L>  /  TBili  1.3<H>  /  DBili      /  AST  76<H>  /  ALT  111<H>  /  AlkPhos  73  01-12    Creatinine Trend: 0.83 <--, 0.81 <--, 0.76 <--, 0.95 <--, 1.01 <--                        12.2   3.4   )-----------( 105      ( 12 Jan 2019 07:19 )             35.9     Urine Studies:

## 2019-01-12 NOTE — PROGRESS NOTE ADULT - NSHPATTENDINGPLANDISCUSS_GEN_ALL_CORE
surgical team and patient and relative;
Kimmy LESTER
Kimmy LESTER/ Sari FOUNTAIN/ Shelia FOUNTAIN

## 2019-01-13 LAB
ALBUMIN SERPL ELPH-MCNC: 3.1 G/DL — LOW (ref 3.5–5)
ALP SERPL-CCNC: 64 U/L — SIGNIFICANT CHANGE UP (ref 40–120)
ALT FLD-CCNC: 84 U/L DA — HIGH (ref 10–60)
ANION GAP SERPL CALC-SCNC: 7 MMOL/L — SIGNIFICANT CHANGE UP (ref 5–17)
AST SERPL-CCNC: 49 U/L — HIGH (ref 10–40)
BASOPHILS # BLD AUTO: 0 K/UL — SIGNIFICANT CHANGE UP (ref 0–0.2)
BASOPHILS NFR BLD AUTO: 0.6 % — SIGNIFICANT CHANGE UP (ref 0–2)
BILIRUB SERPL-MCNC: 1.1 MG/DL — SIGNIFICANT CHANGE UP (ref 0.2–1.2)
BUN SERPL-MCNC: 24 MG/DL — HIGH (ref 7–18)
CA-I BLD-SCNC: 1.28 MMOL/L — SIGNIFICANT CHANGE UP (ref 1.12–1.3)
CALCIUM SERPL-MCNC: 8.5 MG/DL — SIGNIFICANT CHANGE UP (ref 8.4–10.5)
CHLORIDE SERPL-SCNC: 105 MMOL/L — SIGNIFICANT CHANGE UP (ref 96–108)
CO2 SERPL-SCNC: 30 MMOL/L — SIGNIFICANT CHANGE UP (ref 22–31)
CREAT SERPL-MCNC: 0.62 MG/DL — SIGNIFICANT CHANGE UP (ref 0.5–1.3)
EOSINOPHIL # BLD AUTO: 0.1 K/UL — SIGNIFICANT CHANGE UP (ref 0–0.5)
EOSINOPHIL NFR BLD AUTO: 2 % — SIGNIFICANT CHANGE UP (ref 0–6)
GLUCOSE BLDC GLUCOMTR-MCNC: 107 MG/DL — HIGH (ref 70–99)
GLUCOSE BLDC GLUCOMTR-MCNC: 110 MG/DL — HIGH (ref 70–99)
GLUCOSE BLDC GLUCOMTR-MCNC: 111 MG/DL — HIGH (ref 70–99)
GLUCOSE BLDC GLUCOMTR-MCNC: 113 MG/DL — HIGH (ref 70–99)
GLUCOSE BLDC GLUCOMTR-MCNC: 117 MG/DL — HIGH (ref 70–99)
GLUCOSE SERPL-MCNC: 104 MG/DL — HIGH (ref 70–99)
HCT VFR BLD CALC: 33.7 % — LOW (ref 39–50)
HGB BLD-MCNC: 11.7 G/DL — LOW (ref 13–17)
LYMPHOCYTES # BLD AUTO: 0.6 K/UL — LOW (ref 1–3.3)
LYMPHOCYTES # BLD AUTO: 16.1 % — SIGNIFICANT CHANGE UP (ref 13–44)
MAGNESIUM SERPL-MCNC: 1.9 MG/DL — SIGNIFICANT CHANGE UP (ref 1.6–2.6)
MCHC RBC-ENTMCNC: 30.9 PG — SIGNIFICANT CHANGE UP (ref 27–34)
MCHC RBC-ENTMCNC: 34.8 GM/DL — SIGNIFICANT CHANGE UP (ref 32–36)
MCV RBC AUTO: 88.8 FL — SIGNIFICANT CHANGE UP (ref 80–100)
MONOCYTES # BLD AUTO: 0.4 K/UL — SIGNIFICANT CHANGE UP (ref 0–0.9)
MONOCYTES NFR BLD AUTO: 9.4 % — SIGNIFICANT CHANGE UP (ref 2–14)
NEUTROPHILS # BLD AUTO: 2.7 K/UL — SIGNIFICANT CHANGE UP (ref 1.8–7.4)
NEUTROPHILS NFR BLD AUTO: 72 % — SIGNIFICANT CHANGE UP (ref 43–77)
PHOSPHATE SERPL-MCNC: 3.2 MG/DL — SIGNIFICANT CHANGE UP (ref 2.5–4.5)
PLATELET # BLD AUTO: 100 K/UL — LOW (ref 150–400)
POTASSIUM SERPL-MCNC: 3.4 MMOL/L — LOW (ref 3.5–5.3)
POTASSIUM SERPL-SCNC: 3.4 MMOL/L — LOW (ref 3.5–5.3)
PROT SERPL-MCNC: 6.5 G/DL — SIGNIFICANT CHANGE UP (ref 6–8.3)
RBC # BLD: 3.8 M/UL — LOW (ref 4.2–5.8)
RBC # FLD: 12.4 % — SIGNIFICANT CHANGE UP (ref 10.3–14.5)
SODIUM SERPL-SCNC: 142 MMOL/L — SIGNIFICANT CHANGE UP (ref 135–145)
WBC # BLD: 3.8 K/UL — SIGNIFICANT CHANGE UP (ref 3.8–10.5)
WBC # FLD AUTO: 3.8 K/UL — SIGNIFICANT CHANGE UP (ref 3.8–10.5)

## 2019-01-13 RX ORDER — ELECTROLYTE SOLUTION,INJ
1 VIAL (ML) INTRAVENOUS
Qty: 0 | Refills: 0 | Status: DISCONTINUED | OUTPATIENT
Start: 2019-01-13 | End: 2019-01-13

## 2019-01-13 RX ORDER — I.V. FAT EMULSION 20 G/100ML
0.44 EMULSION INTRAVENOUS
Qty: 23.94 | Refills: 0 | Status: DISCONTINUED | OUTPATIENT
Start: 2019-01-13 | End: 2019-01-13

## 2019-01-13 RX ORDER — POTASSIUM CHLORIDE 20 MEQ
10 PACKET (EA) ORAL
Qty: 0 | Refills: 0 | Status: COMPLETED | OUTPATIENT
Start: 2019-01-13 | End: 2019-01-13

## 2019-01-13 RX ADMIN — Medication 100 MILLIEQUIVALENT(S): at 11:12

## 2019-01-13 RX ADMIN — Medication 100 MILLIEQUIVALENT(S): at 12:13

## 2019-01-13 RX ADMIN — I.V. FAT EMULSION 4.99 GM/KG/DAY: 20 EMULSION INTRAVENOUS at 23:21

## 2019-01-13 RX ADMIN — Medication 100 MILLIEQUIVALENT(S): at 10:43

## 2019-01-13 RX ADMIN — CHLORHEXIDINE GLUCONATE 1 APPLICATION(S): 213 SOLUTION TOPICAL at 11:20

## 2019-01-13 RX ADMIN — HEPARIN SODIUM 5000 UNIT(S): 5000 INJECTION INTRAVENOUS; SUBCUTANEOUS at 06:39

## 2019-01-13 RX ADMIN — HEPARIN SODIUM 5000 UNIT(S): 5000 INJECTION INTRAVENOUS; SUBCUTANEOUS at 23:21

## 2019-01-13 RX ADMIN — HEPARIN SODIUM 5000 UNIT(S): 5000 INJECTION INTRAVENOUS; SUBCUTANEOUS at 13:43

## 2019-01-13 NOTE — PROGRESS NOTE ADULT - SUBJECTIVE AND OBJECTIVE BOX
[facility] NEPHROLOGY-METABOLIC PROGRESS NOTE    Pt is a 63 year old male with h/o metastatic gastric adenocarcinoma s/p total gastrectomy with Kelby en Y esophagojejunal anastomosis on 12/13/17 and Lysis of adhesions on 01/03/18 due to Small Bowel Obstruction p/w N/V x 3 wks a/w gastric outlet obstruction s/p 2 dilations. Pt with persistent vomiting. Consulted for TPN  s/p IR left arm PICC    Hospital Medications: Medications reviewed.    REVIEW OF SYSTEMS:  CONSTITUTIONAL: No fevers or chills  RESPIRATORY: No shortness of breath  CARDIOVASCULAR: No chest pain.  GASTROINTESTINAL: No nausea,  diarrhea or abdominal pain. No vomiting, no BM (+flatulence)  VASCULAR: No bilateral lower extremity edema.       VITALS:  T(F): 97.7 (01-13-19 @ 13:29), Max: 98.5 (01-12-19 @ 22:04)  HR: 55 (01-13-19 @ 13:29)  BP: 97/53 (01-13-19 @ 13:29)  RR: 16 (01-13-19 @ 13:29)  SpO2: 100% (01-13-19 @ 13:29)  Wt(kg): --    01-12 @ 07:01 - 01-13 @ 07:00  --------------------------------------------------------  IN: 880 mL / OUT: 0 mL / NET: 880 mL      PHYSICAL EXAM:  Constitutional: NAD, cachectic  Respiratory: CTAB, no wheezes, rales or rhonchi  Cardiovascular: S1, S2, RRR  Gastrointestinal: BS+, soft, ND, mild epigastric tenderness +upper abd incision scar  Extremities:  No peripheral edema  Access: Left arm  PICC - benign       LABS:  01-13    142  |  105  |  24<H>  ----------------------------<  104<H>  3.4<L>   |  30  |  0.62    Ca    8.5      13 Jan 2019 07:46  Phos  3.2     01-13  Mg     1.9     01-13    TPro  6.5  /  Alb  3.1<L>  /  TBili  1.1  /  DBili      /  AST  49<H>  /  ALT  84<H>  /  AlkPhos  64  01-13    Creatinine Trend: 0.62 <--, 0.83 <--, 0.81 <--, 0.76 <--, 0.95 <--                        11.7   3.8   )-----------( 100      ( 13 Jan 2019 07:46 )             33.7     Urine Studies:

## 2019-01-14 VITALS
HEART RATE: 54 BPM | RESPIRATION RATE: 17 BRPM | SYSTOLIC BLOOD PRESSURE: 102 MMHG | OXYGEN SATURATION: 100 % | TEMPERATURE: 98 F | DIASTOLIC BLOOD PRESSURE: 60 MMHG

## 2019-01-14 DIAGNOSIS — Z90.3 ACQUIRED ABSENCE OF STOMACH [PART OF]: ICD-10-CM

## 2019-01-14 LAB
ALBUMIN SERPL ELPH-MCNC: 3.4 G/DL — LOW (ref 3.5–5)
ALP SERPL-CCNC: 74 U/L — SIGNIFICANT CHANGE UP (ref 40–120)
ALT FLD-CCNC: 81 U/L DA — HIGH (ref 10–60)
ANION GAP SERPL CALC-SCNC: 7 MMOL/L — SIGNIFICANT CHANGE UP (ref 5–17)
AST SERPL-CCNC: 43 U/L — HIGH (ref 10–40)
BASOPHILS # BLD AUTO: 0 K/UL — SIGNIFICANT CHANGE UP (ref 0–0.2)
BASOPHILS NFR BLD AUTO: 0.7 % — SIGNIFICANT CHANGE UP (ref 0–2)
BILIRUB SERPL-MCNC: 1.4 MG/DL — HIGH (ref 0.2–1.2)
BUN SERPL-MCNC: 24 MG/DL — HIGH (ref 7–18)
CA-I BLD-SCNC: 1.3 MMOL/L — SIGNIFICANT CHANGE UP (ref 1.12–1.3)
CALCIUM SERPL-MCNC: 9 MG/DL — SIGNIFICANT CHANGE UP (ref 8.4–10.5)
CHLORIDE SERPL-SCNC: 103 MMOL/L — SIGNIFICANT CHANGE UP (ref 96–108)
CO2 SERPL-SCNC: 30 MMOL/L — SIGNIFICANT CHANGE UP (ref 22–31)
CREAT SERPL-MCNC: 0.66 MG/DL — SIGNIFICANT CHANGE UP (ref 0.5–1.3)
EOSINOPHIL # BLD AUTO: 0.1 K/UL — SIGNIFICANT CHANGE UP (ref 0–0.5)
EOSINOPHIL NFR BLD AUTO: 2.7 % — SIGNIFICANT CHANGE UP (ref 0–6)
GLUCOSE BLDC GLUCOMTR-MCNC: 102 MG/DL — HIGH (ref 70–99)
GLUCOSE BLDC GLUCOMTR-MCNC: 104 MG/DL — HIGH (ref 70–99)
GLUCOSE BLDC GLUCOMTR-MCNC: 110 MG/DL — HIGH (ref 70–99)
GLUCOSE BLDC GLUCOMTR-MCNC: 120 MG/DL — HIGH (ref 70–99)
GLUCOSE BLDC GLUCOMTR-MCNC: 134 MG/DL — HIGH (ref 70–99)
GLUCOSE SERPL-MCNC: 83 MG/DL — SIGNIFICANT CHANGE UP (ref 70–99)
HCT VFR BLD CALC: 38.8 % — LOW (ref 39–50)
HGB BLD-MCNC: 13.4 G/DL — SIGNIFICANT CHANGE UP (ref 13–17)
LYMPHOCYTES # BLD AUTO: 0.8 K/UL — LOW (ref 1–3.3)
LYMPHOCYTES # BLD AUTO: 17.1 % — SIGNIFICANT CHANGE UP (ref 13–44)
MAGNESIUM SERPL-MCNC: 1.9 MG/DL — SIGNIFICANT CHANGE UP (ref 1.6–2.6)
MCHC RBC-ENTMCNC: 31.4 PG — SIGNIFICANT CHANGE UP (ref 27–34)
MCHC RBC-ENTMCNC: 34.5 GM/DL — SIGNIFICANT CHANGE UP (ref 32–36)
MCV RBC AUTO: 91 FL — SIGNIFICANT CHANGE UP (ref 80–100)
MONOCYTES # BLD AUTO: 0.3 K/UL — SIGNIFICANT CHANGE UP (ref 0–0.9)
MONOCYTES NFR BLD AUTO: 7.2 % — SIGNIFICANT CHANGE UP (ref 2–14)
NEUTROPHILS # BLD AUTO: 3.4 K/UL — SIGNIFICANT CHANGE UP (ref 1.8–7.4)
NEUTROPHILS NFR BLD AUTO: 72.2 % — SIGNIFICANT CHANGE UP (ref 43–77)
PHOSPHATE SERPL-MCNC: 2.7 MG/DL — SIGNIFICANT CHANGE UP (ref 2.5–4.5)
PLATELET # BLD AUTO: 110 K/UL — LOW (ref 150–400)
POTASSIUM SERPL-MCNC: 3.6 MMOL/L — SIGNIFICANT CHANGE UP (ref 3.5–5.3)
POTASSIUM SERPL-SCNC: 3.6 MMOL/L — SIGNIFICANT CHANGE UP (ref 3.5–5.3)
PROT SERPL-MCNC: 7.6 G/DL — SIGNIFICANT CHANGE UP (ref 6–8.3)
RBC # BLD: 4.26 M/UL — SIGNIFICANT CHANGE UP (ref 4.2–5.8)
RBC # FLD: 12.2 % — SIGNIFICANT CHANGE UP (ref 10.3–14.5)
SODIUM SERPL-SCNC: 140 MMOL/L — SIGNIFICANT CHANGE UP (ref 135–145)
WBC # BLD: 4.7 K/UL — SIGNIFICANT CHANGE UP (ref 3.8–10.5)
WBC # FLD AUTO: 4.7 K/UL — SIGNIFICANT CHANGE UP (ref 3.8–10.5)

## 2019-01-14 PROCEDURE — 99233 SBSQ HOSP IP/OBS HIGH 50: CPT

## 2019-01-14 RX ORDER — I.V. FAT EMULSION 20 G/100ML
0.44 EMULSION INTRAVENOUS
Qty: 23.94 | Refills: 0 | Status: DISCONTINUED | OUTPATIENT
Start: 2019-01-14 | End: 2019-01-15

## 2019-01-14 RX ORDER — ELECTROLYTE SOLUTION,INJ
1 VIAL (ML) INTRAVENOUS
Qty: 0 | Refills: 0 | COMMUNITY
Start: 2019-01-14

## 2019-01-14 RX ORDER — ELECTROLYTE SOLUTION,INJ
1 VIAL (ML) INTRAVENOUS
Qty: 0 | Refills: 0 | Status: DISCONTINUED | OUTPATIENT
Start: 2019-01-14 | End: 2019-01-15

## 2019-01-14 RX ORDER — I.V. FAT EMULSION 20 G/100ML
0 EMULSION INTRAVENOUS
Qty: 0 | Refills: 0 | COMMUNITY
Start: 2019-01-14

## 2019-01-14 RX ADMIN — HEPARIN SODIUM 5000 UNIT(S): 5000 INJECTION INTRAVENOUS; SUBCUTANEOUS at 23:49

## 2019-01-14 RX ADMIN — HEPARIN SODIUM 5000 UNIT(S): 5000 INJECTION INTRAVENOUS; SUBCUTANEOUS at 06:46

## 2019-01-14 RX ADMIN — CHLORHEXIDINE GLUCONATE 1 APPLICATION(S): 213 SOLUTION TOPICAL at 11:26

## 2019-01-14 RX ADMIN — HEPARIN SODIUM 5000 UNIT(S): 5000 INJECTION INTRAVENOUS; SUBCUTANEOUS at 13:05

## 2019-01-14 NOTE — ACUTE INTERFACILITY TRANSFER NOTE - PLAN OF CARE
To tolerate diet Being transferred to [facility] for possible GI intervention Currently on TPN

## 2019-01-14 NOTE — PROGRESS NOTE ADULT - PROVIDER SPECIALTY LIST ADULT
Gastroenterology
Nephrology
Surgery
Nephrology

## 2019-01-14 NOTE — PROGRESS NOTE ADULT - PROBLEM SELECTOR PROBLEM 1
Gastric outlet obstruction
Intractable vomiting without nausea, unspecified vomiting type
Severe protein-calorie malnutrition
Stricture of esophagus
Severe protein-calorie malnutrition

## 2019-01-14 NOTE — PROGRESS NOTE ADULT - PROBLEM SELECTOR PLAN 3
due to gastric outlet obstruction- see above  c/w Zofran.

## 2019-01-14 NOTE — PROGRESS NOTE ADULT - ATTENDING COMMENTS
Agree with above
Agree with above  Start TPN
As above  Dr. Irving's note appreciated, apparently successful dilatation of esophago-jej.    Will begin diet
As above  Not eating well      For Repeat endoscopy, attempt at stenting
Tolerating clears  No new complaints
pt feels better   abdomen soft
pt seen and examined; just came back from UGI at the time; denies nausea and vomiting after the study or during; no pain; abdomen benign; plan is to review study and arrange for possible dilatation (possibly needed more than one treatment) to open the anastomosis if stricture is the cause.
pt seen and examined; not tolerating much PO. plan for dilataiton and stent Monday; abd is benign
As above  Had some vomiting today despite dilatation    For attempt at stent placement 12/31
[facility] NEPHROLOGY  Maik Chance M.D.  Tommie Washington D.O.  Emily Calvo M.D.  Love Winston, MSN, ANP-C  [phone]    [address] [place] [address]
I was physically present for the key portions of the evaluation and management (E/M) service provided.  The patient was personally seen and examined at bedside.  I reviewed the resident's/NP  H& P , ROS,  Exam, assessment and plan. VS and labs  were personally reviewed.   I agree with  the all of the above with the following additions:    Summary:    Appears stent migrated. Will need to be removed. NPO for EGD in the or with stent removal . May consider repeat dilation tomorrow as well.           Thank you for your consultation and allowing  me to participate in the care of your patients. If you have further questions please contact me at [phone].     Felton Bedoya M.D.
I was physically present for the key portions of the evaluation and management (E/M) service provided.  The patient was personally seen and examined at bedside.  I reviewed the resident's/NP  H& P , ROS,  Exam, assessment and plan. VS and labs  were personally reviewed.   I agree with  the all of the above with the following additions:    Summary:  Only a mild bump in lfts at this time. Should be monitored but are not overly concerning. As far as the anastomotic stricture he has failed repeated dilation  (as high as 18mm) with no improvement. An uncovered stent migrated immediately after the procedure. Consider surgical j tube placement versus transfer             Thank you for your consultation and allowing  me to participate in the care of your patients. If you have further questions please contact me at [phone].     Felton Bedoya M.D.
[facility] NEPHROLOGY  Maik Chance M.D.  Tommie Washington D.O.  Emily Calvo M.D.  Love Winston, MSN, ANP-C  [phone]    [address] [place] [address]
[facility] NEPHROLOGY  Maik Chance M.D.  Tommie Washington D.O.  Emily Calvo M.D.  Love Winston, MSN, ANP-C  [phone]    [address] [place] [address]
[facility] NEPHROLOGY  Maik Chance M.D.  Tommie Washington D.O.  Emily Calvo M.D.  Love Winston, MSN, ANP-C  [phone]    [address] [place] [address]
[facility] NEPHROLOGY  Maik Chance M.D.  Tommie Washington D.O.  Emily Calvo M.D.  Love Winston, MSN, ANP-C  [phone]    [address] [place] [address]
[facility] NEPHROLOGY  Maik Chance M.D.  Tommie Washington D.O.  Emily Calvo M.D.  Love Winston, MSN, ANP-C  [phone]    [address] [place] [address]
[facility] NEPHROLOGY  Maik Chance M.D.  Tommie Washington D.O.  Emily Calvo M.D.  Love Winston, MSN, ANP-C  [phone]    [address] [place] [address]

## 2019-01-14 NOTE — PROGRESS NOTE ADULT - SUBJECTIVE AND OBJECTIVE BOX
Patient examined at bedside  Still unable to tolerate celars  Passing flatus and having BMs        T(F): 98.3 (01-14-19 @ 05:27), Max: 98.3 (01-14-19 @ 05:27)  HR: 52 (01-14-19 @ 05:27) (51 - 55)  BP: 106/58 (01-14-19 @ 05:27) (97/53 - 112/59)  RR: 16 (01-14-19 @ 05:27) (16 - 16)  SpO2: 100% (01-14-19 @ 05:27) (100% - 100%)        IN:    fat emulsion (Fish Oil and Plant Based) 20% Infusion: 55 mL    TPN (Total Parenteral Nutrition): 913 mL  Total IN: 968 mL          Physical Exam  General: AAOx3, No acute distress  Skin: No jaundice, no icterus  Abdomen: soft, nontender, nondistended, no rebound tenderness, no guarding, no palpable masses  Extremities: non edematous, no calf pain bilaterally

## 2019-01-14 NOTE — CHART NOTE - NSCHARTNOTEFT_GEN_A_CORE
Assessment:   Patient is a 63y old  Male who presents with a chief complaint of persistent nausea/ vomiting, likely bowel obstruction (2019 12:09). Pt seen, asleep. PN infusing. Discussed with Quoc SELLERS in AM, reports pt for transfer to [facility]. Discussed with KARLOS MENESES.      Factors impacting intake: [ ] none [ ] nausea  [ ] vomiting [ ] diarrhea [ ] constipation  [ ]chewing problems [ ] swallowing issues  [x ] other: altered GI fx    Diet Presciption: Diet, NPO:   Except Medications  With Ice Chips/Sips of Water (19 @ 13:43)    Intake:     Daily Height in cm: 167.64 (2019 03:49)  Height in cm: 167.64 (24 Dec 2018 14:46)      Daily Weight in k.7 (2019 06:51)  Weight in k.3 (2019 22:18)  Weight in k.2 (2019 16:45)  Weight in k.2 (2019 05:38)  Weight in k (2019 05:44)  Weight in k.3 (2019 09:00)  Weight in k.9 (10 [place]on 2019 15:57)  Weight in k.4 (10 [place]on 2019 05:01)  Weight in k.4 (2019 14:44)  Weight in k.4 (2019 11:01)  Weight in k.4 (28 Dec 2018 16:53)    % Weight Change    Pertinent Medications: MEDICATIONS  (STANDING):  chlorhexidine 2% Cloths 1 Application(s) Topical daily  dextrose 5%. 1000 milliLiter(s) (50 mL/Hr) IV Continuous <Continuous>  dextrose 50% Injectable 12.5 Gram(s) IV Push once  dextrose 50% Injectable 25 Gram(s) IV Push once  dextrose 50% Injectable 25 Gram(s) IV Push once  fat emulsion (Fish Oil and Plant Based) 20% Infusion 0.44 Gm/kG/Day (4.987 mL/Hr) IV Continuous <Continuous>  fat emulsion (Fish Oil and Plant Based) 20% Infusion 0.44 Gm/kG/Day (4.987 mL/Hr) IV Continuous <Continuous>  heparin  Injectable 5000 Unit(s) SubCutaneous every 8 hours  insulin lispro (HumaLOG) corrective regimen sliding scale   SubCutaneous every 6 hours  Parenteral Nutrition - Adult 1 Each (83 mL/Hr) TPN Continuous <Continuous>  Parenteral Nutrition - Adult 1 Each (83 mL/Hr) TPN Continuous <Continuous>  sodium chloride 0.9% lock flush 20 milliLiter(s) IV Push once    MEDICATIONS  (PRN):  dextrose 40% Gel 15 Gram(s) Oral once PRN Blood Glucose LESS THAN 70 milliGRAM(s)/deciliter  glucagon  Injectable 1 milliGRAM(s) IntraMuscular once PRN Glucose LESS THAN 70 milligrams/deciliter  ondansetron Injectable 4 milliGRAM(s) IV Push every 6 hours PRN Nausea and/or Vomiting  ondansetron Injectable 4 milliGRAM(s) IV Push once PRN Nausea and/or Vomiting  sodium chloride 0.9% lock flush 10 milliLiter(s) IV Push every 1 hour PRN After each medication administration  sodium chloride 0.9% lock flush 10 milliLiter(s) IV Push every 12 hours PRN Lumen of catheter NOT used    Pertinent Labs:  Na140 mmol/L Glu 83 mg/dL K+ 3.6 mmol/L Cr  0.66 mg/dL BUN 24 mg/dL<H>  Phos 2.7 mg/dL  Alb 3.4 g/dL<L>  [id number] 5.2 %  Chol --    LDL --    HDL --    Trig 86 mg/dL     CAPILLARY BLOOD GLUCOSE      POCT Blood Glucose.: 134 mg/dL (2019 11:33)  POCT Blood Glucose.: 104 mg/dL (2019 05:36)  POCT Blood Glucose.: 102 mg/dL (2019 00:11)  POCT Blood Glucose.: 113 mg/dL (2019 17:12)    Skin:     Estimated Needs:   [x ] no change since previous assessment  [ ] recalculated:       Previous Nutrition Diagnosis:   [ ] Altered GI function  [ ]Inadequate Oral Intake [ ] Swallowing Difficulty   [ ] Altered nutrition related labs [ ] Increased Nutrient Needs [ ] Overweight/Obesity   [ ] Unintended Weight Loss [ ] Food & Nutrition Related Knowledge Deficit [x ] Malnutrition (severe)  [ ] Other:     Nutrition Diagnosis is [x ] ongoing  [ ] resolved [ ] not applicable     New Nutrition Diagnosis: [ ] not applicable       Interventions:   Recommend  [ ] Change Diet To:  [ ] Nutrition Supplement  [x ] Nutrition Support: Pt on TPN/ lipids  [x ] Other: MD to monitor. RD available.     Monitoring and Evaluation:   [ ] PO intake [ x ] Tolerance to diet prescription [ x ] weights [ x ] labs[ x ] follow up per protocol  [ ] other:

## 2019-01-14 NOTE — PROGRESS NOTE ADULT - PROBLEM SELECTOR PROBLEM 3
Intractable vomiting without nausea, unspecified vomiting type

## 2019-01-14 NOTE — PROGRESS NOTE ADULT - ASSESSMENT
Still unclear why the patient is not able to tolerate PO. On Endoscopy his surgical anastomosis is patent. He was dialted to 18 mm and still his nausea persists.  I may be possible there is disease post stricture. Suggest CT enterography. If CT enterography shows no disease distal to the GJ anastomosis then possible an AXIOS stetn placement ( Both enterography and Axios are done at [facility])

## 2019-01-14 NOTE — ACUTE INTERFACILITY TRANSFER NOTE - HOSPITAL COURSE
63 year old male with h/o metastatic gastric adenocarcinoma s/p total gastrectomy with Kelby en Y esophagojejunal anastomosis on 12/13/17 and Lysis of adhesions on 01/03/18 due to Small Bowel Obstruction presents with persistent nausea and vomiting with inability to tolerate oral intake for the past 3 weeks. Pt had EGD as an outpt on 12/20/18 which showed a gastric outlet obstruction.   Hospital course reviewed:   12/26- UGI series showed high-grade stricture at the GE junction measuring 3 cm in length   12/27- s/p EGD with dilatation (1st)  1/2- s/p EGD with esophageal stent placement  Complicated by stent migration to mid esophagus  1/4- s/p EGD with stent removal and dilatation (2nd)  1/6- Upper GI series: High-grade stricture at the esophagojejunostomy; however no complete obstruction   Patient was placed on TPN due to severe calorie malnutrition. Patient to be transferred to [facility] for further dilatation

## 2019-01-14 NOTE — PROGRESS NOTE ADULT - SUBJECTIVE AND OBJECTIVE BOX
[facility] NEPHROLOGY-METABOLIC PROGRESS NOTE    Pt is a 63 year old male with h/o metastatic gastric adenocarcinoma s/p total gastrectomy with Kelby en Y esophagojejunal anastomosis on 12/13/17 and Lysis of adhesions on 01/03/18 due to Small Bowel Obstruction p/w N/V x 3 wks a/w gastric outlet obstruction s/p 2 dilations. Pt with persistent vomiting. Consulted for TPN  s/p IR left arm PICC    Hospital Medications: Medications reviewed.    REVIEW OF SYSTEMS:  CONSTITUTIONAL: No fevers or chills  RESPIRATORY: No shortness of breath  CARDIOVASCULAR: No chest pain.  GASTROINTESTINAL: No nausea,  diarrhea or abdominal pain. + vomiting, +BM yesterday  VASCULAR: No bilateral lower extremity edema.       VITALS:  T(F): 98.3 (01-14-19 @ 05:27), Max: 98.3 (01-14-19 @ 05:27)  HR: 52 (01-14-19 @ 05:27)  BP: 106/58 (01-14-19 @ 05:27)  RR: 16 (01-14-19 @ 05:27)  SpO2: 100% (01-14-19 @ 05:27)  Wt(kg): --    01-13 @ 07:01  -  01-14 @ 07:00  --------------------------------------------------------  IN: 968 mL / OUT: 0 mL / NET: 968 mL      PHYSICAL EXAM:  Constitutional: NAD, cachectic  Respiratory: CTAB, no wheezes, rales or rhonchi  Cardiovascular: S1, S2, RRR  Gastrointestinal: BS+, soft, ND, NT +upper abd incision scar  Extremities:  No peripheral edema  Access: Left arm  PICC - benign       LABS:  01-14    140  |  103  |  24<H>  ----------------------------<  83  3.6   |  30  |  0.66    Ca    9.0      14 Jan 2019 07:34  Phos  2.7     01-14  Mg     1.9     01-14    TPro  7.6  /  Alb  3.4<L>  /  TBili  1.4<H>  /  DBili      /  AST  43<H>  /  ALT  81<H>  /  AlkPhos  74  01-14    Creatinine Trend: 0.66 <--, 0.62 <--, 0.83 <--, 0.81 <--, 0.76 <--, 0.95 <--                        13.4   4.7   )-----------( 110      ( 14 Jan 2019 07:34 )             38.8     Urine Studies:

## 2019-01-14 NOTE — PROGRESS NOTE ADULT - REASON FOR ADMISSION
persistent nausea/ vomiting, likely bowel obstruction

## 2019-01-14 NOTE — PROGRESS NOTE ADULT - SUBJECTIVE AND OBJECTIVE BOX
Subjective:    Unable to tolerate liquids. On TPN     Objective:    MEDICATIONS  (STANDING):  chlorhexidine 2% Cloths 1 Application(s) Topical daily  dextrose 5%. 1000 milliLiter(s) (50 mL/Hr) IV Continuous <Continuous>  dextrose 50% Injectable 12.5 Gram(s) IV Push once  dextrose 50% Injectable 25 Gram(s) IV Push once  dextrose 50% Injectable 25 Gram(s) IV Push once  fat emulsion (Fish Oil and Plant Based) 20% Infusion 0.44 Gm/kG/Day (4.987 mL/Hr) IV Continuous <Continuous>  fat emulsion (Fish Oil and Plant Based) 20% Infusion 0.44 Gm/kG/Day (4.987 mL/Hr) IV Continuous <Continuous>  heparin  Injectable 5000 Unit(s) SubCutaneous every 8 hours  insulin lispro (HumaLOG) corrective regimen sliding scale   SubCutaneous every 6 hours  Parenteral Nutrition - Adult 1 Each (83 mL/Hr) TPN Continuous <Continuous>  Parenteral Nutrition - Adult 1 Each (83 mL/Hr) TPN Continuous <Continuous>  sodium chloride 0.9% lock flush 20 milliLiter(s) IV Push once    MEDICATIONS  (PRN):  dextrose 40% Gel 15 Gram(s) Oral once PRN Blood Glucose LESS THAN 70 milliGRAM(s)/deciliter  glucagon  Injectable 1 milliGRAM(s) IntraMuscular once PRN Glucose LESS THAN 70 milligrams/deciliter  ondansetron Injectable 4 milliGRAM(s) IV Push every 6 hours PRN Nausea and/or Vomiting  ondansetron Injectable 4 milliGRAM(s) IV Push once PRN Nausea and/or Vomiting  sodium chloride 0.9% lock flush 10 milliLiter(s) IV Push every 1 hour PRN After each medication administration  sodium chloride 0.9% lock flush 10 milliLiter(s) IV Push every 12 hours PRN Lumen of catheter NOT used              Vital Signs Last 24 Hrs  T(C): 36.8 (14 Jan 2019 05:27), Max: 36.8 (14 Jan 2019 05:27)  T(F): 98.3 (14 Jan 2019 05:27), Max: 98.3 (14 Jan 2019 05:27)  HR: 52 (14 Jan 2019 05:27) (51 - 55)  BP: 106/58 (14 Jan 2019 05:27) (97/53 - 112/59)  BP(mean): --  RR: 16 (14 Jan 2019 05:27) (16 - 16)  SpO2: 100% (14 Jan 2019 05:27) (100% - 100%)      General: NAD.  Cardiovascular:  RRR normal S1/S2, no murmur.  Respiratory:  CTA B/L, normal respiratory effort.   Abdominal:   soft, no masses or tenderness, normoactive BS, NT/ND, no HSM.  Extremities:   No clubbing or cyanosis, normal capillary refill, no edema.   Skin:   No rash, jaundice, lesions, eczema.         LABS:                        13.4   4.7   )-----------( 110      ( 14 Jan 2019 07:34 )             38.8     01-14    140  |  103  |  24<H>  ----------------------------<  83  3.6   |  30  |  0.66    Ca    9.0      14 Jan 2019 07:34  Phos  2.7     01-14  Mg     1.9     01-14    TPro  7.6  /  Alb  3.4<L>  /  TBili  1.4<H>  /  DBili  x   /  AST  43<H>  /  ALT  81<H>  /  AlkPhos  74  01-14          RADIOLOGY & ADDITIONAL TESTS:

## 2019-01-14 NOTE — PROGRESS NOTE ADULT - PROBLEM SELECTOR PLAN 1
Pt NPO > 3 wks; pt reports 20 lb weight loss over 1 month  s/p Left arm PICC and initiated on TPN 1/9. Pt with acute liver injury/ elevated LFTs with mild hyperbilirubinemia. GI following; not overly concerning as per GI.   c/w TPN with lipids (2000 ml) FS acceptable.  Check FS q 6hrs. c/w ISS.   Daily weights. Strict I/Os. Check CMP/ Mg/ Phos/ ionized calcium daily.

## 2019-01-14 NOTE — PROGRESS NOTE ADULT - PROBLEM SELECTOR PROBLEM 2
Gastric outlet obstruction
Transaminitis

## 2019-01-14 NOTE — PROGRESS NOTE ADULT - ASSESSMENT
62 y/o male with Hx of Gastric Adenocarcinoma s/p Total Gastrectomy & Esophagojejunostomy now with an anastomotic stricture refractory to dilation & stenting & Severe Protein Calorie Malnutrition       1. Continue TPN  2. GI f/u, possible re-dilation? Will speak with Dr. Irving  3. If unable to re-dilate, may need feeding jejunostomy   4. DVT PPx

## 2019-01-14 NOTE — PROGRESS NOTE ADULT - SUBJECTIVE AND OBJECTIVE BOX
INTERVAL HPI/OVERNIGHT EVENTS:  Pt stable.   NPO   flatus/BM.  Patient states swallowing did not improve    Vital Signs Last 24 Hrs  T(C): 36.8 (14 Jan 2019 05:27), Max: 36.8 (14 Jan 2019 05:27)  T(F): 98.3 (14 Jan 2019 05:27), Max: 98.3 (14 Jan 2019 05:27)  HR: 52 (14 Jan 2019 05:27) (51 - 55)  BP: 106/58 (14 Jan 2019 05:27) (97/53 - 112/59)  BP(mean): --  RR: 16 (14 Jan 2019 05:27) (16 - 16)  SpO2: 100% (14 Jan 2019 05:27) (100% - 100%)    Physical:  Abdomen: Soft nondistended, nontender.    I&O's Summary    13 Jan 2019 07:01  -  14 Jan 2019 07:00  --------------------------------------------------------  IN: 968 mL / OUT: 0 mL / NET: 968 mL        LABS:                        13.4   4.7   )-----------( 110      ( 14 Jan 2019 07:34 )             38.8             01-14    140  |  103  |  24<H>  ----------------------------<  83  3.6   |  30  |  0.66    Ca    9.0      14 Jan 2019 07:34  Phos  2.7     01-14  Mg     1.9     01-14    TPro  7.6  /  Alb  3.4<L>  /  TBili  1.4<H>  /  DBili  x   /  AST  43<H>  /  ALT  81<H>  /  AlkPhos  74  01-14

## 2019-01-15 VITALS
RESPIRATION RATE: 18 BRPM | TEMPERATURE: 97 F | OXYGEN SATURATION: 100 % | SYSTOLIC BLOOD PRESSURE: 111 MMHG | HEART RATE: 58 BPM | DIASTOLIC BLOOD PRESSURE: 52 MMHG

## 2019-01-15 DIAGNOSIS — C16.9 MALIGNANT NEOPLASM OF STOMACH, UNSPECIFIED: ICD-10-CM

## 2019-01-15 DIAGNOSIS — Z90.3 ACQUIRED ABSENCE OF STOMACH [PART OF]: Chronic | ICD-10-CM

## 2019-01-15 LAB
ANION GAP SERPL CALC-SCNC: 8 MEQ/L — SIGNIFICANT CHANGE UP (ref 7–14)
APTT BLD: 54.3 SEC — HIGH (ref 27.5–36.3)
BLD GP AB SCN SERPL QL: NEGATIVE — SIGNIFICANT CHANGE UP
BUN SERPL-MCNC: 21 MG/DL — SIGNIFICANT CHANGE UP (ref 7–23)
CALCIUM SERPL-MCNC: 9.4 MG/DL — SIGNIFICANT CHANGE UP (ref 8.4–10.5)
CHLORIDE SERPL-SCNC: 101 MMOL/L — SIGNIFICANT CHANGE UP (ref 98–107)
CO2 SERPL-SCNC: 27 MMOL/L — SIGNIFICANT CHANGE UP (ref 22–31)
CREAT SERPL-MCNC: 0.66 MG/DL — SIGNIFICANT CHANGE UP (ref 0.5–1.3)
GLUCOSE BLDC GLUCOMTR-MCNC: 97 MG/DL — SIGNIFICANT CHANGE UP (ref 70–99)
GLUCOSE BLDC GLUCOMTR-MCNC: 99 MG/DL — SIGNIFICANT CHANGE UP (ref 70–99)
GLUCOSE SERPL-MCNC: 99 MG/DL — SIGNIFICANT CHANGE UP (ref 70–99)
HCT VFR BLD CALC: 34.9 % — LOW (ref 39–50)
HGB BLD-MCNC: 11.8 G/DL — LOW (ref 13–17)
INR BLD: 1.2 — HIGH (ref 0.88–1.17)
MAGNESIUM SERPL-MCNC: 1.7 MG/DL — SIGNIFICANT CHANGE UP (ref 1.6–2.6)
MCHC RBC-ENTMCNC: 30.1 PG — SIGNIFICANT CHANGE UP (ref 27–34)
MCHC RBC-ENTMCNC: 33.8 % — SIGNIFICANT CHANGE UP (ref 32–36)
MCV RBC AUTO: 89 FL — SIGNIFICANT CHANGE UP (ref 80–100)
NRBC # FLD: 0 K/UL — LOW (ref 25–125)
PHOSPHATE SERPL-MCNC: 2.7 MG/DL — SIGNIFICANT CHANGE UP (ref 2.5–4.5)
PLATELET # BLD AUTO: 96 K/UL — LOW (ref 150–400)
PMV BLD: 11.2 FL — SIGNIFICANT CHANGE UP (ref 7–13)
POTASSIUM SERPL-MCNC: 4.5 MMOL/L — SIGNIFICANT CHANGE UP (ref 3.5–5.3)
POTASSIUM SERPL-SCNC: 4.5 MMOL/L — SIGNIFICANT CHANGE UP (ref 3.5–5.3)
PROTHROM AB SERPL-ACNC: 13.8 SEC — HIGH (ref 9.8–13.1)
RBC # BLD: 3.92 M/UL — LOW (ref 4.2–5.8)
RBC # FLD: 12.9 % — SIGNIFICANT CHANGE UP (ref 10.3–14.5)
RH IG SCN BLD-IMP: POSITIVE — SIGNIFICANT CHANGE UP
SODIUM SERPL-SCNC: 136 MMOL/L — SIGNIFICANT CHANGE UP (ref 135–145)
WBC # BLD: 4.59 K/UL — SIGNIFICANT CHANGE UP (ref 3.8–10.5)
WBC # FLD AUTO: 4.59 K/UL — SIGNIFICANT CHANGE UP (ref 3.8–10.5)

## 2019-01-15 PROCEDURE — 76000 FLUOROSCOPY <1 HR PHYS/QHP: CPT

## 2019-01-15 PROCEDURE — 85610 PROTHROMBIN TIME: CPT

## 2019-01-15 PROCEDURE — 86900 BLOOD TYPING SEROLOGIC ABO: CPT

## 2019-01-15 PROCEDURE — 99222 1ST HOSP IP/OBS MODERATE 55: CPT

## 2019-01-15 PROCEDURE — 99285 EMERGENCY DEPT VISIT HI MDM: CPT | Mod: 25

## 2019-01-15 PROCEDURE — 99233 SBSQ HOSP IP/OBS HIGH 50: CPT

## 2019-01-15 PROCEDURE — 85730 THROMBOPLASTIN TIME PARTIAL: CPT

## 2019-01-15 PROCEDURE — 83735 ASSAY OF MAGNESIUM: CPT

## 2019-01-15 PROCEDURE — C1874: CPT

## 2019-01-15 PROCEDURE — 83605 ASSAY OF LACTIC ACID: CPT

## 2019-01-15 PROCEDURE — 74177 CT ABD & PELVIS W/CONTRAST: CPT

## 2019-01-15 PROCEDURE — 82962 GLUCOSE BLOOD TEST: CPT

## 2019-01-15 PROCEDURE — 99223 1ST HOSP IP/OBS HIGH 75: CPT

## 2019-01-15 PROCEDURE — 77001 FLUOROGUIDE FOR VEIN DEVICE: CPT

## 2019-01-15 PROCEDURE — 84478 ASSAY OF TRIGLYCERIDES: CPT

## 2019-01-15 PROCEDURE — 74241: CPT

## 2019-01-15 PROCEDURE — 88305 TISSUE EXAM BY PATHOLOGIST: CPT

## 2019-01-15 PROCEDURE — 76937 US GUIDE VASCULAR ACCESS: CPT

## 2019-01-15 PROCEDURE — 36415 COLL VENOUS BLD VENIPUNCTURE: CPT

## 2019-01-15 PROCEDURE — 84100 ASSAY OF PHOSPHORUS: CPT

## 2019-01-15 PROCEDURE — 86901 BLOOD TYPING SEROLOGIC RH(D): CPT

## 2019-01-15 PROCEDURE — 80053 COMPREHEN METABOLIC PANEL: CPT

## 2019-01-15 PROCEDURE — 81001 URINALYSIS AUTO W/SCOPE: CPT

## 2019-01-15 PROCEDURE — C1769: CPT

## 2019-01-15 PROCEDURE — 85027 COMPLETE CBC AUTOMATED: CPT

## 2019-01-15 PROCEDURE — 80048 BASIC METABOLIC PNL TOTAL CA: CPT

## 2019-01-15 PROCEDURE — 83036 HEMOGLOBIN GLYCOSYLATED A1C: CPT

## 2019-01-15 PROCEDURE — 86850 RBC ANTIBODY SCREEN: CPT

## 2019-01-15 PROCEDURE — 82330 ASSAY OF CALCIUM: CPT

## 2019-01-15 PROCEDURE — C1726: CPT

## 2019-01-15 PROCEDURE — 83690 ASSAY OF LIPASE: CPT

## 2019-01-15 RX ORDER — ENOXAPARIN SODIUM 100 MG/ML
40 INJECTION SUBCUTANEOUS DAILY
Qty: 0 | Refills: 0 | Status: DISCONTINUED | OUTPATIENT
Start: 2019-01-15 | End: 2019-01-16

## 2019-01-15 RX ORDER — CHLORHEXIDINE GLUCONATE 213 G/1000ML
1 SOLUTION TOPICAL
Qty: 0 | Refills: 0 | Status: DISCONTINUED | OUTPATIENT
Start: 2019-01-15 | End: 2019-01-25

## 2019-01-15 RX ORDER — INFLUENZA VIRUS VACCINE 15; 15; 15; 15 UG/.5ML; UG/.5ML; UG/.5ML; UG/.5ML
0.5 SUSPENSION INTRAMUSCULAR ONCE
Qty: 0 | Refills: 0 | Status: COMPLETED | OUTPATIENT
Start: 2019-01-15 | End: 2019-01-15

## 2019-01-15 RX ORDER — MAGNESIUM SULFATE 500 MG/ML
2 VIAL (ML) INJECTION ONCE
Qty: 0 | Refills: 0 | Status: COMPLETED | OUTPATIENT
Start: 2019-01-15 | End: 2019-01-15

## 2019-01-15 RX ORDER — I.V. FAT EMULSION 20 G/100ML
17 EMULSION INTRAVENOUS
Qty: 40 | Refills: 0 | Status: DISCONTINUED | OUTPATIENT
Start: 2019-01-15 | End: 2019-01-18

## 2019-01-15 RX ORDER — SODIUM CHLORIDE 9 MG/ML
1000 INJECTION, SOLUTION INTRAVENOUS
Qty: 0 | Refills: 0 | Status: DISCONTINUED | OUTPATIENT
Start: 2019-01-15 | End: 2019-01-15

## 2019-01-15 RX ORDER — HEPARIN SODIUM 5000 [USP'U]/ML
5000 INJECTION INTRAVENOUS; SUBCUTANEOUS EVERY 8 HOURS
Qty: 0 | Refills: 0 | Status: DISCONTINUED | OUTPATIENT
Start: 2019-01-15 | End: 2019-01-15

## 2019-01-15 RX ORDER — ELECTROLYTE SOLUTION,INJ
1 VIAL (ML) INTRAVENOUS
Qty: 0 | Refills: 0 | Status: DISCONTINUED | OUTPATIENT
Start: 2019-01-15 | End: 2019-01-15

## 2019-01-15 RX ADMIN — Medication 50 GRAM(S): at 09:55

## 2019-01-15 RX ADMIN — CHLORHEXIDINE GLUCONATE 1 APPLICATION(S): 213 SOLUTION TOPICAL at 16:56

## 2019-01-15 RX ADMIN — I.V. FAT EMULSION 17 ML/HR: 20 EMULSION INTRAVENOUS at 17:33

## 2019-01-15 RX ADMIN — HEPARIN SODIUM 5000 UNIT(S): 5000 INJECTION INTRAVENOUS; SUBCUTANEOUS at 13:55

## 2019-01-15 RX ADMIN — HEPARIN SODIUM 5000 UNIT(S): 5000 INJECTION INTRAVENOUS; SUBCUTANEOUS at 05:06

## 2019-01-15 RX ADMIN — SODIUM CHLORIDE 100 MILLILITER(S): 9 INJECTION, SOLUTION INTRAVENOUS at 01:41

## 2019-01-15 RX ADMIN — SODIUM CHLORIDE 100 MILLILITER(S): 9 INJECTION, SOLUTION INTRAVENOUS at 09:58

## 2019-01-15 RX ADMIN — Medication 1 EACH: at 17:32

## 2019-01-15 RX ADMIN — ENOXAPARIN SODIUM 40 MILLIGRAM(S): 100 INJECTION SUBCUTANEOUS at 21:35

## 2019-01-15 NOTE — H&P ADULT - HISTORY OF PRESENT ILLNESS
63M pmh HTN, HLD with history of gastric adenocarcinoma s/p total gastrectomy with Dr Espinal Dec 2017 treated with chem and radiation at the time. Pt now transferred from [place] after a ~20 day admission with symptoms of bowel obstruction, pt reports vomiting any PO intake greater than ice chips. States that he has been on TPN during the  Denies nausea, denies pain. Reports that he recently had an endoscopy, the results of which are consistent with bowel stricture. Pt accepted fro Transfer by Dr Chung. 63M pmh HTN, HLD with history of gastric adenocarcinoma s/p total gastrectomy and Vickie en Y esophagojejunostomy with Dr Espinal at [facility] Dec 2017 c/b early postop SBO s/p lap SHAR, treated with adjuvant chemo and radiation. Pt now transferred from [place] after a ~20 day admission with symptoms of PO intolerance. 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 He underwent endoscopic dilation as well as stent placement, which have not alleviated his symptoms. Pt reports vomiting any PO intake greater than ice chips. States that he has been on TPN during the  Denies nausea at baseline, denies pain.

## 2019-01-15 NOTE — CONSULT NOTE ADULT - ASSESSMENT
62 yo M with PMH of HTN, HLD Gastric adenocarcinoma s/p total gastrectomy and Vickie en Y esophagojejunostomy with Dr Espinal at [facility] Dec 2017 c/b early postop SBO s/p lap SHAR, treated with adjuvant chemo and radiation. Pt now transferred from [place] after a ~20 day admission with symptoms of PO intolerance. 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 He underwent endoscopic dilation as well as stent placement, which have not alleviated his symptoms.  Advanced GI consulted for considering Endoscopic intervention for possible EJ stricture.     # Gastric CA s/p Total gastrectomy and EJ anastomosis with possible anastomotic stricture:  - Keep NPO.   - IV fluids.  - Please get CTE, if patient has stricture amenable to Endoscopic Intervention, will schedule for Dilation/stenting on Thursday, 01/17.  - Will follow up. 64 yo M with PMH of HTN, HLD Gastric adenocarcinoma s/p total gastrectomy and Vickie en Y esophagojejunostomy with Dr Espinal at [facility] Dec 2017 c/b early postop SBO s/p lap SHAR, treated with adjuvant chemo and radiation. Pt now transferred from [place] after a ~20 day admission with symptoms of PO intolerance. 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 He underwent endoscopic dilation as well as stent placement, which have not alleviated his symptoms.  Advanced GI consulted for considering Endoscopic intervention for possible EJ stricture.     # Gastric CA s/p Total gastrectomy and EJ anastomosis with possible anastomotic stricture:  - Keep NPO.   - IV fluids.  - Please get CT enterography, if patient has stricture amenable to Endoscopic Intervention, will schedule for Dilation/stenting on Thursday, 01/17.  - Will follow up.

## 2019-01-15 NOTE — H&P ADULT - ASSESSMENT
63M 1 year s/p total gastrectomy chem and radiation for gastric adenocarinoma now transferred from [place] after a ~20 day inpatient stay with symptoms of bowel obstruction and a recent endoscopy showing bowel stricture.  - NPO with ice chips, history of TPN  - Pain well controlled  - Pt ambulating, voiding  - AM Labs and coags ordered  - Strict I and Os  - DVT prophylaxsis  - Reassess with D team on AM rounds 63M 1 year s/p total gastrectomy, Kelby en Y esophagojejunostomy, chemo and radiation for gastric adenocarcinoma now transferred from [place] for anastomotic stricture  - NPO, will restart TPN through PICC  - Pain well controlled  - Pt ambulating, voiding  - AM Labs and coags ordered  - Strict I and Os  - DVT prophylaxis  - Reassess with D team on AM rounds 63M 1 year s/p total gastrectomy, Kelby en Y esophagojejunostomy, chemo and radiation for gastric adenocarcinoma now transferred from [place] where he was s/p EGD with stent placement 1/2, followed by stent removal due to migration on 1/4, followed by UGI on 1/6 that showed high grade stricture at the anastomosis    - NPO, will restart TPN through PICC  - Pain well controlled  - Pt ambulating, voiding  - AM Labs and coags ordered  - Strict I and Os  - DVT prophylaxis  - Reassess with D team on AM rounds

## 2019-01-15 NOTE — CONSULT NOTE ADULT - SUBJECTIVE AND OBJECTIVE BOX
Advanced GI consulted for Endoscopic Intervention for anastomotic stricture    Interval Events: 64 yo M with PMH of HTN, HLD Gastric adenocarcinoma s/p total gastrectomy and Vickie en Y esophagojejunostomy with Dr Espinal at [facility] Dec 2017 c/b early postop SBO s/p lap SHAR, treated with adjuvant chemo and radiation. Pt now transferred from [place] after a ~20 day admission with symptoms of PO intolerance. 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 He underwent endoscopic dilation as well as stent placement, which have not alleviated his symptoms. Pt reports vomiting any PO intake greater than ice chips. States that he has been on TPN lately. Pt deneis any abdominal pain, fever, chest pain, rectal bleeding.  Pt does not know if he still has stent in place or now.     Allergies:  No Known Allergies      Hospital Medications:  chlorhexidine 4% Liquid 1 Application(s) Topical two times a day  dextrose 5% + sodium chloride 0.45%. 1000 milliLiter(s) IV Continuous <Continuous>  heparin  Injectable 5000 Unit(s) SubCutaneous every 8 hours  influenza   Vaccine 0.5 milliLiter(s) IntraMuscular once      PMHX/PSHX:  Stomach cancer  Prediabetes  HLD (hyperlipidemia)  HTN (hypertension)  Malignant neoplasm of stomach, unspecified location  S/P total gastrectomy and Vickie-en-Y esophagojejunal anastomosis  S/P total gastrectomy and Vickie-en-Y esophagojejunal anastomosis  No significant past surgical history  H/O prostate biopsy  History of arthroscopy of right shoulder  History of arthroscopy of left knee  History of appendectomy      Family history:  No pertinent family history in first degree relatives  Cerebrovascular accident (Mother)      ROS:     General:  No  fevers, chills, night sweats, fatigue,   Eyes:  Good vision, no reported pain  ENT:  No sore throat, pain, runny nose, dysphagia  CV:  No pain, palpitations, hypo/hypertension  Resp:  No dyspnea, cough, tachypnea, wheezing  GI:  See HPI  :  No pain, bleeding, incontinence, nocturia  Muscle:  No pain, weakness  Neuro:  No weakness, tingling, memory problems  Psych:  No fatigue, insomnia, mood problems, depression  Skin:  No rash, edema      PHYSICAL EXAM:     GENERAL:  Appears stated age, well-groomed, well-nourished, no distress  HEENT:  NC/AT,  conjunctivae clear, sclera -anicteric  CHEST:  Full & symmetric excursion, no increased effort, breath sounds clear  HEART:  Regular rhythm, S1, S2, no added sounds  ABDOMEN:  Soft, non-tender, non-distended, upper abdominal sacr normoactive bowel sounds.  NEURO:  Alert, oriented    Vital Signs:  Vital Signs Last 24 Hrs  T(C): 36.6 (15 [place]on 2019 08:09), Max: 36.6 (14 Jan 2019 14:16)  T(F): 97.9 (15 [place]on 2019 08:09), Max: 97.9 (15 [place]on 2019 08:09)  HR: 53 (15 [place]on 2019 08:09) (51 - 62)  BP: 104/62 (15 [place]on 2019 08:09) (98/65 - 111/52)  BP(mean): --  RR: 18 (15 [place]on 2019 08:09) (16 - 18)  SpO2: 100% (15 [place]on 2019 08:09) (99% - 100%)  Daily Height in cm: 167 (15 Weston 2019 08:09)    Daily     LABS:                        11.8   4.59  )-----------( 96       ( 15 Weston 2019 05:20 )             34.9     01-15    136  |  101  |  21  ----------------------------<  99  4.5   |  27  |  0.66    Ca    9.4      15 [place]on 2019 05:20  Phos  2.7     01-15  Mg     1.7     01-15    TPro  7.6  /  Alb  3.4<L>  /  TBili  1.4<H>  /  DBili  x   /  AST  43<H>  /  ALT  81<H>  /  AlkPhos  74  01-14    LIVER FUNCTIONS - ( 14 Jan 2019 07:34 )  Alb: 3.4 g/dL / Pro: 7.6 g/dL / ALK PHOS: 74 U/L / ALT: 81 U/L DA / AST: 43 U/L / GGT: x           PT/INR - ( 15 [place]on 2019 05:20 )   PT: 13.8 SEC;   INR: 1.20          PTT - ( 15 Weston 2019 05:20 )  PTT:54.3 SEC        Imaging: < from: CT Abdomen and Pelvis w/ Oral Cont and w/ IV Cont (12.24.18 @ 17:46) >  There is mild abdominal pelvic ascites of indeterminate etiology.   Hounsfield units measure 22.4     The lung bases are clear.     The visualized portions of the heart are normal.    There is no free intra-abdominal air .     The unopacified liver, spleen, pancreas, adrenal glands and gallbladder   are normal.     There is no intra or extrahepatic biliary ductal dilatation.  Status post gastric bypass procedure.  The duodenum, small, and large bowel and appendix are normal.    Both kidneys show normal morphology without urolithiasis or   hydronephrosis.     The urinary bladder shows normal morphology and contour.        Prostate and seminal vesicles within normal limits.    There are no retroperitoneal masses or abnormal lymphadenopathy.  The retroperitoneal vascular structures are normal.     The bones and soft tissues are within normal limits.      < end of copied text >

## 2019-01-15 NOTE — H&P ADULT - NSHPPHYSICALEXAM_GEN_ALL_CORE
AOx3 NAD  Head Normocephalic Atraumatic  Eyes: EOMI, PERRLA  Oral mucosa moist  Neck supple trachea midline  Heart RRR  Lungs CTA b/l  Abdomen soft NT, ND  Extremities without edema AOx3 NAD  Head Normocephalic Atraumatic  Eyes: EOMI, PERRLA  Oral mucosa moist  Neck supple trachea midline  Heart RRR  Lungs CTA b/l  Abdomen soft NT, ND  Midline incision well healed  Extremities without edema

## 2019-01-15 NOTE — H&P ADULT - PSH
H/O prostate biopsy    History of appendectomy    History of arthroscopy of left knee  meniscal repair  History of arthroscopy of right shoulder  repair for rotator cuff syndrome  S/P total gastrectomy and Kelby-en-Y esophagojejunal anastomosis

## 2019-01-15 NOTE — CONSULT NOTE ADULT - SUBJECTIVE AND OBJECTIVE BOX
Nutrition Support Consult Note    HPI:  63M pmh HTN, HLD with history of gastric adenocarcinoma s/p total gastrectomy and Vickie en Y esophagojejunostomy with Dr Espinal at [facility] Dec 2017 c/b early postop SBO s/p lap SHAR, treated with adjuvant chemo and radiation. Pt now transferred from [place] after a ~20 day admission with symptoms of PO intolerance. 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 He underwent endoscopic dilation as well as stent placement, which have not alleviated his symptoms. Pt reports vomiting any PO intake greater than ice chips. States that he has been on TPN at OSH.   Consult to continue TPN. Patient has a double lumen PICC with dedicated port for TPN.     Patient denies N/V at this time. Has no abdominal pain.    Allergies    No Known Allergies    Intolerances    MEDICATIONS  (STANDING):  chlorhexidine 4% Liquid 1 Application(s) Topical two times a day  dextrose 5% + sodium chloride 0.45%. 1000 milliLiter(s) (100 mL/Hr) IV Continuous <Continuous>  fat emulsion (Fish Oil and Plant Based) 20% Infusion 17 mL/Hr (17 mL/Hr) IV Continuous <Continuous>  heparin  Injectable 5000 Unit(s) SubCutaneous every 8 hours  influenza   Vaccine 0.5 milliLiter(s) IntraMuscular once  Parenteral Nutrition - Adult 1 Each (83 mL/Hr) TPN Continuous <Continuous>    MEDICATIONS  (PRN):    PAST MEDICAL & SURGICAL HISTORY:  Stomach cancer  Prediabetes  HLD (hyperlipidemia)  HTN (hypertension)  Malignant neoplasm of stomach, unspecified location  S/P total gastrectomy and Vickie-en-Y esophagojejunal anastomosis  S/P total gastrectomy and Vickie-en-Y esophagojejunal anastomosis  H/O prostate biopsy  History of arthroscopy of right shoulder: repair for rotator cuff syndrome  History of arthroscopy of left knee: meniscal repair  History of appendectomy      FAMILY HISTORY:  No pertinent family history in first degree relatives  Cerebrovascular accident (Mother)      SOCIAL HISTORY:    REVIEW OF SYSTEMS    General:  No  fevers, chills, night sweats, fatigue,   Eyes:  Good vision, no reported pain  ENT:  No sore throat, pain, runny nose, dysphagia  CV:  No pain, palpitations, hypo/hypertension  Resp:  No dyspnea, cough, tachypnea, wheezing  GI:  See HPI  :  No pain, bleeding, incontinence, nocturia  Muscle:  No pain, weakness  Neuro:  No weakness, tingling, memory problems  Psych:  No fatigue, insomnia, mood problems, depression  Skin:  No rash, edema     Vital Signs Last 24 Hrs  T(C): 36.6 (15 Weston 2019 08:09), Max: 36.6 (14 Jan 2019 14:16)  T(F): 97.9 (15 Weston 2019 08:09), Max: 97.9 (15 Weston 2019 08:09)  HR: 53 (15 Weston 2019 08:09) (51 - 62)  BP: 104/62 (15 Weston 2019 08:09) (98/65 - 111/52)  BP(mean): --  ABP: --  ABP(mean): --  RR: 18 (15 Weston 2019 08:09) (16 - 18)  SpO2: 100% (15 Weston 2019 08:09) (99% - 100%)    I&O's Detail    14 Jan 2019 07:01  -  15 Weston 2019 07:00  --------------------------------------------------------  IN:    dextrose 5% + sodium chloride 0.45%.: 300 mL  Total IN: 300 mL    OUT:  Total OUT: 0 mL    Total NET: 300 mL    PHYSICAL EXAM:     Constitutional: Pt appears well, in NAD    HEENt: NCAT, PERRLA    Respiratory: CTA b/l    Cardiovascular: S1,s2 RRR    Gastrointestinal: soft, non tender, non distended, well healed scars    Extremities: Warm, no Edema    PICC site: Left arm PICC site C/D/I      LABS:  CBC Full  -  ( 15 Weston 2019 05:20 )  WBC Count : 4.59 K/uL  Hemoglobin : 11.8 g/dL  Hematocrit : 34.9 %  Platelet Count - Automated : 96 K/uL  Mean Cell Volume : 89.0 fL  Mean Cell Hemoglobin : 30.1 pg  Mean Cell Hemoglobin Concentration : 33.8 %  Auto Neutrophil # : x  Auto Lymphocyte # : x  Auto Monocyte # : x  Auto Eosinophil # : x  Auto Basophil # : x  Auto Neutrophil % : x  Auto Lymphocyte % : x  Auto Monocyte % : x  Auto Eosinophil % : x  Auto Basophil % : x    01-15    136  |  101  |  21  ----------------------------<  99  4.5   |  27  |  0.66    Ca    9.4      15 Weston 2019 05:20  Phos  2.7     01-15  Mg     1.7     01-15    TPro  7.6  /  Alb  3.4<L>  /  TBili  1.4<H>  /  DBili  x   /  AST  43<H>  /  ALT  81<H>  /  AlkPhos  74  01-14    CAPILLARY BLOOD GLUCOSE      POCT Blood Glucose.: 110 mg/dL (14 Jan 2019 23:35)    PT/INR - ( 15 Weston 2019 05:20 )   PT: 13.8 SEC;   INR: 1.20          PTT - ( 15 Weston 2019 05:20 )  PTT:54.3 SEC    RADIOLOGY:Imaging: < from: CT Abdomen and Pelvis w/ Oral Cont and w/ IV Cont (12.24.18 @ 17:46) >  There is mild abdominal pelvic ascites of indeterminate etiology.   Hounsfield units measure 22.4     The lung bases are clear.     The visualized portions of the heart are normal.    There is no free intra-abdominal air .     The unopacified liver, spleen, pancreas, adrenal glands and gallbladder   are normal.     There is no intra or extrahepatic biliary ductal dilatation.  Status post gastric bypass procedure.  The duodenum, small, and large bowel and appendix are normal.    Both kidneys show normal morphology without urolithiasis or   hydronephrosis.     The urinary bladder shows normal morphology and contour.        Prostate and seminal vesicles within normal limits.    There are no retroperitoneal masses or abnormal lymphadenopathy.  The retroperitoneal vascular structures are normal.     The bones and soft tissues are within normal limits.      < end of copied text >      Pre-Illness Weight: _____ kG  Weight [  ]loss /[  ]gain: kG weeks / months  Current Weight: 53.2kG  Height: 167 cm  Ideal Body Weight: 63 kG  BMI: 19  Current Diet: NPO  Appetite: Poor    CLINICAL INDICATORS  MALNUTRITION IN CONTEXT OF ACUTE ILLNESS OR INJURY  SEVERE MALNUTRITION  Weight Loss  [  ] >2% in 1 week  [ + ] >5% in 1 month  [  ] >7.5% in 3 months    Caloric Intake  [  ] <50% of nutrition needs >= 5 days    MODERATE MALNUTRITION  Weight Loss  [  ] >1-2% in 1 week  [  ] >5% in 1 month  [  ] >7.5% in 3 months    Caloric Intake  [  ] <75% of nutirition needs >= 5 days    Generlized Edema  Mild Edema      MALNUTRITION IN CONTEXT OF CHRONIC ILLNESS  SEVERE MALNUTRITION  Weight Loss  [  ] >5% in 1 month  [  ] >7.5% in 3 month  [  ] >10% in 6 months  [  ] >20% in 1 years    Caloric Intake  [  ] <50% of nutirition needs >= 1 month    Generlized Edema  Severe Edema    MODERATE MALNUTRITION  Weight Loss  [  ] >5% in 1 month  [  ] >7.5% in 3 month  [  ] >10% in 6 months  [  ] >20% in 1 years    Caloric Intake  [  ] <75% of nutirition needs >= 1 month    Generlized Edema  Mild Edema    Metabolic Requirements:  The patient will require:  _____25________ kilocalories / kg / day  Diagnosis:  [  ] Mild protein malnutrition  [ +] Moderate protein calorie malnutrition in acute illness/ injury  [  ] Severe protein calorie malnutrition in acute illness/ injury  [ ] Moderate protein calorie malnutrition in chronic illness  [  ] Severe protein calorie malnutrition in chronic illness      Nutritional Requirements  Carbohydrates: Total grams / kG / day: __246_ Total Calories: _838__  Lipids: Total grams / kG / day: _40__ Total Calories: __359_  Proteins: Total grams / kG / day: _9__ Total Calories: _378__  Non-Protein Calorie to Nitrogen Ratio: 80:      Plan:  [+ ] Initiate TPN formula:  Carbohydrates:___246___grams, Amino Acid:____95__grams  Lipids:___40____ grams, Total volume:__2000_____mL.  1. Dedicated Central line must be placed for TPN.  2. Strict Intake and Output.  3. Weights three times a week  4. Monitor BMP, Mg+, Ionized Ca++, Phosphorus daily  5. Monitor Triglycerides monthly  6. Pre-albumin weekly.  7. Fingersticks to monitor glucose every 6 hours until stable then may be decreased to twice a day.      [  ] Initiate Enteral Nutrition:  Total calories to be delivered over:_____ hours / day at a goal rate  of:_____mL / hr  1. Place nasogastric or nasojejunal feeding tube if not in place  2. Begin: ____________________ at 25 mL / hr  3. Increase rate by 25mL / hr every four hours until goal rate is achieved  4. Monitor residual volume every 4 hours. If residual volume is greater  than 75% of the infused 4 hour volume, hold feeds for 2 hours and  consider promotility agent if volume is still greater than 75%  5. Monitor BMP, Mg+, Ionized Ca++, Phosphorus daily  6. Monitor Pre-albumin weekly  7. Weights two times a week    [  ] I have seen and examined the patient, reviewed the laboratory and radiographic data and agree with practitioner’s history, physical assessment, plan and  reviewed and edited where appropriate.    Nutrition Support [id number] Nutrition Support Consult Note    HPI:  63M pmh HTN, HLD with history of gastric adenocarcinoma s/p total gastrectomy and Vickie en Y esophagojejunostomy with Dr Espinal at [facility] Dec 2017 c/b early postop SBO s/p lap SHAR, treated with adjuvant chemo and radiation. Pt now transferred from [place] after a ~20 day admission with symptoms of PO intolerance. 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 He underwent endoscopic dilation as well as stent placement, which have not alleviated his symptoms. Pt reports vomiting any PO intake greater than ice chips. States that he has been on TPN at OSH.   Consult to continue TPN. Patient has a double lumen PICC with dedicated port for TPN.     Patient denies N/V at this time. Has no abdominal pain.    Allergies    No Known Allergies    Intolerances    MEDICATIONS  (STANDING):  chlorhexidine 4% Liquid 1 Application(s) Topical two times a day  dextrose 5% + sodium chloride 0.45%. 1000 milliLiter(s) (100 mL/Hr) IV Continuous <Continuous>  fat emulsion (Fish Oil and Plant Based) 20% Infusion 17 mL/Hr (17 mL/Hr) IV Continuous <Continuous>  heparin  Injectable 5000 Unit(s) SubCutaneous every 8 hours  influenza   Vaccine 0.5 milliLiter(s) IntraMuscular once  Parenteral Nutrition - Adult 1 Each (83 mL/Hr) TPN Continuous <Continuous>    MEDICATIONS  (PRN):    PAST MEDICAL & SURGICAL HISTORY:  Stomach cancer  Prediabetes  HLD (hyperlipidemia)  HTN (hypertension)  Malignant neoplasm of stomach, unspecified location  S/P total gastrectomy and Vickie-en-Y esophagojejunal anastomosis  S/P total gastrectomy and Vickie-en-Y esophagojejunal anastomosis  H/O prostate biopsy  History of arthroscopy of right shoulder: repair for rotator cuff syndrome  History of arthroscopy of left knee: meniscal repair  History of appendectomy      FAMILY HISTORY:  No pertinent family history in first degree relatives  Cerebrovascular accident (Mother)      SOCIAL HISTORY:    REVIEW OF SYSTEMS    General:  No  fevers, chills, night sweats, fatigue,   Eyes:  Good vision, no reported pain  ENT:  No sore throat, pain, runny nose, dysphagia  CV:  No pain, palpitations, hypo/hypertension  Resp:  No dyspnea, cough, tachypnea, wheezing  GI:  See HPI  :  No pain, bleeding, incontinence, nocturia  Muscle:  No pain, weakness  Neuro:  No weakness, tingling, memory problems  Psych:  No fatigue, insomnia, mood problems, depression  Skin:  No rash, edema     Vital Signs Last 24 Hrs  T(C): 36.6 (15 Weston 2019 08:09), Max: 36.6 (14 Jan 2019 14:16)  T(F): 97.9 (15 Weston 2019 08:09), Max: 97.9 (15 Weston 2019 08:09)  HR: 53 (15 Weston 2019 08:09) (51 - 62)  BP: 104/62 (15 Weston 2019 08:09) (98/65 - 111/52)  BP(mean): --  ABP: --  ABP(mean): --  RR: 18 (15 Weston 2019 08:09) (16 - 18)  SpO2: 100% (15 Weston 2019 08:09) (99% - 100%)    I&O's Detail    14 Jan 2019 07:01  -  15 Weston 2019 07:00  --------------------------------------------------------  IN:    dextrose 5% + sodium chloride 0.45%.: 300 mL  Total IN: 300 mL    OUT:  Total OUT: 0 mL    Total NET: 300 mL    PHYSICAL EXAM:     Constitutional: Pt appears well, in NAD    HEENt: NCAT, PERRLA    Respiratory: CTA b/l    Cardiovascular: S1,s2 RRR    Gastrointestinal: soft, non tender, non distended, well healed scars    Extremities: Warm, no Edema    PICC site: Left arm PICC site C/D/I      LABS:  CBC Full  -  ( 15 Weston 2019 05:20 )  WBC Count : 4.59 K/uL  Hemoglobin : 11.8 g/dL  Hematocrit : 34.9 %  Platelet Count - Automated : 96 K/uL  Mean Cell Volume : 89.0 fL  Mean Cell Hemoglobin : 30.1 pg  Mean Cell Hemoglobin Concentration : 33.8 %  Auto Neutrophil # : x  Auto Lymphocyte # : x  Auto Monocyte # : x  Auto Eosinophil # : x  Auto Basophil # : x  Auto Neutrophil % : x  Auto Lymphocyte % : x  Auto Monocyte % : x  Auto Eosinophil % : x  Auto Basophil % : x    01-15    136  |  101  |  21  ----------------------------<  99  4.5   |  27  |  0.66    Ca    9.4      15 Weston 2019 05:20  Phos  2.7     01-15  Mg     1.7     01-15    TPro  7.6  /  Alb  3.4<L>  /  TBili  1.4<H>  /  DBili  x   /  AST  43<H>  /  ALT  81<H>  /  AlkPhos  74  01-14    CAPILLARY BLOOD GLUCOSE      POCT Blood Glucose.: 110 mg/dL (14 Jan 2019 23:35)    PT/INR - ( 15 Weston 2019 05:20 )   PT: 13.8 SEC;   INR: 1.20          PTT - ( 15 Weston 2019 05:20 )  PTT:54.3 SEC    RADIOLOGY:Imaging: < from: CT Abdomen and Pelvis w/ Oral Cont and w/ IV Cont (12.24.18 @ 17:46) >  There is mild abdominal pelvic ascites of indeterminate etiology.   Hounsfield units measure 22.4     The lung bases are clear.     The visualized portions of the heart are normal.    There is no free intra-abdominal air .     The unopacified liver, spleen, pancreas, adrenal glands and gallbladder   are normal.     There is no intra or extrahepatic biliary ductal dilatation.  Status post gastric bypass procedure.  The duodenum, small, and large bowel and appendix are normal.    Both kidneys show normal morphology without urolithiasis or   hydronephrosis.     The urinary bladder shows normal morphology and contour.        Prostate and seminal vesicles within normal limits.    There are no retroperitoneal masses or abnormal lymphadenopathy.  The retroperitoneal vascular structures are normal.     The bones and soft tissues are within normal limits.      < end of copied text >      Pre-Illness Weight: _____ kG  Weight [  ]loss /[  ]gain: kG weeks / months  Current Weight: 53.2kG  Height: 167 cm  Ideal Body Weight: 63 kG  BMI: 19  Current Diet: NPO  Appetite: Poor    CLINICAL INDICATORS  MALNUTRITION IN CONTEXT OF ACUTE ILLNESS OR INJURY  SEVERE MALNUTRITION  Weight Loss  [  ] >2% in 1 week  [ + ] >5% in 1 month  [  ] >7.5% in 3 months    Caloric Intake  [  ] <50% of nutrition needs >= 5 days    MODERATE MALNUTRITION  Weight Loss  [  ] >1-2% in 1 week  [  ] >5% in 1 month  [  ] >7.5% in 3 months    Caloric Intake  [  ] <75% of nutirition needs >= 5 days    Generlized Edema  Mild Edema      MALNUTRITION IN CONTEXT OF CHRONIC ILLNESS  SEVERE MALNUTRITION  Weight Loss  [  ] >5% in 1 month  [  ] >7.5% in 3 month  [  ] >10% in 6 months  [  ] >20% in 1 years    Caloric Intake  [  ] <50% of nutirition needs >= 1 month    Generlized Edema  Severe Edema    MODERATE MALNUTRITION  Weight Loss  [  ] >5% in 1 month  [  ] >7.5% in 3 month  [  ] >10% in 6 months  [  ] >20% in 1 years    Caloric Intake  [  ] <75% of nutirition needs >= 1 month    Generlized Edema  Mild Edema    Metabolic Requirements:  The patient will require:  _____25________ kilocalories / kg / day  Diagnosis:  [  ] Mild protein malnutrition  [ +] Moderate protein calorie malnutrition in acute illness/ injury  [  ] Severe protein calorie malnutrition in acute illness/ injury  [ ] Moderate protein calorie malnutrition in chronic illness  [  ] Severe protein calorie malnutrition in chronic illness      Nutritional Requirements  Carbohydrates: Total grams / kG / day: __246_ Total Calories: _838__  Lipids: Total grams / kG / day: _40__ Total Calories: __359_  Proteins: Total grams / kG / day: _9__ Total Calories: _378__  Non-Protein Calorie to Nitrogen Ratio: 79:1      Plan:  [+ ] Initiate TPN formula:  Carbohydrates:___246___grams, Amino Acid:____95__grams  Lipids:___40____ grams, Total volume:__2000_____mL.  1. Dedicated Central line must be placed for TPN.  2. Strict Intake and Output.  3. Weights three times a week  4. Monitor BMP, Mg+, Ionized Ca++, Phosphorus daily  5. Monitor Triglycerides monthly  6. Pre-albumin weekly.  7. Fingersticks to monitor glucose every 6 hours until stable then may be decreased to twice a day.      [  ] Initiate Enteral Nutrition:  Total calories to be delivered over:_____ hours / day at a goal rate  of:_____mL / hr  1. Place nasogastric or nasojejunal feeding tube if not in place  2. Begin: ____________________ at 25 mL / hr  3. Increase rate by 25mL / hr every four hours until goal rate is achieved  4. Monitor residual volume every 4 hours. If residual volume is greater  than 75% of the infused 4 hour volume, hold feeds for 2 hours and  consider promotility agent if volume is still greater than 75%  5. Monitor BMP, Mg+, Ionized Ca++, Phosphorus daily  6. Monitor Pre-albumin weekly  7. Weights two times a week    [  ] I have seen and examined the patient, reviewed the laboratory and radiographic data and agree with practitioner’s history, physical assessment, plan and  reviewed and edited where appropriate.    Nutrition Support [id number] Nutrition Support Consult Note    HPI:  63M pmh HTN, HLD with history of gastric adenocarcinoma s/p total gastrectomy and Vickie en Y esophagojejunostomy with Dr Espinal at [facility] Dec 2017 c/b early postop SBO s/p lap SHAR, treated with adjuvant chemo and radiation. Pt now transferred from [place] after a ~20 day admission with symptoms of PO intolerance. 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 He underwent endoscopic dilation as well as stent placement, which have not alleviated his symptoms. Pt reports vomiting any PO intake greater than ice chips. States that he has been on TPN at OSH.   Consult to continue TPN. Patient has a double lumen PICC with dedicated port for TPN.     Patient denies N/V at this time. Has no abdominal pain.    Allergies    No Known Allergies    Intolerances    MEDICATIONS  (STANDING):  chlorhexidine 4% Liquid 1 Application(s) Topical two times a day  dextrose 5% + sodium chloride 0.45%. 1000 milliLiter(s) (100 mL/Hr) IV Continuous <Continuous>  fat emulsion (Fish Oil and Plant Based) 20% Infusion 17 mL/Hr (17 mL/Hr) IV Continuous <Continuous>  heparin  Injectable 5000 Unit(s) SubCutaneous every 8 hours  influenza   Vaccine 0.5 milliLiter(s) IntraMuscular once  Parenteral Nutrition - Adult 1 Each (83 mL/Hr) TPN Continuous <Continuous>    MEDICATIONS  (PRN):    PAST MEDICAL & SURGICAL HISTORY:  Stomach cancer  Prediabetes  HLD (hyperlipidemia)  HTN (hypertension)  Malignant neoplasm of stomach, unspecified location  S/P total gastrectomy and Vickie-en-Y esophagojejunal anastomosis  S/P total gastrectomy and Vickie-en-Y esophagojejunal anastomosis  H/O prostate biopsy  History of arthroscopy of right shoulder: repair for rotator cuff syndrome  History of arthroscopy of left knee: meniscal repair  History of appendectomy      FAMILY HISTORY:  No pertinent family history in first degree relatives  Cerebrovascular accident (Mother)      SOCIAL HISTORY:    REVIEW OF SYSTEMS    General:  No  fevers, chills, night sweats, fatigue,   Eyes:  Good vision, no reported pain  ENT:  No sore throat, pain, runny nose, dysphagia  CV:  No pain, palpitations, hypo/hypertension  Resp:  No dyspnea, cough, tachypnea, wheezing  GI:  See HPI  :  No pain, bleeding, incontinence, nocturia  Muscle:  No pain, weakness  Neuro:  No weakness, tingling, memory problems  Psych:  No fatigue, insomnia, mood problems, depression  Skin:  No rash, edema     Vital Signs Last 24 Hrs  T(C): 36.6 (15 Weston 2019 08:09), Max: 36.6 (14 Jan 2019 14:16)  T(F): 97.9 (15 Weston 2019 08:09), Max: 97.9 (15 Weston 2019 08:09)  HR: 53 (15 Weston 2019 08:09) (51 - 62)  BP: 104/62 (15 Weston 2019 08:09) (98/65 - 111/52)  BP(mean): --  ABP: --  ABP(mean): --  RR: 18 (15 Weston 2019 08:09) (16 - 18)  SpO2: 100% (15 Weston 2019 08:09) (99% - 100%)    I&O's Detail    14 Jan 2019 07:01  -  15 Weston 2019 07:00  --------------------------------------------------------  IN:    dextrose 5% + sodium chloride 0.45%.: 300 mL  Total IN: 300 mL    OUT:  Total OUT: 0 mL    Total NET: 300 mL    PHYSICAL EXAM:     Constitutional: Pt appears well, in NAD    HEENt: NCAT, PERRLA    Respiratory: CTA b/l    Cardiovascular: S1,s2 RRR    Gastrointestinal: soft, non tender, non distended, well healed scars    Extremities: Warm, no Edema    PICC site: Left arm PICC site C/D/I      LABS:  CBC Full  -  ( 15 Weston 2019 05:20 )  WBC Count : 4.59 K/uL  Hemoglobin : 11.8 g/dL  Hematocrit : 34.9 %  Platelet Count - Automated : 96 K/uL  Mean Cell Volume : 89.0 fL  Mean Cell Hemoglobin : 30.1 pg  Mean Cell Hemoglobin Concentration : 33.8 %  Auto Neutrophil # : x  Auto Lymphocyte # : x  Auto Monocyte # : x  Auto Eosinophil # : x  Auto Basophil # : x  Auto Neutrophil % : x  Auto Lymphocyte % : x  Auto Monocyte % : x  Auto Eosinophil % : x  Auto Basophil % : x    01-15    136  |  101  |  21  ----------------------------<  99  4.5   |  27  |  0.66    Ca    9.4      15 Weston 2019 05:20  Phos  2.7     01-15  Mg     1.7     01-15    TPro  7.6  /  Alb  3.4<L>  /  TBili  1.4<H>  /  DBili  x   /  AST  43<H>  /  ALT  81<H>  /  AlkPhos  74  01-14    CAPILLARY BLOOD GLUCOSE      POCT Blood Glucose.: 110 mg/dL (14 Jan 2019 23:35)    PT/INR - ( 15 Weston 2019 05:20 )   PT: 13.8 SEC;   INR: 1.20          PTT - ( 15 Weston 2019 05:20 )  PTT:54.3 SEC    RADIOLOGY:Imaging: < from: CT Abdomen and Pelvis w/ Oral Cont and w/ IV Cont (12.24.18 @ 17:46) >  There is mild abdominal pelvic ascites of indeterminate etiology.   Hounsfield units measure 22.4     The lung bases are clear.     The visualized portions of the heart are normal.    There is no free intra-abdominal air .     The unopacified liver, spleen, pancreas, adrenal glands and gallbladder   are normal.     There is no intra or extrahepatic biliary ductal dilatation.  Status post gastric bypass procedure.  The duodenum, small, and large bowel and appendix are normal.    Both kidneys show normal morphology without urolithiasis or   hydronephrosis.     The urinary bladder shows normal morphology and contour.        Prostate and seminal vesicles within normal limits.    There are no retroperitoneal masses or abnormal lymphadenopathy.  The retroperitoneal vascular structures are normal.     The bones and soft tissues are within normal limits.      < end of copied text >      Pre-Illness Weight: _____ kG  Weight [  ]loss /[  ]gain: kG weeks / months  Current Weight: 53.2kG  Height: 167 cm  Ideal Body Weight: 63 kG  BMI: 19  Current Diet: NPO  Appetite: Poor    CLINICAL INDICATORS  MALNUTRITION IN CONTEXT OF ACUTE ILLNESS OR INJURY  SEVERE MALNUTRITION  Weight Loss  [  ] >2% in 1 week  [ + ] >5% in 1 month  [  ] >7.5% in 3 months    Caloric Intake  [+  ] <50% of nutrition needs >= 5 days    Caloric Intake  [  ] <50% of nutirition needs >= 1 month    Metabolic Requirements:  The patient will require:  _____25________ kilocalories / kg / day  Diagnosis:  [  ] Mild protein malnutrition  [ ] Moderate protein calorie malnutrition in acute illness/ injury  [+] Severe protein calorie malnutrition in acute illness/ injury  [ ] Moderate protein calorie malnutrition in chronic illness  [  ] Severe protein calorie malnutrition in chronic illness      Nutritional Requirements  Carbohydrates: Total grams / kG / day: __3.4_ Total Calories: _838__  Lipids: Total grams / kG / day: _40__ Total Calories: __359_  Proteins: Total grams / kG / day: _9__ Total Calories: _378__  Non-Protein Calorie to Nitrogen Ratio: 79:1      Plan:  [+ ] Initiate TPN formula:  Carbohydrates:___246___grams, Amino Acid:____95__grams  Lipids:___40____ grams, Total volume:__2000_____mL.  1. Dedicated Central line must be placed for TPN.  2. Strict Intake and Output.  3. Weights three times a week  4. Monitor BMP, Mg+, Ionized Ca++, Phosphorus daily  5. Monitor Triglycerides monthly  6. Pre-albumin weekly.  7. Fingersticks to monitor glucose every 6 hours until stable then may be decreased to twice a day.      [  ] Initiate Enteral Nutrition:  Total calories to be delivered over:_____ hours / day at a goal rate  of:_____mL / hr  1. Place nasogastric or nasojejunal feeding tube if not in place  2. Begin: ____________________ at 25 mL / hr  3. Increase rate by 25mL / hr every four hours until goal rate is achieved  4. Monitor residual volume every 4 hours. If residual volume is greater  than 75% of the infused 4 hour volume, hold feeds for 2 hours and  consider promotility agent if volume is still greater than 75%  5. Monitor BMP, Mg+, Ionized Ca++, Phosphorus daily  6. Monitor Pre-albumin weekly  7. Weights two times a week    [  ] I have seen and examined the patient, reviewed the laboratory and radiographic data and agree with practitioner’s history, physical assessment, plan and  reviewed and edited where appropriate.    Nutrition Support [id number]

## 2019-01-16 ENCOUNTER — TRANSCRIPTION ENCOUNTER (OUTPATIENT)
Age: 64
End: 2019-01-16

## 2019-01-16 LAB
ANION GAP SERPL CALC-SCNC: 10 MEQ/L — SIGNIFICANT CHANGE UP (ref 7–14)
BASOPHILS # BLD AUTO: 0.02 K/UL — SIGNIFICANT CHANGE UP (ref 0–0.2)
BASOPHILS NFR BLD AUTO: 0.6 % — SIGNIFICANT CHANGE UP (ref 0–2)
BUN SERPL-MCNC: 18 MG/DL — SIGNIFICANT CHANGE UP (ref 7–23)
CA-I BLD-SCNC: 1.21 MMOL/L — SIGNIFICANT CHANGE UP (ref 1.03–1.23)
CALCIUM SERPL-MCNC: 9 MG/DL — SIGNIFICANT CHANGE UP (ref 8.4–10.5)
CHLORIDE SERPL-SCNC: 101 MMOL/L — SIGNIFICANT CHANGE UP (ref 98–107)
CO2 SERPL-SCNC: 27 MMOL/L — SIGNIFICANT CHANGE UP (ref 22–31)
CREAT SERPL-MCNC: 0.67 MG/DL — SIGNIFICANT CHANGE UP (ref 0.5–1.3)
EOSINOPHIL # BLD AUTO: 0.12 K/UL — SIGNIFICANT CHANGE UP (ref 0–0.5)
EOSINOPHIL NFR BLD AUTO: 3.7 % — SIGNIFICANT CHANGE UP (ref 0–6)
GLUCOSE BLDC GLUCOMTR-MCNC: 102 MG/DL — HIGH (ref 70–99)
GLUCOSE BLDC GLUCOMTR-MCNC: 113 MG/DL — HIGH (ref 70–99)
GLUCOSE BLDC GLUCOMTR-MCNC: 118 MG/DL — HIGH (ref 70–99)
GLUCOSE BLDC GLUCOMTR-MCNC: 123 MG/DL — HIGH (ref 70–99)
GLUCOSE SERPL-MCNC: 104 MG/DL — HIGH (ref 70–99)
HCT VFR BLD CALC: 32.1 % — LOW (ref 39–50)
HGB BLD-MCNC: 11.2 G/DL — LOW (ref 13–17)
IMM GRANULOCYTES NFR BLD AUTO: 0.3 % — SIGNIFICANT CHANGE UP (ref 0–1.5)
LYMPHOCYTES # BLD AUTO: 0.45 K/UL — LOW (ref 1–3.3)
LYMPHOCYTES # BLD AUTO: 13.7 % — SIGNIFICANT CHANGE UP (ref 13–44)
MAGNESIUM SERPL-MCNC: 2 MG/DL — SIGNIFICANT CHANGE UP (ref 1.6–2.6)
MCHC RBC-ENTMCNC: 30.9 PG — SIGNIFICANT CHANGE UP (ref 27–34)
MCHC RBC-ENTMCNC: 34.9 % — SIGNIFICANT CHANGE UP (ref 32–36)
MCV RBC AUTO: 88.7 FL — SIGNIFICANT CHANGE UP (ref 80–100)
MONOCYTES # BLD AUTO: 0.25 K/UL — SIGNIFICANT CHANGE UP (ref 0–0.9)
MONOCYTES NFR BLD AUTO: 7.6 % — SIGNIFICANT CHANGE UP (ref 2–14)
NEUTROPHILS # BLD AUTO: 2.43 K/UL — SIGNIFICANT CHANGE UP (ref 1.8–7.4)
NEUTROPHILS NFR BLD AUTO: 74.1 % — SIGNIFICANT CHANGE UP (ref 43–77)
NRBC # FLD: 0 K/UL — LOW (ref 25–125)
PHOSPHATE SERPL-MCNC: 2.7 MG/DL — SIGNIFICANT CHANGE UP (ref 2.5–4.5)
PLATELET # BLD AUTO: 82 K/UL — LOW (ref 150–400)
PMV BLD: 10.9 FL — SIGNIFICANT CHANGE UP (ref 7–13)
POTASSIUM SERPL-MCNC: 3.7 MMOL/L — SIGNIFICANT CHANGE UP (ref 3.5–5.3)
POTASSIUM SERPL-SCNC: 3.7 MMOL/L — SIGNIFICANT CHANGE UP (ref 3.5–5.3)
RBC # BLD: 3.62 M/UL — LOW (ref 4.2–5.8)
RBC # FLD: 12.9 % — SIGNIFICANT CHANGE UP (ref 10.3–14.5)
RH IG SCN BLD-IMP: POSITIVE — SIGNIFICANT CHANGE UP
SODIUM SERPL-SCNC: 138 MMOL/L — SIGNIFICANT CHANGE UP (ref 135–145)
TRIGL SERPL-MCNC: 58 MG/DL — SIGNIFICANT CHANGE UP (ref 10–149)
WBC # BLD: 3.28 K/UL — LOW (ref 3.8–10.5)
WBC # FLD AUTO: 3.28 K/UL — LOW (ref 3.8–10.5)

## 2019-01-16 PROCEDURE — 99232 SBSQ HOSP IP/OBS MODERATE 35: CPT | Mod: GC

## 2019-01-16 PROCEDURE — 99232 SBSQ HOSP IP/OBS MODERATE 35: CPT

## 2019-01-16 PROCEDURE — 99233 SBSQ HOSP IP/OBS HIGH 50: CPT

## 2019-01-16 RX ORDER — ELECTROLYTE SOLUTION,INJ
1 VIAL (ML) INTRAVENOUS
Qty: 0 | Refills: 0 | Status: DISCONTINUED | OUTPATIENT
Start: 2019-01-16 | End: 2019-01-16

## 2019-01-16 RX ORDER — I.V. FAT EMULSION 20 G/100ML
17 EMULSION INTRAVENOUS
Qty: 40 | Refills: 0 | Status: DISCONTINUED | OUTPATIENT
Start: 2019-01-16 | End: 2019-01-18

## 2019-01-16 RX ADMIN — Medication 1 EACH: at 17:28

## 2019-01-16 RX ADMIN — CHLORHEXIDINE GLUCONATE 1 APPLICATION(S): 213 SOLUTION TOPICAL at 11:28

## 2019-01-16 RX ADMIN — ENOXAPARIN SODIUM 40 MILLIGRAM(S): 100 INJECTION SUBCUTANEOUS at 12:24

## 2019-01-16 RX ADMIN — CHLORHEXIDINE GLUCONATE 1 APPLICATION(S): 213 SOLUTION TOPICAL at 20:14

## 2019-01-16 RX ADMIN — I.V. FAT EMULSION 17 ML/HR: 20 EMULSION INTRAVENOUS at 17:27

## 2019-01-16 NOTE — PROGRESS NOTE ADULT - SUBJECTIVE AND OBJECTIVE BOX
GI following the patient for possible EJ anastomotic stricture, scheduled for CTE but could not drink the contrast    Events ; no events overnight, on TPN        Allergies:  No Known Allergies      Hospital Medications:  chlorhexidine 4% Liquid 1 Application(s) Topical two times a day  enoxaparin Injectable 40 milliGRAM(s) SubCutaneous daily  fat emulsion (Fish Oil and Plant Based) 20% Infusion 17 mL/Hr IV Continuous <Continuous>  fat emulsion (Fish Oil and Plant Based) 20% Infusion 17 mL/Hr IV Continuous <Continuous>  influenza   Vaccine 0.5 milliLiter(s) IntraMuscular once  Parenteral Nutrition - Adult 1 Each TPN Continuous <Continuous>  Parenteral Nutrition - Adult 1 Each TPN Continuous <Continuous>      PMHX/PSHX:  Stomach cancer  Prediabetes  HLD (hyperlipidemia)  HTN (hypertension)  Malignant neoplasm of stomach, unspecified location  S/P total gastrectomy and Kelby-en-Y esophagojejunal anastomosis  S/P total gastrectomy and Kelby-en-Y esophagojejunal anastomosis  No significant past surgical history  H/O prostate biopsy  History of arthroscopy of right shoulder  History of arthroscopy of left knee  History of appendectomy      Family history:  No pertinent family history in first degree relatives  Cerebrovascular accident (Mother)      ROS:     General:  No wt loss, fevers, chills, night sweats, fatigue,   Eyes:  Good vision, no reported pain  ENT:  No sore throat, pain, runny nose, dysphagia  CV:  No pain, palpitations, hypo/hypertension  Resp:  No dyspnea, cough, tachypnea, wheezing  GI:  See HPI  :  No pain, bleeding, incontinence, nocturia  Muscle:  No pain, weakness  Neuro:  No weakness, tingling, memory problems  Psych:  No fatigue, insomnia, mood problems, depression  Skin:  No rash, edema      PHYSICAL EXAM:     GENERAL:  Appears stated age, well-groomed, well-nourished, no distress  HEENT:  NC/AT,  conjunctivae clear, sclera -anicteric  CHEST:  Full & symmetric excursion, no increased effort, breath sounds clear  HEART:  Regular rhythm, S1, S2, no murmur/rub/S3/S4,  no edema  ABDOMEN:  Soft, non-tender, non-distended, normoactive bowel sounds.  NEURO:  Alert, oriented    Vital Signs:  Vital Signs Last 24 Hrs  T(C): 36.5 (16 Jan 2019 12:27), Max: 36.7 (16 Jan 2019 09:01)  T(F): 97.7 (16 Jan 2019 12:27), Max: 98 (16 Jan 2019 09:01)  HR: 61 (16 Jan 2019 12:27) (50 - 67)  BP: 93/60 (16 Jan 2019 12:27) (92/59 - 100/51)  BP(mean): --  RR: 17 (16 Jan 2019 12:27) (17 - 18)  SpO2: 100% (16 Jan 2019 12:27) (99% - 100%)  Daily     Daily     LABS:                        11.2   3.28  )-----------( 82       ( 16 Jan 2019 06:32 )             32.1     01-16    138  |  101  |  18  ----------------------------<  104<H>  3.7   |  27  |  0.67    Ca    9.0      16 Jan 2019 06:32  Phos  2.7     01-16  Mg     2.0     01-16        PT/INR - ( 15 [place]on 2019 05:20 )   PT: 13.8 SEC;   INR: 1.20          PTT - ( 15 Weston 2019 05:20 )  PTT:54.3 SEC        Imaging:   CTE cancelled.

## 2019-01-16 NOTE — PROGRESS NOTE ADULT - SUBJECTIVE AND OBJECTIVE BOX
NUTRITION NOTE  [id number] TOM  ===============================    Interval events: No acute events overnight. Patient tolerating TPN, still unable to tolerate PO.    VITAL SIGNS:  T(C): 36.7 (01-16-19 @ 09:01), Max: 36.7 (01-16-19 @ 09:01)  HR: 67 (01-16-19 @ 09:01) (50 - 67)  BP: 92/59 (01-16-19 @ 09:01) (92/59 - 100/51)  ABP: --  ABP(mean): --  RR: 18 (01-16-19 @ 09:01) (18 - 18)  SpO2: 100% (01-16-19 @ 09:01) (99% - 100%)  CVP(mm Hg): --  01-15 @ 07:01 - 01-16 @ 07:00  --------------------------------------------------------  IN: 783 mL / OUT: 0 mL / NET: 783 mL    01-16 @ 07:01  -  01-16 @ 11:39  --------------------------------------------------------  IN: 469 mL / OUT: 0 mL / NET: 469 mL      Glucose...      Neuro: A/O x 3 in NAD    CV: s1, s2 RRR    Pulm: CTA b/l    GI/Nutrition: soft, NT, ND    Extremity: warm, no edema    PICC Site: C/D/I    Metabolic/FLUIDS/ELECTROLYTES/NUTRITION:  Gastrointestinal Medications:  fat emulsion (Fish Oil and Plant Based) 20% Infusion 17 mL/Hr IV Continuous <Continuous>  fat emulsion (Fish Oil and Plant Based) 20% Infusion 17 mL/Hr IV Continuous <Continuous>  Parenteral Nutrition - Adult 1 Each TPN Continuous <Continuous>  Parenteral Nutrition - Adult 1 Each TPN Continuous <Continuous>    I&O's Detail  63y  Daily   01-16    138  |  101  |  18  ----------------------------<  104<H>  3.7   |  27  |  0.67    Ca    9.0      16 Jan 2019 06:32  Phos  2.7     01-16  Mg     2.0     01-16    Diet: TPN    INFECTIOUS DISEASE:  Antimicrobials/Immunologic Medications:  influenza   Vaccine 0.5 milliLiter(s) IntraMuscular once    RECENT CULTURES:    OTHER MEDICATIONS:  Endocrine/Metabolic Medications:    Genitourinary Medications:    Topical/Other Medications:  chlorhexidine 4% Liquid 1 Application(s) Topical two times a day      IMAGING STUDIES:    LABORATORY      ASSESSMENT/PLAN:  63M 1 year s/p total gastrectomy, Kelby en Y esophagojejunostomy, chemo and radiation for gastric adenocarcinoma now transferred from [place] where he was s/p EGD with stent placement 1/2, followed by stent removal due to migration on 1/4, followed by UGI on 1/6 that showed high grade stricture at the anastomosis    1.  Protein calorie malnutrition being optimized with TPN: CHO [246 ] gm.  AA [95 ] gm. Lipids [40 ] gm.  2.  Hyperglycemia managed with: [0 ] units of regular insulin    3.  Check fluid balance daily.  Strict I/O  [ ] [ ]   4.  Daily BMP, Ionized Calcium, Magnesium and Phosphorous   5.  Triglycerides at initiation of TPN and monthly [ ]     EGD tomorrow with GI    Nutrition Support [id number] NUTRITION NOTE  [id number] TOM  ===============================    Interval events: No acute events overnight. Patient tolerating TPN, still unable to tolerate PO.    VITAL SIGNS:  T(C): 36.7 (01-16-19 @ 09:01), Max: 36.7 (01-16-19 @ 09:01)  HR: 67 (01-16-19 @ 09:01) (50 - 67)  BP: 92/59 (01-16-19 @ 09:01) (92/59 - 100/51)  ABP: --  ABP(mean): --  RR: 18 (01-16-19 @ 09:01) (18 - 18)  SpO2: 100% (01-16-19 @ 09:01) (99% - 100%)  CVP(mm Hg): --  01-15 @ 07:01 - 01-16 @ 07:00  --------------------------------------------------------  IN: 783 mL / OUT: 0 mL / NET: 783 mL    01-16 @ 07:01  -  01-16 @ 11:39  --------------------------------------------------------  IN: 469 mL / OUT: 0 mL / NET: 469 mL      Glucose...      Neuro: A/O x 3 in NAD    CV: s1, s2 RRR    Pulm: CTA b/l    GI/Nutrition: soft, NT, ND    Extremity: warm, no edema    PICC Site: C/D/I    Metabolic/FLUIDS/ELECTROLYTES/NUTRITION:  Gastrointestinal Medications:  fat emulsion (Fish Oil and Plant Based) 20% Infusion 17 mL/Hr IV Continuous <Continuous>  fat emulsion (Fish Oil and Plant Based) 20% Infusion 17 mL/Hr IV Continuous <Continuous>  Parenteral Nutrition - Adult 1 Each TPN Continuous <Continuous>  Parenteral Nutrition - Adult 1 Each TPN Continuous <Continuous>    I&O's Detail  63y  Daily   01-16    138  |  101  |  18  ----------------------------<  104<H>  3.7   |  27  |  0.67    Ca    9.0      16 Jan 2019 06:32  Phos  2.7     01-16  Mg     2.0     01-16    Diet: TPN    INFECTIOUS DISEASE:  Antimicrobials/Immunologic Medications:  influenza   Vaccine 0.5 milliLiter(s) IntraMuscular once    RECENT CULTURES:    OTHER MEDICATIONS:  Endocrine/Metabolic Medications:    Genitourinary Medications:    Topical/Other Medications:  chlorhexidine 4% Liquid 1 Application(s) Topical two times a day      IMAGING STUDIES:    LABORATORY      ASSESSMENT/PLAN:  63M 1 year s/p total gastrectomy, Kelby en Y esophagojejunostomy, chemo and radiation for gastric adenocarcinoma now transferred from [place] where he was s/p EGD with stent placement 1/2, followed by stent removal due to migration on 1/4, followed by UGI on 1/6 that showed high grade stricture at the anastomosis. EGD tomorrow with GI.    1.  Protein calorie malnutrition being optimized with TPN: CHO [246 ] gm.  AA [95 ] gm. Lipids [40 ] gm.  2.  Hyperglycemia managed with: [0 ] units of regular insulin    3.  Check fluid balance daily.  Strict I/O  [ ] [ ]   4.  Daily BMP, Ionized Calcium, Magnesium and Phosphorous   5.  Triglycerides at initiation of TPN and monthly [ ]         Nutrition Support [id number]

## 2019-01-16 NOTE — PROGRESS NOTE ADULT - ASSESSMENT
64 yo M with PMH of HTN, HLD Gastric adenocarcinoma s/p total gastrectomy and Vickie en Y esophagojejunostomy with Dr Espinal at [facility] Dec 2017 c/b early postop SBO s/p lap SHAR, treated with adjuvant chemo and radiation. Pt now transferred from [place] after a ~20 day admission with symptoms of PO intolerance. 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 He underwent endoscopic dilation as well as stent placement, which have not alleviated his symptoms.  Advanced GI consulted for considering Endoscopic intervention for possible EJ stricture.     # Gastric CA s/p Total gastrectomy and EJ anastomosis with possible anastomotic stricture:  - Could not get CTE as he was not able to drink contrast .  - Keep NPO after midnight for EGD and possible Endoscopic intervention based on the length and location of stricture   - IV fluids, Hold Lovenox in am for endoscopy.   - Will follow up.

## 2019-01-16 NOTE — PROGRESS NOTE ADULT - ASSESSMENT
63M 1 year s/p total gastrectomy, Kelby en Y esophagojejunostomy, chemo and radiation for gastric adenocarcinoma now transferred from [place] where he was s/p EGD with stent placement 1/2, followed by stent removal due to migration on 1/4, followed by UGI on 1/6 that showed high grade stricture at the anastomosis    - NPO  - c/w TPN  - Pain well controlled  - Strict I and Os  - DVT prophylaxis  - f/u GI plan since pt unable to get CT enterography    D Team Surgery  [id number]

## 2019-01-16 NOTE — PROGRESS NOTE ADULT - SUBJECTIVE AND OBJECTIVE BOX
Vital Signs Last 24 Hrs  T(C): 36.7 (16 Jan 2019 09:01), Max: 36.7 (16 Jan 2019 09:01)  T(F): 98 (16 Jan 2019 09:01), Max: 98 (16 Jan 2019 09:01)  HR: 67 (16 Jan 2019 09:01) (50 - 67)  BP: 92/59 (16 Jan 2019 09:01) (92/59 - 100/51)  BP(mean): --  RR: 18 (16 Jan 2019 09:01) (18 - 18)  SpO2: 100% (16 Jan 2019 09:01) (99% - 100%)    I&O's Detail    15 Weston 2019 07:01  -  16 Jan 2019 07:00  --------------------------------------------------------  IN:    fat emulsion (Fish Oil and Plant Based) 20% Infusion: 119 mL    TPN (Total Parenteral Nutrition): 664 mL  Total IN: 783 mL    OUT:  Total OUT: 0 mL    Total NET: 783 mL      16 Jan 2019 07:01  -  16 Jan 2019 09:34  --------------------------------------------------------  IN:    TPN (Total Parenteral Nutrition): 166 mL  Total IN: 166 mL    OUT:  Total OUT: 0 mL    Total NET: 166 mL                                11.2   3.28  )-----------( 82       ( 16 Jan 2019 06:32 )             32.1       01-16    138  |  101  |  18  ----------------------------<  104<H>  3.7   |  27  |  0.67    Ca    9.0      16 Jan 2019 06:32  Phos  2.7     01-16  Mg     2.0     01-16        PT/INR - ( 15 Weston 2019 05:20 )   PT: 13.8 SEC;   INR: 1.20          PTT - ( 15 Weston 2019 05:20 )  PTT:54.3 SEC    Pt. unable to swallow contrast for UGI    PLAN:  Upper endoscopy tomorrow and possible stent placement

## 2019-01-16 NOTE — PROGRESS NOTE ADULT - SUBJECTIVE AND OBJECTIVE BOX
Surgery Progress Note    S: Patient seen and examined. No acute events overnight. Patient reports his pain is well controlled. Patient remains NPO and denies n/v. GI requested CT enterography, however pt is unable to tolerate PO.    O:  Vital Signs Last 24 Hrs  T(C): 36.6 (16 Jan 2019 05:46), Max: 36.6 (16 Jan 2019 00:05)  T(F): 97.9 (16 Jan 2019 05:46), Max: 97.9 (16 Jan 2019 05:46)  HR: 56 (16 Jan 2019 05:46) (50 - 66)  BP: 92/61 (16 Jan 2019 05:46) (92/61 - 100/51)  BP(mean): --  RR: 18 (16 Jan 2019 05:46) (18 - 18)  SpO2: 99% (16 Jan 2019 05:46) (99% - 100%)    I&O's Detail    15 Weston 2019 07:01  -  16 Jan 2019 07:00  --------------------------------------------------------  IN:    fat emulsion (Fish Oil and Plant Based) 20% Infusion: 119 mL    TPN (Total Parenteral Nutrition): 664 mL  Total IN: 783 mL    OUT:  Total OUT: 0 mL    Total NET: 783 mL      16 Jan 2019 07:01  -  16 Jan 2019 08:50  --------------------------------------------------------  IN:    TPN (Total Parenteral Nutrition): 166 mL  Total IN: 166 mL    OUT:  Total OUT: 0 mL    Total NET: 166 mL          MEDICATIONS  (STANDING):  chlorhexidine 4% Liquid 1 Application(s) Topical two times a day  enoxaparin Injectable 40 milliGRAM(s) SubCutaneous daily  fat emulsion (Fish Oil and Plant Based) 20% Infusion 17 mL/Hr (17 mL/Hr) IV Continuous <Continuous>  influenza   Vaccine 0.5 milliLiter(s) IntraMuscular once  Parenteral Nutrition - Adult 1 Each (83 mL/Hr) TPN Continuous <Continuous>    MEDICATIONS  (PRN):                            11.2   3.28  )-----------( 82       ( 16 Jan 2019 06:32 )             32.1       01-16    138  |  101  |  18  ----------------------------<  104<H>  3.7   |  27  |  0.67    Ca    9.0      16 Jan 2019 06:32  Phos  2.7     01-16  Mg     2.0     01-16        Physical Exam:  Gen: Laying in bed, NAD  Resp: Unlabored breathing  Abd: soft, NTND, no rebound or guarding  Ext: WWP  Skin: No rashes

## 2019-01-17 ENCOUNTER — RESULT REVIEW (OUTPATIENT)
Age: 64
End: 2019-01-17

## 2019-01-17 LAB
ANION GAP SERPL CALC-SCNC: 10 MMO/L — SIGNIFICANT CHANGE UP (ref 7–14)
ANION GAP SERPL CALC-SCNC: 14 MMO/L — SIGNIFICANT CHANGE UP (ref 7–14)
APTT BLD: 29.2 SEC — SIGNIFICANT CHANGE UP (ref 27.5–36.3)
BASE EXCESS BLDA CALC-SCNC: -4.3 MMOL/L — SIGNIFICANT CHANGE UP
BASOPHILS # BLD AUTO: 0.01 K/UL — SIGNIFICANT CHANGE UP (ref 0–0.2)
BASOPHILS NFR BLD AUTO: 0.3 % — SIGNIFICANT CHANGE UP (ref 0–2)
BLD GP AB SCN SERPL QL: NEGATIVE — SIGNIFICANT CHANGE UP
BUN SERPL-MCNC: 21 MG/DL — SIGNIFICANT CHANGE UP (ref 7–23)
BUN SERPL-MCNC: 23 MG/DL — SIGNIFICANT CHANGE UP (ref 7–23)
CA-I BLD-SCNC: 1.13 MMOL/L — SIGNIFICANT CHANGE UP (ref 1.03–1.23)
CA-I BLDA-SCNC: 1.2 MMOL/L — SIGNIFICANT CHANGE UP (ref 1.15–1.29)
CALCIUM SERPL-MCNC: 8.4 MG/DL — SIGNIFICANT CHANGE UP (ref 8.4–10.5)
CALCIUM SERPL-MCNC: 8.8 MG/DL — SIGNIFICANT CHANGE UP (ref 8.4–10.5)
CHLORIDE SERPL-SCNC: 103 MMOL/L — SIGNIFICANT CHANGE UP (ref 98–107)
CHLORIDE SERPL-SCNC: 98 MMOL/L — SIGNIFICANT CHANGE UP (ref 98–107)
CO2 SERPL-SCNC: 22 MMOL/L — SIGNIFICANT CHANGE UP (ref 22–31)
CO2 SERPL-SCNC: 24 MMOL/L — SIGNIFICANT CHANGE UP (ref 22–31)
CREAT SERPL-MCNC: 0.62 MG/DL — SIGNIFICANT CHANGE UP (ref 0.5–1.3)
CREAT SERPL-MCNC: 0.62 MG/DL — SIGNIFICANT CHANGE UP (ref 0.5–1.3)
EOSINOPHIL # BLD AUTO: 0.16 K/UL — SIGNIFICANT CHANGE UP (ref 0–0.5)
EOSINOPHIL NFR BLD AUTO: 4.4 % — SIGNIFICANT CHANGE UP (ref 0–6)
GLUCOSE BLDA-MCNC: 218 MG/DL — HIGH (ref 70–99)
GLUCOSE BLDC GLUCOMTR-MCNC: 110 MG/DL — HIGH (ref 70–99)
GLUCOSE BLDC GLUCOMTR-MCNC: 113 MG/DL — HIGH (ref 70–99)
GLUCOSE BLDC GLUCOMTR-MCNC: 185 MG/DL — HIGH (ref 70–99)
GLUCOSE SERPL-MCNC: 103 MG/DL — HIGH (ref 70–99)
GLUCOSE SERPL-MCNC: 179 MG/DL — HIGH (ref 70–99)
HCO3 BLDA-SCNC: 21 MMOL/L — LOW (ref 22–26)
HCT VFR BLD CALC: 32.2 % — LOW (ref 39–50)
HCT VFR BLD CALC: 32.2 % — LOW (ref 39–50)
HCT VFR BLD CALC: 39.2 % — SIGNIFICANT CHANGE UP (ref 39–50)
HCT VFR BLDA CALC: 37.1 % — LOW (ref 39–51)
HGB BLD-MCNC: 10.8 G/DL — LOW (ref 13–17)
HGB BLD-MCNC: 10.8 G/DL — LOW (ref 13–17)
HGB BLD-MCNC: 13.1 G/DL — SIGNIFICANT CHANGE UP (ref 13–17)
HGB BLDA-MCNC: 12.1 G/DL — LOW (ref 13–17)
IMM GRANULOCYTES NFR BLD AUTO: 0.3 % — SIGNIFICANT CHANGE UP (ref 0–1.5)
INR BLD: 1.24 — HIGH (ref 0.88–1.17)
LYMPHOCYTES # BLD AUTO: 0.52 K/UL — LOW (ref 1–3.3)
LYMPHOCYTES # BLD AUTO: 14.4 % — SIGNIFICANT CHANGE UP (ref 13–44)
MAGNESIUM SERPL-MCNC: 1.7 MG/DL — SIGNIFICANT CHANGE UP (ref 1.6–2.6)
MAGNESIUM SERPL-MCNC: 1.9 MG/DL — SIGNIFICANT CHANGE UP (ref 1.6–2.6)
MCHC RBC-ENTMCNC: 30 PG — SIGNIFICANT CHANGE UP (ref 27–34)
MCHC RBC-ENTMCNC: 30 PG — SIGNIFICANT CHANGE UP (ref 27–34)
MCHC RBC-ENTMCNC: 30.1 PG — SIGNIFICANT CHANGE UP (ref 27–34)
MCHC RBC-ENTMCNC: 33.4 % — SIGNIFICANT CHANGE UP (ref 32–36)
MCHC RBC-ENTMCNC: 33.5 % — SIGNIFICANT CHANGE UP (ref 32–36)
MCHC RBC-ENTMCNC: 33.5 % — SIGNIFICANT CHANGE UP (ref 32–36)
MCV RBC AUTO: 89.4 FL — SIGNIFICANT CHANGE UP (ref 80–100)
MCV RBC AUTO: 89.4 FL — SIGNIFICANT CHANGE UP (ref 80–100)
MCV RBC AUTO: 90.1 FL — SIGNIFICANT CHANGE UP (ref 80–100)
MONOCYTES # BLD AUTO: 0.35 K/UL — SIGNIFICANT CHANGE UP (ref 0–0.9)
MONOCYTES NFR BLD AUTO: 9.7 % — SIGNIFICANT CHANGE UP (ref 2–14)
NEUTROPHILS # BLD AUTO: 2.56 K/UL — SIGNIFICANT CHANGE UP (ref 1.8–7.4)
NEUTROPHILS NFR BLD AUTO: 70.9 % — SIGNIFICANT CHANGE UP (ref 43–77)
NRBC # FLD: 0 K/UL — LOW (ref 25–125)
PCO2 BLDA: 39 MMHG — SIGNIFICANT CHANGE UP (ref 35–48)
PH BLDA: 7.34 PH — LOW (ref 7.35–7.45)
PHOSPHATE SERPL-MCNC: 2.1 MG/DL — LOW (ref 2.5–4.5)
PHOSPHATE SERPL-MCNC: 2.5 MG/DL — SIGNIFICANT CHANGE UP (ref 2.5–4.5)
PLATELET # BLD AUTO: 110 K/UL — LOW (ref 150–400)
PLATELET # BLD AUTO: 89 K/UL — LOW (ref 150–400)
PLATELET # BLD AUTO: 89 K/UL — LOW (ref 150–400)
PMV BLD: 11 FL — SIGNIFICANT CHANGE UP (ref 7–13)
PMV BLD: 11.3 FL — SIGNIFICANT CHANGE UP (ref 7–13)
PMV BLD: 11.3 FL — SIGNIFICANT CHANGE UP (ref 7–13)
PO2 BLDA: 447 MMHG — HIGH (ref 83–108)
POTASSIUM BLDA-SCNC: 3.6 MMOL/L — SIGNIFICANT CHANGE UP (ref 3.4–4.5)
POTASSIUM SERPL-MCNC: 3 MMOL/L — LOW (ref 3.5–5.3)
POTASSIUM SERPL-MCNC: 3.4 MMOL/L — LOW (ref 3.5–5.3)
POTASSIUM SERPL-SCNC: 3 MMOL/L — LOW (ref 3.5–5.3)
POTASSIUM SERPL-SCNC: 3.4 MMOL/L — LOW (ref 3.5–5.3)
PROTHROM AB SERPL-ACNC: 13.8 SEC — HIGH (ref 9.8–13.1)
RBC # BLD: 3.6 M/UL — LOW (ref 4.2–5.8)
RBC # BLD: 3.6 M/UL — LOW (ref 4.2–5.8)
RBC # BLD: 4.35 M/UL — SIGNIFICANT CHANGE UP (ref 4.2–5.8)
RBC # FLD: 13.3 % — SIGNIFICANT CHANGE UP (ref 10.3–14.5)
RH IG SCN BLD-IMP: POSITIVE — SIGNIFICANT CHANGE UP
SAO2 % BLDA: 99.2 % — HIGH (ref 95–99)
SODIUM BLDA-SCNC: 128 MMOL/L — LOW (ref 136–146)
SODIUM SERPL-SCNC: 134 MMOL/L — LOW (ref 135–145)
SODIUM SERPL-SCNC: 137 MMOL/L — SIGNIFICANT CHANGE UP (ref 135–145)
WBC # BLD: 0.85 K/UL — CRITICAL LOW (ref 3.8–10.5)
WBC # BLD: 3.61 K/UL — LOW (ref 3.8–10.5)
WBC # BLD: 3.61 K/UL — LOW (ref 3.8–10.5)
WBC # FLD AUTO: 0.85 K/UL — CRITICAL LOW (ref 3.8–10.5)
WBC # FLD AUTO: 3.61 K/UL — LOW (ref 3.8–10.5)
WBC # FLD AUTO: 3.61 K/UL — LOW (ref 3.8–10.5)

## 2019-01-17 PROCEDURE — 88305 TISSUE EXAM BY PATHOLOGIST: CPT | Mod: 26,59

## 2019-01-17 PROCEDURE — 88307 TISSUE EXAM BY PATHOLOGIST: CPT | Mod: 26

## 2019-01-17 PROCEDURE — 44144 PARTIAL REMOVAL OF COLON: CPT

## 2019-01-17 PROCEDURE — 88309 TISSUE EXAM BY PATHOLOGIST: CPT | Mod: 26

## 2019-01-17 PROCEDURE — 74018 RADEX ABDOMEN 1 VIEW: CPT | Mod: 26

## 2019-01-17 PROCEDURE — 88342 IMHCHEM/IMCYTCHM 1ST ANTB: CPT | Mod: 26,59

## 2019-01-17 PROCEDURE — 74021 RADEX ABDOMEN 3+ VIEWS: CPT | Mod: 26

## 2019-01-17 PROCEDURE — 43239 EGD BIOPSY SINGLE/MULTIPLE: CPT | Mod: GC

## 2019-01-17 PROCEDURE — 44144 PARTIAL REMOVAL OF COLON: CPT | Mod: 80

## 2019-01-17 PROCEDURE — 44050 REDUCE BOWEL OBSTRUCTION: CPT | Mod: 80

## 2019-01-17 PROCEDURE — 71045 X-RAY EXAM CHEST 1 VIEW: CPT | Mod: 26,76

## 2019-01-17 PROCEDURE — 43605 BIOPSY OF STOMACH: CPT | Mod: 80

## 2019-01-17 PROCEDURE — 74177 CT ABD & PELVIS W/CONTRAST: CPT | Mod: 26

## 2019-01-17 PROCEDURE — 88341 IMHCHEM/IMCYTCHM EA ADD ANTB: CPT | Mod: 26,59

## 2019-01-17 PROCEDURE — 99233 SBSQ HOSP IP/OBS HIGH 50: CPT

## 2019-01-17 PROCEDURE — 88360 TUMOR IMMUNOHISTOCHEM/MANUAL: CPT | Mod: 26

## 2019-01-17 PROCEDURE — 99232 SBSQ HOSP IP/OBS MODERATE 35: CPT

## 2019-01-17 PROCEDURE — 88305 TISSUE EXAM BY PATHOLOGIST: CPT | Mod: 26

## 2019-01-17 PROCEDURE — 43605 BIOPSY OF STOMACH: CPT | Mod: 59

## 2019-01-17 PROCEDURE — 44050 REDUCE BOWEL OBSTRUCTION: CPT | Mod: 59

## 2019-01-17 RX ORDER — PIPERACILLIN AND TAZOBACTAM 4; .5 G/20ML; G/20ML
3.38 INJECTION, POWDER, LYOPHILIZED, FOR SOLUTION INTRAVENOUS ONCE
Qty: 0 | Refills: 0 | Status: DISCONTINUED | OUTPATIENT
Start: 2019-01-17 | End: 2019-01-17

## 2019-01-17 RX ORDER — PIPERACILLIN AND TAZOBACTAM 4; .5 G/20ML; G/20ML
3.38 INJECTION, POWDER, LYOPHILIZED, FOR SOLUTION INTRAVENOUS ONCE
Qty: 0 | Refills: 0 | Status: COMPLETED | OUTPATIENT
Start: 2019-01-17 | End: 2019-01-17

## 2019-01-17 RX ORDER — PIPERACILLIN AND TAZOBACTAM 4; .5 G/20ML; G/20ML
3.38 INJECTION, POWDER, LYOPHILIZED, FOR SOLUTION INTRAVENOUS EVERY 8 HOURS
Qty: 0 | Refills: 0 | Status: DISCONTINUED | OUTPATIENT
Start: 2019-01-17 | End: 2019-01-17

## 2019-01-17 RX ORDER — MICAFUNGIN SODIUM 100 MG/1
100 INJECTION, POWDER, LYOPHILIZED, FOR SOLUTION INTRAVENOUS EVERY 24 HOURS
Qty: 0 | Refills: 0 | Status: DISCONTINUED | OUTPATIENT
Start: 2019-01-18 | End: 2019-01-21

## 2019-01-17 RX ORDER — MICAFUNGIN SODIUM 100 MG/1
INJECTION, POWDER, LYOPHILIZED, FOR SOLUTION INTRAVENOUS
Qty: 0 | Refills: 0 | Status: DISCONTINUED | OUTPATIENT
Start: 2019-01-17 | End: 2019-01-21

## 2019-01-17 RX ORDER — PROPOFOL 10 MG/ML
5 INJECTION, EMULSION INTRAVENOUS
Qty: 1000 | Refills: 0 | Status: DISCONTINUED | OUTPATIENT
Start: 2019-01-17 | End: 2019-01-18

## 2019-01-17 RX ORDER — SODIUM CHLORIDE 9 MG/ML
1000 INJECTION INTRAMUSCULAR; INTRAVENOUS; SUBCUTANEOUS
Qty: 0 | Refills: 0 | Status: DISCONTINUED | OUTPATIENT
Start: 2019-01-17 | End: 2019-01-18

## 2019-01-17 RX ORDER — POTASSIUM CHLORIDE 20 MEQ
10 PACKET (EA) ORAL
Qty: 0 | Refills: 0 | Status: DISCONTINUED | OUTPATIENT
Start: 2019-01-17 | End: 2019-01-17

## 2019-01-17 RX ORDER — ELECTROLYTE SOLUTION,INJ
1 VIAL (ML) INTRAVENOUS
Qty: 0 | Refills: 0 | Status: DISCONTINUED | OUTPATIENT
Start: 2019-01-17 | End: 2019-01-18

## 2019-01-17 RX ORDER — HYDROMORPHONE HYDROCHLORIDE 2 MG/ML
1 INJECTION INTRAMUSCULAR; INTRAVENOUS; SUBCUTANEOUS ONCE
Qty: 0 | Refills: 0 | Status: DISCONTINUED | OUTPATIENT
Start: 2019-01-17 | End: 2019-01-17

## 2019-01-17 RX ORDER — MICAFUNGIN SODIUM 100 MG/1
100 INJECTION, POWDER, LYOPHILIZED, FOR SOLUTION INTRAVENOUS ONCE
Qty: 0 | Refills: 0 | Status: COMPLETED | OUTPATIENT
Start: 2019-01-17 | End: 2019-01-17

## 2019-01-17 RX ORDER — ENOXAPARIN SODIUM 100 MG/ML
40 INJECTION SUBCUTANEOUS DAILY
Qty: 0 | Refills: 0 | Status: DISCONTINUED | OUTPATIENT
Start: 2019-01-17 | End: 2019-01-18

## 2019-01-17 RX ORDER — FENTANYL CITRATE 50 UG/ML
0.5 INJECTION INTRAVENOUS
Qty: 2500 | Refills: 0 | Status: DISCONTINUED | OUTPATIENT
Start: 2019-01-17 | End: 2019-01-18

## 2019-01-17 RX ORDER — ACETAMINOPHEN 500 MG
1000 TABLET ORAL ONCE
Qty: 0 | Refills: 0 | Status: COMPLETED | OUTPATIENT
Start: 2019-01-17 | End: 2019-01-17

## 2019-01-17 RX ORDER — I.V. FAT EMULSION 20 G/100ML
17 EMULSION INTRAVENOUS
Qty: 40 | Refills: 0 | Status: DISCONTINUED | OUTPATIENT
Start: 2019-01-17 | End: 2019-01-23

## 2019-01-17 RX ORDER — SODIUM CHLORIDE 9 MG/ML
500 INJECTION INTRAMUSCULAR; INTRAVENOUS; SUBCUTANEOUS ONCE
Qty: 0 | Refills: 0 | Status: COMPLETED | OUTPATIENT
Start: 2019-01-17 | End: 2019-01-17

## 2019-01-17 RX ORDER — HYDROMORPHONE HYDROCHLORIDE 2 MG/ML
0.5 INJECTION INTRAMUSCULAR; INTRAVENOUS; SUBCUTANEOUS ONCE
Qty: 0 | Refills: 0 | Status: DISCONTINUED | OUTPATIENT
Start: 2019-01-17 | End: 2019-01-17

## 2019-01-17 RX ORDER — PIPERACILLIN AND TAZOBACTAM 4; .5 G/20ML; G/20ML
3.38 INJECTION, POWDER, LYOPHILIZED, FOR SOLUTION INTRAVENOUS EVERY 8 HOURS
Qty: 0 | Refills: 0 | Status: DISCONTINUED | OUTPATIENT
Start: 2019-01-17 | End: 2019-01-31

## 2019-01-17 RX ADMIN — Medication 1000 MILLIGRAM(S): at 14:24

## 2019-01-17 RX ADMIN — I.V. FAT EMULSION 17 ML/HR: 20 EMULSION INTRAVENOUS at 16:57

## 2019-01-17 RX ADMIN — MICAFUNGIN SODIUM 105 MILLIGRAM(S): 100 INJECTION, POWDER, LYOPHILIZED, FOR SOLUTION INTRAVENOUS at 17:16

## 2019-01-17 RX ADMIN — Medication 400 MILLIGRAM(S): at 13:40

## 2019-01-17 RX ADMIN — PIPERACILLIN AND TAZOBACTAM 200 GRAM(S): 4; .5 INJECTION, POWDER, LYOPHILIZED, FOR SOLUTION INTRAVENOUS at 11:30

## 2019-01-17 RX ADMIN — HYDROMORPHONE HYDROCHLORIDE 0.5 MILLIGRAM(S): 2 INJECTION INTRAMUSCULAR; INTRAVENOUS; SUBCUTANEOUS at 17:11

## 2019-01-17 RX ADMIN — CHLORHEXIDINE GLUCONATE 1 APPLICATION(S): 213 SOLUTION TOPICAL at 18:24

## 2019-01-17 RX ADMIN — SODIUM CHLORIDE 2000 MILLILITER(S): 9 INJECTION INTRAMUSCULAR; INTRAVENOUS; SUBCUTANEOUS at 23:30

## 2019-01-17 RX ADMIN — Medication 100 MILLIEQUIVALENT(S): at 08:58

## 2019-01-17 RX ADMIN — Medication 1 EACH: at 17:01

## 2019-01-17 RX ADMIN — CHLORHEXIDINE GLUCONATE 1 APPLICATION(S): 213 SOLUTION TOPICAL at 04:51

## 2019-01-17 RX ADMIN — PIPERACILLIN AND TAZOBACTAM 25 GRAM(S): 4; .5 INJECTION, POWDER, LYOPHILIZED, FOR SOLUTION INTRAVENOUS at 17:31

## 2019-01-17 NOTE — PROGRESS NOTE ADULT - ASSESSMENT
63M 1 year s/p total gastrectomy, Kelby en Y esophagojejunostomy, chemo and radiation for gastric adenocarcinoma now transferred from [place] where he was s/p EGD with stent placement 1/2, followed by stent removal due to migration on 1/4, followed by UGI on 1/6 that showed high grade stricture at the anastomosis    - NPO  - c/w TPN  - Pain well controlled  - Strict I and Os  - DVT prophylaxis  - EGD with GI today    D Team Surgery  [id number]

## 2019-01-17 NOTE — CONSULT NOTE ADULT - SUBJECTIVE AND OBJECTIVE BOX
HISTORY OF PRESENT ILLNESS:  ROGER SANTOS is a 63M pmh HTN, HLD with history of gastric adenocarcinoma s/p total gastrectomy and Kelby en Y esophagojejunostomy with Dr Espinal at [facility] Dec 2017 c/b early postop SBO s/p lap SHAR, treated with adjuvant chemo and radiation. Pt now transferred from [place] after a ~20 day admission with symptoms of PO intolerance. 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 He underwent endoscopic dilation as well as stent placement, but the stent migrated and was subsequently removed. Pt reports vomiting any PO intake greater than ice chips. He has been on TPN for the past 6 weeks.  CT enterography was attempted but patient could not tolerate the PO contrast.  Today patient went for endoscopy to better assess strictures.  EJ anastomosis was noted to be friable and erythematous, biopsies were taken.  In PACU patient had complaints of abdominal distention and pain.  Upright CXR performed significant for pneumoperitoneum.  Nasoesophageal tube placed for decompression and SICU consulted for monitoring.  Currently hemodynamically stable.     PAST MEDICAL HISTORY: Stomach cancer  Prediabetes  HLD (hyperlipidemia)  HTN (hypertension)  Malignant neoplasm of stomach, unspecified location      PAST SURGICAL HISTORY: S/P total gastrectomy and Kelby-en-Y esophagojejunal anastomosis  S/P total gastrectomy and Kelby-en-Y esophagojejunal anastomosis  No significant past surgical history  H/O prostate biopsy  History of arthroscopy of right shoulder  History of arthroscopy of left knee  History of appendectomy      FAMILY HISTORY: No pertinent family history in first degree relatives  Cerebrovascular accident (Mother)    CODE STATUS: Full Code     HOME MEDICATIONS:  · 	Bactrim  mg-160 mg oral tablet: 1 tab(s) orally 2 times a day   · 	 mg oral tablet: 1 tab(s) orally every 6 hours, As Needed -for moderate pain MDD:5 tabs   · 	tamsulosin: 0.4  orally once a day    ALLERGIES: No Known Allergies      VITAL SIGNS:  ICU Vital Signs Last 24 Hrs  T(C): 36.7 (17 Jan 2019 09:18), Max: 36.7 (17 Jan 2019 04:50)  T(F): 98.1 (17 Jan 2019 09:18), Max: 98.1 (17 Jan 2019 07:45)  HR: 55 (17 Jan 2019 09:18) (55 - 63)  BP: 96/58 (17 Jan 2019 09:18) (92/59 - 101/62)  RR: 16 (17 Jan 2019 09:18) (16 - 18)  SpO2: 100% (17 Jan 2019 09:18) (99% - 100%)  NEURO  Exam: AOx3, following commands, no deficits       RESPIRATORY  Exam: mild increase WOB and tachypnea 2/2 pain      CARDIOVASCULAR  Exam: RRR, systolic high 90 to low 100s at baseline  Cardiac Rhythm: sinus       GI/NUTRITION  Exam: rigid, diffusely tender, peritoneal  Diet: NPO, Nasoesophageal tube in place       GENITOURINARY/RENAL  fat emulsion (Fish Oil and Plant Based) 20% Infusion 17 mL/Hr IV Continuous <Continuous>  fat emulsion (Fish Oil and Plant Based) 20% Infusion 17 mL/Hr IV Continuous <Continuous>  fat emulsion (Fish Oil and Plant Based) 20% Infusion 17 mL/Hr IV Continuous <Continuous>  Parenteral Nutrition - Adult 1 Each TPN Continuous <Continuous>  Parenteral Nutrition - Adult 1 Each TPN Continuous <Continuous>  potassium chloride  10 mEq/100 mL IVPB 10 milliEquivalent(s) IV Intermittent every 1 hour      01-16 @ 07:01  -  01-17 @ 07:00  --------------------------------------------------------  IN:    fat emulsion (Fish Oil and Plant Based) 20% Infusion: 51 mL    Oral Fluid: 220 mL    TPN (Total Parenteral Nutrition): 996 mL  Total IN: 1267 mL    OUT:  Total OUT: 0 mL    Total NET: 1267 mL        Weight (kg): 53.2 (01-17 @ 09:18)  01-17    137  |  103  |  21  ----------------------------<  103<H>  3.4<L>   |  24  |  0.62    Ca    8.8      17 Jan 2019 06:18  Phos  2.5     01-17  Mg     1.9     01-17      [ ] Washburn catheter, indication: urine output monitoring in critically ill patient    HEMATOLOGIC  VTE Prophylaxis: holding for possible procedure                           10.8   3.61  )-----------( 89       ( 17 Jan 2019 06:18 )             32.2       Transfusion: [ ] PRBC	[ ] Platelets	[ ] FFP	[ ] Cryoprecipitate      INFECTIOUS DISEASES: enteric perforation with pneumoperitoneum   influenza   Vaccine 0.5 milliLiter(s) IntraMuscular once  micafungin IVPB 100 milliGRAM(s) IV Intermittent once  micafungin IVPB      piperacillin/tazobactam IVPB. 3.375 Gram(s) IV Intermittent every 8 hours        ENDOCRINE: DM  CAPILLARY BLOOD GLUCOSE    POCT Blood Glucose.: 110 mg/dL (17 Jan 2019 05:59)  POCT Blood Glucose.: 113 mg/dL (17 Jan 2019 00:18)  POCT Blood Glucose.: 102 mg/dL (16 Jan 2019 17:54)      PATIENT CARE ACCESS DEVICES:  [ x] Peripheral IV  [ ] Central Venous Line	[ ] R	[ ] L	[ ] IJ	[ ] Fem	[ ] SC	Placed:   [ ] Arterial Line		[ ] R	[ ] L	[ ] Fem	[ ] Rad	[ ] Ax	Placed:   [ x] PICC:					[ ] Mediport  [ ] Urinary Catheter, Date Placed:   [x] Necessity of urinary, arterial, and venous catheters discussed    OTHER MEDICATIONS: chlorhexidine 4% Liquid 1 Application(s) Topical two times a day      IMAGING STUDIES:  < from: Xray Chest 1 View- PORTABLE-Urgent (01.17.19 @ 13:03) >  IMPRESSION:  Enteric tube tip overlies the distal esophagus and side port   in the midesophagus on the most recent image.    Right IJ port and left subclavian PICC line unchanged in position.    Continued linear lucencies overlying the lower right heart raising the   possibility of localized pneumomediastinum.    No significant change in right basilar opacity which could be due to   atelectasis and/or developing pneumonia.    Relative lucency projecting over the right upper quadrant of the abdomen   may be related to pneumoperitoneum which was more apparent on the   abdominal films.    < end of copied text >

## 2019-01-17 NOTE — DIETITIAN INITIAL EVALUATION ADULT. - OTHER INFO
Nutrition assessment initiated for parenteral nutrition support . Pt. currently on PN , tolerating , noted the hx. of gastrectomy and Kelby-en-Y esophagojejunal anastomosis for stomach cancer and had chemo , in  Dec. 2017 , adhesions  Jan. 2018  and now  with bowel obstruction , unable to tolerate PO nutrition  for 4 weeks .

## 2019-01-17 NOTE — CHART NOTE - NSCHARTNOTEFT_GEN_A_CORE
NUTRITION SERVICES                                                                                  MALNUTRITION ALERT     Attention Health Care Provider: Upon nutritional assessment by the Registered Dietitian your patient was determined to meet criteria / has evidence of the following diagnosis/diagnoses:    [ ] Mild Protein Calorie Malnutrition   [ ] Moderate Protein Calorie Malnutrition   [ x] Severe Protein Calorie Malnutrition   [ ] Unspecified Protein Calorie Malnutrition   [ ] Underweight / BMI <19  [ ] Morbid Obesity / BMI >40            By signing this assessment you are acknowledging the diagnosis/diagnoses.       PLAN OF CARE: Refer to Initial Dietitian Evaluation or Nutrition Follow-Up Documentation for Nutritional Recommendations.

## 2019-01-17 NOTE — CONSULT NOTE ADULT - ASSESSMENT
63M 1 year s/p total gastrectomy, Kelby en Y esophagojejunostomy, chemo and radiation for gastric adenocarcinoma, high grade stricture at the anastomosis on TPN, s/p EGD today with post op pneumoperitoneum, concern for perforation at EJ anastomosis.    Neuro: AOx3, peritoneal abdominal pain  - abdominal exam before any pain medication    Resp:   - NC prn to maintain O2 sat >94%    Cards: currently hemodynamically stable  - would monitor vitals closely  - please call with change in vital signs    GI: EJ structure, pneumoperitoneum  - serial abdominal exams  - NPO/TPN  - follow up plan or IR vs OR    :  - would place coto for close monitoring of UOP    ID: perforated viscus  - zosyn and micafungin  - monitor for signs of sepsis, fever, WBC, unstable vitals    Endo: no active issues    Dispo: floor.  Please call if patient decompensates becomes hemodynamically unstable, if patient goes to OR and requires SICU care post op.    PENG Scales PGY2  [id number]

## 2019-01-17 NOTE — DIETITIAN INITIAL EVALUATION ADULT. - PHYSICAL APPEARANCE
debilitated/Nutrition focused physical exam conducted - found signs of malnutrition severe Subcutaneous fat loss and severe   Muscle wasting/other (specify)

## 2019-01-17 NOTE — PROGRESS NOTE ADULT - SUBJECTIVE AND OBJECTIVE BOX
NUTRITION NOTE  [id number] TOM  ===============================    Interval events; no acute events overnight, tolerating TPN     VITAL SIGNS:  T(C): 36.7 (01-17-19 @ 09:18), Max: 36.7 (01-17-19 @ 04:50)  HR: 55 (01-17-19 @ 09:18) (55 - 63)  BP: 96/58 (01-17-19 @ 09:18) (92/59 - 101/62)  ABP: --  ABP(mean): --  RR: 16 (01-17-19 @ 09:18) (16 - 18)  SpO2: 100% (01-17-19 @ 09:18) (99% - 100%)  CVP(mm Hg): --  01-16 @ 07:01  -  01-17 @ 07:00  --------------------------------------------------------  IN: 1267 mL / OUT: 0 mL / NET: 1267 mL    Neuro: A/O x 3 in NAD    CV: s1, s2 RRR    Pulm: CTA b/l    GI/Nutrition: soft, NT, ND    Extremity: warm, no edema    PICC Site: C/D/I    Metabolic/FLUIDS/ELECTROLYTES/NUTRITION:  Gastrointestinal Medications:  fat emulsion (Fish Oil and Plant Based) 20% Infusion 17 mL/Hr IV Continuous <Continuous>  fat emulsion (Fish Oil and Plant Based) 20% Infusion 17 mL/Hr IV Continuous <Continuous>  fat emulsion (Fish Oil and Plant Based) 20% Infusion 17 mL/Hr IV Continuous <Continuous>  Parenteral Nutrition - Adult 1 Each TPN Continuous <Continuous>  Parenteral Nutrition - Adult 1 Each TPN Continuous <Continuous>  potassium chloride  10 mEq/100 mL IVPB 10 milliEquivalent(s) IV Intermittent every 1 hour    I&O's Detail  63y  Daily   01-17    137  |  103  |  21  ----------------------------<  103<H>  3.4<L>   |  24  |  0.62    Ca    8.8      17 Jan 2019 06:18  Phos  2.5     01-17  Mg     1.9     01-17    Diet: NPO/TPN     INFECTIOUS DISEASE:  Antimicrobials/Immunologic Medications:  influenza   Vaccine 0.5 milliLiter(s) IntraMuscular once    ASSESSMENT/PLAN:   63M 1 year s/p total gastrectomy, Kelby en Y esophagojejunostomy, chemo and radiation for gastric adenocarcinoma now transferred from [place] where he was s/p EGD with stent placement 1/2, followed by stent removal due to migration on 1/4, followed by UGI on 1/6 that showed high grade stricture at the anastomosis. EGD tomorrow with GI.    -Plan for a scope with GI today   -Continue NPO/TPN     1. Protein calorie malnutrition being optimized with TPN: CHO [ ] gm.  AA [ ] gm. Lipids [ ] gm.  2.  Hyperglycemia managed with: [ ] units of regular insulin    3.  Check fluid balance daily.  Strict I/O  [ ] [ ]   4.  Daily BMP, Ionized Calcium, Magnesium and Phosphorous   5.  Triglycerides at initiation of TPN and monthly [ ] [ ]   6.  Pepcid in TPN for Gi prophylaxis  [ ]

## 2019-01-17 NOTE — PROGRESS NOTE ADULT - SUBJECTIVE AND OBJECTIVE BOX
Surgery Progress Note    S: Patient seen and examined. No acute events overnight. Patient continues to not tolerate anything PO. patient denies any pain.     O:  Vital Signs Last 24 Hrs  T(C): 36.7 (17 Jan 2019 09:18), Max: 36.7 (17 Jan 2019 04:50)  T(F): 98.1 (17 Jan 2019 09:18), Max: 98.1 (17 Jan 2019 07:45)  HR: 55 (17 Jan 2019 09:18) (55 - 63)  BP: 96/58 (17 Jan 2019 09:18) (92/59 - 101/62)  BP(mean): --  RR: 16 (17 Jan 2019 09:18) (16 - 18)  SpO2: 100% (17 Jan 2019 09:18) (99% - 100%)    I&O's Detail    16 Jan 2019 07:01  -  17 Jan 2019 07:00  --------------------------------------------------------  IN:    fat emulsion (Fish Oil and Plant Based) 20% Infusion: 51 mL    Oral Fluid: 220 mL    TPN (Total Parenteral Nutrition): 996 mL  Total IN: 1267 mL    OUT:  Total OUT: 0 mL    Total NET: 1267 mL          MEDICATIONS  (STANDING):  chlorhexidine 4% Liquid 1 Application(s) Topical two times a day  fat emulsion (Fish Oil and Plant Based) 20% Infusion 17 mL/Hr (17 mL/Hr) IV Continuous <Continuous>  fat emulsion (Fish Oil and Plant Based) 20% Infusion 17 mL/Hr (17 mL/Hr) IV Continuous <Continuous>  fat emulsion (Fish Oil and Plant Based) 20% Infusion 17 mL/Hr (17 mL/Hr) IV Continuous <Continuous>  influenza   Vaccine 0.5 milliLiter(s) IntraMuscular once  Parenteral Nutrition - Adult 1 Each (83 mL/Hr) TPN Continuous <Continuous>  Parenteral Nutrition - Adult 1 Each (83 mL/Hr) TPN Continuous <Continuous>  potassium chloride  10 mEq/100 mL IVPB 10 milliEquivalent(s) IV Intermittent every 1 hour    MEDICATIONS  (PRN):                            10.8   3.61  )-----------( 89       ( 17 Jan 2019 06:18 )             32.2       01-17    137  |  103  |  21  ----------------------------<  103<H>  3.4<L>   |  24  |  0.62    Ca    8.8      17 Jan 2019 06:18  Phos  2.5     01-17  Mg     1.9     01-17        Physical Exam:  Gen: Laying in bed, NAD  Resp: Unlabored breathing  Abd: soft, NTND, no rebound or guarding  Ext: WWP  Skin: No rashes

## 2019-01-18 LAB
ALBUMIN SERPL ELPH-MCNC: 2.4 G/DL — LOW (ref 3.3–5)
ALBUMIN SERPL ELPH-MCNC: 3.5 G/DL — SIGNIFICANT CHANGE UP (ref 3.3–5)
ALP SERPL-CCNC: 28 U/L — LOW (ref 40–120)
ALP SERPL-CCNC: 43 U/L — SIGNIFICANT CHANGE UP (ref 40–120)
ALT FLD-CCNC: 38 U/L — SIGNIFICANT CHANGE UP (ref 4–41)
ALT FLD-CCNC: 48 U/L — HIGH (ref 4–41)
ANION GAP SERPL CALC-SCNC: 14 MMO/L — SIGNIFICANT CHANGE UP (ref 7–14)
ANION GAP SERPL CALC-SCNC: 14 MMO/L — SIGNIFICANT CHANGE UP (ref 7–14)
ANION GAP SERPL CALC-SCNC: 8 MMO/L — SIGNIFICANT CHANGE UP (ref 7–14)
ANION GAP SERPL CALC-SCNC: 8 MMO/L — SIGNIFICANT CHANGE UP (ref 7–14)
ANION GAP SERPL CALC-SCNC: 9 MMO/L — SIGNIFICANT CHANGE UP (ref 7–14)
ANION GAP SERPL CALC-SCNC: 9 MMO/L — SIGNIFICANT CHANGE UP (ref 7–14)
APTT BLD: 28.9 SEC — SIGNIFICANT CHANGE UP (ref 27.5–36.3)
APTT BLD: 33.8 SEC — SIGNIFICANT CHANGE UP (ref 27.5–36.3)
APTT BLD: 34.9 SEC — SIGNIFICANT CHANGE UP (ref 27.5–36.3)
APTT BLD: 36 SEC — SIGNIFICANT CHANGE UP (ref 27.5–36.3)
APTT BLD: 52.9 SEC — HIGH (ref 27.5–36.3)
AST SERPL-CCNC: 46 U/L — HIGH (ref 4–40)
AST SERPL-CCNC: 61 U/L — HIGH (ref 4–40)
BASE EXCESS BLDA CALC-SCNC: -0.8 MMOL/L — SIGNIFICANT CHANGE UP
BASE EXCESS BLDA CALC-SCNC: -1.2 MMOL/L — SIGNIFICANT CHANGE UP
BASE EXCESS BLDA CALC-SCNC: -1.8 MMOL/L — SIGNIFICANT CHANGE UP
BASE EXCESS BLDA CALC-SCNC: -4 MMOL/L — SIGNIFICANT CHANGE UP
BASE EXCESS BLDA CALC-SCNC: -6.8 MMOL/L — SIGNIFICANT CHANGE UP
BASE EXCESS BLDA CALC-SCNC: -9.5 MMOL/L — SIGNIFICANT CHANGE UP
BASOPHILS # BLD AUTO: 0 K/UL — SIGNIFICANT CHANGE UP (ref 0–0.2)
BASOPHILS # BLD AUTO: 0.01 K/UL — SIGNIFICANT CHANGE UP (ref 0–0.2)
BASOPHILS NFR BLD AUTO: 0 % — SIGNIFICANT CHANGE UP (ref 0–2)
BASOPHILS NFR BLD AUTO: 0.5 % — SIGNIFICANT CHANGE UP (ref 0–2)
BASOPHILS NFR SPEC: 0 % — SIGNIFICANT CHANGE UP (ref 0–2)
BASOPHILS NFR SPEC: 0 % — SIGNIFICANT CHANGE UP (ref 0–2)
BILIRUB DIRECT SERPL-MCNC: 0.7 MG/DL — HIGH (ref 0.1–0.2)
BILIRUB SERPL-MCNC: 0.7 MG/DL — SIGNIFICANT CHANGE UP (ref 0.2–1.2)
BILIRUB SERPL-MCNC: 1.8 MG/DL — HIGH (ref 0.2–1.2)
BLASTS # FLD: 0 % — SIGNIFICANT CHANGE UP (ref 0–0)
BLASTS # FLD: 0 % — SIGNIFICANT CHANGE UP (ref 0–0)
BUN SERPL-MCNC: 14 MG/DL — SIGNIFICANT CHANGE UP (ref 7–23)
BUN SERPL-MCNC: 15 MG/DL — SIGNIFICANT CHANGE UP (ref 7–23)
BUN SERPL-MCNC: 15 MG/DL — SIGNIFICANT CHANGE UP (ref 7–23)
BUN SERPL-MCNC: 16 MG/DL — SIGNIFICANT CHANGE UP (ref 7–23)
BUN SERPL-MCNC: 16 MG/DL — SIGNIFICANT CHANGE UP (ref 7–23)
BUN SERPL-MCNC: 17 MG/DL — SIGNIFICANT CHANGE UP (ref 7–23)
BURR CELLS BLD QL SMEAR: PRESENT — SIGNIFICANT CHANGE UP
BURR CELLS BLD QL SMEAR: PRESENT — SIGNIFICANT CHANGE UP
CA-I BLD-SCNC: 1.24 MMOL/L — HIGH (ref 1.03–1.23)
CA-I BLDA-SCNC: 0.82 MMOL/L — LOW (ref 1.15–1.29)
CA-I BLDA-SCNC: 1.14 MMOL/L — LOW (ref 1.15–1.29)
CA-I BLDA-SCNC: 1.17 MMOL/L — SIGNIFICANT CHANGE UP (ref 1.15–1.29)
CA-I BLDA-SCNC: 1.18 MMOL/L — SIGNIFICANT CHANGE UP (ref 1.15–1.29)
CA-I BLDA-SCNC: 1.23 MMOL/L — SIGNIFICANT CHANGE UP (ref 1.15–1.29)
CA-I BLDA-SCNC: 1.24 MMOL/L — SIGNIFICANT CHANGE UP (ref 1.15–1.29)
CALCIUM SERPL-MCNC: 6.4 MG/DL — CRITICAL LOW (ref 8.4–10.5)
CALCIUM SERPL-MCNC: 7.1 MG/DL — LOW (ref 8.4–10.5)
CALCIUM SERPL-MCNC: 7.7 MG/DL — LOW (ref 8.4–10.5)
CALCIUM SERPL-MCNC: 7.9 MG/DL — LOW (ref 8.4–10.5)
CALCIUM SERPL-MCNC: 8.2 MG/DL — LOW (ref 8.4–10.5)
CALCIUM SERPL-MCNC: 9.3 MG/DL — SIGNIFICANT CHANGE UP (ref 8.4–10.5)
CHLORIDE SERPL-SCNC: 105 MMOL/L — SIGNIFICANT CHANGE UP (ref 98–107)
CHLORIDE SERPL-SCNC: 106 MMOL/L — SIGNIFICANT CHANGE UP (ref 98–107)
CHLORIDE SERPL-SCNC: 108 MMOL/L — HIGH (ref 98–107)
CHLORIDE SERPL-SCNC: 108 MMOL/L — HIGH (ref 98–107)
CHLORIDE SERPL-SCNC: 109 MMOL/L — HIGH (ref 98–107)
CHLORIDE SERPL-SCNC: 111 MMOL/L — HIGH (ref 98–107)
CO2 SERPL-SCNC: 16 MMOL/L — LOW (ref 22–31)
CO2 SERPL-SCNC: 19 MMOL/L — LOW (ref 22–31)
CO2 SERPL-SCNC: 19 MMOL/L — LOW (ref 22–31)
CO2 SERPL-SCNC: 20 MMOL/L — LOW (ref 22–31)
CO2 SERPL-SCNC: 21 MMOL/L — LOW (ref 22–31)
CO2 SERPL-SCNC: 22 MMOL/L — SIGNIFICANT CHANGE UP (ref 22–31)
CREAT SERPL-MCNC: 0.58 MG/DL — SIGNIFICANT CHANGE UP (ref 0.5–1.3)
CREAT SERPL-MCNC: 0.63 MG/DL — SIGNIFICANT CHANGE UP (ref 0.5–1.3)
CREAT SERPL-MCNC: 0.64 MG/DL — SIGNIFICANT CHANGE UP (ref 0.5–1.3)
CREAT SERPL-MCNC: 0.65 MG/DL — SIGNIFICANT CHANGE UP (ref 0.5–1.3)
CREAT SERPL-MCNC: 0.7 MG/DL — SIGNIFICANT CHANGE UP (ref 0.5–1.3)
CREAT SERPL-MCNC: 0.74 MG/DL — SIGNIFICANT CHANGE UP (ref 0.5–1.3)
EOSINOPHIL # BLD AUTO: 0 K/UL — SIGNIFICANT CHANGE UP (ref 0–0.5)
EOSINOPHIL # BLD AUTO: 0.02 K/UL — SIGNIFICANT CHANGE UP (ref 0–0.5)
EOSINOPHIL # BLD AUTO: 0.03 K/UL — SIGNIFICANT CHANGE UP (ref 0–0.5)
EOSINOPHIL # BLD AUTO: 0.04 K/UL — SIGNIFICANT CHANGE UP (ref 0–0.5)
EOSINOPHIL NFR BLD AUTO: 0 % — SIGNIFICANT CHANGE UP (ref 0–6)
EOSINOPHIL NFR BLD AUTO: 1.9 % — SIGNIFICANT CHANGE UP (ref 0–6)
EOSINOPHIL NFR BLD AUTO: 2 % — SIGNIFICANT CHANGE UP (ref 0–6)
EOSINOPHIL NFR BLD AUTO: 2.3 % — SIGNIFICANT CHANGE UP (ref 0–6)
EOSINOPHIL NFR FLD: 0 % — SIGNIFICANT CHANGE UP (ref 0–6)
EOSINOPHIL NFR FLD: 0 % — SIGNIFICANT CHANGE UP (ref 0–6)
FIBRINOGEN PPP-MCNC: 289.9 MG/DL — LOW (ref 350–510)
FIBRINOGEN PPP-MCNC: 547 MG/DL — HIGH (ref 350–510)
FIBRINOGEN PPP-MCNC: 627.4 MG/DL — HIGH (ref 350–510)
GIANT PLATELETS BLD QL SMEAR: PRESENT — SIGNIFICANT CHANGE UP
GIANT PLATELETS BLD QL SMEAR: PRESENT — SIGNIFICANT CHANGE UP
GLUCOSE BLDA-MCNC: 133 MG/DL — HIGH (ref 70–99)
GLUCOSE BLDA-MCNC: 136 MG/DL — HIGH (ref 70–99)
GLUCOSE BLDA-MCNC: 154 MG/DL — HIGH (ref 70–99)
GLUCOSE BLDA-MCNC: 232 MG/DL — HIGH (ref 70–99)
GLUCOSE BLDA-MCNC: 253 MG/DL — HIGH (ref 70–99)
GLUCOSE BLDA-MCNC: 309 MG/DL — HIGH (ref 70–99)
GLUCOSE BLDC GLUCOMTR-MCNC: 122 MG/DL — HIGH (ref 70–99)
GLUCOSE BLDC GLUCOMTR-MCNC: 128 MG/DL — HIGH (ref 70–99)
GLUCOSE BLDC GLUCOMTR-MCNC: 136 MG/DL — HIGH (ref 70–99)
GLUCOSE BLDC GLUCOMTR-MCNC: 143 MG/DL — HIGH (ref 70–99)
GLUCOSE BLDC GLUCOMTR-MCNC: 148 MG/DL — HIGH (ref 70–99)
GLUCOSE SERPL-MCNC: 140 MG/DL — HIGH (ref 70–99)
GLUCOSE SERPL-MCNC: 145 MG/DL — HIGH (ref 70–99)
GLUCOSE SERPL-MCNC: 166 MG/DL — HIGH (ref 70–99)
GLUCOSE SERPL-MCNC: 243 MG/DL — HIGH (ref 70–99)
GLUCOSE SERPL-MCNC: 281 MG/DL — HIGH (ref 70–99)
GLUCOSE SERPL-MCNC: 314 MG/DL — HIGH (ref 70–99)
GRAM STN WND: SIGNIFICANT CHANGE UP
HCO3 BLDA-SCNC: 17 MMOL/L — LOW (ref 22–26)
HCO3 BLDA-SCNC: 18 MMOL/L — LOW (ref 22–26)
HCO3 BLDA-SCNC: 21 MMOL/L — LOW (ref 22–26)
HCO3 BLDA-SCNC: 23 MMOL/L — SIGNIFICANT CHANGE UP (ref 22–26)
HCO3 BLDA-SCNC: 24 MMOL/L — SIGNIFICANT CHANGE UP (ref 22–26)
HCO3 BLDA-SCNC: 24 MMOL/L — SIGNIFICANT CHANGE UP (ref 22–26)
HCT VFR BLD CALC: 22.3 % — LOW (ref 39–50)
HCT VFR BLD CALC: 22.8 % — LOW (ref 39–50)
HCT VFR BLD CALC: 29.8 % — LOW (ref 39–50)
HCT VFR BLD CALC: 30.4 % — LOW (ref 39–50)
HCT VFR BLD CALC: 30.8 % — LOW (ref 39–50)
HCT VFR BLD CALC: 30.8 % — LOW (ref 39–50)
HCT VFR BLD CALC: 31.2 % — LOW (ref 39–50)
HCT VFR BLD CALC: 31.3 % — LOW (ref 39–50)
HCT VFR BLD CALC: 31.3 % — LOW (ref 39–50)
HCT VFR BLDA CALC: 25.3 % — LOW (ref 39–51)
HCT VFR BLDA CALC: 32.2 % — LOW (ref 39–51)
HCT VFR BLDA CALC: 32.5 % — LOW (ref 39–51)
HCT VFR BLDA CALC: 33.8 % — LOW (ref 39–51)
HCT VFR BLDA CALC: 34.8 % — LOW (ref 39–51)
HCT VFR BLDA CALC: 35.2 % — LOW (ref 39–51)
HGB BLD-MCNC: 10.3 G/DL — LOW (ref 13–17)
HGB BLD-MCNC: 10.3 G/DL — LOW (ref 13–17)
HGB BLD-MCNC: 10.6 G/DL — LOW (ref 13–17)
HGB BLD-MCNC: 10.8 G/DL — LOW (ref 13–17)
HGB BLD-MCNC: 10.9 G/DL — LOW (ref 13–17)
HGB BLD-MCNC: 10.9 G/DL — LOW (ref 13–17)
HGB BLD-MCNC: 11 G/DL — LOW (ref 13–17)
HGB BLD-MCNC: 7.9 G/DL — LOW (ref 13–17)
HGB BLD-MCNC: 8 G/DL — LOW (ref 13–17)
HGB BLDA-MCNC: 10.4 G/DL — LOW (ref 13–17)
HGB BLDA-MCNC: 10.5 G/DL — LOW (ref 13–17)
HGB BLDA-MCNC: 11 G/DL — LOW (ref 13–17)
HGB BLDA-MCNC: 11.3 G/DL — LOW (ref 13–17)
HGB BLDA-MCNC: 11.4 G/DL — LOW (ref 13–17)
HGB BLDA-MCNC: 8.1 G/DL — LOW (ref 13–17)
IMM GRANULOCYTES NFR BLD AUTO: 1.2 % — SIGNIFICANT CHANGE UP (ref 0–1.5)
IMM GRANULOCYTES NFR BLD AUTO: 1.9 % — HIGH (ref 0–1.5)
IMM GRANULOCYTES NFR BLD AUTO: 5.1 % — HIGH (ref 0–1.5)
IMM GRANULOCYTES NFR BLD AUTO: 6.4 % — HIGH (ref 0–1.5)
INR BLD: 1.09 — SIGNIFICANT CHANGE UP (ref 0.88–1.17)
INR BLD: 1.43 — HIGH (ref 0.88–1.17)
INR BLD: 1.8 — HIGH (ref 0.88–1.17)
INR BLD: 1.93 — HIGH (ref 0.88–1.17)
LACTATE BLDA-SCNC: 1.6 MMOL/L — SIGNIFICANT CHANGE UP (ref 0.5–2)
LACTATE BLDA-SCNC: 1.6 MMOL/L — SIGNIFICANT CHANGE UP (ref 0.5–2)
LACTATE BLDA-SCNC: 1.9 MMOL/L — SIGNIFICANT CHANGE UP (ref 0.5–2)
LACTATE BLDA-SCNC: 2.2 MMOL/L — HIGH (ref 0.5–2)
LACTATE BLDA-SCNC: 3.4 MMOL/L — HIGH (ref 0.5–2)
LACTATE BLDA-SCNC: 5 MMOL/L — CRITICAL HIGH (ref 0.5–2)
LYMPHOCYTES # BLD AUTO: 0.04 K/UL — LOW (ref 1–3.3)
LYMPHOCYTES # BLD AUTO: 0.19 K/UL — LOW (ref 1–3.3)
LYMPHOCYTES # BLD AUTO: 0.19 K/UL — LOW (ref 1–3.3)
LYMPHOCYTES # BLD AUTO: 0.21 K/UL — LOW (ref 1–3.3)
LYMPHOCYTES # BLD AUTO: 10.7 % — LOW (ref 13–44)
LYMPHOCYTES # BLD AUTO: 12.1 % — LOW (ref 13–44)
LYMPHOCYTES # BLD AUTO: 22.1 % — SIGNIFICANT CHANGE UP (ref 13–44)
LYMPHOCYTES # BLD AUTO: 7.5 % — LOW (ref 13–44)
LYMPHOCYTES NFR SPEC AUTO: 16.7 % — SIGNIFICANT CHANGE UP (ref 13–44)
LYMPHOCYTES NFR SPEC AUTO: 16.7 % — SIGNIFICANT CHANGE UP (ref 13–44)
MACROCYTES BLD QL: SLIGHT — SIGNIFICANT CHANGE UP
MACROCYTES BLD QL: SLIGHT — SIGNIFICANT CHANGE UP
MAGNESIUM SERPL-MCNC: 1.4 MG/DL — LOW (ref 1.6–2.6)
MAGNESIUM SERPL-MCNC: 1.5 MG/DL — LOW (ref 1.6–2.6)
MAGNESIUM SERPL-MCNC: 2.1 MG/DL — SIGNIFICANT CHANGE UP (ref 1.6–2.6)
MAGNESIUM SERPL-MCNC: 2.2 MG/DL — SIGNIFICANT CHANGE UP (ref 1.6–2.6)
MAGNESIUM SERPL-MCNC: 2.4 MG/DL — SIGNIFICANT CHANGE UP (ref 1.6–2.6)
MAGNESIUM SERPL-MCNC: 3 MG/DL — HIGH (ref 1.6–2.6)
MANUAL SMEAR VERIFICATION: SIGNIFICANT CHANGE UP
MCHC RBC-ENTMCNC: 29.4 PG — SIGNIFICANT CHANGE UP (ref 27–34)
MCHC RBC-ENTMCNC: 29.5 PG — SIGNIFICANT CHANGE UP (ref 27–34)
MCHC RBC-ENTMCNC: 29.5 PG — SIGNIFICANT CHANGE UP (ref 27–34)
MCHC RBC-ENTMCNC: 29.6 PG — SIGNIFICANT CHANGE UP (ref 27–34)
MCHC RBC-ENTMCNC: 30.1 PG — SIGNIFICANT CHANGE UP (ref 27–34)
MCHC RBC-ENTMCNC: 31.7 PG — SIGNIFICANT CHANGE UP (ref 27–34)
MCHC RBC-ENTMCNC: 32.8 PG — SIGNIFICANT CHANGE UP (ref 27–34)
MCHC RBC-ENTMCNC: 32.9 % — SIGNIFICANT CHANGE UP (ref 32–36)
MCHC RBC-ENTMCNC: 32.9 % — SIGNIFICANT CHANGE UP (ref 32–36)
MCHC RBC-ENTMCNC: 34.6 % — SIGNIFICANT CHANGE UP (ref 32–36)
MCHC RBC-ENTMCNC: 35.1 % — SIGNIFICANT CHANGE UP (ref 32–36)
MCHC RBC-ENTMCNC: 35.4 % — SIGNIFICANT CHANGE UP (ref 32–36)
MCHC RBC-ENTMCNC: 35.4 % — SIGNIFICANT CHANGE UP (ref 32–36)
MCHC RBC-ENTMCNC: 35.6 % — SIGNIFICANT CHANGE UP (ref 32–36)
MCHC RBC-ENTMCNC: 35.7 % — SIGNIFICANT CHANGE UP (ref 32–36)
MCHC RBC-ENTMCNC: 35.9 % — SIGNIFICANT CHANGE UP (ref 32–36)
MCV RBC AUTO: 82.4 FL — SIGNIFICANT CHANGE UP (ref 80–100)
MCV RBC AUTO: 82.8 FL — SIGNIFICANT CHANGE UP (ref 80–100)
MCV RBC AUTO: 83.2 FL — SIGNIFICANT CHANGE UP (ref 80–100)
MCV RBC AUTO: 84.2 FL — SIGNIFICANT CHANGE UP (ref 80–100)
MCV RBC AUTO: 85.5 FL — SIGNIFICANT CHANGE UP (ref 80–100)
MCV RBC AUTO: 89.9 FL — SIGNIFICANT CHANGE UP (ref 80–100)
MCV RBC AUTO: 89.9 FL — SIGNIFICANT CHANGE UP (ref 80–100)
MCV RBC AUTO: 90.5 FL — SIGNIFICANT CHANGE UP (ref 80–100)
MCV RBC AUTO: 92.5 FL — SIGNIFICANT CHANGE UP (ref 80–100)
METAMYELOCYTES # FLD: 4.7 % — HIGH (ref 0–1)
METAMYELOCYTES # FLD: 4.7 % — HIGH (ref 0–1)
MICROCYTES BLD QL: SLIGHT — SIGNIFICANT CHANGE UP
MICROCYTES BLD QL: SLIGHT — SIGNIFICANT CHANGE UP
MONOCYTES # BLD AUTO: 0.03 K/UL — SIGNIFICANT CHANGE UP (ref 0–0.9)
MONOCYTES # BLD AUTO: 0.12 K/UL — SIGNIFICANT CHANGE UP (ref 0–0.9)
MONOCYTES # BLD AUTO: 0.14 K/UL — SIGNIFICANT CHANGE UP (ref 0–0.9)
MONOCYTES # BLD AUTO: 0.15 K/UL — SIGNIFICANT CHANGE UP (ref 0–0.9)
MONOCYTES NFR BLD AUTO: 14 % — SIGNIFICANT CHANGE UP (ref 2–14)
MONOCYTES NFR BLD AUTO: 5.7 % — SIGNIFICANT CHANGE UP (ref 2–14)
MONOCYTES NFR BLD AUTO: 7.7 % — SIGNIFICANT CHANGE UP (ref 2–14)
MONOCYTES NFR BLD AUTO: 8.9 % — SIGNIFICANT CHANGE UP (ref 2–14)
MONOCYTES NFR BLD: 2.4 % — SIGNIFICANT CHANGE UP (ref 2–9)
MONOCYTES NFR BLD: 2.4 % — SIGNIFICANT CHANGE UP (ref 2–9)
MYELOCYTES NFR BLD: 2.4 % — HIGH (ref 0–0)
MYELOCYTES NFR BLD: 2.4 % — HIGH (ref 0–0)
NEUTROPHIL AB SER-ACNC: 40.5 % — LOW (ref 43–77)
NEUTROPHIL AB SER-ACNC: 40.5 % — LOW (ref 43–77)
NEUTROPHILS # BLD AUTO: 0.45 K/UL — LOW (ref 1.8–7.4)
NEUTROPHILS # BLD AUTO: 0.52 K/UL — LOW (ref 1.8–7.4)
NEUTROPHILS # BLD AUTO: 1.11 K/UL — LOW (ref 1.8–7.4)
NEUTROPHILS # BLD AUTO: 1.45 K/UL — LOW (ref 1.8–7.4)
NEUTROPHILS NFR BLD AUTO: 60.4 % — SIGNIFICANT CHANGE UP (ref 43–77)
NEUTROPHILS NFR BLD AUTO: 70.7 % — SIGNIFICANT CHANGE UP (ref 43–77)
NEUTROPHILS NFR BLD AUTO: 74 % — SIGNIFICANT CHANGE UP (ref 43–77)
NEUTROPHILS NFR BLD AUTO: 84.9 % — HIGH (ref 43–77)
NEUTS BAND # BLD: 28.6 % — HIGH (ref 0–6)
NEUTS BAND # BLD: 28.6 % — HIGH (ref 0–6)
NRBC # BLD: 2.4 /100WBC — SIGNIFICANT CHANGE UP
NRBC # BLD: 2.4 /100WBC — SIGNIFICANT CHANGE UP
NRBC # FLD: 0 K/UL — LOW (ref 25–125)
OTHER - HEMATOLOGY %: 0 — SIGNIFICANT CHANGE UP
OTHER - HEMATOLOGY %: 0 — SIGNIFICANT CHANGE UP
OVALOCYTES BLD QL SMEAR: SLIGHT — SIGNIFICANT CHANGE UP
OVALOCYTES BLD QL SMEAR: SLIGHT — SIGNIFICANT CHANGE UP
PCO2 BLDA: 36 MMHG — SIGNIFICANT CHANGE UP (ref 35–48)
PCO2 BLDA: 38 MMHG — SIGNIFICANT CHANGE UP (ref 35–48)
PCO2 BLDA: 40 MMHG — SIGNIFICANT CHANGE UP (ref 35–48)
PCO2 BLDA: 42 MMHG — SIGNIFICANT CHANGE UP (ref 35–48)
PCO2 BLDA: 42 MMHG — SIGNIFICANT CHANGE UP (ref 35–48)
PCO2 BLDA: 60 MMHG — HIGH (ref 35–48)
PH BLDA: 7.16 PH — CRITICAL LOW (ref 7.35–7.45)
PH BLDA: 7.22 PH — LOW (ref 7.35–7.45)
PH BLDA: 7.34 PH — LOW (ref 7.35–7.45)
PH BLDA: 7.36 PH — SIGNIFICANT CHANGE UP (ref 7.35–7.45)
PH BLDA: 7.4 PH — SIGNIFICANT CHANGE UP (ref 7.35–7.45)
PH BLDA: 7.43 PH — SIGNIFICANT CHANGE UP (ref 7.35–7.45)
PHOSPHATE SERPL-MCNC: 1.6 MG/DL — LOW (ref 2.5–4.5)
PHOSPHATE SERPL-MCNC: 1.8 MG/DL — LOW (ref 2.5–4.5)
PHOSPHATE SERPL-MCNC: 2.1 MG/DL — LOW (ref 2.5–4.5)
PHOSPHATE SERPL-MCNC: 2.6 MG/DL — SIGNIFICANT CHANGE UP (ref 2.5–4.5)
PHOSPHATE SERPL-MCNC: 3.2 MG/DL — SIGNIFICANT CHANGE UP (ref 2.5–4.5)
PHOSPHATE SERPL-MCNC: 4.1 MG/DL — SIGNIFICANT CHANGE UP (ref 2.5–4.5)
PLATELET # BLD AUTO: 110 K/UL — LOW (ref 150–400)
PLATELET # BLD AUTO: 118 K/UL — LOW (ref 150–400)
PLATELET # BLD AUTO: 124 K/UL — LOW (ref 150–400)
PLATELET # BLD AUTO: 126 K/UL — LOW (ref 150–400)
PLATELET # BLD AUTO: 131 K/UL — LOW (ref 150–400)
PLATELET # BLD AUTO: 75 K/UL — LOW (ref 150–400)
PLATELET # BLD AUTO: 75 K/UL — LOW (ref 150–400)
PLATELET # BLD AUTO: 85 K/UL — LOW (ref 150–400)
PLATELET # BLD AUTO: 91 K/UL — LOW (ref 150–400)
PLATELET COUNT - ESTIMATE: SIGNIFICANT CHANGE UP
PLATELET COUNT - ESTIMATE: SIGNIFICANT CHANGE UP
PMV BLD: 10.1 FL — SIGNIFICANT CHANGE UP (ref 7–13)
PMV BLD: 10.2 FL — SIGNIFICANT CHANGE UP (ref 7–13)
PMV BLD: 10.2 FL — SIGNIFICANT CHANGE UP (ref 7–13)
PMV BLD: 10.4 FL — SIGNIFICANT CHANGE UP (ref 7–13)
PMV BLD: 10.4 FL — SIGNIFICANT CHANGE UP (ref 7–13)
PMV BLD: 10.5 FL — SIGNIFICANT CHANGE UP (ref 7–13)
PMV BLD: 10.5 FL — SIGNIFICANT CHANGE UP (ref 7–13)
PMV BLD: 10.7 FL — SIGNIFICANT CHANGE UP (ref 7–13)
PMV BLD: 11.1 FL — SIGNIFICANT CHANGE UP (ref 7–13)
PO2 BLDA: 109 MMHG — HIGH (ref 83–108)
PO2 BLDA: 147 MMHG — HIGH (ref 83–108)
PO2 BLDA: 159 MMHG — HIGH (ref 83–108)
PO2 BLDA: 173 MMHG — HIGH (ref 83–108)
PO2 BLDA: 180 MMHG — HIGH (ref 83–108)
PO2 BLDA: 197 MMHG — HIGH (ref 83–108)
POIKILOCYTOSIS BLD QL AUTO: SLIGHT — SIGNIFICANT CHANGE UP
POIKILOCYTOSIS BLD QL AUTO: SLIGHT — SIGNIFICANT CHANGE UP
POTASSIUM BLDA-SCNC: 3 MMOL/L — LOW (ref 3.4–4.5)
POTASSIUM BLDA-SCNC: 3.1 MMOL/L — LOW (ref 3.4–4.5)
POTASSIUM BLDA-SCNC: 3.6 MMOL/L — SIGNIFICANT CHANGE UP (ref 3.4–4.5)
POTASSIUM BLDA-SCNC: 3.6 MMOL/L — SIGNIFICANT CHANGE UP (ref 3.4–4.5)
POTASSIUM BLDA-SCNC: 4 MMOL/L — SIGNIFICANT CHANGE UP (ref 3.4–4.5)
POTASSIUM BLDA-SCNC: 5.5 MMOL/L — HIGH (ref 3.4–4.5)
POTASSIUM SERPL-MCNC: 3.3 MMOL/L — LOW (ref 3.5–5.3)
POTASSIUM SERPL-MCNC: 3.4 MMOL/L — LOW (ref 3.5–5.3)
POTASSIUM SERPL-MCNC: 3.8 MMOL/L — SIGNIFICANT CHANGE UP (ref 3.5–5.3)
POTASSIUM SERPL-MCNC: 3.9 MMOL/L — SIGNIFICANT CHANGE UP (ref 3.5–5.3)
POTASSIUM SERPL-MCNC: 4.2 MMOL/L — SIGNIFICANT CHANGE UP (ref 3.5–5.3)
POTASSIUM SERPL-MCNC: 5.8 MMOL/L — HIGH (ref 3.5–5.3)
POTASSIUM SERPL-SCNC: 3.3 MMOL/L — LOW (ref 3.5–5.3)
POTASSIUM SERPL-SCNC: 3.4 MMOL/L — LOW (ref 3.5–5.3)
POTASSIUM SERPL-SCNC: 3.8 MMOL/L — SIGNIFICANT CHANGE UP (ref 3.5–5.3)
POTASSIUM SERPL-SCNC: 3.9 MMOL/L — SIGNIFICANT CHANGE UP (ref 3.5–5.3)
POTASSIUM SERPL-SCNC: 4.2 MMOL/L — SIGNIFICANT CHANGE UP (ref 3.5–5.3)
POTASSIUM SERPL-SCNC: 5.8 MMOL/L — HIGH (ref 3.5–5.3)
PROMYELOCYTES # FLD: 0 % — SIGNIFICANT CHANGE UP (ref 0–0)
PROMYELOCYTES # FLD: 0 % — SIGNIFICANT CHANGE UP (ref 0–0)
PROT SERPL-MCNC: 3.6 G/DL — LOW (ref 6–8.3)
PROT SERPL-MCNC: 5.2 G/DL — LOW (ref 6–8.3)
PROTHROM AB SERPL-ACNC: 12.1 SEC — SIGNIFICANT CHANGE UP (ref 9.8–13.1)
PROTHROM AB SERPL-ACNC: 16.1 SEC — HIGH (ref 9.8–13.1)
PROTHROM AB SERPL-ACNC: 20.4 SEC — HIGH (ref 9.8–13.1)
PROTHROM AB SERPL-ACNC: 22.5 SEC — HIGH (ref 9.8–13.1)
RBC # BLD: 2.41 M/UL — LOW (ref 4.2–5.8)
RBC # BLD: 2.52 M/UL — LOW (ref 4.2–5.8)
RBC # BLD: 3.48 M/UL — LOW (ref 4.2–5.8)
RBC # BLD: 3.48 M/UL — LOW (ref 4.2–5.8)
RBC # BLD: 3.6 M/UL — LOW (ref 4.2–5.8)
RBC # BLD: 3.65 M/UL — LOW (ref 4.2–5.8)
RBC # BLD: 3.66 M/UL — LOW (ref 4.2–5.8)
RBC # BLD: 3.69 M/UL — LOW (ref 4.2–5.8)
RBC # BLD: 3.7 M/UL — LOW (ref 4.2–5.8)
RBC # FLD: 13.2 % — SIGNIFICANT CHANGE UP (ref 10.3–14.5)
RBC # FLD: 13.4 % — SIGNIFICANT CHANGE UP (ref 10.3–14.5)
RBC # FLD: 13.5 % — SIGNIFICANT CHANGE UP (ref 10.3–14.5)
RBC # FLD: 13.6 % — SIGNIFICANT CHANGE UP (ref 10.3–14.5)
RBC # FLD: 14.2 % — SIGNIFICANT CHANGE UP (ref 10.3–14.5)
RBC # FLD: 14.7 % — HIGH (ref 10.3–14.5)
RBC # FLD: 14.9 % — HIGH (ref 10.3–14.5)
SAO2 % BLDA: 97.4 % — SIGNIFICANT CHANGE UP (ref 95–99)
SAO2 % BLDA: 98.2 % — SIGNIFICANT CHANGE UP (ref 95–99)
SAO2 % BLDA: 98.3 % — SIGNIFICANT CHANGE UP (ref 95–99)
SAO2 % BLDA: 99 % — SIGNIFICANT CHANGE UP (ref 95–99)
SAO2 % BLDA: 99.1 % — HIGH (ref 95–99)
SAO2 % BLDA: 99.1 % — HIGH (ref 95–99)
SMUDGE CELLS # BLD: PRESENT — SIGNIFICANT CHANGE UP
SMUDGE CELLS # BLD: PRESENT — SIGNIFICANT CHANGE UP
SODIUM BLDA-SCNC: 130 MMOL/L — LOW (ref 136–146)
SODIUM BLDA-SCNC: 132 MMOL/L — LOW (ref 136–146)
SODIUM BLDA-SCNC: 133 MMOL/L — LOW (ref 136–146)
SODIUM BLDA-SCNC: 133 MMOL/L — LOW (ref 136–146)
SODIUM BLDA-SCNC: 135 MMOL/L — LOW (ref 136–146)
SODIUM BLDA-SCNC: 137 MMOL/L — SIGNIFICANT CHANGE UP (ref 136–146)
SODIUM SERPL-SCNC: 136 MMOL/L — SIGNIFICANT CHANGE UP (ref 135–145)
SODIUM SERPL-SCNC: 137 MMOL/L — SIGNIFICANT CHANGE UP (ref 135–145)
SODIUM SERPL-SCNC: 137 MMOL/L — SIGNIFICANT CHANGE UP (ref 135–145)
SODIUM SERPL-SCNC: 138 MMOL/L — SIGNIFICANT CHANGE UP (ref 135–145)
SODIUM SERPL-SCNC: 138 MMOL/L — SIGNIFICANT CHANGE UP (ref 135–145)
SODIUM SERPL-SCNC: 140 MMOL/L — SIGNIFICANT CHANGE UP (ref 135–145)
SPECIMEN SOURCE: SIGNIFICANT CHANGE UP
VARIANT LYMPHS # BLD: 4.7 % — SIGNIFICANT CHANGE UP
VARIANT LYMPHS # BLD: 4.7 % — SIGNIFICANT CHANGE UP
WBC # BLD: 0.6 K/UL — CRITICAL LOW (ref 3.8–10.5)
WBC # BLD: 0.6 K/UL — CRITICAL LOW (ref 3.8–10.5)
WBC # BLD: 0.67 K/UL — CRITICAL LOW (ref 3.8–10.5)
WBC # BLD: 0.67 K/UL — CRITICAL LOW (ref 3.8–10.5)
WBC # BLD: 0.86 K/UL — CRITICAL LOW (ref 3.8–10.5)
WBC # BLD: 0.86 K/UL — CRITICAL LOW (ref 3.8–10.5)
WBC # BLD: 0.9 K/UL — CRITICAL LOW (ref 3.8–10.5)
WBC # BLD: 1.57 K/UL — LOW (ref 3.8–10.5)
WBC # BLD: 1.96 K/UL — LOW (ref 3.8–10.5)
WBC # FLD AUTO: 0.6 K/UL — CRITICAL LOW (ref 3.8–10.5)
WBC # FLD AUTO: 0.6 K/UL — CRITICAL LOW (ref 3.8–10.5)
WBC # FLD AUTO: 0.67 K/UL — CRITICAL LOW (ref 3.8–10.5)
WBC # FLD AUTO: 0.67 K/UL — CRITICAL LOW (ref 3.8–10.5)
WBC # FLD AUTO: 0.86 K/UL — CRITICAL LOW (ref 3.8–10.5)
WBC # FLD AUTO: 0.86 K/UL — CRITICAL LOW (ref 3.8–10.5)
WBC # FLD AUTO: 0.9 K/UL — CRITICAL LOW (ref 3.8–10.5)
WBC # FLD AUTO: 1.57 K/UL — LOW (ref 3.8–10.5)
WBC # FLD AUTO: 1.96 K/UL — LOW (ref 3.8–10.5)

## 2019-01-18 PROCEDURE — 99233 SBSQ HOSP IP/OBS HIGH 50: CPT

## 2019-01-18 PROCEDURE — 71045 X-RAY EXAM CHEST 1 VIEW: CPT | Mod: 26

## 2019-01-18 PROCEDURE — 99232 SBSQ HOSP IP/OBS MODERATE 35: CPT | Mod: GC

## 2019-01-18 RX ORDER — SODIUM CHLORIDE 9 MG/ML
500 INJECTION, SOLUTION INTRAVENOUS
Qty: 0 | Refills: 0 | Status: DISCONTINUED | OUTPATIENT
Start: 2019-01-18 | End: 2019-01-18

## 2019-01-18 RX ORDER — MAGNESIUM SULFATE 500 MG/ML
2 VIAL (ML) INJECTION ONCE
Qty: 0 | Refills: 0 | Status: COMPLETED | OUTPATIENT
Start: 2019-01-18 | End: 2019-01-18

## 2019-01-18 RX ORDER — SODIUM CHLORIDE 9 MG/ML
1000 INJECTION, SOLUTION INTRAVENOUS ONCE
Qty: 0 | Refills: 0 | Status: COMPLETED | OUTPATIENT
Start: 2019-01-18 | End: 2019-01-18

## 2019-01-18 RX ORDER — ACETAMINOPHEN 500 MG
1000 TABLET ORAL ONCE
Qty: 0 | Refills: 0 | Status: COMPLETED | OUTPATIENT
Start: 2019-01-18 | End: 2019-01-18

## 2019-01-18 RX ORDER — DEXTROSE 50 % IN WATER 50 %
25 SYRINGE (ML) INTRAVENOUS ONCE
Qty: 0 | Refills: 0 | Status: DISCONTINUED | OUTPATIENT
Start: 2019-01-18 | End: 2019-02-01

## 2019-01-18 RX ORDER — SODIUM CHLORIDE 9 MG/ML
500 INJECTION, SOLUTION INTRAVENOUS ONCE
Qty: 0 | Refills: 0 | Status: DISCONTINUED | OUTPATIENT
Start: 2019-01-18 | End: 2019-01-18

## 2019-01-18 RX ORDER — GLUCAGON INJECTION, SOLUTION 0.5 MG/.1ML
1 INJECTION, SOLUTION SUBCUTANEOUS ONCE
Qty: 0 | Refills: 0 | Status: DISCONTINUED | OUTPATIENT
Start: 2019-01-18 | End: 2019-02-01

## 2019-01-18 RX ORDER — POTASSIUM PHOSPHATE, MONOBASIC POTASSIUM PHOSPHATE, DIBASIC 236; 224 MG/ML; MG/ML
15 INJECTION, SOLUTION INTRAVENOUS ONCE
Qty: 0 | Refills: 0 | Status: COMPLETED | OUTPATIENT
Start: 2019-01-18 | End: 2019-01-18

## 2019-01-18 RX ORDER — VANCOMYCIN HCL 1 G
1000 VIAL (EA) INTRAVENOUS ONCE
Qty: 0 | Refills: 0 | Status: COMPLETED | OUTPATIENT
Start: 2019-01-18 | End: 2019-01-18

## 2019-01-18 RX ORDER — ELECTROLYTE SOLUTION,INJ
1 VIAL (ML) INTRAVENOUS
Qty: 0 | Refills: 0 | Status: DISCONTINUED | OUTPATIENT
Start: 2019-01-18 | End: 2019-01-18

## 2019-01-18 RX ORDER — I.V. FAT EMULSION 20 G/100ML
17 EMULSION INTRAVENOUS
Qty: 40 | Refills: 0 | Status: DISCONTINUED | OUTPATIENT
Start: 2019-01-18 | End: 2019-01-23

## 2019-01-18 RX ORDER — INSULIN LISPRO 100/ML
VIAL (ML) SUBCUTANEOUS EVERY 4 HOURS
Qty: 0 | Refills: 0 | Status: DISCONTINUED | OUTPATIENT
Start: 2019-01-18 | End: 2019-01-18

## 2019-01-18 RX ORDER — SODIUM CHLORIDE 9 MG/ML
500 INJECTION, SOLUTION INTRAVENOUS ONCE
Qty: 0 | Refills: 0 | Status: COMPLETED | OUTPATIENT
Start: 2019-01-18 | End: 2019-01-18

## 2019-01-18 RX ORDER — CALCIUM GLUCONATE 100 MG/ML
2 VIAL (ML) INTRAVENOUS ONCE
Qty: 0 | Refills: 0 | Status: COMPLETED | OUTPATIENT
Start: 2019-01-18 | End: 2019-01-18

## 2019-01-18 RX ORDER — HYDROMORPHONE HYDROCHLORIDE 2 MG/ML
0.25 INJECTION INTRAMUSCULAR; INTRAVENOUS; SUBCUTANEOUS
Qty: 0 | Refills: 0 | Status: DISCONTINUED | OUTPATIENT
Start: 2019-01-18 | End: 2019-01-18

## 2019-01-18 RX ORDER — POTASSIUM CHLORIDE 20 MEQ
20 PACKET (EA) ORAL
Qty: 0 | Refills: 0 | Status: COMPLETED | OUTPATIENT
Start: 2019-01-18 | End: 2019-01-18

## 2019-01-18 RX ORDER — SODIUM CHLORIDE 9 MG/ML
1000 INJECTION, SOLUTION INTRAVENOUS
Qty: 0 | Refills: 0 | Status: DISCONTINUED | OUTPATIENT
Start: 2019-01-18 | End: 2019-02-01

## 2019-01-18 RX ORDER — INSULIN LISPRO 100/ML
VIAL (ML) SUBCUTANEOUS EVERY 4 HOURS
Qty: 0 | Refills: 0 | Status: DISCONTINUED | OUTPATIENT
Start: 2019-01-18 | End: 2019-01-19

## 2019-01-18 RX ORDER — DEXTROSE 50 % IN WATER 50 %
12.5 SYRINGE (ML) INTRAVENOUS ONCE
Qty: 0 | Refills: 0 | Status: DISCONTINUED | OUTPATIENT
Start: 2019-01-18 | End: 2019-02-01

## 2019-01-18 RX ORDER — HYDROMORPHONE HYDROCHLORIDE 2 MG/ML
0.5 INJECTION INTRAMUSCULAR; INTRAVENOUS; SUBCUTANEOUS EVERY 6 HOURS
Qty: 0 | Refills: 0 | Status: DISCONTINUED | OUTPATIENT
Start: 2019-01-18 | End: 2019-01-21

## 2019-01-18 RX ORDER — DEXTROSE 50 % IN WATER 50 %
15 SYRINGE (ML) INTRAVENOUS ONCE
Qty: 0 | Refills: 0 | Status: DISCONTINUED | OUTPATIENT
Start: 2019-01-18 | End: 2019-02-01

## 2019-01-18 RX ORDER — SODIUM CHLORIDE 9 MG/ML
1000 INJECTION, SOLUTION INTRAVENOUS
Qty: 0 | Refills: 0 | Status: DISCONTINUED | OUTPATIENT
Start: 2019-01-18 | End: 2019-01-20

## 2019-01-18 RX ORDER — HYDROMORPHONE HYDROCHLORIDE 2 MG/ML
0.25 INJECTION INTRAMUSCULAR; INTRAVENOUS; SUBCUTANEOUS EVERY 6 HOURS
Qty: 0 | Refills: 0 | Status: DISCONTINUED | OUTPATIENT
Start: 2019-01-18 | End: 2019-01-21

## 2019-01-18 RX ORDER — SODIUM CHLORIDE 9 MG/ML
1000 INJECTION INTRAMUSCULAR; INTRAVENOUS; SUBCUTANEOUS ONCE
Qty: 0 | Refills: 0 | Status: COMPLETED | OUTPATIENT
Start: 2019-01-18 | End: 2019-01-18

## 2019-01-18 RX ADMIN — Medication 50 MILLIEQUIVALENT(S): at 10:16

## 2019-01-18 RX ADMIN — CHLORHEXIDINE GLUCONATE 1 APPLICATION(S): 213 SOLUTION TOPICAL at 19:30

## 2019-01-18 RX ADMIN — Medication 50 GRAM(S): at 02:20

## 2019-01-18 RX ADMIN — PROPOFOL 1.6 MICROGRAM(S)/KG/MIN: 10 INJECTION, EMULSION INTRAVENOUS at 10:15

## 2019-01-18 RX ADMIN — Medication 250 MILLIGRAM(S): at 02:20

## 2019-01-18 RX ADMIN — CHLORHEXIDINE GLUCONATE 1 APPLICATION(S): 213 SOLUTION TOPICAL at 16:45

## 2019-01-18 RX ADMIN — PIPERACILLIN AND TAZOBACTAM 25 GRAM(S): 4; .5 INJECTION, POWDER, LYOPHILIZED, FOR SOLUTION INTRAVENOUS at 03:30

## 2019-01-18 RX ADMIN — Medication 200 GRAM(S): at 02:19

## 2019-01-18 RX ADMIN — I.V. FAT EMULSION 17 ML/HR: 20 EMULSION INTRAVENOUS at 02:25

## 2019-01-18 RX ADMIN — PIPERACILLIN AND TAZOBACTAM 25 GRAM(S): 4; .5 INJECTION, POWDER, LYOPHILIZED, FOR SOLUTION INTRAVENOUS at 20:06

## 2019-01-18 RX ADMIN — Medication 400 MILLIGRAM(S): at 10:14

## 2019-01-18 RX ADMIN — Medication 50 GRAM(S): at 03:19

## 2019-01-18 RX ADMIN — Medication 1 EACH: at 17:01

## 2019-01-18 RX ADMIN — SODIUM CHLORIDE 1000 MILLILITER(S): 9 INJECTION, SOLUTION INTRAVENOUS at 09:47

## 2019-01-18 RX ADMIN — FENTANYL CITRATE 2.66 MICROGRAM(S)/KG/HR: 50 INJECTION INTRAVENOUS at 10:15

## 2019-01-18 RX ADMIN — Medication 1 EACH: at 10:17

## 2019-01-18 RX ADMIN — FENTANYL CITRATE 0.5 MICROGRAM(S)/KG/HR: 50 INJECTION INTRAVENOUS at 10:18

## 2019-01-18 RX ADMIN — SODIUM CHLORIDE 125 MILLILITER(S): 9 INJECTION INTRAMUSCULAR; INTRAVENOUS; SUBCUTANEOUS at 02:25

## 2019-01-18 RX ADMIN — Medication 1 EACH: at 02:24

## 2019-01-18 RX ADMIN — POTASSIUM PHOSPHATE, MONOBASIC POTASSIUM PHOSPHATE, DIBASIC 62.5 MILLIMOLE(S): 236; 224 INJECTION, SOLUTION INTRAVENOUS at 11:46

## 2019-01-18 RX ADMIN — SODIUM CHLORIDE 2000 MILLILITER(S): 9 INJECTION INTRAMUSCULAR; INTRAVENOUS; SUBCUTANEOUS at 00:52

## 2019-01-18 RX ADMIN — SODIUM CHLORIDE 125 MILLILITER(S): 9 INJECTION, SOLUTION INTRAVENOUS at 20:06

## 2019-01-18 RX ADMIN — MICAFUNGIN SODIUM 105 MILLIGRAM(S): 100 INJECTION, POWDER, LYOPHILIZED, FOR SOLUTION INTRAVENOUS at 13:29

## 2019-01-18 RX ADMIN — Medication 50 MILLIEQUIVALENT(S): at 11:44

## 2019-01-18 RX ADMIN — Medication 1000 MILLIGRAM(S): at 12:14

## 2019-01-18 RX ADMIN — POTASSIUM PHOSPHATE, MONOBASIC POTASSIUM PHOSPHATE, DIBASIC 62.5 MILLIMOLE(S): 236; 224 INJECTION, SOLUTION INTRAVENOUS at 05:45

## 2019-01-18 RX ADMIN — PIPERACILLIN AND TAZOBACTAM 25 GRAM(S): 4; .5 INJECTION, POWDER, LYOPHILIZED, FOR SOLUTION INTRAVENOUS at 11:45

## 2019-01-18 RX ADMIN — Medication 200 GRAM(S): at 03:19

## 2019-01-18 RX ADMIN — SODIUM CHLORIDE 2000 MILLILITER(S): 9 INJECTION, SOLUTION INTRAVENOUS at 15:47

## 2019-01-18 RX ADMIN — I.V. FAT EMULSION 17 ML/HR: 20 EMULSION INTRAVENOUS at 16:46

## 2019-01-18 NOTE — PROGRESS NOTE ADULT - SUBJECTIVE AND OBJECTIVE BOX
General Surgery Progress Note    SUBJECTIVE:  The patient was seen and examined. s/p exploratory laparotomy, right hemicolectomy with ileostomy and mucus fistula. underwent MTP for low H/H and hemodynamic instability.     OBJECTIVE:     ** VITAL SIGNS / I&O's **    Vital Signs Last 24 Hrs  T(C): 36.6 (18 Jan 2019 04:00), Max: 36.8 (17 Jan 2019 19:25)  T(F): 97.8 (18 Jan 2019 04:00), Max: 98.2 (17 Jan 2019 19:25)  HR: 88 (18 Jan 2019 09:00) (84 - 121)  BP: 68/36 (18 Jan 2019 01:00) (68/36 - 111/76)  BP(mean): 43 (18 Jan 2019 01:00) (43 - 43)  RR: 20 (18 Jan 2019 09:00) (12 - 24)  SpO2: 97% (18 Jan 2019 09:00) (93% - 100%)  Mode: AC/ CMV (Assist Control/ Continuous Mandatory Ventilation)  RR (machine): 20  TV (machine): 400  FiO2: 50  PEEP: 5  MAP: 9  PIP: 21      17 Jan 2019 07:01  -  18 Jan 2019 07:00  --------------------------------------------------------  IN:    Cryoprecipitate: 300 mL    fat emulsion (Fish Oil and Plant Based) 20% Infusion: 119 mL    fentaNYL  Infusion: 20.8 mL    propofol Infusion: 28 mL    sodium chloride 0.9%: 1000 mL    Sodium Chloride 0.9% IV Bolus: 1000 mL    TPN (Total Parenteral Nutrition): 996 mL  Total IN: 3463.8 mL    OUT:    Bulb: 1610 mL    Indwelling Catheter - Urethral: 3200 mL  Total OUT: 4810 mL    Total NET: -1346.2 mL      18 Jan 2019 07:01  -  18 Jan 2019 09:34  --------------------------------------------------------  IN:    fentaNYL  Infusion: 31.8 mL    lactated ringers.: 375 mL    propofol Infusion: 23.7 mL    TPN (Total Parenteral Nutrition): 249 mL  Total IN: 679.5 mL    OUT:    Bulb: 110 mL    Indwelling Catheter - Urethral: 235 mL  Total OUT: 345 mL    Total NET: 334.5 mL          ** PHYSICAL EXAM **    -- CONSTITUTIONAL: NAD, intubated  -- PULMONARY: non-labored on ventilator  -- ABDOMEN: soft, distended, 2 R sided ostomies pink/viable with nothing in the bags, L sided SOSA with shelley blood, midline dressing with blood staining      ** LABS **                          11.0   0.67  )-----------( 131      ( 18 Jan 2019 08:00 )             30.8     18 Jan 2019 08:30    140    |  109    |  15     ----------------------------<  166    3.3     |  22     |  0.74     Ca    8.2        18 Jan 2019 08:30  Phos  2.1       18 Jan 2019 08:30  Mg     2.4       18 Jan 2019 08:30    TPro  5.2    /  Alb  3.5    /  TBili  1.8    /  DBili  0.7    /  AST  46     /  ALT  38     /  AlkPhos  43     18 Jan 2019 04:10    PT/INR - ( 18 Jan 2019 04:10 )   PT: 12.1 SEC;   INR: 1.09          PTT - ( 18 Jan 2019 04:10 )  PTT:28.9 SEC  CAPILLARY BLOOD GLUCOSE      POCT Blood Glucose.: 148 mg/dL (18 Jan 2019 08:40)  POCT Blood Glucose.: 185 mg/dL (17 Jan 2019 19:34)        LIVER FUNCTIONS - ( 18 Jan 2019 04:10 )  Alb: 3.5 g/dL / Pro: 5.2 g/dL / ALK PHOS: 43 u/L / ALT: 38 u/L / AST: 46 u/L / GGT: x                 MEDICATIONS  (STANDING):  chlorhexidine 4% Liquid 1 Application(s) Topical two times a day  dextrose 5%. 1000 milliLiter(s) (50 mL/Hr) IV Continuous <Continuous>  dextrose 50% Injectable 12.5 Gram(s) IV Push once  dextrose 50% Injectable 25 Gram(s) IV Push once  dextrose 50% Injectable 25 Gram(s) IV Push once  fat emulsion (Fish Oil and Plant Based) 20% Infusion 17 mL/Hr (17 mL/Hr) IV Continuous <Continuous>  fat emulsion (Fish Oil and Plant Based) 20% Infusion 17 mL/Hr (17 mL/Hr) IV Continuous <Continuous>  fat emulsion (Fish Oil and Plant Based) 20% Infusion 17 mL/Hr (17 mL/Hr) IV Continuous <Continuous>  fentaNYL   Infusion 0.5 MICROgram(s)/kG/Hr (2.66 mL/Hr) IV Continuous <Continuous>  influenza   Vaccine 0.5 milliLiter(s) IntraMuscular once  insulin lispro (HumaLOG) corrective regimen sliding scale   SubCutaneous every 4 hours  lactated ringers. 1000 milliLiter(s) (125 mL/Hr) IV Continuous <Continuous>  micafungin IVPB 100 milliGRAM(s) IV Intermittent every 24 hours  micafungin IVPB      Parenteral Nutrition - Adult 1 Each (83 mL/Hr) TPN Continuous <Continuous>  piperacillin/tazobactam IVPB. 3.375 Gram(s) IV Intermittent every 8 hours  potassium phosphate IVPB 15 milliMole(s) IV Intermittent once  propofol Infusion 5 MICROgram(s)/kG/Min (1.596 mL/Hr) IV Continuous <Continuous>    MEDICATIONS  (PRN):  dextrose 40% Gel 15 Gram(s) Oral once PRN Blood Glucose LESS THAN 70 milliGRAM(s)/deciliter  glucagon  Injectable 1 milliGRAM(s) IntraMuscular once PRN Glucose LESS THAN 70 milligrams/deciliter

## 2019-01-18 NOTE — PROGRESS NOTE ADULT - SUBJECTIVE AND OBJECTIVE BOX
Vital Signs Last 24 Hrs  T(C): 37.7 (18 Jan 2019 12:00), Max: 38.4 (18 Jan 2019 10:00)  T(F): 99.9 (18 Jan 2019 12:00), Max: 101.2 (18 Jan 2019 10:00)  HR: 74 (18 Jan 2019 13:00) (72 - 121)  BP: 68/36 (18 Jan 2019 01:00) (68/36 - 111/76)  BP(mean): 43 (18 Jan 2019 01:00) (43 - 43)  RR: 20 (18 Jan 2019 13:00) (12 - 24)  SpO2: 99% (18 Jan 2019 13:00) (93% - 100%)    I&O's Detail    17 Jan 2019 07:01  -  18 Jan 2019 07:00  --------------------------------------------------------  IN:    Cryoprecipitate: 300 mL    fat emulsion (Fish Oil and Plant Based) 20% Infusion: 119 mL    fentaNYL  Infusion: 20.8 mL    propofol Infusion: 28 mL    sodium chloride 0.9%: 1000 mL    Sodium Chloride 0.9% IV Bolus: 1000 mL    TPN (Total Parenteral Nutrition): 996 mL  Total IN: 3463.8 mL    OUT:    Bulb: 1610 mL    Indwelling Catheter - Urethral: 3200 mL  Total OUT: 4810 mL    Total NET: -1346.2 mL      18 Jan 2019 07:01  -  18 Jan 2019 13:45  --------------------------------------------------------  IN:    fentaNYL  Infusion: 74.2 mL    IV PiggyBack: 450 mL    Lactated Ringers IV Bolus: 500 mL    lactated ringers.: 875 mL    propofol Infusion: 31.6 mL    TPN (Total Parenteral Nutrition): 581 mL  Total IN: 2511.8 mL    OUT:    Bulb: 270 mL    Indwelling Catheter - Urethral: 540 mL  Total OUT: 810 mL    Total NET: 1701.8 mL                                10.6   0.86  )-----------( 124      ( 18 Jan 2019 11:28 )             29.8       01-18    140  |  109<H>  |  15  ----------------------------<  166<H>  3.3<L>   |  22  |  0.74    Ca    8.2<L>      18 Jan 2019 08:30  Phos  2.1     01-18  Mg     2.4     01-18    TPro  5.2<L>  /  Alb  3.5  /  TBili  1.8<H>  /  DBili  0.7<H>  /  AST  46<H>  /  ALT  38  /  AlkPhos  43  01-18      PT/INR - ( 18 Jan 2019 04:10 )   PT: 12.1 SEC;   INR: 1.09          PTT - ( 18 Jan 2019 04:10 )  PTT:28.9 SEC    BP now stable after fluid resusitation    PLAN:  Continue antibiotics  Monitor J-P drainage

## 2019-01-18 NOTE — BRIEF OPERATIVE NOTE - OPERATION/FINDINGS
Tumor recurrence in LUQ involving esophagojejunostomy and portion of large bowel (splenic flexure). Unable to clearly dissect structures to investigate location of iatrogenic bowel perforation from the EGD. There was however an area of ischemic perforation on the hepatic flexure with enteric contents spilling out. A right hemicolectomy was performed with creation of an end ileostomy (RLQ) and proximal transverse colon mucus fistula (RUQ). A 19fr. rufina drain was left in the area of the tumor recurrence.

## 2019-01-18 NOTE — PROGRESS NOTE ADULT - SUBJECTIVE AND OBJECTIVE BOX
HISTORY  63y Male with history of gastric adenocarcinoma s/p total gastrectomy and Kelby en Y esophagojejunostomy with Dr Espinal at [facility] Dec 2017 c/b early postop SBO s/p lap SHAR, treated with adjuvant chemo and radiation. Pt now transferred from [place] after a ~20 day admission with symptoms of PO intolerance. 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 He underwent endoscopic dilation as well as stent placement, but the stent migrated and was subsequently removed. Pt reports vomiting any PO intake greater than ice chips. He has been on TPN for the past 6 weeks.  CT enterography was attempted but patient could not tolerate the PO contrast.  Today patient went for endoscopy to better assess strictures.  EJ anastomosis was noted to be friable and erythematous, biopsies were taken.  In PACU patient had complaints of abdominal distention and pain.  Upright CXR performed significant for pneumoperitoneum.  Nasoesophageal tube placed for decompression and SICU consulted for monitoring.     24 HOUR EVENTS:  Patient observed on the floor, became increasingly peritoneal, tender, AMS.  Taken to the OR for ex lap. About 2L of ascites evacuated. Patient was found to have a large mass encasing the EJ anastomosis that was adherent to the abdominal wall.  Patient found to have a perforation at the hepatic flexure with surrounding necrosis.  An extended right hemicolectomy was performed with end ileostomy and mucous fistula creation of transverse colon.  Patient was brought to the SICU hemodynamically stable, intubated.  In SICU patient became tachycardic and hypotensive.  Pressors started, H/H noted to have significant decrease.  SOSA placed around epigastric mass pouring out >500cc sanguinous fluid.  MTP started, R cordis placed.  Patient received 6U PRBC, 5 FFP, 1 cryo, 4 platelets.  Patient stabilized and off pressors.      SUBJECTIVE/ROS:  [x ] A ten-point review of systems was otherwise negative except as noted.  [ ] Due to altered mental status/intubation, subjective information were not able to be obtained from the patient. History was obtained, to the extent possible, from review of the chart and collateral sources of information.      NEURO  RASS: -1    GCS:     CAM ICU:  Exam: sedated, following commands  Meds: fentaNYL   Infusion 0.5 MICROgram(s)/kG/Hr IV Continuous <Continuous>  propofol Infusion 5 MICROgram(s)/kG/Min IV Continuous <Continuous>    [x] Adequacy of sedation and pain control has been assessed and adjusted      RESPIRATORY  RR: 20 (01-18-19 @ 04:00) (12 - 24)  SpO2: 100% (01-18-19 @ 04:08) (93% - 100%)  Wt(kg): --  Exam: unlabored, clear to auscultation bilaterally  Mechanical Ventilation: Mode: AC/ CMV (Assist Control/ Continuous Mandatory Ventilation), RR (machine): 20, RR (patient): 20, TV (machine): 400, FiO2: 50, PEEP: 5, MAP: 9, PIP: 21  ABG - ( 18 Jan 2019 04:10 )  pH: 7.34  /  pCO2: 40    /  pO2: 173   / HCO3: 21    / Base Excess: -4.0  /  SaO2: 97.4    Lactate: 3.4    [N/A] Extubation Readiness Assessed        CARDIOVASCULAR  HR: 94 (01-18-19 @ 04:08) (55 - 121)  BP: 68/36 (01-18-19 @ 01:00) (68/36 - 111/76)  BP(mean): 43 (01-18-19 @ 01:00) (43 - 43)  ABP: 111/66 (01-18-19 @ 04:00) (64/47 - 121/77)  ABP(mean): 83 (01-18-19 @ 04:00) (54 - 95)  Wt(kg): --  CVP(cm H2O): --    Exam: regular rate and rhythm  Cardiac Rhythm: sinus  Perfusion     []Adequate   [x ]Inadequate  Mentation   []Normal       [x ]Reduced  Extremities  [x]Warm         [ ]Cool  Volume Status [ ]Hypervolemic []Euvolemic [ x]Hypovolemic  Meds:       GI/NUTRITION  Exam: soft, nondistended, incision dressing with sang strikethrough.  SOSA x1 in abdomen with copious sanguinous drainage  Diet: NPO  Meds: TPN    GENITOURINARY  I&O's Detail    01-16 @ 07:01  -  01-17 @ 07:00  --------------------------------------------------------  IN:    fat emulsion (Fish Oil and Plant Based) 20% Infusion: 51 mL    Oral Fluid: 220 mL    TPN (Total Parenteral Nutrition): 996 mL  Total IN: 1267 mL    OUT:  Total OUT: 0 mL    Total NET: 1267 mL      01-17 @ 07:01  -  01-18 @ 04:38  --------------------------------------------------------  IN:    Cryoprecipitate: 300 mL    fat emulsion (Fish Oil and Plant Based) 20% Infusion: 85 mL    propofol Infusion: 14 mL    Sodium Chloride 0.9% IV Bolus: 1000 mL    TPN (Total Parenteral Nutrition): 830 mL  Total IN: 2229 mL    OUT:    Bulb: 1520 mL    Indwelling Catheter - Urethral: 2850 mL  Total OUT: 4370 mL    Total NET: -2141 mL        Weight (kg): 53.2 (01-17 @ 09:18)  01-18    136  |  106  |  16  ----------------------------<  314<H>  5.8<H>   |  16<L>  |  0.63    Ca    7.1<L>      18 Jan 2019 02:00  Phos  4.1     01-18  Mg     1.4     01-18    TPro  3.6<L>  /  Alb  2.4<L>  /  TBili  0.7  /  DBili  x   /  AST  61<H>  /  ALT  48<H>  /  AlkPhos  28<L>  01-17    [x ] Washburn catheter, indication: Critically ill    Meds: fat emulsion (Fish Oil and Plant Based) 20% Infusion 17 mL/Hr IV Continuous <Continuous>  fat emulsion (Fish Oil and Plant Based) 20% Infusion 17 mL/Hr IV Continuous <Continuous>  fat emulsion (Fish Oil and Plant Based) 20% Infusion 17 mL/Hr IV Continuous <Continuous>  Parenteral Nutrition - Adult 1 Each TPN Continuous <Continuous>  potassium phosphate IVPB 15 milliMole(s) IV Intermittent once  sodium chloride 0.9%. 1000 milliLiter(s) IV Continuous <Continuous>        HEMATOLOGIC  Meds: enoxaparin Injectable 40 milliGRAM(s) SubCutaneous daily    [x] VTE Prophylaxis                        10.8   x     )-----------( x        ( 18 Jan 2019 04:10 )             31.2     PT/INR - ( 18 Jan 2019 02:00 )   PT: 16.1 SEC;   INR: 1.43          PTT - ( 18 Jan 2019 02:00 )  PTT:36.0 SEC  Transfusion     [6 ] PRBC   [4 ] Platelets   [5 ] FFP   [ 1] Cryoprecipitate      INFECTIOUS DISEASES  WBC Count: 0.60 K/uL (01-18 @ 02:00)  WBC Count: 0.60 K/uL (01-18 @ 02:00)  WBC Count: 0.90 K/uL (01-18 @ 01:05)  WBC Count: 0.86 K/uL (01-17 @ 23:55)  WBC Count: 0.85 K/uL (01-17 @ 16:14)  WBC Count: 3.61 K/uL (01-17 @ 06:18)  WBC Count: 3.61 K/uL (01-17 @ 06:18)    RECENT CULTURES:    Meds: influenza   Vaccine 0.5 milliLiter(s) IntraMuscular once  micafungin IVPB 100 milliGRAM(s) IV Intermittent every 24 hours  micafungin IVPB      piperacillin/tazobactam IVPB. 3.375 Gram(s) IV Intermittent every 8 hours        ENDOCRINE  CAPILLARY BLOOD GLUCOSE      POCT Blood Glucose.: 185 mg/dL (17 Jan 2019 19:34)  POCT Blood Glucose.: 110 mg/dL (17 Jan 2019 05:59)        ACCESS DEVICES:  [ x] Peripheral IV  [ x] Central Venous Line	[x ] R	[ ] L	[ ] IJ	[ x] Fem	[ ] SC	Placed:   [x ] Arterial Line		[ x] R	[ ] L	[ ] Fem	[x ] Rad	[ ] Ax	Placed:   [ x] PICC:					[ ] Mediport  [ x] Urinary Catheter, Date Placed:   [x] Necessity of urinary, arterial, and venous catheters discussed    OTHER MEDICATIONS:  chlorhexidine 4% Liquid 1 Application(s) Topical two times a day      CODE STATUS: full code

## 2019-01-18 NOTE — PROGRESS NOTE ADULT - ASSESSMENT
64 yo M with PMH of HTN, HLD Gastric adenocarcinoma s/p total gastrectomy and Kelby en Y esophagojejunostomy with Dr Espinal at [facility] Dec 2017 c/b early postop SBO s/p lap SHAR, treated with adjuvant chemo and radiation. Pt now transferred from [place] after a ~20 day admission with symptoms of PO intolerance. 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 He underwent endoscopic dilation as well as stent placement, which have not alleviated his symptoms.  Advanced GI consulted for considering Endoscopic intervention for possible EJ stricture.   Pt s/p  EGD yesterday that revealed 2 stricture, one at GJ and one in proximal jejunum, later stricture not traverse with scope, (  scope could not pass ), patient developed Pneumoperitoneum and hemorrhagic shock, Resp Failure s/p intubation and Massive transfusion s/p Ex Lap yesterday that revealed a mass at GJ and the site of perforation was in Hepatic Flexure with area of necrosis and patient had Rt hemicolectomy with end Ileostomy and mucous fistula.         # Gastric CA s/p Total gastrectomy and EJ anastomosis with anastomotic stricture and jejunal stricture, post procedure Pneumoperitoneum complicated by Hemorrhagic shock, Resp Failure and massive Transfusion protocol, s/p Ex Lap that revealed a mass at GJ and the site of perforation was in Hepatic Flexure and patient had Rt hemicolectomy with end Ileostomy and mucous fistula.   - Keep NPO, IV antibiotics and Micafungin.  - Monitor CBC, INR and electrolytes.  - ICU support.   - Further plan as per SICU team and Oncology.   - GI will follow up.

## 2019-01-18 NOTE — BRIEF OPERATIVE NOTE - PROCEDURE
<<-----Click on this checkbox to enter Procedure End ileostomy  01/18/2019    Active  [id number]  Right hemicolectomy  01/18/2019    Active  [id number]  Exploratory laparotomy  01/18/2019    Active  [id number]

## 2019-01-18 NOTE — PROGRESS NOTE ADULT - SUBJECTIVE AND OBJECTIVE BOX
GI following the patient for EJ anastomotic stricture, post gastrectomy for Stomach CA , s/p EGD that revealed 2 stricture, one at GJ and one in proximal jejunum, later stricture not traverse with scope, (  scope could not pass ), patient developed Pneumoperitoneum and hemorrhagic shock, Resp Failure s/p intubation and Massive transfusion s/p Ex Lap yesterday that revealed a mass at GJ and the site of perforation was in Hepatic Flexure and patient had Rt hemicolectomy with end Ileostomy and mucous fistula.     Interval events : Pt intubated, sedated, off pressors, on Abx and Micafungin and TPN.           Allergies:  No Known Allergies      Hospital Medications:  chlorhexidine 4% Liquid 1 Application(s) Topical two times a day  dextrose 40% Gel 15 Gram(s) Oral once PRN  dextrose 5%. 1000 milliLiter(s) IV Continuous <Continuous>  dextrose 50% Injectable 12.5 Gram(s) IV Push once  dextrose 50% Injectable 25 Gram(s) IV Push once  dextrose 50% Injectable 25 Gram(s) IV Push once  fat emulsion (Fish Oil and Plant Based) 20% Infusion 17 mL/Hr IV Continuous <Continuous>  fat emulsion (Fish Oil and Plant Based) 20% Infusion 17 mL/Hr IV Continuous <Continuous>  fat emulsion (Fish Oil and Plant Based) 20% Infusion 17 mL/Hr IV Continuous <Continuous>  fat emulsion (Fish Oil and Plant Based) 20% Infusion 17 mL/Hr IV Continuous <Continuous>  fentaNYL   Infusion 0.5 MICROgram(s)/kG/Hr IV Continuous <Continuous>  glucagon  Injectable 1 milliGRAM(s) IntraMuscular once PRN  influenza   Vaccine 0.5 milliLiter(s) IntraMuscular once  insulin lispro (HumaLOG) corrective regimen sliding scale   SubCutaneous every 4 hours  lactated ringers. 1000 milliLiter(s) IV Continuous <Continuous>  micafungin IVPB 100 milliGRAM(s) IV Intermittent every 24 hours  micafungin IVPB      Parenteral Nutrition - Adult 1 Each TPN Continuous <Continuous>  Parenteral Nutrition - Adult 1 Each TPN Continuous <Continuous>  piperacillin/tazobactam IVPB. 3.375 Gram(s) IV Intermittent every 8 hours  potassium chloride  20 mEq/100 mL IVPB 20 milliEquivalent(s) IV Intermittent every 2 hours  potassium phosphate IVPB 15 milliMole(s) IV Intermittent once  propofol Infusion 5 MICROgram(s)/kG/Min IV Continuous <Continuous>      PMHX/PSHX:  Stomach cancer  Prediabetes  HLD (hyperlipidemia)  HTN (hypertension)  Malignant neoplasm of stomach, unspecified location  S/P total gastrectomy and Kelby-en-Y esophagojejunal anastomosis  S/P total gastrectomy and Kelby-en-Y esophagojejunal anastomosis  No significant past surgical history  H/O prostate biopsy  History of arthroscopy of right shoulder  History of arthroscopy of left knee  History of appendectomy      Family history:  No pertinent family history in first degree relatives  Cerebrovascular accident (Mother)      ROS:     Can not assess as patient is intubated and sedated       PHYSICAL EXAM:     GENERAL:  Appears stated age, no distress  HEENT:  NC/AT,  conjunctivae clear, sclera -anicteric  CHEST:  Full & symmetric excursion, no increased effort, breath sounds clear  HEART:  Regular rhythm, S1, S2, no added sounds  ABDOMEN:  Soft, non-tender, non-distended, normoactive bowel sounds. end ileostomy with small amount of serosanguinous fluid in it.   NEURO: Can not assess as patient is intubated and sedated     Vital Signs:  Vital Signs Last 24 Hrs  T(C): 36.6 (2019 04:00), Max: 36.8 (2019 19:25)  T(F): 97.8 (2019 04:00), Max: 98.2 (2019 19:25)  HR: 82 (2019 10:00) (82 - 121)  BP: 68/36 (2019 01:00) (68/36 - 111/76)  BP(mean): 43 (2019 01:00) (43 - 43)  RR: 20 (2019 10:00) (12 - 24)  SpO2: 98% (2019 10:00) (93% - 100%)  Daily     Daily Weight in k.5 (2019 23:20)    LABS:                        11.0   0.67  )-----------( 131      ( 2019 08:00 )             30.8     -18    140  |  109<H>  |  15  ----------------------------<  166<H>  3.3<L>   |  22  |  0.74    Ca    8.2<L>      2019 08:30  Phos  2.1     -  Mg     2.4         TPro  5.2<L>  /  Alb  3.5  /  TBili  1.8<H>  /  DBili  0.7<H>  /  AST  46<H>  /  ALT  38  /  AlkPhos  43      LIVER FUNCTIONS - ( 2019 04:10 )  Alb: 3.5 g/dL / Pro: 5.2 g/dL / ALK PHOS: 43 u/L / ALT: 38 u/L / AST: 46 u/L / GGT: x           PT/INR - ( 2019 04:10 )   PT: 12.1 SEC;   INR: 1.09          PTT - ( 2019 04:10 )  PTT:28.9 SEC        Operative Note : Tumor recurrence in LUQ involving esophagojejunostomy and portion of large bowel (splenic flexure). Unable to clearly dissect structures to investigate location of iatrogenic bowel perforation from the EGD. There was however an area of ischemic perforation on the hepatic flexure with enteric contents spilling out. A right hemicolectomy was performed with creation of an end ileostomy (RLQ) and proximal transverse colon mucus fistula (RUQ). A 19fr. rufina drain was left in the area of the tumor recurrence.

## 2019-01-18 NOTE — PROGRESS NOTE ADULT - SUBJECTIVE AND OBJECTIVE BOX
NUTRITION NOTE  [id number] TOM  ===============================    Interval events; Pt was scoped by GI and was found to have a mass at the previous surgical EJ anastomosis; in PACU pt had pain with an xray showing pneumoperitoneum; IN OR pt had a right hemicolectomy  with creation of an end ileostomy (RLQ) and proximal transverse colon mucus fistula (RUQ). A 19fr. rufina drain was left in the area of the tumor recurrence, pt was a code fusion from post op bleeding in the SICU overnight and was resuscitated.  Pt in critical condition.      VITAL SIGNS:  T(C): 36.6 (19 @ 04:00), Max: 36.8 (19 @ 19:25)  HR: 82 (19 @ 10:00) (82 - 121)  BP: 68/36 (19 @ 01:00) (68/36 - 111/76)  ABP: 90/55 (19 @ 10:00) (64/47 - 124/74)  ABP(mean): 68 (19 @ 10:00) (54 - 95)  RR: 20 (19 @ 10:00) (12 - 24)  SpO2: 98% (19 @ 10:00) (93% - 100%)  CVP(mm Hg): --  :  -   07:00  --------------------------------------------------------  IN: 3463.8 mL / OUT: 4810 mL / NET: -1346.2 mL     07:  -   10:27  --------------------------------------------------------  IN: 906 mL / OUT: 495 mL / NET: 411 mL    Glucose 110-185 with in 24hrs    NEURO: NAD, intubated  PULMONARY: non-labored on ventilator  ABDOMEN: soft, distended, 2 R sided ostomies pink/viable with nothing in the bags, L sided SOSA with shelley blood, midline dressing with blood staining  CVS tachycardia   PICC; C/D/I no erythema     Metabolic/FLUIDS/ELECTROLYTES/NUTRITION:  Gastrointestinal Medications:  dextrose 5%. 1000 milliLiter(s) IV Continuous <Continuous>  fat emulsion (Fish Oil and Plant Based) 20% Infusion 17 mL/Hr IV Continuous <Continuous>  fat emulsion (Fish Oil and Plant Based) 20% Infusion 17 mL/Hr IV Continuous <Continuous>  fat emulsion (Fish Oil and Plant Based) 20% Infusion 17 mL/Hr IV Continuous <Continuous>  fat emulsion (Fish Oil and Plant Based) 20% Infusion 17 mL/Hr IV Continuous <Continuous>  lactated ringers. 1000 milliLiter(s) IV Continuous <Continuous>  Parenteral Nutrition - Adult 1 Each TPN Continuous <Continuous>  Parenteral Nutrition - Adult 1 Each TPN Continuous <Continuous>  potassium chloride  20 mEq/100 mL IVPB 20 milliEquivalent(s) IV Intermittent every 2 hours  potassium phosphate IVPB 15 milliMole(s) IV Intermittent once    I&O's Detail  63y  Daily Weight in k.5 (2019 23:20)      140  |  109<H>  |  15  ----------------------------<  166<H>  3.3<L>   |  22  |  0.74    Ca    8.2<L>      2019 08:30  Phos  2.1       Mg     2.4         TPro  5.2<L>  /  Alb  3.5  /  TBili  1.8<H>  /  DBili  0.7<H>  /  AST  46<H>  /  ALT  38  /  AlkPhos  43        Diet: NPO     INFECTIOUS DISEASE:  Antimicrobials/Immunologic Medications:  influenza   Vaccine 0.5 milliLiter(s) IntraMuscular once  micafungin IVPB 100 milliGRAM(s) IV Intermittent every 24 hours  micafungin IVPB      piperacillin/tazobactam IVPB. 3.375 Gram(s) IV Intermittent every 8 hours    OTHER MEDICATIONS:  Endocrine/Metabolic Medications:  dextrose 40% Gel 15 Gram(s) Oral once PRN  dextrose 50% Injectable 12.5 Gram(s) IV Push once  dextrose 50% Injectable 25 Gram(s) IV Push once  dextrose 50% Injectable 25 Gram(s) IV Push once  glucagon  Injectable 1 milliGRAM(s) IntraMuscular once PRN  insulin lispro (HumaLOG) corrective regimen sliding scale   SubCutaneous every 4 hours    Genitourinary Medications:    Topical/Other Medications:  chlorhexidine 4% Liquid 1 Application(s) Topical two times a day    ASSESSMENT/PLAN:  63y Male s/p exploratory laparotomy, 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   - Increased K and added Na/K phos  - care per SICU  - monitor ostomy function  - hemodynamic monitoring  - continue TPN      1. Protein calorie malnutrition being optimized with TPN: CHO [ ] gm.  AA [ ] gm. Lipids [ ] gm.  2.  Hyperglycemia managed with: [ ] units of regular insulin    3.  Check fluid balance daily.  Strict I/O  [ ] [ ]   4.  Daily BMP, Ionized Calcium, Magnesium and Phosphorous   5.  Triglycerides at initiation of TPN and monthly [ ] [ ]   6.  Pepcid in TPN for Gi prophylaxis  [ ]

## 2019-01-18 NOTE — PROGRESS NOTE ADULT - ASSESSMENT
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     Plan:  - care per SICU  - Pain control as needed  - monitor ostomy function  - hemodynamic monitoring  - pain control as needed 63y Male s/p exploratory laparotomy, 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     Plan:  - care per SICU  - Pain control as needed  - monitor ostomy function  - hemodynamic monitoring

## 2019-01-18 NOTE — PROGRESS NOTE ADULT - ASSESSMENT
63M 1 year s/p total gastrectomy, Kelby en Y esophagojejunostomy, chemo and radiation for gastric adenocarcinoma, high grade stricture at the anastomosis on TPN, s/p EGD with post op pneumoperitoneum, RTOR for ex lap, right hemicolectomy for hepatic flexure perf, end ileostomy and mucous fistula creation, MTP for post op bleeding.     Neuro: AOx3, post-op pain  - analgesic sedation with prop and fent gtt  - goal rass -1    Resp:   - continue vent while patient remains unstable    Cards: post op hypotension, s/p MTP for hemorrhagic shock  - monitor vitals closely with A line  - serial CBCs to trend H/H  - monitor SOSA output quantity and quality    GI: EJ structure, colonic perforation  - NPO/TPN  - monitor ostomy function  - serial abdominal exams     :  - coto for close monitoring of UOP    ID: perforated viscus  - zosyn and micafungin  - neutropenic, trend WBC     Endo: no active issues    Dispo: DUTCH Scales PGY2  [id number]

## 2019-01-18 NOTE — CHART NOTE - NSCHARTNOTEFT_GEN_A_CORE
Called to SICU by colleagues, concern for post operative hemorrhagic shock following exploratory laparotomy, bowel resection, with associated ostomy and mucous fistula for suspected iatrogenic esophagojejunostomy perforation by GI performed upper endoscopy. (exact operative details pending at the time of this composition)      On examination patient remained intubated postoperatively, off sedation, SBP 60-70's MAP 50's IABP, Rufina drain which overly the LUQ resection bed drained ~ 1L sanguinous since patient was brought to SICU at 2320. At this time massive transfusion protocol initiated, right femoral vein cordis placed. Operative surgeon of record notified of concern for post operative hemorrhage. Recommended to correct coagulopathy and transfused as necessary. Patient was transfused 7 pRBC 5 FFP, 3 PLT, 1 Cryo. Patient's MAP improved to mid 60's off of vasopressors and decreased rufina drain output. At this time, I suspect there is an element of massive bleeding type of DIC, based on the PT , fibrinogen, and platelet levels. Will continue the use of a hemostatic regimen consisting of platelets, RBC, and FFP in a 1:1:1 ratio, if patient continues to exhibit signs of active bleeding will again discuss operative intervention with attending of record. Plan discussed with Surgical Attending of Record in SICU.   Will continue to monitor closely.     LILLIAN Posadas MD  PGY V

## 2019-01-18 NOTE — BRIEF OPERATIVE NOTE - POST-OP DX
Bowel perforation  01/18/2019  hepatic flexure  Active  AttiJhonatan palm  Gastric cancer  01/18/2019  recurrence  Active  AttJhonatan smith

## 2019-01-18 NOTE — PROCEDURE NOTE - PROCEDURE
<<-----Click on this checkbox to enter Procedure Insertion of central venous access device catheter  01/18/2019    Active  [id number]

## 2019-01-18 NOTE — PROGRESS NOTE ADULT - SUBJECTIVE AND OBJECTIVE BOX
ANESTHESIA POSTOP CHECK    63y Male POSTOP DAY 1    Vital Signs Last 24 Hrs  T(C): 37.7 (18 Jan 2019 12:00), Max: 38.4 (18 Jan 2019 10:00)  T(F): 99.9 (18 Jan 2019 12:00), Max: 101.2 (18 Jan 2019 10:00)  HR: 72 (18 Jan 2019 12:00) (72 - 121)  BP: 68/36 (18 Jan 2019 01:00) (68/36 - 111/76)  BP(mean): 43 (18 Jan 2019 01:00) (43 - 43)  RR: 20 (18 Jan 2019 12:00) (12 - 24)  SpO2: 99% (18 Jan 2019 12:00) (93% - 100%)  I&O's Summary    17 Jan 2019 07:01  -  18 Jan 2019 07:00  --------------------------------------------------------  IN: 3463.8 mL / OUT: 4810 mL / NET: -1346.2 mL    18 Jan 2019 07:01  -  18 Jan 2019 12:13  --------------------------------------------------------  IN: 906 mL / OUT: 620 mL / NET: 286 mL        [X ] NO APPARENT ANESTHESIA COMPLICATIONS    Overnight patient with ~2L blood loss from drains, s/p transfusion multiple units of blood products.  Now VSS, off vasopressors.

## 2019-01-18 NOTE — CHART NOTE - NSCHARTNOTEFT_GEN_A_CORE
POST-OPERATIVE NOTE    Subjective:  Patient is s/p exploratory laparotomy, right hemicolectomy with ileostomy and mucus fistula. Patient currently undergoing MTP for low H/H and hemodynamic instability.     Objective:  Vital Signs Last 24 Hrs  T(C): 36.7 (17 Jan 2019 23:20), Max: 36.8 (17 Jan 2019 19:25)  T(F): 98 (17 Jan 2019 23:20), Max: 98.2 (17 Jan 2019 19:25)  HR: 109 (18 Jan 2019 01:00) (55 - 121)  BP: 68/36 (18 Jan 2019 01:00) (68/36 - 111/76)  BP(mean): 43 (18 Jan 2019 01:00) (43 - 43)  RR: 20 (18 Jan 2019 01:00) (12 - 24)  SpO2: 100% (18 Jan 2019 01:00) (93% - 100%)  I&O's Detail    16 Jan 2019 07:01  -  17 Jan 2019 07:00  --------------------------------------------------------  IN:    fat emulsion (Fish Oil and Plant Based) 20% Infusion: 51 mL    Oral Fluid: 220 mL    TPN (Total Parenteral Nutrition): 996 mL  Total IN: 1267 mL    OUT:  Total OUT: 0 mL    Total NET: 1267 mL      17 Jan 2019 07:01  -  18 Jan 2019 01:55  --------------------------------------------------------  IN:    fat emulsion (Fish Oil and Plant Based) 20% Infusion: 17 mL    TPN (Total Parenteral Nutrition): 332 mL  Total IN: 349 mL    OUT:    Bulb: 880 mL    Indwelling Catheter - Urethral: 750 mL  Total OUT: 1630 mL    Total NET: -1281 mL        micafungin IVPB 100  micafungin IVPB   piperacillin/tazobactam IVPB. 3.375  enoxaparin Injectable 40  micafungin IVPB 100  micafungin IVPB   piperacillin/tazobactam IVPB. 3.375    PAST MEDICAL & SURGICAL HISTORY:  Stomach cancer  Prediabetes  HLD (hyperlipidemia)  HTN (hypertension)  Malignant neoplasm of stomach, unspecified location  S/P total gastrectomy and Kelby-en-Y esophagojejunal anastomosis  S/P total gastrectomy and Kelby-en-Y esophagojejunal anastomosis  H/O prostate biopsy  History of arthroscopy of right shoulder: repair for rotator cuff syndrome  History of arthroscopy of left knee: meniscal repair  History of appendectomy        Physical Exam:  General: NAD, intubated, NGT to suction  Pulmonary: Nonlabored breathing on ventilator  Abdominal: soft, distended, 2 R sided ostomies pink/viable with nothing in the bags, L sided SOSA with shelley blood, midline dressing with blood staining  Extremities: WWP  : coto in place draining yellow urine      LABS:                        7.9    0.90  )-----------( 91       ( 18 Jan 2019 01:05 )             22.3     01-17    138  |  111<H>  |  17  ----------------------------<  243<H>  3.8   |  19<L>  |  0.58    Ca    6.4<LL>      17 Jan 2019 23:55  Phos  1.8     01-17  Mg     1.5     01-17    TPro  3.6<L>  /  Alb  2.4<L>  /  TBili  0.7  /  DBili  x   /  AST  61<H>  /  ALT  48<H>  /  AlkPhos  28<L>  01-17    PT/INR - ( 17 Jan 2019 23:55 )   PT: 22.5 SEC;   INR: 1.93          PTT - ( 17 Jan 2019 23:55 )  PTT:52.9 SEC  CAPILLARY BLOOD GLUCOSE      POCT Blood Glucose.: 185 mg/dL (17 Jan 2019 19:34)  POCT Blood Glucose.: 110 mg/dL (17 Jan 2019 05:59)      Radiology and Additional Studies:    Assessment:  The patient is a 63y Male who is now several hours post-op from an exploratory laparotomy, right hemicolectomy with ileostomy and mucus fistula.     Plan:  - Pain control as needed  - c/w MTP  - monitor ostomy function  - hemodynamic monitoring  - appreciate SICU care    D Team Surgery  [phone]

## 2019-01-19 LAB
ANION GAP SERPL CALC-SCNC: 7 MMO/L — SIGNIFICANT CHANGE UP (ref 7–14)
APTT BLD: 36.1 SEC — SIGNIFICANT CHANGE UP (ref 27.5–36.3)
BASE EXCESS BLDA CALC-SCNC: 0.4 MMOL/L — SIGNIFICANT CHANGE UP
BASE EXCESS BLDA CALC-SCNC: 0.5 MMOL/L — SIGNIFICANT CHANGE UP
BASOPHILS # BLD AUTO: 0.01 K/UL — SIGNIFICANT CHANGE UP (ref 0–0.2)
BASOPHILS NFR BLD AUTO: 0.3 % — SIGNIFICANT CHANGE UP (ref 0–2)
BUN SERPL-MCNC: 12 MG/DL — SIGNIFICANT CHANGE UP (ref 7–23)
CA-I BLD-SCNC: 1.08 MMOL/L — SIGNIFICANT CHANGE UP (ref 1.03–1.23)
CA-I BLDA-SCNC: 1.14 MMOL/L — LOW (ref 1.15–1.29)
CALCIUM SERPL-MCNC: 7.8 MG/DL — LOW (ref 8.4–10.5)
CHLORIDE SERPL-SCNC: 107 MMOL/L — SIGNIFICANT CHANGE UP (ref 98–107)
CO2 SERPL-SCNC: 22 MMOL/L — SIGNIFICANT CHANGE UP (ref 22–31)
CREAT SERPL-MCNC: 0.63 MG/DL — SIGNIFICANT CHANGE UP (ref 0.5–1.3)
EOSINOPHIL # BLD AUTO: 0.05 K/UL — SIGNIFICANT CHANGE UP (ref 0–0.5)
EOSINOPHIL NFR BLD AUTO: 1.7 % — SIGNIFICANT CHANGE UP (ref 0–6)
FIBRINOGEN PPP-MCNC: 790 MG/DL — HIGH (ref 350–510)
GLUCOSE BLDA-MCNC: 132 MG/DL — HIGH (ref 70–99)
GLUCOSE BLDC GLUCOMTR-MCNC: 123 MG/DL — HIGH (ref 70–99)
GLUCOSE BLDC GLUCOMTR-MCNC: 125 MG/DL — HIGH (ref 70–99)
GLUCOSE BLDC GLUCOMTR-MCNC: 130 MG/DL — HIGH (ref 70–99)
GLUCOSE SERPL-MCNC: 132 MG/DL — HIGH (ref 70–99)
HCO3 BLDA-SCNC: 25 MMOL/L — SIGNIFICANT CHANGE UP (ref 22–26)
HCO3 BLDA-SCNC: 25 MMOL/L — SIGNIFICANT CHANGE UP (ref 22–26)
HCT VFR BLD CALC: 29.5 % — LOW (ref 39–50)
HCT VFR BLDA CALC: 34 % — LOW (ref 39–51)
HGB BLD-MCNC: 10.6 G/DL — LOW (ref 13–17)
HGB BLDA-MCNC: 11 G/DL — LOW (ref 13–17)
IMM GRANULOCYTES NFR BLD AUTO: 8.9 % — HIGH (ref 0–1.5)
INR BLD: 1.79 — HIGH (ref 0.88–1.17)
LACTATE BLDA-SCNC: 1.4 MMOL/L — SIGNIFICANT CHANGE UP (ref 0.5–2)
LYMPHOCYTES # BLD AUTO: 0.27 K/UL — LOW (ref 1–3.3)
LYMPHOCYTES # BLD AUTO: 8.9 % — LOW (ref 13–44)
MAGNESIUM SERPL-MCNC: 2 MG/DL — SIGNIFICANT CHANGE UP (ref 1.6–2.6)
MCHC RBC-ENTMCNC: 29.8 PG — SIGNIFICANT CHANGE UP (ref 27–34)
MCHC RBC-ENTMCNC: 35.9 % — SIGNIFICANT CHANGE UP (ref 32–36)
MCV RBC AUTO: 82.9 FL — SIGNIFICANT CHANGE UP (ref 80–100)
MONOCYTES # BLD AUTO: 0.14 K/UL — SIGNIFICANT CHANGE UP (ref 0–0.9)
MONOCYTES NFR BLD AUTO: 4.6 % — SIGNIFICANT CHANGE UP (ref 2–14)
NEUTROPHILS # BLD AUTO: 2.29 K/UL — SIGNIFICANT CHANGE UP (ref 1.8–7.4)
NEUTROPHILS NFR BLD AUTO: 75.6 % — SIGNIFICANT CHANGE UP (ref 43–77)
NRBC # FLD: 0 K/UL — LOW (ref 25–125)
PCO2 BLDA: 36 MMHG — SIGNIFICANT CHANGE UP (ref 35–48)
PCO2 BLDA: 38 MMHG — SIGNIFICANT CHANGE UP (ref 35–48)
PH BLDA: 7.42 PH — SIGNIFICANT CHANGE UP (ref 7.35–7.45)
PH BLDA: 7.45 PH — SIGNIFICANT CHANGE UP (ref 7.35–7.45)
PHOSPHATE SERPL-MCNC: 2 MG/DL — LOW (ref 2.5–4.5)
PLATELET # BLD AUTO: 113 K/UL — LOW (ref 150–400)
PMV BLD: 10.4 FL — SIGNIFICANT CHANGE UP (ref 7–13)
PO2 BLDA: 122 MMHG — HIGH (ref 83–108)
PO2 BLDA: 159 MMHG — HIGH (ref 83–108)
POTASSIUM BLDA-SCNC: 3.2 MMOL/L — LOW (ref 3.4–4.5)
POTASSIUM SERPL-MCNC: 3.5 MMOL/L — SIGNIFICANT CHANGE UP (ref 3.5–5.3)
POTASSIUM SERPL-SCNC: 3.5 MMOL/L — SIGNIFICANT CHANGE UP (ref 3.5–5.3)
PREALB SERPL-MCNC: 9 MG/DL — LOW (ref 20–40)
PROTHROM AB SERPL-ACNC: 20.8 SEC — HIGH (ref 9.8–13.1)
RBC # BLD: 3.56 M/UL — LOW (ref 4.2–5.8)
RBC # FLD: 15 % — HIGH (ref 10.3–14.5)
SAO2 % BLDA: 98.8 % — SIGNIFICANT CHANGE UP (ref 95–99)
SAO2 % BLDA: 99.1 % — HIGH (ref 95–99)
SODIUM BLDA-SCNC: 131 MMOL/L — LOW (ref 136–146)
SODIUM SERPL-SCNC: 136 MMOL/L — SIGNIFICANT CHANGE UP (ref 135–145)
WBC # BLD: 3.03 K/UL — LOW (ref 3.8–10.5)
WBC # FLD AUTO: 3.03 K/UL — LOW (ref 3.8–10.5)

## 2019-01-19 PROCEDURE — 99291 CRITICAL CARE FIRST HOUR: CPT

## 2019-01-19 PROCEDURE — 71045 X-RAY EXAM CHEST 1 VIEW: CPT | Mod: 26

## 2019-01-19 PROCEDURE — 99232 SBSQ HOSP IP/OBS MODERATE 35: CPT | Mod: GC

## 2019-01-19 RX ORDER — POTASSIUM CHLORIDE 20 MEQ
20 PACKET (EA) ORAL ONCE
Qty: 0 | Refills: 0 | Status: COMPLETED | OUTPATIENT
Start: 2019-01-19 | End: 2019-01-19

## 2019-01-19 RX ORDER — ACETAMINOPHEN 500 MG
1000 TABLET ORAL ONCE
Qty: 0 | Refills: 0 | Status: COMPLETED | OUTPATIENT
Start: 2019-01-19 | End: 2019-01-19

## 2019-01-19 RX ORDER — POTASSIUM PHOSPHATE, MONOBASIC POTASSIUM PHOSPHATE, DIBASIC 236; 224 MG/ML; MG/ML
15 INJECTION, SOLUTION INTRAVENOUS ONCE
Qty: 0 | Refills: 0 | Status: COMPLETED | OUTPATIENT
Start: 2019-01-19 | End: 2019-01-19

## 2019-01-19 RX ORDER — ENOXAPARIN SODIUM 100 MG/ML
40 INJECTION SUBCUTANEOUS DAILY
Qty: 0 | Refills: 0 | Status: DISCONTINUED | OUTPATIENT
Start: 2019-01-19 | End: 2019-01-26

## 2019-01-19 RX ORDER — INSULIN LISPRO 100/ML
VIAL (ML) SUBCUTANEOUS EVERY 6 HOURS
Qty: 0 | Refills: 0 | Status: DISCONTINUED | OUTPATIENT
Start: 2019-01-19 | End: 2019-02-01

## 2019-01-19 RX ORDER — POTASSIUM CHLORIDE 20 MEQ
10 PACKET (EA) ORAL
Qty: 0 | Refills: 0 | Status: DISCONTINUED | OUTPATIENT
Start: 2019-01-19 | End: 2019-01-19

## 2019-01-19 RX ORDER — I.V. FAT EMULSION 20 G/100ML
17 EMULSION INTRAVENOUS
Qty: 40 | Refills: 0 | Status: DISCONTINUED | OUTPATIENT
Start: 2019-01-19 | End: 2019-01-23

## 2019-01-19 RX ORDER — ELECTROLYTE SOLUTION,INJ
1 VIAL (ML) INTRAVENOUS
Qty: 0 | Refills: 0 | Status: DISCONTINUED | OUTPATIENT
Start: 2019-01-19 | End: 2019-01-19

## 2019-01-19 RX ORDER — METOPROLOL TARTRATE 50 MG
5 TABLET ORAL EVERY 6 HOURS
Qty: 0 | Refills: 0 | Status: DISCONTINUED | OUTPATIENT
Start: 2019-01-19 | End: 2019-01-25

## 2019-01-19 RX ADMIN — PIPERACILLIN AND TAZOBACTAM 25 GRAM(S): 4; .5 INJECTION, POWDER, LYOPHILIZED, FOR SOLUTION INTRAVENOUS at 05:05

## 2019-01-19 RX ADMIN — HYDROMORPHONE HYDROCHLORIDE 0.5 MILLIGRAM(S): 2 INJECTION INTRAMUSCULAR; INTRAVENOUS; SUBCUTANEOUS at 22:00

## 2019-01-19 RX ADMIN — POTASSIUM PHOSPHATE, MONOBASIC POTASSIUM PHOSPHATE, DIBASIC 62.5 MILLIMOLE(S): 236; 224 INJECTION, SOLUTION INTRAVENOUS at 05:08

## 2019-01-19 RX ADMIN — Medication 1 EACH: at 17:01

## 2019-01-19 RX ADMIN — MICAFUNGIN SODIUM 105 MILLIGRAM(S): 100 INJECTION, POWDER, LYOPHILIZED, FOR SOLUTION INTRAVENOUS at 13:08

## 2019-01-19 RX ADMIN — HYDROMORPHONE HYDROCHLORIDE 0.5 MILLIGRAM(S): 2 INJECTION INTRAMUSCULAR; INTRAVENOUS; SUBCUTANEOUS at 21:50

## 2019-01-19 RX ADMIN — Medication 1000 MILLIGRAM(S): at 10:17

## 2019-01-19 RX ADMIN — CHLORHEXIDINE GLUCONATE 1 APPLICATION(S): 213 SOLUTION TOPICAL at 18:36

## 2019-01-19 RX ADMIN — HYDROMORPHONE HYDROCHLORIDE 0.5 MILLIGRAM(S): 2 INJECTION INTRAMUSCULAR; INTRAVENOUS; SUBCUTANEOUS at 14:28

## 2019-01-19 RX ADMIN — HYDROMORPHONE HYDROCHLORIDE 0.5 MILLIGRAM(S): 2 INJECTION INTRAMUSCULAR; INTRAVENOUS; SUBCUTANEOUS at 00:33

## 2019-01-19 RX ADMIN — PIPERACILLIN AND TAZOBACTAM 25 GRAM(S): 4; .5 INJECTION, POWDER, LYOPHILIZED, FOR SOLUTION INTRAVENOUS at 12:16

## 2019-01-19 RX ADMIN — Medication 400 MILLIGRAM(S): at 08:17

## 2019-01-19 RX ADMIN — HYDROMORPHONE HYDROCHLORIDE 0.5 MILLIGRAM(S): 2 INJECTION INTRAMUSCULAR; INTRAVENOUS; SUBCUTANEOUS at 00:45

## 2019-01-19 RX ADMIN — HYDROMORPHONE HYDROCHLORIDE 0.5 MILLIGRAM(S): 2 INJECTION INTRAMUSCULAR; INTRAVENOUS; SUBCUTANEOUS at 08:42

## 2019-01-19 RX ADMIN — I.V. FAT EMULSION 17 ML/HR: 20 EMULSION INTRAVENOUS at 16:19

## 2019-01-19 RX ADMIN — CHLORHEXIDINE GLUCONATE 1 APPLICATION(S): 213 SOLUTION TOPICAL at 08:24

## 2019-01-19 RX ADMIN — Medication 5 MILLIGRAM(S): at 16:35

## 2019-01-19 RX ADMIN — PIPERACILLIN AND TAZOBACTAM 25 GRAM(S): 4; .5 INJECTION, POWDER, LYOPHILIZED, FOR SOLUTION INTRAVENOUS at 21:39

## 2019-01-19 RX ADMIN — HYDROMORPHONE HYDROCHLORIDE 0.5 MILLIGRAM(S): 2 INJECTION INTRAMUSCULAR; INTRAVENOUS; SUBCUTANEOUS at 15:46

## 2019-01-19 RX ADMIN — SODIUM CHLORIDE 50 MILLILITER(S): 9 INJECTION, SOLUTION INTRAVENOUS at 21:50

## 2019-01-19 RX ADMIN — Medication 100 MILLIEQUIVALENT(S): at 05:05

## 2019-01-19 RX ADMIN — ENOXAPARIN SODIUM 40 MILLIGRAM(S): 100 INJECTION SUBCUTANEOUS at 12:16

## 2019-01-19 RX ADMIN — Medication 100 MILLIEQUIVALENT(S): at 06:38

## 2019-01-19 RX ADMIN — HYDROMORPHONE HYDROCHLORIDE 0.5 MILLIGRAM(S): 2 INJECTION INTRAMUSCULAR; INTRAVENOUS; SUBCUTANEOUS at 08:24

## 2019-01-19 NOTE — PROGRESS NOTE ADULT - SUBJECTIVE AND OBJECTIVE BOX
Pre-Interventional Radiology Procedure Note    63y    Male    Procedure: IVC filter    Diagnosis/Indication: Patient is a 63y old  Male who presents with a chief complaint of Bowel Obstruction (19 Jan 2019 09:31)      Interventional Radiology Attending Physician: Dr. Mcdonald    Ordering Attending Physician: Dr. Stevens    PAST MEDICAL & SURGICAL HISTORY:  Stomach cancer  Prediabetes  HLD (hyperlipidemia)  HTN (hypertension)  Malignant neoplasm of stomach, unspecified location  S/P total gastrectomy and Kelby-en-Y esophagojejunal anastomosis  S/P total gastrectomy and Kelby-en-Y esophagojejunal anastomosis  H/O prostate biopsy  History of arthroscopy of right shoulder: repair for rotator cuff syndrome  History of arthroscopy of left knee: meniscal repair  History of appendectomy       CBC Full  -  ( 19 Jan 2019 02:15 )  WBC Count : 3.03 K/uL  Hemoglobin : 10.6 g/dL  Hematocrit : 29.5 %  Platelet Count - Automated : 113 K/uL  Mean Cell Volume : 82.9 fL  Mean Cell Hemoglobin : 29.8 pg  Mean Cell Hemoglobin Concentration : 35.9 %  Auto Neutrophil # : 2.29 K/uL  Auto Lymphocyte # : 0.27 K/uL  Auto Monocyte # : 0.14 K/uL  Auto Eosinophil # : 0.05 K/uL  Auto Basophil # : 0.01 K/uL  Auto Neutrophil % : 75.6 %  Auto Lymphocyte % : 8.9 %  Auto Monocyte % : 4.6 %  Auto Eosinophil % : 1.7 %  Auto Basophil % : 0.3 %    01-19    136  |  107  |  12  ----------------------------<  132<H>  3.5   |  22  |  0.63    Ca    7.8<L>      19 Jan 2019 02:15  Phos  2.0     01-19  Mg     2.0     01-19    TPro  5.2<L>  /  Alb  3.5  /  TBili  1.8<H>  /  DBili  0.7<H>  /  AST  46<H>  /  ALT  38  /  AlkPhos  43  01-18    PT/INR - ( 19 Jan 2019 02:15 )   PT: 20.8 SEC;   INR: 1.79          PTT - ( 19 Jan 2019 02:15 )  PTT:36.1 SEC

## 2019-01-19 NOTE — PROGRESS NOTE ADULT - SUBJECTIVE AND OBJECTIVE BOX
SICU Daily Progress Note  =====================================================  Interval/Overnight Events:     Patient H/H remained stable overnight. BP stable. SOSA drain output decreased from previous day.     HISTORY  63y Male with history of gastric adenocarcinoma s/p total gastrectomy and Kelby en Y esophagojejunostomy with Dr Espinal at [facility] Dec 2017 c/b early postop SBO s/p lap SHAR, treated with adjuvant chemo and radiation. Pt now transferred from [place] after a ~20 day admission with symptoms of PO intolerance. 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 He underwent endoscopic dilation as well as stent placement, but the stent migrated and was subsequently removed. Pt reports vomiting any PO intake greater than ice chips. He has been on TPN for the past 6 weeks.  CT enterography was attempted but patient could not tolerate the PO contrast.  Today patient went for endoscopy to better assess strictures.  EJ anastomosis was noted to be friable and erythematous, biopsies were taken.  In PACU patient had complaints of abdominal distention and pain.  Upright CXR performed significant for pneumoperitoneum.  Nasoesophageal tube placed for decompression and SICU consulted for monitoring.     Patient observed on the floor, became increasingly peritoneal, tender, AMS.  Taken to the OR for ex lap. About 2L of ascites evacuated. Patient was found to have a large mass encasing the EJ anastomosis that was adherent to the abdominal wall.  Patient found to have a perforation at the hepatic flexure with surrounding necrosis.  An extended right hemicolectomy was performed with end ileostomy and mucous fistula creation of transverse colon.  Patient was brought to the SICU hemodynamically stable, intubated.  In SICU patient became tachycardic and hypotensive.  Pressors started, H/H noted to have significant decrease.  SOSA placed around epigastric mass pouring out >500cc sanguinous fluid.  MTP started, R cordis placed.  Patient received 6U PRBC, 5 FFP, 1 cryo, 4 platelets.  Patient stabilized and off pressors.          PMH:   ***    Allergies: No Known Allergies      MEDICATIONS:   --------------------------------------------------------------------------------------  Neurologic Medications  HYDROmorphone  Injectable 0.5 milliGRAM(s) IV Push every 6 hours PRN Severe Pain (7 - 10)  HYDROmorphone  Injectable 0.25 milliGRAM(s) IV Push every 6 hours PRN Moderate Pain (4 - 6)    Respiratory Medications    Cardiovascular Medications    Gastrointestinal Medications  dextrose 5%. 1000 milliLiter(s) IV Continuous <Continuous>  fat emulsion (Fish Oil and Plant Based) 20% Infusion 17 mL/Hr IV Continuous <Continuous>  fat emulsion (Fish Oil and Plant Based) 20% Infusion 17 mL/Hr IV Continuous <Continuous>  lactated ringers. 1000 milliLiter(s) IV Continuous <Continuous>  Parenteral Nutrition - Adult 1 Each TPN Continuous <Continuous>    Genitourinary Medications    Hematologic/Oncologic Medications  influenza   Vaccine 0.5 milliLiter(s) IntraMuscular once    Antimicrobial/Immunologic Medications  micafungin IVPB 100 milliGRAM(s) IV Intermittent every 24 hours  micafungin IVPB      piperacillin/tazobactam IVPB. 3.375 Gram(s) IV Intermittent every 8 hours    Endocrine/Metabolic Medications  dextrose 40% Gel 15 Gram(s) Oral once PRN Blood Glucose LESS THAN 70 milliGRAM(s)/deciliter  dextrose 50% Injectable 12.5 Gram(s) IV Push once  dextrose 50% Injectable 25 Gram(s) IV Push once  dextrose 50% Injectable 25 Gram(s) IV Push once  glucagon  Injectable 1 milliGRAM(s) IntraMuscular once PRN Glucose LESS THAN 70 milligrams/deciliter  insulin lispro (HumaLOG) corrective regimen sliding scale   SubCutaneous every 4 hours    Topical/Other Medications  chlorhexidine 4% Liquid 1 Application(s) Topical two times a day    --------------------------------------------------------------------------------------    VITAL SIGNS, INS/OUTS (last 24 hours):  --------------------------------------------------------------------------------------  ICU Vital Signs Last 24 Hrs  T(C): 37.2 (18 Jan 2019 20:00), Max: 38.4 (18 Jan 2019 10:00)  T(F): 99 (18 Jan 2019 20:00), Max: 101.2 (18 Jan 2019 10:00)  HR: 83 (18 Jan 2019 23:00) (69 - 109)  BP: 68/36 (18 Jan 2019 01:00) (68/36 - 68/36)  BP(mean): 43 (18 Jan 2019 01:00) (43 - 43)  ABP: 116/55 (18 Jan 2019 23:00) (64/47 - 128/69)  ABP(mean): 76 (18 Jan 2019 23:00) (54 - 95)  RR: 23 (18 Jan 2019 23:00) (19 - 23)  SpO2: 98% (18 Jan 2019 23:00) (97% - 100%)    --------------------------------------------------------------------------------------    EXAM  NEUROLOGY  RASS:   0  Exam: Normal, NAD, alert, intubated, on Dilaudid prn    HEENT  Exam: Normocephalic, atraumatic, EOMI.      RESPIRATORY  Exam: Lungs clear to auscultation, Normal expansion/effort.   Mechanical Ventilation: Mode: AC/ CMV (Assist Control/ Continuous Mandatory Ventilation), RR (machine): 20, TV (machine): 400, FiO2: 30, PEEP: 5, MAP: 8, PIP: 18    CARDIOVASCULAR  Exam: S1, S2.  Regular rate and rhythm.       GI/NUTRITION  Exam: Abdomen soft, Non-tender, Non-distended.      VASCULAR  Exam: Extremities warm, pink, well-perfused.     MUSCULOSKELETAL  Exam: All extremities moving spontaneously without limitations.     SKIN  Exam: Good skin turgor, no skin breakdown.     METABOLIC/FLUIDS/ELECTROLYTES  dextrose 5%. 1000 milliLiter(s) IV Continuous <Continuous>  fat emulsion (Fish Oil and Plant Based) 20% Infusion 17 mL/Hr IV Continuous <Continuous>  fat emulsion (Fish Oil and Plant Based) 20% Infusion 17 mL/Hr IV Continuous <Continuous>  lactated ringers. 1000 milliLiter(s) IV Continuous <Continuous>  Parenteral Nutrition - Adult 1 Each TPN Continuous <Continuous>      HEMATOLOGIC  [x] VTE Prophylaxis:   Transfusions:	[] PRBC	[] Platelets		[] FFP	[] Cryoprecipitate    INFECTIOUS DISEASE  Antimicrobials/Immunologic Medications:  influenza   Vaccine 0.5 milliLiter(s) IntraMuscular once  micafungin IVPB 100 milliGRAM(s) IV Intermittent every 24 hours  micafungin IVPB      piperacillin/tazobactam IVPB. 3.375 Gram(s) IV Intermittent every 8 hours    Day #      of     ***    Tubes/Lines/Drains  ***  [x] Peripheral IV  [] Central Venous Line     	[] R	[] L	[] IJ	[] Fem	[] SC	Date Placed:   [] Arterial Line		[] R	[] L	[] Fem	[] Rad	[] Ax	Date Placed:   [] PICC		[] Midline		[] Mediport  [] Urinary Catheter		Date Placed:   [x] Necessity of urinary, arterial, and venous catheters discussed    LABS  --------------------------------------------------------------------------------------  ((Insert SICU Labs here))***  --------------------------------------------------------------------------------------    OTHER LABORATORY:     IMAGING STUDIES:   CXR:     63M 1 year s/p total gastrectomy, Kelby en Y esophagojejunostomy, chemo and radiation for gastric adenocarcinoma, high grade stricture at the anastomosis on TPN, s/p EGD with post op pneumoperitoneum, RTOR for ex lap, right hemicolectomy for hepatic flexure perf, end ileostomy and mucous fistula creation, MTP for post op bleeding.     Neuro: AOx3, post-op pain  - prn dilaudid  - goal rass 0    Resp:   - continue vent while patient remains unstable    Cards: post op hypotension, s/p MTP for hemorrhagic shock  - monitor vitals closely with A line  - serial CBCs to trend H/H  - monitor SOSA output quantity and quality    GI: EJ structure, colonic perforation  - NPO/TPN  - monitor ostomy function  - serial abdominal exams     :  - coto for close monitoring of UOP    ID: perforated viscus  - zosyn and micafungin  - neutropenic, trend WBC     Endo: no active issues    Dispo: SICU    --------------------------------------------------------------------------------------    Critical Care Diagnoses: SICU Daily Progress Note  =====================================================  Interval/Overnight Events:     Patient H/H remained stable overnight. BP stable. SOSA drain output decreased from previous day.     HISTORY  63y Male with history of gastric adenocarcinoma s/p total gastrectomy and Kelby en Y esophagojejunostomy with Dr Espinal at [facility] Dec 2017 c/b early postop SBO s/p lap SHAR, treated with adjuvant chemo and radiation. Pt now transferred from [place] after a ~20 day admission with symptoms of PO intolerance. 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 He underwent endoscopic dilation as well as stent placement, but the stent migrated and was subsequently removed. Pt reports vomiting any PO intake greater than ice chips. He has been on TPN for the past 6 weeks.  CT enterography was attempted but patient could not tolerate the PO contrast.  Today patient went for endoscopy to better assess strictures.  EJ anastomosis was noted to be friable and erythematous, biopsies were taken.  In PACU patient had complaints of abdominal distention and pain.  Upright CXR performed significant for pneumoperitoneum.  Nasoesophageal tube placed for decompression and SICU consulted for monitoring.     Patient observed on the floor, became increasingly peritoneal, tender, AMS.  Taken to the OR for ex lap. About 2L of ascites evacuated. Patient was found to have a large mass encasing the EJ anastomosis that was adherent to the abdominal wall.  Patient found to have a perforation at the hepatic flexure with surrounding necrosis.  An extended right hemicolectomy was performed with end ileostomy and mucous fistula creation of transverse colon.  Patient was brought to the SICU hemodynamically stable, intubated.  In SICU patient became tachycardic and hypotensive.  Pressors started, H/H noted to have significant decrease.  SOSA placed around epigastric mass pouring out >500cc sanguinous fluid.  MTP started, R cordis placed.  Patient received 6U PRBC, 5 FFP, 1 cryo, 4 platelets.  Patient stabilized and off pressors.          PMH:   ***    Allergies: No Known Allergies      MEDICATIONS:   --------------------------------------------------------------------------------------  Neurologic Medications  HYDROmorphone  Injectable 0.5 milliGRAM(s) IV Push every 6 hours PRN Severe Pain (7 - 10)  HYDROmorphone  Injectable 0.25 milliGRAM(s) IV Push every 6 hours PRN Moderate Pain (4 - 6)    Respiratory Medications    Cardiovascular Medications    Gastrointestinal Medications  dextrose 5%. 1000 milliLiter(s) IV Continuous <Continuous>  fat emulsion (Fish Oil and Plant Based) 20% Infusion 17 mL/Hr IV Continuous <Continuous>  fat emulsion (Fish Oil and Plant Based) 20% Infusion 17 mL/Hr IV Continuous <Continuous>  lactated ringers. 1000 milliLiter(s) IV Continuous <Continuous>  Parenteral Nutrition - Adult 1 Each TPN Continuous <Continuous>    Genitourinary Medications    Hematologic/Oncologic Medications  influenza   Vaccine 0.5 milliLiter(s) IntraMuscular once    Antimicrobial/Immunologic Medications  micafungin IVPB 100 milliGRAM(s) IV Intermittent every 24 hours  micafungin IVPB      piperacillin/tazobactam IVPB. 3.375 Gram(s) IV Intermittent every 8 hours    Endocrine/Metabolic Medications  dextrose 40% Gel 15 Gram(s) Oral once PRN Blood Glucose LESS THAN 70 milliGRAM(s)/deciliter  dextrose 50% Injectable 12.5 Gram(s) IV Push once  dextrose 50% Injectable 25 Gram(s) IV Push once  dextrose 50% Injectable 25 Gram(s) IV Push once  glucagon  Injectable 1 milliGRAM(s) IntraMuscular once PRN Glucose LESS THAN 70 milligrams/deciliter  insulin lispro (HumaLOG) corrective regimen sliding scale   SubCutaneous every 4 hours    Topical/Other Medications  chlorhexidine 4% Liquid 1 Application(s) Topical two times a day    --------------------------------------------------------------------------------------    VITAL SIGNS, INS/OUTS (last 24 hours):  --------------------------------------------------------------------------------------  ICU Vital Signs Last 24 Hrs  T(C): 37.2 (18 Jan 2019 20:00), Max: 38.4 (18 Jan 2019 10:00)  T(F): 99 (18 Jan 2019 20:00), Max: 101.2 (18 Jan 2019 10:00)  HR: 83 (18 Jan 2019 23:00) (69 - 109)  BP: 68/36 (18 Jan 2019 01:00) (68/36 - 68/36)  BP(mean): 43 (18 Jan 2019 01:00) (43 - 43)  ABP: 116/55 (18 Jan 2019 23:00) (64/47 - 128/69)  ABP(mean): 76 (18 Jan 2019 23:00) (54 - 95)  RR: 23 (18 Jan 2019 23:00) (19 - 23)  SpO2: 98% (18 Jan 2019 23:00) (97% - 100%)    --------------------------------------------------------------------------------------    EXAM  NEUROLOGY  RASS:   0  Exam: Normal, NAD, alert, intubated, on Dilaudid prn    HEENT  Exam: Normocephalic, atraumatic, EOMI.      RESPIRATORY  Exam: Lungs clear to auscultation, Normal expansion/effort.   Mechanical Ventilation: Mode: AC/ CMV (Assist Control/ Continuous Mandatory Ventilation), RR (machine): 20, TV (machine): 400, FiO2: 30, PEEP: 5, MAP: 8, PIP: 18    CARDIOVASCULAR  Exam: S1, S2.  Regular rate and rhythm.       GI/NUTRITION  Exam: Abdomen soft, appropriately tender, Non-distended.  ostomy pink, mucus fistula pink     VASCULAR  Exam: Extremities warm, pink, well-perfused.     MUSCULOSKELETAL  Exam: All extremities moving spontaneously without limitations.     SKIN  Exam: Good skin turgor, no skin breakdown.     METABOLIC/FLUIDS/ELECTROLYTES  dextrose 5%. 1000 milliLiter(s) IV Continuous <Continuous>  fat emulsion (Fish Oil and Plant Based) 20% Infusion 17 mL/Hr IV Continuous <Continuous>  fat emulsion (Fish Oil and Plant Based) 20% Infusion 17 mL/Hr IV Continuous <Continuous>  lactated ringers. 1000 milliLiter(s) IV Continuous <Continuous>  Parenteral Nutrition - Adult 1 Each TPN Continuous <Continuous>      HEMATOLOGIC  [x] VTE Prophylaxis:   Transfusions:	[] PRBC	[] Platelets		[] FFP	[] Cryoprecipitate    INFECTIOUS DISEASE  Antimicrobials/Immunologic Medications:  influenza   Vaccine 0.5 milliLiter(s) IntraMuscular once  micafungin IVPB 100 milliGRAM(s) IV Intermittent every 24 hours  micafungin IVPB      piperacillin/tazobactam IVPB. 3.375 Gram(s) IV Intermittent every 8 hours    Day #      of     ***    Tubes/Lines/Drains  ***  [x] Peripheral IV  [] Central Venous Line     	[] R	[] L	[] IJ	[] Fem	[] SC	Date Placed:   [] Arterial Line		[] R	[] L	[] Fem	[] Rad	[] Ax	Date Placed:   [] PICC		[] Midline		[] Mediport  [] Urinary Catheter		Date Placed:   [x] Necessity of urinary, arterial, and venous catheters discussed    LABS  --------------------------------------------------------------------------------------    --------------------------------------------------------------------------------------    OTHER LABORATORY:     IMAGING STUDIES:   CXR:     63M 1 year s/p total gastrectomy, Kelby en Y esophagojejunostomy, chemo and radiation for gastric adenocarcinoma, high grade stricture at the anastomosis on TPN, s/p EGD with post op pneumoperitoneum, RTOR for ex lap, right hemicolectomy for hepatic flexure perf, end ileostomy and mucous fistula creation, MTP for post op bleeding.     Neuro: AOx3, post-op pain  - prn dilaudid  - goal rass 0    Resp:   - continue vent while patient remains unstable    Cards: post op hypotension, s/p MTP for hemorrhagic shock  - monitor vitals closely with A line  - serial CBCs to trend H/H  - monitor SOSA output quantity and quality    GI: EJ structure, colonic perforation  - NPO/TPN  - monitor ostomy function  - serial abdominal exams     :  - coto for close monitoring of UOP    ID: perforated viscus  - zosyn and micafungin  - neutropenic, trend WBC     Endo: no active issues    Dispo: SICU    --------------------------------------------------------------------------------------    Critical Care Diagnoses:

## 2019-01-19 NOTE — PROGRESS NOTE ADULT - SUBJECTIVE AND OBJECTIVE BOX
NUTRITION NOTE  [id number] TOM  ===============================    Interval events; Pt more alert this am; plan for extubation     VITAL SIGNS:  T(C): 38.2 (19 @ 08:00), Max: 38.4 (19 @ 10:00)  HR: 95 (19 @ 09:00) (69 - 95)  BP: --  ABP: 135/65 (19 @ 09:00) (85/55 - 135/65)  ABP(mean): 87 (19 @ 09:00) (66 - 90)  RR: 20 (19 @ 09:00) (20 - 23)  SpO2: 95% (19 @ 09:00) (95% - 99%)  CVP(mm Hg): --   @ 07:  -   @ 07:00  --------------------------------------------------------  IN: 7958.6 mL / OUT: 3005 mL / NET: 4953.6 mL     @ 07:01  -   @ 09:32  --------------------------------------------------------  IN: 416 mL / OUT: 440 mL / NET: -24 mL      NEURO: Alert, NAD, intubated  PULMONARY: non-labored respirations on ventilator  ABDOMEN: soft, distended, 2 R sided ostomies pink/viable with nothing in the bags, L sided SOSA with shelley blood, midline dressing with blood staining    Metabolic/FLUIDS/ELECTROLYTES/NUTRITION:  Gastrointestinal Medications:  dextrose 5%. 1000 milliLiter(s) IV Continuous <Continuous>  fat emulsion (Fish Oil and Plant Based) 20% Infusion 17 mL/Hr IV Continuous <Continuous>  fat emulsion (Fish Oil and Plant Based) 20% Infusion 17 mL/Hr IV Continuous <Continuous>  fat emulsion (Fish Oil and Plant Based) 20% Infusion 17 mL/Hr IV Continuous <Continuous>  lactated ringers. 1000 milliLiter(s) IV Continuous <Continuous>  Parenteral Nutrition - Adult 1 Each TPN Continuous <Continuous>  Parenteral Nutrition - Adult 1 Each TPN Continuous <Continuous>    I&O's Detail  63y  Daily Weight in k.3 (2019 06:00)      136  |  107  |  12  ----------------------------<  132<H>  3.5   |  22  |  0.63    Ca    7.8<L>      2019 02:15  Phos  2.0       Mg     2.0         TPro  5.2<L>  /  Alb  3.5  /  TBili  1.8<H>  /  DBili  0.7<H>  /  AST  46<H>  /  ALT  38  /  AlkPhos  43        Diet: NPO       INFECTIOUS DISEASE:  Antimicrobials/Immunologic Medications:  influenza   Vaccine 0.5 milliLiter(s) IntraMuscular once  micafungin IVPB 100 milliGRAM(s) IV Intermittent every 24 hours  micafungin IVPB      piperacillin/tazobactam IVPB. 3.375 Gram(s) IV Intermittent every 8 hours    RECENT CULTURES:   @ 20:30 SURGERY       OTHER MEDICATIONS:  Endocrine/Metabolic Medications:  dextrose 40% Gel 15 Gram(s) Oral once PRN  dextrose 50% Injectable 12.5 Gram(s) IV Push once  dextrose 50% Injectable 25 Gram(s) IV Push once  dextrose 50% Injectable 25 Gram(s) IV Push once  glucagon  Injectable 1 milliGRAM(s) IntraMuscular once PRN  insulin lispro (HumaLOG) corrective regimen sliding scale   SubCutaneous every 4 hours    Genitourinary Medications:    Topical/Other Medications:  chlorhexidine 4% Liquid 1 Application(s) Topical two times a day    ASSESSMENT/PLAN:  63y Male s/p exploratory laparotomy, 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   -Replaced K and Na/K phos  -care per SICU  -Continue TPN       1. Protein calorie malnutrition being optimized with TPN: CHO [ ] gm.  AA [ ] gm. Lipids [ ] gm.  2.  Hyperglycemia managed with: [ ] units of regular insulin    3.  Check fluid balance daily.  Strict I/O  [ ] [ ]   4.  Daily BMP, Ionized Calcium, Magnesium and Phosphorous   5.  Triglycerides at initiation of TPN and monthly [ ] [ ]   6.  Pepcid in TPN for Gi prophylaxis  [ ]

## 2019-01-19 NOTE — PROGRESS NOTE ADULT - SUBJECTIVE AND OBJECTIVE BOX
General Surgery Progress Note    SUBJECTIVE:  The patient was seen and examined. No acute events overnight. Pain well controlled. More alert today.    OBJECTIVE:     ** VITAL SIGNS / I&O's **    Vital Signs Last 24 Hrs  T(C): 37.3 (19 Jan 2019 04:00), Max: 38.4 (18 Jan 2019 10:00)  T(F): 99.2 (19 Jan 2019 04:00), Max: 101.2 (18 Jan 2019 10:00)  HR: 94 (19 Jan 2019 08:00) (69 - 94)  BP: --  BP(mean): --  RR: 22 (19 Jan 2019 08:00) (20 - 23)  SpO2: 97% (19 Jan 2019 08:00) (97% - 99%)  Mode: CPAP with PS  FiO2: 30  PEEP: 5  PS: 5      18 Jan 2019 07:01  -  19 Jan 2019 07:00  --------------------------------------------------------  IN:    fat emulsion (Fish Oil and Plant Based) 20% Infusion: 204 mL    fentaNYL  Infusion: 106 mL    IV PiggyBack: 750 mL    Lactated Ringers IV Bolus: 2000 mL    lactated ringers.: 2875 mL    propofol Infusion: 31.6 mL    TPN (Total Parenteral Nutrition): 1992 mL  Total IN: 7958.6 mL    OUT:    Bulb: 955 mL    Drain: 45 mL    Ileostomy: 30 mL    Indwelling Catheter - Urethral: 1975 mL  Total OUT: 3005 mL    Total NET: 4953.6 mL          ** PHYSICAL EXAM **    -- CONSTITUTIONAL: Alert, NAD, intubated  -- PULMONARY: non-labored respirations on ventilator  -- ABDOMEN: soft, distended, 2 R sided ostomies pink/viable with nothing in the bags, L sided SOSA with shelley blood, midline dressing with blood staining    ** LABS **                          10.6   3.03  )-----------( 113      ( 19 Jan 2019 02:15 )             29.5     19 Jan 2019 02:15    136    |  107    |  12     ----------------------------<  132    3.5     |  22     |  0.63     Ca    7.8        19 Jan 2019 02:15  Phos  2.0       19 Jan 2019 02:15  Mg     2.0       19 Jan 2019 02:15    TPro  5.2    /  Alb  3.5    /  TBili  1.8    /  DBili  0.7    /  AST  46     /  ALT  38     /  AlkPhos  43     18 Jan 2019 04:10    PT/INR - ( 19 Jan 2019 02:15 )   PT: 20.8 SEC;   INR: 1.79          PTT - ( 19 Jan 2019 02:15 )  PTT:36.1 SEC  CAPILLARY BLOOD GLUCOSE      POCT Blood Glucose.: 130 mg/dL (19 Jan 2019 04:56)  POCT Blood Glucose.: 128 mg/dL (18 Jan 2019 23:27)  POCT Blood Glucose.: 143 mg/dL (18 Jan 2019 19:59)  POCT Blood Glucose.: 136 mg/dL (18 Jan 2019 16:12)  POCT Blood Glucose.: 122 mg/dL (18 Jan 2019 12:06)  POCT Blood Glucose.: 148 mg/dL (18 Jan 2019 08:40)        LIVER FUNCTIONS - ( 18 Jan 2019 04:10 )  Alb: 3.5 g/dL / Pro: 5.2 g/dL / ALK PHOS: 43 u/L / ALT: 38 u/L / AST: 46 u/L / GGT: x             Culture - Surg Site Aerob/Anaer w/Gm St (collected 18 Jan 2019 20:30)  Source: SURGERY  Preliminary Report (19 Jan 2019 07:23):    Insufficient growth, culture re-incubated.          MEDICATIONS  (STANDING):  chlorhexidine 4% Liquid 1 Application(s) Topical two times a day  dextrose 5%. 1000 milliLiter(s) (50 mL/Hr) IV Continuous <Continuous>  dextrose 50% Injectable 12.5 Gram(s) IV Push once  dextrose 50% Injectable 25 Gram(s) IV Push once  dextrose 50% Injectable 25 Gram(s) IV Push once  fat emulsion (Fish Oil and Plant Based) 20% Infusion 17 mL/Hr (17 mL/Hr) IV Continuous <Continuous>  fat emulsion (Fish Oil and Plant Based) 20% Infusion 17 mL/Hr (17 mL/Hr) IV Continuous <Continuous>  fat emulsion (Fish Oil and Plant Based) 20% Infusion 17 mL/Hr (17 mL/Hr) IV Continuous <Continuous>  influenza   Vaccine 0.5 milliLiter(s) IntraMuscular once  insulin lispro (HumaLOG) corrective regimen sliding scale   SubCutaneous every 4 hours  lactated ringers. 1000 milliLiter(s) (125 mL/Hr) IV Continuous <Continuous>  micafungin IVPB 100 milliGRAM(s) IV Intermittent every 24 hours  micafungin IVPB      Parenteral Nutrition - Adult 1 Each (83 mL/Hr) TPN Continuous <Continuous>  Parenteral Nutrition - Adult 1 Each (83 mL/Hr) TPN Continuous <Continuous>  piperacillin/tazobactam IVPB. 3.375 Gram(s) IV Intermittent every 8 hours    MEDICATIONS  (PRN):  dextrose 40% Gel 15 Gram(s) Oral once PRN Blood Glucose LESS THAN 70 milliGRAM(s)/deciliter  glucagon  Injectable 1 milliGRAM(s) IntraMuscular once PRN Glucose LESS THAN 70 milligrams/deciliter  HYDROmorphone  Injectable 0.5 milliGRAM(s) IV Push every 6 hours PRN Severe Pain (7 - 10)  HYDROmorphone  Injectable 0.25 milliGRAM(s) IV Push every 6 hours PRN Moderate Pain (4 - 6)

## 2019-01-19 NOTE — CHART NOTE - NSCHARTNOTEFT_GEN_A_CORE
GENERAL SURGERY FLOOR TRANSFER NOTE    63y Male transferred to floor from SICU    SUBJECTIVE:  Patient seen and examined. Reports pain is well controlled. Denies N/V.    OBJECTIVE:    T(C): 36.8 (01-19-19 @ 21:32), Max: 38.2 (01-19-19 @ 08:00)  HR: 84 (01-19-19 @ 21:32) (70 - 97)  BP: 153/83 (01-19-19 @ 21:32) (153/83 - 153/83)  RR: 19 (01-19-19 @ 21:32) (15 - 23)  SpO2: 97% (01-19-19 @ 21:32) (95% - 99%)  Wt(kg): --      I&O's Summary    18 Jan 2019 07:01  -  19 Jan 2019 07:00  --------------------------------------------------------  IN: 7958.6 mL / OUT: 3005 mL / NET: 4953.6 mL    19 Jan 2019 07:01  -  19 Jan 2019 22:54  --------------------------------------------------------  IN: 1964 mL / OUT: 2800 mL / NET: -836 mL                          10.6   3.03  )-----------( 113      ( 19 Jan 2019 02:15 )             29.5       01-19    136  |  107  |  12  ----------------------------<  132<H>  3.5   |  22  |  0.63    Ca    7.8<L>      19 Jan 2019 02:15  Phos  2.0     01-19  Mg     2.0     01-19    TPro  5.2<L>  /  Alb  3.5  /  TBili  1.8<H>  /  DBili  0.7<H>  /  AST  46<H>  /  ALT  38  /  AlkPhos  43  01-18      MEDICATIONS  (STANDING):  chlorhexidine 4% Liquid 1 Application(s) Topical two times a day  dextrose 5%. 1000 milliLiter(s) (50 mL/Hr) IV Continuous <Continuous>  dextrose 50% Injectable 12.5 Gram(s) IV Push once  dextrose 50% Injectable 25 Gram(s) IV Push once  dextrose 50% Injectable 25 Gram(s) IV Push once  enoxaparin Injectable 40 milliGRAM(s) SubCutaneous daily  fat emulsion (Fish Oil and Plant Based) 20% Infusion 17 mL/Hr (17 mL/Hr) IV Continuous <Continuous>  fat emulsion (Fish Oil and Plant Based) 20% Infusion 17 mL/Hr (17 mL/Hr) IV Continuous <Continuous>  fat emulsion (Fish Oil and Plant Based) 20% Infusion 17 mL/Hr (17 mL/Hr) IV Continuous <Continuous>  influenza   Vaccine 0.5 milliLiter(s) IntraMuscular once  insulin lispro (HumaLOG) corrective regimen sliding scale   SubCutaneous every 6 hours  lactated ringers. 1000 milliLiter(s) (50 mL/Hr) IV Continuous <Continuous>  metoprolol tartrate Injectable 5 milliGRAM(s) IV Push every 6 hours  micafungin IVPB 100 milliGRAM(s) IV Intermittent every 24 hours  micafungin IVPB      Parenteral Nutrition - Adult 1 Each (83 mL/Hr) TPN Continuous <Continuous>  Parenteral Nutrition - Adult 1 Each (83 mL/Hr) TPN Continuous <Continuous>  piperacillin/tazobactam IVPB. 3.375 Gram(s) IV Intermittent every 8 hours    MEDICATIONS  (PRN):  dextrose 40% Gel 15 Gram(s) Oral once PRN Blood Glucose LESS THAN 70 milliGRAM(s)/deciliter  glucagon  Injectable 1 milliGRAM(s) IntraMuscular once PRN Glucose LESS THAN 70 milligrams/deciliter  HYDROmorphone  Injectable 0.5 milliGRAM(s) IV Push every 6 hours PRN Severe Pain (7 - 10)  HYDROmorphone  Injectable 0.25 milliGRAM(s) IV Push every 6 hours PRN Moderate Pain (4 - 6)        PHYSICAL EXAM:  NAD, resting in bed, responding appropriately, NGT to suction  Airway intact, non-labored respirations  Soft, distended, appropriately tender  2 R sided ostomies viable without output, L sided SOSA serosanguinous  Midline dressing with serosanguinous strikethrough  WWP       63y Male s/p exploratory laparotomy, right hemicolectomy with ileostomy and mucus fistula, immediate postop course c/b low H/H and hemodynamic instability, pt was sent to SICU for hemodynamic monitoring now on the floors    - Pain control  - NPO / NGT / TPN / IVF / ISS while on TPN  - Zosyn/Micafungin  - [place]  - DVT ppx: Lovenox, OOBA    Dispo: floors    CAMILA Zaman, PGY-1  Surgical Oncology D Team  [id number] with any questions

## 2019-01-19 NOTE — PROGRESS NOTE ADULT - ASSESSMENT
62 yo M with PMH of HTN, HLD Gastric adenocarcinoma s/p total gastrectomy and Kelby en Y esophagojejunostomy with Dr Espinal at [facility] Dec 2017 c/b early postop SBO s/p lap SHAR, treated with adjuvant chemo and radiation. Pt now transferred from [place] after a ~20 day admission with symptoms of PO intolerance. 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 He underwent endoscopic dilation as well as stent placement, which have not alleviated his symptoms.  Advanced GI consulted for considering Endoscopic intervention for possible EJ stricture.   Pt s/p  EGD yesterday that revealed 2 stricture, one at GJ and one in proximal jejunum, later stricture not traverse with scope, (  scope could not pass ), patient developed Pneumoperitoneum and hemorrhagic shock, Resp Failure s/p intubation and Massive transfusion s/p Ex Lap yesterday that revealed a mass at GJ and the site of perforation was in Hepatic Flexure with area of necrosis and patient had Rt hemicolectomy with end Ileostomy and mucous fistula.     IMPRESSION  # Gastric CA s/p Total gastrectomy and EJ anastomosis with anastomotic stricture and jejunal stricture, post procedure Pneumoperitoneum complicated by Hemorrhagic shock, Resp Failure and massive Transfusion protocol, s/p Ex Lap that revealed a mass at GJ and the site of perforation was in Hepatic Flexure and patient had Rt hemicolectomy with end Ileostomy and mucous fistula.     RECOMMENDATION    - Monitor CBC, INR and electrolytes  - Keep NPO, IV antibiotics and Micafungin  - ICU support.  - Further plan as per SICU team and Oncology    Dominick Harris, PGY-5  GI fellow  B- [phone]/ [phone]  Please call GI fellow on call after 5pm and on weekends 64 yo M with PMH of HTN, HLD Gastric adenocarcinoma s/p total gastrectomy and Kelby en Y esophagojejunostomy with Dr Espinal at [facility] Dec 2017 c/b early postop SBO s/p lap SHAR, treated with adjuvant chemo and radiation. Pt now transferred from [place] after a ~20 day admission with symptoms of PO intolerance. 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 He underwent endoscopic dilation as well as stent placement, which have not alleviated his symptoms.  Advanced GI consulted for considering Endoscopic intervention for possible EJ stricture.   Pt s/p  EGD yesterday that revealed 2 stricture, one at GJ and one in proximal jejunum, later stricture not traverse with scope, (  scope could not pass ), patient developed Pneumoperitoneum and hemorrhagic shock, Resp Failure s/p intubation and Massive transfusion s/p Ex Lap yesterday that revealed a mass at GJ and the site of perforation was in Hepatic Flexure with area of necrosis and patient had Rt hemicolectomy with end Ileostomy and mucous fistula.     IMPRESSION  - Gastric CA s/p Total gastrectomy and EJ anastomosis with anastomotic stricture and jejunal stricture, post procedure Pneumoperitoneum complicated by Hemorrhagic shock, Resp Failure and massive Transfusion protocol, s/p Ex Lap that revealed a mass at GJ and the site of perforation was in Hepatic Flexure and patient had Rt hemicolectomy with end Ileostomy and mucous fistula.     RECOMMENDATION    - Monitor CBC, INR and electrolytes  - Keep NPO, IV antibiotics and Micafungin  - ICU support.  - Further plan as per SICU team and Oncology    Dominick Harris, PGY-5  GI fellow  B- [phone]/ [phone]  Please call GI fellow on call after 5pm and on weekends 62 yo M with PMH of HTN, HLD Gastric adenocarcinoma s/p total gastrectomy and Kelby en Y esophagojejunostomy with Dr Espinal at [facility] Dec 2017 c/b early postop SBO s/p lap SHAR, treated with adjuvant chemo and radiation. Pt now transferred from [place] after a ~20 day admission with symptoms of PO intolerance. 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 He underwent endoscopic dilation as well as stent placement, which have not alleviated his symptoms.  Advanced GI consulted for considering Endoscopic intervention for possible EJ stricture.   Pt s/p  EGD on   that revealed 2 strictures, one at GJ and one in proximal jejunum, later stricture not traverse with scope, (  scope could not pass ), patient developed Pneumoperitoneum and hemorrhagic shock, Resp Failure s/p intubation and Massive transfusion s/p Ex Lap on  that revealed a mass at the GJ junction and colon at the splenic flexure - the area could not be evaluated in detail, and a perforation at the hepatic Flexure with area of necrosis and spilling stool. A Rt hemicolectomy with end Ileostomy and mucous fistula was performed.     IMPRESSION  - Gastric CA s/p Total gastrectomy and EJ anastomosis with anastomotic stricture and jejunal stricture, post procedure Pneumoperitoneum complicated by Hemorrhagic shock, Resp Failure and massive Transfusion protocol, s/p Ex Lap that revealed a mass at GJ and the site of perforation was in Hepatic Flexure and patient had Rt hemicolectomy with end Ileostomy and mucous fistula.   - stricture at GJ junction not treated, ex lap showed recurrent tumor in that area    RECOMMENDATION    - Monitor CBC, INR and electrolytes  - Keep NPO, IV antibiotics and Micafungin  - ICU support.  - Further plan as per SICU team and Oncology    Dominick Harris, PGY-5  GI fellow  B- [phone]/ [phone]  Please call GI fellow on call after 5pm and on weekends

## 2019-01-19 NOTE — PROGRESS NOTE ADULT - SUBJECTIVE AND OBJECTIVE BOX
GI following the patient for EJ anastomotic stricture, post gastrectomy for Stomach CA , s/p EGD that revealed 2 stricture, one at GJ and one in proximal jejunum, later stricture not traverse with scope, (  scope could not pass ), patient developed Pneumoperitoneum and hemorrhagic shock, Resp Failure s/p intubation and Massive transfusion s/p Ex Lap yesterday that revealed a mass at GJ and the site of perforation was in Hepatic Flexure and patient had Rt hemicolectomy with end Ileostomy and mucous fistula.     Chief Complaint:  Patient is a 63y old  Male who presents with a chief complaint of Bowel Obstruction (2019 00:10)      Interval Events:     Allergies:  No Known Allergies      Hospital Medications:  chlorhexidine 4% Liquid 1 Application(s) Topical two times a day  dextrose 40% Gel 15 Gram(s) Oral once PRN  dextrose 5%. 1000 milliLiter(s) IV Continuous <Continuous>  dextrose 50% Injectable 12.5 Gram(s) IV Push once  dextrose 50% Injectable 25 Gram(s) IV Push once  dextrose 50% Injectable 25 Gram(s) IV Push once  fat emulsion (Fish Oil and Plant Based) 20% Infusion 17 mL/Hr IV Continuous <Continuous>  fat emulsion (Fish Oil and Plant Based) 20% Infusion 17 mL/Hr IV Continuous <Continuous>  glucagon  Injectable 1 milliGRAM(s) IntraMuscular once PRN  HYDROmorphone  Injectable 0.5 milliGRAM(s) IV Push every 6 hours PRN  HYDROmorphone  Injectable 0.25 milliGRAM(s) IV Push every 6 hours PRN  influenza   Vaccine 0.5 milliLiter(s) IntraMuscular once  insulin lispro (HumaLOG) corrective regimen sliding scale   SubCutaneous every 4 hours  lactated ringers. 1000 milliLiter(s) IV Continuous <Continuous>  micafungin IVPB 100 milliGRAM(s) IV Intermittent every 24 hours  micafungin IVPB      Parenteral Nutrition - Adult 1 Each TPN Continuous <Continuous>  piperacillin/tazobactam IVPB. 3.375 Gram(s) IV Intermittent every 8 hours      PMHX/PSHX:  Stomach cancer  Prediabetes  HLD (hyperlipidemia)  HTN (hypertension)  Malignant neoplasm of stomach, unspecified location  S/P total gastrectomy and Kelby-en-Y esophagojejunal anastomosis  S/P total gastrectomy and Kelby-en-Y esophagojejunal anastomosis  No significant past surgical history  H/O prostate biopsy  History of arthroscopy of right shoulder  History of arthroscopy of left knee  History of appendectomy      Family history:  No pertinent family history in first degree relatives  Cerebrovascular accident (Mother)      ROS:     Can not assess as patient is intubated and sedated       PHYSICAL EXAM:     GENERAL:  Appears stated age, no distress  HEENT:  NC/AT,  conjunctivae clear, sclera -anicteric  CHEST:  Full & symmetric excursion, no increased effort, breath sounds clear  HEART:  Regular rhythm, S1, S2, no added sounds  ABDOMEN:  Soft, non-tender, non-distended, normoactive bowel sounds. end ileostomy with small amount of serosanguinous fluid in it.   NEURO: Can not assess as patient is intubated and sedated       Vital Signs:  Vital Signs Last 24 Hrs  T(C): 37.3 (2019 04:00), Max: 38.4 (2019 10:00)  T(F): 99.2 (2019 04:00), Max: 101.2 (2019 10:00)  HR: 92 (2019 07:28) (69 - 92)  BP: --  BP(mean): --  RR: 21 (2019 06:00) (20 - 23)  SpO2: 97% (2019 07:28) (97% - 99%)  Daily     Daily Weight in k.3 (2019 06:00)    LABS:                        10.6   3.03  )-----------( 113      ( 2019 02:15 )             29.5     -19    136  |  107  |  12  ----------------------------<  132<H>  3.5   |  22  |  0.63    Ca    7.8<L>      2019 02:15  Phos  2.0     -19  Mg     2.0     -    TPro  5.2<L>  /  Alb  3.5  /  TBili  1.8<H>  /  DBili  0.7<H>  /  AST  46<H>  /  ALT  38  /  AlkPhos  43  18    LIVER FUNCTIONS - ( 2019 04:10 )  Alb: 3.5 g/dL / Pro: 5.2 g/dL / ALK PHOS: 43 u/L / ALT: 38 u/L / AST: 46 u/L / GGT: x           PT/INR - ( 2019 02:15 )   PT: 20.8 SEC;   INR: 1.79          PTT - ( 2019 02:15 )  PTT:36.1 SEC        Imaging:    Operative Note : Tumor recurrence in LUQ involving esophagojejunostomy and portion of large bowel (splenic flexure). Unable to clearly dissect structures to investigate location of iatrogenic bowel perforation from the EGD. There was however an area of ischemic perforation on the hepatic flexure with enteric contents spilling out. A right hemicolectomy was performed with creation of an end ileostomy (RLQ) and proximal transverse colon mucus fistula (RUQ). A 19fr. rufina drain was left in the area of the tumor recurrence.        < from: Upper Endoscopy (19 @ 09:32) >  Findings:       The upper third of theesophagus, middle third of the esophagus and        lower third of the esophagus were normal.       An esophago-jejunal anastomosis was found at the lower esophageal        sphincter.       Localized moderate erythema, friability, congestion of the mucosa was        found at the EJ anastomosis. Biopsies were taken with a cold forceps for        histology to rule out recurrent adenocarcinoma.       Two areas of stenosis were found on exam. The first site of stenosis was        found at the EJ anastomotic site which was transverse with the regular        upper endoscope. The second anastomotic site was found in the proximal        mid jejunum and was not able to be traversed by the upper endoscopy. A        transnasal endoscope was used. The second stricture site could not be        traverse with the transnasal endoscope as well.                                                                                   Impression:            - Normal upper third of esophagus and middle third of esophagus.  - An esophago-jejunal anastomosis was found.  - Erythema, friability, stricturing, and congestion at the EJ anastomotic site. Biopsied.  - Second stricture site distal the EJ anastomotic site which could not be traversed with the transnasal endoscope.  - Procedure c/b a perforation as described above.    Recommendation:    - Follow up surgical recommendations.                       - NPO.                       - IV antibiotics.                       - Family informed of events.    < end of copied text > GI following the patient for EJ anastomotic stricture, post gastrectomy for Stomach CA , s/p EGD that revealed 2 stricture, one at GJ and one in proximal jejunum, later stricture not traverse with scope, (  scope could not pass ), patient developed Pneumoperitoneum and hemorrhagic shock, Resp Failure s/p intubation and Massive transfusion s/p Ex Lap yesterday that revealed a mass at GJ and the site of perforation was in Hepatic Flexure and patient had Rt hemicolectomy with end Ileostomy and mucous fistula.     Chief Complaint:  Patient is a 63y old  Male who presents with a chief complaint of Bowel Obstruction (2019 00:10)      Interval Events:   - no acute events overnight.   - plan for extubation by the SICU team today.     Allergies:  No Known Allergies      Hospital Medications:  chlorhexidine 4% Liquid 1 Application(s) Topical two times a day  dextrose 40% Gel 15 Gram(s) Oral once PRN  dextrose 5%. 1000 milliLiter(s) IV Continuous <Continuous>  dextrose 50% Injectable 12.5 Gram(s) IV Push once  dextrose 50% Injectable 25 Gram(s) IV Push once  dextrose 50% Injectable 25 Gram(s) IV Push once  fat emulsion (Fish Oil and Plant Based) 20% Infusion 17 mL/Hr IV Continuous <Continuous>  fat emulsion (Fish Oil and Plant Based) 20% Infusion 17 mL/Hr IV Continuous <Continuous>  glucagon  Injectable 1 milliGRAM(s) IntraMuscular once PRN  HYDROmorphone  Injectable 0.5 milliGRAM(s) IV Push every 6 hours PRN  HYDROmorphone  Injectable 0.25 milliGRAM(s) IV Push every 6 hours PRN  influenza   Vaccine 0.5 milliLiter(s) IntraMuscular once  insulin lispro (HumaLOG) corrective regimen sliding scale   SubCutaneous every 4 hours  lactated ringers. 1000 milliLiter(s) IV Continuous <Continuous>  micafungin IVPB 100 milliGRAM(s) IV Intermittent every 24 hours  micafungin IVPB      Parenteral Nutrition - Adult 1 Each TPN Continuous <Continuous>  piperacillin/tazobactam IVPB. 3.375 Gram(s) IV Intermittent every 8 hours      PMHX/PSHX:  Stomach cancer  Prediabetes  HLD (hyperlipidemia)  HTN (hypertension)  Malignant neoplasm of stomach, unspecified location  S/P total gastrectomy and Kelby-en-Y esophagojejunal anastomosis  S/P total gastrectomy and Kelby-en-Y esophagojejunal anastomosis  No significant past surgical history  H/O prostate biopsy  History of arthroscopy of right shoulder  History of arthroscopy of left knee  History of appendectomy      Family history:  No pertinent family history in first degree relatives  Cerebrovascular accident (Mother)      ROS:     Can not assess as patient is intubated and sedated       PHYSICAL EXAM:     GENERAL:  Appears stated age, no distress  HEENT:  NC/AT,  conjunctivae clear, sclera -anicteric  CHEST:  Full & symmetric excursion, no increased effort, breath sounds clear  HEART:  Regular rhythm, S1, S2, no added sounds  ABDOMEN:  Soft, non-tender, non-distended, normoactive bowel sounds. end ileostomy with small amount of serosanguinous fluid in it.   NEURO: Can not assess as patient is intubated and sedated       Vital Signs:  Vital Signs Last 24 Hrs  T(C): 37.3 (2019 04:00), Max: 38.4 (2019 10:00)  T(F): 99.2 (2019 04:00), Max: 101.2 (2019 10:00)  HR: 92 (2019 07:28) (69 - 92)  BP: --  BP(mean): --  RR: 21 (2019 06:00) (20 - 23)  SpO2: 97% (2019 07:28) (97% - 99%)  Daily     Daily Weight in k.3 (2019 06:00)    LABS:                        10.6   3.03  )-----------( 113      ( 2019 02:15 )             29.5     -19    136  |  107  |  12  ----------------------------<  132<H>  3.5   |  22  |  0.63    Ca    7.8<L>      2019 02:15  Phos  2.0     -19  Mg     2.0     -    TPro  5.2<L>  /  Alb  3.5  /  TBili  1.8<H>  /  DBili  0.7<H>  /  AST  46<H>  /  ALT  38  /  AlkPhos  43  18    LIVER FUNCTIONS - ( 2019 04:10 )  Alb: 3.5 g/dL / Pro: 5.2 g/dL / ALK PHOS: 43 u/L / ALT: 38 u/L / AST: 46 u/L / GGT: x           PT/INR - ( 2019 02:15 )   PT: 20.8 SEC;   INR: 1.79          PTT - ( 2019 02:15 )  PTT:36.1 SEC        Imaging:    Operative Note : Tumor recurrence in LUQ involving esophagojejunostomy and portion of large bowel (splenic flexure). Unable to clearly dissect structures to investigate location of iatrogenic bowel perforation from the EGD. There was however an area of ischemic perforation on the hepatic flexure with enteric contents spilling out. A right hemicolectomy was performed with creation of an end ileostomy (RLQ) and proximal transverse colon mucus fistula (RUQ). A 19fr. rufina drain was left in the area of the tumor recurrence.        < from: Upper Endoscopy (19 @ 09:32) >  Findings:       The upper third of theesophagus, middle third of the esophagus and        lower third of the esophagus were normal.       An esophago-jejunal anastomosis was found at the lower esophageal        sphincter.       Localized moderate erythema, friability, congestion of the mucosa was        found at the EJ anastomosis. Biopsies were taken with a cold forceps for        histology to rule out recurrent adenocarcinoma.       Two areas of stenosis were found on exam. The first site of stenosis was        found at the EJ anastomotic site which was transverse with the regular        upper endoscope. The second anastomotic site was found in the proximal        mid jejunum and was not able to be traversed by the upper endoscopy. A        transnasal endoscope was used. The second stricture site could not be        traverse with the transnasal endoscope as well.                                                                                   Impression:            - Normal upper third of esophagus and middle third of esophagus.  - An esophago-jejunal anastomosis was found.  - Erythema, friability, stricturing, and congestion at the EJ anastomotic site. Biopsied.  - Second stricture site distal the EJ anastomotic site which could not be traversed with the transnasal endoscope.  - Procedure c/b a perforation as described above.    Recommendation:    - Follow up surgical recommendations.                       - NPO.                       - IV antibiotics.                       - Family informed of events.    < end of copied text >

## 2019-01-20 LAB
ANION GAP SERPL CALC-SCNC: 10 MMO/L — SIGNIFICANT CHANGE UP (ref 7–14)
BASOPHILS # BLD AUTO: 0.03 K/UL — SIGNIFICANT CHANGE UP (ref 0–0.2)
BASOPHILS NFR BLD AUTO: 0.6 % — SIGNIFICANT CHANGE UP (ref 0–2)
BASOPHILS NFR SPEC: 0 % — SIGNIFICANT CHANGE UP (ref 0–2)
BLASTS # FLD: 0 % — SIGNIFICANT CHANGE UP (ref 0–0)
BUN SERPL-MCNC: 10 MG/DL — SIGNIFICANT CHANGE UP (ref 7–23)
CA-I BLD-SCNC: 1.01 MMOL/L — LOW (ref 1.03–1.23)
CALCIUM SERPL-MCNC: 8.5 MG/DL — SIGNIFICANT CHANGE UP (ref 8.4–10.5)
CHLORIDE SERPL-SCNC: 103 MMOL/L — SIGNIFICANT CHANGE UP (ref 98–107)
CO2 SERPL-SCNC: 23 MMOL/L — SIGNIFICANT CHANGE UP (ref 22–31)
CREAT SERPL-MCNC: 0.56 MG/DL — SIGNIFICANT CHANGE UP (ref 0.5–1.3)
EOSINOPHIL # BLD AUTO: 0.04 K/UL — SIGNIFICANT CHANGE UP (ref 0–0.5)
EOSINOPHIL NFR BLD AUTO: 0.7 % — SIGNIFICANT CHANGE UP (ref 0–6)
EOSINOPHIL NFR FLD: 0 % — SIGNIFICANT CHANGE UP (ref 0–6)
GIANT PLATELETS BLD QL SMEAR: PRESENT — SIGNIFICANT CHANGE UP
GLUCOSE BLDC GLUCOMTR-MCNC: 105 MG/DL — HIGH (ref 70–99)
GLUCOSE BLDC GLUCOMTR-MCNC: 118 MG/DL — HIGH (ref 70–99)
GLUCOSE BLDC GLUCOMTR-MCNC: 123 MG/DL — HIGH (ref 70–99)
GLUCOSE BLDC GLUCOMTR-MCNC: 126 MG/DL — HIGH (ref 70–99)
GLUCOSE BLDC GLUCOMTR-MCNC: 132 MG/DL — HIGH (ref 70–99)
GLUCOSE SERPL-MCNC: 118 MG/DL — HIGH (ref 70–99)
HCT VFR BLD CALC: 35.4 % — LOW (ref 39–50)
HGB BLD-MCNC: 12.2 G/DL — LOW (ref 13–17)
IMM GRANULOCYTES NFR BLD AUTO: 0.2 % — SIGNIFICANT CHANGE UP (ref 0–1.5)
LYMPHOCYTES # BLD AUTO: 0.31 K/UL — LOW (ref 1–3.3)
LYMPHOCYTES # BLD AUTO: 5.8 % — LOW (ref 13–44)
LYMPHOCYTES NFR SPEC AUTO: 2.6 % — LOW (ref 13–44)
MAGNESIUM SERPL-MCNC: 1.8 MG/DL — SIGNIFICANT CHANGE UP (ref 1.6–2.6)
MCHC RBC-ENTMCNC: 29.8 PG — SIGNIFICANT CHANGE UP (ref 27–34)
MCHC RBC-ENTMCNC: 34.5 % — SIGNIFICANT CHANGE UP (ref 32–36)
MCV RBC AUTO: 86.6 FL — SIGNIFICANT CHANGE UP (ref 80–100)
METAMYELOCYTES # FLD: 0 % — SIGNIFICANT CHANGE UP (ref 0–1)
MONOCYTES # BLD AUTO: 0.23 K/UL — SIGNIFICANT CHANGE UP (ref 0–0.9)
MONOCYTES NFR BLD AUTO: 4.3 % — SIGNIFICANT CHANGE UP (ref 2–14)
MONOCYTES NFR BLD: 0.9 % — LOW (ref 2–9)
MYELOCYTES NFR BLD: 0 % — SIGNIFICANT CHANGE UP (ref 0–0)
NEUTROPHIL AB SER-ACNC: 78.9 % — HIGH (ref 43–77)
NEUTROPHILS # BLD AUTO: 4.75 K/UL — SIGNIFICANT CHANGE UP (ref 1.8–7.4)
NEUTROPHILS NFR BLD AUTO: 88.4 % — HIGH (ref 43–77)
NEUTS BAND # BLD: 16.7 % — HIGH (ref 0–6)
NRBC # FLD: 0 K/UL — LOW (ref 25–125)
OTHER - HEMATOLOGY %: 0 — SIGNIFICANT CHANGE UP
PHOSPHATE SERPL-MCNC: 2.2 MG/DL — LOW (ref 2.5–4.5)
PLATELET # BLD AUTO: 105 K/UL — LOW (ref 150–400)
PLATELET COUNT - ESTIMATE: SIGNIFICANT CHANGE UP
PMV BLD: 11.1 FL — SIGNIFICANT CHANGE UP (ref 7–13)
POIKILOCYTOSIS BLD QL AUTO: SLIGHT — SIGNIFICANT CHANGE UP
POTASSIUM SERPL-MCNC: 3.9 MMOL/L — SIGNIFICANT CHANGE UP (ref 3.5–5.3)
POTASSIUM SERPL-SCNC: 3.9 MMOL/L — SIGNIFICANT CHANGE UP (ref 3.5–5.3)
PREALB SERPL-MCNC: 9 MG/DL — LOW (ref 20–40)
PROMYELOCYTES # FLD: 0 % — SIGNIFICANT CHANGE UP (ref 0–0)
RBC # BLD: 4.09 M/UL — LOW (ref 4.2–5.8)
RBC # FLD: 15.5 % — HIGH (ref 10.3–14.5)
SODIUM SERPL-SCNC: 136 MMOL/L — SIGNIFICANT CHANGE UP (ref 135–145)
SPHEROCYTES BLD QL SMEAR: SLIGHT — SIGNIFICANT CHANGE UP
VARIANT LYMPHS # BLD: 0.9 % — SIGNIFICANT CHANGE UP
WBC # BLD: 5.37 K/UL — SIGNIFICANT CHANGE UP (ref 3.8–10.5)
WBC # FLD AUTO: 5.37 K/UL — SIGNIFICANT CHANGE UP (ref 3.8–10.5)

## 2019-01-20 RX ORDER — MAGNESIUM SULFATE 500 MG/ML
1 VIAL (ML) INJECTION ONCE
Qty: 0 | Refills: 0 | Status: COMPLETED | OUTPATIENT
Start: 2019-01-20 | End: 2019-01-20

## 2019-01-20 RX ORDER — POTASSIUM PHOSPHATE, MONOBASIC POTASSIUM PHOSPHATE, DIBASIC 236; 224 MG/ML; MG/ML
30 INJECTION, SOLUTION INTRAVENOUS ONCE
Qty: 0 | Refills: 0 | Status: COMPLETED | OUTPATIENT
Start: 2019-01-20 | End: 2019-01-20

## 2019-01-20 RX ORDER — ELECTROLYTE SOLUTION,INJ
1 VIAL (ML) INTRAVENOUS
Qty: 0 | Refills: 0 | Status: DISCONTINUED | OUTPATIENT
Start: 2019-01-20 | End: 2019-01-20

## 2019-01-20 RX ORDER — ACETAMINOPHEN 500 MG
1000 TABLET ORAL ONCE
Qty: 0 | Refills: 0 | Status: COMPLETED | OUTPATIENT
Start: 2019-01-20 | End: 2019-01-20

## 2019-01-20 RX ORDER — I.V. FAT EMULSION 20 G/100ML
17 EMULSION INTRAVENOUS
Qty: 40 | Refills: 0 | Status: DISCONTINUED | OUTPATIENT
Start: 2019-01-20 | End: 2019-01-23

## 2019-01-20 RX ADMIN — Medication 400 MILLIGRAM(S): at 12:45

## 2019-01-20 RX ADMIN — CHLORHEXIDINE GLUCONATE 1 APPLICATION(S): 213 SOLUTION TOPICAL at 17:46

## 2019-01-20 RX ADMIN — MICAFUNGIN SODIUM 105 MILLIGRAM(S): 100 INJECTION, POWDER, LYOPHILIZED, FOR SOLUTION INTRAVENOUS at 12:45

## 2019-01-20 RX ADMIN — Medication 5 MILLIGRAM(S): at 17:47

## 2019-01-20 RX ADMIN — CHLORHEXIDINE GLUCONATE 1 APPLICATION(S): 213 SOLUTION TOPICAL at 05:07

## 2019-01-20 RX ADMIN — Medication 1 EACH: at 17:01

## 2019-01-20 RX ADMIN — PIPERACILLIN AND TAZOBACTAM 25 GRAM(S): 4; .5 INJECTION, POWDER, LYOPHILIZED, FOR SOLUTION INTRAVENOUS at 12:19

## 2019-01-20 RX ADMIN — Medication 1000 MILLIGRAM(S): at 13:05

## 2019-01-20 RX ADMIN — POTASSIUM PHOSPHATE, MONOBASIC POTASSIUM PHOSPHATE, DIBASIC 85 MILLIMOLE(S): 236; 224 INJECTION, SOLUTION INTRAVENOUS at 14:14

## 2019-01-20 RX ADMIN — Medication 5 MILLIGRAM(S): at 23:45

## 2019-01-20 RX ADMIN — PIPERACILLIN AND TAZOBACTAM 25 GRAM(S): 4; .5 INJECTION, POWDER, LYOPHILIZED, FOR SOLUTION INTRAVENOUS at 20:18

## 2019-01-20 RX ADMIN — ENOXAPARIN SODIUM 40 MILLIGRAM(S): 100 INJECTION SUBCUTANEOUS at 12:20

## 2019-01-20 RX ADMIN — Medication 5 MILLIGRAM(S): at 00:30

## 2019-01-20 RX ADMIN — Medication 5 MILLIGRAM(S): at 12:20

## 2019-01-20 RX ADMIN — Medication 100 GRAM(S): at 14:15

## 2019-01-20 RX ADMIN — I.V. FAT EMULSION 17 ML/HR: 20 EMULSION INTRAVENOUS at 16:14

## 2019-01-20 RX ADMIN — PIPERACILLIN AND TAZOBACTAM 25 GRAM(S): 4; .5 INJECTION, POWDER, LYOPHILIZED, FOR SOLUTION INTRAVENOUS at 05:07

## 2019-01-20 RX ADMIN — Medication 5 MILLIGRAM(S): at 05:08

## 2019-01-20 NOTE — PROGRESS NOTE ADULT - ASSESSMENT
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 transitioned from sicu to floor yesterday.    Plan:  - Pain control as needed  - diet: NPO  - IVL  - monitor ostomy function  - monitor vs, uop; coto d/c'd, passed tov  - dvt ppx: lovenox  - oob/ambulate as tolerated  - ID c/s for antibiotics  - palliative consult  - f/u with his medical oncologist re: outpt treatment

## 2019-01-20 NOTE — PROGRESS NOTE ADULT - SUBJECTIVE AND OBJECTIVE BOX
NUTRITION NOTE  [id number] TOM  ===============================    Interval events: Patient transferred from SICU to floor yesterday.  Patient seen and examined at bedside.  Has no complaints at this time. Denies abdominal pain, no n/v.     VITAL SIGNS:  T(C): 37.6 (19 @ 08:26), Max: 37.7 (19 @ 05:00)  HR: 89 (19 08:26) (70 - 97)  BP: 140/80 (19 08:26) (131/73 - 153/83)  ABP: 155/71 (19 20:00) (129/65 - 161/81)  ABP(mean): 103 (19 20:00) (88 - 111)  RR: 18 (19 08:26) (15 - 23)  SpO2: 93% (19 08:26) (93% - 99%)  CVP(mm Hg): --   07:  -   @ 07:00  --------------------------------------------------------  IN: 3813 mL / OUT: 5360 mL / NET: -1547 mL     @ 07: @ 10:02  --------------------------------------------------------  IN: 399 mL / OUT: 930 mL / NET: -531 mL    Glucose 118-132    Neuro: Patient A/O x 3 in NAD    CV: s1, s2 RRR    Pulm: b/l exp wheeze, no crackles, no rales     GI/Nutrition: softly distended, non tender to palpation, ostomy pink, no stool/air, dressing C/D/I, Drain with serosanginous drainage    Extremity: warm, venodynes in place    PICC Site: C/D/I    Metabolic/FLUIDS/ELECTROLYTES/NUTRITION:  Gastrointestinal Medications:  dextrose 5%. 1000 milliLiter(s) IV Continuous <Continuous>  fat emulsion (Fish Oil and Plant Based) 20% Infusion 17 mL/Hr IV Continuous <Continuous>  fat emulsion (Fish Oil and Plant Based) 20% Infusion 17 mL/Hr IV Continuous <Continuous>  fat emulsion (Fish Oil and Plant Based) 20% Infusion 17 mL/Hr IV Continuous <Continuous>  fat emulsion (Fish Oil and Plant Based) 20% Infusion 17 mL/Hr IV Continuous <Continuous>  Parenteral Nutrition - Adult 1 Each TPN Continuous <Continuous>  Parenteral Nutrition - Adult 1 Each TPN Continuous <Continuous>    I&O's Detail  63y  Daily Weight in k.3 (2019 06:00)      136  |  103  |  10  ----------------------------<  118<H>  3.9   |  23  |  0.56    Ca    8.5      2019 05:19  Phos  2.2       Mg     1.8         Diet: NPO/TPN    INFECTIOUS DISEASE:  Antimicrobials/Immunologic Medications:  influenza   Vaccine 0.5 milliLiter(s) IntraMuscular once  micafungin IVPB 100 milliGRAM(s) IV Intermittent every 24 hours  micafungin IVPB      piperacillin/tazobactam IVPB. 3.375 Gram(s) IV Intermittent every 8 hours    RECENT CULTURES:   @ 20:30 SURGERY       OTHER MEDICATIONS:  Endocrine/Metabolic Medications:  dextrose 40% Gel 15 Gram(s) Oral once PRN  dextrose 50% Injectable 12.5 Gram(s) IV Push once  dextrose 50% Injectable 25 Gram(s) IV Push once  dextrose 50% Injectable 25 Gram(s) IV Push once  glucagon  Injectable 1 milliGRAM(s) IntraMuscular once PRN  insulin lispro (HumaLOG) corrective regimen sliding scale   SubCutaneous every 6 hours    Genitourinary Medications:    Topical/Other Medications:  chlorhexidine 4% Liquid 1 Application(s) Topical two times a day    ASSESSMENT/PLAN:  63y Male s/p exploratory laparotomy, 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 Patient now on surgical floor, HD stable.    Continue TPN  Electrolytes adjusted    1.  Protein calorie malnutrition being optimized with TPN: CHO [246 ] gm.  AA [95 ] gm. Lipids [40 ] gm.  2.  Hyperglycemia managed with: [0 ] units of regular insulin    3.  Check fluid balance daily.  Strict I/O  [ ] [ ]   4.  Daily BMP, Ionized Calcium, Magnesium and Phosphorous   5.  Triglycerides at initiation of TPN and monthly [ ]    Nutrition Support [id number]

## 2019-01-20 NOTE — PROGRESS NOTE ADULT - SUBJECTIVE AND OBJECTIVE BOX
General Surgery Progress Note    SUBJECTIVE:  The patient was seen and examined. No acute events overnight. Pain well controlled. Denies n/v, cp, sob, abd pain. [place] d/c'd, Passed tov.    OBJECTIVE:     ** VITAL SIGNS / I&O's **    Vital Signs Last 24 Hrs  T(C): 37.6 (20 Jan 2019 12:16), Max: 37.7 (20 Jan 2019 05:00)  T(F): 99.6 (20 Jan 2019 12:16), Max: 99.8 (20 Jan 2019 05:00)  HR: 78 (20 Jan 2019 12:16) (70 - 89)  BP: 138/71 (20 Jan 2019 12:16) (131/73 - 153/83)  BP(mean): --  RR: 18 (20 Jan 2019 12:16) (15 - 23)  SpO2: 97% (20 Jan 2019 12:16) (93% - 99%)      19 Jan 2019 07:01  -  20 Jan 2019 07:00  --------------------------------------------------------  IN:    fat emulsion (Fish Oil and Plant Based) 20% Infusion: 204 mL    IV PiggyBack: 400 mL    lactated ringers.: 1300 mL    TPN (Total Parenteral Nutrition): 1909 mL  Total IN: 3813 mL    OUT:    Bulb: 420 mL    Drain: 20 mL    Ileostomy: 10 mL    Indwelling Catheter - Urethral: 4710 mL    Nasoenteral Tube: 200 mL  Total OUT: 5360 mL    Total NET: -1547 mL      20 Jan 2019 07:01  -  20 Jan 2019 12:17  --------------------------------------------------------  IN:    lactated ringers.: 150 mL    TPN (Total Parenteral Nutrition): 249 mL  Total IN: 399 mL    OUT:    Bulb: 10 mL    Drain: 10 mL    Ileostomy: 10 mL    Indwelling Catheter - Urethral: 1500 mL    Nasoenteral Tube: 100 mL    Voided: 400 mL  Total OUT: 2030 mL    Total NET: -1631 mL          ** PHYSICAL EXAM **    -- CONSTITUTIONAL: Alert, NAD  -- PULMONARY: non-labored respirations  -- ABDOMEN: soft, mildly distended, 2 R sided ostomies pink/viable, L SOSA ss, c/d/i incision    ** LABS **                          12.2   5.37  )-----------( 105      ( 20 Jan 2019 05:19 )             35.4     20 Jan 2019 05:19    136    |  103    |  10     ----------------------------<  118    3.9     |  23     |  0.56     Ca    8.5        20 Jan 2019 05:19  Phos  2.2       20 Jan 2019 05:19  Mg     1.8       20 Jan 2019 05:19      PT/INR - ( 19 Jan 2019 02:15 )   PT: 20.8 SEC;   INR: 1.79          PTT - ( 19 Jan 2019 02:15 )  PTT:36.1 SEC  CAPILLARY BLOOD GLUCOSE      POCT Blood Glucose.: 118 mg/dL (20 Jan 2019 05:18)  POCT Blood Glucose.: 132 mg/dL (20 Jan 2019 00:56)  POCT Blood Glucose.: 123 mg/dL (19 Jan 2019 18:34)            Culture - Surg Site Aerob/Anaer w/Gm St (collected 18 Jan 2019 20:30)  Source: SURGERY  Preliminary Report (20 Jan 2019 11:26):    PSA^Pseudomonas aeruginosa    EC^Escherichia coli  Preliminary Report (19 Jan 2019 07:23):    Insufficient growth, culture re-incubated.          MEDICATIONS  (STANDING):  chlorhexidine 4% Liquid 1 Application(s) Topical two times a day  dextrose 5%. 1000 milliLiter(s) (50 mL/Hr) IV Continuous <Continuous>  dextrose 50% Injectable 12.5 Gram(s) IV Push once  dextrose 50% Injectable 25 Gram(s) IV Push once  dextrose 50% Injectable 25 Gram(s) IV Push once  enoxaparin Injectable 40 milliGRAM(s) SubCutaneous daily  fat emulsion (Fish Oil and Plant Based) 20% Infusion 17 mL/Hr (17 mL/Hr) IV Continuous <Continuous>  fat emulsion (Fish Oil and Plant Based) 20% Infusion 17 mL/Hr (17 mL/Hr) IV Continuous <Continuous>  fat emulsion (Fish Oil and Plant Based) 20% Infusion 17 mL/Hr (17 mL/Hr) IV Continuous <Continuous>  fat emulsion (Fish Oil and Plant Based) 20% Infusion 17 mL/Hr (17 mL/Hr) IV Continuous <Continuous>  influenza   Vaccine 0.5 milliLiter(s) IntraMuscular once  insulin lispro (HumaLOG) corrective regimen sliding scale   SubCutaneous every 6 hours  metoprolol tartrate Injectable 5 milliGRAM(s) IV Push every 6 hours  micafungin IVPB 100 milliGRAM(s) IV Intermittent every 24 hours  micafungin IVPB      Parenteral Nutrition - Adult 1 Each (83 mL/Hr) TPN Continuous <Continuous>  Parenteral Nutrition - Adult 1 Each (83 mL/Hr) TPN Continuous <Continuous>  piperacillin/tazobactam IVPB. 3.375 Gram(s) IV Intermittent every 8 hours    MEDICATIONS  (PRN):  dextrose 40% Gel 15 Gram(s) Oral once PRN Blood Glucose LESS THAN 70 milliGRAM(s)/deciliter  glucagon  Injectable 1 milliGRAM(s) IntraMuscular once PRN Glucose LESS THAN 70 milligrams/deciliter  HYDROmorphone  Injectable 0.5 milliGRAM(s) IV Push every 6 hours PRN Severe Pain (7 - 10)  HYDROmorphone  Injectable 0.25 milliGRAM(s) IV Push every 6 hours PRN Moderate Pain (4 - 6)

## 2019-01-21 LAB
ANION GAP SERPL CALC-SCNC: 12 MMO/L — SIGNIFICANT CHANGE UP (ref 7–14)
BASOPHILS # BLD AUTO: 0.02 K/UL — SIGNIFICANT CHANGE UP (ref 0–0.2)
BASOPHILS NFR BLD AUTO: 0.4 % — SIGNIFICANT CHANGE UP (ref 0–2)
BUN SERPL-MCNC: 13 MG/DL — SIGNIFICANT CHANGE UP (ref 7–23)
CA-I BLD-SCNC: 1.03 MMOL/L — SIGNIFICANT CHANGE UP (ref 1.03–1.23)
CALCIUM SERPL-MCNC: 8.5 MG/DL — SIGNIFICANT CHANGE UP (ref 8.4–10.5)
CHLORIDE SERPL-SCNC: 101 MMOL/L — SIGNIFICANT CHANGE UP (ref 98–107)
CO2 SERPL-SCNC: 21 MMOL/L — LOW (ref 22–31)
CREAT SERPL-MCNC: 0.57 MG/DL — SIGNIFICANT CHANGE UP (ref 0.5–1.3)
EOSINOPHIL # BLD AUTO: 0.04 K/UL — SIGNIFICANT CHANGE UP (ref 0–0.5)
EOSINOPHIL NFR BLD AUTO: 0.7 % — SIGNIFICANT CHANGE UP (ref 0–6)
GLUCOSE BLDC GLUCOMTR-MCNC: 123 MG/DL — HIGH (ref 70–99)
GLUCOSE BLDC GLUCOMTR-MCNC: 127 MG/DL — HIGH (ref 70–99)
GLUCOSE BLDC GLUCOMTR-MCNC: 137 MG/DL — HIGH (ref 70–99)
GLUCOSE BLDC GLUCOMTR-MCNC: 144 MG/DL — HIGH (ref 70–99)
GLUCOSE SERPL-MCNC: 132 MG/DL — HIGH (ref 70–99)
GRAM STN WND: SIGNIFICANT CHANGE UP
HCT VFR BLD CALC: 37.2 % — LOW (ref 39–50)
HGB BLD-MCNC: 12.8 G/DL — LOW (ref 13–17)
IMM GRANULOCYTES NFR BLD AUTO: 1.1 % — SIGNIFICANT CHANGE UP (ref 0–1.5)
LYMPHOCYTES # BLD AUTO: 0.34 K/UL — LOW (ref 1–3.3)
LYMPHOCYTES # BLD AUTO: 6.3 % — LOW (ref 13–44)
MAGNESIUM SERPL-MCNC: 2 MG/DL — SIGNIFICANT CHANGE UP (ref 1.6–2.6)
MANUAL SMEAR VERIFICATION: SIGNIFICANT CHANGE UP
MCHC RBC-ENTMCNC: 29.5 PG — SIGNIFICANT CHANGE UP (ref 27–34)
MCHC RBC-ENTMCNC: 34.4 % — SIGNIFICANT CHANGE UP (ref 32–36)
MCV RBC AUTO: 85.7 FL — SIGNIFICANT CHANGE UP (ref 80–100)
METHOD TYPE: SIGNIFICANT CHANGE UP
MONOCYTES # BLD AUTO: 0.5 K/UL — SIGNIFICANT CHANGE UP (ref 0–0.9)
MONOCYTES NFR BLD AUTO: 9.3 % — SIGNIFICANT CHANGE UP (ref 2–14)
NEUTROPHILS # BLD AUTO: 4.43 K/UL — SIGNIFICANT CHANGE UP (ref 1.8–7.4)
NEUTROPHILS NFR BLD AUTO: 82.2 % — HIGH (ref 43–77)
NRBC # FLD: 0 K/UL — LOW (ref 25–125)
ORGANISM # SPEC MICROSCOPIC CNT: SIGNIFICANT CHANGE UP
PHOSPHATE SERPL-MCNC: 3.5 MG/DL — SIGNIFICANT CHANGE UP (ref 2.5–4.5)
PLATELET # BLD AUTO: 118 K/UL — LOW (ref 150–400)
PMV BLD: 10.1 FL — SIGNIFICANT CHANGE UP (ref 7–13)
POTASSIUM SERPL-MCNC: 3.8 MMOL/L — SIGNIFICANT CHANGE UP (ref 3.5–5.3)
POTASSIUM SERPL-SCNC: 3.8 MMOL/L — SIGNIFICANT CHANGE UP (ref 3.5–5.3)
RBC # BLD: 4.34 M/UL — SIGNIFICANT CHANGE UP (ref 4.2–5.8)
RBC # FLD: 15.1 % — HIGH (ref 10.3–14.5)
SODIUM SERPL-SCNC: 134 MMOL/L — LOW (ref 135–145)
WBC # BLD: 5.39 K/UL — SIGNIFICANT CHANGE UP (ref 3.8–10.5)
WBC # FLD AUTO: 5.39 K/UL — SIGNIFICANT CHANGE UP (ref 3.8–10.5)

## 2019-01-21 PROCEDURE — 99223 1ST HOSP IP/OBS HIGH 75: CPT | Mod: GC

## 2019-01-21 RX ORDER — HYDROMORPHONE HYDROCHLORIDE 2 MG/ML
0.5 INJECTION INTRAMUSCULAR; INTRAVENOUS; SUBCUTANEOUS
Qty: 0 | Refills: 0 | Status: DISCONTINUED | OUTPATIENT
Start: 2019-01-21 | End: 2019-01-23

## 2019-01-21 RX ORDER — I.V. FAT EMULSION 20 G/100ML
17 EMULSION INTRAVENOUS
Qty: 40 | Refills: 0 | Status: DISCONTINUED | OUTPATIENT
Start: 2019-01-21 | End: 2019-01-23

## 2019-01-21 RX ORDER — ONDANSETRON 8 MG/1
4 TABLET, FILM COATED ORAL EVERY 6 HOURS
Qty: 0 | Refills: 0 | Status: DISCONTINUED | OUTPATIENT
Start: 2019-01-21 | End: 2019-01-25

## 2019-01-21 RX ORDER — HYDROMORPHONE HYDROCHLORIDE 2 MG/ML
0.25 INJECTION INTRAMUSCULAR; INTRAVENOUS; SUBCUTANEOUS EVERY 4 HOURS
Qty: 0 | Refills: 0 | Status: DISCONTINUED | OUTPATIENT
Start: 2019-01-21 | End: 2019-01-21

## 2019-01-21 RX ORDER — ELECTROLYTE SOLUTION,INJ
1 VIAL (ML) INTRAVENOUS
Qty: 0 | Refills: 0 | Status: DISCONTINUED | OUTPATIENT
Start: 2019-01-21 | End: 2019-01-21

## 2019-01-21 RX ORDER — DEXTROSE MONOHYDRATE, SODIUM CHLORIDE, AND POTASSIUM CHLORIDE 50; .745; 4.5 G/1000ML; G/1000ML; G/1000ML
1000 INJECTION, SOLUTION INTRAVENOUS
Qty: 0 | Refills: 0 | Status: DISCONTINUED | OUTPATIENT
Start: 2019-01-21 | End: 2019-01-22

## 2019-01-21 RX ORDER — HYDROMORPHONE HYDROCHLORIDE 2 MG/ML
30 INJECTION INTRAMUSCULAR; INTRAVENOUS; SUBCUTANEOUS
Qty: 0 | Refills: 0 | Status: DISCONTINUED | OUTPATIENT
Start: 2019-01-21 | End: 2019-01-23

## 2019-01-21 RX ORDER — HYDROMORPHONE HYDROCHLORIDE 2 MG/ML
0.5 INJECTION INTRAMUSCULAR; INTRAVENOUS; SUBCUTANEOUS EVERY 4 HOURS
Qty: 0 | Refills: 0 | Status: DISCONTINUED | OUTPATIENT
Start: 2019-01-21 | End: 2019-01-21

## 2019-01-21 RX ORDER — NALOXONE HYDROCHLORIDE 4 MG/.1ML
0.1 SPRAY NASAL
Qty: 0 | Refills: 0 | Status: DISCONTINUED | OUTPATIENT
Start: 2019-01-21 | End: 2019-02-01

## 2019-01-21 RX ORDER — ACETAMINOPHEN 500 MG
1000 TABLET ORAL ONCE
Qty: 0 | Refills: 0 | Status: COMPLETED | OUTPATIENT
Start: 2019-01-21 | End: 2019-01-21

## 2019-01-21 RX ADMIN — HYDROMORPHONE HYDROCHLORIDE 0.5 MILLIGRAM(S): 2 INJECTION INTRAMUSCULAR; INTRAVENOUS; SUBCUTANEOUS at 01:25

## 2019-01-21 RX ADMIN — HYDROMORPHONE HYDROCHLORIDE 0.5 MILLIGRAM(S): 2 INJECTION INTRAMUSCULAR; INTRAVENOUS; SUBCUTANEOUS at 19:26

## 2019-01-21 RX ADMIN — DEXTROSE MONOHYDRATE, SODIUM CHLORIDE, AND POTASSIUM CHLORIDE 30 MILLILITER(S): 50; .745; 4.5 INJECTION, SOLUTION INTRAVENOUS at 11:10

## 2019-01-21 RX ADMIN — Medication 400 MILLIGRAM(S): at 05:19

## 2019-01-21 RX ADMIN — PIPERACILLIN AND TAZOBACTAM 25 GRAM(S): 4; .5 INJECTION, POWDER, LYOPHILIZED, FOR SOLUTION INTRAVENOUS at 21:26

## 2019-01-21 RX ADMIN — PIPERACILLIN AND TAZOBACTAM 25 GRAM(S): 4; .5 INJECTION, POWDER, LYOPHILIZED, FOR SOLUTION INTRAVENOUS at 11:32

## 2019-01-21 RX ADMIN — PIPERACILLIN AND TAZOBACTAM 25 GRAM(S): 4; .5 INJECTION, POWDER, LYOPHILIZED, FOR SOLUTION INTRAVENOUS at 05:20

## 2019-01-21 RX ADMIN — Medication 5 MILLIGRAM(S): at 11:32

## 2019-01-21 RX ADMIN — Medication 1000 MILLIGRAM(S): at 06:00

## 2019-01-21 RX ADMIN — HYDROMORPHONE HYDROCHLORIDE 0.5 MILLIGRAM(S): 2 INJECTION INTRAMUSCULAR; INTRAVENOUS; SUBCUTANEOUS at 02:00

## 2019-01-21 RX ADMIN — HYDROMORPHONE HYDROCHLORIDE 30 MILLILITER(S): 2 INJECTION INTRAMUSCULAR; INTRAVENOUS; SUBCUTANEOUS at 11:02

## 2019-01-21 RX ADMIN — Medication 1 EACH: at 17:01

## 2019-01-21 RX ADMIN — CHLORHEXIDINE GLUCONATE 1 APPLICATION(S): 213 SOLUTION TOPICAL at 05:20

## 2019-01-21 RX ADMIN — HYDROMORPHONE HYDROCHLORIDE 0.5 MILLIGRAM(S): 2 INJECTION INTRAMUSCULAR; INTRAVENOUS; SUBCUTANEOUS at 11:14

## 2019-01-21 RX ADMIN — Medication 5 MILLIGRAM(S): at 23:28

## 2019-01-21 RX ADMIN — HYDROMORPHONE HYDROCHLORIDE 0.5 MILLIGRAM(S): 2 INJECTION INTRAMUSCULAR; INTRAVENOUS; SUBCUTANEOUS at 07:03

## 2019-01-21 RX ADMIN — Medication 5 MILLIGRAM(S): at 17:33

## 2019-01-21 RX ADMIN — Medication 5 MILLIGRAM(S): at 05:20

## 2019-01-21 RX ADMIN — I.V. FAT EMULSION 17 ML/HR: 20 EMULSION INTRAVENOUS at 16:23

## 2019-01-21 RX ADMIN — ONDANSETRON 4 MILLIGRAM(S): 8 TABLET, FILM COATED ORAL at 11:10

## 2019-01-21 RX ADMIN — HYDROMORPHONE HYDROCHLORIDE 0.5 MILLIGRAM(S): 2 INJECTION INTRAMUSCULAR; INTRAVENOUS; SUBCUTANEOUS at 06:29

## 2019-01-21 RX ADMIN — HYDROMORPHONE HYDROCHLORIDE 30 MILLILITER(S): 2 INJECTION INTRAMUSCULAR; INTRAVENOUS; SUBCUTANEOUS at 19:24

## 2019-01-21 RX ADMIN — CHLORHEXIDINE GLUCONATE 1 APPLICATION(S): 213 SOLUTION TOPICAL at 17:33

## 2019-01-21 RX ADMIN — MICAFUNGIN SODIUM 105 MILLIGRAM(S): 100 INJECTION, POWDER, LYOPHILIZED, FOR SOLUTION INTRAVENOUS at 13:13

## 2019-01-21 RX ADMIN — ENOXAPARIN SODIUM 40 MILLIGRAM(S): 100 INJECTION SUBCUTANEOUS at 11:32

## 2019-01-21 NOTE — CONSULT NOTE ADULT - SUBJECTIVE AND OBJECTIVE BOX
HPI:  63M pmh HTN, HLD with history of gastric adenocarcinoma s/p total gastrectomy and Vickie en Y esophagojejunostomy with Dr Espinal at [facility] Dec 2017 c/b early postop SBO s/p lap SHAR, treated with adjuvant chemo and radiation who was transferred to [facility] on 1/15/19 for esophageal dilatation. Patient was initially admitted 12/24/18 - 1/14/19 to [facility] for inability to tolerate to PO (had EGD as outpatient on 12/20/18 which revealed gastric outlet obstruction). Patient had EGD with dilation on 12/27, EGD with esophageal stent placement on 1/2 which was complicated by stent migration to mid esophagus. He underwent EGD with stent removal and dilation on 1/4. UGI series on 1/6 revealed high grade stricture at esophagojejunostomy. He was transferred to [facility] on 1/15 for further dilatation. On 1/17 patient had endoscopy to better assess strictures.  EJ anastomosis was noted to be friable and erythematous, biopsies were taken.  In PACU patient had complaints of abdominal distention and pain.  Upright CXR performed significant for pneumoperitoneum. Went to the OR on 1/17/19 for Exploratory laparotomy, extensive lysis of adhesions, reduction of internal hernia, right hemicolectomy with mesenteric lymphadenectomy, intraabdominal mass biopsy at site of carcinomatosis, abdominal washout, creation of end ileostomy and mucous fistula creation. Intraoperatively found to have Ischemic right colon at the hepatic flexure with colonic perforation, feculent peritonitis, recurrent carcinomatosis at the previous esophagojejunal anastomosis. Started on Zosyn (1/17/19-->) and Micafungin (1/17/19-->). Patient noted to have significant drainage from SOSA drain which was sent for culture. Growth of E. coli and Pseudomonas. Last febrile to 100.8 on 1/19/19. Noted to have 16.7 bands on 1/20/19.         PAST MEDICAL & SURGICAL HISTORY:  Stomach cancer  Prediabetes  HLD (hyperlipidemia)  HTN (hypertension)  Malignant neoplasm of stomach, unspecified location  S/P total gastrectomy and Vickie-en-Y esophagojejunal anastomosis  S/P total gastrectomy and Vickie-en-Y esophagojejunal anastomosis  H/O prostate biopsy  History of arthroscopy of right shoulder: repair for rotator cuff syndrome  History of arthroscopy of left knee: meniscal repair  History of appendectomy      Allergies  No Known Allergies        ANTIMICROBIALS:  micafungin IVPB 100 every 24 hours  micafungin IVPB    piperacillin/tazobactam IVPB. 3.375 every 8 hours      OTHER MEDS: MEDICATIONS  (STANDING):  dextrose 40% Gel 15 once PRN  dextrose 50% Injectable 12.5 once  dextrose 50% Injectable 25 once  dextrose 50% Injectable 25 once  enoxaparin Injectable 40 daily  glucagon  Injectable 1 once PRN  HYDROmorphone  Injectable 0.5 every 4 hours PRN  HYDROmorphone  Injectable 0.25 every 4 hours PRN  influenza   Vaccine 0.5 once  insulin lispro (HumaLOG) corrective regimen sliding scale  every 6 hours  metoprolol tartrate Injectable 5 every 6 hours      SOCIAL HISTORY:  [ ] etoh [ ] tobacco [ ] former smoker [ ] IVDU    FAMILY HISTORY:  No pertinent family history in first degree relatives  Cerebrovascular accident (Mother)      REVIEW OF SYSTEMS  [  ] ROS unobtainable because:    [  ] All other systems negative except as noted below:	    Constitutional:  [ ] fever [ ] chills  [ ] weight loss  [ ] weakness  Skin:  [ ] rash [ ] phlebitis	  Eyes: [ ] icterus [ ] pain  [ ] discharge	  ENMT: [ ] sore throat  [ ] thrush [ ] ulcers [ ] exudates  Respiratory: [ ] dyspnea [ ] hemoptysis [ ] cough [ ] sputum	  Cardiovascular:  [ ] chest pain [ ] palpitations [ ] edema	  Gastrointestinal:  [ ] nausea [ ] vomiting [ ] diarrhea [ ] constipation [ ] pain	  Genitourinary:  [ ] dysuria [ ] frequency [ ] hematuria [ ] discharge [ ] flank pain  [ ] incontinence  Musculoskeletal:  [ ] myalgias [ ] arthralgias [ ] arthritis  [ ] back pain  Neurological:  [ ] headache [ ] seizures  [ ] confusion/altered mental status  Psychiatric:  [ ] anxiety [ ] depression	  Hematology/Lymphatics:  [ ] lymphadenopathy  Endocrine:  [ ] adrenal [ ] thyroid  Allergic/Immunologic:	 [ ] transplant [ ] seasonal    Vital Signs Last 24 Hrs  T(F): 98.3 (01-21-19 @ 05:09), Max: 101.2 (01-18-19 @ 10:00)    Vital Signs Last 24 Hrs  HR: 80 (01-21-19 @ 05:09) (73 - 89)  BP: 139/72 (01-21-19 @ 05:09) (118/71 - 143/80)  RR: 18 (01-21-19 @ 05:09)  SpO2: 95% (01-21-19 @ 05:09) (93% - 97%)  Wt(kg): --    PHYSICAL EXAM:  General: non-toxic  HEAD/EYES: anicteric, PERRL  ENT:  supple  Cardiovascular:   S1, S2  Respiratory:  clear bilaterally  GI:  soft, non-tender, normal bowel sounds  :  no CVA tenderness   Musculoskeletal:  no synovitis  Neurologic:  grossly non-focal  Skin:  no rash  Lymph: no lymphadenopathy  Psychiatric:  appropriate affect  Vascular:  no phlebitis                                12.8   5.39  )-----------( 118      ( 21 Jan 2019 06:10 )             37.2       01-21    134<L>  |  101  |  13  ----------------------------<  132<H>  3.8   |  21<L>  |  0.57    Ca    8.5      21 Jan 2019 06:10  Phos  3.5     01-21  Mg     2.0     01-21            MICROBIOLOGY:    Culture - Surg Site Aerob/Anaer w/Gm St (01.18.19 @ 20:30)    Gram Stain Wound:   GVR^GRAM VARIABLE RODS  WBC^White Blood Cells  QNTY CELLS IN GRAM STAIN: FEW (2+)    Culture - Surgical Site:   Insufficient growth, culture re-incubated.    Culture - Surgical Site:   PSA^Pseudomonas aeruginosa  EC^Escherichia coli    Specimen Source: SURGERY    RADIOLOGY:    EXAM:  XR CHEST PORTABLE ROUTINE 1V    PROCEDURE DATE:  Jan 19 2019   Endotracheal tube tip 2.5 cm above shelton.    EXAM:  CT ABDOMEN AND PELVIS IC    PROCEDURE DATE:  Jan 17 2019   Moderate pneumoperitoneum and moderate amount of abdominal and pelvic ascites, mildly increased from December 24, 2018. No discrete or drainable fluid collection.  Small amount of pneumomediastinum.    PATHOLOGY:    Surgical Final Report  1/17/19  Final Diagnosis  Esophagojejunal anastomosis site, endoscopic biopsy:  - Small intestinal type mucosa, negative for dysplasia HPI:    63M pmh HTN, HLD with history of gastric adenocarcinoma s/p total gastrectomy and Vickie en Y esophagojejunostomy with Dr Espinal at [facility] Dec 2017 c/b early postop SBO s/p lap SHAR, treated with adjuvant chemo and radiation who was transferred to [facility] on 1/15/19 for esophageal dilatation. Patient was initially admitted 12/24/18 - 1/14/19 to [facility] for inability to tolerate to PO (had EGD as outpatient on 12/20/18 which revealed gastric outlet obstruction). Patient had EGD with dilation on 12/27, EGD with esophageal stent placement on 1/2 which was complicated by stent migration to mid esophagus. He underwent EGD with stent removal and dilation on 1/4. UGI series on 1/6 revealed high grade stricture at esophagojejunostomy. He was transferred to [facility] on 1/15 for further dilatation. On 1/17 patient had endoscopy to better assess strictures.  EJ anastomosis was noted to be friable and erythematous, biopsies were taken.  In PACU patient had complaints of abdominal distention and pain.  Upright CXR performed significant for pneumoperitoneum. Went to the OR on 1/17/19 for Exploratory laparotomy, extensive lysis of adhesions, reduction of internal hernia, right hemicolectomy with mesenteric lymphadenectomy, intraabdominal mass biopsy at site of carcinomatosis, abdominal washout, creation of end ileostomy and mucous fistula creation. Intraoperatively found to have Ischemic right colon at the hepatic flexure with colonic perforation, feculent peritonitis, recurrent carcinomatosis at the previous esophagojejunal anastomosis. Started on Zosyn (1/17/19-->) and Micafungin (1/17/19-->). Patient noted to have significant drainage from SOSA drain which was sent for culture. Growth of E. coli and Pseudomonas. Last febrile to 100.8 on 1/19/19. Noted to have 16.7 bands on 1/20/19. Notes improvement in N/V. Continues to have abdominal pain.        PAST MEDICAL & SURGICAL HISTORY:  Stomach cancer  Prediabetes  HLD (hyperlipidemia)  HTN (hypertension)  Malignant neoplasm of stomach, unspecified location  S/P total gastrectomy and Vickie-en-Y esophagojejunal anastomosis  S/P total gastrectomy and Vickie-en-Y esophagojejunal anastomosis  H/O prostate biopsy  History of arthroscopy of right shoulder: repair for rotator cuff syndrome  History of arthroscopy of left knee: meniscal repair  History of appendectomy      Allergies  No Known Allergies        ANTIMICROBIALS:  micafungin IVPB 100 every 24 hours  micafungin IVPB    piperacillin/tazobactam IVPB. 3.375 every 8 hours      OTHER MEDS: MEDICATIONS  (STANDING):  dextrose 40% Gel 15 once PRN  dextrose 50% Injectable 12.5 once  dextrose 50% Injectable 25 once  dextrose 50% Injectable 25 once  enoxaparin Injectable 40 daily  glucagon  Injectable 1 once PRN  HYDROmorphone  Injectable 0.5 every 4 hours PRN  HYDROmorphone  Injectable 0.25 every 4 hours PRN  influenza   Vaccine 0.5 once  insulin lispro (HumaLOG) corrective regimen sliding scale  every 6 hours  metoprolol tartrate Injectable 5 every 6 hours      SOCIAL HISTORY:  [ ] etoh [ ] tobacco [ ] former smoker [ ] IVDU    FAMILY HISTORY:  No pertinent family history in first degree relatives  Cerebrovascular accident (Mother)      REVIEW OF SYSTEMS  [  ] ROS unobtainable because:    [  ] All other systems negative except as noted below:	    Constitutional:  [ ] fever [ ] chills  [ ] weight loss  [ ] weakness  Skin:  [ ] rash [ ] phlebitis	  Eyes: [ ] icterus [ ] pain  [ ] discharge	  ENMT: [ ] sore throat  [ ] thrush [ ] ulcers [ ] exudates  Respiratory: [ ] dyspnea [ ] hemoptysis [ ] cough [ ] sputum	  Cardiovascular:  [ ] chest pain [ ] palpitations [ ] edema	  Gastrointestinal:  [ ] nausea [ ] vomiting [ ] diarrhea [ ] constipation [ ] pain	  Genitourinary:  [ ] dysuria [ ] frequency [ ] hematuria [ ] discharge [ ] flank pain  [ ] incontinence  Musculoskeletal:  [ ] myalgias [ ] arthralgias [ ] arthritis  [ ] back pain  Neurological:  [ ] headache [ ] seizures  [ ] confusion/altered mental status  Psychiatric:  [ ] anxiety [ ] depression	  Hematology/Lymphatics:  [ ] lymphadenopathy  Endocrine:  [ ] adrenal [ ] thyroid  Allergic/Immunologic:	 [ ] transplant [ ] seasonal    Vital Signs Last 24 Hrs  T(F): 98.3 (01-21-19 @ 05:09), Max: 101.2 (01-18-19 @ 10:00)    Vital Signs Last 24 Hrs  HR: 80 (01-21-19 @ 05:09) (73 - 89)  BP: 139/72 (01-21-19 @ 05:09) (118/71 - 143/80)  RR: 18 (01-21-19 @ 05:09)  SpO2: 95% (01-21-19 @ 05:09) (93% - 97%)  Wt(kg): --    PHYSICAL EXAM:  General: non-toxic  HEAD/EYES: anicteric, PERRL  ENT:  supple  Cardiovascular:   S1, S2  Respiratory:  clear bilaterally  GI:  soft, non-tender, normal bowel sounds  :  no CVA tenderness   Musculoskeletal:  no synovitis  Neurologic:  grossly non-focal  Skin:  no rash  Lymph: no lymphadenopathy  Psychiatric:  appropriate affect  Vascular:  no phlebitis                                12.8   5.39  )-----------( 118      ( 21 Jan 2019 06:10 )             37.2       01-21    134<L>  |  101  |  13  ----------------------------<  132<H>  3.8   |  21<L>  |  0.57    Ca    8.5      21 Jan 2019 06:10  Phos  3.5     01-21  Mg     2.0     01-21            MICROBIOLOGY:    Culture - Surg Site Aerob/Anaer w/Gm St (01.18.19 @ 20:30)    Gram Stain Wound:   GVR^GRAM VARIABLE RODS  WBC^White Blood Cells  QNTY CELLS IN GRAM STAIN: FEW (2+)    Culture - Surgical Site:   Insufficient growth, culture re-incubated.    Culture - Surgical Site:   PSA^Pseudomonas aeruginosa  EC^Escherichia coli    Specimen Source: SURGERY    RADIOLOGY:    EXAM:  XR CHEST PORTABLE ROUTINE 1V    PROCEDURE DATE:  Jan 19 2019   Endotracheal tube tip 2.5 cm above shelton.    EXAM:  CT ABDOMEN AND PELVIS IC    PROCEDURE DATE:  Jan 17 2019   Moderate pneumoperitoneum and moderate amount of abdominal and pelvic ascites, mildly increased from December 24, 2018. No discrete or drainable fluid collection.  Small amount of pneumomediastinum.    PATHOLOGY:    Surgical Final Report  1/17/19  Final Diagnosis  Esophagojejunal anastomosis site, endoscopic biopsy:  - Small intestinal type mucosa, negative for dysplasia HPI:    63M pmh HTN, HLD with history of gastric adenocarcinoma s/p total gastrectomy and Vickie en Y esophagojejunostomy with Dr Espinal at [facility] Dec 2017 c/b early postop SBO s/p lap SHAR, treated with adjuvant chemo and radiation who was transferred to [facility] on 1/15/19 for esophageal dilatation. Patient was initially admitted 12/24/18 - 1/14/19 to [facility] for inability to tolerate to PO (had EGD as outpatient on 12/20/18 which revealed gastric outlet obstruction). Patient had EGD with dilation on 12/27, EGD with esophageal stent placement on 1/2 which was complicated by stent migration to mid esophagus. He underwent EGD with stent removal and dilation on 1/4. UGI series on 1/6 revealed high grade stricture at esophagojejunostomy. He was transferred to [facility] on 1/15 for further dilatation. On 1/17 patient had endoscopy to better assess strictures.  EJ anastomosis was noted to be friable and erythematous, biopsies were taken.  In PACU patient had complaints of abdominal distention and pain.  Upright CXR performed significant for pneumoperitoneum. Went to the OR on 1/17/19 for Exploratory laparotomy, extensive lysis of adhesions, reduction of internal hernia, right hemicolectomy with mesenteric lymphadenectomy, intraabdominal mass biopsy at site of carcinomatosis, abdominal washout, creation of end ileostomy and mucous fistula creation. Intraoperatively found to have Ischemic right colon at the hepatic flexure with colonic perforation, feculent peritonitis, recurrent carcinomatosis at the previous esophagojejunal anastomosis. Started on Zosyn (1/17/19-->) and Micafungin (1/17/19-->). Patient noted to have significant drainage from SOSA drain which was sent for culture. Growth of E. coli and Pseudomonas. Last febrile to 100.8 on 1/19/19. Noted to have 16.7 bands on 1/20/19. Notes improvement in N/V. Continues to have abdominal pain.        PAST MEDICAL & SURGICAL HISTORY:  Stomach cancer  Prediabetes  HLD (hyperlipidemia)  HTN (hypertension)  Malignant neoplasm of stomach, unspecified location  S/P total gastrectomy and Vickie-en-Y esophagojejunal anastomosis  S/P total gastrectomy and Vickie-en-Y esophagojejunal anastomosis  H/O prostate biopsy  History of arthroscopy of right shoulder: repair for rotator cuff syndrome  History of arthroscopy of left knee: meniscal repair  History of appendectomy      Allergies  No Known Allergies        ANTIMICROBIALS:  micafungin IVPB 100 every 24 hours  micafungin IVPB    piperacillin/tazobactam IVPB. 3.375 every 8 hours      OTHER MEDS: MEDICATIONS  (STANDING):  dextrose 40% Gel 15 once PRN  dextrose 50% Injectable 12.5 once  dextrose 50% Injectable 25 once  dextrose 50% Injectable 25 once  enoxaparin Injectable 40 daily  glucagon  Injectable 1 once PRN  HYDROmorphone  Injectable 0.5 every 4 hours PRN  HYDROmorphone  Injectable 0.25 every 4 hours PRN  influenza   Vaccine 0.5 once  insulin lispro (HumaLOG) corrective regimen sliding scale  every 6 hours  metoprolol tartrate Injectable 5 every 6 hours      SOCIAL HISTORY:  No smoking history. Prior EtOH use. No recreational drug use. Born in [place] and moved to  > 30 years ago. No recent travel     FAMILY HISTORY:  No pertinent family history in first degree relatives  Cerebrovascular accident (Mother)      REVIEW OF SYSTEMS  [  ] ROS unobtainable because:    [  x] All other systems negative except as noted below:	    Constitutional:  [ x] fever [ ] chills  [ ] weight loss  [ ] weakness  Skin:  [ ] rash [ ] phlebitis	  Eyes: [ ] icterus [ ] pain  [ ] discharge	  ENMT: [ ] sore throat  [ ] thrush [ ] ulcers [ ] exudates  Respiratory: [ ] dyspnea [ ] hemoptysis [ ] cough [ ] sputum	  Cardiovascular:  [ ] chest pain [ ] palpitations [ ] edema	  Gastrointestinal:  [ ] nausea [ ] vomiting [ ] diarrhea [ ] constipation [x ] pain	  Genitourinary:  [ ] dysuria [ ] frequency [ ] hematuria [ ] discharge [ ] flank pain  [ ] incontinence  Musculoskeletal:  [ ] myalgias [ ] arthralgias [ ] arthritis  [ ] back pain  Neurological:  [ ] headache [ ] seizures  [ ] confusion/altered mental status  Psychiatric:  [ ] anxiety [ ] depression	  Hematology/Lymphatics:  [ ] lymphadenopathy  Endocrine:  [ ] adrenal [ ] thyroid  Allergic/Immunologic:	 [ ] transplant [ ] seasonal    Vital Signs Last 24 Hrs  T(F): 98.3 (01-21-19 @ 05:09), Max: 101.2 (01-18-19 @ 10:00)    Vital Signs Last 24 Hrs  HR: 80 (01-21-19 @ 05:09) (73 - 89)  BP: 139/72 (01-21-19 @ 05:09) (118/71 - 143/80)  RR: 18 (01-21-19 @ 05:09)  SpO2: 95% (01-21-19 @ 05:09) (93% - 97%)  Wt(kg): --    PHYSICAL EXAMINATION:  General: Alert and Awake, NAD  HEENT: PERRL, EOMI, No subconjunctival hemorrhages, Oropharynx Clear, MMM  Neck: Supple, No JUSTIN  Cardiac: RRR, No M/R/G  Vascular: LUE PICC Line (No surrounding erythema, drainage or tenderness to palpation) R Mediport (No surrounding erythema, drainage or tenderness to palpation)  Resp: CTAB, No Wh/Rh/Ra  Abdomen: +Mucocutaneous fistula in midline of abdomen, +ileostomy in RLQ, midline staples and incision clean (No surrounding erythema, drainage or tenderness to palpation), NBS, Abdomen is diffusely tender to palpation, No HSM, No rigidity or guarding  MSK: No LE edema. No stigmata of IE. No evidence of phlebitis. No evidence of synovitis.  : No coto  Skin: No rashes or lesions. Skin is warm and dry to the touch.   Neuro: Alert and Awake. CN 2-12 Grossly intact. Moves all four extremities spontaneously.  Psych: Calm, Pleasant, Cooperative                          12.8   5.39  )-----------( 118      ( 21 Jan 2019 06:10 )             37.2       01-21    134<L>  |  101  |  13  ----------------------------<  132<H>  3.8   |  21<L>  |  0.57    Ca    8.5      21 Jan 2019 06:10  Phos  3.5     01-21  Mg     2.0     01-21            MICROBIOLOGY:    Culture - Surg Site Aerob/Anaer w/Gm St (01.18.19 @ 20:30)    Gram Stain Wound:   GVR^GRAM VARIABLE RODS  WBC^White Blood Cells  QNTY CELLS IN GRAM STAIN: FEW (2+)    Culture - Surgical Site:   Insufficient growth, culture re-incubated.    Culture - Surgical Site:   PSA^Pseudomonas aeruginosa  EC^Escherichia coli    Specimen Source: SURGERY    RADIOLOGY:    EXAM:  XR CHEST PORTABLE ROUTINE 1V    PROCEDURE DATE:  Jan 19 2019   Endotracheal tube tip 2.5 cm above shelton.    EXAM:  CT ABDOMEN AND PELVIS IC    PROCEDURE DATE:  Jan 17 2019   Moderate pneumoperitoneum and moderate amount of abdominal and pelvic ascites, mildly increased from December 24, 2018. No discrete or drainable fluid collection.  Small amount of pneumomediastinum.    PATHOLOGY:    Surgical Final Report  1/17/19  Final Diagnosis  Esophagojejunal anastomosis site, endoscopic biopsy:  - Small intestinal type mucosa, negative for dysplasia

## 2019-01-21 NOTE — PROGRESS NOTE ADULT - SUBJECTIVE AND OBJECTIVE BOX
Surgery Progress Note    S: Patient seen and examined. No acute events overnight. Patient had coto removed yesterday and passed TOV. Patient still complains of generalized abdominal pain. Pain service was consulted this AM for possible PCA. Patient remains NPO.     O:  Vital Signs Last 24 Hrs  T(C): 36.8 (21 Jan 2019 05:09), Max: 37.6 (20 Jan 2019 08:26)  T(F): 98.3 (21 Jan 2019 05:09), Max: 99.6 (20 Jan 2019 08:26)  HR: 80 (21 Jan 2019 05:09) (73 - 89)  BP: 139/72 (21 Jan 2019 05:09) (118/71 - 143/80)  BP(mean): --  RR: 18 (21 Jan 2019 05:09) (18 - 18)  SpO2: 95% (21 Jan 2019 05:09) (93% - 97%)    I&O's Detail    20 Jan 2019 07:01  -  21 Jan 2019 07:00  --------------------------------------------------------  IN:    fat emulsion (Fish Oil and Plant Based) 20% Infusion: 136 mL    IV PiggyBack: 275 mL    lactated ringers.: 150 mL    TPN (Total Parenteral Nutrition): 1494 mL  Total IN: 2055 mL    OUT:    Bulb: 130 mL    Drain: 20 mL    Ileostomy: 35 mL    Indwelling Catheter - Urethral: 1500 mL    Nasoenteral Tube: 350 mL    Voided: 2930 mL  Total OUT: 4965 mL    Total NET: -2910 mL          MEDICATIONS  (STANDING):  chlorhexidine 4% Liquid 1 Application(s) Topical two times a day  dextrose 5%. 1000 milliLiter(s) (50 mL/Hr) IV Continuous <Continuous>  dextrose 50% Injectable 12.5 Gram(s) IV Push once  dextrose 50% Injectable 25 Gram(s) IV Push once  dextrose 50% Injectable 25 Gram(s) IV Push once  enoxaparin Injectable 40 milliGRAM(s) SubCutaneous daily  fat emulsion (Fish Oil and Plant Based) 20% Infusion 17 mL/Hr (17 mL/Hr) IV Continuous <Continuous>  fat emulsion (Fish Oil and Plant Based) 20% Infusion 17 mL/Hr (17 mL/Hr) IV Continuous <Continuous>  fat emulsion (Fish Oil and Plant Based) 20% Infusion 17 mL/Hr (17 mL/Hr) IV Continuous <Continuous>  fat emulsion (Fish Oil and Plant Based) 20% Infusion 17 mL/Hr (17 mL/Hr) IV Continuous <Continuous>  influenza   Vaccine 0.5 milliLiter(s) IntraMuscular once  insulin lispro (HumaLOG) corrective regimen sliding scale   SubCutaneous every 6 hours  metoprolol tartrate Injectable 5 milliGRAM(s) IV Push every 6 hours  micafungin IVPB 100 milliGRAM(s) IV Intermittent every 24 hours  micafungin IVPB      Parenteral Nutrition - Adult 1 Each (83 mL/Hr) TPN Continuous <Continuous>  piperacillin/tazobactam IVPB. 3.375 Gram(s) IV Intermittent every 8 hours    MEDICATIONS  (PRN):  dextrose 40% Gel 15 Gram(s) Oral once PRN Blood Glucose LESS THAN 70 milliGRAM(s)/deciliter  glucagon  Injectable 1 milliGRAM(s) IntraMuscular once PRN Glucose LESS THAN 70 milligrams/deciliter  HYDROmorphone  Injectable 0.5 milliGRAM(s) IV Push every 4 hours PRN Severe Pain (7 - 10)  HYDROmorphone  Injectable 0.25 milliGRAM(s) IV Push every 4 hours PRN Moderate Pain (4 - 6)                            12.8   5.39  )-----------( 118      ( 21 Jan 2019 06:10 )             37.2       01-21    134<L>  |  101  |  13  ----------------------------<  132<H>  3.8   |  21<L>  |  0.57    Ca    8.5      21 Jan 2019 06:10  Phos  3.5     01-21  Mg     2.0     01-21        Physical Exam:  Gen: Laying in bed, NAD, NGT in place to suction  Resp: Unlabored breathing on RA  Abd: soft, generalized abdominal TTP without peritoneal signs, mildly distended, RLQ ostomy with minimal green stool, RUQ ostomy pink/viable, LLQ SOSA with dark sanguinous output, no rebound or guarding  Ext: WWP  Skin: No rashes

## 2019-01-21 NOTE — PROGRESS NOTE ADULT - SUBJECTIVE AND OBJECTIVE BOX
NUTRITION NOTE  [id number] TOM  ===============================    Interval events; No acute events overnight. Transferred from SICU to floor over weekend     VITAL SIGNS:  T(C): 36.4 (19 @ 11:22), Max: 37.2 (19 @ 20:17)  HR: 70 (19 @ 11:22) (68 - 83)  BP: 136/79 (19 @ 11:22) (118/71 - 143/80)  ABP: --  ABP(mean): --  RR: 20 (19 @ 11:22) (18 - 20)  SpO2: 98% (19 11:22) (95% - 98%)  CVP(mm Hg): --   @ 07:  -   @ 07:00  --------------------------------------------------------  IN: 2055 mL / OUT: 4965 mL / NET: -2910 mL     @ 07:01  -   @ 13:09  --------------------------------------------------------  IN: 249 mL / OUT: 450 mL / NET: -201 mL      Glucose 105-132    ** PHYSICAL EXAM **    -- CONSTITUTIONAL: Alert, NAD  -- PULMONARY: non-labored respirations  -- ABDOMEN: soft, mildly distended, 2 R sided ostomies pink/viable, L SOSA ss, c/d/i incision    Metabolic/FLUIDS/ELECTROLYTES/NUTRITION:  Gastrointestinal Medications:  dextrose 5% + sodium chloride 0.45% with potassium chloride 20 mEq/L 1000 milliLiter(s) IV Continuous <Continuous>  dextrose 5%. 1000 milliLiter(s) IV Continuous <Continuous>  fat emulsion (Fish Oil and Plant Based) 20% Infusion 17 mL/Hr IV Continuous <Continuous>  fat emulsion (Fish Oil and Plant Based) 20% Infusion 17 mL/Hr IV Continuous <Continuous>  fat emulsion (Fish Oil and Plant Based) 20% Infusion 17 mL/Hr IV Continuous <Continuous>  fat emulsion (Fish Oil and Plant Based) 20% Infusion 17 mL/Hr IV Continuous <Continuous>  fat emulsion (Fish Oil and Plant Based) 20% Infusion 17 mL/Hr IV Continuous <Continuous>  Parenteral Nutrition - Adult 1 Each TPN Continuous <Continuous>  Parenteral Nutrition - Adult 1 Each TPN Continuous <Continuous>    I&O's Detail  63y  Daily Weight in k.3 (2019 06:00)      134<L>  |  101  |  13  ----------------------------<  132<H>  3.8   |  21<L>  |  0.57    Ca    8.5      2019 06:10  Phos  3.5       Mg     2.0             Diet: NPO       INFECTIOUS DISEASE:  Antimicrobials/Immunologic Medications:  influenza   Vaccine 0.5 milliLiter(s) IntraMuscular once  micafungin IVPB 100 milliGRAM(s) IV Intermittent every 24 hours  micafungin IVPB      piperacillin/tazobactam IVPB. 3.375 Gram(s) IV Intermittent every 8 hours    RECENT CULTURES:   @ 20:30 SURGERY Pseudomonas aeruginosa  Escherichia coli      OTHER MEDICATIONS:  Endocrine/Metabolic Medications:  dextrose 40% Gel 15 Gram(s) Oral once PRN  dextrose 50% Injectable 12.5 Gram(s) IV Push once  dextrose 50% Injectable 25 Gram(s) IV Push once  dextrose 50% Injectable 25 Gram(s) IV Push once  glucagon  Injectable 1 milliGRAM(s) IntraMuscular once PRN  insulin lispro (HumaLOG) corrective regimen sliding scale   SubCutaneous every 6 hours    Genitourinary Medications:    Topical/Other Medications:  chlorhexidine 4% Liquid 1 Application(s) Topical two times a day  naloxone Injectable 0.1 milliGRAM(s) IV Push every 3 minutes PRN    ASSESSMENT/PLAN:  63y Male s/p exploratory laparotomy, 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 transitioned from sicu to floor     Plan:   Continue TPN  UGI to Wed per primary team  Increased phosphate in TPN

## 2019-01-21 NOTE — PROGRESS NOTE ADULT - ASSESSMENT
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 transitioned from sicu to floor     Plan:  - Pain control as needed  - f/u acute pain consult for PCA  - NPO with TPN  - monitor ostomy function  - lovenox for DVT ppx  - oob/ambulate as tolerated  - f/u ID recs  - palliative consult  - f/u with his medical oncologist re: outpatient treatment    D Team Surgery  [id number]

## 2019-01-21 NOTE — CONSULT NOTE ADULT - ASSESSMENT
63M pmh HTN, HLD with history of gastric adenocarcinoma s/p total gastrectomy and Vickie en Y esophagojejunostomy with Dr Espinal at [facility] Dec 2017 c/b early postop SBO s/p lap SHAR, treated with adjuvant chemo and radiation who was transferred to [facility] on 1/15/19 for esophageal dilatation. On 1/17 patient had endoscopy to better assess strictures.  EJ anastomosis was noted to be friable and erythematous, biopsies were taken.  In PACU patient had complaints of abdominal distention and pain.  Upright CXR performed significant for pneumoperitoneum. 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 Started on Zosyn (1/17/19-->) and Micafungin (1/17/19-->).     Overall, feculent peritonitis secondary to colonic perforation. Would finish micafungin after today (Day 5). Would continue Zosyn at this time (tentative plan for 14 days of antibiotics). Would followup on E. coli and pseudomonas susceptibilities from the SOSA drainage.    --Stop Micafungin after today  --Continue Zosyn 3.375 mg IV Q8H  --F/U E. coli and pseudomonas susceptibilities from the SSOA drainage.  --If febrile again would recommend blood cultures x2

## 2019-01-21 NOTE — PROGRESS NOTE ADULT - SUBJECTIVE AND OBJECTIVE BOX
Vital Signs Last 24 Hrs  T(C): 36.7 (21 Jan 2019 08:00), Max: 37.6 (20 Jan 2019 12:16)  T(F): 98.1 (21 Jan 2019 08:00), Max: 99.6 (20 Jan 2019 12:16)  HR: 68 (21 Jan 2019 08:00) (68 - 83)  BP: 134/71 (21 Jan 2019 08:00) (118/71 - 143/80)  BP(mean): --  RR: 18 (21 Jan 2019 08:00) (18 - 18)  SpO2: 97% (21 Jan 2019 08:00) (95% - 97%)    I&O's Detail    20 Jan 2019 07:01  -  21 Jan 2019 07:00  --------------------------------------------------------  IN:    fat emulsion (Fish Oil and Plant Based) 20% Infusion: 136 mL    IV PiggyBack: 275 mL    lactated ringers.: 150 mL    TPN (Total Parenteral Nutrition): 1494 mL  Total IN: 2055 mL    OUT:    Bulb: 130 mL    Drain: 20 mL    Ileostomy: 35 mL    Indwelling Catheter - Urethral: 1500 mL    Nasoenteral Tube: 350 mL    Voided: 2930 mL  Total OUT: 4965 mL    Total NET: -2910 mL                                12.8   5.39  )-----------( 118      ( 21 Jan 2019 06:10 )             37.2       01-21    134<L>  |  101  |  13  ----------------------------<  132<H>  3.8   |  21<L>  |  0.57    Ca    8.5      21 Jan 2019 06:10  Phos  3.5     01-21  Mg     2.0     01-21    ostomy functioning  Incision clear              PLAN:  UGI on Wednesday to assess leak  OOB to chair  Palliative care consult

## 2019-01-22 DIAGNOSIS — K65.8 OTHER PERITONITIS: ICD-10-CM

## 2019-01-22 DIAGNOSIS — Z51.5 ENCOUNTER FOR PALLIATIVE CARE: ICD-10-CM

## 2019-01-22 DIAGNOSIS — R54 AGE-RELATED PHYSICAL DEBILITY: ICD-10-CM

## 2019-01-22 DIAGNOSIS — A49.9 BACTERIAL INFECTION, UNSPECIFIED: ICD-10-CM

## 2019-01-22 DIAGNOSIS — C16.9 MALIGNANT NEOPLASM OF STOMACH, UNSPECIFIED: ICD-10-CM

## 2019-01-22 LAB
-  AMIKACIN: SIGNIFICANT CHANGE UP
-  AMIKACIN: SIGNIFICANT CHANGE UP
-  AMPICILLIN/SULBACTAM: SIGNIFICANT CHANGE UP
-  AMPICILLIN: SIGNIFICANT CHANGE UP
-  AZTREONAM: SIGNIFICANT CHANGE UP
-  AZTREONAM: SIGNIFICANT CHANGE UP
-  CEFAZOLIN: SIGNIFICANT CHANGE UP
-  CEFEPIME: SIGNIFICANT CHANGE UP
-  CEFEPIME: SIGNIFICANT CHANGE UP
-  CEFOXITIN: SIGNIFICANT CHANGE UP
-  CEFTAZIDIME: SIGNIFICANT CHANGE UP
-  CEFTAZIDIME: SIGNIFICANT CHANGE UP
-  CEFTRIAXONE: SIGNIFICANT CHANGE UP
-  CIPROFLOXACIN: SIGNIFICANT CHANGE UP
-  CIPROFLOXACIN: SIGNIFICANT CHANGE UP
-  ERTAPENEM: SIGNIFICANT CHANGE UP
-  GENTAMICIN: SIGNIFICANT CHANGE UP
-  GENTAMICIN: SIGNIFICANT CHANGE UP
-  IMIPENEM: SIGNIFICANT CHANGE UP
-  IMIPENEM: SIGNIFICANT CHANGE UP
-  LEVOFLOXACIN: SIGNIFICANT CHANGE UP
-  LEVOFLOXACIN: SIGNIFICANT CHANGE UP
-  MEROPENEM: SIGNIFICANT CHANGE UP
-  MEROPENEM: SIGNIFICANT CHANGE UP
-  PIPERACILLIN/TAZOBACTAM: SIGNIFICANT CHANGE UP
-  PIPERACILLIN/TAZOBACTAM: SIGNIFICANT CHANGE UP
-  TIGECYCLINE: SIGNIFICANT CHANGE UP
-  TOBRAMYCIN: SIGNIFICANT CHANGE UP
-  TOBRAMYCIN: SIGNIFICANT CHANGE UP
-  TRIMETHOPRIM/SULFAMETHOXAZOLE: SIGNIFICANT CHANGE UP
ANION GAP SERPL CALC-SCNC: 12 MMO/L — SIGNIFICANT CHANGE UP (ref 7–14)
BUN SERPL-MCNC: 19 MG/DL — SIGNIFICANT CHANGE UP (ref 7–23)
CA-I BLD-SCNC: 1.1 MMOL/L — SIGNIFICANT CHANGE UP (ref 1.03–1.23)
CALCIUM SERPL-MCNC: 8.6 MG/DL — SIGNIFICANT CHANGE UP (ref 8.4–10.5)
CHLORIDE SERPL-SCNC: 99 MMOL/L — SIGNIFICANT CHANGE UP (ref 98–107)
CO2 SERPL-SCNC: 23 MMOL/L — SIGNIFICANT CHANGE UP (ref 22–31)
CREAT SERPL-MCNC: 0.61 MG/DL — SIGNIFICANT CHANGE UP (ref 0.5–1.3)
GLUCOSE BLDC GLUCOMTR-MCNC: 107 MG/DL — HIGH (ref 70–99)
GLUCOSE BLDC GLUCOMTR-MCNC: 127 MG/DL — HIGH (ref 70–99)
GLUCOSE BLDC GLUCOMTR-MCNC: 130 MG/DL — HIGH (ref 70–99)
GLUCOSE BLDC GLUCOMTR-MCNC: 148 MG/DL — HIGH (ref 70–99)
GLUCOSE SERPL-MCNC: 156 MG/DL — HIGH (ref 70–99)
HCT VFR BLD CALC: 39 % — SIGNIFICANT CHANGE UP (ref 39–50)
HGB BLD-MCNC: 13 G/DL — SIGNIFICANT CHANGE UP (ref 13–17)
MAGNESIUM SERPL-MCNC: 2.2 MG/DL — SIGNIFICANT CHANGE UP (ref 1.6–2.6)
MCHC RBC-ENTMCNC: 29.2 PG — SIGNIFICANT CHANGE UP (ref 27–34)
MCHC RBC-ENTMCNC: 33.3 % — SIGNIFICANT CHANGE UP (ref 32–36)
MCV RBC AUTO: 87.6 FL — SIGNIFICANT CHANGE UP (ref 80–100)
METHOD TYPE: SIGNIFICANT CHANGE UP
NRBC # FLD: 0 K/UL — LOW (ref 25–125)
PHOSPHATE SERPL-MCNC: 3.2 MG/DL — SIGNIFICANT CHANGE UP (ref 2.5–4.5)
PLATELET # BLD AUTO: 114 K/UL — LOW (ref 150–400)
PMV BLD: 10.8 FL — SIGNIFICANT CHANGE UP (ref 7–13)
POTASSIUM SERPL-MCNC: 4.2 MMOL/L — SIGNIFICANT CHANGE UP (ref 3.5–5.3)
POTASSIUM SERPL-SCNC: 4.2 MMOL/L — SIGNIFICANT CHANGE UP (ref 3.5–5.3)
RBC # BLD: 4.45 M/UL — SIGNIFICANT CHANGE UP (ref 4.2–5.8)
RBC # FLD: 15 % — HIGH (ref 10.3–14.5)
SODIUM SERPL-SCNC: 134 MMOL/L — LOW (ref 135–145)
SPECIMEN SOURCE: SIGNIFICANT CHANGE UP
WBC # BLD: 5.19 K/UL — SIGNIFICANT CHANGE UP (ref 3.8–10.5)
WBC # FLD AUTO: 5.19 K/UL — SIGNIFICANT CHANGE UP (ref 3.8–10.5)

## 2019-01-22 PROCEDURE — 99232 SBSQ HOSP IP/OBS MODERATE 35: CPT | Mod: GC

## 2019-01-22 PROCEDURE — 99232 SBSQ HOSP IP/OBS MODERATE 35: CPT

## 2019-01-22 PROCEDURE — 99223 1ST HOSP IP/OBS HIGH 75: CPT | Mod: GC

## 2019-01-22 PROCEDURE — 99233 SBSQ HOSP IP/OBS HIGH 50: CPT

## 2019-01-22 RX ORDER — I.V. FAT EMULSION 20 G/100ML
17 EMULSION INTRAVENOUS
Qty: 40 | Refills: 0 | Status: DISCONTINUED | OUTPATIENT
Start: 2019-01-22 | End: 2019-01-25

## 2019-01-22 RX ORDER — SODIUM CHLORIDE 9 MG/ML
1000 INJECTION INTRAMUSCULAR; INTRAVENOUS; SUBCUTANEOUS
Qty: 0 | Refills: 0 | Status: DISCONTINUED | OUTPATIENT
Start: 2019-01-22 | End: 2019-01-25

## 2019-01-22 RX ORDER — ACETAMINOPHEN 500 MG
1000 TABLET ORAL ONCE
Qty: 0 | Refills: 0 | Status: DISCONTINUED | OUTPATIENT
Start: 2019-01-22 | End: 2019-02-01

## 2019-01-22 RX ORDER — ELECTROLYTE SOLUTION,INJ
1 VIAL (ML) INTRAVENOUS
Qty: 0 | Refills: 0 | Status: DISCONTINUED | OUTPATIENT
Start: 2019-01-22 | End: 2019-01-22

## 2019-01-22 RX ADMIN — HYDROMORPHONE HYDROCHLORIDE 30 MILLILITER(S): 2 INJECTION INTRAMUSCULAR; INTRAVENOUS; SUBCUTANEOUS at 07:19

## 2019-01-22 RX ADMIN — Medication 1 EACH: at 17:08

## 2019-01-22 RX ADMIN — CHLORHEXIDINE GLUCONATE 1 APPLICATION(S): 213 SOLUTION TOPICAL at 05:21

## 2019-01-22 RX ADMIN — Medication 5 MILLIGRAM(S): at 23:21

## 2019-01-22 RX ADMIN — HYDROMORPHONE HYDROCHLORIDE 30 MILLILITER(S): 2 INJECTION INTRAMUSCULAR; INTRAVENOUS; SUBCUTANEOUS at 19:17

## 2019-01-22 RX ADMIN — PIPERACILLIN AND TAZOBACTAM 25 GRAM(S): 4; .5 INJECTION, POWDER, LYOPHILIZED, FOR SOLUTION INTRAVENOUS at 05:20

## 2019-01-22 RX ADMIN — Medication 5 MILLIGRAM(S): at 17:06

## 2019-01-22 RX ADMIN — ENOXAPARIN SODIUM 40 MILLIGRAM(S): 100 INJECTION SUBCUTANEOUS at 12:41

## 2019-01-22 RX ADMIN — PIPERACILLIN AND TAZOBACTAM 25 GRAM(S): 4; .5 INJECTION, POWDER, LYOPHILIZED, FOR SOLUTION INTRAVENOUS at 12:41

## 2019-01-22 RX ADMIN — PIPERACILLIN AND TAZOBACTAM 25 GRAM(S): 4; .5 INJECTION, POWDER, LYOPHILIZED, FOR SOLUTION INTRAVENOUS at 21:14

## 2019-01-22 RX ADMIN — Medication 5 MILLIGRAM(S): at 12:41

## 2019-01-22 RX ADMIN — Medication 5 MILLIGRAM(S): at 05:20

## 2019-01-22 RX ADMIN — CHLORHEXIDINE GLUCONATE 1 APPLICATION(S): 213 SOLUTION TOPICAL at 18:22

## 2019-01-22 RX ADMIN — I.V. FAT EMULSION 17 ML/HR: 20 EMULSION INTRAVENOUS at 17:07

## 2019-01-22 NOTE — PROGRESS NOTE ADULT - ASSESSMENT
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 transitioned from sicu to floor     Plan:  - Pain control as needed with PCA  - NPO with TPN  - monitor ostomy function  - lovenox for DVT ppx  - oob/ambulate as tolerated  - f/u ID recs  - f/u palliative  - f/u with his medical oncologist re: outpatient treatment Dr. Chin    D Team Surgery  [id number] 63y Male s/p exploratory laparotomy, 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 transitioned from sicu to floor     Plan:  - Pain control as needed with PCA  - NPO with TPN  - monitor ostomy function  - lovenox for DVT ppx  - oob/ambulate as tolerated  - appreciate ID recs  - f/u palliative  - f/u with his medical oncologist re: outpatient treatment Dr. Chin    D Team Surgery  [id number]

## 2019-01-22 NOTE — CONSULT NOTE ADULT - SUBJECTIVE AND OBJECTIVE BOX
HPI:  63M pmh HTN, HLD with history of gastric adenocarcinoma s/p total gastrectomy and Vickie en Y esophagojejunostomy with Dr Espinal at [facility] Dec 2017 c/b early postop SBO s/p lap SHAR, treated with adjuvant chemo and radiation. Pt now transferred from [place] after a ~20 day admission with symptoms of PO intolerance. 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 He underwent endoscopic dilation as well as stent placement, which have not alleviated his symptoms. Pt reports vomiting any PO intake greater than ice chips. States that he has been on TPN during the  Denies nausea at baseline, denies pain. (15 [place]on 2019 02:46)      PERTINENT PM/SXH:   Stomach cancer  Prediabetes  HLD (hyperlipidemia)  HTN (hypertension)  Malignant neoplasm of stomach, unspecified location    S/P total gastrectomy and Vickie-en-Y esophagojejunal anastomosis  S/P total gastrectomy and Vickie-en-Y esophagojejunal anastomosis  No significant past surgical history  H/O prostate biopsy  History of arthroscopy of right shoulder  History of arthroscopy of left knee  History of appendectomy    SOCIAL HISTORY:   Significant other/partner:  [ X] YES  [ ] NO               Children:  [ X] YES  [ ] NO                   Tenriism/Spirituality:  Substance hx:  [ ] YES   [ ] NO                   Tobacco hx:  [ ] YES  [ ] NO                       Alcohol hx: [ ] YES  [ ] NO         Home Opioid hx:  [ ] YES  [ ] NO   Living Situation: [X ] Home  [ ] Long term care  [ ] Rehab [ ] Other    FAMILY HISTORY:  No pertinent family history in first degree relatives  Cerebrovascular accident (Mother)    [ ] Family history non-contributory     BASELINE (I)ADLs (prior to admission):  [place]: [X ] total  [ ] moderate [ ] dependent    ADVANCE DIRECTIVES:    DNR   MOLST  [ ] YES [ ] NO                      [ ] Completed  Health Care Proxy [ ] YES  [ ] NO   [ ] Completed  Living Will  [ ] YES [ ] NO             [ ] Surrogate  [X ] HCP: Wife  [ ] Guardian:                                                                  Phone#:    Allergies    No Known Allergies    Intolerances        MEDICATIONS  (STANDING):  acetaminophen  IVPB .. 1000 milliGRAM(s) IV Intermittent once  chlorhexidine 4% Liquid 1 Application(s) Topical two times a day  dextrose 5%. 1000 milliLiter(s) (50 mL/Hr) IV Continuous <Continuous>  dextrose 50% Injectable 12.5 Gram(s) IV Push once  dextrose 50% Injectable 25 Gram(s) IV Push once  dextrose 50% Injectable 25 Gram(s) IV Push once  enoxaparin Injectable 40 milliGRAM(s) SubCutaneous daily  fat emulsion (Fish Oil and Plant Based) 20% Infusion 17 mL/Hr (17 mL/Hr) IV Continuous <Continuous>  fat emulsion (Fish Oil and Plant Based) 20% Infusion 17 mL/Hr (17 mL/Hr) IV Continuous <Continuous>  fat emulsion (Fish Oil and Plant Based) 20% Infusion 17 mL/Hr (17 mL/Hr) IV Continuous <Continuous>  fat emulsion (Fish Oil and Plant Based) 20% Infusion 17 mL/Hr (17 mL/Hr) IV Continuous <Continuous>  fat emulsion (Fish Oil and Plant Based) 20% Infusion 17 mL/Hr (17 mL/Hr) IV Continuous <Continuous>  fat emulsion (Fish Oil and Plant Based) 20% Infusion 17 mL/Hr (17 mL/Hr) IV Continuous <Continuous>  HYDROmorphone PCA (1 mG/mL) 30 milliLiter(s) PCA Continuous PCA Continuous  influenza   Vaccine 0.5 milliLiter(s) IntraMuscular once  insulin lispro (HumaLOG) corrective regimen sliding scale   SubCutaneous every 6 hours  metoprolol tartrate Injectable 5 milliGRAM(s) IV Push every 6 hours  Parenteral Nutrition - Adult 1 Each (83 mL/Hr) TPN Continuous <Continuous>  Parenteral Nutrition - Adult 1 Each (83 mL/Hr) TPN Continuous <Continuous>  piperacillin/tazobactam IVPB. 3.375 Gram(s) IV Intermittent every 8 hours  sodium chloride 0.9%. 1000 milliLiter(s) (30 mL/Hr) IV Continuous <Continuous>    MEDICATIONS  (PRN):  dextrose 40% Gel 15 Gram(s) Oral once PRN Blood Glucose LESS THAN 70 milliGRAM(s)/deciliter  glucagon  Injectable 1 milliGRAM(s) IntraMuscular once PRN Glucose LESS THAN 70 milligrams/deciliter  HYDROmorphone PCA (1 mG/mL) Rescue Clinician Bolus 0.5 milliGRAM(s) IV Push every 15 minutes PRN for Pain Scale GREATER THAN 6  naloxone Injectable 0.1 milliGRAM(s) IV Push every 3 minutes PRN For ANY of the following changes in patient status:  A. RR LESS THAN 10 breaths per minute, B. Oxygen saturation LESS THAN 90%, C. Sedation score of 6  ondansetron Injectable 4 milliGRAM(s) IV Push every 6 hours PRN Nausea      PRESENT SYMPTOMS:  Source: [X ] Patient   [X ] Family   [ ] Team     Pain:                        [ X] No [ ] Yes             [ ] Mild [ ] Moderate [ ] Severe    Dyspnea:                [ X] No [ ] Yes             [ ] Mild [ ] Moderate [ ] Severe    Anxiety:                  [ ] No [ ] Yes             [ ] Mild [ ] Moderate [ ] Severe    Fatigue:                  [ ] No [ ] Yes             [ ] Mild [ ] Moderate [ ] Severe    Nausea:                  [X ] No [ ] Yes             [ ] Mild [ ] Moderate [ ] Severe    Loss of appetite:   [ ] No [ ] Yes             [ ] Mild [ ] Moderate [ ] Severe    Constipation:        [ ] No [ ] Yes             [ ] Mild [ ] Moderate [ ] Severe    Other Symptoms:  [X ] All other review of systems negative   [ ] Unable to obtain due to poor mentation     Karnofsky Performance Score/Palliative Performance Status Version 2:        70-80 %    PHYSICAL EXAM:  Vital Signs Last 24 Hrs  T(C): 36.7 (22 Jan 2019 12:47), Max: 37 (21 Jan 2019 23:27)  T(F): 98.1 (22 Jan 2019 12:47), Max: 98.6 (21 Jan 2019 23:27)  HR: 86 (22 Jan 2019 12:47) (79 - 92)  BP: 134/84 (22 Jan 2019 12:47) (121/76 - 147/78)  BP(mean): --  RR: 20 (22 Jan 2019 12:47) (18 - 20)  SpO2: 98% (22 Jan 2019 12:47) (97% - 98%) I&O's Summary    21 Jan 2019 07:01  -  22 Jan 2019 07:00  --------------------------------------------------------  IN: 2742 mL / OUT: 1953 mL / NET: 789 mL        General:  [ X] Alert  [ ] Oriented x      [ ] Lethargic  [ ] Agitated   [ ] Cachexia   [ ] Unarousable  [ ] Verbal  [ ] Non-Verbal    HEENT:  [ ] Normal   [X ] Dry mouth   [ ] ET Tube    [ ] Trach  [ ] Oral lesions    Lungs:   [ X] Clear [ ] Tachypnea  [ ] Audible excessive secretions   [ ] Rhonchi        [ ] Right [ ] Left [ ] Bilateral  [ ] Crackles        [ ] Right [ ] Left [ ] Bilateral  [ ] Wheezing     [ ] Right [ ] Left [ ] Bilateral    Cardiovascular:  [X ] Regular [ ] Irregular [ ] Tachycardia   [ ] Bradycardia  [ ] Murmur [ ] Other    Abdomen: [ ] Soft  [ X] Mildly distended   [ ] +BS  [X ] Non tender [ ] Tender  [ ]PEG   [ X]OGT/ NGT   Last BM:       Genitourinary: [ ] Normal [ ] Incontinent   [ ] Oliguria/Anuria   [ ] [place]    Musculoskeletal:  [X ] Normal   [ ] Weakness  [ ] Bedbound/Wheelchair bound [ ] Edema    Neurological: [X ] No focal deficits  [ ] Cognitive impairment  [ ] Dysphagia [ ] Dysarthria [ ] Paresis [ ] Other     Skin: [ ] Normal   [ ] Pressure ulcer(s)                  [ ] Rash    LABS:                        13.0   5.19  )-----------( 114      ( 22 Jan 2019 05:30 )             39.0     01-22    134<L>  |  99  |  19  ----------------------------<  156<H>  4.2   |  23  |  0.61    Ca    8.6      22 Jan 2019 05:30  Phos  3.2     01-22  Mg     2.2     01-22    Shock: [ ] Septic [ ] Cardiogenic [ ] Neurologic [ ] Hypovolemic  Vasopressors x   Inotrophs x     Protein Calorie Malnutrition: [ ] Mild [ ] Moderate [ ] Severe    Oral Intake: [ ] Unable/mouth care only [ ] Minimal [ ] Moderate [ ] Full Capability  Diet: [ ] NPO [ ] Tube feeds [ ] TPN [ ] Other     RADIOLOGY & ADDITIONAL STUDIES:    REFERRALS:   [ ] Chaplaincy  [ ] Hospice  [ ] Child Life  [ ] Social Work  [ ] Case management [ ] Holistic Therapy HPI:  63M pmh HTN, HLD with history of gastric adenocarcinoma s/p total gastrectomy and Vickie en Y esophagojejunostomy with Dr Espinal at [facility] Dec 2017 c/b early postop SBO s/p lap SHAR, treated with adjuvant chemo and radiation. Pt now transferred from [place] after a ~20 day admission with symptoms of PO intolerance. 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 He underwent endoscopic dilation as well as stent placement, which have not alleviated his symptoms. Pt reports vomiting any PO intake greater than ice chips. States that he has been on TPN during the  Denies nausea at baseline, denies pain. (15 [place] 2019 02:46)    PERTINENT PM/SXH:   Stomach cancer  Prediabetes  HLD (hyperlipidemia)  HTN (hypertension)  Malignant neoplasm of stomach, unspecified location    S/P total gastrectomy and Vickie-en-Y esophagojejunal anastomosis  S/P total gastrectomy and Vickie-en-Y esophagojejunal anastomosis  No significant past surgical history  H/O prostate biopsy  History of arthroscopy of right shoulder  History of arthroscopy of left knee  History of appendectomy    SOCIAL HISTORY:   Significant other/partner:  [ X] YES  [ ] NO               Children:  [ X] YES  [ ] NO                   Catholic/Spirituality:  Substance hx:  [ ] YES   [ ] NO                   Tobacco hx:  [ ] YES  [ ] NO                       Alcohol hx: [ ] YES  [ ] NO         Home Opioid hx:  [ ] YES  [ ] NO   Living Situation: [X ] Home  [ ] Long term care  [ ] Rehab [ ] Other  Pt lives at home with wife. Has 3 adult children. Son     FAMILY HISTORY:  No pertinent family history in first degree relatives  Cerebrovascular accident (Mother)    [ ] Family history non-contributory     BASELINE (I)ADLs (prior to admission):  [place]: [X ] total  [ ] moderate [ ] dependent    ADVANCE DIRECTIVES:    Full code  MOLST  [ ] YES [x ] NO                      [ ] Completed  Health Care Proxy [ ] YES  [x ] NO   [ ] Completed  Living Will  [ ] YES [ x] NO             [x ] Surrogate  [ ] HCP   [ ] Guardian:                                                                  Phone#:  Wife      Allergies    No Known Allergies    Intolerances    MEDICATIONS  (STANDING):  acetaminophen  IVPB .. 1000 milliGRAM(s) IV Intermittent once  chlorhexidine 4% Liquid 1 Application(s) Topical two times a day  dextrose 5%. 1000 milliLiter(s) (50 mL/Hr) IV Continuous <Continuous>  dextrose 50% Injectable 12.5 Gram(s) IV Push once  dextrose 50% Injectable 25 Gram(s) IV Push once  dextrose 50% Injectable 25 Gram(s) IV Push once  enoxaparin Injectable 40 milliGRAM(s) SubCutaneous daily  fat emulsion (Fish Oil and Plant Based) 20% Infusion 17 mL/Hr (17 mL/Hr) IV Continuous <Continuous>  fat emulsion (Fish Oil and Plant Based) 20% Infusion 17 mL/Hr (17 mL/Hr) IV Continuous <Continuous>  fat emulsion (Fish Oil and Plant Based) 20% Infusion 17 mL/Hr (17 mL/Hr) IV Continuous <Continuous>  fat emulsion (Fish Oil and Plant Based) 20% Infusion 17 mL/Hr (17 mL/Hr) IV Continuous <Continuous>  fat emulsion (Fish Oil and Plant Based) 20% Infusion 17 mL/Hr (17 mL/Hr) IV Continuous <Continuous>  fat emulsion (Fish Oil and Plant Based) 20% Infusion 17 mL/Hr (17 mL/Hr) IV Continuous <Continuous>  HYDROmorphone PCA (1 mG/mL) 30 milliLiter(s) PCA Continuous PCA Continuous  influenza   Vaccine 0.5 milliLiter(s) IntraMuscular once  insulin lispro (HumaLOG) corrective regimen sliding scale   SubCutaneous every 6 hours  metoprolol tartrate Injectable 5 milliGRAM(s) IV Push every 6 hours  Parenteral Nutrition - Adult 1 Each (83 mL/Hr) TPN Continuous <Continuous>  Parenteral Nutrition - Adult 1 Each (83 mL/Hr) TPN Continuous <Continuous>  piperacillin/tazobactam IVPB. 3.375 Gram(s) IV Intermittent every 8 hours  sodium chloride 0.9%. 1000 milliLiter(s) (30 mL/Hr) IV Continuous <Continuous>    MEDICATIONS  (PRN):  dextrose 40% Gel 15 Gram(s) Oral once PRN Blood Glucose LESS THAN 70 milliGRAM(s)/deciliter  glucagon  Injectable 1 milliGRAM(s) IntraMuscular once PRN Glucose LESS THAN 70 milligrams/deciliter  HYDROmorphone PCA (1 mG/mL) Rescue Clinician Bolus 0.5 milliGRAM(s) IV Push every 15 minutes PRN for Pain Scale GREATER THAN 6  naloxone Injectable 0.1 milliGRAM(s) IV Push every 3 minutes PRN For ANY of the following changes in patient status:  A. RR LESS THAN 10 breaths per minute, B. Oxygen saturation LESS THAN 90%, C. Sedation score of 6  ondansetron Injectable 4 milliGRAM(s) IV Push every 6 hours PRN Nausea    PRESENT SYMPTOMS:  Source: [X ] Patient   [X ] Family   [ ] Team     Pain:                        [ X] No [ ] Yes             [ ] Mild [ ] Moderate [ ] Severe    Dyspnea:                [ X] No [ ] Yes             [ ] Mild [ ] Moderate [ ] Severe    Anxiety:                  [ ] No [ ] Yes             [ ] Mild [ ] Moderate [ ] Severe    Fatigue:                  [ ] No [ ] Yes             [ ] Mild [ ] Moderate [ ] Severe    Nausea:                  [X ] No [ ] Yes             [ ] Mild [ ] Moderate [ ] Severe    Loss of appetite:   [ ] No [ ] Yes             [ ] Mild [ ] Moderate [ ] Severe    Constipation:        [ ] No [ ] Yes             [ ] Mild [ ] Moderate [ ] Severe    Other Symptoms:  [X ] All other review of systems negative   [ ] Unable to obtain due to poor mentation     Karnofsky Performance Score/Palliative Performance Status Version 2:        70-80 %    PHYSICAL EXAM:  Vital Signs Last 24 Hrs  T(C): 36.7 (22 Jan 2019 12:47), Max: 37 (21 Jan 2019 23:27)  T(F): 98.1 (22 Jan 2019 12:47), Max: 98.6 (21 Jan 2019 23:27)  HR: 86 (22 Jan 2019 12:47) (79 - 92)  BP: 134/84 (22 Jan 2019 12:47) (121/76 - 147/78)  BP(mean): --  RR: 20 (22 Jan 2019 12:47) (18 - 20)  SpO2: 98% (22 Jan 2019 12:47) (97% - 98%) I&O's Summary    21 Jan 2019 07:01  -  22 Jan 2019 07:00  --------------------------------------------------------  IN: 2742 mL / OUT: 1953 mL / NET: 789 mL    General:  [ X] Alert  [ ] Oriented x      [ ] Lethargic  [ ] Agitated   [ ] Cachexia   [ ] Unarousable  [x ] Verbal  [ ] Non-Verbal    HEENT:  [ ] Normal   [X ] Dry mouth   [ ] ET Tube    [ ] Trach  [ ] Oral lesions    Lungs:   [ X] Clear [ ] Tachypnea  [ ] Audible excessive secretions   [ ] Rhonchi        [ ] Right [ ] Left [ ] Bilateral  [ ] Crackles        [ ] Right [ ] Left [ ] Bilateral  [ ] Wheezing     [ ] Right [ ] Left [ ] Bilateral    Cardiovascular:  [X ] Regular [ ] Irregular [ ] Tachycardia   [ ] Bradycardia  [ ] Murmur [ ] Other    Abdomen: [ ] Soft  [ X] Mildly distended   [ ] +BS  [X ] Non tender [ ] Tender  [ ]PEG   [ X]OGT/ NGT   Last BM: ostomy.      Genitourinary: [ ] Normal [ ] Incontinent   [ ] Oliguria/Anuria   [ ] [place]    Musculoskeletal:  [X ] Normal   [ ] Weakness  [ ] Bedbound/Wheelchair bound [ ] Edema    Neurological: [X ] No focal deficits  [ ] Cognitive impairment  [ ] Dysphagia [ ] Dysarthria [ ] Paresis [ ] Other     Skin: [ ] Normal   [ ] Pressure ulcer(s)                  [ ] Rash    LABS:                        13.0   5.19  )-----------( 114      ( 22 Jan 2019 05:30 )             39.0     01-22    134<L>  |  99  |  19  ----------------------------<  156<H>  4.2   |  23  |  0.61    Ca    8.6      22 Jan 2019 05:30  Phos  3.2     01-22  Mg     2.2     01-22    Shock: [ ] Septic [ ] Cardiogenic [ ] Neurologic [ ] Hypovolemic  Vasopressors x   Inotrophs x     Protein Calorie Malnutrition: [ ] Mild [ ] Moderate [ ] Severe    Oral Intake: [ ] Unable/mouth care only [ ] Minimal [ ] Moderate [ ] Full Capability  Diet: [ ] NPO [ ] Tube feeds [ ] TPN [ ] Other     RADIOLOGY & ADDITIONAL STUDIES:     < from: CT Abdomen and Pelvis w/ IV Cont (01.17.19 @ 15:41) >    IMPRESSION:     Moderate pneumoperitoneum and moderate amount of abdominal and pelvic   ascites, mildly increased from December 24, 2018. No discrete or   drainable fluid collection.    Small amount of pneumomediastinum.    REFERRALS:   [ ] Chaplaincy  [ ] Hospice  [ ] Child Life  [ ] Social Work  [ ] Case management [ ] Holistic Therapy

## 2019-01-22 NOTE — PROGRESS NOTE ADULT - SUBJECTIVE AND OBJECTIVE BOX
Surgery Progress Note    S: Patient seen and examined. No acute events overnight. Patient had acute pain consult yesterday and was started on PCA with much improvement in pain control. The patient's outpatient oncologist Dr. Caio Chin was consulted yesterday to ensure outpatient follow up and we are currently awaiting any recommendations. Patient denies n/v. Patient OOB to chair. Patient received last dose of micafungin yesterday, as per ID.     O:  Vital Signs Last 24 Hrs  T(C): 37 (21 Jan 2019 23:27), Max: 37 (21 Jan 2019 23:27)  T(F): 98.6 (21 Jan 2019 23:27), Max: 98.6 (21 Jan 2019 23:27)  HR: 87 (21 Jan 2019 23:27) (68 - 87)  BP: 147/78 (21 Jan 2019 23:27) (121/76 - 147/78)  BP(mean): --  RR: 18 (21 Jan 2019 23:27) (18 - 20)  SpO2: 97% (21 Jan 2019 23:27) (95% - 98%)    I&O's Detail    20 Jan 2019 07:01  -  21 Jan 2019 07:00  --------------------------------------------------------  IN:    fat emulsion (Fish Oil and Plant Based) 20% Infusion: 136 mL    IV PiggyBack: 275 mL    lactated ringers.: 150 mL    TPN (Total Parenteral Nutrition): 1494 mL  Total IN: 2055 mL    OUT:    Bulb: 130 mL    Drain: 20 mL    Ileostomy: 35 mL    Indwelling Catheter - Urethral: 1500 mL    Nasoenteral Tube: 350 mL    Voided: 2930 mL  Total OUT: 4965 mL    Total NET: -2910 mL      21 Jan 2019 07:01  -  22 Jan 2019 00:13  --------------------------------------------------------  IN:    dextrose 5% + sodium chloride 0.45% with potassium chloride 20 mEq/L: 120 mL    fat emulsion (Fish Oil and Plant Based) 20% Infusion: 85 mL    IV PiggyBack: 100 mL    TPN (Total Parenteral Nutrition): 1411 mL  Total IN: 1716 mL    OUT:    Bulb: 150 mL    Drain: 5 mL    Ileostomy: 140 mL    Nasoenteral Tube: 400 mL    Voided: 880 mL  Total OUT: 1575 mL    Total NET: 141 mL          MEDICATIONS  (STANDING):  chlorhexidine 4% Liquid 1 Application(s) Topical two times a day  dextrose 5% + sodium chloride 0.45% with potassium chloride 20 mEq/L 1000 milliLiter(s) (30 mL/Hr) IV Continuous <Continuous>  dextrose 5%. 1000 milliLiter(s) (50 mL/Hr) IV Continuous <Continuous>  dextrose 50% Injectable 12.5 Gram(s) IV Push once  dextrose 50% Injectable 25 Gram(s) IV Push once  dextrose 50% Injectable 25 Gram(s) IV Push once  enoxaparin Injectable 40 milliGRAM(s) SubCutaneous daily  fat emulsion (Fish Oil and Plant Based) 20% Infusion 17 mL/Hr (17 mL/Hr) IV Continuous <Continuous>  fat emulsion (Fish Oil and Plant Based) 20% Infusion 17 mL/Hr (17 mL/Hr) IV Continuous <Continuous>  fat emulsion (Fish Oil and Plant Based) 20% Infusion 17 mL/Hr (17 mL/Hr) IV Continuous <Continuous>  fat emulsion (Fish Oil and Plant Based) 20% Infusion 17 mL/Hr (17 mL/Hr) IV Continuous <Continuous>  fat emulsion (Fish Oil and Plant Based) 20% Infusion 17 mL/Hr (17 mL/Hr) IV Continuous <Continuous>  HYDROmorphone PCA (1 mG/mL) 30 milliLiter(s) PCA Continuous PCA Continuous  influenza   Vaccine 0.5 milliLiter(s) IntraMuscular once  insulin lispro (HumaLOG) corrective regimen sliding scale   SubCutaneous every 6 hours  metoprolol tartrate Injectable 5 milliGRAM(s) IV Push every 6 hours  Parenteral Nutrition - Adult 1 Each (83 mL/Hr) TPN Continuous <Continuous>  piperacillin/tazobactam IVPB. 3.375 Gram(s) IV Intermittent every 8 hours    MEDICATIONS  (PRN):  dextrose 40% Gel 15 Gram(s) Oral once PRN Blood Glucose LESS THAN 70 milliGRAM(s)/deciliter  glucagon  Injectable 1 milliGRAM(s) IntraMuscular once PRN Glucose LESS THAN 70 milligrams/deciliter  HYDROmorphone PCA (1 mG/mL) Rescue Clinician Bolus 0.5 milliGRAM(s) IV Push every 15 minutes PRN for Pain Scale GREATER THAN 6  naloxone Injectable 0.1 milliGRAM(s) IV Push every 3 minutes PRN For ANY of the following changes in patient status:  A. RR LESS THAN 10 breaths per minute, B. Oxygen saturation LESS THAN 90%, C. Sedation score of 6  ondansetron Injectable 4 milliGRAM(s) IV Push every 6 hours PRN Nausea                            12.8   5.39  )-----------( 118      ( 21 Jan 2019 06:10 )             37.2       01-21    134<L>  |  101  |  13  ----------------------------<  132<H>  3.8   |  21<L>  |  0.57    Ca    8.5      21 Jan 2019 06:10  Phos  3.5     01-21  Mg     2.0     01-21        Physical Exam:  Gen: Laying in bed, NAD, NGT in place to suction  Resp: Unlabored breathing on RA  Abd: soft, generalized abdominal TTP without peritoneal signs, mildly distended, RLQ ostomy with minimal stool, RUQ ostomy pink/viable, LLQ SOSA with dark sanguinous output, no rebound or guarding  Ext: WWP  Skin: No rashes Surgery Progress Note    S: Patient seen and examined. No acute events overnight. Patient had acute pain consult yesterday and was started on PCA with much improvement in pain control. The patient's outpatient oncologist Dr. Caio Chin was consulted yesterday to ensure outpatient follow up and we are currently awaiting any recommendations. Reports emesis. Patient OOB to chair. Patient received last dose of micafungin yesterday, as per ID.     O:  Vital Signs Last 24 Hrs  T(C): 37 (21 Jan 2019 23:27), Max: 37 (21 Jan 2019 23:27)  T(F): 98.6 (21 Jan 2019 23:27), Max: 98.6 (21 Jan 2019 23:27)  HR: 87 (21 Jan 2019 23:27) (68 - 87)  BP: 147/78 (21 Jan 2019 23:27) (121/76 - 147/78)  BP(mean): --  RR: 18 (21 Jan 2019 23:27) (18 - 20)  SpO2: 97% (21 Jan 2019 23:27) (95% - 98%)    I&O's Detail    20 Jan 2019 07:01  -  21 Jan 2019 07:00  --------------------------------------------------------  IN:    fat emulsion (Fish Oil and Plant Based) 20% Infusion: 136 mL    IV PiggyBack: 275 mL    lactated ringers.: 150 mL    TPN (Total Parenteral Nutrition): 1494 mL  Total IN: 2055 mL    OUT:    Bulb: 130 mL    Drain: 20 mL    Ileostomy: 35 mL    Indwelling Catheter - Urethral: 1500 mL    Nasoenteral Tube: 350 mL    Voided: 2930 mL  Total OUT: 4965 mL    Total NET: -2910 mL      21 Jan 2019 07:01  -  22 Jan 2019 00:13  --------------------------------------------------------  IN:    dextrose 5% + sodium chloride 0.45% with potassium chloride 20 mEq/L: 120 mL    fat emulsion (Fish Oil and Plant Based) 20% Infusion: 85 mL    IV PiggyBack: 100 mL    TPN (Total Parenteral Nutrition): 1411 mL  Total IN: 1716 mL    OUT:    Bulb: 150 mL    Drain: 5 mL    Ileostomy: 140 mL    Nasoenteral Tube: 400 mL    Voided: 880 mL  Total OUT: 1575 mL    Total NET: 141 mL          MEDICATIONS  (STANDING):  chlorhexidine 4% Liquid 1 Application(s) Topical two times a day  dextrose 5% + sodium chloride 0.45% with potassium chloride 20 mEq/L 1000 milliLiter(s) (30 mL/Hr) IV Continuous <Continuous>  dextrose 5%. 1000 milliLiter(s) (50 mL/Hr) IV Continuous <Continuous>  dextrose 50% Injectable 12.5 Gram(s) IV Push once  dextrose 50% Injectable 25 Gram(s) IV Push once  dextrose 50% Injectable 25 Gram(s) IV Push once  enoxaparin Injectable 40 milliGRAM(s) SubCutaneous daily  fat emulsion (Fish Oil and Plant Based) 20% Infusion 17 mL/Hr (17 mL/Hr) IV Continuous <Continuous>  fat emulsion (Fish Oil and Plant Based) 20% Infusion 17 mL/Hr (17 mL/Hr) IV Continuous <Continuous>  fat emulsion (Fish Oil and Plant Based) 20% Infusion 17 mL/Hr (17 mL/Hr) IV Continuous <Continuous>  fat emulsion (Fish Oil and Plant Based) 20% Infusion 17 mL/Hr (17 mL/Hr) IV Continuous <Continuous>  fat emulsion (Fish Oil and Plant Based) 20% Infusion 17 mL/Hr (17 mL/Hr) IV Continuous <Continuous>  HYDROmorphone PCA (1 mG/mL) 30 milliLiter(s) PCA Continuous PCA Continuous  influenza   Vaccine 0.5 milliLiter(s) IntraMuscular once  insulin lispro (HumaLOG) corrective regimen sliding scale   SubCutaneous every 6 hours  metoprolol tartrate Injectable 5 milliGRAM(s) IV Push every 6 hours  Parenteral Nutrition - Adult 1 Each (83 mL/Hr) TPN Continuous <Continuous>  piperacillin/tazobactam IVPB. 3.375 Gram(s) IV Intermittent every 8 hours    MEDICATIONS  (PRN):  dextrose 40% Gel 15 Gram(s) Oral once PRN Blood Glucose LESS THAN 70 milliGRAM(s)/deciliter  glucagon  Injectable 1 milliGRAM(s) IntraMuscular once PRN Glucose LESS THAN 70 milligrams/deciliter  HYDROmorphone PCA (1 mG/mL) Rescue Clinician Bolus 0.5 milliGRAM(s) IV Push every 15 minutes PRN for Pain Scale GREATER THAN 6  naloxone Injectable 0.1 milliGRAM(s) IV Push every 3 minutes PRN For ANY of the following changes in patient status:  A. RR LESS THAN 10 breaths per minute, B. Oxygen saturation LESS THAN 90%, C. Sedation score of 6  ondansetron Injectable 4 milliGRAM(s) IV Push every 6 hours PRN Nausea                            12.8   5.39  )-----------( 118      ( 21 Jan 2019 06:10 )             37.2       01-21    134<L>  |  101  |  13  ----------------------------<  132<H>  3.8   |  21<L>  |  0.57    Ca    8.5      21 Jan 2019 06:10  Phos  3.5     01-21  Mg     2.0     01-21        Physical Exam:  Gen: Laying in bed, NAD, NGT in place to suction  Resp: Unlabored breathing on RA  Abd: soft, generalized abdominal TTP without peritoneal signs, mildly distended, RLQ ostomy with minimal stool, RUQ ostomy pink/viable, LLQ SOSA with dark sanguinous output, no rebound or guarding  Ext: WWP  Skin: No rashes

## 2019-01-22 NOTE — PROGRESS NOTE ADULT - SUBJECTIVE AND OBJECTIVE BOX
NUTRITION NOTE  [id number] TOM  ===============================    Interval events: No acute events overnight.  Patient using PCA for pain control.  Remains NPO/NGT with TPN.    VITAL SIGNS:  T(C): 36.9 (01-22-19 @ 08:51), Max: 37 (01-21-19 @ 23:27)  HR: 82 (01-22-19 @ 08:51) (79 - 92)  BP: 131/83 (01-22-19 @ 08:51) (121/76 - 147/78)  ABP: --  ABP(mean): --  RR: 20 (01-22-19 @ 08:51) (18 - 20)  SpO2: 98% (01-22-19 @ 08:51) (97% - 98%)  CVP(mm Hg): --  01-21 @ 07:01  -  01-22 @ 07:00  --------------------------------------------------------  IN: 2742 mL / OUT: 1953 mL / NET: 789 mL      Glucose 127-148      Neuro: A/O x 3 in NAD    CV: S1, S2 RRR    Pulm: CTA/ b/l    GI/Nutrition: Soft, non tender, non distended, dressing C/D/I, drain in place with more serous drainage    Extremity: Warm, non tender    PICC Site: C/D/I      Metabolic/FLUIDS/ELECTROLYTES/NUTRITION:  Gastrointestinal Medications:  dextrose 5%. 1000 milliLiter(s) IV Continuous <Continuous>  fat emulsion (Fish Oil and Plant Based) 20% Infusion 17 mL/Hr IV Continuous <Continuous>  fat emulsion (Fish Oil and Plant Based) 20% Infusion 17 mL/Hr IV Continuous <Continuous>  fat emulsion (Fish Oil and Plant Based) 20% Infusion 17 mL/Hr IV Continuous <Continuous>  fat emulsion (Fish Oil and Plant Based) 20% Infusion 17 mL/Hr IV Continuous <Continuous>  fat emulsion (Fish Oil and Plant Based) 20% Infusion 17 mL/Hr IV Continuous <Continuous>  fat emulsion (Fish Oil and Plant Based) 20% Infusion 17 mL/Hr IV Continuous <Continuous>  Parenteral Nutrition - Adult 1 Each TPN Continuous <Continuous>  Parenteral Nutrition - Adult 1 Each TPN Continuous <Continuous>  sodium chloride 0.9%. 1000 milliLiter(s) IV Continuous <Continuous>    I&O's Detail  63y  Daily   01-22    134<L>  |  99  |  19  ----------------------------<  156<H>  4.2   |  23  |  0.61    Ca    8.6      22 Jan 2019 05:30  Phos  3.2     01-22  Mg     2.2     01-22    Diet: TPN    INFECTIOUS DISEASE:  Antimicrobials/Immunologic Medications:  influenza   Vaccine 0.5 milliLiter(s) IntraMuscular once  piperacillin/tazobactam IVPB. 3.375 Gram(s) IV Intermittent every 8 hours    RECENT CULTURES:  01-18 @ 20:30 SURGERY Pseudomonas aeruginosa  Escherichia coli      OTHER MEDICATIONS:  Endocrine/Metabolic Medications:  dextrose 40% Gel 15 Gram(s) Oral once PRN  dextrose 50% Injectable 12.5 Gram(s) IV Push once  dextrose 50% Injectable 25 Gram(s) IV Push once  dextrose 50% Injectable 25 Gram(s) IV Push once  glucagon  Injectable 1 milliGRAM(s) IntraMuscular once PRN  insulin lispro (HumaLOG) corrective regimen sliding scale   SubCutaneous every 6 hours    Genitourinary Medications:    Topical/Other Medications:  chlorhexidine 4% Liquid 1 Application(s) Topical two times a day  naloxone Injectable 0.1 milliGRAM(s) IV Push every 3 minutes PRN    ASSESSMENT/PLAN:  63y Male s/p exploratory laparotomy, 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 transitioned from sicu to floor     Plan:   UGI to Wed per primary team  Increase NaCl in TPN    1.  Protein calorie malnutrition being optimized with TPN: CHO [246 ] gm.  AA [95 ] gm. Lipids [40 ] gm.  2.  Hyperglycemia managed with: [0 ] units of regular insulin    3.  Check fluid balance daily.  Strict I/O  [ ] [ ]   4.  Daily BMP, Ionized Calcium, Magnesium and Phosphorous   5.  Triglycerides at initiation of TPN and monthly [ ]    Nutrition Support [id number]

## 2019-01-22 NOTE — CONSULT NOTE ADULT - ASSESSMENT
63M pmh HTN, HLD with history of gastric adenocarcinoma s/p total gastrectomy and Vickie en Y esophagojejunostomy with Dr Espinal at [facility] Dec 2017 c/b early postop SBO s/p lap SHAR, treated with adjuvant chemo and radiation who was transferred to [facility] on 1/15/19 for esophageal dilatation, c/b pneumoperitoneum from ischemic colon with colonic perforation, feculent peritonitis, and recurrent carcinomatosis s/p right hemicolectomy with ileostomy.

## 2019-01-22 NOTE — PROGRESS NOTE ADULT - SUBJECTIVE AND OBJECTIVE BOX
ROGER SANTOS 63y [id number]    Patient is a 63y old  Male who presents with a chief complaint of Bowel Obstruction (22 Jan 2019 08:57)      Follow Up/CC:  ID following for peritonitis    Interval History/ROS: feels ok, no fever, pain comtrolled    Allergies    No Known Allergies    Intolerances        ANTIMICROBIALS:  piperacillin/tazobactam IVPB. 3.375 every 8 hours      MEDICATIONS  (STANDING):  acetaminophen  IVPB .. 1000 milliGRAM(s) IV Intermittent once  chlorhexidine 4% Liquid 1 Application(s) Topical two times a day  dextrose 5%. 1000 milliLiter(s) (50 mL/Hr) IV Continuous <Continuous>  dextrose 50% Injectable 12.5 Gram(s) IV Push once  dextrose 50% Injectable 25 Gram(s) IV Push once  dextrose 50% Injectable 25 Gram(s) IV Push once  enoxaparin Injectable 40 milliGRAM(s) SubCutaneous daily  fat emulsion (Fish Oil and Plant Based) 20% Infusion 17 mL/Hr (17 mL/Hr) IV Continuous <Continuous>  fat emulsion (Fish Oil and Plant Based) 20% Infusion 17 mL/Hr (17 mL/Hr) IV Continuous <Continuous>  fat emulsion (Fish Oil and Plant Based) 20% Infusion 17 mL/Hr (17 mL/Hr) IV Continuous <Continuous>  fat emulsion (Fish Oil and Plant Based) 20% Infusion 17 mL/Hr (17 mL/Hr) IV Continuous <Continuous>  fat emulsion (Fish Oil and Plant Based) 20% Infusion 17 mL/Hr (17 mL/Hr) IV Continuous <Continuous>  HYDROmorphone PCA (1 mG/mL) 30 milliLiter(s) PCA Continuous PCA Continuous  influenza   Vaccine 0.5 milliLiter(s) IntraMuscular once  insulin lispro (HumaLOG) corrective regimen sliding scale   SubCutaneous every 6 hours  metoprolol tartrate Injectable 5 milliGRAM(s) IV Push every 6 hours  Parenteral Nutrition - Adult 1 Each (83 mL/Hr) TPN Continuous <Continuous>  piperacillin/tazobactam IVPB. 3.375 Gram(s) IV Intermittent every 8 hours    MEDICATIONS  (PRN):  dextrose 40% Gel 15 Gram(s) Oral once PRN Blood Glucose LESS THAN 70 milliGRAM(s)/deciliter  glucagon  Injectable 1 milliGRAM(s) IntraMuscular once PRN Glucose LESS THAN 70 milligrams/deciliter  HYDROmorphone PCA (1 mG/mL) Rescue Clinician Bolus 0.5 milliGRAM(s) IV Push every 15 minutes PRN for Pain Scale GREATER THAN 6  naloxone Injectable 0.1 milliGRAM(s) IV Push every 3 minutes PRN For ANY of the following changes in patient status:  A. RR LESS THAN 10 breaths per minute, B. Oxygen saturation LESS THAN 90%, C. Sedation score of 6  ondansetron Injectable 4 milliGRAM(s) IV Push every 6 hours PRN Nausea        Vital Signs Last 24 Hrs  T(C): 36.9 (22 Jan 2019 08:51), Max: 37 (21 Jan 2019 23:27)  T(F): 98.5 (22 Jan 2019 08:51), Max: 98.6 (21 Jan 2019 23:27)  HR: 82 (22 Jan 2019 08:51) (70 - 92)  BP: 131/83 (22 Jan 2019 08:51) (121/76 - 147/78)  BP(mean): --  RR: 20 (22 Jan 2019 08:51) (18 - 20)  SpO2: 98% (22 Jan 2019 08:51) (97% - 98%)    CBC Full  -  ( 22 Jan 2019 05:30 )  WBC Count : 5.19 K/uL  Hemoglobin : 13.0 g/dL  Hematocrit : 39.0 %  Platelet Count - Automated : 114 K/uL  Mean Cell Volume : 87.6 fL  Mean Cell Hemoglobin : 29.2 pg  Mean Cell Hemoglobin Concentration : 33.3 %  Auto Neutrophil # : x  Auto Lymphocyte # : x  Auto Monocyte # : x  Auto Eosinophil # : x  Auto Basophil # : x  Auto Neutrophil % : x  Auto Lymphocyte % : x  Auto Monocyte % : x  Auto Eosinophil % : x  Auto Basophil % : x    01-22    134<L>  |  99  |  19  ----------------------------<  156<H>  4.2   |  23  |  0.61    Ca    8.6      22 Jan 2019 05:30  Phos  3.2     01-22  Mg     2.2     01-22            MICROBIOLOGY:  SURGERY  01-18-19 --  --  Pseudomonas aeruginosa  Escherichia coli      RADIOLOGY    < from: Xray Chest 1 View- PORTABLE-Routine (01.19.19 @ 00:30) >  Endotracheal tube tip 2.5 cm above [place].

## 2019-01-22 NOTE — CONSULT NOTE ADULT - PROBLEM SELECTOR RECOMMENDATION 3
- Functionally independent and AO x3 at baseline  - Significant weight loss and mild functional decompensation, however patient still able to OOB to chair and cognitive status at baseline per son  - Physical therapy following

## 2019-01-22 NOTE — PROGRESS NOTE ADULT - SUBJECTIVE AND OBJECTIVE BOX
Anesthesia Pain Management Service    SUBJECTIVE: Patient is doing well with IV PCA and no significant problems reported.    Pain Scale Score	At rest: _8__ 	With Activity: ___ 	[X ] Refer to charted pain scores    THERAPY:    [ ] IV PCA Morphine		[ ] 5 mg/mL	[ ] 1 mg/mL  [X ] IV PCA Hydromorphone	[ ] 5 mg/mL	[X ] 1 mg/mL  [ ] IV PCA Fentanyl		[ ] 50 micrograms/mL    Demand dose __0.2_ lockout __6_ (minutes) Continuous Rate _0__ Total: _5.1__  mg used (in past 24 hours)      MEDICATIONS  (STANDING):  chlorhexidine 4% Liquid 1 Application(s) Topical two times a day  dextrose 5%. 1000 milliLiter(s) (50 mL/Hr) IV Continuous <Continuous>  dextrose 50% Injectable 12.5 Gram(s) IV Push once  dextrose 50% Injectable 25 Gram(s) IV Push once  dextrose 50% Injectable 25 Gram(s) IV Push once  enoxaparin Injectable 40 milliGRAM(s) SubCutaneous daily  fat emulsion (Fish Oil and Plant Based) 20% Infusion 17 mL/Hr (17 mL/Hr) IV Continuous <Continuous>  fat emulsion (Fish Oil and Plant Based) 20% Infusion 17 mL/Hr (17 mL/Hr) IV Continuous <Continuous>  fat emulsion (Fish Oil and Plant Based) 20% Infusion 17 mL/Hr (17 mL/Hr) IV Continuous <Continuous>  fat emulsion (Fish Oil and Plant Based) 20% Infusion 17 mL/Hr (17 mL/Hr) IV Continuous <Continuous>  fat emulsion (Fish Oil and Plant Based) 20% Infusion 17 mL/Hr (17 mL/Hr) IV Continuous <Continuous>  HYDROmorphone PCA (1 mG/mL) 30 milliLiter(s) PCA Continuous PCA Continuous  influenza   Vaccine 0.5 milliLiter(s) IntraMuscular once  insulin lispro (HumaLOG) corrective regimen sliding scale   SubCutaneous every 6 hours  metoprolol tartrate Injectable 5 milliGRAM(s) IV Push every 6 hours  Parenteral Nutrition - Adult 1 Each (83 mL/Hr) TPN Continuous <Continuous>  piperacillin/tazobactam IVPB. 3.375 Gram(s) IV Intermittent every 8 hours    MEDICATIONS  (PRN):  dextrose 40% Gel 15 Gram(s) Oral once PRN Blood Glucose LESS THAN 70 milliGRAM(s)/deciliter  glucagon  Injectable 1 milliGRAM(s) IntraMuscular once PRN Glucose LESS THAN 70 milligrams/deciliter  HYDROmorphone PCA (1 mG/mL) Rescue Clinician Bolus 0.5 milliGRAM(s) IV Push every 15 minutes PRN for Pain Scale GREATER THAN 6  naloxone Injectable 0.1 milliGRAM(s) IV Push every 3 minutes PRN For ANY of the following changes in patient status:  A. RR LESS THAN 10 breaths per minute, B. Oxygen saturation LESS THAN 90%, C. Sedation score of 6  ondansetron Injectable 4 milliGRAM(s) IV Push every 6 hours PRN Nausea      OBJECTIVE:    Sedation Score:	[ X] Alert	[ ] Drowsy 	[ ] Arousable	[ ] Asleep	[ ] Unresponsive    Side Effects:	[X ] None	[ ] Nausea	[ ] Vomiting	[ ] Pruritus  		[ ] Other:    Vital Signs Last 24 Hrs  T(C): 36.9 (22 Jan 2019 08:51), Max: 37 (21 Jan 2019 23:27)  T(F): 98.5 (22 Jan 2019 08:51), Max: 98.6 (21 Jan 2019 23:27)  HR: 82 (22 Jan 2019 08:51) (70 - 92)  BP: 131/83 (22 Jan 2019 08:51) (121/76 - 147/78)  BP(mean): --  RR: 20 (22 Jan 2019 08:51) (18 - 20)  SpO2: 98% (22 Jan 2019 08:51) (97% - 98%)    ASSESSMENT/ PLAN    Therapy to  be:	[ X] Continue   [ ] Discontinued   [ ] Change to prn Analgesics    Documentation and Verification of current medications:   [X] Done	[ ] Not done, not elligible    Comments:  npo with IV nutrition  continue iv pca Anesthesia Pain Management Service    SUBJECTIVE: Patient is doing well with IV PCA and no significant problems reported.    Pain Scale Score	At rest: _8__ 	With Activity: ___ 	[X ] Refer to charted pain scores    THERAPY:    [ ] IV PCA Morphine		[ ] 5 mg/mL	[ ] 1 mg/mL  [X ] IV PCA Hydromorphone	[ ] 5 mg/mL	[X ] 1 mg/mL  [ ] IV PCA Fentanyl		[ ] 50 micrograms/mL    Demand dose __0.2_ lockout __6_ (minutes) Continuous Rate _0__ Total: _5.1__  mg used (in past 24 hours)      MEDICATIONS  (STANDING):  chlorhexidine 4% Liquid 1 Application(s) Topical two times a day  dextrose 5%. 1000 milliLiter(s) (50 mL/Hr) IV Continuous <Continuous>  dextrose 50% Injectable 12.5 Gram(s) IV Push once  dextrose 50% Injectable 25 Gram(s) IV Push once  dextrose 50% Injectable 25 Gram(s) IV Push once  enoxaparin Injectable 40 milliGRAM(s) SubCutaneous daily  fat emulsion (Fish Oil and Plant Based) 20% Infusion 17 mL/Hr (17 mL/Hr) IV Continuous <Continuous>  fat emulsion (Fish Oil and Plant Based) 20% Infusion 17 mL/Hr (17 mL/Hr) IV Continuous <Continuous>  fat emulsion (Fish Oil and Plant Based) 20% Infusion 17 mL/Hr (17 mL/Hr) IV Continuous <Continuous>  fat emulsion (Fish Oil and Plant Based) 20% Infusion 17 mL/Hr (17 mL/Hr) IV Continuous <Continuous>  fat emulsion (Fish Oil and Plant Based) 20% Infusion 17 mL/Hr (17 mL/Hr) IV Continuous <Continuous>  HYDROmorphone PCA (1 mG/mL) 30 milliLiter(s) PCA Continuous PCA Continuous  influenza   Vaccine 0.5 milliLiter(s) IntraMuscular once  insulin lispro (HumaLOG) corrective regimen sliding scale   SubCutaneous every 6 hours  metoprolol tartrate Injectable 5 milliGRAM(s) IV Push every 6 hours  Parenteral Nutrition - Adult 1 Each (83 mL/Hr) TPN Continuous <Continuous>  piperacillin/tazobactam IVPB. 3.375 Gram(s) IV Intermittent every 8 hours    MEDICATIONS  (PRN):  dextrose 40% Gel 15 Gram(s) Oral once PRN Blood Glucose LESS THAN 70 milliGRAM(s)/deciliter  glucagon  Injectable 1 milliGRAM(s) IntraMuscular once PRN Glucose LESS THAN 70 milligrams/deciliter  HYDROmorphone PCA (1 mG/mL) Rescue Clinician Bolus 0.5 milliGRAM(s) IV Push every 15 minutes PRN for Pain Scale GREATER THAN 6  naloxone Injectable 0.1 milliGRAM(s) IV Push every 3 minutes PRN For ANY of the following changes in patient status:  A. RR LESS THAN 10 breaths per minute, B. Oxygen saturation LESS THAN 90%, C. Sedation score of 6  ondansetron Injectable 4 milliGRAM(s) IV Push every 6 hours PRN Nausea      OBJECTIVE:    Sedation Score:	[ X] Alert	[ ] Drowsy 	[ ] Arousable	[ ] Asleep	[ ] Unresponsive    Side Effects:	[X ] None	[ ] Nausea	[ ] Vomiting	[ ] Pruritus  		[ ] Other:    Vital Signs Last 24 Hrs  T(C): 36.9 (22 Jan 2019 08:51), Max: 37 (21 Jan 2019 23:27)  T(F): 98.5 (22 Jan 2019 08:51), Max: 98.6 (21 Jan 2019 23:27)  HR: 82 (22 Jan 2019 08:51) (70 - 92)  BP: 131/83 (22 Jan 2019 08:51) (121/76 - 147/78)  BP(mean): --  RR: 20 (22 Jan 2019 08:51) (18 - 20)  SpO2: 98% (22 Jan 2019 08:51) (97% - 98%)    ASSESSMENT/ PLAN    Therapy to  be:	[ X] Continue   [ ] Discontinued   [ ] Change to prn Analgesics    Documentation and Verification of current medications:   [X] Done	[ ] Not done, not elligible    Comments:  npo with IV nutrition  continue iv pca  IV tylenol 1g Q6  discuss toradol with surgery

## 2019-01-22 NOTE — PROGRESS NOTE ADULT - ASSESSMENT
64 yo M with PMH of HTN, HLD Gastric adenocarcinoma s/p total gastrectomy and Kelby en Y esophagojejunostomy with Dr Espinal at [facility] Dec 2017 c/b early postop SBO s/p lap SHAR, treated with adjuvant chemo and radiation. Pt now transferred from [place] after a ~20 day admission with symptoms of PO intolerance. 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 He underwent endoscopic dilation as well as stent placement, which have not alleviated his symptoms.  Advanced GI consulted for considering Endoscopic intervention for possible EJ stricture.   Pt s/p  EGD on   that revealed 2 strictures, one at GJ and one in proximal jejunum, later stricture not traversed with scope, (  scope could not pass ), patient developed Pneumoperitoneum and hemorrhagic shock, Resp Failure s/p intubation and Massive transfusion s/p Ex Lap on  that revealed a mass at the GJ junction and colon at the splenic flexure - the area could not be evaluated in detail, and a perforation at the hepatic Flexure with area of necrosis and spilling stool. A Rt hemicolectomy with end Ileostomy and mucous fistula was performed.     IMPRESSION  - Gastric CA s/p Total gastrectomy and EJ anastomosis with anastomotic stricture and jejunal stricture, no intervention was performed, post procedure Pneumoperitoneum complicated by Hemorrhagic shock, Resp Failure and massive Transfusion protocol, s/p Ex Lap that revealed a mass at GJ and the site of perforation was in Hepatic Flexure and patient had Rt hemicolectomy with end Ileostomy and mucous fistula.       RECOMMENDATION    - Monitor CBC, INR and electrolytes  - Keep NPO, NG to suction. IV antibiotics, on TPN.   - Further plan as per SICU team and Oncology.  - GI will follow up.

## 2019-01-22 NOTE — CONSULT NOTE ADULT - PROBLEM SELECTOR RECOMMENDATION 2
- S/p right hemicolectomy with ileostomy and micafungin  - C/w Zosyn per ID recs  - Postoperative pain well controlled by PCA  - Pain medicine following  - C/w current pain regimens - S/p right hemicolectomy with ileostomy and micafungin  - C/w Zosyn per ID recs  - Postoperative pain well controlled by PCA  - Pain medicine following  - C/w current pain regimen

## 2019-01-22 NOTE — PROGRESS NOTE ADULT - SUBJECTIVE AND OBJECTIVE BOX
GI following the patient for EJ anastomotic stricture, post gastrectomy for Stomach CA , s/p EGD that revealed 2 stricture, one at GJ and one in proximal jejunum, later stricture not traverse with scope, (  scope could not pass ), patient developed Pneumoperitoneum and hemorrhagic shock, Resp Failure s/p intubation and Massive transfusion s/p Ex Lap that revealed a mass at GJ and the site of perforation was in Hepatic Flexure and patient had Rt hemicolectomy with end Ileostomy and mucous fistula, remained in SICU and now downgraded to floor .        Interval Events: Pt still has NG tube in place, sitting in chair and looks comfortable. NPO and on TPN    Allergies:  No Known Allergies      Hospital Medications:  acetaminophen  IVPB .. 1000 milliGRAM(s) IV Intermittent once  chlorhexidine 4% Liquid 1 Application(s) Topical two times a day  dextrose 40% Gel 15 Gram(s) Oral once PRN  dextrose 5%. 1000 milliLiter(s) IV Continuous <Continuous>  dextrose 50% Injectable 12.5 Gram(s) IV Push once  dextrose 50% Injectable 25 Gram(s) IV Push once  dextrose 50% Injectable 25 Gram(s) IV Push once  enoxaparin Injectable 40 milliGRAM(s) SubCutaneous daily  fat emulsion (Fish Oil and Plant Based) 20% Infusion 17 mL/Hr IV Continuous <Continuous>  fat emulsion (Fish Oil and Plant Based) 20% Infusion 17 mL/Hr IV Continuous <Continuous>  fat emulsion (Fish Oil and Plant Based) 20% Infusion 17 mL/Hr IV Continuous <Continuous>  fat emulsion (Fish Oil and Plant Based) 20% Infusion 17 mL/Hr IV Continuous <Continuous>  fat emulsion (Fish Oil and Plant Based) 20% Infusion 17 mL/Hr IV Continuous <Continuous>  fat emulsion (Fish Oil and Plant Based) 20% Infusion 17 mL/Hr IV Continuous <Continuous>  glucagon  Injectable 1 milliGRAM(s) IntraMuscular once PRN  HYDROmorphone PCA (1 mG/mL) 30 milliLiter(s) PCA Continuous PCA Continuous  HYDROmorphone PCA (1 mG/mL) Rescue Clinician Bolus 0.5 milliGRAM(s) IV Push every 15 minutes PRN  influenza   Vaccine 0.5 milliLiter(s) IntraMuscular once  insulin lispro (HumaLOG) corrective regimen sliding scale   SubCutaneous every 6 hours  metoprolol tartrate Injectable 5 milliGRAM(s) IV Push every 6 hours  naloxone Injectable 0.1 milliGRAM(s) IV Push every 3 minutes PRN  ondansetron Injectable 4 milliGRAM(s) IV Push every 6 hours PRN  Parenteral Nutrition - Adult 1 Each TPN Continuous <Continuous>  Parenteral Nutrition - Adult 1 Each TPN Continuous <Continuous>  piperacillin/tazobactam IVPB. 3.375 Gram(s) IV Intermittent every 8 hours  sodium chloride 0.9%. 1000 milliLiter(s) IV Continuous <Continuous>      PMHX/PSHX:  Stomach cancer  Prediabetes  HLD (hyperlipidemia)  HTN (hypertension)  Malignant neoplasm of stomach, unspecified location  S/P total gastrectomy and Kelby-en-Y esophagojejunal anastomosis  S/P total gastrectomy and Kelby-en-Y esophagojejunal anastomosis  No significant past surgical history  H/O prostate biopsy  History of arthroscopy of right shoulder  History of arthroscopy of left knee  History of appendectomy      Family history:  No pertinent family history in first degree relatives  Cerebrovascular accident (Mother)      ROS:     General:  No fevers, chills, night sweats, fatigue,   Eyes:  Good vision, no reported pain  ENT:  No sore throat, pain, runny nose, dysphagia  CV:  No pain, palpitations, hypo/hypertension  Resp:  No dyspnea, cough, tachypnea, wheezing  GI:  See HPI  :  No pain, bleeding, incontinence, nocturia  Muscle:  No pain, weakness  Neuro:  No weakness, tingling, memory problem  Skin:  No rash, edema      PHYSICAL EXAM:     GENERAL:  Appears stated age, no distress  CHEST:  Full & symmetric excursion, no increased effort, breath sounds clear  HEART:  Regular rhythm, S1, S2, no added sounds  ABDOMEN:  Soft, non-tender, non-distended, ileostomy in place, normoactive bowel sounds.  NEURO:  Alert, oriented    Vital Signs:  Vital Signs Last 24 Hrs  T(C): 36.9 (2019 08:51), Max: 37 (2019 23:27)  T(F): 98.5 (2019 08:51), Max: 98.6 (2019 23:27)  HR: 82 (2019 08:51) (79 - 92)  BP: 131/83 (2019 08:51) (121/76 - 147/78)  BP(mean): --  RR: 20 (2019 08:51) (18 - 20)  SpO2: 98% (2019 08:51) (97% - 98%)  Daily     Daily     LABS:                        13.0   5.19  )-----------( 114      ( 2019 05:30 )             39.0         134<L>  |  99  |  19  ----------------------------<  156<H>  4.2   |  23  |  0.61    Ca    8.6      2019 05:30  Phos  3.2       Mg     2.2

## 2019-01-22 NOTE — PROGRESS NOTE ADULT - ASSESSMENT
63M pmh HTN, HLD with history of gastric adenocarcinoma s/p total gastrectomy and Vickie en Y esophagojejunostomy with Dr Espinal at [facility] Dec 2017 c/b early postop SBO s/p lap SHAR, treated with adjuvant chemo and radiation who was transferred to [facility] on 1/15/19 for esophageal dilatation. On 1/17 patient had endoscopy to better assess strictures.  EJ anastomosis was noted to be friable and erythematous, biopsies were taken.  In PACU patient had complaints of abdominal distention and pain.  Upright CXR performed significant for pneumoperitoneum. 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 Started on Zosyn (1/17/19-->) and Micafungin (1/17/19-->).     Overall, feculent peritonitis secondary to colonic perforation. Would finish micafungin after today (Day 5). Would continue Zosyn at this time (tentative plan for 14 days of antibiotics). Would followup on E. coli and pseudomonas susceptibilities from the SOSA drainage.

## 2019-01-22 NOTE — CONSULT NOTE ADULT - PROBLEM SELECTOR RECOMMENDATION 9
- S/p total gastrectomy and Vickie en Y esophagojejunostomy with Dr Espinal at [facility] Dec 2017  - Found to have - S/p total gastrectomy and Vickie en Y esophagojejunostomy with Dr Espinal at [facility] Dec 2017  - Found to have recurrent carcinomatosis at the previous esophagojejunal anastomosis  - Await outpatient oncology, Dr. Caio Chin, for his recs

## 2019-01-22 NOTE — CONSULT NOTE ADULT - PROBLEM SELECTOR RECOMMENDATION 4
- Spoke to son (Jed [phone]), wife (Gisella, [facility], [phone]) and surgery team (pager [phone])   - Will plan for a family meeting tomorrow at 12:30 pm  - Son is well aware of patient's clinical status; however, patient himself and his wife are not aware of cancer recurrence and prognosis. Son was afraid that if wife/patient found out about cancer recurrence, patient might give up.  - Will need to clarify nutrition and dispo plan with surgery and GI teams

## 2019-01-22 NOTE — CONSULT NOTE ADULT - ATTENDING COMMENTS
63M with Gastric CA s/p Total gastrectomy and EJ anastomosis with anastomotic stricture and jejunal stricture, post procedure Pneumoperitoneum complicated by Hemorrhagic shock, Resp Failure and massive Transfusion protocol, s/p Ex Lap that revealed a mass at GJ and the site of perforation was in Hepatic Flexure and patient had Rt hemicolectomy with end Ileostomy and mucous fistula with pseudomonas/ecoli polymicrobial peritonitis  -cont zosyn 3.375 gm iv q8  -f/u final cx results  -monitor temps and WBC  -complete 5 days micafungin today and stop  -Rt CW port intact    Lui Benton  Attending Physician   Division of Infectious Disease  Pager #[phone]  After 5pm/weekend or no response, call #[phone]
Pt seen and examined with GI fellow.  Pt s/p gastrojejunostomy c/b a GJ stricture s/p stenting c/b migration. Unclear if the GJ is the cause of symptoms as the anastomosis was patent upon stent removal. Plan for CT enterography and re-evaluation after.
Severe protein calorie malnutrition. Unable to tolerate po due to stricture at esophagojejunal anastomosis. Agree with TPN as outlined.
Pt seen with resident. Pain management being followed by acute pain, on PCA, controlled. Family meeting scheduled for tomorrow 12:30 PM to discuss further options.

## 2019-01-23 DIAGNOSIS — R52 PAIN, UNSPECIFIED: ICD-10-CM

## 2019-01-23 DIAGNOSIS — Z71.89 OTHER SPECIFIED COUNSELING: ICD-10-CM

## 2019-01-23 LAB
ANION GAP SERPL CALC-SCNC: 11 MMO/L — SIGNIFICANT CHANGE UP (ref 7–14)
BUN SERPL-MCNC: 22 MG/DL — SIGNIFICANT CHANGE UP (ref 7–23)
CA-I BLD-SCNC: 1.12 MMOL/L — SIGNIFICANT CHANGE UP (ref 1.03–1.23)
CALCIUM SERPL-MCNC: 8.6 MG/DL — SIGNIFICANT CHANGE UP (ref 8.4–10.5)
CHLORIDE SERPL-SCNC: 100 MMOL/L — SIGNIFICANT CHANGE UP (ref 98–107)
CO2 SERPL-SCNC: 24 MMOL/L — SIGNIFICANT CHANGE UP (ref 22–31)
CREAT SERPL-MCNC: 0.58 MG/DL — SIGNIFICANT CHANGE UP (ref 0.5–1.3)
GLUCOSE BLDC GLUCOMTR-MCNC: 117 MG/DL — HIGH (ref 70–99)
GLUCOSE BLDC GLUCOMTR-MCNC: 122 MG/DL — HIGH (ref 70–99)
GLUCOSE BLDC GLUCOMTR-MCNC: 125 MG/DL — HIGH (ref 70–99)
GLUCOSE BLDC GLUCOMTR-MCNC: 98 MG/DL — SIGNIFICANT CHANGE UP (ref 70–99)
GLUCOSE SERPL-MCNC: 128 MG/DL — HIGH (ref 70–99)
HCT VFR BLD CALC: 40 % — SIGNIFICANT CHANGE UP (ref 39–50)
HGB BLD-MCNC: 13.2 G/DL — SIGNIFICANT CHANGE UP (ref 13–17)
MAGNESIUM SERPL-MCNC: 2.2 MG/DL — SIGNIFICANT CHANGE UP (ref 1.6–2.6)
MCHC RBC-ENTMCNC: 29.2 PG — SIGNIFICANT CHANGE UP (ref 27–34)
MCHC RBC-ENTMCNC: 33 % — SIGNIFICANT CHANGE UP (ref 32–36)
MCV RBC AUTO: 88.5 FL — SIGNIFICANT CHANGE UP (ref 80–100)
NRBC # FLD: 0 K/UL — LOW (ref 25–125)
PHOSPHATE SERPL-MCNC: 3.3 MG/DL — SIGNIFICANT CHANGE UP (ref 2.5–4.5)
PLATELET # BLD AUTO: 90 K/UL — LOW (ref 150–400)
PMV BLD: 10.7 FL — SIGNIFICANT CHANGE UP (ref 7–13)
POTASSIUM SERPL-MCNC: 4.5 MMOL/L — SIGNIFICANT CHANGE UP (ref 3.5–5.3)
POTASSIUM SERPL-SCNC: 4.5 MMOL/L — SIGNIFICANT CHANGE UP (ref 3.5–5.3)
RBC # BLD: 4.52 M/UL — SIGNIFICANT CHANGE UP (ref 4.2–5.8)
RBC # FLD: 15.3 % — HIGH (ref 10.3–14.5)
SODIUM SERPL-SCNC: 135 MMOL/L — SIGNIFICANT CHANGE UP (ref 135–145)
WBC # BLD: 8.27 K/UL — SIGNIFICANT CHANGE UP (ref 3.8–10.5)
WBC # FLD AUTO: 8.27 K/UL — SIGNIFICANT CHANGE UP (ref 3.8–10.5)

## 2019-01-23 PROCEDURE — 99232 SBSQ HOSP IP/OBS MODERATE 35: CPT

## 2019-01-23 PROCEDURE — 74245: CPT | Mod: 26

## 2019-01-23 RX ORDER — ACETAMINOPHEN 500 MG
1000 TABLET ORAL ONCE
Qty: 0 | Refills: 0 | Status: COMPLETED | OUTPATIENT
Start: 2019-01-23 | End: 2019-01-23

## 2019-01-23 RX ORDER — HYDROMORPHONE HYDROCHLORIDE 2 MG/ML
0.5 INJECTION INTRAMUSCULAR; INTRAVENOUS; SUBCUTANEOUS
Qty: 0 | Refills: 0 | Status: DISCONTINUED | OUTPATIENT
Start: 2019-01-23 | End: 2019-01-24

## 2019-01-23 RX ORDER — HYDROMORPHONE HYDROCHLORIDE 2 MG/ML
30 INJECTION INTRAMUSCULAR; INTRAVENOUS; SUBCUTANEOUS
Qty: 0 | Refills: 0 | Status: DISCONTINUED | OUTPATIENT
Start: 2019-01-23 | End: 2019-01-24

## 2019-01-23 RX ORDER — HYDROMORPHONE HYDROCHLORIDE 2 MG/ML
30 INJECTION INTRAMUSCULAR; INTRAVENOUS; SUBCUTANEOUS
Qty: 0 | Refills: 0 | Status: DISCONTINUED | OUTPATIENT
Start: 2019-01-23 | End: 2019-01-23

## 2019-01-23 RX ORDER — HYDROMORPHONE HYDROCHLORIDE 2 MG/ML
1 INJECTION INTRAMUSCULAR; INTRAVENOUS; SUBCUTANEOUS
Qty: 0 | Refills: 0 | Status: DISCONTINUED | OUTPATIENT
Start: 2019-01-23 | End: 2019-01-23

## 2019-01-23 RX ORDER — I.V. FAT EMULSION 20 G/100ML
17 EMULSION INTRAVENOUS
Qty: 40 | Refills: 0 | Status: DISCONTINUED | OUTPATIENT
Start: 2019-01-23 | End: 2019-01-24

## 2019-01-23 RX ORDER — ELECTROLYTE SOLUTION,INJ
1 VIAL (ML) INTRAVENOUS
Qty: 0 | Refills: 0 | Status: DISCONTINUED | OUTPATIENT
Start: 2019-01-23 | End: 2019-01-23

## 2019-01-23 RX ADMIN — ENOXAPARIN SODIUM 40 MILLIGRAM(S): 100 INJECTION SUBCUTANEOUS at 11:29

## 2019-01-23 RX ADMIN — PIPERACILLIN AND TAZOBACTAM 25 GRAM(S): 4; .5 INJECTION, POWDER, LYOPHILIZED, FOR SOLUTION INTRAVENOUS at 11:28

## 2019-01-23 RX ADMIN — CHLORHEXIDINE GLUCONATE 1 APPLICATION(S): 213 SOLUTION TOPICAL at 17:48

## 2019-01-23 RX ADMIN — HYDROMORPHONE HYDROCHLORIDE 30 MILLILITER(S): 2 INJECTION INTRAMUSCULAR; INTRAVENOUS; SUBCUTANEOUS at 14:54

## 2019-01-23 RX ADMIN — Medication 5 MILLIGRAM(S): at 23:35

## 2019-01-23 RX ADMIN — Medication 400 MILLIGRAM(S): at 10:00

## 2019-01-23 RX ADMIN — PIPERACILLIN AND TAZOBACTAM 25 GRAM(S): 4; .5 INJECTION, POWDER, LYOPHILIZED, FOR SOLUTION INTRAVENOUS at 21:34

## 2019-01-23 RX ADMIN — Medication 5 MILLIGRAM(S): at 11:30

## 2019-01-23 RX ADMIN — PIPERACILLIN AND TAZOBACTAM 25 GRAM(S): 4; .5 INJECTION, POWDER, LYOPHILIZED, FOR SOLUTION INTRAVENOUS at 05:10

## 2019-01-23 RX ADMIN — Medication 1000 MILLIGRAM(S): at 10:30

## 2019-01-23 RX ADMIN — Medication 5 MILLIGRAM(S): at 17:48

## 2019-01-23 RX ADMIN — Medication 5 MILLIGRAM(S): at 05:10

## 2019-01-23 RX ADMIN — HYDROMORPHONE HYDROCHLORIDE 30 MILLILITER(S): 2 INJECTION INTRAMUSCULAR; INTRAVENOUS; SUBCUTANEOUS at 19:07

## 2019-01-23 RX ADMIN — HYDROMORPHONE HYDROCHLORIDE 30 MILLILITER(S): 2 INJECTION INTRAMUSCULAR; INTRAVENOUS; SUBCUTANEOUS at 07:05

## 2019-01-23 RX ADMIN — CHLORHEXIDINE GLUCONATE 1 APPLICATION(S): 213 SOLUTION TOPICAL at 05:10

## 2019-01-23 RX ADMIN — ONDANSETRON 4 MILLIGRAM(S): 8 TABLET, FILM COATED ORAL at 19:08

## 2019-01-23 NOTE — PROGRESS NOTE ADULT - SUBJECTIVE AND OBJECTIVE BOX
ROGER SANTOS             [id number]    CC: Poor PO intake    HPI:  63M pmh HTN, HLD with history of gastric adenocarcinoma s/p total gastrectomy and Vickie en Y esophagojejunostomy with Dr Espinal at [facility] at the [facility] Dec 2017 c/b early postop SBO s/p lap SHAR, treated with adjuvant chemo and radiation. Pt now transferred from [place] after a ~20 day admission with symptoms of PO intolerance. 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 He underwent endoscopic dilation as well as stent placement, which have not alleviated his symptoms. Pt reports vomiting any PO intake greater than ice chips. States that he has been on TPN during the  Denies nausea at baseline, denies pain. (15 Weston 2019 02:46)    SUBJECTIVE: Patient was seen and examined today. Denies chest pain, nausea, vomiting, or SOB. Reports ongoing postoperative abdominal pain, currently on PCA. Tolerated his swallow study well today.     ROS:  DYSPNEA: N	  NAUS/VOM: N		  Pain (Y/):       - Generalized abdominal pain after surgery    OTHER REVIEW OF SYSTEMS:  UNABLE TO OBTAIN  due to:    PEx:  T(C): 37.1 (01-23-19 @ 11:26), Max: 37.1 (01-22-19 @ 23:18)  HR: 79 (01-23-19 @ 11:26) (79 - 84)  BP: 136/76 (01-23-19 @ 11:26) (119/83 - 153/87)  RR: 18 (01-23-19 @ 11:26) (18 - 18)  SpO2: 99% (01-23-19 @ 11:26) (96% - 99%)  Wt(kg): --    PHYSICAL EXAM:  GENERAL: NAD  HEENT: sclera clear bilaterally, has NG tube intact for suctioning  NECK: Supple  CHEST/LUNG: CTAB anteriorly, no wheezing or rales, nonlabored breathing  HEART: RRR, S1 and S2, no murmurs  ABDOMEN: Soft, mildly tender on palpation,   EXTREMITIES:  2+ Peripheral Pulses, No clubbing, cyanosis, or edema  PSYCH: AAOx3  NEUROLOGY: non-focal  SKIN: No rashes or lesions         	     ALLERGIES: No Known Allergies      OPIATE NAÏVE (Y/N):    MEDICATIONS: REVIEWED  MEDICATIONS  (STANDING):  acetaminophen  IVPB .. 1000 milliGRAM(s) IV Intermittent once  chlorhexidine 4% Liquid 1 Application(s) Topical two times a day  dextrose 5%. 1000 milliLiter(s) (50 mL/Hr) IV Continuous <Continuous>  dextrose 50% Injectable 12.5 Gram(s) IV Push once  dextrose 50% Injectable 25 Gram(s) IV Push once  dextrose 50% Injectable 25 Gram(s) IV Push once  enoxaparin Injectable 40 milliGRAM(s) SubCutaneous daily  fat emulsion (Fish Oil and Plant Based) 20% Infusion 17 mL/Hr (17 mL/Hr) IV Continuous <Continuous>  fat emulsion (Fish Oil and Plant Based) 20% Infusion 17 mL/Hr (17 mL/Hr) IV Continuous <Continuous>  HYDROmorphone PCA (1 mG/mL) 30 milliLiter(s) PCA Continuous PCA Continuous  influenza   Vaccine 0.5 milliLiter(s) IntraMuscular once  insulin lispro (HumaLOG) corrective regimen sliding scale   SubCutaneous every 6 hours  metoprolol tartrate Injectable 5 milliGRAM(s) IV Push every 6 hours  Parenteral Nutrition - Adult 1 Each (83 mL/Hr) TPN Continuous <Continuous>  Parenteral Nutrition - Adult 1 Each (83 mL/Hr) TPN Continuous <Continuous>  piperacillin/tazobactam IVPB. 3.375 Gram(s) IV Intermittent every 8 hours  sodium chloride 0.9%. 1000 milliLiter(s) (30 mL/Hr) IV Continuous <Continuous>    MEDICATIONS  (PRN):  dextrose 40% Gel 15 Gram(s) Oral once PRN Blood Glucose LESS THAN 70 milliGRAM(s)/deciliter  glucagon  Injectable 1 milliGRAM(s) IntraMuscular once PRN Glucose LESS THAN 70 milligrams/deciliter  HYDROmorphone PCA (1 mG/mL) Rescue Clinician Bolus 1 milliGRAM(s) IV Push every 2 hours PRN for Pain Scale GREATER THAN 6  naloxone Injectable 0.1 milliGRAM(s) IV Push every 3 minutes PRN For ANY of the following changes in patient status:  A. RR LESS THAN 10 breaths per minute, B. Oxygen saturation LESS THAN 90%, C. Sedation score of 6  ondansetron Injectable 4 milliGRAM(s) IV Push every 6 hours PRN Nausea      LABS: REVIEWED        IMAGING: REVIEWED    ADVANCED DIRECTIVES:     FULL CODE     DNR     DNI     LIVING WILL     MOLST    DECISION MAKER:   LEGAL SURROGATE:    PSYCHOSOCIAL-SPIRITUAL ASSESSMENT:       Reviewed       Care plan unchanged       Care plan adjusted as above	    GOALS OF CARE DISCUSSION       Palliative care info/counseling provided	           Family meeting       Advanced Directives addressed please see Advance Care Planning Note	           See previous Palliative Medicine Note       Documentation of GOC:   	      AGENCY CHOICE DISCUSSED:           Homecare        Hospice        [facility]        Other:    REFERRALS	        Palliative Med        Unit SW/Case Mgmt              Speech/Swallow       Patient/Family Support       Massage Therapy       Music Therapy       Hospice       Nutrition       Ethics       PT/OT        CRITICAL CARE TIME PROVIDED TO UNSTABLE PT W/ ORGAN FAILURE	   Start:               End:  	       Minutes:              > 50% OF THE TIME SPENT IN COUNSELING AND COORDINATING CARE 	   Start:               End:  	       Minutes:      PROLONGED SERVICE             FACE TO FACE:    PT            PT & FAMILY	   Start:               End:  	       Minutes:      Advance Care Planning Time: ROGER SANTOS             [id number]    CC: Poor PO intake    HPI:  63M pmh HTN, HLD with history of gastric adenocarcinoma s/p total gastrectomy and Vickie en [facility] c/b early postop SBO s/p lap SHAR, treated with adjuvant chemo and radiation. Pt now transferred from [place] after a ~20 day admission with symptoms of PO intolerance. 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 He underwent endoscopic dilation as well as stent placement, which have not alleviated his symptoms. Pt reports vomiting any PO intake greater than ice chips. States that he has been on TPN during the  Denies nausea at baseline, denies pain. (15 [place]on 2019 02:46)    SUBJECTIVE: Patient was seen and examined today. Denies chest pain, nausea, vomiting, or SOB. Reports ongoing postoperative abdominal pain, currently on PCA. Tolerated his swallow study well today.     ROS:  DYSPNEA: N	  NAUS/VOM: N		  Pain (Y/):       - Generalized abdominal pain after surgery    OTHER REVIEW OF SYSTEMS:  UNABLE TO OBTAIN  due to:    PEx:  T(C): 37.1 (01-23-19 @ 11:26), Max: 37.1 (01-22-19 @ 23:18)  HR: 79 (01-23-19 @ 11:26) (79 - 84)  BP: 136/76 (01-23-19 @ 11:26) (119/83 - 153/87)  RR: 18 (01-23-19 @ 11:26) (18 - 18)  SpO2: 99% (01-23-19 @ 11:26) (96% - 99%)  Wt(kg): --    PHYSICAL EXAM:  GENERAL: NAD  HEENT: sclera clear bilaterally, has NG tube intact for suctioning  NECK: Supple  CHEST/LUNG: CTAB anteriorly, no wheezing or rales, nonlabored breathing  HEART: RRR, S1 and S2, no murmurs  ABDOMEN: Soft, mildly tender on palpation, no rebound or guarding, has ileostomy bag intact  EXTREMITIES: no peripheral edema  PSYCH: Appropriate mood and cooperative  NEUROLOGY: non-focal  SKIN: No rashes         	     ALLERGIES: No Known Allergies      OPIATE NAÏVE (Y/N):    MEDICATIONS: REVIEWED  MEDICATIONS  (STANDING):  acetaminophen  IVPB .. 1000 milliGRAM(s) IV Intermittent once  chlorhexidine 4% Liquid 1 Application(s) Topical two times a day  dextrose 5%. 1000 milliLiter(s) (50 mL/Hr) IV Continuous <Continuous>  dextrose 50% Injectable 12.5 Gram(s) IV Push once  dextrose 50% Injectable 25 Gram(s) IV Push once  dextrose 50% Injectable 25 Gram(s) IV Push once  enoxaparin Injectable 40 milliGRAM(s) SubCutaneous daily  fat emulsion (Fish Oil and Plant Based) 20% Infusion 17 mL/Hr (17 mL/Hr) IV Continuous <Continuous>  fat emulsion (Fish Oil and Plant Based) 20% Infusion 17 mL/Hr (17 mL/Hr) IV Continuous <Continuous>  HYDROmorphone PCA (1 mG/mL) 30 milliLiter(s) PCA Continuous PCA Continuous  influenza   Vaccine 0.5 milliLiter(s) IntraMuscular once  insulin lispro (HumaLOG) corrective regimen sliding scale   SubCutaneous every 6 hours  metoprolol tartrate Injectable 5 milliGRAM(s) IV Push every 6 hours  Parenteral Nutrition - Adult 1 Each (83 mL/Hr) TPN Continuous <Continuous>  Parenteral Nutrition - Adult 1 Each (83 mL/Hr) TPN Continuous <Continuous>  piperacillin/tazobactam IVPB. 3.375 Gram(s) IV Intermittent every 8 hours  sodium chloride 0.9%. 1000 milliLiter(s) (30 mL/Hr) IV Continuous <Continuous>    MEDICATIONS  (PRN):  dextrose 40% Gel 15 Gram(s) Oral once PRN Blood Glucose LESS THAN 70 milliGRAM(s)/deciliter  glucagon  Injectable 1 milliGRAM(s) IntraMuscular once PRN Glucose LESS THAN 70 milligrams/deciliter  HYDROmorphone PCA (1 mG/mL) Rescue Clinician Bolus 1 milliGRAM(s) IV Push every 2 hours PRN for Pain Scale GREATER THAN 6  naloxone Injectable 0.1 milliGRAM(s) IV Push every 3 minutes PRN For ANY of the following changes in patient status:  A. RR LESS THAN 10 breaths per minute, B. Oxygen saturation LESS THAN 90%, C. Sedation score of 6  ondansetron Injectable 4 milliGRAM(s) IV Push every 6 hours PRN Nausea      LABS: REVIEWED        IMAGING: REVIEWED    ADVANCED DIRECTIVES:     FULL CODE     DNR     DNI     LIVING WILL     MOLST    DECISION MAKER:   LEGAL SURROGATE:    PSYCHOSOCIAL-SPIRITUAL ASSESSMENT:       Reviewed       Care plan unchanged       Care plan adjusted as above	    GOALS OF CARE DISCUSSION       Palliative care info/counseling provided	           Family meeting       Advanced Directives addressed please see Advance Care Planning Note	           See previous Palliative Medicine Note       Documentation of GOC:   	      AGENCY CHOICE DISCUSSED:           Homecare        Hospice        [facility]        Other:    REFERRALS	        Palliative Med        Unit SW/Case Mgmt              Speech/Swallow       Patient/Family Support       Massage Therapy       Music Therapy       Hospice       Nutrition       Ethics       PT/OT        CRITICAL CARE TIME PROVIDED TO UNSTABLE PT W/ ORGAN FAILURE	   Start:               End:  	       Minutes:              > 50% OF THE TIME SPENT IN COUNSELING AND COORDINATING CARE 	   Start:               End:  	       Minutes:      PROLONGED SERVICE             FACE TO FACE:    PT            PT & FAMILY	   Start:               End:  	       Minutes:      Advance Care Planning Time: ROGER SANTOS             [id number]    Palliative Care following for complex symptom management and decision making in the setting of malignancy.   CC: Poor PO intake    HPI:  63M pmh HTN, HLD with history of gastric adenocarcinoma s/p total gastrectomy and Vickie en Y esophagojejunostomy with Dr Espinal at [facility] Dec 2017 c/b early postop SBO s/p lap SHAR, treated with adjuvant chemo and radiation. Pt now transferred from [place] after a ~20 day admission with symptoms of PO intolerance. 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 He underwent endoscopic dilation as well as stent placement, which have not alleviated his symptoms. Pt reports vomiting any PO intake greater than ice chips. States that he has been on TPN during the  Denies nausea at baseline, denies pain. (15 [place]on 2019 02:46)    SUBJECTIVE: Patient was seen and examined today. Denies chest pain, nausea, vomiting, or SOB. Reports ongoing postoperative abdominal pain, currently on PCA. Tolerated his swallow study well today.     ROS:  DYSPNEA: N	  NAUS/VOM: N		  Pain (Y/):       - Generalized abdominal pain after surgery    OTHER REVIEW OF SYSTEMS:  UNABLE TO OBTAIN  due to:    PEx:  T(C): 37.1 (01-23-19 @ 11:26), Max: 37.1 (01-22-19 @ 23:18)  HR: 79 (01-23-19 @ 11:26) (79 - 84)  BP: 136/76 (01-23-19 @ 11:26) (119/83 - 153/87)  RR: 18 (01-23-19 @ 11:26) (18 - 18)  SpO2: 99% (01-23-19 @ 11:26) (96% - 99%)  Wt(kg): --    PHYSICAL EXAM:  GENERAL: NAD  HEENT: sclera clear bilaterally, has NG tube intact for suctioning  NECK: Supple  CHEST/LUNG: CTAB anteriorly, no wheezing or rales, nonlabored breathing  HEART: RRR, S1 and S2, no murmurs  ABDOMEN: Soft, mildly tender on palpation, no rebound or guarding, has ileostomy bag intact.  EXTREMITIES: no peripheral edema  PSYCH: Appropriate mood and cooperative  NEUROLOGY: non-focal  SKIN: No rashes    ALLERGIES: No Known Allergies    MEDICATIONS: REVIEWED  MEDICATIONS  (STANDING):  acetaminophen  IVPB .. 1000 milliGRAM(s) IV Intermittent once  chlorhexidine 4% Liquid 1 Application(s) Topical two times a day  dextrose 5%. 1000 milliLiter(s) (50 mL/Hr) IV Continuous <Continuous>  dextrose 50% Injectable 12.5 Gram(s) IV Push once  dextrose 50% Injectable 25 Gram(s) IV Push once  dextrose 50% Injectable 25 Gram(s) IV Push once  enoxaparin Injectable 40 milliGRAM(s) SubCutaneous daily  fat emulsion (Fish Oil and Plant Based) 20% Infusion 17 mL/Hr (17 mL/Hr) IV Continuous <Continuous>  fat emulsion (Fish Oil and Plant Based) 20% Infusion 17 mL/Hr (17 mL/Hr) IV Continuous <Continuous>  HYDROmorphone PCA (1 mG/mL) 30 milliLiter(s) PCA Continuous PCA Continuous  influenza   Vaccine 0.5 milliLiter(s) IntraMuscular once  insulin lispro (HumaLOG) corrective regimen sliding scale   SubCutaneous every 6 hours  metoprolol tartrate Injectable 5 milliGRAM(s) IV Push every 6 hours  Parenteral Nutrition - Adult 1 Each (83 mL/Hr) TPN Continuous <Continuous>  Parenteral Nutrition - Adult 1 Each (83 mL/Hr) TPN Continuous <Continuous>  piperacillin/tazobactam IVPB. 3.375 Gram(s) IV Intermittent every 8 hours  sodium chloride 0.9%. 1000 milliLiter(s) (30 mL/Hr) IV Continuous <Continuous>    MEDICATIONS  (PRN):  dextrose 40% Gel 15 Gram(s) Oral once PRN Blood Glucose LESS THAN 70 milliGRAM(s)/deciliter  glucagon  Injectable 1 milliGRAM(s) IntraMuscular once PRN Glucose LESS THAN 70 milligrams/deciliter  HYDROmorphone PCA (1 mG/mL) Rescue Clinician Bolus 1 milliGRAM(s) IV Push every 2 hours PRN for Pain Scale GREATER THAN 6  naloxone Injectable 0.1 milliGRAM(s) IV Push every 3 minutes PRN For ANY of the following changes in patient status:  A. RR LESS THAN 10 breaths per minute, B. Oxygen saturation LESS THAN 90%, C. Sedation score of 6  ondansetron Injectable 4 milliGRAM(s) IV Push every 6 hours PRN Nausea    LABS: REVIEWED    IMAGING: REVIEWED    ADVANCED DIRECTIVES:     FULL CODE       DECISION MAKER: surrogate pts wife - Ruddy  LEGAL SURROGATE:    PSYCHOSOCIAL-SPIRITUAL ASSESSMENT:       Reviewed    GOALS OF CARE DISCUSSION       Palliative care info/counseling provided	           Family meeting

## 2019-01-23 NOTE — PROGRESS NOTE ADULT - SUBJECTIVE AND OBJECTIVE BOX
NUTRITION NOTE  [id number] TOM  ===============================    Interval events; no events overnight     VITAL SIGNS:  T(C): 36.7 (01-23-19 @ 08:16), Max: 37.1 (01-22-19 @ 23:18)  HR: 83 (01-23-19 @ 08:16) (82 - 86)  BP: 152/82 (01-23-19 @ 08:16) (119/83 - 153/87)  ABP: --  ABP(mean): --  RR: 18 (01-23-19 @ 08:16) (18 - 20)  SpO2: 96% (01-23-19 @ 08:16) (96% - 99%)  CVP(mm Hg): --  01-22 @ 07:01  -  01-23 @ 07:00  --------------------------------------------------------  IN: 1866 mL / OUT: 870 mL / NET: 996 mL    01-23 @ 07:01  - 01-23 @ 10:05  --------------------------------------------------------  IN: 0 mL / OUT: 260 mL / NET: -260 mL    Glucose 107-130    ** PHYSICAL EXAM **    -- CONSTITUTIONAL: Alert, NAD  -- PULMONARY: non-labored respirations  -- ABDOMEN: soft, moderately-distended, non-tender; Mucous fistula with bowel sweat, ostomy pink/viable w/ liq stool and gas, LLQ SOSA with dark sanguinous output, no rebound or guarding    Metabolic/FLUIDS/ELECTROLYTES/NUTRITION:  Gastrointestinal Medications:  dextrose 5%. 1000 milliLiter(s) IV Continuous <Continuous>  fat emulsion (Fish Oil and Plant Based) 20% Infusion 17 mL/Hr IV Continuous <Continuous>  Parenteral Nutrition - Adult 1 Each TPN Continuous <Continuous>  sodium chloride 0.9%. 1000 milliLiter(s) IV Continuous <Continuous>    I&O's Detail  63y  Daily   01-23    135  |  100  |  22  ----------------------------<  128<H>  4.5   |  24  |  0.58    Ca    8.6      23 Jan 2019 05:27  Phos  3.3     01-23  Mg     2.2     01-23    Diet: NPO/NGT     INFECTIOUS DISEASE:  Antimicrobials/Immunologic Medications:  influenza   Vaccine 0.5 milliLiter(s) IntraMuscular once  piperacillin/tazobactam IVPB. 3.375 Gram(s) IV Intermittent every 8 hours    RECENT CULTURES:  01-18 @ 20:30 SURGERY Pseudomonas aeruginosa  Escherichia coli      OTHER MEDICATIONS:  Endocrine/Metabolic Medications:  dextrose 40% Gel 15 Gram(s) Oral once PRN  dextrose 50% Injectable 12.5 Gram(s) IV Push once  dextrose 50% Injectable 25 Gram(s) IV Push once  dextrose 50% Injectable 25 Gram(s) IV Push once  glucagon  Injectable 1 milliGRAM(s) IntraMuscular once PRN  insulin lispro (HumaLOG) corrective regimen sliding scale   SubCutaneous every 6 hours    Genitourinary Medications:    Topical/Other Medications:  chlorhexidine 4% Liquid 1 Application(s) Topical two times a day  naloxone Injectable 0.1 milliGRAM(s) IV Push every 3 minutes PRN      ASSESSMENT/PLAN:   63y Male s/p exploratory laparotomy, 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 transitioned from sicu to floor    - F/U UGI series   -Continue NPO/TPN           1. Protein calorie malnutrition being optimized with TPN: CHO [ ] gm.  AA [ ] gm. Lipids [ ] gm.  2.  Hyperglycemia managed with: [ ] units of regular insulin    3.  Check fluid balance daily.  Strict I/O  [ ] [ ]   4.  Daily BMP, Ionized Calcium, Magnesium and Phosphorous   5.  Triglycerides at initiation of TPN and monthly [ ] [ ]   6.  Pepcid in TPN for Gi prophylaxis  [ ]

## 2019-01-23 NOTE — PROGRESS NOTE ADULT - PROBLEM SELECTOR PLAN 4
- Had a family meeting with son (Jed [phone]), wife (Gisella, HCP, [phone]) and surgery team (pager [phone])  - Patient and wife are now aware of cancer recurrence, since patient was already suspicious of cancer progression beforehand and reports he would like to be involved for decision-making.   - Patient is agreeable to further chemo if indicated  - Await swallow study results. If he tolerates PO, then may transition PCA to long acting PO meds and also transition from TPN to PO feeds.  - Will continue to follow up - Functionally independent and AO x3 at baseline  - Significant weight loss and mild functional decompensation, however patient still able to OOB to chair and cognitive status at baseline per son  - Physical therapy following.

## 2019-01-23 NOTE — PROGRESS NOTE ADULT - ASSESSMENT
63M pmh HTN, HLD with history of gastric adenocarcinoma s/p total gastrectomy and Vickie en Y esophagojejunostomy with Dr Espinal at [facility] Dec 2017 c/b early postop SBO s/p lap SHAR, treated with adjuvant chemo and radiation who was transferred to [facility] on 1/15/19 for esophageal dilatation. On 1/17 patient had endoscopy to better assess strictures.  EJ anastomosis was noted to be friable and erythematous, biopsies were taken.  In PACU patient had complaints of abdominal distention and pain.  Upright CXR performed significant for pneumoperitoneum. 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 Started on Zosyn (1/17/19-->) and Micafungin (1/17/19-->).     Overall, feculent peritonitis secondary to colonic perforation. Would finish micafungin after today (Day 5). Would continue Zosyn at this time (tentative plan for 14 days of antibiotics). Would followup on E. coli and pseudomonas susceptibilities from the SOSA drainage.

## 2019-01-23 NOTE — PROGRESS NOTE ADULT - SUBJECTIVE AND OBJECTIVE BOX
TOM ROGER 63y MRN-[id number]    Patient is a 63y old  Male who presents with a chief complaint of Bowel Obstruction (23 Jan 2019 13:18)      Follow Up/CC:  ID following for peritonitis    Interval History/ROS: no fever    Allergies    No Known Allergies    Intolerances        ANTIMICROBIALS:  piperacillin/tazobactam IVPB. 3.375 every 8 hours      MEDICATIONS  (STANDING):  acetaminophen  IVPB .. 1000 milliGRAM(s) IV Intermittent once  chlorhexidine 4% Liquid 1 Application(s) Topical two times a day  dextrose 5%. 1000 milliLiter(s) (50 mL/Hr) IV Continuous <Continuous>  dextrose 50% Injectable 12.5 Gram(s) IV Push once  dextrose 50% Injectable 25 Gram(s) IV Push once  dextrose 50% Injectable 25 Gram(s) IV Push once  enoxaparin Injectable 40 milliGRAM(s) SubCutaneous daily  fat emulsion (Fish Oil and Plant Based) 20% Infusion 17 mL/Hr (17 mL/Hr) IV Continuous <Continuous>  fat emulsion (Fish Oil and Plant Based) 20% Infusion 17 mL/Hr (17 mL/Hr) IV Continuous <Continuous>  HYDROmorphone PCA (1 mG/mL) 30 milliLiter(s) PCA Continuous PCA Continuous  influenza   Vaccine 0.5 milliLiter(s) IntraMuscular once  insulin lispro (HumaLOG) corrective regimen sliding scale   SubCutaneous every 6 hours  metoprolol tartrate Injectable 5 milliGRAM(s) IV Push every 6 hours  Parenteral Nutrition - Adult 1 Each (83 mL/Hr) TPN Continuous <Continuous>  Parenteral Nutrition - Adult 1 Each (83 mL/Hr) TPN Continuous <Continuous>  piperacillin/tazobactam IVPB. 3.375 Gram(s) IV Intermittent every 8 hours  sodium chloride 0.9%. 1000 milliLiter(s) (30 mL/Hr) IV Continuous <Continuous>    MEDICATIONS  (PRN):  dextrose 40% Gel 15 Gram(s) Oral once PRN Blood Glucose LESS THAN 70 milliGRAM(s)/deciliter  glucagon  Injectable 1 milliGRAM(s) IntraMuscular once PRN Glucose LESS THAN 70 milligrams/deciliter  HYDROmorphone PCA (1 mG/mL) Rescue Clinician Bolus 0.5 milliGRAM(s) IV Push every 2 hours PRN for Pain Scale GREATER THAN 6  naloxone Injectable 0.1 milliGRAM(s) IV Push every 3 minutes PRN For ANY of the following changes in patient status:  A. RR LESS THAN 10 breaths per minute, B. Oxygen saturation LESS THAN 90%, C. Sedation score of 6  ondansetron Injectable 4 milliGRAM(s) IV Push every 6 hours PRN Nausea        Vital Signs Last 24 Hrs  T(C): 37.1 (23 Jan 2019 11:26), Max: 37.1 (22 Jan 2019 23:18)  T(F): 98.7 (23 Jan 2019 11:26), Max: 98.8 (22 Jan 2019 23:18)  HR: 79 (23 Jan 2019 11:26) (79 - 84)  BP: 136/76 (23 Jan 2019 11:26) (119/83 - 153/87)  BP(mean): --  RR: 18 (23 Jan 2019 11:26) (18 - 18)  SpO2: 99% (23 Jan 2019 11:26) (96% - 99%)    CBC Full  -  ( 23 Jan 2019 08:49 )  WBC Count : 8.27 K/uL  Hemoglobin : 13.2 g/dL  Hematocrit : 40.0 %  Platelet Count - Automated : 90 K/uL  Mean Cell Volume : 88.5 fL  Mean Cell Hemoglobin : 29.2 pg  Mean Cell Hemoglobin Concentration : 33.0 %  Auto Neutrophil # : x  Auto Lymphocyte # : x  Auto Monocyte # : x  Auto Eosinophil # : x  Auto Basophil # : x  Auto Neutrophil % : x  Auto Lymphocyte % : x  Auto Monocyte % : x  Auto Eosinophil % : x  Auto Basophil % : x    01-23    135  |  100  |  22  ----------------------------<  128<H>  4.5   |  24  |  0.58    Ca    8.6      23 Jan 2019 05:27  Phos  3.3     01-23  Mg     2.2     01-23            MICROBIOLOGY:  SURGERY  01-18-19 --  --  Pseudomonas aeruginosa  Escherichia coli    RADIOLOGY    < from: Xray Chest 1 View- PORTABLE-Routine (01.19.19 @ 00:30) >  Endotracheal tube tip 2.5 cm above shelton.

## 2019-01-23 NOTE — PROGRESS NOTE ADULT - PROBLEM SELECTOR PLAN 5
- Had a family meeting with son (Jed [phone]), wife (Gisella, HCP, [phone]) and surgery team (pager [phone])  - Patient and wife are now aware of cancer recurrence, since patient was already suspicious of cancer progression beforehand and reports he would like to be involved for decision-making.   - Patient is agreeable to further chemo if indicated  - Await swallow study results. If he tolerates PO, then may transition PCA to long acting PO meds and also transition from TPN to PO feeds.  - Will continue to follow up

## 2019-01-23 NOTE — PROGRESS NOTE ADULT - PROBLEM SELECTOR PLAN 2
- S/p right hemicolectomy with ileostomy and micafungin  - C/w Zosyn per ID recs  - Postoperative pain well controlled by PCA  - Pain medicine following  - C/w current pain regimen.  - If patient tolerates PO, may transition from PCA to long acting PO meds - S/p right hemicolectomy with ileostomy and micafungin  - C/w Zosyn per ID recs  - Postoperative pain well controlled by PCA  - If patient tolerates PO, may transition from PCA to long acting PO meds

## 2019-01-23 NOTE — PROGRESS NOTE ADULT - PROBLEM SELECTOR PLAN 3
- Functionally independent and AO x3 at baseline  - Significant weight loss and mild functional decompensation, however patient still able to OOB to chair and cognitive status at baseline per son  - Physical therapy following. Controlled. With current PCA usage, received 6.8mg of dilaudid in the past 24hrs. Would recommend to start continuous dosing at 0.2mg/hr and continue with 0.2mg demand, lockout 15 min, CB at 0.5mg q2hrs PRN. Please page for uncontrolled symptoms.  Once pt is able to tolerate PO, can change pain regimen to oral regimen.

## 2019-01-23 NOTE — PROGRESS NOTE ADULT - PROBLEM SELECTOR PLAN 1
- S/p total gastrectomy and Vickie en Y esophagojejunostomy with Dr Espinal at [facility] Dec 2017  - Found to have recurrent carcinomatosis at the previous esophagojejunal anastomosis  - Per surgery team, no surgical intervention at this time  - Patient is agreeable to further chemo if eligible.  - Await outpatient oncology, Dr. Caio Chin, for his recs. - S/p total gastrectomy and Vickie en Y esophagojejunostomy with Dr Espinal at [facility] Dec 2017  - Found to have recurrent carcinomatosis at the previous esophagojejunal anastomosis  - Per surgery team, no further surgical intervention at this time  - Patient is agreeable to further chemo if eligible.  - Await outpatient oncology, Dr. Caio Chin, for his recs.

## 2019-01-23 NOTE — PROGRESS NOTE ADULT - ASSESSMENT
63M pmh HTN, HLD with history of gastric adenocarcinoma s/p total gastrectomy and Vickie en Y esophagojejunostomy with Dr Espinal at [facility] Dec 2017 c/b early postop SBO s/p lap SHAR, treated with adjuvant chemo and radiation who was transferred to [facility] on 1/15/19 for esophageal dilatation, c/b pneumoperitoneum from ischemic colon with colonic perforation, feculent peritonitis, and recurrent carcinomatosis s/p right hemicolectomy with ileostomy.

## 2019-01-23 NOTE — PROGRESS NOTE ADULT - SUBJECTIVE AND OBJECTIVE BOX
Anesthesia Pain Management Service    SUBJECTIVE: Patient is doing well with IV PCA and no significant problems reported. Family at bedside.    Pain Scale Score	At rest: __8/10_ 	With Activity: ___ 	[X ] Refer to charted pain scores    THERAPY:    [ ] IV PCA Morphine		[ ] 5 mg/mL	[ ] 1 mg/mL  [X ] IV PCA Hydromorphone	[ ] 5 mg/mL	[X ] 1 mg/mL  [ ] IV PCA Fentanyl		[ ] 50 micrograms/mL    Demand dose __0.2_ lockout __6_ (minutes) Continuous Rate _0__ Total: _9__  mg used (in past 24 hours)      MEDICATIONS  (STANDING):  acetaminophen  IVPB .. 1000 milliGRAM(s) IV Intermittent once  chlorhexidine 4% Liquid 1 Application(s) Topical two times a day  dextrose 5%. 1000 milliLiter(s) (50 mL/Hr) IV Continuous <Continuous>  dextrose 50% Injectable 12.5 Gram(s) IV Push once  dextrose 50% Injectable 25 Gram(s) IV Push once  dextrose 50% Injectable 25 Gram(s) IV Push once  enoxaparin Injectable 40 milliGRAM(s) SubCutaneous daily  fat emulsion (Fish Oil and Plant Based) 20% Infusion 17 mL/Hr (17 mL/Hr) IV Continuous <Continuous>  HYDROmorphone PCA (1 mG/mL) 30 milliLiter(s) PCA Continuous PCA Continuous  influenza   Vaccine 0.5 milliLiter(s) IntraMuscular once  insulin lispro (HumaLOG) corrective regimen sliding scale   SubCutaneous every 6 hours  metoprolol tartrate Injectable 5 milliGRAM(s) IV Push every 6 hours  Parenteral Nutrition - Adult 1 Each (83 mL/Hr) TPN Continuous <Continuous>  piperacillin/tazobactam IVPB. 3.375 Gram(s) IV Intermittent every 8 hours  sodium chloride 0.9%. 1000 milliLiter(s) (30 mL/Hr) IV Continuous <Continuous>    MEDICATIONS  (PRN):  dextrose 40% Gel 15 Gram(s) Oral once PRN Blood Glucose LESS THAN 70 milliGRAM(s)/deciliter  glucagon  Injectable 1 milliGRAM(s) IntraMuscular once PRN Glucose LESS THAN 70 milligrams/deciliter  HYDROmorphone PCA (1 mG/mL) Rescue Clinician Bolus 0.5 milliGRAM(s) IV Push every 15 minutes PRN for Pain Scale GREATER THAN 6  naloxone Injectable 0.1 milliGRAM(s) IV Push every 3 minutes PRN For ANY of the following changes in patient status:  A. RR LESS THAN 10 breaths per minute, B. Oxygen saturation LESS THAN 90%, C. Sedation score of 6  ondansetron Injectable 4 milliGRAM(s) IV Push every 6 hours PRN Nausea      OBJECTIVE:  Patient sitting in chair, with NGT and NPO.    Sedation Score:	[ X] Alert	[ ] Drowsy 	[ ] Arousable	[ ] Asleep	[ ] Unresponsive    Side Effects:	[X ] None	[ ] Nausea	[ ] Vomiting	[ ] Pruritus  		[ ] Other:    Vital Signs Last 24 Hrs  T(C): 36.7 (23 Jan 2019 08:16), Max: 37.1 (22 Jan 2019 23:18)  T(F): 98 (23 Jan 2019 08:16), Max: 98.8 (22 Jan 2019 23:18)  HR: 83 (23 Jan 2019 08:16) (82 - 86)  BP: 152/82 (23 Jan 2019 08:16) (119/83 - 153/87)  BP(mean): --  RR: 18 (23 Jan 2019 08:16) (18 - 20)  SpO2: 96% (23 Jan 2019 08:16) (96% - 99%)    ASSESSMENT/ PLAN    Therapy to  be:	[ X] Continue   [ ] Discontinued   [ ] Change to prn Analgesics    Documentation and Verification of current medications:   [X] Done	[ ] Not done, not elligible    Comments: Discussed patient with Palliative service and they will cover patient's plan of care regarding pain and IV PCA.  IV Tylenol activated for 1 dose to supplement IV PCA.

## 2019-01-23 NOTE — PROGRESS NOTE ADULT - ASSESSMENT
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 transitioned from sicu to floor     Plan:  - Pain control as needed with PCA  - NPO with TPN  - monitor ostomy function  - lovenox for DVT ppx  - oob/ambulate as tolerated  - appreciate ID recs: complete 14day course of zosyn  - f/u palliative mtg at 12:30pm today  - appreciate pt's medical oncologist (Dr. Chin) recs re: outpatient treatment   - possible contrast study for swallowing today

## 2019-01-23 NOTE — PROGRESS NOTE ADULT - SUBJECTIVE AND OBJECTIVE BOX
General Surgery Progress Note    SUBJECTIVE:  The patient was seen and examined. No acute events overnight. Denies n/v, cp, sob, abd pain.    OBJECTIVE:     ** VITAL SIGNS / I&O's **    Vital Signs Last 24 Hrs  T(C): 36.9 (23 Jan 2019 05:03), Max: 37.1 (22 Jan 2019 23:18)  T(F): 98.5 (23 Jan 2019 05:03), Max: 98.8 (22 Jan 2019 23:18)  HR: 84 (23 Jan 2019 05:03) (82 - 86)  BP: 153/87 (23 Jan 2019 05:03) (119/83 - 153/87)  BP(mean): --  RR: 18 (23 Jan 2019 05:03) (18 - 20)  SpO2: 97% (23 Jan 2019 05:03) (96% - 99%)      22 Jan 2019 07:01  -  23 Jan 2019 07:00  --------------------------------------------------------  IN:    fat emulsion (Fish Oil and Plant Based) 20% Infusion: 153 mL    IV PiggyBack: 200 mL    sodium chloride 0.9%.: 600 mL    TPN (Total Parenteral Nutrition): 913 mL  Total IN: 1866 mL    OUT:    Bulb: 130 mL    Ileostomy: 90 mL    Nasoenteral Tube: 150 mL    Voided: 500 mL  Total OUT: 870 mL    Total NET: 996 mL          ** PHYSICAL EXAM **    -- CONSTITUTIONAL: Alert, NAD  -- PULMONARY: non-labored respirations  -- ABDOMEN: soft, moderately-distended, non-tender; Mucous fistula with bowel sweat, ostomy pink/viable w/ liq stool and gas, LLQ SOSA with dark sanguinous output, no rebound or guarding      ** LABS **                          13.0   5.19  )-----------( 114      ( 22 Jan 2019 05:30 )             39.0     23 Jan 2019 05:27    135    |  100    |  22     ----------------------------<  128    4.5     |  24     |  0.58     Ca    8.6        23 Jan 2019 05:27  Phos  3.3       23 Jan 2019 05:27  Mg     2.2       23 Jan 2019 05:27        CAPILLARY BLOOD GLUCOSE      POCT Blood Glucose.: 117 mg/dL (23 Jan 2019 05:09)  POCT Blood Glucose.: 130 mg/dL (22 Jan 2019 23:19)  POCT Blood Glucose.: 107 mg/dL (22 Jan 2019 18:01)  POCT Blood Glucose.: 127 mg/dL (22 Jan 2019 12:07)                MEDICATIONS  (STANDING):  acetaminophen  IVPB .. 1000 milliGRAM(s) IV Intermittent once  acetaminophen  IVPB .. 1000 milliGRAM(s) IV Intermittent once  chlorhexidine 4% Liquid 1 Application(s) Topical two times a day  dextrose 5%. 1000 milliLiter(s) (50 mL/Hr) IV Continuous <Continuous>  dextrose 50% Injectable 12.5 Gram(s) IV Push once  dextrose 50% Injectable 25 Gram(s) IV Push once  dextrose 50% Injectable 25 Gram(s) IV Push once  enoxaparin Injectable 40 milliGRAM(s) SubCutaneous daily  fat emulsion (Fish Oil and Plant Based) 20% Infusion 17 mL/Hr (17 mL/Hr) IV Continuous <Continuous>  fat emulsion (Fish Oil and Plant Based) 20% Infusion 17 mL/Hr (17 mL/Hr) IV Continuous <Continuous>  fat emulsion (Fish Oil and Plant Based) 20% Infusion 17 mL/Hr (17 mL/Hr) IV Continuous <Continuous>  fat emulsion (Fish Oil and Plant Based) 20% Infusion 17 mL/Hr (17 mL/Hr) IV Continuous <Continuous>  fat emulsion (Fish Oil and Plant Based) 20% Infusion 17 mL/Hr (17 mL/Hr) IV Continuous <Continuous>  fat emulsion (Fish Oil and Plant Based) 20% Infusion 17 mL/Hr (17 mL/Hr) IV Continuous <Continuous>  HYDROmorphone PCA (1 mG/mL) 30 milliLiter(s) PCA Continuous PCA Continuous  influenza   Vaccine 0.5 milliLiter(s) IntraMuscular once  insulin lispro (HumaLOG) corrective regimen sliding scale   SubCutaneous every 6 hours  metoprolol tartrate Injectable 5 milliGRAM(s) IV Push every 6 hours  Parenteral Nutrition - Adult 1 Each (83 mL/Hr) TPN Continuous <Continuous>  piperacillin/tazobactam IVPB. 3.375 Gram(s) IV Intermittent every 8 hours  sodium chloride 0.9%. 1000 milliLiter(s) (30 mL/Hr) IV Continuous <Continuous>    MEDICATIONS  (PRN):  dextrose 40% Gel 15 Gram(s) Oral once PRN Blood Glucose LESS THAN 70 milliGRAM(s)/deciliter  glucagon  Injectable 1 milliGRAM(s) IntraMuscular once PRN Glucose LESS THAN 70 milligrams/deciliter  HYDROmorphone PCA (1 mG/mL) Rescue Clinician Bolus 0.5 milliGRAM(s) IV Push every 15 minutes PRN for Pain Scale GREATER THAN 6  naloxone Injectable 0.1 milliGRAM(s) IV Push every 3 minutes PRN For ANY of the following changes in patient status:  A. RR LESS THAN 10 breaths per minute, B. Oxygen saturation LESS THAN 90%, C. Sedation score of 6  ondansetron Injectable 4 milliGRAM(s) IV Push every 6 hours PRN Nausea

## 2019-01-23 NOTE — PROGRESS NOTE ADULT - SUBJECTIVE AND OBJECTIVE BOX
Anesthesia Pain Management Service    SUBJECTIVE: Pt doing well with IV PCA without problems reported.    Therapy:	  [ X] IV PCA	   [ ] Epidural           [ ] s/p Spinal Opoid              [ ] Postpartum infusion	  [ ] Patient controlled regional anesthesia (PCRA)    [ ] prn Analgesics    Allergies    No Known Allergies    Intolerances      MEDICATIONS  (STANDING):  acetaminophen  IVPB .. 1000 milliGRAM(s) IV Intermittent once  chlorhexidine 4% Liquid 1 Application(s) Topical two times a day  dextrose 5%. 1000 milliLiter(s) (50 mL/Hr) IV Continuous <Continuous>  dextrose 50% Injectable 12.5 Gram(s) IV Push once  dextrose 50% Injectable 25 Gram(s) IV Push once  dextrose 50% Injectable 25 Gram(s) IV Push once  enoxaparin Injectable 40 milliGRAM(s) SubCutaneous daily  fat emulsion (Fish Oil and Plant Based) 20% Infusion 17 mL/Hr (17 mL/Hr) IV Continuous <Continuous>  fat emulsion (Fish Oil and Plant Based) 20% Infusion 17 mL/Hr (17 mL/Hr) IV Continuous <Continuous>  HYDROmorphone PCA (1 mG/mL) 30 milliLiter(s) PCA Continuous PCA Continuous  influenza   Vaccine 0.5 milliLiter(s) IntraMuscular once  insulin lispro (HumaLOG) corrective regimen sliding scale   SubCutaneous every 6 hours  metoprolol tartrate Injectable 5 milliGRAM(s) IV Push every 6 hours  Parenteral Nutrition - Adult 1 Each (83 mL/Hr) TPN Continuous <Continuous>  Parenteral Nutrition - Adult 1 Each (83 mL/Hr) TPN Continuous <Continuous>  piperacillin/tazobactam IVPB. 3.375 Gram(s) IV Intermittent every 8 hours  sodium chloride 0.9%. 1000 milliLiter(s) (30 mL/Hr) IV Continuous <Continuous>    MEDICATIONS  (PRN):  dextrose 40% Gel 15 Gram(s) Oral once PRN Blood Glucose LESS THAN 70 milliGRAM(s)/deciliter  glucagon  Injectable 1 milliGRAM(s) IntraMuscular once PRN Glucose LESS THAN 70 milligrams/deciliter  HYDROmorphone PCA (1 mG/mL) Rescue Clinician Bolus 1 milliGRAM(s) IV Push every 2 hours PRN for Pain Scale GREATER THAN 6  naloxone Injectable 0.1 milliGRAM(s) IV Push every 3 minutes PRN For ANY of the following changes in patient status:  A. RR LESS THAN 10 breaths per minute, B. Oxygen saturation LESS THAN 90%, C. Sedation score of 6  ondansetron Injectable 4 milliGRAM(s) IV Push every 6 hours PRN Nausea      OBJECTIVE:   [X] No new signs     [ ] Other:    Side Effects:  [X ] None			[ ] Other:    Assessment of Catheter Site:		[ ] Intact		[ ] Other:    ASSESSMENT/PLAN  [ X] Continue current therapy    [ ] Therapy changed to:    [ ] IV PCA       [ ] Epidural     [ ] prn Analgesics     Comments:

## 2019-01-24 LAB
ANION GAP SERPL CALC-SCNC: 11 MMO/L — SIGNIFICANT CHANGE UP (ref 7–14)
BUN SERPL-MCNC: 16 MG/DL — SIGNIFICANT CHANGE UP (ref 7–23)
CA-I BLD-SCNC: 1.09 MMOL/L — SIGNIFICANT CHANGE UP (ref 1.03–1.23)
CALCIUM SERPL-MCNC: 8.5 MG/DL — SIGNIFICANT CHANGE UP (ref 8.4–10.5)
CHLORIDE SERPL-SCNC: 97 MMOL/L — LOW (ref 98–107)
CO2 SERPL-SCNC: 24 MMOL/L — SIGNIFICANT CHANGE UP (ref 22–31)
CREAT SERPL-MCNC: 0.52 MG/DL — SIGNIFICANT CHANGE UP (ref 0.5–1.3)
GLUCOSE BLDC GLUCOMTR-MCNC: 113 MG/DL — HIGH (ref 70–99)
GLUCOSE BLDC GLUCOMTR-MCNC: 123 MG/DL — HIGH (ref 70–99)
GLUCOSE BLDC GLUCOMTR-MCNC: 99 MG/DL — SIGNIFICANT CHANGE UP (ref 70–99)
GLUCOSE SERPL-MCNC: 127 MG/DL — HIGH (ref 70–99)
MAGNESIUM SERPL-MCNC: 1.9 MG/DL — SIGNIFICANT CHANGE UP (ref 1.6–2.6)
PHOSPHATE SERPL-MCNC: 2.6 MG/DL — SIGNIFICANT CHANGE UP (ref 2.5–4.5)
POTASSIUM SERPL-MCNC: 3.9 MMOL/L — SIGNIFICANT CHANGE UP (ref 3.5–5.3)
POTASSIUM SERPL-SCNC: 3.9 MMOL/L — SIGNIFICANT CHANGE UP (ref 3.5–5.3)
SODIUM SERPL-SCNC: 132 MMOL/L — LOW (ref 135–145)
SURGICAL PATHOLOGY STUDY: SIGNIFICANT CHANGE UP

## 2019-01-24 PROCEDURE — 99233 SBSQ HOSP IP/OBS HIGH 50: CPT

## 2019-01-24 PROCEDURE — 74018 RADEX ABDOMEN 1 VIEW: CPT | Mod: 26

## 2019-01-24 RX ORDER — ELECTROLYTE SOLUTION,INJ
1 VIAL (ML) INTRAVENOUS
Qty: 0 | Refills: 0 | Status: DISCONTINUED | OUTPATIENT
Start: 2019-01-24 | End: 2019-01-24

## 2019-01-24 RX ORDER — ACETAMINOPHEN 500 MG
1000 TABLET ORAL ONCE
Qty: 0 | Refills: 0 | Status: COMPLETED | OUTPATIENT
Start: 2019-01-24 | End: 2019-01-25

## 2019-01-24 RX ORDER — HYDROMORPHONE HYDROCHLORIDE 2 MG/ML
1 INJECTION INTRAMUSCULAR; INTRAVENOUS; SUBCUTANEOUS ONCE
Qty: 0 | Refills: 0 | Status: DISCONTINUED | OUTPATIENT
Start: 2019-01-24 | End: 2019-01-25

## 2019-01-24 RX ORDER — HYDROMORPHONE HYDROCHLORIDE 2 MG/ML
0.5 INJECTION INTRAMUSCULAR; INTRAVENOUS; SUBCUTANEOUS
Qty: 0 | Refills: 0 | Status: DISCONTINUED | OUTPATIENT
Start: 2019-01-24 | End: 2019-01-25

## 2019-01-24 RX ORDER — I.V. FAT EMULSION 20 G/100ML
17 EMULSION INTRAVENOUS
Qty: 40 | Refills: 0 | Status: DISCONTINUED | OUTPATIENT
Start: 2019-01-24 | End: 2019-01-25

## 2019-01-24 RX ORDER — PIPERACILLIN AND TAZOBACTAM 4; .5 G/20ML; G/20ML
4.5 INJECTION, POWDER, LYOPHILIZED, FOR SOLUTION INTRAVENOUS
Qty: 94.5 | Refills: 0 | OUTPATIENT
Start: 2019-01-24 | End: 2019-01-30

## 2019-01-24 RX ORDER — ELECTROLYTE SOLUTION,INJ
1 VIAL (ML) INTRAVENOUS
Qty: 0 | Refills: 0 | Status: DISCONTINUED | OUTPATIENT
Start: 2019-01-24 | End: 2019-01-25

## 2019-01-24 RX ORDER — HYDROMORPHONE HYDROCHLORIDE 2 MG/ML
0.5 INJECTION INTRAMUSCULAR; INTRAVENOUS; SUBCUTANEOUS EVERY 4 HOURS
Qty: 0 | Refills: 0 | Status: DISCONTINUED | OUTPATIENT
Start: 2019-01-24 | End: 2019-01-24

## 2019-01-24 RX ORDER — FENTANYL CITRATE 50 UG/ML
1 INJECTION INTRAVENOUS
Qty: 0 | Refills: 0 | Status: DISCONTINUED | OUTPATIENT
Start: 2019-01-24 | End: 2019-01-25

## 2019-01-24 RX ORDER — PIPERACILLIN AND TAZOBACTAM 4; .5 G/20ML; G/20ML
4.5 INJECTION, POWDER, LYOPHILIZED, FOR SOLUTION INTRAVENOUS
Qty: 135 | Refills: 0 | OUTPATIENT
Start: 2019-01-24 | End: 2019-02-02

## 2019-01-24 RX ADMIN — PIPERACILLIN AND TAZOBACTAM 25 GRAM(S): 4; .5 INJECTION, POWDER, LYOPHILIZED, FOR SOLUTION INTRAVENOUS at 05:12

## 2019-01-24 RX ADMIN — Medication 1 EACH: at 17:16

## 2019-01-24 RX ADMIN — CHLORHEXIDINE GLUCONATE 1 APPLICATION(S): 213 SOLUTION TOPICAL at 18:05

## 2019-01-24 RX ADMIN — Medication 5 MILLIGRAM(S): at 12:00

## 2019-01-24 RX ADMIN — Medication 5 MILLIGRAM(S): at 17:17

## 2019-01-24 RX ADMIN — ENOXAPARIN SODIUM 40 MILLIGRAM(S): 100 INJECTION SUBCUTANEOUS at 12:00

## 2019-01-24 RX ADMIN — HYDROMORPHONE HYDROCHLORIDE 30 MILLILITER(S): 2 INJECTION INTRAMUSCULAR; INTRAVENOUS; SUBCUTANEOUS at 07:17

## 2019-01-24 RX ADMIN — CHLORHEXIDINE GLUCONATE 1 APPLICATION(S): 213 SOLUTION TOPICAL at 05:12

## 2019-01-24 RX ADMIN — PIPERACILLIN AND TAZOBACTAM 25 GRAM(S): 4; .5 INJECTION, POWDER, LYOPHILIZED, FOR SOLUTION INTRAVENOUS at 11:58

## 2019-01-24 RX ADMIN — I.V. FAT EMULSION 17 ML/HR: 20 EMULSION INTRAVENOUS at 17:17

## 2019-01-24 RX ADMIN — FENTANYL CITRATE 1 PATCH: 50 INJECTION INTRAVENOUS at 18:04

## 2019-01-24 RX ADMIN — PIPERACILLIN AND TAZOBACTAM 25 GRAM(S): 4; .5 INJECTION, POWDER, LYOPHILIZED, FOR SOLUTION INTRAVENOUS at 19:58

## 2019-01-24 RX ADMIN — HYDROMORPHONE HYDROCHLORIDE 0.5 MILLIGRAM(S): 2 INJECTION INTRAMUSCULAR; INTRAVENOUS; SUBCUTANEOUS at 21:50

## 2019-01-24 RX ADMIN — Medication 5 MILLIGRAM(S): at 05:12

## 2019-01-24 NOTE — PROGRESS NOTE ADULT - SUBJECTIVE AND OBJECTIVE BOX
NUTRITION NOTE  [id number] TOM  ===============================    Interval events, no acute events overnight     VITAL SIGNS:  T(C): 37.2 (01-24-19 @ 12:09), Max: 37.2 (01-23-19 @ 23:34)  HR: 91 (01-24-19 @ 12:09) (77 - 91)  BP: 137/79 (01-24-19 @ 12:09) (130/74 - 144/77)  ABP: --  ABP(mean): --  RR: 28 (01-24-19 @ 12:09) (18 - 28)  SpO2: 97% (01-24-19 @ 12:09) (96% - 98%)  CVP(mm Hg): --  01-23 @ 07:01  -  01-24 @ 07:00  --------------------------------------------------------  IN: 1760 mL / OUT: 2680 mL / NET: -920 mL    01-24 @ 07:01  -  01-24 @ 12:36  --------------------------------------------------------  IN: 595 mL / OUT: 630 mL / NET: -35 mL      Glucose 113    ** PHYSICAL EXAM **    -- CONSTITUTIONAL: Alert, NAD  -- PULMONARY: non-labored respirations  -- ABDOMEN: soft, moderately-distended, non-tender; Mucous fistula with bowel sweat, ostomy pink/viable w/ liq stool and gas, LLQ SOSA with dark sanguinous output, no rebound or guarding      Metabolic/FLUIDS/ELECTROLYTES/NUTRITION:  Gastrointestinal Medications:  dextrose 5%. 1000 milliLiter(s) IV Continuous <Continuous>  fat emulsion (Fish Oil and Plant Based) 20% Infusion 17 mL/Hr IV Continuous <Continuous>  fat emulsion (Fish Oil and Plant Based) 20% Infusion 17 mL/Hr IV Continuous <Continuous>  Parenteral Nutrition - Adult 1 Each TPN Continuous <Continuous>  Parenteral Nutrition - Adult 1 Each TPN Continuous <Continuous>  sodium chloride 0.9%. 1000 milliLiter(s) IV Continuous <Continuous>    I&O's Detail  63y  Daily   01-24    132<L>  |  97<L>  |  16  ----------------------------<  127<H>  3.9   |  24  |  0.52    Ca    8.5      24 Jan 2019 06:20  Phos  2.6     01-24  Mg     1.9     01-24      Diet: NPO     INFECTIOUS DISEASE:  Antimicrobials/Immunologic Medications:  influenza   Vaccine 0.5 milliLiter(s) IntraMuscular once  piperacillin/tazobactam IVPB. 3.375 Gram(s) IV Intermittent every 8 hours      OTHER MEDICATIONS:  Endocrine/Metabolic Medications:  dextrose 40% Gel 15 Gram(s) Oral once PRN  dextrose 50% Injectable 12.5 Gram(s) IV Push once  dextrose 50% Injectable 25 Gram(s) IV Push once  dextrose 50% Injectable 25 Gram(s) IV Push once  glucagon  Injectable 1 milliGRAM(s) IntraMuscular once PRN  insulin lispro (HumaLOG) corrective regimen sliding scale   SubCutaneous every 6 hours    Genitourinary Medications:    Topical/Other Medications:  chlorhexidine 4% Liquid 1 Application(s) Topical two times a day  naloxone Injectable 0.1 milliGRAM(s) IV Push every 3 minutes PRN    ASSESSMENT/PLAN:   63y Male s/p exploratory laparotomy, 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 transitioned from sicu to floor     TPN cycled over 12 hrs with a total volume 1500cc   preparation for D/C home tomorrow per primary team       1. Protein calorie malnutrition being optimized with TPN: CHO [ ] gm.  AA [ ] gm. Lipids [ ] gm.  2.  Hyperglycemia managed with: [ ] units of regular insulin    3.  Check fluid balance daily.  Strict I/O  [ ] [ ]   4.  Daily BMP, Ionized Calcium, Magnesium and Phosphorous   5.  Triglycerides at initiation of TPN and monthly [ ] [ ]   6.  Pepcid in TPN for Gi prophylaxis  [ ]

## 2019-01-24 NOTE — PROGRESS NOTE ADULT - PROBLEM SELECTOR PLAN 2
- S/p right hemicolectomy with ileostomy and micafungin  - C/w Zosyn per ID recs  - Postoperative pain well controlled by PCA  - If patient tolerates PO, may transition from PCA to long acting PO meds

## 2019-01-24 NOTE — PROGRESS NOTE ADULT - ASSESSMENT
63M pmh HTN, HLD with history of gastric adenocarcinoma s/p total gastrectomy and Vickie en Y esophagojejunostomy with Dr Espinal at [facility] Dec 2017 c/b early postop SBO s/p lap SHAR, treated with adjuvant chemo and radiation who was transferred to [facility] on 1/15/19 for esophageal dilatation, c/b pneumoperitoneum from ischemic colon with colonic perforation, feculent peritonitis, and recurrent carcinomatosis s/p right hemicolectomy with ileostomy.

## 2019-01-24 NOTE — PROGRESS NOTE ADULT - SUBJECTIVE AND OBJECTIVE BOX
General Surgery Progress Note    SUBJECTIVE:  The patient was seen and examined. No acute events overnight.     OBJECTIVE:     ** VITAL SIGNS / I&O's **    Vital Signs Last 24 Hrs  T(C): 37.2 (23 Jan 2019 23:34), Max: 37.2 (23 Jan 2019 23:34)  T(F): 99 (23 Jan 2019 23:34), Max: 99 (23 Jan 2019 23:34)  HR: 84 (23 Jan 2019 23:34) (79 - 90)  BP: 137/78 (23 Jan 2019 23:34) (130/74 - 153/87)  BP(mean): --  RR: 18 (23 Jan 2019 23:34) (18 - 18)  SpO2: 96% (23 Jan 2019 23:34) (96% - 99%)      22 Jan 2019 07:01  -  23 Jan 2019 07:00  --------------------------------------------------------  IN:    fat emulsion (Fish Oil and Plant Based) 20% Infusion: 153 mL    IV PiggyBack: 200 mL    sodium chloride 0.9%.: 600 mL    TPN (Total Parenteral Nutrition): 913 mL  Total IN: 1866 mL    OUT:    Bulb: 130 mL    Ileostomy: 90 mL    Nasoenteral Tube: 150 mL    Voided: 500 mL  Total OUT: 870 mL    Total NET: 996 mL      23 Jan 2019 07:01  -  24 Jan 2019 00:05  --------------------------------------------------------  IN:    fat emulsion (Fish Oil and Plant Based) 20% Infusion: 85 mL    IV PiggyBack: 100 mL    sodium chloride 0.9%.: 150 mL    TPN (Total Parenteral Nutrition): 415 mL  Total IN: 750 mL    OUT:    Bulb: 95 mL    Drain: 5 mL    Ileostomy: 700 mL    Nasoenteral Tube: 100 mL    Voided: 1100 mL  Total OUT: 2000 mL    Total NET: -1250 mL          ** PHYSICAL EXAM **    -- CONSTITUTIONAL: Alert, NAD  -- PULMONARY: non-labored respirations  -- ABDOMEN: soft, non-distended, non-tender  -- NEURO: A&Ox3    ** LABS **                          13.2   8.27  )-----------( 90       ( 23 Jan 2019 08:49 )             40.0     23 Jan 2019 05:27    135    |  100    |  22     ----------------------------<  128    4.5     |  24     |  0.58     Ca    8.6        23 Jan 2019 05:27  Phos  3.3       23 Jan 2019 05:27  Mg     2.2       23 Jan 2019 05:27        CAPILLARY BLOOD GLUCOSE      POCT Blood Glucose.: 125 mg/dL (23 Jan 2019 23:33)  POCT Blood Glucose.: 122 mg/dL (23 Jan 2019 22:03)  POCT Blood Glucose.: 98 mg/dL (23 Jan 2019 17:41)  POCT Blood Glucose.: 117 mg/dL (23 Jan 2019 05:09)                MEDICATIONS  (STANDING):  acetaminophen  IVPB .. 1000 milliGRAM(s) IV Intermittent once  chlorhexidine 4% Liquid 1 Application(s) Topical two times a day  dextrose 5%. 1000 milliLiter(s) (50 mL/Hr) IV Continuous <Continuous>  dextrose 50% Injectable 12.5 Gram(s) IV Push once  dextrose 50% Injectable 25 Gram(s) IV Push once  dextrose 50% Injectable 25 Gram(s) IV Push once  enoxaparin Injectable 40 milliGRAM(s) SubCutaneous daily  fat emulsion (Fish Oil and Plant Based) 20% Infusion 17 mL/Hr (17 mL/Hr) IV Continuous <Continuous>  fat emulsion (Fish Oil and Plant Based) 20% Infusion 17 mL/Hr (17 mL/Hr) IV Continuous <Continuous>  HYDROmorphone PCA (1 mG/mL) 30 milliLiter(s) PCA Continuous PCA Continuous  influenza   Vaccine 0.5 milliLiter(s) IntraMuscular once  insulin lispro (HumaLOG) corrective regimen sliding scale   SubCutaneous every 6 hours  metoprolol tartrate Injectable 5 milliGRAM(s) IV Push every 6 hours  Parenteral Nutrition - Adult 1 Each (83 mL/Hr) TPN Continuous <Continuous>  piperacillin/tazobactam IVPB. 3.375 Gram(s) IV Intermittent every 8 hours  sodium chloride 0.9%. 1000 milliLiter(s) (30 mL/Hr) IV Continuous <Continuous>    MEDICATIONS  (PRN):  dextrose 40% Gel 15 Gram(s) Oral once PRN Blood Glucose LESS THAN 70 milliGRAM(s)/deciliter  glucagon  Injectable 1 milliGRAM(s) IntraMuscular once PRN Glucose LESS THAN 70 milligrams/deciliter  HYDROmorphone PCA (1 mG/mL) Rescue Clinician Bolus 0.5 milliGRAM(s) IV Push every 2 hours PRN for Pain Scale GREATER THAN 6  naloxone Injectable 0.1 milliGRAM(s) IV Push every 3 minutes PRN For ANY of the following changes in patient status:  A. RR LESS THAN 10 breaths per minute, B. Oxygen saturation LESS THAN 90%, C. Sedation score of 6  ondansetron Injectable 4 milliGRAM(s) IV Push every 6 hours PRN Nausea General Surgery Progress Note    SUBJECTIVE:  The patient was seen and examined. No acute events overnight. Denies n/v, cp, sob, abd pain. Pain well controlled.  UGI series yesterday prelim read showed no leaks.    OBJECTIVE:     ** VITAL SIGNS / I&O's **    Vital Signs Last 24 Hrs  T(C): 37.2 (23 Jan 2019 23:34), Max: 37.2 (23 Jan 2019 23:34)  T(F): 99 (23 Jan 2019 23:34), Max: 99 (23 Jan 2019 23:34)  HR: 84 (23 Jan 2019 23:34) (79 - 90)  BP: 137/78 (23 Jan 2019 23:34) (130/74 - 153/87)  BP(mean): --  RR: 18 (23 Jan 2019 23:34) (18 - 18)  SpO2: 96% (23 Jan 2019 23:34) (96% - 99%)      22 Jan 2019 07:01  -  23 Jan 2019 07:00  --------------------------------------------------------  IN:    fat emulsion (Fish Oil and Plant Based) 20% Infusion: 153 mL    IV PiggyBack: 200 mL    sodium chloride 0.9%.: 600 mL    TPN (Total Parenteral Nutrition): 913 mL  Total IN: 1866 mL    OUT:    Bulb: 130 mL    Ileostomy: 90 mL    Nasoenteral Tube: 150 mL    Voided: 500 mL  Total OUT: 870 mL    Total NET: 996 mL      23 Jan 2019 07:01  -  24 Jan 2019 00:05  --------------------------------------------------------  IN:    fat emulsion (Fish Oil and Plant Based) 20% Infusion: 85 mL    IV PiggyBack: 100 mL    sodium chloride 0.9%.: 150 mL    TPN (Total Parenteral Nutrition): 415 mL  Total IN: 750 mL    OUT:    Bulb: 95 mL    Drain: 5 mL    Ileostomy: 700 mL    Nasoenteral Tube: 100 mL    Voided: 1100 mL  Total OUT: 2000 mL    Total NET: -1250 mL          ** PHYSICAL EXAM **    -- CONSTITUTIONAL: Alert, NAD  -- PULMONARY: non-labored respirations  -- ABDOMEN: soft, moderately-distended, non-tender; Mucous fistula with bowel sweat, ostomy pink/viable w/ liq stool and gas, LLQ SOSA with dark sanguinous output, no rebound or guarding    ** LABS **                          13.2   8.27  )-----------( 90       ( 23 Jan 2019 08:49 )             40.0     23 Jan 2019 05:27    135    |  100    |  22     ----------------------------<  128    4.5     |  24     |  0.58     Ca    8.6        23 Jan 2019 05:27  Phos  3.3       23 Jan 2019 05:27  Mg     2.2       23 Jan 2019 05:27        CAPILLARY BLOOD GLUCOSE      POCT Blood Glucose.: 125 mg/dL (23 Jan 2019 23:33)  POCT Blood Glucose.: 122 mg/dL (23 Jan 2019 22:03)  POCT Blood Glucose.: 98 mg/dL (23 Jan 2019 17:41)  POCT Blood Glucose.: 117 mg/dL (23 Jan 2019 05:09)                MEDICATIONS  (STANDING):  acetaminophen  IVPB .. 1000 milliGRAM(s) IV Intermittent once  chlorhexidine 4% Liquid 1 Application(s) Topical two times a day  dextrose 5%. 1000 milliLiter(s) (50 mL/Hr) IV Continuous <Continuous>  dextrose 50% Injectable 12.5 Gram(s) IV Push once  dextrose 50% Injectable 25 Gram(s) IV Push once  dextrose 50% Injectable 25 Gram(s) IV Push once  enoxaparin Injectable 40 milliGRAM(s) SubCutaneous daily  fat emulsion (Fish Oil and Plant Based) 20% Infusion 17 mL/Hr (17 mL/Hr) IV Continuous <Continuous>  fat emulsion (Fish Oil and Plant Based) 20% Infusion 17 mL/Hr (17 mL/Hr) IV Continuous <Continuous>  HYDROmorphone PCA (1 mG/mL) 30 milliLiter(s) PCA Continuous PCA Continuous  influenza   Vaccine 0.5 milliLiter(s) IntraMuscular once  insulin lispro (HumaLOG) corrective regimen sliding scale   SubCutaneous every 6 hours  metoprolol tartrate Injectable 5 milliGRAM(s) IV Push every 6 hours  Parenteral Nutrition - Adult 1 Each (83 mL/Hr) TPN Continuous <Continuous>  piperacillin/tazobactam IVPB. 3.375 Gram(s) IV Intermittent every 8 hours  sodium chloride 0.9%. 1000 milliLiter(s) (30 mL/Hr) IV Continuous <Continuous>    MEDICATIONS  (PRN):  dextrose 40% Gel 15 Gram(s) Oral once PRN Blood Glucose LESS THAN 70 milliGRAM(s)/deciliter  glucagon  Injectable 1 milliGRAM(s) IntraMuscular once PRN Glucose LESS THAN 70 milligrams/deciliter  HYDROmorphone PCA (1 mG/mL) Rescue Clinician Bolus 0.5 milliGRAM(s) IV Push every 2 hours PRN for Pain Scale GREATER THAN 6  naloxone Injectable 0.1 milliGRAM(s) IV Push every 3 minutes PRN For ANY of the following changes in patient status:  A. RR LESS THAN 10 breaths per minute, B. Oxygen saturation LESS THAN 90%, C. Sedation score of 6  ondansetron Injectable 4 milliGRAM(s) IV Push every 6 hours PRN Nausea

## 2019-01-24 NOTE — PROGRESS NOTE ADULT - PROBLEM SELECTOR PLAN 5
- Spoke to son (Jed [phone]), wife (Gisella, [facility], [phone]) and surgery team (pager [phone]) yesterday  - Patient and wife are now aware of cancer recurrence, since patient was already suspicious of cancer progression beforehand and reports he would like to be involved for decision-making.   - Patient is agreeable to further chemo if indicated  - Await swallow study results. If he tolerates PO, then may transition PCA to long acting PO meds and also transition from TPN to PO feeds.  - Will continue to follow up

## 2019-01-24 NOTE — PROGRESS NOTE ADULT - SUBJECTIVE AND OBJECTIVE BOX
ROGER SANTOS             MRN-[id number]    CC: Poor PO intake    HPI:  63M pmh HTN, HLD with history of gastric adenocarcinoma s/p total gastrectomy and Vickie en Y esophagojejunostomy with Dr Espinal at [facility] Dec 2017 c/b early postop SBO s/p lap SHAR, treated with adjuvant chemo and radiation. Pt now transferred from [place] after a ~20 day admission with symptoms of PO intolerance. 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 He underwent endoscopic dilation as well as stent placement, which have not alleviated his symptoms. Pt reports vomiting any PO intake greater than ice chips. States that he has been on TPN during the  Denies nausea at baseline, denies pain. (15 [place]on 2019 02:46)    SUBJECTIVE: Patient was seen and examined today. He reports he's doing well. Reports stable mild abdominal pain, but denies SOB or chest pain.    OTHER REVIEW OF SYSTEMS: Negative  UNABLE TO OBTAIN  due to:     PEx:  T(C): 36.7 (01-24-19 @ 09:04), Max: 37.2 (01-23-19 @ 23:34)  HR: 77 (01-24-19 @ 09:04) (77 - 90)  BP: 139/76 (01-24-19 @ 09:04) (130/74 - 144/77)  RR: 20 (01-24-19 @ 09:04) (18 - 20)  SpO2: 96% (01-24-19 @ 09:04) (96% - 99%)  Wt(kg): --    GENERAL: NAD  HEENT: sclera clear bilaterally, has NG tube intact for suctioning  NECK: Supple  CHEST/LUNG: CTAB anteriorly, no wheezing or rales, nonlabored breathing  HEART: RRR, S1 and S2, no murmurs  ABDOMEN: Soft, mildly tender on palpation, no rebound or guarding, has ileostomy bag intact.  EXTREMITIES: no peripheral edema  PSYCH: Appropriate mood and cooperative  NEUROLOGY: non-focal  SKIN: No rashes      	     ALLERGIES: No Known Allergies      OPIATE NAÏVE (Y/N):    MEDICATIONS: REVIEWED  MEDICATIONS  (STANDING):  acetaminophen  IVPB .. 1000 milliGRAM(s) IV Intermittent once  chlorhexidine 4% Liquid 1 Application(s) Topical two times a day  dextrose 5%. 1000 milliLiter(s) (50 mL/Hr) IV Continuous <Continuous>  dextrose 50% Injectable 12.5 Gram(s) IV Push once  dextrose 50% Injectable 25 Gram(s) IV Push once  dextrose 50% Injectable 25 Gram(s) IV Push once  enoxaparin Injectable 40 milliGRAM(s) SubCutaneous daily  fat emulsion (Fish Oil and Plant Based) 20% Infusion 17 mL/Hr (17 mL/Hr) IV Continuous <Continuous>  fat emulsion (Fish Oil and Plant Based) 20% Infusion 17 mL/Hr (17 mL/Hr) IV Continuous <Continuous>  fat emulsion (Fish Oil and Plant Based) 20% Infusion 17 mL/Hr (17 mL/Hr) IV Continuous <Continuous>  HYDROmorphone PCA (1 mG/mL) 30 milliLiter(s) PCA Continuous PCA Continuous  influenza   Vaccine 0.5 milliLiter(s) IntraMuscular once  insulin lispro (HumaLOG) corrective regimen sliding scale   SubCutaneous every 6 hours  metoprolol tartrate Injectable 5 milliGRAM(s) IV Push every 6 hours  Parenteral Nutrition - Adult 1 Each (83 mL/Hr) TPN Continuous <Continuous>  Parenteral Nutrition - Adult 1 Each TPN Continuous <Continuous>  piperacillin/tazobactam IVPB. 3.375 Gram(s) IV Intermittent every 8 hours  sodium chloride 0.9%. 1000 milliLiter(s) (30 mL/Hr) IV Continuous <Continuous>    MEDICATIONS  (PRN):  dextrose 40% Gel 15 Gram(s) Oral once PRN Blood Glucose LESS THAN 70 milliGRAM(s)/deciliter  glucagon  Injectable 1 milliGRAM(s) IntraMuscular once PRN Glucose LESS THAN 70 milligrams/deciliter  HYDROmorphone PCA (1 mG/mL) Rescue Clinician Bolus 0.5 milliGRAM(s) IV Push every 2 hours PRN for Pain Scale GREATER THAN 6  naloxone Injectable 0.1 milliGRAM(s) IV Push every 3 minutes PRN For ANY of the following changes in patient status:  A. RR LESS THAN 10 breaths per minute, B. Oxygen saturation LESS THAN 90%, C. Sedation score of 6  ondansetron Injectable 4 milliGRAM(s) IV Push every 6 hours PRN Nausea      LABS: REVIEWED        IMAGING: REVIEWED    ADVANCED DIRECTIVES:     FULL CODE     DNR     DNI     LIVING WILL     MOLST    DECISION MAKER:   LEGAL SURROGATE:    PSYCHOSOCIAL-SPIRITUAL ASSESSMENT:       Reviewed       Care plan unchanged       Care plan adjusted as above	    GOALS OF CARE DISCUSSION       Palliative care info/counseling provided	           Family meeting       Advanced Directives addressed please see Advance Care Planning Note	           See previous Palliative Medicine Note       Documentation of GOC:   	      AGENCY CHOICE DISCUSSED:           Homecare        Hospice        [facility]        Other:    REFERRALS	        Palliative Med        Unit SW/Case Mgmt              Speech/Swallow       Patient/Family Support       Massage Therapy       Music Therapy       Hospice       Nutrition       Ethics       PT/OT        CRITICAL CARE TIME PROVIDED TO UNSTABLE PT W/ ORGAN FAILURE	   Start:               End:  	       Minutes:              > 50% OF THE TIME SPENT IN COUNSELING AND COORDINATING CARE 	   Start:               End:  	       Minutes:      PROLONGED SERVICE             FACE TO FACE:    PT            PT & FAMILY	   Start:               End:  	       Minutes:      Advance Care Planning Time: ROGER SANTOS             [id number]    CC: Poor PO intake    Palliative Care following for complex symptom management and goals of care.     HPI:  63M pmh HTN, HLD with history of gastric adenocarcinoma s/p total gastrectomy and Vickie en Y [facility] c/b early postop SBO s/p lap SHAR, treated with adjuvant chemo and radiation. Pt now transferred from [place] after a ~20 day admission with symptoms of PO intolerance. 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 He underwent endoscopic dilation as well as stent placement, which have not alleviated his symptoms. Pt reports vomiting any PO intake greater than ice chips. States that he has been on TPN during the  Denies nausea at baseline, denies pain. (15 [place]on 2019 02:46)    SUBJECTIVE: Patient was seen and examined today. He reports he's doing well. Reports stable mild abdominal pain, but denies SOB or chest pain.    OTHER REVIEW OF SYSTEMS: Negative  UNABLE TO OBTAIN  due to:     PEx:  T(C): 36.7 (01-24-19 @ 09:04), Max: 37.2 (01-23-19 @ 23:34)  HR: 77 (01-24-19 @ 09:04) (77 - 90)  BP: 139/76 (01-24-19 @ 09:04) (130/74 - 144/77)  RR: 20 (01-24-19 @ 09:04) (18 - 20)  SpO2: 96% (01-24-19 @ 09:04) (96% - 99%)  Wt(kg): --    GENERAL: NAD  HEENT: sclera clear bilaterally, has NG tube intact for suctioning  NECK: Supple  CHEST/LUNG: CTAB anteriorly, no wheezing or rales, nonlabored breathing  HEART: RRR, S1 and S2, no murmurs  ABDOMEN: Soft, mildly tender on palpation, no rebound or guarding, has ileostomy bag intact.  EXTREMITIES: no peripheral edema  PSYCH: Appropriate mood and cooperative  NEUROLOGY: non-focal  SKIN: No rashes      	   ALLERGIES: No Known Allergies    OPIATE NAÏVE (Y/N): n    MEDICATIONS: REVIEWED  MEDICATIONS  (STANDING):  acetaminophen  IVPB .. 1000 milliGRAM(s) IV Intermittent once  chlorhexidine 4% Liquid 1 Application(s) Topical two times a day  dextrose 5%. 1000 milliLiter(s) (50 mL/Hr) IV Continuous <Continuous>  dextrose 50% Injectable 12.5 Gram(s) IV Push once  dextrose 50% Injectable 25 Gram(s) IV Push once  dextrose 50% Injectable 25 Gram(s) IV Push once  enoxaparin Injectable 40 milliGRAM(s) SubCutaneous daily  fat emulsion (Fish Oil and Plant Based) 20% Infusion 17 mL/Hr (17 mL/Hr) IV Continuous <Continuous>  fat emulsion (Fish Oil and Plant Based) 20% Infusion 17 mL/Hr (17 mL/Hr) IV Continuous <Continuous>  fat emulsion (Fish Oil and Plant Based) 20% Infusion 17 mL/Hr (17 mL/Hr) IV Continuous <Continuous>  HYDROmorphone PCA (1 mG/mL) 30 milliLiter(s) PCA Continuous PCA Continuous  influenza   Vaccine 0.5 milliLiter(s) IntraMuscular once  insulin lispro (HumaLOG) corrective regimen sliding scale   SubCutaneous every 6 hours  metoprolol tartrate Injectable 5 milliGRAM(s) IV Push every 6 hours  Parenteral Nutrition - Adult 1 Each (83 mL/Hr) TPN Continuous <Continuous>  Parenteral Nutrition - Adult 1 Each TPN Continuous <Continuous>  piperacillin/tazobactam IVPB. 3.375 Gram(s) IV Intermittent every 8 hours  sodium chloride 0.9%. 1000 milliLiter(s) (30 mL/Hr) IV Continuous <Continuous>    MEDICATIONS  (PRN):  dextrose 40% Gel 15 Gram(s) Oral once PRN Blood Glucose LESS THAN 70 milliGRAM(s)/deciliter  glucagon  Injectable 1 milliGRAM(s) IntraMuscular once PRN Glucose LESS THAN 70 milligrams/deciliter  HYDROmorphone PCA (1 mG/mL) Rescue Clinician Bolus 0.5 milliGRAM(s) IV Push every 2 hours PRN for Pain Scale GREATER THAN 6  naloxone Injectable 0.1 milliGRAM(s) IV Push every 3 minutes PRN For ANY of the following changes in patient status:  A. RR LESS THAN 10 breaths per minute, B. Oxygen saturation LESS THAN 90%, C. Sedation score of 6  ondansetron Injectable 4 milliGRAM(s) IV Push every 6 hours PRN Nausea    LABS: REVIEWED    IMAGING: REVIEWED    ADVANCED DIRECTIVES:     FULL CODE

## 2019-01-24 NOTE — PROGRESS NOTE ADULT - ASSESSMENT
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 transitioned from sicu to floor     Plan:  - Pain control as needed  - NPO with TPN, NGT  - f/u UGI swallow study, if official read states no leaks, may d/c NGT  - monitor ostomy function  - lovenox for DVT ppx  - oob/ambulate as tolerated  - appreciate ID recs: complete 14day course of zosyn  - appreciate palliative recs  - appreciate pt's medical oncologist (Dr. Chin) recs: recommends pt to see him in office immediately after d/c  - PT  - dispo planning

## 2019-01-24 NOTE — PROGRESS NOTE ADULT - PROBLEM SELECTOR PLAN 1
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  - Await outpatient oncology, Dr. Caio Chin, for his recs. After discharge, patient needs to follow up with oncology as outpatient.

## 2019-01-24 NOTE — PROGRESS NOTE ADULT - PROBLEM SELECTOR PLAN 4
- Functionally independent and AO x3 at baseline  - Significant weight loss and mild functional decompensation, however patient still able to OOB to chair and cognitive status at baseline per son  - Physical therapy following.

## 2019-01-24 NOTE — PROGRESS NOTE ADULT - PROBLEM SELECTOR PLAN 3
- Controlled. With current PCA usage, received 6.8mg of dilaudid previously in 24hrs. Would recommend to start continuous dosing at 0.2mg/hr and continue with 0.2mg demand, lockout 15 min, CB at 0.5mg q2hrs PRN. Please page for uncontrolled symptoms.  - Once pt is able to tolerate PO, can change pain regimen to oral regimen. - Controlled. Continue PCA continuous dosing at 0.2mg/hr and continue with 0.2mg demand, lockout 15 min, CB at 0.5mg q2hrs PRN. Please page for uncontrolled symptoms.  - Once pt is able to tolerate PO, can change pain regimen to oral regimen.

## 2019-01-25 DIAGNOSIS — R18.8 OTHER ASCITES: ICD-10-CM

## 2019-01-25 LAB
ALBUMIN SERPL ELPH-MCNC: 2.3 G/DL — LOW (ref 3.3–5)
ALP SERPL-CCNC: 159 U/L — HIGH (ref 40–120)
ALT FLD-CCNC: 82 U/L — HIGH (ref 4–41)
ANION GAP SERPL CALC-SCNC: 12 MMO/L — SIGNIFICANT CHANGE UP (ref 7–14)
ANION GAP SERPL CALC-SCNC: 14 MMO/L — SIGNIFICANT CHANGE UP (ref 7–14)
ANION GAP SERPL CALC-SCNC: 14 MMO/L — SIGNIFICANT CHANGE UP (ref 7–14)
APTT BLD: 29.2 SEC — SIGNIFICANT CHANGE UP (ref 27.5–36.3)
APTT BLD: 31.7 SEC — SIGNIFICANT CHANGE UP (ref 27.5–36.3)
APTT BLD: 32 SEC — SIGNIFICANT CHANGE UP (ref 27.5–36.3)
AST SERPL-CCNC: 82 U/L — HIGH (ref 4–40)
BASE EXCESS BLDA CALC-SCNC: -3 MMOL/L — SIGNIFICANT CHANGE UP
BILIRUB SERPL-MCNC: 2.8 MG/DL — HIGH (ref 0.2–1.2)
BLD GP AB SCN SERPL QL: NEGATIVE — SIGNIFICANT CHANGE UP
BUN SERPL-MCNC: 28 MG/DL — HIGH (ref 7–23)
BUN SERPL-MCNC: 29 MG/DL — HIGH (ref 7–23)
BUN SERPL-MCNC: 30 MG/DL — HIGH (ref 7–23)
CALCIUM SERPL-MCNC: 8 MG/DL — LOW (ref 8.4–10.5)
CALCIUM SERPL-MCNC: 8.3 MG/DL — LOW (ref 8.4–10.5)
CALCIUM SERPL-MCNC: 8.5 MG/DL — SIGNIFICANT CHANGE UP (ref 8.4–10.5)
CHLORIDE BLDA-SCNC: 101 MMOL/L — SIGNIFICANT CHANGE UP (ref 96–108)
CHLORIDE SERPL-SCNC: 97 MMOL/L — LOW (ref 98–107)
CHLORIDE SERPL-SCNC: 98 MMOL/L — SIGNIFICANT CHANGE UP (ref 98–107)
CHLORIDE SERPL-SCNC: 98 MMOL/L — SIGNIFICANT CHANGE UP (ref 98–107)
CO2 SERPL-SCNC: 18 MMOL/L — LOW (ref 22–31)
CO2 SERPL-SCNC: 23 MMOL/L — SIGNIFICANT CHANGE UP (ref 22–31)
CO2 SERPL-SCNC: 24 MMOL/L — SIGNIFICANT CHANGE UP (ref 22–31)
CREAT SERPL-MCNC: 0.64 MG/DL — SIGNIFICANT CHANGE UP (ref 0.5–1.3)
CREAT SERPL-MCNC: 0.68 MG/DL — SIGNIFICANT CHANGE UP (ref 0.5–1.3)
CREAT SERPL-MCNC: 0.69 MG/DL — SIGNIFICANT CHANGE UP (ref 0.5–1.3)
GLUCOSE BLDA-MCNC: 269 MG/DL — HIGH (ref 70–99)
GLUCOSE BLDC GLUCOMTR-MCNC: 161 MG/DL — HIGH (ref 70–99)
GLUCOSE BLDC GLUCOMTR-MCNC: 259 MG/DL — HIGH (ref 70–99)
GLUCOSE BLDC GLUCOMTR-MCNC: 99 MG/DL — SIGNIFICANT CHANGE UP (ref 70–99)
GLUCOSE BLDC GLUCOMTR-MCNC: 99 MG/DL — SIGNIFICANT CHANGE UP (ref 70–99)
GLUCOSE SERPL-MCNC: 104 MG/DL — HIGH (ref 70–99)
GLUCOSE SERPL-MCNC: 276 MG/DL — HIGH (ref 70–99)
GLUCOSE SERPL-MCNC: 88 MG/DL — SIGNIFICANT CHANGE UP (ref 70–99)
GRAM STN SPEC: SIGNIFICANT CHANGE UP
GRAM STN WND: SIGNIFICANT CHANGE UP
HCO3 BLDA-SCNC: 23 MMOL/L — SIGNIFICANT CHANGE UP (ref 22–26)
HCT VFR BLD CALC: 19.3 % — CRITICAL LOW (ref 39–50)
HCT VFR BLD CALC: 24.5 % — LOW (ref 39–50)
HCT VFR BLD CALC: 24.8 % — LOW (ref 39–50)
HCT VFR BLDA CALC: 26.4 % — LOW (ref 39–51)
HGB BLD-MCNC: 6.5 G/DL — CRITICAL LOW (ref 13–17)
HGB BLD-MCNC: 8.3 G/DL — LOW (ref 13–17)
HGB BLD-MCNC: 8.5 G/DL — LOW (ref 13–17)
HGB BLDA-MCNC: 8.5 G/DL — LOW (ref 13–17)
INR BLD: 1.41 — HIGH (ref 0.88–1.17)
INR BLD: 1.52 — HIGH (ref 0.88–1.17)
INR BLD: 1.6 — HIGH (ref 0.88–1.17)
LACTATE BLDA-SCNC: 3.8 MMOL/L — HIGH (ref 0.5–2)
MAGNESIUM SERPL-MCNC: 2 MG/DL — SIGNIFICANT CHANGE UP (ref 1.6–2.6)
MAGNESIUM SERPL-MCNC: 2 MG/DL — SIGNIFICANT CHANGE UP (ref 1.6–2.6)
MCHC RBC-ENTMCNC: 29.3 PG — SIGNIFICANT CHANGE UP (ref 27–34)
MCHC RBC-ENTMCNC: 29.9 PG — SIGNIFICANT CHANGE UP (ref 27–34)
MCHC RBC-ENTMCNC: 30.2 PG — SIGNIFICANT CHANGE UP (ref 27–34)
MCHC RBC-ENTMCNC: 33.7 % — SIGNIFICANT CHANGE UP (ref 32–36)
MCHC RBC-ENTMCNC: 33.9 % — SIGNIFICANT CHANGE UP (ref 32–36)
MCHC RBC-ENTMCNC: 34.3 % — SIGNIFICANT CHANGE UP (ref 32–36)
MCV RBC AUTO: 86.9 FL — SIGNIFICANT CHANGE UP (ref 80–100)
MCV RBC AUTO: 87.3 FL — SIGNIFICANT CHANGE UP (ref 80–100)
MCV RBC AUTO: 89.1 FL — SIGNIFICANT CHANGE UP (ref 80–100)
NRBC # FLD: 0 K/UL — LOW (ref 25–125)
PCO2 BLDA: 23 MMHG — LOW (ref 35–48)
PH BLDA: 7.53 PH — HIGH (ref 7.35–7.45)
PHOSPHATE SERPL-MCNC: 2.9 MG/DL — SIGNIFICANT CHANGE UP (ref 2.5–4.5)
PHOSPHATE SERPL-MCNC: 4 MG/DL — SIGNIFICANT CHANGE UP (ref 2.5–4.5)
PLATELET # BLD AUTO: 104 K/UL — LOW (ref 150–400)
PLATELET # BLD AUTO: 112 K/UL — LOW (ref 150–400)
PLATELET # BLD AUTO: 114 K/UL — LOW (ref 150–400)
PMV BLD: 11.2 FL — SIGNIFICANT CHANGE UP (ref 7–13)
PMV BLD: 11.7 FL — SIGNIFICANT CHANGE UP (ref 7–13)
PMV BLD: SIGNIFICANT CHANGE UP FL (ref 7–13)
PO2 BLDA: 119 MMHG — HIGH (ref 83–108)
POTASSIUM BLDA-SCNC: 3.2 MMOL/L — LOW (ref 3.4–4.5)
POTASSIUM SERPL-MCNC: 3.4 MMOL/L — LOW (ref 3.5–5.3)
POTASSIUM SERPL-MCNC: 4.1 MMOL/L — SIGNIFICANT CHANGE UP (ref 3.5–5.3)
POTASSIUM SERPL-MCNC: 4.1 MMOL/L — SIGNIFICANT CHANGE UP (ref 3.5–5.3)
POTASSIUM SERPL-SCNC: 3.4 MMOL/L — LOW (ref 3.5–5.3)
POTASSIUM SERPL-SCNC: 4.1 MMOL/L — SIGNIFICANT CHANGE UP (ref 3.5–5.3)
POTASSIUM SERPL-SCNC: 4.1 MMOL/L — SIGNIFICANT CHANGE UP (ref 3.5–5.3)
PROT SERPL-MCNC: 5.1 G/DL — LOW (ref 6–8.3)
PROTHROM AB SERPL-ACNC: 16.2 SEC — HIGH (ref 9.8–13.1)
PROTHROM AB SERPL-ACNC: 17.1 SEC — HIGH (ref 9.8–13.1)
PROTHROM AB SERPL-ACNC: 18.5 SEC — HIGH (ref 9.8–13.1)
RBC # BLD: 2.22 M/UL — LOW (ref 4.2–5.8)
RBC # BLD: 2.75 M/UL — LOW (ref 4.2–5.8)
RBC # BLD: 2.84 M/UL — LOW (ref 4.2–5.8)
RBC # FLD: 14.4 % — SIGNIFICANT CHANGE UP (ref 10.3–14.5)
RBC # FLD: 14.6 % — HIGH (ref 10.3–14.5)
RBC # FLD: SIGNIFICANT CHANGE UP % (ref 10.3–14.5)
RH IG SCN BLD-IMP: POSITIVE — SIGNIFICANT CHANGE UP
SAO2 % BLDA: 98.6 % — SIGNIFICANT CHANGE UP (ref 95–99)
SODIUM BLDA-SCNC: 128 MMOL/L — LOW (ref 136–146)
SODIUM SERPL-SCNC: 130 MMOL/L — LOW (ref 135–145)
SODIUM SERPL-SCNC: 134 MMOL/L — LOW (ref 135–145)
SODIUM SERPL-SCNC: 134 MMOL/L — LOW (ref 135–145)
SPECIMEN SOURCE: SIGNIFICANT CHANGE UP
SPECIMEN SOURCE: SIGNIFICANT CHANGE UP
TROPONIN T, HIGH SENSITIVITY: 9 NG/L — SIGNIFICANT CHANGE UP (ref ?–14)
WBC # BLD: 12.74 K/UL — HIGH (ref 3.8–10.5)
WBC # BLD: 15.43 K/UL — HIGH (ref 3.8–10.5)
WBC # BLD: 19.26 K/UL — HIGH (ref 3.8–10.5)
WBC # FLD AUTO: 12.74 K/UL — HIGH (ref 3.8–10.5)
WBC # FLD AUTO: 15.43 K/UL — HIGH (ref 3.8–10.5)
WBC # FLD AUTO: 19.26 K/UL — HIGH (ref 3.8–10.5)

## 2019-01-25 PROCEDURE — 49406 IMAGE CATH FLUID PERI/RETRO: CPT

## 2019-01-25 PROCEDURE — 74177 CT ABD & PELVIS W/CONTRAST: CPT | Mod: 26

## 2019-01-25 PROCEDURE — 99497 ADVNCD CARE PLAN 30 MIN: CPT | Mod: 25

## 2019-01-25 PROCEDURE — 99232 SBSQ HOSP IP/OBS MODERATE 35: CPT

## 2019-01-25 PROCEDURE — 70450 CT HEAD/BRAIN W/O DYE: CPT | Mod: 26

## 2019-01-25 PROCEDURE — 71045 X-RAY EXAM CHEST 1 VIEW: CPT | Mod: 26

## 2019-01-25 PROCEDURE — 99233 SBSQ HOSP IP/OBS HIGH 50: CPT

## 2019-01-25 PROCEDURE — 99498 ADVNCD CARE PLAN ADDL 30 MIN: CPT | Mod: 25

## 2019-01-25 PROCEDURE — 71275 CT ANGIOGRAPHY CHEST: CPT | Mod: 26

## 2019-01-25 PROCEDURE — 99291 CRITICAL CARE FIRST HOUR: CPT

## 2019-01-25 RX ORDER — LANOLIN ALCOHOL/MO/W.PET/CERES
3 CREAM (GRAM) TOPICAL AT BEDTIME
Qty: 0 | Refills: 0 | Status: DISCONTINUED | OUTPATIENT
Start: 2019-01-25 | End: 2019-02-01

## 2019-01-25 RX ORDER — CHLORHEXIDINE GLUCONATE 213 G/1000ML
1 SOLUTION TOPICAL
Qty: 0 | Refills: 0 | Status: DISCONTINUED | OUTPATIENT
Start: 2019-01-25 | End: 2019-02-01

## 2019-01-25 RX ORDER — SODIUM CHLORIDE 9 MG/ML
1000 INJECTION, SOLUTION INTRAVENOUS ONCE
Qty: 0 | Refills: 0 | Status: COMPLETED | OUTPATIENT
Start: 2019-01-25 | End: 2019-01-25

## 2019-01-25 RX ORDER — SODIUM CHLORIDE 9 MG/ML
1000 INJECTION, SOLUTION INTRAVENOUS
Qty: 0 | Refills: 0 | Status: DISCONTINUED | OUTPATIENT
Start: 2019-01-25 | End: 2019-01-27

## 2019-01-25 RX ORDER — ONDANSETRON 8 MG/1
4 TABLET, FILM COATED ORAL EVERY 6 HOURS
Qty: 0 | Refills: 0 | Status: DISCONTINUED | OUTPATIENT
Start: 2019-01-25 | End: 2019-02-01

## 2019-01-25 RX ORDER — HYDROMORPHONE HYDROCHLORIDE 2 MG/ML
30 INJECTION INTRAMUSCULAR; INTRAVENOUS; SUBCUTANEOUS
Qty: 0 | Refills: 0 | Status: DISCONTINUED | OUTPATIENT
Start: 2019-01-25 | End: 2019-02-01

## 2019-01-25 RX ORDER — HYDROMORPHONE HYDROCHLORIDE 2 MG/ML
0.5 INJECTION INTRAMUSCULAR; INTRAVENOUS; SUBCUTANEOUS
Qty: 0 | Refills: 0 | Status: DISCONTINUED | OUTPATIENT
Start: 2019-01-25 | End: 2019-02-01

## 2019-01-25 RX ADMIN — PIPERACILLIN AND TAZOBACTAM 25 GRAM(S): 4; .5 INJECTION, POWDER, LYOPHILIZED, FOR SOLUTION INTRAVENOUS at 20:40

## 2019-01-25 RX ADMIN — Medication 1000 MILLIGRAM(S): at 04:32

## 2019-01-25 RX ADMIN — HYDROMORPHONE HYDROCHLORIDE 0.5 MILLIGRAM(S): 2 INJECTION INTRAMUSCULAR; INTRAVENOUS; SUBCUTANEOUS at 09:17

## 2019-01-25 RX ADMIN — ENOXAPARIN SODIUM 40 MILLIGRAM(S): 100 INJECTION SUBCUTANEOUS at 11:51

## 2019-01-25 RX ADMIN — HYDROMORPHONE HYDROCHLORIDE 0.5 MILLIGRAM(S): 2 INJECTION INTRAMUSCULAR; INTRAVENOUS; SUBCUTANEOUS at 09:00

## 2019-01-25 RX ADMIN — Medication 6: at 00:52

## 2019-01-25 RX ADMIN — PIPERACILLIN AND TAZOBACTAM 25 GRAM(S): 4; .5 INJECTION, POWDER, LYOPHILIZED, FOR SOLUTION INTRAVENOUS at 04:23

## 2019-01-25 RX ADMIN — SODIUM CHLORIDE 75 MILLILITER(S): 9 INJECTION, SOLUTION INTRAVENOUS at 10:51

## 2019-01-25 RX ADMIN — SODIUM CHLORIDE 30 MILLILITER(S): 9 INJECTION INTRAMUSCULAR; INTRAVENOUS; SUBCUTANEOUS at 08:18

## 2019-01-25 RX ADMIN — PIPERACILLIN AND TAZOBACTAM 25 GRAM(S): 4; .5 INJECTION, POWDER, LYOPHILIZED, FOR SOLUTION INTRAVENOUS at 11:51

## 2019-01-25 RX ADMIN — Medication 400 MILLIGRAM(S): at 04:22

## 2019-01-25 RX ADMIN — SODIUM CHLORIDE 1000 MILLILITER(S): 9 INJECTION, SOLUTION INTRAVENOUS at 04:32

## 2019-01-25 RX ADMIN — CHLORHEXIDINE GLUCONATE 1 APPLICATION(S): 213 SOLUTION TOPICAL at 05:00

## 2019-01-25 RX ADMIN — Medication 2: at 05:59

## 2019-01-25 RX ADMIN — HYDROMORPHONE HYDROCHLORIDE 30 MILLILITER(S): 2 INJECTION INTRAMUSCULAR; INTRAVENOUS; SUBCUTANEOUS at 19:08

## 2019-01-25 RX ADMIN — FENTANYL CITRATE 1 PATCH: 50 INJECTION INTRAVENOUS at 19:09

## 2019-01-25 RX ADMIN — HYDROMORPHONE HYDROCHLORIDE 30 MILLILITER(S): 2 INJECTION INTRAMUSCULAR; INTRAVENOUS; SUBCUTANEOUS at 14:54

## 2019-01-25 RX ADMIN — FENTANYL CITRATE 1 PATCH: 50 INJECTION INTRAVENOUS at 06:29

## 2019-01-25 NOTE — PROGRESS NOTE ADULT - ASSESSMENT
3 year old male with worsening abdominal pain, tachycardia, mild hypotension, and hypothermia concerning for sepsis in setting of recent feculent contamination, perforated viscus from tumor recurrence. worsening  abdominal pain, hypotension SOB, not hypoxics responsive somewhat to fluids,     IV hydration  Continue ABX  CXR  Repeat labs, CBC, CMP, lactate, troponin  Consider  imaging  EKG  DVT/GI prophylaxis  Transfuse as needed  Palliative following but pt so far is full code  Will transfer to SICU when bed available. 3 year old male with worsening abdominal pain, tachycardia, mild hypotension, and hypothermia concerning for sepsis in setting of recent feculent contamination, perforated viscus from tumor recurrence. worsening  abdominal pain, hypotension SOB, not hypoxics responsive somewhat to fluids,     IV hydration  Continue ABX  CXR  Repeat labs, CBC, CMP, lactate, troponin  Consider  imaging, CTH  EKG  DVT/GI prophylaxis  Transfuse as needed  Palliative following but pt so far is full code  Will transfer to SICU when bed available.

## 2019-01-25 NOTE — PROGRESS NOTE ADULT - ASSESSMENT
ASSESSMENT:  63y Male with history of gastric adenocarcinoma s/p total gastrectomy and Kelby en Y esophagojejunostomy presented from [facility] for stricture at EJ anastomosis s/p EGD c/b pneumoperitoneum post-procedure s/p ex-lap right hemicolectomy, end ileostomy, mucus fistula. Patient is previously known to SICU team and has been reconsulted for hemodynamic monitoring s/p hypotensive event overnight, downtrending Hct and tachypnea. Palliative care following.     PLAN:   Neurologic: Patient is A&Ox3, in moderate pain  -pain control with PCA morphine, palliative care following     Respiratory: Tachypneic on physical exam, saturating well, non-hypoxic  -supplemental O2 as needed    Cardiovascular: stable hemodynamically for whole day in SICU  -ct IVF hydration  -monitor BP in SICU  -hold lopressor 5mg q6h IV in setting of hypotension    Gastrointestinal/Nutrition:   TPN stopped as PICC line was violated with contrast injection, pt now has opted for pleasure feeds and TPN is discontinued   -IR guided drain placement to drain pelvic collections  -PO feeds once back from procedure     Renal/Genitourinary:     -Monitor urine output, replete lytes    Hematologic: Hct downtrending over the past 48hrs  -CT A/P to evaluate for active bleeding  -Will transfuse if Hb 7.0, unless actively bleeding  -lovenox ppx  -Repeat CBC/coags    Infectious Disease: ID following, recs appreciated  -continue with Zosyn - consider d/c tomorrow    Endocrine: FS with ISS    Disposition: SICU for hemodynamic monitoring    -Palliative care following, GOC discussion ongoing, currently full code    Diagnosis: Respiratory abnormalities, severe caloric nutrition ASSESSMENT:  63y Male with history of gastric adenocarcinoma s/p total gastrectomy and Kelby en Y esophagojejunostomy presented from [facility] at [facility] for stricture at EJ anastomosis s/p EGD c/b pneumoperitoneum post-procedure s/p ex-lap right hemicolectomy, end ileostomy, mucus fistula. Patient is previously known to SICU team and has been reconsulted for hemodynamic monitoring s/p hypotensive event overnight, downtrending Hct and tachypnea. Palliative care following.     PLAN:   Neurologic: Patient is A&Ox3, in moderate pain  -pain control with PCA morphine, palliative care following     Respiratory: Tachypneic on physical exam, saturating well, non-hypoxic  -supplemental O2 as needed    Cardiovascular: stable hemodynamically for whole day in SICU  -ct IVF hydration with LR  -hemodynamic monitoring as per routine   -hold lopressor 5mg q6h IV in setting of hypotension    Gastrointestinal/Nutrition:   TPN stopped as PICC line was violated with contrast injection, pt now has opted for pleasure feeds and TPN is discontinued   -IR guided drain placement to drain pelvic collections  -PO feeds once back from procedure   -Ct A/P positive for ascites, collection and leak, pt has opted for comfort care at this point, no surgical intervention    Renal/Genitourinary:   -Monitor urine output, and BMP, replete lytes as needed     Hematologic:   H/H stable  -lovenox ppx      Infectious Disease: ID following, recs appreciated  -continue with Zosyn - consider d/c tomorrow    Endocrine: FS with ISS    Disposition: SICU for hemodynamic monitoring    -Palliative care following, GOC discussion ongoing, currently full code    Diagnosis: Respiratory abnormalities, severe caloric nutrition

## 2019-01-25 NOTE — PROGRESS NOTE ADULT - PROBLEM SELECTOR PLAN 3
- Uncontrolled with fentanyl patch and dilaudid IVP.  - D/c fentanyl patch   - Need to place a new PICC line on Monday 1/28 and resume PCA. - Uncontrolled with fentanyl patch and Dilaudid IVP PRN.  - D/c fentanyl patch   - Need to place a new PICC line on Monday 1/28 and resume PCA.  - Resume PCA at 0.2mg/hr continuous, 0.2mg demands, 15 min lockout, 0.5mg q2hrs PRN clinician bolus.   - Palliative Care managing PCA please page for any issues [phone]

## 2019-01-25 NOTE — PROGRESS NOTE ADULT - SUBJECTIVE AND OBJECTIVE BOX
TOM ROGER 63y [id number]    Patient is a 63y old  Male who presents with a chief complaint of Bowel Obstruction (25 Jan 2019 13:05)      Follow Up/CC:  ID following for peritonitis    Interval History/ROS: transferred to SICU for hypotension, no fever    Allergies    No Known Allergies    Intolerances        ANTIMICROBIALS:  piperacillin/tazobactam IVPB. 3.375 every 8 hours      MEDICATIONS  (STANDING):  acetaminophen  IVPB .. 1000 milliGRAM(s) IV Intermittent once  chlorhexidine 4% Liquid 1 Application(s) Topical two times a day  dextrose 5%. 1000 milliLiter(s) (50 mL/Hr) IV Continuous <Continuous>  dextrose 50% Injectable 12.5 Gram(s) IV Push once  dextrose 50% Injectable 25 Gram(s) IV Push once  dextrose 50% Injectable 25 Gram(s) IV Push once  enoxaparin Injectable 40 milliGRAM(s) SubCutaneous daily  fat emulsion (Fish Oil and Plant Based) 20% Infusion 17 mL/Hr (17 mL/Hr) IV Continuous <Continuous>  fat emulsion (Fish Oil and Plant Based) 20% Infusion 17 mL/Hr (17 mL/Hr) IV Continuous <Continuous>  HYDROmorphone  Injectable 1 milliGRAM(s) IV Push once  HYDROmorphone PCA (1 mG/mL) 30 milliLiter(s) PCA Continuous PCA Continuous  influenza   Vaccine 0.5 milliLiter(s) IntraMuscular once  insulin lispro (HumaLOG) corrective regimen sliding scale   SubCutaneous every 6 hours  lactated ringers. 1000 milliLiter(s) (75 mL/Hr) IV Continuous <Continuous>  melatonin 3 milliGRAM(s) Oral at bedtime  Parenteral Nutrition - Adult 1 Each TPN Continuous <Continuous>  piperacillin/tazobactam IVPB. 3.375 Gram(s) IV Intermittent every 8 hours    MEDICATIONS  (PRN):  dextrose 40% Gel 15 Gram(s) Oral once PRN Blood Glucose LESS THAN 70 milliGRAM(s)/deciliter  glucagon  Injectable 1 milliGRAM(s) IntraMuscular once PRN Glucose LESS THAN 70 milligrams/deciliter  HYDROmorphone  Injectable 0.5 milliGRAM(s) IV Push every 3 hours PRN mild and moderate pain  HYDROmorphone PCA (1 mG/mL) Rescue Clinician Bolus 0.5 milliGRAM(s) IV Push every 2 hours PRN for Pain Scale GREATER THAN 6  naloxone Injectable 0.1 milliGRAM(s) IV Push every 3 minutes PRN For ANY of the following changes in patient status:  A. RR LESS THAN 10 breaths per minute, B. Oxygen saturation LESS THAN 90%, C. Sedation score of 6  ondansetron Injectable 4 milliGRAM(s) IV Push every 6 hours PRN Nausea        Vital Signs Last 24 Hrs  T(C): 35.7 (25 Jan 2019 12:00), Max: 36.9 (24 Jan 2019 20:20)  T(F): 96.3 (25 Jan 2019 12:00), Max: 98.4 (24 Jan 2019 20:20)  HR: 104 (25 Jan 2019 15:05) (85 - 123)  BP: 107/74 (25 Jan 2019 13:00) (90/53 - 128/81)  BP(mean): 84 (25 Jan 2019 08:56) (84 - 90)  RR: 24 (25 Jan 2019 15:05) (19 - 30)  SpO2: 97% (25 Jan 2019 15:05) (97% - 100%)    CBC Full  -  ( 25 Jan 2019 10:30 )  WBC Count : 15.43 K/uL  Hemoglobin : 8.3 g/dL  Hematocrit : 24.5 %  Platelet Count - Automated : 104 K/uL  Mean Cell Volume : 89.1 fL  Mean Cell Hemoglobin : 30.2 pg  Mean Cell Hemoglobin Concentration : 33.9 %  Auto Neutrophil # : x  Auto Lymphocyte # : x  Auto Monocyte # : x  Auto Eosinophil # : x  Auto Basophil # : x  Auto Neutrophil % : x  Auto Lymphocyte % : x  Auto Monocyte % : x  Auto Eosinophil % : x  Auto Basophil % : x    01-25    134<L>  |  97<L>  |  30<H>  ----------------------------<  104<H>  4.1   |  23  |  0.69    Ca    8.3<L>      25 Jan 2019 10:30  Phos  2.9     01-25  Mg     2.0     01-25            MICROBIOLOGY:  INCISION  01-25-19 --  --  --      SURGERY  01-18-19 --  --  Pseudomonas aeruginosa  Escherichia coli      RADIOLOGY    < from: CT Abdomen and Pelvis w/ IV Cont (01.25.19 @ 07:43) >  No pulmonary embolism.    Large heterogeneously dense ascites, new from 1/17/2019, concerning for   hemoperitoneum.    Small pneumoperitoneum, suggestive of bowel perforation/anastomotic leak.    A large peripherally enhancing fluid collection in the rectovesical space   measures 9.7 cm in greatest dimension.    Mild bowel distention diffusely, question ileus versus early or partial   obstruction.    Apparent narrowing of the right ventricular outflow tract.  Suggestion of   pulmonary artery narrowing.  Echo recommended for complete evaluation.    < end of copied text >

## 2019-01-25 NOTE — PROGRESS NOTE ADULT - ASSESSMENT
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 transitioned from sicu to floor now back to SICU (1/25) for dropping H/H, leukocytosis, and hemodynamic monitoring    Plan:  - Pain control as needed  - NPO with TPN, NGT  - f/u UGI swallow study, possibly going to re-do today  - monitor ostomy function  - lovenox for DVT ppx  - oob/ambulate as tolerated  - appreciate ID recs: complete 14day course of zosyn  - appreciate palliative recs  - appreciate pt's medical oncologist (Dr. Chin) recs: recommends pt to see him in office immediately after d/c  - PT  - appreciate SICU care    D Team Surgery  [id number]

## 2019-01-25 NOTE — PROGRESS NOTE ADULT - ASSESSMENT
ASSESSMENT:  63y Male with history of gastric adenocarcinoma s/p total gastrectomy and Kelby en Y esophagojejunostomy presented from [facility] for stricture at EJ anastomosis s/p EGD c/b pneumoperitoneum post-procedure s/p ex-lap right hemicolectomy, end ileostomy, mucus fistula. Patient is previously known to SICU team and has been reconsulted for hemodynamic monitoring s/p hypotensive event overnight, downtrending Hct and tachypnea. Palliative care following.     PLAN:   Neurologic: Patient is A&Ox3, in moderate pain  -pain control with Fentanyl patch, Dilaudid and Tylenol for breakthrough    Respiratory: Tachypneic on physical exam, saturating well, non-hypoxic  -low suspicion for PE, but will scan chest with CT A/P  -supplemental O2 as needed    Cardiovascular: hypotensive responsive to IVF bolus  -monitor BP in SICU, possibly actively bleeding  -hold lopressor in setting of hypotension    Gastrointestinal/Nutrition: Continue TPN    Renal/Genitourinary: NS at 30  -Monitor urine output, replete lytes    Hematologic: Hct downtrending over the past 48hrs  -CT A/P to evaluate for active bleeding  -Will transfuse now then to Hb 7.0, unless actively bleeding  -Hold lovenox ppx if actively bleeding    Infectious Disease: ID following, recs appreciated  -continue with Zosyn    Endocrine: FS with ISS    Disposition: SICU for hemodynamic monitoring ASSESSMENT:  63y Male with history of gastric adenocarcinoma s/p total gastrectomy and Kelby en Y esophagojejunostomy presented [facility] for stricture at EJ anastomosis s/p EGD c/b pneumoperitoneum post-procedure s/p ex-lap right hemicolectomy, end ileostomy, mucus fistula. Patient is previously known to SICU team and has been reconsulted for hemodynamic monitoring s/p hypotensive event overnight, downtrending Hct and tachypnea. Palliative care following.     PLAN:   Neurologic: Patient is A&Ox3, in moderate pain  -pain control with Fentanyl patch, Dilaudid and Tylenol for breakthrough    Respiratory: Tachypneic on physical exam, saturating well, non-hypoxic  -low suspicion for PE, but will scan chest with CT A/P  -CXR ordered additionally  -supplemental O2 as needed    Cardiovascular: hypotensive responsive to IVF bolus  -IVF hydration  -EKG  -monitor BP in SICU, possibly actively bleeding  -hold lopressor 5mg q6h IV in setting of hypotension    Gastrointestinal/Nutrition: Continue TPN    Renal/Genitourinary: NS at 30  -Monitor urine output, replete lytes    Hematologic: Hct downtrending over the past 48hrs  -CT A/P to evaluate for active bleeding  -Will transfuse now then to Hb 7.0, unless actively bleeding  -Hold lovenox ppx if actively bleeding    Infectious Disease: ID following, recs appreciated  -continue with Zosyn    Endocrine: FS with ISS    Disposition: SICU for hemodynamic monitoring    -Palliative care following, GOC discussion ongoing, currently full code ASSESSMENT:  63y Male with history of gastric adenocarcinoma s/p total gastrectomy and Kelby en Y esophagojejunostomy presented from [facility] for stricture at EJ anastomosis s/p EGD c/b pneumoperitoneum post-procedure s/p ex-lap right hemicolectomy, end ileostomy, mucus fistula. Patient is previously known to SICU team and has been reconsulted for hemodynamic monitoring s/p hypotensive event overnight, downtrending Hct and tachypnea. Palliative care following.     PLAN:   Neurologic: Patient is A&Ox3, in moderate pain  -pain control with Fentanyl patch, Dilaudid and Tylenol for breakthrough    Respiratory: Tachypneic on physical exam, saturating well, non-hypoxic  -low suspicion for PE, but will scan chest with CT A/P  -CXR ordered additionally  -supplemental O2 as needed    Cardiovascular: hypotensive responsive to IVF bolus  -IVF hydration  -EKG  -monitor BP in SICU, possibly actively bleeding  -hold lopressor 5mg q6h IV in setting of hypotension    Gastrointestinal/Nutrition: Continue TPN  -Expanding right sided Pericholic hematoma  -Speak with surgery about need for intervention as increasing retroperitoneal collection  -Will repeat small bowel series  -Will call IR to replace PICC line and determine if drainage is possible    Renal/Genitourinary: NS at 30 -> Will switch to LR maintenance fluid  -Monitor urine output, replete lytes    Hematologic: Hct downtrending over the past 48hrs  -CT A/P to evaluate for active bleeding  -Will transfuse if Hb 7.0, unless actively bleeding  -lovenox ppx  -Repeat CBC/coags    Infectious Disease: ID following, recs appreciated  -continue with Zosyn - consider d/c tomorrow    Endocrine: FS with ISS    Disposition: SICU for hemodynamic monitoring    -Palliative care following, GOC discussion ongoing, currently full code    Diagnosis: Respiratory abnormalities, severe caloric nutrition

## 2019-01-25 NOTE — PROGRESS NOTE ADULT - PROBLEM SELECTOR PLAN 5
- Spoke to wife (Giselal, HCP, [phone]) and the patient today for GOC  - Patient and wife are now aware of cancer recurrence, since patient was already suspicious of cancer progression beforehand and reports he would like to be involved for decision-making.   - Due to leakage seen in swallow study, patient and wife understand he is not a candidate for PO intake. Despite infection risk, he is now agreeable to pleasure feeds  - Patient wants to go home with home hospice for symptom management, agreeable to d/c TPN, continue PCA for pain control with new PICC line planned to be placed on Monday 1/28  - Agreeable to DNR/DNI, MOLST filled and original in chart  - Will refer to hospice team - Spoke to wife (Gisella, HCP, [phone]) and the patient today for GOC  - Due to leakage seen in swallow study, patient and wife understand he is not a candidate for PO intake. Despite infection risk, he is now agreeable to pleasure feeds  - Patient wants to go home with home hospice for symptom management, agreeable to d/c TPN, continue PCA for pain control with new PICC line planned to be placed on Monday 1/28  - Agreeable to DNR/DNI, MOLST filled and original in chart  - Will refer to hospice team

## 2019-01-25 NOTE — CHART NOTE - NSCHARTNOTEFT_GEN_A_CORE
~~~Surgical Resident Note~~~     Called to evaluate patient for labored breathing, tachypnea, hypotension, worsening abdominal pain, and increased sanguinous drainage from rufina drain. Patient is s/p total gastrectomy 12/13/18 for gastric adenocarcinoma; ex-lap lysis of adhesions 1/3/19 for bowel obstruction associated with J-J anastomosis and surgical drain; and POD#7 s/p ex-lap with R hemicolectomy and creation of end ileostomy following perforation during EGD associated with feculent peritonitis and tumor recurrence. He complains of worsening abdominal pain and states he cannot catch his breath. Feels cold, has been getting blankets and warming packs.    Vital Signs  T(C): 36.5 (01-25 @ 04:21), Max: 37.2 (01-24 @ 12:09)  HR: 123 (01-25 @ 04:21) (77 - 123)  BP: 90/53 (01-25 @ 04:21) (90/53 - 144/77)  RR: 20 (01-25 @ 04:21) (18 - 28)  SpO2: 98% (01-25 @ 04:21) (96% - 99%)  01-23-19 @ 07:01  -  01-24-19 @ 07:00  --------------------------------------------------------  IN: 1760 mL / OUT: 2680 mL / NET: -920 mL    01-24-19 @ 07:01 - 01-25-19 @ 04:26  --------------------------------------------------------  IN: 595 mL / OUT: 1970 mL / NET: -1375 mL    Gen: alert and uncomfortable, NG tube in place  Resp: clear, tachypneic, mildly labored  CVS: regular, tachycardic (112 on palpation)  Abd: moderately distended, tender R>L without rebound; voluntary guarding to RUQ  Incision: C/D/I staples; rufina drain with sanguinous output  Ostomy/mucus fistula: pink, viable  Lower Extremities: nontender bilaterally    A/P: 63 year old male with worsening abdominal pain, tachycardia, mild hypotension, and hypothermia concerning for sepsis in setting of recent feculent contamination, perforated viscus from tumor recurrence.    - labs drawn, including ABG and lactate  - request chest x ray for increased work of breathing, not hypoxic, less likely PE  - bolused 1L crystalloid for high ileostomy output    Seen and examined with Dr. Posadas (chief resident)  --ITALO Arzola, PGY-4 ~~~Surgical Resident Note~~~     Called to evaluate patient for labored breathing, tachypnea, hypotension, worsening abdominal pain, and increased sanguinous drainage from rufina drain. Patient is s/p total gastrectomy 12/13/18 for gastric adenocarcinoma; ex-lap lysis of adhesions 1/3/19 for bowel obstruction associated with J-J anastomosis and surgical drain; and POD#7 s/p ex-lap with R hemicolectomy and creation of end ileostomy following perforation during EGD associated with feculent peritonitis and tumor recurrence. He complains of worsening abdominal pain and states he cannot catch his breath. Feels cold, has been getting blankets and warming packs.    Vital Signs  T(C): 36.5 (01-25 @ 04:21), Max: 37.2 (01-24 @ 12:09)  HR: 123 (01-25 @ 04:21) (77 - 123)  BP: 90/53 (01-25 @ 04:21) (90/53 - 144/77)  RR: 20 (01-25 @ 04:21) (18 - 28)  SpO2: 98% (01-25 @ 04:21) (96% - 99%)  01-23-19 @ 07:01  -  01-24-19 @ 07:00  --------------------------------------------------------  IN: 1760 mL / OUT: 2680 mL / NET: -920 mL    01-24-19 @ 07:01 - 01-25-19 @ 04:26  --------------------------------------------------------  IN: 595 mL / OUT: 1970 mL / NET: -1375 mL    Gen: alert and uncomfortable, NG tube in place  Resp: clear, tachypneic, mildly labored  CVS: regular, tachycardic (112 on palpation)  Abd: moderately distended, tender R>L without rebound; voluntary guarding to RUQ  Incision: staples in place with new drainage; rufina drain with sanguinous output  Ostomy/mucus fistula: pink, viable  Lower Extremities: nontender bilaterally    A/P: 63 year old male with worsening abdominal pain, tachycardia, mild hypotension, and hypothermia concerning for sepsis in setting of recent feculent contamination, perforated viscus from tumor recurrence.    - labs drawn, including ABG and lactate  - request chest x ray for increased work of breathing, not hypoxic, less likely PE  - staples removed from midline wound to evaluate drainage  - bolused 1L crystalloid for high ileostomy output    Seen and examined with Dr. Posadas (chief resident)  --ITALO Arzola, PGY-4

## 2019-01-25 NOTE — GOALS OF CARE CONVERSATION - PERSONAL ADVANCE DIRECTIVE - CONVERSATION DETAILS
Palliative Care following for complex symptom management and goals of care. Meeting held with patient and spouse at [facility] to discuss pt's overall medical condition including discussion of pt's current goals of care.  At this time Pt and spouse are understanding that pt is not able to tolerate oral forms of nutrition. Pt has stated that he no longer wishes to pursue options for further disease modifying therapies and understands the role of TPN has changed. Pt understands that the TPN is no longer a bridge to oral nutrition. Pt states he prefers pleasure feeds understanding the risks associated as opposed to TPN. Pt wants to go home and focus on his overall quality of life and symptom management. Discussed role of hospice care and provided information to pt and spouse regarding the services provided. Pt and spouse are understanding that pt's prognosis is poor and state his goal is to spend quality time in his home. Discussed pt's wishes for advanced care planning. Pt states he would not want aggressive resuscitative measures at the time of his passing such as CPR or mechanical ventilation. Pt was amenable to complete a MOLST form to document his wishes for advanced care planning. MOLST completed and placed on pt's medical record. Pt is now a DNR/DNI. Palliative Care following for complex symptom management and goals of care. Meeting held with patient and spouse at [facility] to discuss pt's overall medical condition including discussion of pt's current goals of care.  At this time Pt and spouse are understanding that pt is not able to tolerate oral forms of nutrition. Pt has stated that he no longer wishes to pursue options for further disease modifying therapies and understands the role of TPN has changed. Pt understands that the TPN is no longer a bridge to oral nutrition. Pt states he prefers pleasure feeds understanding the risks associated as opposed to TPN. Pt wants to go home and focus on his overall quality of life and symptom management. Discussed role of hospice care and provided information to pt and spouse regarding the services provided. Pt and spouse are understanding that pt's prognosis is poor and state his goal is to spend quality time in his home. Discussed pt's wishes for advanced care planning. Pt states he would not want aggressive resuscitative measures at the time of his passing such as CPR or mechanical ventilation. Pt was amenable to complete a MOLST form to document his wishes for advanced care planning. MOLST completed and placed on pt's medical record. Pt is now a DNR/DNI.   Pt and spouse are amenable to a hospice referral. Referral made. Emotional support provided to pt and spouse regarding pt's overall poor prognosis and transition to hospice care.

## 2019-01-25 NOTE — PROGRESS NOTE ADULT - SUBJECTIVE AND OBJECTIVE BOX
SICU Daily Progress Note  =====================================================  63y Male with history of gastric adenocarcinoma s/p total gastrectomy and Kelby en Y esophagojejunostomy with Dr Espinal at [facility] Dec 2017 c/b early postop SBO s/p lap SHAR 1/3/19, treated with adjuvant chemo and radiation. Pt transferred from [place] after a ~20 day admission with symptoms of PO intolerance. 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 He underwent endoscopic dilation as well as stent placement, but the stent migrated and was subsequently removed. Pt reports vomiting any PO intake greater than ice chips. He has been on TPN for the past 6 weeks.  CT enterography was attempted but patient could not tolerate the PO contrast.     Patient underwent endoscopy at [facility] 1/18/19 to better assess strictures.  EJ anastomosis was noted to be friable and erythematous, biopsies were taken.  In PACU patient had complaints of abdominal distention and pain.  Upright CXR performed significant for pneumoperitoneum.  Nasoesophageal tube placed for decompression and SICU consulted for monitoring.  Taken to the OR that day for ex lap. About 2L of ascites evacuated. Patient was found to have a large mass encasing the EJ anastomosis that was adherent to the abdominal wall.  Patient found to have a perforation at the hepatic flexure with surrounding necrosis.  An extended right hemicolectomy was performed with end ileostomy and mucous fistula creation of transverse colon.  Patient was brought to the SICU hemodynamically stable, intubated.  In SICU patient became tachycardic and hypotensive.  Pressors started, H/H noted to have significant decrease.  SOSA placed around epigastric mass pouring out >500cc sanguinous fluid.  MTP started, R cordis placed.  Patient received 6U PRBC, 5 FFP, 1 cryo, 4 platelets. He was stabilized and downgraded to the surgical floor.    SICU team called 1/25 for tachypnea and hypotension responsive to IVF bolus overnight. Patient is in more pain than usual and feels tachypneic 2/2 pain. He midline incision site was opened at bedside and a small amount of purulence was drained. Palliative care is following. SICU accepts the patient for hemodynamic monitoring.    Allergies: No Known Allergies    MEDICATIONS:   --------------------------------------------------------------------------------------  Neurologic Medications  acetaminophen  IVPB .. 1000 milliGRAM(s) IV Intermittent once  fentaNYL   Patch  12 MICROgram(s)/Hr 1 Patch Transdermal every 72 hours  HYDROmorphone  Injectable 0.5 milliGRAM(s) IV Push every 3 hours PRN mild and moderate pain  HYDROmorphone  Injectable 1 milliGRAM(s) IV Push once  melatonin 3 milliGRAM(s) Oral at bedtime  ondansetron Injectable 4 milliGRAM(s) IV Push every 6 hours PRN Nausea    Cardiovascular Medications  metoprolol tartrate Injectable 5 milliGRAM(s) IV Push every 6 hours    Gastrointestinal Medications  dextrose 5%. 1000 milliLiter(s) IV Continuous <Continuous>  fat emulsion (Fish Oil and Plant Based) 20% Infusion 17 mL/Hr IV Continuous <Continuous>  fat emulsion (Fish Oil and Plant Based) 20% Infusion 17 mL/Hr IV Continuous <Continuous>  Parenteral Nutrition - Adult 1 Each TPN Continuous <Continuous>  sodium chloride 0.9%. 1000 milliLiter(s) IV Continuous <Continuous>    Hematologic/Oncologic Medications  enoxaparin Injectable 40 milliGRAM(s) SubCutaneous daily  influenza   Vaccine 0.5 milliLiter(s) IntraMuscular once    Antimicrobial/Immunologic Medications  piperacillin/tazobactam IVPB. 3.375 Gram(s) IV Intermittent every 8 hours    Endocrine/Metabolic Medications  dextrose 40% Gel 15 Gram(s) Oral once PRN Blood Glucose LESS THAN 70 milliGRAM(s)/deciliter  dextrose 50% Injectable 12.5 Gram(s) IV Push once  dextrose 50% Injectable 25 Gram(s) IV Push once  dextrose 50% Injectable 25 Gram(s) IV Push once  glucagon  Injectable 1 milliGRAM(s) IntraMuscular once PRN Glucose LESS THAN 70 milligrams/deciliter  insulin lispro (HumaLOG) corrective regimen sliding scale   SubCutaneous every 6 hours    Topical/Other Medications  chlorhexidine 4% Liquid 1 Application(s) Topical two times a day  naloxone Injectable 0.1 milliGRAM(s) IV Push every 3 minutes PRN For ANY of the following changes in patient status:  A. RR LESS THAN 10 breaths per minute, B. Oxygen saturation LESS THAN 90%, C. Sedation score of 6    --------------------------------------------------------------------------------------    EXAM  NEUROLOGY  RASS: 1-2  Exam: Normal, mild-moderate distress 2/2 pain, alert, oriented x3, no focal deficits      RESPIRATORY  Exam: Lungs clear to auscultation, decreased BS at bases likely a degree of splinting.    CARDIOVASCULAR  Exam: S1, S2.  Regular rate and rhythm.      GI/NUTRITION  Exam: Abdomen soft, Non-tender, mild distention/firmness, ostomy and mucus fistula are viable with no shelley blood or purulence noted. the midline incision is c/d/i, dressed with serosanguinous fluid partially soaking through. SOSA draining serosanguinous fluid.  Current Diet:  NPO on TPN    SKIN  Exam: Good skin turgor, no skin breakdown, pallor noted    METABOLIC/FLUIDS/ELECTROLYTES  dextrose 5%. 1000 milliLiter(s) IV Continuous <Continuous>  fat emulsion (Fish Oil and Plant Based) 20% Infusion 17 mL/Hr IV Continuous <Continuous>  fat emulsion (Fish Oil and Plant Based) 20% Infusion 17 mL/Hr IV Continuous <Continuous>  Parenteral Nutrition - Adult 1 Each TPN Continuous <Continuous>  sodium chloride 0.9%. 1000 milliLiter(s) IV Continuous <Continuous>      HEMATOLOGIC  [x] VTE Prophylaxis: enoxaparin Injectable 40 milliGRAM(s) SubCutaneous daily      INFECTIOUS DISEASE  Antimicrobials/Immunologic Medications:  influenza   Vaccine 0.5 milliLiter(s) IntraMuscular once  piperacillin/tazobactam IVPB. 3.375 Gram(s) IV Intermittent every 8 hours      Tubes/Lines/Drains    [] Peripheral IV  [] Central Venous Line     	[] R	[] L	[] IJ	[] Fem	[] SC	Date Placed:   [] Arterial Line		[] R	[] L	[] Fem	[] Rad	[] Ax	Date Placed:   [X] PICC		[] Midline		[] Mediport  [] Urinary Catheter		Date Placed:   [x] Necessity of urinary, arterial, and venous catheters discussed    LABS  --------------------------------------------------------------------------------------                        8.5    19.26 )-----------( 114      ( 25 Jan 2019 04:10 )             24.8     01-25    130<L>  |  98  |  29<H>  ----------------------------<  276<H>  3.4<L>   |  18<L>  |  0.64    Ca    8.0<L>      25 Jan 2019 04:10  Phos  2.9     01-25  Mg     2.0     01-25      CAPILLARY BLOOD GLUCOSE      POCT Blood Glucose.: 161 mg/dL (25 Jan 2019 05:56)  POCT Blood Glucose.: 259 mg/dL (25 Jan 2019 00:37)  POCT Blood Glucose.: 99 mg/dL (24 Jan 2019 17:29)  POCT Blood Glucose.: 113 mg/dL (24 Jan 2019 12:25)      PT/INR - ( 25 Jan 2019 05:37 )   PT: 18.5 SEC;   INR: 1.60          PTT - ( 25 Jan 2019 05:37 )  PTT:32.0 SEC  -------------------------------------------------------------------------------------- SICU Daily Progress Note  =====================================================  63y Male with history of gastric adenocarcinoma s/p total gastrectomy and Kelby en Y esophagojejunostomy with Dr Espinal at [facility] Dec 2017 c/b early postop SBO s/p lap SHAR 1/3/19, treated with adjuvant chemo and radiation. Pt transferred from [place] after a ~20 day admission with symptoms of PO intolerance. 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 He underwent endoscopic dilation as well as stent placement, but the stent migrated and was subsequently removed. Pt reports vomiting any PO intake greater than ice chips. He has been on TPN for the past 6 weeks.  CT enterography was attempted but patient could not tolerate the PO contrast.     Patient underwent endoscopy at [facility] 1/18/19 to better assess strictures.  EJ anastomosis was noted to be friable and erythematous, biopsies were taken.  In PACU patient had complaints of abdominal distention and pain.  Upright CXR performed significant for pneumoperitoneum.  Nasoesophageal tube placed for decompression and SICU consulted for monitoring.  Taken to the OR that day for ex lap. About 2L of ascites evacuated. Patient was found to have a large mass encasing the EJ anastomosis that was adherent to the abdominal wall.  Patient found to have a perforation at the hepatic flexure with surrounding necrosis.  An extended right hemicolectomy was performed with end ileostomy and mucous fistula creation of transverse colon.  Patient was brought to the SICU hemodynamically stable, intubated.  In SICU patient became tachycardic and hypotensive.  Pressors started, H/H noted to have significant decrease.  SOSA placed around epigastric mass pouring out >500cc sanguinous fluid.  MTP started, R cordis placed.  Patient received 6U PRBC, 5 FFP, 1 cryo, 4 platelets. He was stabilized and downgraded to the surgical floor.    SICU team called 1/25 for tachypnea and hypotension responsive to IVF bolus overnight. Patient is in more pain than usual and feels tachypneic 2/2 pain. He midline incision site was opened at bedside and a small amount of purulence was drained. Palliative care is following. SICU accepts the patient for hemodynamic monitoring.    PICC Line was contaminated at CT as IV contrast was put through the TPN tube    Allergies: No Known Allergies    MEDICATIONS:   --------------------------------------------------------------------------------------  Neurologic Medications  acetaminophen  IVPB .. 1000 milliGRAM(s) IV Intermittent once  fentaNYL   Patch  12 MICROgram(s)/Hr 1 Patch Transdermal every 72 hours  HYDROmorphone  Injectable 0.5 milliGRAM(s) IV Push every 3 hours PRN mild and moderate pain  HYDROmorphone  Injectable 1 milliGRAM(s) IV Push once  melatonin 3 milliGRAM(s) Oral at bedtime  ondansetron Injectable 4 milliGRAM(s) IV Push every 6 hours PRN Nausea    Cardiovascular Medications  metoprolol tartrate Injectable 5 milliGRAM(s) IV Push every 6 hours    Gastrointestinal Medications  dextrose 5%. 1000 milliLiter(s) IV Continuous <Continuous>  fat emulsion (Fish Oil and Plant Based) 20% Infusion 17 mL/Hr IV Continuous <Continuous>  fat emulsion (Fish Oil and Plant Based) 20% Infusion 17 mL/Hr IV Continuous <Continuous>  Parenteral Nutrition - Adult 1 Each TPN Continuous <Continuous>  sodium chloride 0.9%. 1000 milliLiter(s) IV Continuous <Continuous>    Hematologic/Oncologic Medications  enoxaparin Injectable 40 milliGRAM(s) SubCutaneous daily  influenza   Vaccine 0.5 milliLiter(s) IntraMuscular once    Antimicrobial/Immunologic Medications  piperacillin/tazobactam IVPB. 3.375 Gram(s) IV Intermittent every 8 hours    Endocrine/Metabolic Medications  dextrose 40% Gel 15 Gram(s) Oral once PRN Blood Glucose LESS THAN 70 milliGRAM(s)/deciliter  dextrose 50% Injectable 12.5 Gram(s) IV Push once  dextrose 50% Injectable 25 Gram(s) IV Push once  dextrose 50% Injectable 25 Gram(s) IV Push once  glucagon  Injectable 1 milliGRAM(s) IntraMuscular once PRN Glucose LESS THAN 70 milligrams/deciliter  insulin lispro (HumaLOG) corrective regimen sliding scale   SubCutaneous every 6 hours    Topical/Other Medications  chlorhexidine 4% Liquid 1 Application(s) Topical two times a day  naloxone Injectable 0.1 milliGRAM(s) IV Push every 3 minutes PRN For ANY of the following changes in patient status:  A. RR LESS THAN 10 breaths per minute, B. Oxygen saturation LESS THAN 90%, C. Sedation score of 6    --------------------------------------------------------------------------------------    EXAM  NEUROLOGY  RASS: 1-2  Exam: Normal, mild-moderate distress 2/2 pain, alert, oriented x3, no focal deficits      RESPIRATORY  Exam: Lungs clear to auscultation, decreased BS at bases likely a degree of splinting.    CARDIOVASCULAR  Exam: S1, S2.  Regular rate and rhythm.      GI/NUTRITION  Exam: Abdomen soft, Non-tender, mild distention/firmness, ostomy and mucus fistula are viable with no shelley blood or purulence noted. the midline incision is c/d/i, dressed with serosanguinous fluid partially soaking through. SOSA draining serosanguinous fluid.  Current Diet:  NPO on TPN    SKIN  Exam: Good skin turgor, no skin breakdown, pallor noted    METABOLIC/FLUIDS/ELECTROLYTES  dextrose 5%. 1000 milliLiter(s) IV Continuous <Continuous>  fat emulsion (Fish Oil and Plant Based) 20% Infusion 17 mL/Hr IV Continuous <Continuous>  fat emulsion (Fish Oil and Plant Based) 20% Infusion 17 mL/Hr IV Continuous <Continuous>  Parenteral Nutrition - Adult 1 Each TPN Continuous <Continuous>  sodium chloride 0.9%. 1000 milliLiter(s) IV Continuous <Continuous>      HEMATOLOGIC  [x] VTE Prophylaxis: enoxaparin Injectable 40 milliGRAM(s) SubCutaneous daily      INFECTIOUS DISEASE  Antimicrobials/Immunologic Medications:  influenza   Vaccine 0.5 milliLiter(s) IntraMuscular once  piperacillin/tazobactam IVPB. 3.375 Gram(s) IV Intermittent every 8 hours      Tubes/Lines/Drains    [] Peripheral IV  [] Central Venous Line     	[] R	[] L	[] IJ	[] Fem	[] SC	Date Placed:   [] Arterial Line		[] R	[] L	[] Fem	[] Rad	[] Ax	Date Placed:   [X] PICC	 (must be changed)	[] Midline		[] Mediport  [] Urinary Catheter		Date Placed:   [x] Necessity of urinary, arterial, and venous catheters discussed    LABS  --------------------------------------------------------------------------------------                        8.5    19.26 )-----------( 114      ( 25 Jan 2019 04:10 )             24.8     01-25    130<L>  |  98  |  29<H>  ----------------------------<  276<H>  3.4<L>   |  18<L>  |  0.64    Ca    8.0<L>      25 Jan 2019 04:10  Phos  2.9     01-25  Mg     2.0     01-25      CAPILLARY BLOOD GLUCOSE      POCT Blood Glucose.: 161 mg/dL (25 Jan 2019 05:56)  POCT Blood Glucose.: 259 mg/dL (25 Jan 2019 00:37)  POCT Blood Glucose.: 99 mg/dL (24 Jan 2019 17:29)  POCT Blood Glucose.: 113 mg/dL (24 Jan 2019 12:25)      PT/INR - ( 25 Jan 2019 05:37 )   PT: 18.5 SEC;   INR: 1.60          PTT - ( 25 Jan 2019 05:37 )  PTT:32.0 SEC  --------------------------------------------------------------------------------------    CTH: IMPRESSION:  No acute intracranial pathology.  No acute intracranial hemorrhage.    CTA - abdomen/pelvis: Evidence of Right sided Pericholic gutter hematoma with increasing retroperitoneal collection.

## 2019-01-25 NOTE — PROGRESS NOTE ADULT - SUBJECTIVE AND OBJECTIVE BOX
Patient is s/p total gastrectomy 12/13/18 for gastric adenocarcinoma; ex-lap lysis of adhesions 1/3/19 for bowel obstruction associated with J-J anastomosis and surgical drain; and POD#7 s/p ex-lap with R hemicolectomy and creation of end ileostomy following perforation during EGD associated with feculent peritonitis and tumor recurrence. He complains of worsening abdominal pain SOB, no hypoxia. Feels cold, no fever, has high ileostomy out put, responded to some extent to IVF bolus. Palliative following, possible Anastomotic leak on recent study .    ROS:.  [] A ten-point review of systems was otherwise negative except as noted.  Systemic:	[ ] Fever	[ ] Chills	[ ] Night sweats    [ X] Fatigue	[ ] Other  [] Cardiovascular:  [X] Pulmonary; SOB  [] Renal/Urologic:  [] Gastrointestinal: abdominal pain, vomiting  [] Metabolic:  [] Neurologic:  [] Hematologic:  [] ENT:  [] Ophthalmologic:  [] Musculoskeletal:    [ ] Due to altered mental status/intubation, subjective information were not able to be obtained from the patient. History was obtained, to the extent possible, from review of the chart and collateral sources of information.  PAST MEDICAL & SURGICAL HISTORY:  Stomach cancer  Prediabetes  HLD (hyperlipidemia)  HTN (hypertension)  Malignant neoplasm of stomach, unspecified location  S/P total gastrectomy and Kelby-en-Y esophagojejunal anastomosis  S/P total gastrectomy and Kelby-en-Y esophagojejunal anastomosis  H/O prostate biopsy  History of arthroscopy of right shoulder: repair for rotator cuff syndrome  History of arthroscopy of left knee: meniscal repair  History of appendectomy  MEDICATIONS  (STANDING):  acetaminophen  IVPB .. 1000 milliGRAM(s) IV Intermittent once  chlorhexidine 4% Liquid 1 Application(s) Topical two times a day  dextrose 5%. 1000 milliLiter(s) (50 mL/Hr) IV Continuous <Continuous>  dextrose 50% Injectable 12.5 Gram(s) IV Push once  dextrose 50% Injectable 25 Gram(s) IV Push once  dextrose 50% Injectable 25 Gram(s) IV Push once  enoxaparin Injectable 40 milliGRAM(s) SubCutaneous daily  fat emulsion (Fish Oil and Plant Based) 20% Infusion 17 mL/Hr (17 mL/Hr) IV Continuous <Continuous>  fat emulsion (Fish Oil and Plant Based) 20% Infusion 17 mL/Hr (17 mL/Hr) IV Continuous <Continuous>  fentaNYL   Patch  12 MICROgram(s)/Hr 1 Patch Transdermal every 72 hours  HYDROmorphone  Injectable 1 milliGRAM(s) IV Push once  influenza   Vaccine 0.5 milliLiter(s) IntraMuscular once  insulin lispro (HumaLOG) corrective regimen sliding scale   SubCutaneous every 6 hours  melatonin 3 milliGRAM(s) Oral at bedtime  metoprolol tartrate Injectable 5 milliGRAM(s) IV Push every 6 hours  Parenteral Nutrition - Adult 1 Each TPN Continuous <Continuous>  piperacillin/tazobactam IVPB. 3.375 Gram(s) IV Intermittent every 8 hours  sodium chloride 0.9%. 1000 milliLiter(s) (30 mL/Hr) IV Continuous <Continuous>  Vital Signs Last 24 Hrs  T(C): 36.2 (25 Jan 2019 04:56), Max: 37.2 (24 Jan 2019 12:09)  T(F): 97.1 (25 Jan 2019 04:56), Max: 99 (24 Jan 2019 12:09)  HR: 107 (25 Jan 2019 04:56) (77 - 123)  BP: 107/66 (25 Jan 2019 04:56) (90/53 - 139/76)  BP(mean): --  RR: 20 (25 Jan 2019 04:56) (20 - 28)  SpO2: 100% (25 Jan 2019 04:56) (96% - 100%)  PHYSICAL EXAM:  Constitutional: NAD, GCS: 15/15  AOX3  Eyes:  WNL  ENMT:  WNL  Neck:  WNL, non tender  Back: Non tender  Respiratory: CTABL  Cardiovascular:  S1+S2+0  Gastrointestinal: Soft, moderately distended, tender R>L without rebound; voluntary guarding to RUQIncision: staples in place with new drainage; rufina drain with sanguinous output  Ostomy/mucus fistula: pink, viable  Genitourinary:  WNL  Extremities: NV intact  Vascular:  Intact  Neurological: No focal neurological deficit,  CN, motor and sensory system grossly intact.  Skin: WNL  Musculoskeletal: WNL  Psychiatric: Grossly WNL  Labs:                          8.5    19.26 )-----------( 114      ( 25 Jan 2019 04:10 )             24.8       01-25    130<L>  |  98  |  29<H>  ----------------------------<  276<H>  3.4<L>   |  18<L>  |  0.64    Ca    8.0<L>      25 Jan 2019 04:10  Phos  2.9     01-25  Mg     2.0     01-25        PT/INR - ( 25 Jan 2019 05:37 )   PT: 18.5 SEC;   INR: 1.60          PTT - ( 25 Jan 2019 05:37 )  PTT:32.0 SEC    < from: Xray Abdomen 1 View-Supine Only (01.24.19 @ 12:00) >    Delay of contrast at the esophagojejunostomy with no evidence of leak.   Contrast subsequently passed into the proximal jejunum with suggestion of   an a mortise collection of contrast in the region of the distal   anastomosis. A leak at this level is not excluded. Patient can be brought   back for additional imaging of this region to exclude a leak, if felt   clinically appropriate. Patient is on palliative care and NPO.      < end of copied text > Patient is s/p total gastrectomy 12/13/18 for gastric adenocarcinoma; ex-lap lysis of adhesions 1/3/19 for bowel obstruction associated with J-J anastomosis and surgical drain; and POD#7 s/p ex-lap with R hemicolectomy and creation of end ileostomy following perforation during EGD associated with feculent peritonitis and tumor recurrence. He complains of worsening abdominal pain SOB, no hypoxia. Feels cold, no fever, has high ileostomy out put, responded to some extent to IVF bolus. Palliative following, possible Anastomotic leak on recent study .C.O left hand, leg numbness.    ROS:.  [X] A ten-point review of systems was otherwise negative except as noted.  Systemic:	[ ] Fever	[ ] Chills	[ ] Night sweats    [ X] Fatigue	[ ] Other  [] Cardiovascular:  [X] Pulmonary; SOB  [] Renal/Urologic:  [] Gastrointestinal: abdominal pain, vomiting  [] Metabolic:  [] Neurologic:  [] Hematologic:  [] ENT:  [] Ophthalmologic:  [] Musculoskeletal:    [ ] Due to altered mental status/intubation, subjective information were not able to be obtained from the patient. History was obtained, to the extent possible, from review of the chart and collateral sources of information.  PAST MEDICAL & SURGICAL HISTORY:  Stomach cancer  Prediabetes  HLD (hyperlipidemia)  HTN (hypertension)  Malignant neoplasm of stomach, unspecified location  S/P total gastrectomy and Kelby-en-Y esophagojejunal anastomosis  S/P total gastrectomy and Kelby-en-Y esophagojejunal anastomosis  H/O prostate biopsy  History of arthroscopy of right shoulder: repair for rotator cuff syndrome  History of arthroscopy of left knee: meniscal repair  History of appendectomy  MEDICATIONS  (STANDING):  acetaminophen  IVPB .. 1000 milliGRAM(s) IV Intermittent once  chlorhexidine 4% Liquid 1 Application(s) Topical two times a day  dextrose 5%. 1000 milliLiter(s) (50 mL/Hr) IV Continuous <Continuous>  dextrose 50% Injectable 12.5 Gram(s) IV Push once  dextrose 50% Injectable 25 Gram(s) IV Push once  dextrose 50% Injectable 25 Gram(s) IV Push once  enoxaparin Injectable 40 milliGRAM(s) SubCutaneous daily  fat emulsion (Fish Oil and Plant Based) 20% Infusion 17 mL/Hr (17 mL/Hr) IV Continuous <Continuous>  fat emulsion (Fish Oil and Plant Based) 20% Infusion 17 mL/Hr (17 mL/Hr) IV Continuous <Continuous>  fentaNYL   Patch  12 MICROgram(s)/Hr 1 Patch Transdermal every 72 hours  HYDROmorphone  Injectable 1 milliGRAM(s) IV Push once  influenza   Vaccine 0.5 milliLiter(s) IntraMuscular once  insulin lispro (HumaLOG) corrective regimen sliding scale   SubCutaneous every 6 hours  melatonin 3 milliGRAM(s) Oral at bedtime  metoprolol tartrate Injectable 5 milliGRAM(s) IV Push every 6 hours  Parenteral Nutrition - Adult 1 Each TPN Continuous <Continuous>  piperacillin/tazobactam IVPB. 3.375 Gram(s) IV Intermittent every 8 hours  sodium chloride 0.9%. 1000 milliLiter(s) (30 mL/Hr) IV Continuous <Continuous>  Vital Signs Last 24 Hrs  T(C): 36.2 (25 Jan 2019 04:56), Max: 37.2 (24 Jan 2019 12:09)  T(F): 97.1 (25 Jan 2019 04:56), Max: 99 (24 Jan 2019 12:09)  HR: 107 (25 Jan 2019 04:56) (77 - 123)  BP: 107/66 (25 Jan 2019 04:56) (90/53 - 139/76)  BP(mean): --  RR: 20 (25 Jan 2019 04:56) (20 - 28)  SpO2: 100% (25 Jan 2019 04:56) (96% - 100%)  PHYSICAL EXAM:  Constitutional: NAD, GCS: 15/15  AOX3  Eyes:  WNL  ENMT:  WNL  Neck:  WNL, non tender  Back: Non tender  Respiratory: CTABL  Cardiovascular:  S1+S2+0  Gastrointestinal: Soft, moderately distended, tender R>L without rebound; voluntary guarding to RUQ Incision: staples in place with new drainage; rufina drain with sanguinous output  Ostomy/mucus fistula: pink, viable  Genitourinary:  WNL  Extremities: NV intact  Vascular:  Intact  Neurological: C/O left sided numbness, paresthesia.  Skin: WNL  Musculoskeletal: WNL  Psychiatric: Grossly WNL  Labs:                          8.5    19.26 )-----------( 114      ( 25 Jan 2019 04:10 )             24.8       01-25    130<L>  |  98  |  29<H>  ----------------------------<  276<H>  3.4<L>   |  18<L>  |  0.64    Ca    8.0<L>      25 Jan 2019 04:10  Phos  2.9     01-25  Mg     2.0     01-25        PT/INR - ( 25 Jan 2019 05:37 )   PT: 18.5 SEC;   INR: 1.60          PTT - ( 25 Jan 2019 05:37 )  PTT:32.0 SEC    < from: Xray Abdomen 1 View-Supine Only (01.24.19 @ 12:00) >    Delay of contrast at the esophagojejunostomy with no evidence of leak.   Contrast subsequently passed into the proximal jejunum with suggestion of   an a mortise collection of contrast in the region of the distal   anastomosis. A leak at this level is not excluded. Patient can be brought   back for additional imaging of this region to exclude a leak, if felt   clinically appropriate. Patient is on palliative care and NPO.      < end of copied text >

## 2019-01-25 NOTE — PROGRESS NOTE ADULT - SUBJECTIVE AND OBJECTIVE BOX
Surgery Progress Note    S: Patient seen and examined. Yesterday, patient's PCA was stopped and transitioned to prn pushes. As per discussion with ID, due to pseudamonas, the patient should remain on zosyn.  At approximately 2am, the overnight resident was called to see the patient as his midline dressing was saturated and he was in significant pain. Both senior residents and chief resident came to see patient and opened midline incision with large amount of pus expressed. The patient had labs sent that were significant for leukocytosis and a drop in the patient's H/H. SICU was consulted and accepted the patient for further care.     O:  Vital Signs Last 24 Hrs  T(C): 36.4 (25 Jan 2019 07:45), Max: 37.2 (24 Jan 2019 12:09)  T(F): 97.5 (25 Jan 2019 07:45), Max: 99 (24 Jan 2019 12:09)  HR: 111 (25 Jan 2019 08:14) (77 - 123)  BP: 117/75 (25 Jan 2019 08:14) (90/53 - 139/76)  BP(mean): 84 (25 Jan 2019 08:14) (84 - 90)  RR: 28 (25 Jan 2019 08:14) (20 - 28)  SpO2: 98% (25 Jan 2019 08:14) (96% - 100%)    I&O's Detail    24 Jan 2019 07:01  -  25 Jan 2019 07:00  --------------------------------------------------------  IN:    fat emulsion (Fish Oil and Plant Based) 20% Infusion: 85 mL    sodium chloride 0.9%.: 180 mL    TPN (Total Parenteral Nutrition): 913 mL  Total IN: 1178 mL    OUT:    Bulb: 135 mL    Drain: 90 mL    Ileostomy: 1100 mL    Nasoenteral Tube: 60 mL    Voided: 1150 mL  Total OUT: 2535 mL    Total NET: -1357 mL          MEDICATIONS  (STANDING):  acetaminophen  IVPB .. 1000 milliGRAM(s) IV Intermittent once  chlorhexidine 4% Liquid 1 Application(s) Topical two times a day  dextrose 5%. 1000 milliLiter(s) (50 mL/Hr) IV Continuous <Continuous>  dextrose 50% Injectable 12.5 Gram(s) IV Push once  dextrose 50% Injectable 25 Gram(s) IV Push once  dextrose 50% Injectable 25 Gram(s) IV Push once  enoxaparin Injectable 40 milliGRAM(s) SubCutaneous daily  fat emulsion (Fish Oil and Plant Based) 20% Infusion 17 mL/Hr (17 mL/Hr) IV Continuous <Continuous>  fat emulsion (Fish Oil and Plant Based) 20% Infusion 17 mL/Hr (17 mL/Hr) IV Continuous <Continuous>  fentaNYL   Patch  12 MICROgram(s)/Hr 1 Patch Transdermal every 72 hours  HYDROmorphone  Injectable 1 milliGRAM(s) IV Push once  influenza   Vaccine 0.5 milliLiter(s) IntraMuscular once  insulin lispro (HumaLOG) corrective regimen sliding scale   SubCutaneous every 6 hours  melatonin 3 milliGRAM(s) Oral at bedtime  Parenteral Nutrition - Adult 1 Each TPN Continuous <Continuous>  piperacillin/tazobactam IVPB. 3.375 Gram(s) IV Intermittent every 8 hours  sodium chloride 0.9%. 1000 milliLiter(s) (30 mL/Hr) IV Continuous <Continuous>    MEDICATIONS  (PRN):  dextrose 40% Gel 15 Gram(s) Oral once PRN Blood Glucose LESS THAN 70 milliGRAM(s)/deciliter  glucagon  Injectable 1 milliGRAM(s) IntraMuscular once PRN Glucose LESS THAN 70 milligrams/deciliter  HYDROmorphone  Injectable 0.5 milliGRAM(s) IV Push every 3 hours PRN mild and moderate pain  naloxone Injectable 0.1 milliGRAM(s) IV Push every 3 minutes PRN For ANY of the following changes in patient status:  A. RR LESS THAN 10 breaths per minute, B. Oxygen saturation LESS THAN 90%, C. Sedation score of 6  ondansetron Injectable 4 milliGRAM(s) IV Push every 6 hours PRN Nausea                            8.5    19.26 )-----------( 114      ( 25 Jan 2019 04:10 )             24.8       01-25    130<L>  |  98  |  29<H>  ----------------------------<  276<H>  3.4<L>   |  18<L>  |  0.64    Ca    8.0<L>      25 Jan 2019 04:10  Phos  2.9     01-25  Mg     2.0     01-25        Physical Exam:  Gen: Sitting in chair, moderate distress, NGT in place  Resp: Unlabored breathing  Abd: soft, ND, midline wound with packing in place, LLQ SOSA with dark sanguinous output, RLQ ostomy pink/viable with liquid stool in bag, mucus fistula pink/viable, no rebound or guarding  Ext: WWP  Skin: No rashes

## 2019-01-25 NOTE — PROGRESS NOTE ADULT - SUBJECTIVE AND OBJECTIVE BOX
ROGER SANTOS             [id number]    CC: Poor PO intake    HPI:  63M pmh HTN, HLD with history of gastric adenocarcinoma s/p total gastrectomy and Vickie en Y esophagojejunostomy with Dr Espinal at [facility] Dec 2017 c/b early postop SBO s/p lap SHAR, treated with adjuvant chemo and radiation. Pt now transferred from [place] after a ~20 day admission with symptoms of PO intolerance. 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 He underwent endoscopic dilation as well as stent placement, which have not alleviated his symptoms. Pt reports vomiting any PO intake greater than ice chips. States that he has been on TPN during the  Denies nausea at baseline, denies pain. (15 [place]on 2019 02:46)    SUBJECTIVE: Patient was seen and examined today. Reports 5-6/10 abdominal pain. Otherwise no nausea, vomiting, or SOB. Previous PICC line contaminated 2/2 CT contrast being given through the same route.    OTHER REVIEW OF SYSTEMS: Neg  UNABLE TO OBTAIN  due to:    PEx:  T(C): 35.7 (01-25-19 @ 12:00), Max: 36.9 (01-24-19 @ 20:20)  HR: 104 (01-25-19 @ 12:00) (85 - 123)  BP: 102/75 (01-25-19 @ 12:00) (90/53 - 128/81)  RR: 21 (01-25-19 @ 12:00) (20 - 30)  SpO2: 99% (01-25-19 @ 12:00) (97% - 100%)  Wt(kg): --    GENERAL: NAD  HEENT: sclera clear bilaterally, has NG tube intact for suctioning  NECK: Supple  CHEST/LUNG: CTAB anteriorly, no wheezing or rales, nonlabored breathing  HEART: RRR, S1 and S2, no murmurs  ABDOMEN: Soft, mildly tender on palpation, no rebound or guarding, has ileostomy bag intact.  EXTREMITIES: no peripheral edema  PSYCH: Appropriate mood and cooperative  NEUROLOGY: non-focal  SKIN: No rashes  	     ALLERGIES: No Known Allergies      OPIATE NAÏVE (Y/N):    MEDICATIONS: REVIEWED  MEDICATIONS  (STANDING):  acetaminophen  IVPB .. 1000 milliGRAM(s) IV Intermittent once  chlorhexidine 4% Liquid 1 Application(s) Topical two times a day  dextrose 5%. 1000 milliLiter(s) (50 mL/Hr) IV Continuous <Continuous>  dextrose 50% Injectable 12.5 Gram(s) IV Push once  dextrose 50% Injectable 25 Gram(s) IV Push once  dextrose 50% Injectable 25 Gram(s) IV Push once  enoxaparin Injectable 40 milliGRAM(s) SubCutaneous daily  fat emulsion (Fish Oil and Plant Based) 20% Infusion 17 mL/Hr (17 mL/Hr) IV Continuous <Continuous>  fat emulsion (Fish Oil and Plant Based) 20% Infusion 17 mL/Hr (17 mL/Hr) IV Continuous <Continuous>  HYDROmorphone  Injectable 1 milliGRAM(s) IV Push once  HYDROmorphone PCA (1 mG/mL) 30 milliLiter(s) PCA Continuous PCA Continuous  influenza   Vaccine 0.5 milliLiter(s) IntraMuscular once  insulin lispro (HumaLOG) corrective regimen sliding scale   SubCutaneous every 6 hours  lactated ringers. 1000 milliLiter(s) (75 mL/Hr) IV Continuous <Continuous>  melatonin 3 milliGRAM(s) Oral at bedtime  Parenteral Nutrition - Adult 1 Each TPN Continuous <Continuous>  piperacillin/tazobactam IVPB. 3.375 Gram(s) IV Intermittent every 8 hours    MEDICATIONS  (PRN):  dextrose 40% Gel 15 Gram(s) Oral once PRN Blood Glucose LESS THAN 70 milliGRAM(s)/deciliter  glucagon  Injectable 1 milliGRAM(s) IntraMuscular once PRN Glucose LESS THAN 70 milligrams/deciliter  HYDROmorphone  Injectable 0.5 milliGRAM(s) IV Push every 3 hours PRN mild and moderate pain  HYDROmorphone PCA (1 mG/mL) Rescue Clinician Bolus 0.5 milliGRAM(s) IV Push every 2 hours PRN for Pain Scale GREATER THAN 6  naloxone Injectable 0.1 milliGRAM(s) IV Push every 3 minutes PRN For ANY of the following changes in patient status:  A. RR LESS THAN 10 breaths per minute, B. Oxygen saturation LESS THAN 90%, C. Sedation score of 6  ondansetron Injectable 4 milliGRAM(s) IV Push every 6 hours PRN Nausea      LABS: REVIEWED        IMAGING: REVIEWED    ADVANCED DIRECTIVES:     FULL CODE     DNR     DNI     LIVING WILL     MOLST    DECISION MAKER:   LEGAL SURROGATE:    PSYCHOSOCIAL-SPIRITUAL ASSESSMENT:       Reviewed       Care plan unchanged       Care plan adjusted as above	    GOALS OF CARE DISCUSSION       Palliative care info/counseling provided	           Family meeting       Advanced Directives addressed please see Advance Care Planning Note	           See previous Palliative Medicine Note       Documentation of GOC:   	      AGENCY CHOICE DISCUSSED:           Homecare        Hospice        [facility]        Other:    REFERRALS	        Palliative Med        Unit SW/Case Mgmt              Speech/Swallow       Patient/Family Support       Massage Therapy       Music Therapy       Hospice       Nutrition       Ethics       PT/OT        CRITICAL CARE TIME PROVIDED TO UNSTABLE PT W/ ORGAN FAILURE	   Start:               End:  	       Minutes:              > 50% OF THE TIME SPENT IN COUNSELING AND COORDINATING CARE 	   Start:               End:  	       Minutes:      PROLONGED SERVICE             FACE TO FACE:    PT            PT & FAMILY	   Start:               End:  	       Minutes:      Advance Care Planning Time: TOMROGER             [id number]    CC: Poor PO intake    HPI:  63M pmh HTN, HLD with history of gastric adenocarcinoma s/p total gastrectomy and Vickie en Y esophagojejunostomy with Dr Espinal at [facility] Dec 2017 c/b early postop SBO s/p lap SHAR, treated with adjuvant chemo and radiation. Pt now transferred from [place] after a ~20 day admission with symptoms of PO intolerance. 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 He underwent endoscopic dilation as well as stent placement, which have not alleviated his symptoms. Pt reports vomiting any PO intake greater than ice chips. States that he has been on TPN during the  Denies nausea at baseline, denies pain. (15 [place]on 2019 02:46)    SUBJECTIVE: Patient was seen and examined today. Reports 5-6/10 abdominal pain. Otherwise no nausea, vomiting, or SOB. Previous PICC line contaminated 2/2 CT contrast being given through the same route.    OTHER REVIEW OF SYSTEMS: Neg  UNABLE TO OBTAIN  due to:    PEx:  T(C): 35.7 (01-25-19 @ 12:00), Max: 36.9 (01-24-19 @ 20:20)  HR: 104 (01-25-19 @ 12:00) (85 - 123)  BP: 102/75 (01-25-19 @ 12:00) (90/53 - 128/81)  RR: 21 (01-25-19 @ 12:00) (20 - 30)  SpO2: 99% (01-25-19 @ 12:00) (97% - 100%)  Wt(kg): --    GENERAL: NAD  HEENT: sclera clear bilaterally, has NG tube intact for suctioning  NECK: Supple  CHEST/LUNG: CTAB anteriorly, no wheezing or rales, nonlabored breathing  HEART: RRR, S1 and S2, no murmurs  ABDOMEN: Soft, mildly tender on palpation, no rebound or guarding, has ileostomy bag intact.  EXTREMITIES: no peripheral edema  PSYCH: Appropriate mood and cooperative  NEUROLOGY: non-focal  SKIN: No rashes  	     ALLERGIES: No Known Allergies    MEDICATIONS: REVIEWED  MEDICATIONS  (STANDING):  acetaminophen  IVPB .. 1000 milliGRAM(s) IV Intermittent once  chlorhexidine 4% Liquid 1 Application(s) Topical two times a day  dextrose 5%. 1000 milliLiter(s) (50 mL/Hr) IV Continuous <Continuous>  dextrose 50% Injectable 12.5 Gram(s) IV Push once  dextrose 50% Injectable 25 Gram(s) IV Push once  dextrose 50% Injectable 25 Gram(s) IV Push once  enoxaparin Injectable 40 milliGRAM(s) SubCutaneous daily  fat emulsion (Fish Oil and Plant Based) 20% Infusion 17 mL/Hr (17 mL/Hr) IV Continuous <Continuous>  fat emulsion (Fish Oil and Plant Based) 20% Infusion 17 mL/Hr (17 mL/Hr) IV Continuous <Continuous>  HYDROmorphone  Injectable 1 milliGRAM(s) IV Push once  HYDROmorphone PCA (1 mG/mL) 30 milliLiter(s) PCA Continuous PCA Continuous  influenza   Vaccine 0.5 milliLiter(s) IntraMuscular once  insulin lispro (HumaLOG) corrective regimen sliding scale   SubCutaneous every 6 hours  lactated ringers. 1000 milliLiter(s) (75 mL/Hr) IV Continuous <Continuous>  melatonin 3 milliGRAM(s) Oral at bedtime  Parenteral Nutrition - Adult 1 Each TPN Continuous <Continuous>  piperacillin/tazobactam IVPB. 3.375 Gram(s) IV Intermittent every 8 hours    MEDICATIONS  (PRN):  dextrose 40% Gel 15 Gram(s) Oral once PRN Blood Glucose LESS THAN 70 milliGRAM(s)/deciliter  glucagon  Injectable 1 milliGRAM(s) IntraMuscular once PRN Glucose LESS THAN 70 milligrams/deciliter  HYDROmorphone  Injectable 0.5 milliGRAM(s) IV Push every 3 hours PRN mild and moderate pain  HYDROmorphone PCA (1 mG/mL) Rescue Clinician Bolus 0.5 milliGRAM(s) IV Push every 2 hours PRN for Pain Scale GREATER THAN 6  naloxone Injectable 0.1 milliGRAM(s) IV Push every 3 minutes PRN For ANY of the following changes in patient status:  A. RR LESS THAN 10 breaths per minute, B. Oxygen saturation LESS THAN 90%, C. Sedation score of 6  ondansetron Injectable 4 milliGRAM(s) IV Push every 6 hours PRN Nausea    LABS: REVIEWED    IMAGING: REVIEWED    ADVANCED DIRECTIVES:     DNR/DNI     MOLST    DECISION MAKER: Patient  LEGAL SURROGATE: Wife - Gisella Wilson    PSYCHOSOCIAL-SPIRITUAL ASSESSMENT:       Reviewed       Care plan unchanged        GOALS OF CARE DISCUSSION       Family meeting       Advanced Directives addressed please see Advance Care Planning Note	           Documentation of GOC:  see goc note.   	      AGENCY CHOICE DISCUSSED:           Hospice

## 2019-01-25 NOTE — PROGRESS NOTE ADULT - PROBLEM SELECTOR PLAN 6
30 minutes of face to face discussion addressing advanced care planning.
60 minutes of face to face discussion addressing advanced directives and advanced care planning, as above.
30 minutes of face to face discussion addressing advanced care planning.

## 2019-01-25 NOTE — CHART NOTE - NSCHARTNOTEFT_GEN_A_CORE
Pre Interventional Radiology Procedure Note  Patient Age: 63y    Patient Gender:Male    Procedure (including site / side if known): drainage of right paracolic hematoma and drainage of enhancing collection posterior to bladder    Diagnosis / Indication: R para glottic hematoma and enhancing collection posterior to bladder    Interventional Radiology Attending Physician: Dr. NICHOLAS Cantu    Ordering Attending Physician: Dr Long    Pertinent Medical History:    PAST MEDICAL & SURGICAL HISTORY:  Stomach cancer  Prediabetes  HLD (hyperlipidemia)  HTN (hypertension)  Malignant neoplasm of stomach, unspecified location  S/P total gastrectomy and Kelby-en-Y esophagojejunal anastomosis  S/P total gastrectomy and Kelby-en-Y esophagojejunal anastomosis  H/O prostate biopsy  History of arthroscopy of right shoulder: repair for rotator cuff syndrome  History of arthroscopy of left knee: meniscal repair  History of appendectomy      Pertinent Labs:                          8.5    19.26 )-----------( 114      ( 25 Jan 2019 04:10 )             24.8       01-25    130<L>  |  98  |  29<H>  ----------------------------<  276<H>  3.4<L>   |  18<L>  |  0.64    Ca    8.0<L>      25 Jan 2019 04:10  Phos  2.9     01-25  Mg     2.0     01-25        PT/INR - ( 25 Jan 2019 05:37 )   PT: 18.5 SEC;   INR: 1.60          PTT - ( 25 Jan 2019 05:37 )  PTT:32.0 SEC    Patient and Family aware:   Yes Pre Interventional Radiology Procedure Note  Patient Age: 63y    Patient Gender:Male    Procedure (including site / side if known): drainage of pelvic collection    Diagnosis / Indication: pelvic collection    Interventional Radiology Attending Physician: Dr. NICHOLAS Cantu    Ordering Attending Physician: Dr Long    Pertinent Medical History:    PAST MEDICAL & SURGICAL HISTORY:  Stomach cancer  Prediabetes  HLD (hyperlipidemia)  HTN (hypertension)  Malignant neoplasm of stomach, unspecified location  S/P total gastrectomy and Kelby-en-Y esophagojejunal anastomosis  S/P total gastrectomy and Kelby-en-Y esophagojejunal anastomosis  H/O prostate biopsy  History of arthroscopy of right shoulder: repair for rotator cuff syndrome  History of arthroscopy of left knee: meniscal repair  History of appendectomy      Pertinent Labs:                          8.5    19.26 )-----------( 114      ( 25 Jan 2019 04:10 )             24.8       01-25    130<L>  |  98  |  29<H>  ----------------------------<  276<H>  3.4<L>   |  18<L>  |  0.64    Ca    8.0<L>      25 Jan 2019 04:10  Phos  2.9     01-25  Mg     2.0     01-25        PT/INR - ( 25 Jan 2019 05:37 )   PT: 18.5 SEC;   INR: 1.60          PTT - ( 25 Jan 2019 05:37 )  PTT:32.0 SEC    Patient and Family aware:   Yes Pre Interventional Radiology Procedure Note  Patient Age: 63y    Patient Gender:Male    Procedure (including site / side if known): drainage of pelvic collection    Diagnosis / Indication: pelvic collection    Interventional Radiology Attending Physician: Dr. NICHOLAS Cantu    Ordering Attending Physician: Dr Long    Pertinent Medical History:    PAST MEDICAL & SURGICAL HISTORY:  Stomach cancer  Prediabetes  HLD (hyperlipidemia)  HTN (hypertension)  Malignant neoplasm of stomach, unspecified location  S/P total gastrectomy and Kelby-en-Y esophagojejunal anastomosis  S/P total gastrectomy and Kelby-en-Y esophagojejunal anastomosis  H/O prostate biopsy  History of arthroscopy of right shoulder: repair for rotator cuff syndrome  History of arthroscopy of left knee: meniscal repair  History of appendectomy      Pertinent Labs:                          8.5    19.26 )-----------( 114      ( 25 Jan 2019 04:10 )             24.8       01-25    130<L>  |  98  |  29<H>  ----------------------------<  276<H>  3.4<L>   |  18<L>  |  0.64    Ca    8.0<L>      25 Jan 2019 04:10  Phos  2.9     01-25  Mg     2.0     01-25        PT/INR - ( 25 Jan 2019 05:37 )   PT: 18.5 SEC;   INR: 1.60          PTT - ( 25 Jan 2019 05:37 )  PTT:32.0 SEC  Agree    Patient and Family aware:   Yes

## 2019-01-25 NOTE — PROGRESS NOTE ADULT - SUBJECTIVE AND OBJECTIVE BOX
HISTORY  63y Male    24 HOUR EVENTS:    SUBJECTIVE/ROS:  [ ] A ten-point review of systems was otherwise negative except as noted.  [ ] Due to altered mental status/intubation, subjective information were not able to be obtained from the patient. History was obtained, to the extent possible, from review of the chart and collateral sources of information.      NEURO  RASS:     GCS:     CAM ICU:  Exam: awake, alert, oriented  Meds: acetaminophen  IVPB .. 1000 milliGRAM(s) IV Intermittent once  HYDROmorphone  Injectable 0.5 milliGRAM(s) IV Push every 3 hours PRN mild and moderate pain  HYDROmorphone  Injectable 1 milliGRAM(s) IV Push once  HYDROmorphone PCA (1 mG/mL) 30 milliLiter(s) PCA Continuous PCA Continuous  HYDROmorphone PCA (1 mG/mL) Rescue Clinician Bolus 0.5 milliGRAM(s) IV Push every 2 hours PRN for Pain Scale GREATER THAN 6  melatonin 3 milliGRAM(s) Oral at bedtime  ondansetron Injectable 4 milliGRAM(s) IV Push every 6 hours PRN Nausea    [x] Adequacy of sedation and pain control has been assessed and adjusted      RESPIRATORY  RR: 16 (01-25-19 @ 18:05) (16 - 30)  SpO2: 100% (01-25-19 @ 18:05) (91% - 100%)  Wt(kg): --  Exam: unlabored, clear to auscultation bilaterally  Mechanical Ventilation:   ABG - ( 25 Jan 2019 04:10 )  pH: 7.53  /  pCO2: 23    /  pO2: 119   / HCO3: 23    / Base Excess: -3.0  /  SaO2: 98.6    Lactate: 3.8              [N/A] Extubation Readiness Assessed  Meds:       CARDIOVASCULAR  HR: 105 (01-25-19 @ 18:05) (98 - 123)  BP: 117/75 (01-25-19 @ 17:33) (90/53 - 128/81)  BP(mean): 85 (01-25-19 @ 17:33) (84 - 90)  ABP: --  ABP(mean): --  Wt(kg): --  CVP(cm H2O): --      Exam: regular rate and rhythm  Cardiac Rhythm: sinus  Perfusion     [x]Adequate   [ ]Inadequate  Mentation   [x]Normal       [ ]Reduced  Extremities  [x]Warm         [ ]Cool  Volume Status [ ]Hypervolemic [x]Euvolemic [ ]Hypovolemic  Meds:       GI/NUTRITION  Exam: soft, nontender, nondistended, incision C/D/I  Diet:  Meds:     GENITOURINARY  I&O's Detail    01-24 @ 07:01 - 01-25 @ 07:00  --------------------------------------------------------  IN:    fat emulsion (Fish Oil and Plant Based) 20% Infusion: 85 mL    sodium chloride 0.9%: 180 mL    TPN (Total Parenteral Nutrition): 913 mL  Total IN: 1178 mL    OUT:    Bulb: 135 mL    Drain: 90 mL    Ileostomy: 1100 mL    Nasoenteral Tube: 60 mL    Voided: 1150 mL  Total OUT: 2535 mL    Total NET: -1357 mL      01-25 @ 07:01 - 01-25 @ 18:06  --------------------------------------------------------  IN:    lactated ringers.: 525 mL    sodium chloride 0.9%: 90 mL  Total IN: 615 mL    OUT:    Bulb: 95 mL    Ileostomy: 450 mL    Nasoenteral Tube: 50 mL    Voided: 750 mL  Total OUT: 1345 mL    Total NET: -730 mL          01-25    134<L>  |  97<L>  |  30<H>  ----------------------------<  104<H>  4.1   |  23  |  0.69    Ca    8.3<L>      25 Jan 2019 10:30  Phos  2.9     01-25  Mg     2.0     01-25      [ ] Washburn catheter, indication: N/A  Meds: dextrose 5%. 1000 milliLiter(s) IV Continuous <Continuous>  fat emulsion (Fish Oil and Plant Based) 20% Infusion 17 mL/Hr IV Continuous <Continuous>  fat emulsion (Fish Oil and Plant Based) 20% Infusion 17 mL/Hr IV Continuous <Continuous>  lactated ringers. 1000 milliLiter(s) IV Continuous <Continuous>  Parenteral Nutrition - Adult 1 Each TPN Continuous <Continuous>        HEMATOLOGIC  Meds: enoxaparin Injectable 40 milliGRAM(s) SubCutaneous daily    [x] VTE Prophylaxis                        8.3    15.43 )-----------( 104      ( 25 Jan 2019 10:30 )             24.5     PT/INR - ( 25 Jan 2019 10:30 )   PT: 17.1 SEC;   INR: 1.52          PTT - ( 25 Jan 2019 10:30 )  PTT:29.2 SEC  Transfusion     [ ] PRBC   [ ] Platelets   [ ] FFP   [ ] Cryoprecipitate      INFECTIOUS DISEASES  WBC Count: 15.43 K/uL (01-25 @ 10:30)  WBC Count: 19.26 K/uL (01-25 @ 04:10)    RECENT CULTURES:  Specimen Source: INCISION  Date/Time: 01-25 @ 05:12  Culture Results: --  Gram Stain: --  Organism: --    Meds: influenza   Vaccine 0.5 milliLiter(s) IntraMuscular once  piperacillin/tazobactam IVPB. 3.375 Gram(s) IV Intermittent every 8 hours        ENDOCRINE  CAPILLARY BLOOD GLUCOSE      POCT Blood Glucose.: 99 mg/dL (25 Jan 2019 17:47)  POCT Blood Glucose.: 99 mg/dL (25 Jan 2019 11:49)  POCT Blood Glucose.: 161 mg/dL (25 Jan 2019 05:56)  POCT Blood Glucose.: 259 mg/dL (25 Jan 2019 00:37)    Meds: dextrose 40% Gel 15 Gram(s) Oral once PRN  dextrose 50% Injectable 12.5 Gram(s) IV Push once  dextrose 50% Injectable 25 Gram(s) IV Push once  dextrose 50% Injectable 25 Gram(s) IV Push once  glucagon  Injectable 1 milliGRAM(s) IntraMuscular once PRN  insulin lispro (HumaLOG) corrective regimen sliding scale   SubCutaneous every 6 hours        ACCESS DEVICES:  [ ] Peripheral IV  [ ] Central Venous Line	[ ] R	[ ] L	[ ] IJ	[ ] Fem	[ ] SC	Placed:   [ ] Arterial Line		[ ] R	[ ] L	[ ] Fem	[ ] Rad	[ ] Ax	Placed:   [ ] PICC:					[ ] Mediport  [ ] Urinary Catheter, Date Placed:   [x] Necessity of urinary, arterial, and venous catheters discussed    OTHER MEDICATIONS:  chlorhexidine 4% Liquid 1 Application(s) Topical two times a day  naloxone Injectable 0.1 milliGRAM(s) IV Push every 3 minutes PRN      CODE STATUS:  Yes      IMAGING: HISTORY  63y Male    24 HOUR EVENTS:    SUBJECTIVE/ROS:  [ ] A ten-point review of systems was otherwise negative except as noted.  [ ] Due to altered mental status/intubation, subjective information were not able to be obtained from the patient. History was obtained, to the extent possible, from review of the chart and collateral sources of information.      NEURO  RASS:     GCS:     CAM ICU:  Exam: awake, alert, oriented  Meds: acetaminophen  IVPB .. 1000 milliGRAM(s) IV Intermittent once  HYDROmorphone  Injectable 0.5 milliGRAM(s) IV Push every 3 hours PRN mild and moderate pain  HYDROmorphone  Injectable 1 milliGRAM(s) IV Push once  HYDROmorphone PCA (1 mG/mL) 30 milliLiter(s) PCA Continuous PCA Continuous  HYDROmorphone PCA (1 mG/mL) Rescue Clinician Bolus 0.5 milliGRAM(s) IV Push every 2 hours PRN for Pain Scale GREATER THAN 6  melatonin 3 milliGRAM(s) Oral at bedtime  ondansetron Injectable 4 milliGRAM(s) IV Push every 6 hours PRN Nausea    [x] Adequacy of sedation and pain control has been assessed and adjusted      RESPIRATORY  RR: 16 (01-25-19 @ 18:05) (16 - 30)  SpO2: 100% (01-25-19 @ 18:05) (91% - 100%)  Wt(kg): --  Exam: unlabored, clear to auscultation bilaterally  Mechanical Ventilation:   ABG - ( 25 Jan 2019 04:10 )  pH: 7.53  /  pCO2: 23    /  pO2: 119   / HCO3: 23    / Base Excess: -3.0  /  SaO2: 98.6    Lactate: 3.8              [N/A] Extubation Readiness Assessed  Meds:       CARDIOVASCULAR  HR: 105 (01-25-19 @ 18:05) (98 - 123)  BP: 117/75 (01-25-19 @ 17:33) (90/53 - 128/81)  BP(mean): 85 (01-25-19 @ 17:33) (84 - 90)  ABP: --  ABP(mean): --  Wt(kg): --  CVP(cm H2O): --      Exam: regular rate and rhythm  Cardiac Rhythm: sinus  Perfusion     [x]Adequate   [ ]Inadequate  Mentation   [x]Normal       [ ]Reduced  Extremities  [x]Warm         [ ]Cool  Volume Status [ ]Hypervolemic [x]Euvolemic [ ]Hypovolemic  Meds:       GI/NUTRITION  Exam: soft, nontender, nondistended, incision C/D/I  Diet:  Meds:     GENITOURINARY  I&O's Detail    01-24 @ 07:01 - 01-25 @ 07:00  --------------------------------------------------------  IN:    fat emulsion (Fish Oil and Plant Based) 20% Infusion: 85 mL    sodium chloride 0.9%: 180 mL    TPN (Total Parenteral Nutrition): 913 mL  Total IN: 1178 mL    OUT:    Bulb: 135 mL    Drain: 90 mL    Ileostomy: 1100 mL    Nasoenteral Tube: 60 mL    Voided: 1150 mL  Total OUT: 2535 mL    Total NET: -1357 mL      01-25 @ 07:01 - 01-25 @ 18:06  --------------------------------------------------------  IN:    lactated ringers.: 525 mL    sodium chloride 0.9%: 90 mL  Total IN: 615 mL    OUT:    Bulb: 95 mL    Ileostomy: 450 mL    Nasoenteral Tube: 50 mL    Voided: 750 mL  Total OUT: 1345 mL    Total NET: -730 mL          01-25    134<L>  |  97<L>  |  30<H>  ----------------------------<  104<H>  4.1   |  23  |  0.69    Ca    8.3<L>      25 Jan 2019 10:30  Phos  2.9     01-25  Mg     2.0     01-25      [ ] Washburn catheter, indication: N/A  Meds: dextrose 5%. 1000 milliLiter(s) IV Continuous <Continuous>  fat emulsion (Fish Oil and Plant Based) 20% Infusion 17 mL/Hr IV Continuous <Continuous>  fat emulsion (Fish Oil and Plant Based) 20% Infusion 17 mL/Hr IV Continuous <Continuous>  lactated ringers. 1000 milliLiter(s) IV Continuous <Continuous>  Parenteral Nutrition - Adult 1 Each TPN Continuous <Continuous>        HEMATOLOGIC  Meds: enoxaparin Injectable 40 milliGRAM(s) SubCutaneous daily    [x] VTE Prophylaxis                        8.3    15.43 )-----------( 104      ( 25 Jan 2019 10:30 )             24.5     PT/INR - ( 25 Jan 2019 10:30 )   PT: 17.1 SEC;   INR: 1.52          PTT - ( 25 Jan 2019 10:30 )  PTT:29.2 SEC  Transfusion     [ ] PRBC   [ ] Platelets   [ ] FFP   [ ] Cryoprecipitate      INFECTIOUS DISEASES  WBC Count: 15.43 K/uL (01-25 @ 10:30)  WBC Count: 19.26 K/uL (01-25 @ 04:10)    RECENT CULTURES:  Specimen Source: INCISION  Date/Time: 01-25 @ 05:12  Culture Results: --  Gram Stain: --  Organism: --    Meds: influenza   Vaccine 0.5 milliLiter(s) IntraMuscular once  piperacillin/tazobactam IVPB. 3.375 Gram(s) IV Intermittent every 8 hours        ENDOCRINE  CAPILLARY BLOOD GLUCOSE      POCT Blood Glucose.: 99 mg/dL (25 Jan 2019 17:47)  POCT Blood Glucose.: 99 mg/dL (25 Jan 2019 11:49)  POCT Blood Glucose.: 161 mg/dL (25 Jan 2019 05:56)  POCT Blood Glucose.: 259 mg/dL (25 Jan 2019 00:37)    Meds: dextrose 40% Gel 15 Gram(s) Oral once PRN  dextrose 50% Injectable 12.5 Gram(s) IV Push once  dextrose 50% Injectable 25 Gram(s) IV Push once  dextrose 50% Injectable 25 Gram(s) IV Push once  glucagon  Injectable 1 milliGRAM(s) IntraMuscular once PRN  insulin lispro (HumaLOG) corrective regimen sliding scale   SubCutaneous every 6 hours        ACCESS DEVICES:  [ ] Peripheral IV  [ ] Central Venous Line	[ ] R	[ ] L	[ ] IJ	[ ] Fem	[ ] SC	Placed:   [ ] Arterial Line		[ ] R	[ ] L	[ ] Fem	[ ] Rad	[ ] Ax	Placed:   [ ] PICC:					[ ] Mediport  [ ] Urinary Catheter, Date Placed:   [x] Necessity of urinary, arterial, and venous catheters discussed    OTHER MEDICATIONS:  chlorhexidine 4% Liquid 1 Application(s) Topical two times a day  naloxone Injectable 0.1 milliGRAM(s) IV Push every 3 minutes PRN      CODE STATUS:  DNR/DNI, pt opted for palliative/comfort care, hospice services contacted for home hospice care placement       IMAGING:  < from: CT Abdomen and Pelvis w/ IV Cont (01.25.19 @ 07:43) >  IMPRESSION:   No pulmonary embolism.    Large heterogeneously dense ascites, new from 1/17/2019, concerning for   hemoperitoneum.    Small pneumoperitoneum, suggestive of bowel perforation/anastomotic leak.    A large peripherally enhancing fluid collection in the rectovesical space   measures 9.7 cm in greatest dimension.    Mild bowel distention diffusely, question ileus versus early or partial   obstruction.    Apparent narrowing of the right ventricular outflow tract.  Suggestion of   pulmonary artery narrowing.  Echo recommended for complete evaluation.    These findings were discussed with Dr. Hernandez at 1/25/2019 9:27 AM by Dr. Ingram with read back confirmation.    < end of copied text >

## 2019-01-25 NOTE — PROGRESS NOTE ADULT - PROBLEM SELECTOR PLAN 1
- S/p total gastrectomy and Vickie en Y esophagojejunostomy with Dr Espinal at [facility] Dec 2017  - Found to have recurrent carcinomatosis at the previous esophagojejunal anastomosis  - Per surgery team, no further surgical intervention at this time  - Patient was initially agreeable to further chemo if eligible. However, now that he cannot tolerate PO intake based on swallow study, he and his wife would prefer going home with home hospice. - S/p total gastrectomy and Vickie en Y esophagojejunostomy with Dr Espinal at [facility] Dec 2017  - Found to have recurrent carcinomatosis at the previous esophagojejunal anastomosis  - Per surgery team, no further surgical intervention at this time  - Patient was initially agreeable to further chemo if eligible. However, now that he cannot tolerate PO intake based on swallow study, he and his wife would prefer going home with home hospice and focusing on comfort and symptom management.

## 2019-01-25 NOTE — PROGRESS NOTE ADULT - ASSESSMENT
63M pmh HTN, HLD with history of gastric adenocarcinoma s/p total gastrectomy and Vickie en Y esophagojejunostomy with Dr Espinal at [facility] Dec 2017 c/b early postop SBO s/p lap SHAR, treated with adjuvant chemo and radiation who was transferred to [facility] on 1/15/19 for esophageal dilatation, c/b pneumoperitoneum from ischemic colon with colonic perforation, feculent peritonitis, and recurrent carcinomatosis s/p right hemicolectomy with ileostomy.

## 2019-01-25 NOTE — PROGRESS NOTE ADULT - ASSESSMENT
63M pmh HTN, HLD with history of gastric adenocarcinoma s/p total gastrectomy and Vickie en Y esophagojejunostomy with Dr Espinal at [facility] Dec 2017 c/b early postop SBO s/p lap SHAR, treated with adjuvant chemo and radiation who was transferred to [facility] on 1/15/19 for esophageal dilatation. On 1/17 patient had endoscopy to better assess strictures.  EJ anastomosis was noted to be friable and erythematous, biopsies were taken.  In PACU patient had complaints of abdominal distention and pain.  Upright CXR performed significant for pneumoperitoneum. 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 Started on Zosyn (1/17/19-->) and Micafungin (1/17/19-->).     Overall, feculent peritonitis secondary to colonic perforation. Would finish micafungin after today (Day 5). Would continue Zosyn at this time (tentative plan for 14 days of antibiotics). Would followup on E. coli and pseudomonas susceptibilities from the SOSA drainage.

## 2019-01-25 NOTE — CHART NOTE - NSCHARTNOTEFT_GEN_A_CORE
Pre Interventional Radiology Procedure Note  Patient Age: 63y    Patient Gender:Male    Procedure (including site / side if known): replace picc line    Diagnosis / Indication: violation of tpn port of picc line    Interventional Radiology Attending Physician: PA    Ordering Attending Physician: Ethan    Pertinent Medical History:    PAST MEDICAL & SURGICAL HISTORY:  Stomach cancer  Prediabetes  HLD (hyperlipidemia)  HTN (hypertension)  Malignant neoplasm of stomach, unspecified location  S/P total gastrectomy and Kelby-en-Y esophagojejunal anastomosis  S/P total gastrectomy and Kelby-en-Y esophagojejunal anastomosis  H/O prostate biopsy  History of arthroscopy of right shoulder: repair for rotator cuff syndrome  History of arthroscopy of left knee: meniscal repair  History of appendectomy        Pertinent Labs:                          8.5    19.26 )-----------( 114      ( 25 Jan 2019 04:10 )             24.8       01-25    130<L>  |  98  |  29<H>  ----------------------------<  276<H>  3.4<L>   |  18<L>  |  0.64    Ca    8.0<L>      25 Jan 2019 04:10  Phos  2.9     01-25  Mg     2.0     01-25        PT/INR - ( 25 Jan 2019 05:37 )   PT: 18.5 SEC;   INR: 1.60          PTT - ( 25 Jan 2019 05:37 )  PTT:32.0 SEC    Patient and Family aware:   yes Pre Interventional Radiology Procedure Note  Patient Age: 63y    Patient Gender:Male    Procedure (including site / side if known): replace picc line    Diagnosis / Indication: violation of tpn port of picc line, picc line removed, needs picc for home PCA for hospice care    Interventional Radiology Attending Physician: PA    Ordering Attending Physician: Ethan    Pertinent Medical History:    PAST MEDICAL & SURGICAL HISTORY:  Stomach cancer  Prediabetes  HLD (hyperlipidemia)  HTN (hypertension)  Malignant neoplasm of stomach, unspecified location  S/P total gastrectomy and Kelby-en-Y esophagojejunal anastomosis  S/P total gastrectomy and Kelby-en-Y esophagojejunal anastomosis  H/O prostate biopsy  History of arthroscopy of right shoulder: repair for rotator cuff syndrome  History of arthroscopy of left knee: meniscal repair  History of appendectomy        Pertinent Labs:                          8.5    19.26 )-----------( 114      ( 25 Jan 2019 04:10 )             24.8       01-25    130<L>  |  98  |  29<H>  ----------------------------<  276<H>  3.4<L>   |  18<L>  |  0.64    Ca    8.0<L>      25 Jan 2019 04:10  Phos  2.9     01-25  Mg     2.0     01-25        PT/INR - ( 25 Jan 2019 05:37 )   PT: 18.5 SEC;   INR: 1.60          PTT - ( 25 Jan 2019 05:37 )  PTT:32.0 SEC    Patient and Family aware:   yes

## 2019-01-25 NOTE — PROGRESS NOTE ADULT - PROBLEM SELECTOR PLAN 2
- S/p right hemicolectomy with ileostomy and micafungin  - C/w Zosyn per ID recs  - Postoperative pain well controlled by PCA. But was d/brandie when transferred to SICU. Will need to place a new PICC line and resume PCA, given his pain is not controlled.

## 2019-01-26 LAB
ANION GAP SERPL CALC-SCNC: 10 MMO/L — SIGNIFICANT CHANGE UP (ref 7–14)
BUN SERPL-MCNC: 29 MG/DL — HIGH (ref 7–23)
CALCIUM SERPL-MCNC: 8.4 MG/DL — SIGNIFICANT CHANGE UP (ref 8.4–10.5)
CHLORIDE SERPL-SCNC: 101 MMOL/L — SIGNIFICANT CHANGE UP (ref 98–107)
CO2 SERPL-SCNC: 25 MMOL/L — SIGNIFICANT CHANGE UP (ref 22–31)
CREAT SERPL-MCNC: 0.72 MG/DL — SIGNIFICANT CHANGE UP (ref 0.5–1.3)
GLUCOSE BLDC GLUCOMTR-MCNC: 108 MG/DL — HIGH (ref 70–99)
GLUCOSE BLDC GLUCOMTR-MCNC: 76 MG/DL — SIGNIFICANT CHANGE UP (ref 70–99)
GLUCOSE BLDC GLUCOMTR-MCNC: 92 MG/DL — SIGNIFICANT CHANGE UP (ref 70–99)
GLUCOSE BLDC GLUCOMTR-MCNC: 94 MG/DL — SIGNIFICANT CHANGE UP (ref 70–99)
GLUCOSE SERPL-MCNC: 98 MG/DL — SIGNIFICANT CHANGE UP (ref 70–99)
HCT VFR BLD CALC: 22.3 % — LOW (ref 39–50)
HGB BLD-MCNC: 7.6 G/DL — LOW (ref 13–17)
MAGNESIUM SERPL-MCNC: 2.1 MG/DL — SIGNIFICANT CHANGE UP (ref 1.6–2.6)
MCHC RBC-ENTMCNC: 29.8 PG — SIGNIFICANT CHANGE UP (ref 27–34)
MCHC RBC-ENTMCNC: 34.1 % — SIGNIFICANT CHANGE UP (ref 32–36)
MCV RBC AUTO: 87.5 FL — SIGNIFICANT CHANGE UP (ref 80–100)
NRBC # FLD: 0 K/UL — LOW (ref 25–125)
PHOSPHATE SERPL-MCNC: 4 MG/DL — SIGNIFICANT CHANGE UP (ref 2.5–4.5)
PLATELET # BLD AUTO: 111 K/UL — LOW (ref 150–400)
PMV BLD: 11.3 FL — SIGNIFICANT CHANGE UP (ref 7–13)
POTASSIUM SERPL-MCNC: 4.1 MMOL/L — SIGNIFICANT CHANGE UP (ref 3.5–5.3)
POTASSIUM SERPL-SCNC: 4.1 MMOL/L — SIGNIFICANT CHANGE UP (ref 3.5–5.3)
RBC # BLD: 2.55 M/UL — LOW (ref 4.2–5.8)
RBC # FLD: 15 % — HIGH (ref 10.3–14.5)
SODIUM SERPL-SCNC: 136 MMOL/L — SIGNIFICANT CHANGE UP (ref 135–145)
WBC # BLD: 12.3 K/UL — HIGH (ref 3.8–10.5)
WBC # FLD AUTO: 12.3 K/UL — HIGH (ref 3.8–10.5)

## 2019-01-26 PROCEDURE — 99233 SBSQ HOSP IP/OBS HIGH 50: CPT

## 2019-01-26 RX ADMIN — FENTANYL CITRATE 1 PATCH: 50 INJECTION INTRAVENOUS at 19:03

## 2019-01-26 RX ADMIN — CHLORHEXIDINE GLUCONATE 1 APPLICATION(S): 213 SOLUTION TOPICAL at 05:17

## 2019-01-26 RX ADMIN — SODIUM CHLORIDE 75 MILLILITER(S): 9 INJECTION, SOLUTION INTRAVENOUS at 19:41

## 2019-01-26 RX ADMIN — HYDROMORPHONE HYDROCHLORIDE 30 MILLILITER(S): 2 INJECTION INTRAMUSCULAR; INTRAVENOUS; SUBCUTANEOUS at 16:04

## 2019-01-26 RX ADMIN — PIPERACILLIN AND TAZOBACTAM 25 GRAM(S): 4; .5 INJECTION, POWDER, LYOPHILIZED, FOR SOLUTION INTRAVENOUS at 05:18

## 2019-01-26 RX ADMIN — PIPERACILLIN AND TAZOBACTAM 25 GRAM(S): 4; .5 INJECTION, POWDER, LYOPHILIZED, FOR SOLUTION INTRAVENOUS at 13:01

## 2019-01-26 RX ADMIN — HYDROMORPHONE HYDROCHLORIDE 30 MILLILITER(S): 2 INJECTION INTRAMUSCULAR; INTRAVENOUS; SUBCUTANEOUS at 19:41

## 2019-01-26 RX ADMIN — FENTANYL CITRATE 1 PATCH: 50 INJECTION INTRAVENOUS at 06:29

## 2019-01-26 RX ADMIN — PIPERACILLIN AND TAZOBACTAM 25 GRAM(S): 4; .5 INJECTION, POWDER, LYOPHILIZED, FOR SOLUTION INTRAVENOUS at 20:11

## 2019-01-26 RX ADMIN — HYDROMORPHONE HYDROCHLORIDE 30 MILLILITER(S): 2 INJECTION INTRAMUSCULAR; INTRAVENOUS; SUBCUTANEOUS at 07:10

## 2019-01-26 NOTE — CHART NOTE - NSCHARTNOTEFT_GEN_A_CORE
General Surgery Progress Note    SUBJECTIVE:  The patient was seen and examined. Transferred from sicu to floor. denies n/v, sob, abd pain. NGT in place to suction    OBJECTIVE:     ** VITAL SIGNS / I&O's **    Vital Signs Last 24 Hrs  T(C): 36.8 (26 Jan 2019 12:00), Max: 37.2 (26 Jan 2019 04:00)  T(F): 98.3 (26 Jan 2019 12:00), Max: 98.9 (26 Jan 2019 04:00)  HR: 77 (26 Jan 2019 14:00) (77 - 105)  BP: 97/55 (26 Jan 2019 14:00) (89/61 - 112/55)  BP(mean): 65 (26 Jan 2019 14:00) (65 - 74)  RR: 10 (26 Jan 2019 14:00) (10 - 16)  SpO2: 98% (26 Jan 2019 14:00) (96% - 100%)      25 Jan 2019 07:01  -  26 Jan 2019 07:00  --------------------------------------------------------  IN:    lactated ringers.: 1500 mL    sodium chloride 0.9%: 90 mL  Total IN: 1590 mL    OUT:    Bulb: 125 mL    Drain: 20 mL    Ileostomy: 650 mL    Nasoenteral Tube: 60 mL    Voided: 1225 mL  Total OUT: 2080 mL    Total NET: -490 mL      26 Jan 2019 07:01  -  26 Jan 2019 17:40  --------------------------------------------------------  IN:    lactated ringers.: 525 mL  Total IN: 525 mL    OUT:    Voided: 750 mL  Total OUT: 750 mL    Total NET: -225 mL          ** PHYSICAL EXAM **    -- CONSTITUTIONAL: Alert, NAD  -- HEENT: NGT in place to suction  -- PULMONARY: non-labored respirations  -- ABDOMEN: soft, ND, midline wound with packing in place, LLQ SOSA with dark sanguinous output, RLQ ostomy pink/viable with liquid stool in bag, mucus fistula pink/viable, no rebound or guarding    ** LABS **                          7.6    12.30 )-----------( 111      ( 26 Jan 2019 02:47 )             22.3     26 Jan 2019 02:47    136    |  101    |  29     ----------------------------<  98     4.1     |  25     |  0.72     Ca    8.4        26 Jan 2019 02:47  Phos  4.0       26 Jan 2019 02:47  Mg     2.1       26 Jan 2019 02:47    TPro  5.1    /  Alb  2.3    /  TBili  2.8    /  DBili  x      /  AST  82     /  ALT  82     /  AlkPhos  159    25 Jan 2019 21:30    PT/INR - ( 25 Jan 2019 21:30 )   PT: 16.2 SEC;   INR: 1.41          PTT - ( 25 Jan 2019 21:30 )  PTT:31.7 SEC  CAPILLARY BLOOD GLUCOSE      POCT Blood Glucose.: 94 mg/dL (26 Jan 2019 12:34)  POCT Blood Glucose.: 92 mg/dL (26 Jan 2019 05:16)  POCT Blood Glucose.: 108 mg/dL (26 Jan 2019 00:18)  POCT Blood Glucose.: 99 mg/dL (25 Jan 2019 17:47)        LIVER FUNCTIONS - ( 25 Jan 2019 21:30 )  Alb: 2.3 g/dL / Pro: 5.1 g/dL / ALK PHOS: 159 u/L / ALT: 82 u/L / AST: 82 u/L / GGT: x             Culture - Surg Site Aerob/Anaer w/Gm St (collected 25 Jan 2019 18:05)  Source: PELVIS  Preliminary Report (26 Jan 2019 08:11):    CULTURE IN PROGRESS, FURTHER REPORT TO FOLLOW.    Culture - Abscess with Gram Stain (collected 25 Jan 2019 05:12)  Source: INCISION  Preliminary Report (26 Jan 2019 13:00):    EC^Escherichia coli    ENTF^Enterococcus faecalis    QUANTITY OF GROWTH: FEW          MEDICATIONS  (STANDING):  acetaminophen  IVPB .. 1000 milliGRAM(s) IV Intermittent once  chlorhexidine 4% Liquid 1 Application(s) Topical two times a day  dextrose 5%. 1000 milliLiter(s) (50 mL/Hr) IV Continuous <Continuous>  dextrose 50% Injectable 12.5 Gram(s) IV Push once  dextrose 50% Injectable 25 Gram(s) IV Push once  dextrose 50% Injectable 25 Gram(s) IV Push once  HYDROmorphone PCA (1 mG/mL) 30 milliLiter(s) PCA Continuous PCA Continuous  influenza   Vaccine 0.5 milliLiter(s) IntraMuscular once  insulin lispro (HumaLOG) corrective regimen sliding scale   SubCutaneous every 6 hours  lactated ringers. 1000 milliLiter(s) (75 mL/Hr) IV Continuous <Continuous>  melatonin 3 milliGRAM(s) Oral at bedtime  piperacillin/tazobactam IVPB. 3.375 Gram(s) IV Intermittent every 8 hours    MEDICATIONS  (PRN):  dextrose 40% Gel 15 Gram(s) Oral once PRN Blood Glucose LESS THAN 70 milliGRAM(s)/deciliter  glucagon  Injectable 1 milliGRAM(s) IntraMuscular once PRN Glucose LESS THAN 70 milligrams/deciliter  HYDROmorphone  Injectable 0.5 milliGRAM(s) IV Push every 3 hours PRN mild and moderate pain  HYDROmorphone PCA (1 mG/mL) Rescue Clinician Bolus 0.5 milliGRAM(s) IV Push every 2 hours PRN for Pain Scale GREATER THAN 6  naloxone Injectable 0.1 milliGRAM(s) IV Push every 3 minutes PRN For ANY of the following changes in patient status:  A. RR LESS THAN 10 breaths per minute, B. Oxygen saturation LESS THAN 90%, C. Sedation score of 6  ondansetron Injectable 4 milliGRAM(s) IV Push every 6 hours PRN Nausea      ASSESSMENT  63y Male s/p exploratory laparotomy, 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 transitioned from sicu to floor now back to SICU (1/25) for dropping H/H, leukocytosis, and hemodynamic monitoring, now transitioned from sicu to floor.    Plan:  - Pain control as needed  - NPO with TPN, NGT  - monitor ostomy function  - lovenox for DVT ppx  - oob/ambulate as tolerated  - appreciate ID recs: complete 14day course of zosyn  - appreciate palliative recs  - appreciate pt's medical oncologist (Dr. Chin) recs: recommends pt to see him in office immediately after d/c  - PT

## 2019-01-26 NOTE — PROGRESS NOTE ADULT - ASSESSMENT
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 transitioned from sicu to floor now back to SICU (1/25) for dropping H/H, leukocytosis, and hemodynamic monitoring    Plan:  - continue care per SICU  - Pain control as needed  - NPO with TPN, NGT  - f/u UGI swallow study, possibly going to re-do today  - monitor ostomy function  - lovenox for DVT ppx  - oob/ambulate as tolerated  - appreciate ID recs: complete 14day course of zosyn  - appreciate palliative recs  - appreciate pt's medical oncologist (Dr. Chin) recs: recommends pt to see him in office immediately after d/c  - PT 63y Male s/p exploratory laparotomy, 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 transitioned from sicu to floor now back to SICU (1/25) for dropping H/H, leukocytosis, and hemodynamic monitoring    Plan:  - continue care per SICU  - Pain control as needed  - NPO with TPN, NGT  - monitor ostomy function  - lovenox for DVT ppx  - oob/ambulate as tolerated  - appreciate ID recs: complete 14day course of zosyn  - appreciate palliative recs  - appreciate pt's medical oncologist (Dr. Chin) recs: recommends pt to see him in office immediately after d/c  - PT

## 2019-01-26 NOTE — PROGRESS NOTE ADULT - SUBJECTIVE AND OBJECTIVE BOX
General Surgery Progress Note    SUBJECTIVE:  The patient was seen and examined. No acute events overnight. Pain well controlled. Denies n/v, cp, sob, abd pain.    OBJECTIVE:     ** VITAL SIGNS / I&O's **    Vital Signs Last 24 Hrs  T(C): 37.2 (26 Jan 2019 04:00), Max: 37.2 (26 Jan 2019 04:00)  T(F): 98.9 (26 Jan 2019 04:00), Max: 98.9 (26 Jan 2019 04:00)  HR: 83 (26 Jan 2019 06:00) (81 - 114)  BP: 97/60 (26 Jan 2019 06:00) (89/61 - 117/75)  BP(mean): 70 (26 Jan 2019 06:00) (65 - 85)  RR: 11 (26 Jan 2019 06:00) (11 - 24)  SpO2: 98% (26 Jan 2019 06:00) (91% - 100%)      25 Jan 2019 07:01  -  26 Jan 2019 07:00  --------------------------------------------------------  IN:    lactated ringers.: 1500 mL    sodium chloride 0.9%: 90 mL  Total IN: 1590 mL    OUT:    Bulb: 125 mL    Drain: 20 mL    Ileostomy: 650 mL    Nasoenteral Tube: 60 mL    Voided: 1225 mL  Total OUT: 2080 mL    Total NET: -490 mL          ** PHYSICAL EXAM **    -- CONSTITUTIONAL: Alert, NAD  -- PULMONARY: non-labored respirations  -- ABDOMEN: soft, ND, midline wound with packing in place, LLQ SOSA with dark sanguinous output, RLQ ostomy pink/viable with liquid stool in bag, mucus fistula pink/viable, no rebound or guarding    ** LABS **                          7.6    12.30 )-----------( 111      ( 26 Jan 2019 02:47 )             22.3     26 Jan 2019 02:47    136    |  101    |  29     ----------------------------<  98     4.1     |  25     |  0.72     Ca    8.4        26 Jan 2019 02:47  Phos  4.0       26 Jan 2019 02:47  Mg     2.1       26 Jan 2019 02:47    TPro  5.1    /  Alb  2.3    /  TBili  2.8    /  DBili  x      /  AST  82     /  ALT  82     /  AlkPhos  159    25 Jan 2019 21:30    PT/INR - ( 25 Jan 2019 21:30 )   PT: 16.2 SEC;   INR: 1.41          PTT - ( 25 Jan 2019 21:30 )  PTT:31.7 SEC  CAPILLARY BLOOD GLUCOSE      POCT Blood Glucose.: 92 mg/dL (26 Jan 2019 05:16)  POCT Blood Glucose.: 108 mg/dL (26 Jan 2019 00:18)  POCT Blood Glucose.: 99 mg/dL (25 Jan 2019 17:47)  POCT Blood Glucose.: 99 mg/dL (25 Jan 2019 11:49)        LIVER FUNCTIONS - ( 25 Jan 2019 21:30 )  Alb: 2.3 g/dL / Pro: 5.1 g/dL / ALK PHOS: 159 u/L / ALT: 82 u/L / AST: 82 u/L / GGT: x             Culture - Surg Site Aerob/Anaer w/Gm St (collected 25 Jan 2019 18:05)  Source: PELVIS  Preliminary Report (26 Jan 2019 08:11):    CULTURE IN PROGRESS, FURTHER REPORT TO FOLLOW.    Culture - Abscess with Gram Stain (collected 25 Jan 2019 05:12)  Source: INCISION  Preliminary Report (26 Jan 2019 08:48):    GNRID^Gram Neg Dwight To Be Identified    ENTSP^Enterococcus species          MEDICATIONS  (STANDING):  acetaminophen  IVPB .. 1000 milliGRAM(s) IV Intermittent once  chlorhexidine 4% Liquid 1 Application(s) Topical two times a day  dextrose 5%. 1000 milliLiter(s) (50 mL/Hr) IV Continuous <Continuous>  dextrose 50% Injectable 12.5 Gram(s) IV Push once  dextrose 50% Injectable 25 Gram(s) IV Push once  dextrose 50% Injectable 25 Gram(s) IV Push once  HYDROmorphone PCA (1 mG/mL) 30 milliLiter(s) PCA Continuous PCA Continuous  influenza   Vaccine 0.5 milliLiter(s) IntraMuscular once  insulin lispro (HumaLOG) corrective regimen sliding scale   SubCutaneous every 6 hours  lactated ringers. 1000 milliLiter(s) (75 mL/Hr) IV Continuous <Continuous>  melatonin 3 milliGRAM(s) Oral at bedtime  piperacillin/tazobactam IVPB. 3.375 Gram(s) IV Intermittent every 8 hours    MEDICATIONS  (PRN):  dextrose 40% Gel 15 Gram(s) Oral once PRN Blood Glucose LESS THAN 70 milliGRAM(s)/deciliter  glucagon  Injectable 1 milliGRAM(s) IntraMuscular once PRN Glucose LESS THAN 70 milligrams/deciliter  HYDROmorphone  Injectable 0.5 milliGRAM(s) IV Push every 3 hours PRN mild and moderate pain  HYDROmorphone PCA (1 mG/mL) Rescue Clinician Bolus 0.5 milliGRAM(s) IV Push every 2 hours PRN for Pain Scale GREATER THAN 6  naloxone Injectable 0.1 milliGRAM(s) IV Push every 3 minutes PRN For ANY of the following changes in patient status:  A. RR LESS THAN 10 breaths per minute, B. Oxygen saturation LESS THAN 90%, C. Sedation score of 6  ondansetron Injectable 4 milliGRAM(s) IV Push every 6 hours PRN Nausea

## 2019-01-26 NOTE — PROGRESS NOTE ADULT - SUBJECTIVE AND OBJECTIVE BOX
SICU Daily Progress Note  =====================================================  Interval/Overnight Events:     Patient H/H 6/19 post procedure, with soft systolic blood pressures. 1uPRBC given. Palliative met with patient yesterday. Patient made DNR/DNI and hospice referral made. Goal is comfort and to get patient home.     POD #          	SICU Day #    ***    HPI: 63y Male with history of gastric adenocarcinoma s/p total gastrectomy and Kelby en Y esophagojejunostomy with Dr Espinal at [facility] Dec 2017 c/b early postop SBO s/p lap SHAR 1/3/19, treated with adjuvant chemo and radiation. Pt transferred from [place] after a ~20 day admission with symptoms of PO intolerance. 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 He underwent endoscopic dilation as well as stent placement, but the stent migrated and was subsequently removed. Pt reports vomiting any PO intake greater than ice chips. He has been on TPN for the past 6 weeks.  CT enterography was attempted but patient could not tolerate the PO contrast.     Patient underwent endoscopy at [facility] 1/18/19 to better assess strictures.  EJ anastomosis was noted to be friable and erythematous, biopsies were taken.  In PACU patient had complaints of abdominal distention and pain.  Upright CXR performed significant for pneumoperitoneum.  Nasoesophageal tube placed for decompression and SICU consulted for monitoring.  Taken to the OR that day for ex lap. About 2L of ascites evacuated. Patient was found to have a large mass encasing the EJ anastomosis that was adherent to the abdominal wall.  Patient found to have a perforation at the hepatic flexure with surrounding necrosis.  An extended right hemicolectomy was performed with end ileostomy and mucous fistula creation of transverse colon.  Patient was brought to the SICU hemodynamically stable, intubated.  In SICU patient became tachycardic and hypotensive.  Pressors started, H/H noted to have significant decrease.  SOSA placed around epigastric mass pouring out >500cc sanguinous fluid.  MTP started, R cordis placed.  Patient received 6U PRBC, 5 FFP, 1 cryo, 4 platelets. He was stabilized and downgraded to the surgical floor.    SICU team called 1/25 for tachypnea and hypotension responsive to IVF bolus overnight. Patient is in more pain than usual and feels tachypneic 2/2 pain. He midline incision site was opened at bedside and a small amount of purulence was drained. Palliative care is following. SICU accepts the patient for hemodynamic monitoring.      Allergies: No Known Allergies      MEDICATIONS:   --------------------------------------------------------------------------------------  Neurologic Medications  acetaminophen  IVPB .. 1000 milliGRAM(s) IV Intermittent once  HYDROmorphone  Injectable 0.5 milliGRAM(s) IV Push every 3 hours PRN mild and moderate pain  HYDROmorphone PCA (1 mG/mL) 30 milliLiter(s) PCA Continuous PCA Continuous  HYDROmorphone PCA (1 mG/mL) Rescue Clinician Bolus 0.5 milliGRAM(s) IV Push every 2 hours PRN for Pain Scale GREATER THAN 6  melatonin 3 milliGRAM(s) Oral at bedtime  ondansetron Injectable 4 milliGRAM(s) IV Push every 6 hours PRN Nausea    Respiratory Medications    Cardiovascular Medications    Gastrointestinal Medications  dextrose 5%. 1000 milliLiter(s) IV Continuous <Continuous>  lactated ringers. 1000 milliLiter(s) IV Continuous <Continuous>    Genitourinary Medications    Hematologic/Oncologic Medications  enoxaparin Injectable 40 milliGRAM(s) SubCutaneous daily  influenza   Vaccine 0.5 milliLiter(s) IntraMuscular once    Antimicrobial/Immunologic Medications  piperacillin/tazobactam IVPB. 3.375 Gram(s) IV Intermittent every 8 hours    Endocrine/Metabolic Medications  dextrose 40% Gel 15 Gram(s) Oral once PRN Blood Glucose LESS THAN 70 milliGRAM(s)/deciliter  dextrose 50% Injectable 12.5 Gram(s) IV Push once  dextrose 50% Injectable 25 Gram(s) IV Push once  dextrose 50% Injectable 25 Gram(s) IV Push once  glucagon  Injectable 1 milliGRAM(s) IntraMuscular once PRN Glucose LESS THAN 70 milligrams/deciliter  insulin lispro (HumaLOG) corrective regimen sliding scale   SubCutaneous every 6 hours    Topical/Other Medications  chlorhexidine 4% Liquid 1 Application(s) Topical two times a day  naloxone Injectable 0.1 milliGRAM(s) IV Push every 3 minutes PRN For ANY of the following changes in patient status:  A. RR LESS THAN 10 breaths per minute, B. Oxygen saturation LESS THAN 90%, C. Sedation score of 6    --------------------------------------------------------------------------------------    VITAL SIGNS, INS/OUTS (last 24 hours):  --------------------------------------------------------------------------------------  ((Insert SICU Vitals/Is+Os here))***  --------------------------------------------------------------------------------------    EXAM  NEUROLOGY  RASS:   	GCS:    Exam: Normal, NAD, alert, oriented x3, no focal deficits.     HEENT  Exam: Normocephalic, atraumatic, EOMI.      RESPIRATORY  Exam: Lungs clear to auscultation, Normal expansion/effort.     CARDIOVASCULAR  Exam: S1, S2.  Regular rate and rhythm.       GI/NUTRITION  Exam: Abdomen soft, Non-tender, Non-distended.    Diet: NPO    VASCULAR  Exam: Extremities warm, pink, well-perfused.     MUSCULOSKELETAL  Exam: All extremities moving spontaneously without limitations.     SKIN  Exam: Good skin turgor, no skin breakdown.     METABOLIC/FLUIDS/ELECTROLYTES  dextrose 5%. 1000 milliLiter(s) IV Continuous <Continuous>  lactated ringers. 1000 milliLiter(s) IV Continuous <Continuous>      HEMATOLOGIC  [x] VTE Prophylaxis: enoxaparin Injectable 40 milliGRAM(s) SubCutaneous daily    Transfusions:	[] PRBC	[] Platelets		[] FFP	[] Cryoprecipitate    INFECTIOUS DISEASE  Antimicrobials/Immunologic Medications:  influenza   Vaccine 0.5 milliLiter(s) IntraMuscular once  piperacillin/tazobactam IVPB. 3.375 Gram(s) IV Intermittent every 8 hours    Day #      of     ***    Tubes/Lines/Drains  ***  [x] Peripheral IV  [] Central Venous Line     	[] R	[] L	[] IJ	[] Fem	[] SC	Date Placed:   [] Arterial Line		[] R	[] L	[] Fem	[] Rad	[] Ax	Date Placed:   [] PICC		[] Midline		[] Mediport  [] Urinary Catheter		Date Placed:   [x] Necessity of urinary, arterial, and venous catheters discussed    LABS  --------------------------------------------------------------------------------------  ((Insert SICU Labs here))***  --------------------------------------------------------------------------------------    OTHER LABORATORY:     IMAGING STUDIES:   CXR:     ASSESSMENT:  63y Male with history of gastric adenocarcinoma s/p total gastrectomy and Kelby en Y esophagojejunostomy presented from [facility] for stricture at EJ anastomosis s/p EGD c/b pneumoperitoneum post-procedure s/p ex-lap right hemicolectomy, end ileostomy, mucus fistula. Patient is previously known to SICU team and has been reconsulted for hemodynamic monitoring s/p hypotensive event overnight, downtrending Hct and tachypnea. Palliative care following.     PLAN:   Neurologic: Patient is A&Ox3, in moderate pain  -pain control with Fentanyl patch, Dilaudid and Tylenol for breakthrough    Respiratory: Tachypneic on physical exam, saturating well, non-hypoxic  -low suspicion for PE, but will scan chest with CT A/P  -CXR ordered additionally  -supplemental O2 as needed    Cardiovascular: hypotensive responsive to IVF bolus  -IVF hydration  -EKG  -monitor BP in SICU, possibly actively bleeding  -hold lopressor 5mg q6h IV in setting of hypotension    Gastrointestinal/Nutrition: Continue TPN  -Expanding right sided Pericholic hematoma  -rim enhancing collection- s/p IR drainage  -comfort care is goal     Renal/Genitourinary: NS at 30 -> Will switch to LR maintenance fluid  -Monitor urine output, replete lytes    Hematologic: Hct downtrending over the past 48hrs  -Will transfuse if Hb 7.0, unless actively bleeding  -lovenox ppx  -Repeat CBC/coags    Infectious Disease: ID following, recs appreciated  -continue with Zosyn - consider d/c tomorrow    Endocrine: FS with ISS    Disposition: SICU for hemodynamic monitoring      Palliative care following, GOC discussion ongoing, DNR/DNI hospice referral made     Diagnosis: Respiratory abnormalities, severe caloric nutrition    --------------------------------------------------------------------------------------    Critical Care Diagnoses: SICU Daily Progress Note  =====================================================  Interval/Overnight Events:     Patient H/H 6/19 post procedure, with soft systolic blood pressures. 1uPRBC given. Palliative met with patient yesterday. Patient made DNR/DNI and hospice referral made. Goal is comfort and to get patient home.     POD #          	SICU Day #    ***    HPI: 63y Male with history of gastric adenocarcinoma s/p total gastrectomy and Kelby en Y [facility] c/b early postop SBO s/p lap SHAR 1/3/19, treated with adjuvant chemo and radiation. Pt transferred from [place] after a ~20 day admission with symptoms of PO intolerance. 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 He underwent endoscopic dilation as well as stent placement, but the stent migrated and was subsequently removed. Pt reports vomiting any PO intake greater than ice chips. He has been on TPN for the past 6 weeks.  CT enterography was attempted but patient could not tolerate the PO contrast.     Patient underwent endoscopy at [facility] 1/18/19 to better assess strictures.  EJ anastomosis was noted to be friable and erythematous, biopsies were taken.  In PACU patient had complaints of abdominal distention and pain.  Upright CXR performed significant for pneumoperitoneum.  Nasoesophageal tube placed for decompression and SICU consulted for monitoring.  Taken to the OR that day for ex lap. About 2L of ascites evacuated. Patient was found to have a large mass encasing the EJ anastomosis that was adherent to the abdominal wall.  Patient found to have a perforation at the hepatic flexure with surrounding necrosis.  An extended right hemicolectomy was performed with end ileostomy and mucous fistula creation of transverse colon.  Patient was brought to the SICU hemodynamically stable, intubated.  In SICU patient became tachycardic and hypotensive.  Pressors started, H/H noted to have significant decrease.  SOSA placed around epigastric mass pouring out >500cc sanguinous fluid.  MTP started, R cordis placed.  Patient received 6U PRBC, 5 FFP, 1 cryo, 4 platelets. He was stabilized and downgraded to the surgical floor.    SICU team called 1/25 for tachypnea and hypotension responsive to IVF bolus overnight. Patient is in more pain than usual and feels tachypneic 2/2 pain. He midline incision site was opened at bedside and a small amount of purulence was drained. Palliative care is following. SICU accepts the patient for hemodynamic monitoring.      Allergies: No Known Allergies      MEDICATIONS:   --------------------------------------------------------------------------------------  Neurologic Medications  acetaminophen  IVPB .. 1000 milliGRAM(s) IV Intermittent once  HYDROmorphone  Injectable 0.5 milliGRAM(s) IV Push every 3 hours PRN mild and moderate pain  HYDROmorphone PCA (1 mG/mL) 30 milliLiter(s) PCA Continuous PCA Continuous  HYDROmorphone PCA (1 mG/mL) Rescue Clinician Bolus 0.5 milliGRAM(s) IV Push every 2 hours PRN for Pain Scale GREATER THAN 6  melatonin 3 milliGRAM(s) Oral at bedtime  ondansetron Injectable 4 milliGRAM(s) IV Push every 6 hours PRN Nausea    Respiratory Medications    Cardiovascular Medications    Gastrointestinal Medications  dextrose 5%. 1000 milliLiter(s) IV Continuous <Continuous>  lactated ringers. 1000 milliLiter(s) IV Continuous <Continuous>    Genitourinary Medications    Hematologic/Oncologic Medications  enoxaparin Injectable 40 milliGRAM(s) SubCutaneous daily  influenza   Vaccine 0.5 milliLiter(s) IntraMuscular once    Antimicrobial/Immunologic Medications  piperacillin/tazobactam IVPB. 3.375 Gram(s) IV Intermittent every 8 hours    Endocrine/Metabolic Medications  dextrose 40% Gel 15 Gram(s) Oral once PRN Blood Glucose LESS THAN 70 milliGRAM(s)/deciliter  dextrose 50% Injectable 12.5 Gram(s) IV Push once  dextrose 50% Injectable 25 Gram(s) IV Push once  dextrose 50% Injectable 25 Gram(s) IV Push once  glucagon  Injectable 1 milliGRAM(s) IntraMuscular once PRN Glucose LESS THAN 70 milligrams/deciliter  insulin lispro (HumaLOG) corrective regimen sliding scale   SubCutaneous every 6 hours    Topical/Other Medications  chlorhexidine 4% Liquid 1 Application(s) Topical two times a day  naloxone Injectable 0.1 milliGRAM(s) IV Push every 3 minutes PRN For ANY of the following changes in patient status:  A. RR LESS THAN 10 breaths per minute, B. Oxygen saturation LESS THAN 90%, C. Sedation score of 6    --------------------------------------------------------------------------------------    VITAL SIGNS, INS/OUTS (last 24 hours):  --------------------------------------------------------------------------------------  ((Insert SICU Vitals/Is+Os here))***  --------------------------------------------------------------------------------------    EXAM  NEUROLOGY  RASS:   	GCS:    Exam: Normal, NAD, alert, oriented x3, no focal deficits.     HEENT  Exam: Normocephalic, atraumatic, EOMI.      RESPIRATORY  Exam: Lungs clear to auscultation, Normal expansion/effort.     CARDIOVASCULAR  Exam: S1, S2.  Regular rate and rhythm.       GI/NUTRITION  Exam: Abdomen soft, Non-tender, Non-distended.  IR drain with brown/purulent discharge  Diet: NPO    VASCULAR  Exam: Extremities warm, pink, well-perfused.     MUSCULOSKELETAL  Exam: All extremities moving spontaneously without limitations.     SKIN  Exam: Good skin turgor, no skin breakdown.     METABOLIC/FLUIDS/ELECTROLYTES  dextrose 5%. 1000 milliLiter(s) IV Continuous <Continuous>  lactated ringers. 1000 milliLiter(s) IV Continuous <Continuous>      HEMATOLOGIC  [x] VTE Prophylaxis: enoxaparin Injectable 40 milliGRAM(s) SubCutaneous daily    Transfusions:	[] PRBC	[] Platelets		[] FFP	[] Cryoprecipitate    INFECTIOUS DISEASE  Antimicrobials/Immunologic Medications:  influenza   Vaccine 0.5 milliLiter(s) IntraMuscular once  piperacillin/tazobactam IVPB. 3.375 Gram(s) IV Intermittent every 8 hours    Day #      of     ***    Tubes/Lines/Drains  ***  [x] Peripheral IV  [] Central Venous Line     	[] R	[] L	[] IJ	[] Fem	[] SC	Date Placed:   [] Arterial Line		[] R	[] L	[] Fem	[] Rad	[] Ax	Date Placed:   [] PICC		[] Midline		[] Mediport  [] Urinary Catheter		Date Placed:   [x] Necessity of urinary, arterial, and venous catheters discussed    LABS  --------------------------------------------------------------------------------------  ((Insert SICU Labs here))***  --------------------------------------------------------------------------------------    OTHER LABORATORY:     IMAGING STUDIES:   CXR:     ASSESSMENT:  63y Male with history of gastric adenocarcinoma s/p total gastrectomy and Kelby en Y esophagojejunostomy [facility]osis s/p EGD c/b pneumoperitoneum post-procedure s/p ex-lap right hemicolectomy, end ileostomy, mucus fistula. Patient is previously known to SICU team and has been reconsulted for hemodynamic monitoring s/p hypotensive event overnight, downtrending Hct and tachypnea. Palliative care following.     PLAN:   Neurologic: Patient is A&Ox3, in moderate pain  -pain control with Fentanyl patch, Dilaudid and Tylenol for breakthrough    Respiratory: Tachypneic on physical exam, saturating well, non-hypoxic  -low suspicion for PE, but will scan chest with CT A/P  -CXR ordered additionally  -supplemental O2 as needed    Cardiovascular: hypotensive responsive to IVF bolus  -IVF hydration  -EKG  -monitor BP in SICU, possibly actively bleeding  -hold lopressor 5mg q6h IV in setting of hypotension    Gastrointestinal/Nutrition: Continue TPN  -Expanding right sided Pericholic hematoma  -rim enhancing collection- s/p IR drainage  -comfort care is goal     Renal/Genitourinary: NS at 30 -> Will switch to LR maintenance fluid  -Monitor urine output, replete lytes    Hematologic: Hct downtrending over the past 48hrs  -Will transfuse if Hb 7.0, unless actively bleeding  -lovenox ppx  -Repeat CBC/coags    Infectious Disease: ID following, recs appreciated  -continue with Zosyn - consider d/c tomorrow    Endocrine: FS with ISS    Disposition: SICU for hemodynamic monitoring      Palliative care following, C discussion ongoing, DNR/DNI hospice referral made     Diagnosis: Respiratory abnormalities, severe caloric nutrition    --------------------------------------------------------------------------------------    Critical Care Diagnoses: SICU Daily Progress Note  =====================================================  Interval/Overnight Events:     Patient H/H 6/19 post procedure, with soft systolic blood pressures. 1uPRBC given. Palliative met with patient yesterday. Patient made DNR/DNI and hospice referral made. Goal is comfort and to get patient home. Pt had IR guided drain placed in abscess collection in pelvis, hematoma not drained, pending PICC placement.     POD #          	SICU Day #    ***    HPI: 63y Male with history of gastric adenocarcinoma s/p total gastrectomy and Kelby en Y esophagojejunostomy with Dr Espinal at [facility] Dec 2017 c/b early postop SBO s/p lap SHAR 1/3/19, treated with adjuvant chemo and radiation. Pt transferred from [place] after a ~20 day admission with symptoms of PO intolerance. 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 He underwent endoscopic dilation as well as stent placement, but the stent migrated and was subsequently removed. Pt reports vomiting any PO intake greater than ice chips. He has been on TPN for the past 6 weeks.  CT enterography was attempted but patient could not tolerate the PO contrast.     Patient underwent endoscopy at [facility] 1/18/19 to better assess strictures.  EJ anastomosis was noted to be friable and erythematous, biopsies were taken.  In PACU patient had complaints of abdominal distention and pain.  Upright CXR performed significant for pneumoperitoneum.  Nasoesophageal tube placed for decompression and SICU consulted for monitoring.  Taken to the OR that day for ex lap. About 2L of ascites evacuated. Patient was found to have a large mass encasing the EJ anastomosis that was adherent to the abdominal wall.  Patient found to have a perforation at the hepatic flexure with surrounding necrosis.  An extended right hemicolectomy was performed with end ileostomy and mucous fistula creation of transverse colon.  Patient was brought to the SICU hemodynamically stable, intubated.  In SICU patient became tachycardic and hypotensive.  Pressors started, H/H noted to have significant decrease.  SOSA placed around epigastric mass pouring out >500cc sanguinous fluid.  MTP started, R cordis placed.  Patient received 6U PRBC, 5 FFP, 1 cryo, 4 platelets. He was stabilized and downgraded to the surgical floor.    SICU team called 1/25 for tachypnea and hypotension responsive to IVF bolus overnight. Patient is in more pain than usual and feels tachypneic 2/2 pain. He midline incision site was opened at bedside and a small amount of purulence was drained. Palliative care is following. SICU accepts the patient for hemodynamic monitoring.      Allergies: No Known Allergies      MEDICATIONS:   --------------------------------------------------------------------------------------  Neurologic Medications  acetaminophen  IVPB .. 1000 milliGRAM(s) IV Intermittent once  HYDROmorphone  Injectable 0.5 milliGRAM(s) IV Push every 3 hours PRN mild and moderate pain  HYDROmorphone PCA (1 mG/mL) 30 milliLiter(s) PCA Continuous PCA Continuous  HYDROmorphone PCA (1 mG/mL) Rescue Clinician Bolus 0.5 milliGRAM(s) IV Push every 2 hours PRN for Pain Scale GREATER THAN 6  melatonin 3 milliGRAM(s) Oral at bedtime  ondansetron Injectable 4 milliGRAM(s) IV Push every 6 hours PRN Nausea    Respiratory Medications    Cardiovascular Medications    Gastrointestinal Medications  dextrose 5%. 1000 milliLiter(s) IV Continuous <Continuous>  lactated ringers. 1000 milliLiter(s) IV Continuous <Continuous>    Genitourinary Medications    Hematologic/Oncologic Medications  enoxaparin Injectable 40 milliGRAM(s) SubCutaneous daily  influenza   Vaccine 0.5 milliLiter(s) IntraMuscular once    Antimicrobial/Immunologic Medications  piperacillin/tazobactam IVPB. 3.375 Gram(s) IV Intermittent every 8 hours    Endocrine/Metabolic Medications  dextrose 40% Gel 15 Gram(s) Oral once PRN Blood Glucose LESS THAN 70 milliGRAM(s)/deciliter  dextrose 50% Injectable 12.5 Gram(s) IV Push once  dextrose 50% Injectable 25 Gram(s) IV Push once  dextrose 50% Injectable 25 Gram(s) IV Push once  glucagon  Injectable 1 milliGRAM(s) IntraMuscular once PRN Glucose LESS THAN 70 milligrams/deciliter  insulin lispro (HumaLOG) corrective regimen sliding scale   SubCutaneous every 6 hours    Topical/Other Medications  chlorhexidine 4% Liquid 1 Application(s) Topical two times a day  naloxone Injectable 0.1 milliGRAM(s) IV Push every 3 minutes PRN For ANY of the following changes in patient status:  A. RR LESS THAN 10 breaths per minute, B. Oxygen saturation LESS THAN 90%, C. Sedation score of 6    --------------------------------------------------------------------------------------  ICU Vital Signs Last 24 Hrs  T(C): 37.2 (26 Jan 2019 04:00), Max: 37.2 (26 Jan 2019 04:00)  T(F): 98.9 (26 Jan 2019 04:00), Max: 98.9 (26 Jan 2019 04:00)  HR: 83 (26 Jan 2019 06:00) (81 - 114)  BP: 97/60 (26 Jan 2019 06:00) (89/61 - 117/75)  BP(mean): 70 (26 Jan 2019 06:00) (65 - 85)  ABP: --  ABP(mean): --  RR: 11 (26 Jan 2019 06:00) (11 - 24)  SpO2: 98% (26 Jan 2019 06:00) (91% - 100%)    I&O's Detail    25 Jan 2019 07:01  -  26 Jan 2019 07:00  --------------------------------------------------------  IN:    lactated ringers.: 1500 mL    sodium chloride 0.9%: 90 mL  Total IN: 1590 mL    OUT:    Bulb: 125 mL    Drain: 20 mL    Ileostomy: 650 mL    Nasoenteral Tube: 60 mL    Voided: 1225 mL  Total OUT: 2080 mL    Total NET: -490 mL      EXAM  NEUROLOGY  RASS:   	GCS:    Exam: Normal, NAD, alert, oriented x3, no focal deficits.     HEENT  Exam: Normocephalic, atraumatic, EOMI.      RESPIRATORY  Exam: Lungs clear to auscultation, Normal expansion/effort.     CARDIOVASCULAR  Exam: S1, S2.  Regular rate and rhythm.       GI/NUTRITION  Exam: Abdomen soft, Non-tender, Non-distended.  IR drain with brown/purulent discharge  Diet: NPO    VASCULAR  Exam: Extremities warm, pink, well-perfused.     MUSCULOSKELETAL  Exam: All extremities moving spontaneously without limitations.     SKIN  Exam: Good skin turgor, no skin breakdown.     METABOLIC/FLUIDS/ELECTROLYTES  dextrose 5%. 1000 milliLiter(s) IV Continuous <Continuous>  lactated ringers. 1000 milliLiter(s) IV Continuous <Continuous>      HEMATOLOGIC  [x] VTE Prophylaxis: enoxaparin Injectable 40 milliGRAM(s) SubCutaneous daily    Transfusions:	[] PRBC	[] Platelets		[] FFP	[] Cryoprecipitate    INFECTIOUS DISEASE  Antimicrobials/Immunologic Medications:  influenza   Vaccine 0.5 milliLiter(s) IntraMuscular once  piperacillin/tazobactam IVPB. 3.375 Gram(s) IV Intermittent every 8 hours    Day #      of     ***    Tubes/Lines/Drains  ***  [x] Peripheral IV  [] Central Venous Line     	[] R	[] L	[] IJ	[] Fem	[] SC	Date Placed:   [] Arterial Line		[] R	[] L	[] Fem	[] Rad	[] Ax	Date Placed:   [] PICC		[] Midline		[] Mediport  [] Urinary Catheter		Date Placed:   [x] Necessity of urinary, arterial, and venous catheters discussed                          7.6    12.30 )-----------( 111      ( 26 Jan 2019 02:47 )             22.3   01-26    136  |  101  |  29<H>  ----------------------------<  98  4.1   |  25  |  0.72    Ca    8.4      26 Jan 2019 02:47  Phos  4.0     01-26  Mg     2.1     01-26    TPro  5.1<L>  /  Alb  2.3<L>  /  TBili  2.8<H>  /  DBili  x   /  AST  82<H>  /  ALT  82<H>  /  AlkPhos  159<H>  01-25    PT/INR - ( 25 Jan 2019 21:30 )   PT: 16.2 SEC;   INR: 1.41          PTT - ( 25 Jan 2019 21:30 )  PTT:31.7 SEC    ABG - ( 25 Jan 2019 04:10 )  pH, Arterial: 7.53  pH, Blood: x     /  pCO2: 23    /  pO2: 119   / HCO3: 23    / Base Excess: -3.0  /  SaO2: 98.6        OTHER LABORATORY:     IMAGING STUDIES:   CXR:

## 2019-01-26 NOTE — PROGRESS NOTE ADULT - ASSESSMENT
ASSESSMENT:  63y Male with history of gastric adenocarcinoma s/p total gastrectomy and [place] en Y esophagojejunostomy presented from formerly [facility] for stricture at EJ anastomosis s/p EGD c/b pneumoperitoneum post-procedure s/p ex-lap right hemicolectomy, end ileostomy, mucus fistula. Patient is previously known to SICU team and has been reconsulted for hemodynamic monitoring s/p hypotensive event overnight, downtrending Hct and tachypnea. Palliative care following.     PLAN:   Neurologic: Patient is A&Ox3, in moderate pain  -pain control with PCA morphine, palliative care following     Respiratory: Tachypneic on physical exam, saturating well, non-hypoxic  -supplemental O2 as needed    Cardiovascular: stable hemodynamically for whole day in SICU, received 1 PRBC o/n for drop in h/h   -ct IVF hydration with LR  -hemodynamic monitoring as per routine   -hold lopressor 5mg q6h IV in setting of hypotension    Gastrointestinal/Nutrition:   TPN stopped as PICC line was violated with contrast injection, pt now has opted for pleasure feeds and TPN is discontinued   -IR guided drain placement to drain pelvic collections  -PO feeds once back from procedure   -Ct A/P positive for ascites, collection and leak, pt has opted for comfort care at this point, no surgical intervention    Renal/Genitourinary:   -Monitor urine output, and BMP, replete lytes as needed     Hematologic:   H/H stable, ct monitoring   -lovenox ppx    Infectious Disease:   ID following, recs appreciated  -continue with Zosyn - consider d/c tomorrow    Endocrine: FS with ISS    Disposition: SICU for hemodynamic monitoring    -Palliative care following, GOC discussion ongoing, currently full code    Diagnosis: Respiratory abnormalities, severe caloric nutrition

## 2019-01-27 LAB
GLUCOSE BLDC GLUCOMTR-MCNC: 73 MG/DL — SIGNIFICANT CHANGE UP (ref 70–99)
GLUCOSE BLDC GLUCOMTR-MCNC: 78 MG/DL — SIGNIFICANT CHANGE UP (ref 70–99)
GLUCOSE BLDC GLUCOMTR-MCNC: 80 MG/DL — SIGNIFICANT CHANGE UP (ref 70–99)
GLUCOSE BLDC GLUCOMTR-MCNC: 83 MG/DL — SIGNIFICANT CHANGE UP (ref 70–99)
GRAM STN SPEC: SIGNIFICANT CHANGE UP
HCT VFR BLD CALC: 23 % — LOW (ref 39–50)
HGB BLD-MCNC: 7.4 G/DL — LOW (ref 13–17)
MCHC RBC-ENTMCNC: 29.4 PG — SIGNIFICANT CHANGE UP (ref 27–34)
MCHC RBC-ENTMCNC: 32.2 % — SIGNIFICANT CHANGE UP (ref 32–36)
MCV RBC AUTO: 91.3 FL — SIGNIFICANT CHANGE UP (ref 80–100)
METHOD TYPE: SIGNIFICANT CHANGE UP
NRBC # FLD: 0 K/UL — LOW (ref 25–125)
ORGANISM # SPEC MICROSCOPIC CNT: SIGNIFICANT CHANGE UP
PLATELET # BLD AUTO: 141 K/UL — LOW (ref 150–400)
PMV BLD: 11.1 FL — SIGNIFICANT CHANGE UP (ref 7–13)
RBC # BLD: 2.52 M/UL — LOW (ref 4.2–5.8)
RBC # FLD: 15.2 % — HIGH (ref 10.3–14.5)
SPECIMEN SOURCE: SIGNIFICANT CHANGE UP
WBC # BLD: 7.74 K/UL — SIGNIFICANT CHANGE UP (ref 3.8–10.5)
WBC # FLD AUTO: 7.74 K/UL — SIGNIFICANT CHANGE UP (ref 3.8–10.5)

## 2019-01-27 PROCEDURE — 99232 SBSQ HOSP IP/OBS MODERATE 35: CPT

## 2019-01-27 RX ORDER — DEXTROSE MONOHYDRATE, SODIUM CHLORIDE, AND POTASSIUM CHLORIDE 50; .745; 4.5 G/1000ML; G/1000ML; G/1000ML
1000 INJECTION, SOLUTION INTRAVENOUS
Qty: 0 | Refills: 0 | Status: DISCONTINUED | OUTPATIENT
Start: 2019-01-27 | End: 2019-01-29

## 2019-01-27 RX ADMIN — PIPERACILLIN AND TAZOBACTAM 25 GRAM(S): 4; .5 INJECTION, POWDER, LYOPHILIZED, FOR SOLUTION INTRAVENOUS at 11:57

## 2019-01-27 RX ADMIN — PIPERACILLIN AND TAZOBACTAM 25 GRAM(S): 4; .5 INJECTION, POWDER, LYOPHILIZED, FOR SOLUTION INTRAVENOUS at 03:26

## 2019-01-27 RX ADMIN — PIPERACILLIN AND TAZOBACTAM 25 GRAM(S): 4; .5 INJECTION, POWDER, LYOPHILIZED, FOR SOLUTION INTRAVENOUS at 20:48

## 2019-01-27 RX ADMIN — DEXTROSE MONOHYDRATE, SODIUM CHLORIDE, AND POTASSIUM CHLORIDE 100 MILLILITER(S): 50; .745; 4.5 INJECTION, SOLUTION INTRAVENOUS at 14:18

## 2019-01-27 RX ADMIN — DEXTROSE MONOHYDRATE, SODIUM CHLORIDE, AND POTASSIUM CHLORIDE 100 MILLILITER(S): 50; .745; 4.5 INJECTION, SOLUTION INTRAVENOUS at 19:02

## 2019-01-27 RX ADMIN — HYDROMORPHONE HYDROCHLORIDE 30 MILLILITER(S): 2 INJECTION INTRAMUSCULAR; INTRAVENOUS; SUBCUTANEOUS at 19:02

## 2019-01-27 RX ADMIN — HYDROMORPHONE HYDROCHLORIDE 30 MILLILITER(S): 2 INJECTION INTRAMUSCULAR; INTRAVENOUS; SUBCUTANEOUS at 07:14

## 2019-01-27 RX ADMIN — SODIUM CHLORIDE 75 MILLILITER(S): 9 INJECTION, SOLUTION INTRAVENOUS at 11:57

## 2019-01-27 NOTE — PROGRESS NOTE ADULT - SUBJECTIVE AND OBJECTIVE BOX
General Surgery Progress Note    SUBJECTIVE:  The patient was seen and examined. No acute events overnight. Transferred from SICU to floor yesterday. pain well controlled. Denies n/v, cp, sob, abd pain.    OBJECTIVE:     ** VITAL SIGNS / I&O's **    Vital Signs Last 24 Hrs  T(C): 36.8 (27 Jan 2019 00:44), Max: 36.8 (26 Jan 2019 12:00)  T(F): 98.3 (27 Jan 2019 00:44), Max: 98.3 (26 Jan 2019 12:00)  HR: 85 (27 Jan 2019 00:44) (77 - 90)  BP: 110/61 (27 Jan 2019 00:44) (94/61 - 112/55)  BP(mean): 65 (26 Jan 2019 14:00) (65 - 74)  RR: 16 (27 Jan 2019 00:44) (10 - 16)  SpO2: 97% (27 Jan 2019 00:44) (97% - 99%)      25 Jan 2019 07:01  -  26 Jan 2019 07:00  --------------------------------------------------------  IN:    lactated ringers.: 1500 mL    sodium chloride 0.9%: 90 mL  Total IN: 1590 mL    OUT:    Bulb: 125 mL    Drain: 20 mL    Ileostomy: 650 mL    Nasoenteral Tube: 60 mL    Voided: 1225 mL  Total OUT: 2080 mL    Total NET: -490 mL      26 Jan 2019 07:01  -  27 Jan 2019 05:16  --------------------------------------------------------  IN:    lactated ringers.: 525 mL  Total IN: 525 mL    OUT:    Bulb: 15 mL    Drain: 13 mL    Ileostomy: 200 mL    Voided: 1650 mL  Total OUT: 1878 mL    Total NET: -1353 mL          ** PHYSICAL EXAM **    -- CONSTITUTIONAL: Alert, NAD  -- HEENT: NGT in place to suction  -- PULMONARY: non-labored respirations  -- ABDOMEN: soft, ND, midline wound with packing in place, LLQ SOSA with dark sanguinous output, RLQ ostomy pink/viable with liquid stool in bag, mucus fistula pink/viable, no rebound or guarding    ** LABS **                          7.6    12.30 )-----------( 111      ( 26 Jan 2019 02:47 )             22.3     26 Jan 2019 02:47    136    |  101    |  29     ----------------------------<  98     4.1     |  25     |  0.72     Ca    8.4        26 Jan 2019 02:47  Phos  4.0       26 Jan 2019 02:47  Mg     2.1       26 Jan 2019 02:47    TPro  5.1    /  Alb  2.3    /  TBili  2.8    /  DBili  x      /  AST  82     /  ALT  82     /  AlkPhos  159    25 Jan 2019 21:30    PT/INR - ( 25 Jan 2019 21:30 )   PT: 16.2 SEC;   INR: 1.41          PTT - ( 25 Jan 2019 21:30 )  PTT:31.7 SEC  CAPILLARY BLOOD GLUCOSE      POCT Blood Glucose.: 83 mg/dL (27 Jan 2019 00:31)  POCT Blood Glucose.: 76 mg/dL (26 Jan 2019 18:10)  POCT Blood Glucose.: 94 mg/dL (26 Jan 2019 12:34)        LIVER FUNCTIONS - ( 25 Jan 2019 21:30 )  Alb: 2.3 g/dL / Pro: 5.1 g/dL / ALK PHOS: 159 u/L / ALT: 82 u/L / AST: 82 u/L / GGT: x             Culture - Surg Site Aerob/Anaer w/Gm St (collected 25 Jan 2019 18:05)  Source: PELVIS  Preliminary Report (26 Jan 2019 08:11):    CULTURE IN PROGRESS, FURTHER REPORT TO FOLLOW.    Culture - Abscess with Gram Stain (collected 25 Jan 2019 05:12)  Source: INCISION  Preliminary Report (26 Jan 2019 13:00):    EC^Escherichia coli    ENTF^Enterococcus faecalis    QUANTITY OF GROWTH: FEW          MEDICATIONS  (STANDING):  acetaminophen  IVPB .. 1000 milliGRAM(s) IV Intermittent once  chlorhexidine 4% Liquid 1 Application(s) Topical two times a day  dextrose 5%. 1000 milliLiter(s) (50 mL/Hr) IV Continuous <Continuous>  dextrose 50% Injectable 12.5 Gram(s) IV Push once  dextrose 50% Injectable 25 Gram(s) IV Push once  dextrose 50% Injectable 25 Gram(s) IV Push once  HYDROmorphone PCA (1 mG/mL) 30 milliLiter(s) PCA Continuous PCA Continuous  influenza   Vaccine 0.5 milliLiter(s) IntraMuscular once  insulin lispro (HumaLOG) corrective regimen sliding scale   SubCutaneous every 6 hours  lactated ringers. 1000 milliLiter(s) (75 mL/Hr) IV Continuous <Continuous>  melatonin 3 milliGRAM(s) Oral at bedtime  piperacillin/tazobactam IVPB. 3.375 Gram(s) IV Intermittent every 8 hours    MEDICATIONS  (PRN):  dextrose 40% Gel 15 Gram(s) Oral once PRN Blood Glucose LESS THAN 70 milliGRAM(s)/deciliter  glucagon  Injectable 1 milliGRAM(s) IntraMuscular once PRN Glucose LESS THAN 70 milligrams/deciliter  HYDROmorphone  Injectable 0.5 milliGRAM(s) IV Push every 3 hours PRN mild and moderate pain  HYDROmorphone PCA (1 mG/mL) Rescue Clinician Bolus 0.5 milliGRAM(s) IV Push every 2 hours PRN for Pain Scale GREATER THAN 6  naloxone Injectable 0.1 milliGRAM(s) IV Push every 3 minutes PRN For ANY of the following changes in patient status:  A. RR LESS THAN 10 breaths per minute, B. Oxygen saturation LESS THAN 90%, C. Sedation score of 6  ondansetron Injectable 4 milliGRAM(s) IV Push every 6 hours PRN Nausea

## 2019-01-27 NOTE — PROGRESS NOTE ADULT - SUBJECTIVE AND OBJECTIVE BOX
Follow Up:  peritonitis    Inverval History/ROS: transferred to floor.  feels better.  continues with NGT to suction.   no fever, chills.  Rest of ROS neg.  Allergies  No Known Allergies        ANTIMICROBIALS:  piperacillin/tazobactam IVPB. 3.375 every 8 hours      OTHER MEDS:  acetaminophen  IVPB .. 1000 milliGRAM(s) IV Intermittent once  chlorhexidine 4% Liquid 1 Application(s) Topical two times a day  dextrose 40% Gel 15 Gram(s) Oral once PRN  dextrose 5%. 1000 milliLiter(s) IV Continuous <Continuous>  dextrose 50% Injectable 12.5 Gram(s) IV Push once  dextrose 50% Injectable 25 Gram(s) IV Push once  dextrose 50% Injectable 25 Gram(s) IV Push once  glucagon  Injectable 1 milliGRAM(s) IntraMuscular once PRN  HYDROmorphone  Injectable 0.5 milliGRAM(s) IV Push every 3 hours PRN  HYDROmorphone PCA (1 mG/mL) 30 milliLiter(s) PCA Continuous PCA Continuous  HYDROmorphone PCA (1 mG/mL) Rescue Clinician Bolus 0.5 milliGRAM(s) IV Push every 2 hours PRN  influenza   Vaccine 0.5 milliLiter(s) IntraMuscular once  insulin lispro (HumaLOG) corrective regimen sliding scale   SubCutaneous every 6 hours  lactated ringers. 1000 milliLiter(s) IV Continuous <Continuous>  melatonin 3 milliGRAM(s) Oral at bedtime  naloxone Injectable 0.1 milliGRAM(s) IV Push every 3 minutes PRN  ondansetron Injectable 4 milliGRAM(s) IV Push every 6 hours PRN      Vital Signs Last 24 Hrs  T(C): 36.7 (27 Jan 2019 07:41), Max: 36.8 (26 Jan 2019 12:00)  T(F): 98.1 (27 Jan 2019 07:41), Max: 98.3 (26 Jan 2019 12:00)  HR: 81 (27 Jan 2019 07:41) (77 - 90)  BP: 112/68 (27 Jan 2019 07:41) (97/55 - 116/65)  BP(mean): 76 (27 Jan 2019 06:08) (65 - 76)  RR: 18 (27 Jan 2019 07:41) (10 - 18)  SpO2: 96% (27 Jan 2019 07:41) (96% - 99%)    PHYSICAL EXAM:  General: [x ] non-toxic  HEAD/EYES: [ ] PERRL [x ] white sclera [ ] icterus  ENT:  NGT  Cardiovascular:   [ ] murmur [x ] normal [ ] PPM/AICD  Respiratory:  [ x] clear to ausculation bilaterally  GI:  mid line wound w dressing, ostomy right drain left  :  [x ] no coto [ ] no CVA tenderness   Musculoskeletal:  [ x] no synovitis  Neurologic:  [x ] non-focal exam   Skin:  [ ] no rash  Lymph: [ ] no lymphadenopathy  Psychiatric:  [x ] appropriate affect [x ] alert & oriented  Lines:  [x ] no phlebitis [ ] central line                                7.4    7.74  )-----------( 141      ( 27 Jan 2019 06:12 )             23.0       01-26    136  |  101  |  29<H>  ----------------------------<  98  4.1   |  25  |  0.72    Ca    8.4      26 Jan 2019 02:47  Phos  4.0     01-26  Mg     2.1     01-26    TPro  5.1<L>  /  Alb  2.3<L>  /  TBili  2.8<H>  /  DBili  x   /  AST  82<H>  /  ALT  82<H>  /  AlkPhos  159<H>  01-25          MICROBIOLOGY:  v  PELVIS  01-25-19 --  --  --      INCISION  01-25-19   EC^Escherichia coli  ENTF^Enterococcus faecalis  QUANTITY OF GROWTH: FEW  PSA^Pseudomonas aeruginosa  --  Escherichia coli  Enterococcus faecalis  Pseudomonas aeruginosa      SURGERY  01-18-19 --  --  Pseudomonas aeruginosa  Escherichia coli                RADIOLOGY:

## 2019-01-27 NOTE — PROGRESS NOTE ADULT - ASSESSMENT
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 transitioned from sicu to floor now back to SICU (1/25) for dropping H/H, leukocytosis, and hemodynamic monitoring, now transitioned from sicu to floor.    PLAN:  - Pain control as needed  - NPO with TPN, NGT  - monitor ostomy function  - lovenox for DVT ppx  - oob/ambulate as tolerated  - appreciate ID recs: complete 14day course of zosyn  - appreciate palliative recs  - appreciate pt's medical oncologist (Dr. Chin) recs: recommends pt to see him in office immediately after d/c  - PT.

## 2019-01-28 ENCOUNTER — TRANSCRIPTION ENCOUNTER (OUTPATIENT)
Age: 64
End: 2019-01-28

## 2019-01-28 LAB
-  AMIKACIN: SIGNIFICANT CHANGE UP
-  AMPICILLIN/SULBACTAM: SIGNIFICANT CHANGE UP
-  AMPICILLIN: SIGNIFICANT CHANGE UP
-  AZTREONAM: SIGNIFICANT CHANGE UP
-  CEFAZOLIN: SIGNIFICANT CHANGE UP
-  CEFEPIME: SIGNIFICANT CHANGE UP
-  CEFOXITIN: SIGNIFICANT CHANGE UP
-  CEFTAZIDIME: SIGNIFICANT CHANGE UP
-  CEFTRIAXONE: SIGNIFICANT CHANGE UP
-  CIPROFLOXACIN: SIGNIFICANT CHANGE UP
-  ERTAPENEM: SIGNIFICANT CHANGE UP
-  GENTAMICIN: SIGNIFICANT CHANGE UP
-  IMIPENEM: SIGNIFICANT CHANGE UP
-  LEVOFLOXACIN: SIGNIFICANT CHANGE UP
-  MEROPENEM: SIGNIFICANT CHANGE UP
-  PIPERACILLIN/TAZOBACTAM: SIGNIFICANT CHANGE UP
-  TETRACYCLINE: SIGNIFICANT CHANGE UP
-  TIGECYCLINE: SIGNIFICANT CHANGE UP
-  TOBRAMYCIN: SIGNIFICANT CHANGE UP
-  TRIMETHOPRIM/SULFAMETHOXAZOLE: SIGNIFICANT CHANGE UP
-  VANCOMYCIN: SIGNIFICANT CHANGE UP
ANION GAP SERPL CALC-SCNC: 9 MMO/L — SIGNIFICANT CHANGE UP (ref 7–14)
BUN SERPL-MCNC: 8 MG/DL — SIGNIFICANT CHANGE UP (ref 7–23)
CALCIUM SERPL-MCNC: 8.2 MG/DL — LOW (ref 8.4–10.5)
CHLORIDE SERPL-SCNC: 98 MMOL/L — SIGNIFICANT CHANGE UP (ref 98–107)
CO2 SERPL-SCNC: 25 MMOL/L — SIGNIFICANT CHANGE UP (ref 22–31)
CREAT SERPL-MCNC: 0.55 MG/DL — SIGNIFICANT CHANGE UP (ref 0.5–1.3)
CULTURE - SURGICAL SITE: SIGNIFICANT CHANGE UP
CULTURE RESULTS: SIGNIFICANT CHANGE UP
GLUCOSE BLDC GLUCOMTR-MCNC: 105 MG/DL — HIGH (ref 70–99)
GLUCOSE BLDC GLUCOMTR-MCNC: 106 MG/DL — HIGH (ref 70–99)
GLUCOSE BLDC GLUCOMTR-MCNC: 110 MG/DL — HIGH (ref 70–99)
GLUCOSE BLDC GLUCOMTR-MCNC: 112 MG/DL — HIGH (ref 70–99)
GLUCOSE BLDC GLUCOMTR-MCNC: 128 MG/DL — HIGH (ref 70–99)
GLUCOSE SERPL-MCNC: 93 MG/DL — SIGNIFICANT CHANGE UP (ref 70–99)
GRAM STN WND: SIGNIFICANT CHANGE UP
HCT VFR BLD CALC: 24.3 % — LOW (ref 39–50)
HGB BLD-MCNC: 7.7 G/DL — LOW (ref 13–17)
MAGNESIUM SERPL-MCNC: 1.9 MG/DL — SIGNIFICANT CHANGE UP (ref 1.6–2.6)
MCHC RBC-ENTMCNC: 29.4 PG — SIGNIFICANT CHANGE UP (ref 27–34)
MCHC RBC-ENTMCNC: 31.7 % — LOW (ref 32–36)
MCV RBC AUTO: 92.7 FL — SIGNIFICANT CHANGE UP (ref 80–100)
METHOD TYPE: SIGNIFICANT CHANGE UP
NRBC # FLD: 0 K/UL — LOW (ref 25–125)
ORGANISM # SPEC MICROSCOPIC CNT: SIGNIFICANT CHANGE UP
PHOSPHATE SERPL-MCNC: 2.2 MG/DL — LOW (ref 2.5–4.5)
PLATELET # BLD AUTO: 178 K/UL — SIGNIFICANT CHANGE UP (ref 150–400)
PMV BLD: 10.8 FL — SIGNIFICANT CHANGE UP (ref 7–13)
POTASSIUM SERPL-MCNC: 4 MMOL/L — SIGNIFICANT CHANGE UP (ref 3.5–5.3)
POTASSIUM SERPL-SCNC: 4 MMOL/L — SIGNIFICANT CHANGE UP (ref 3.5–5.3)
RBC # BLD: 2.62 M/UL — LOW (ref 4.2–5.8)
RBC # FLD: 14.7 % — HIGH (ref 10.3–14.5)
SODIUM SERPL-SCNC: 132 MMOL/L — LOW (ref 135–145)
WBC # BLD: 7.41 K/UL — SIGNIFICANT CHANGE UP (ref 3.8–10.5)
WBC # FLD AUTO: 7.41 K/UL — SIGNIFICANT CHANGE UP (ref 3.8–10.5)

## 2019-01-28 PROCEDURE — 99233 SBSQ HOSP IP/OBS HIGH 50: CPT

## 2019-01-28 PROCEDURE — 36573 INSJ PICC RS&I 5 YR+: CPT

## 2019-01-28 RX ORDER — LANOLIN ALCOHOL/MO/W.PET/CERES
1 CREAM (GRAM) TOPICAL
Qty: 0 | Refills: 0 | COMMUNITY
Start: 2019-01-28

## 2019-01-28 RX ADMIN — HYDROMORPHONE HYDROCHLORIDE 30 MILLILITER(S): 2 INJECTION INTRAMUSCULAR; INTRAVENOUS; SUBCUTANEOUS at 07:22

## 2019-01-28 RX ADMIN — PIPERACILLIN AND TAZOBACTAM 25 GRAM(S): 4; .5 INJECTION, POWDER, LYOPHILIZED, FOR SOLUTION INTRAVENOUS at 20:21

## 2019-01-28 RX ADMIN — PIPERACILLIN AND TAZOBACTAM 25 GRAM(S): 4; .5 INJECTION, POWDER, LYOPHILIZED, FOR SOLUTION INTRAVENOUS at 12:56

## 2019-01-28 RX ADMIN — CHLORHEXIDINE GLUCONATE 1 APPLICATION(S): 213 SOLUTION TOPICAL at 17:13

## 2019-01-28 RX ADMIN — PIPERACILLIN AND TAZOBACTAM 25 GRAM(S): 4; .5 INJECTION, POWDER, LYOPHILIZED, FOR SOLUTION INTRAVENOUS at 04:40

## 2019-01-28 RX ADMIN — Medication 85 MILLIMOLE(S): at 17:12

## 2019-01-28 RX ADMIN — HYDROMORPHONE HYDROCHLORIDE 30 MILLILITER(S): 2 INJECTION INTRAMUSCULAR; INTRAVENOUS; SUBCUTANEOUS at 19:06

## 2019-01-28 NOTE — PROGRESS NOTE ADULT - ASSESSMENT
63M pmh HTN, HLD with history of gastric adenocarcinoma s/p total gastrectomy and Vickie en Y esophagojejunostomy with Dr Espinal at formerly [facility] Dec 2017 c/b early postop SBO s/p lap SHAR, treated with adjuvant chemo and radiation who was transferred to [facility] on 1/15/19 for esophageal dilatation, c/b pneumoperitoneum from ischemic colon with colonic perforation, feculent peritonitis, and recurrent carcinomatosis s/p right hemicolectomy with ileostomy.

## 2019-01-28 NOTE — PROGRESS NOTE ADULT - PROBLEM SELECTOR PLAN 1
- S/p total gastrectomy and Vickie en Y esophagojejunostomy with Dr Espinal at [facility] Dec 2017  - Found to have recurrent carcinomatosis at the previous esophagojejunal anastomosis  - Per surgery team, no further surgical intervention at this time  - Patient was initially agreeable to further chemo if eligible. However, now that he cannot tolerate PO intake based on swallow study, he and his wife would prefer going home/inpatient with hospice services focusing on comfort and symptom management.

## 2019-01-28 NOTE — PROGRESS NOTE ADULT - PROBLEM SELECTOR PLAN 5
Pt is DNR/DNI, surrogate is pts wife. Plan is for pt to continue with pleasure feeds as tolerated, due to leakage seen in swallow study, patient and wife understand he is not a candidate for PO intake. Despite infection risk, he is now agreeable to pleasure feeds  - Discharge home is with hospice services, PCA, d/c TPN.  MOLST in the chart.

## 2019-01-28 NOTE — DISCHARGE NOTE ADULT - PLAN OF CARE
pain control WOUND CARE:    BATHING: Please do not submerge wound underwater. You may shower and/or sponge bathe.  ACTIVITY: No heavy lifting or straining. Otherwise, you may return to your usual level of physical activity. If you are taking narcotic pain medication (such as Percocet) DO NOT drive a car, operate machinery or make important decisions.  DIET: Return to your usual diet.  NOTIFY YOUR SURGEON IF: You have any bleeding that does not stop, any pus draining from your wound(s), any fever (over 100.4 F) or chills, persistent nausea/vomiting, persistent diarrhea, or if your pain is not controlled on your discharge pain medications.  FOLLOW-UP: Please follow up with your primary care physician in one week regarding your hospitalization WOUND CARE:  Keep incision clean, dry, and intact.  BATHING: Please do not submerge wound underwater. You may shower and/or sponge bathe.  ACTIVITY: No heavy lifting or straining. Otherwise, you may return to your usual level of physical activity. If you are taking narcotic pain medication (such as Percocet) DO NOT drive a car, operate machinery or make important decisions.  DIET: Return to your usual diet.  NOTIFY YOUR SURGEON IF: You have any bleeding that does not stop, any pus draining from your wound(s), any fever (over 100.4 F) or chills, persistent nausea/vomiting, persistent diarrhea, or if your pain is not controlled on your discharge pain medications.  FOLLOW-UP: Please follow up with your primary care physician in one week regarding your hospitalization chemotherapy as indicated , hospice care hospice care

## 2019-01-28 NOTE — PROGRESS NOTE ADULT - ASSESSMENT
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 transitioned from sicu to floor now back to SICU (1/25) for dropping H/H, leukocytosis, and hemodynamic monitoring, now transitioned from sicu to floor.    PLAN:  - Pain control as needed  - NPO/IVF, NGT  - monitor ostomy function  - lovenox for DVT ppx  - oob/ambulate as tolerated, PT  - cont to appreciate ID recs: complete 14day course of zosyn  - contact palliative for hospice recs  - appreciate pt's medical oncologist (Dr. Chin) recs: recommends pt to see him in office immediately after d/c

## 2019-01-28 NOTE — PROGRESS NOTE ADULT - PROBLEM SELECTOR PLAN 3
- Well controlled, continue with Dilaudid PCA at 0.2mg/hr continuous, 0.2mg demands, 15 min lockout, 0.5mg q2hrs PRN clinician bolus.   - Palliative Care managing PCA please page for any issues [phone]

## 2019-01-28 NOTE — GOALS OF CARE CONVERSATION - PERSONAL ADVANCE DIRECTIVE - CONVERSATION DETAILS
Reviewed Hospice POC/benefits with wife and patient via   phone   Patient signed consents for in-pt at [facility].    Patient approved for in-pt [facility] , when bed available.   Currently no beds are available.  Updated CM Samia Duggan.

## 2019-01-28 NOTE — PROGRESS NOTE ADULT - SUBJECTIVE AND OBJECTIVE BOX
General Surgery Progress Note    SUBJECTIVE:  The patient was seen and examined. No acute events overnight. Pain well controlled. Denies n/v, cp, sob, abd pain. NGT in place.    OBJECTIVE:     ** VITAL SIGNS / I&O's **    Vital Signs Last 24 Hrs  T(C): 36.7 (28 Jan 2019 04:34), Max: 37.6 (27 Jan 2019 11:58)  T(F): 98 (28 Jan 2019 04:34), Max: 99.7 (27 Jan 2019 11:58)  HR: 80 (28 Jan 2019 04:34) (76 - 87)  BP: 122/69 (28 Jan 2019 04:34) (112/68 - 122/69)  BP(mean): --  RR: 18 (28 Jan 2019 04:34) (18 - 18)  SpO2: 97% (28 Jan 2019 04:34) (96% - 98%)      27 Jan 2019 07:01  -  28 Jan 2019 07:00  --------------------------------------------------------  IN:    dextrose 5% + sodium chloride 0.45% with potassium chloride 20 mEq/L: 500 mL    IV PiggyBack: 100 mL    lactated ringers.: 450 mL  Total IN: 1050 mL    OUT:    Bulb: 55 mL    Drain: 1 mL    Ileostomy: 175 mL    Nasoenteral Tube: 45 mL    Voided: 1650 mL  Total OUT: 1926 mL    Total NET: -876 mL          ** PHYSICAL EXAM **    -- CONSTITUTIONAL: Alert, NAD, NGT in place to suction  -- PULMONARY: non-labored respirations  -- ABDOMEN: soft, ND, midline wound with packing in place, LLQ SOSA with dark sanguinous output, RLQ ostomy pink/viable with liquid stool in bag, mucus fistula pink/viable, no rebound or     ** LABS **                          7.4    7.74  )-----------( 141      ( 27 Jan 2019 06:12 )             23.0             CAPILLARY BLOOD GLUCOSE      POCT Blood Glucose.: 106 mg/dL (28 Jan 2019 05:37)  POCT Blood Glucose.: 105 mg/dL (28 Jan 2019 00:27)  POCT Blood Glucose.: 80 mg/dL (27 Jan 2019 18:07)  POCT Blood Glucose.: 73 mg/dL (27 Jan 2019 11:56)            Culture - Yeast and Fungus (collected 25 Jan 2019 18:05)  Source: PELVIS  Preliminary Report (27 Jan 2019 13:50):    CULTURE NEGATIVE FOR YEASTS AND MOLDS AFTER 2 DAYS    Culture - Surg Site Aerob/Anaer w/Gm St (collected 25 Jan 2019 18:05)  Source: PELVIS  Preliminary Report (27 Jan 2019 10:43):    MODERATE    EC^Escherichia coli          MEDICATIONS  (STANDING):  acetaminophen  IVPB .. 1000 milliGRAM(s) IV Intermittent once  chlorhexidine 4% Liquid 1 Application(s) Topical two times a day  dextrose 5% + sodium chloride 0.45% with potassium chloride 20 mEq/L 1000 milliLiter(s) (100 mL/Hr) IV Continuous <Continuous>  dextrose 5%. 1000 milliLiter(s) (50 mL/Hr) IV Continuous <Continuous>  dextrose 50% Injectable 12.5 Gram(s) IV Push once  dextrose 50% Injectable 25 Gram(s) IV Push once  dextrose 50% Injectable 25 Gram(s) IV Push once  HYDROmorphone PCA (1 mG/mL) 30 milliLiter(s) PCA Continuous PCA Continuous  influenza   Vaccine 0.5 milliLiter(s) IntraMuscular once  insulin lispro (HumaLOG) corrective regimen sliding scale   SubCutaneous every 6 hours  melatonin 3 milliGRAM(s) Oral at bedtime  piperacillin/tazobactam IVPB. 3.375 Gram(s) IV Intermittent every 8 hours    MEDICATIONS  (PRN):  dextrose 40% Gel 15 Gram(s) Oral once PRN Blood Glucose LESS THAN 70 milliGRAM(s)/deciliter  glucagon  Injectable 1 milliGRAM(s) IntraMuscular once PRN Glucose LESS THAN 70 milligrams/deciliter  HYDROmorphone  Injectable 0.5 milliGRAM(s) IV Push every 3 hours PRN mild and moderate pain  HYDROmorphone PCA (1 mG/mL) Rescue Clinician Bolus 0.5 milliGRAM(s) IV Push every 2 hours PRN for Pain Scale GREATER THAN 6  naloxone Injectable 0.1 milliGRAM(s) IV Push every 3 minutes PRN For ANY of the following changes in patient status:  A. RR LESS THAN 10 breaths per minute, B. Oxygen saturation LESS THAN 90%, C. Sedation score of 6  ondansetron Injectable 4 milliGRAM(s) IV Push every 6 hours PRN Nausea

## 2019-01-28 NOTE — DISCHARGE NOTE ADULT - CARE PLAN
Principal Discharge DX:	Malignant neoplasm of stomach, unspecified location  Goal:	pain control  Assessment and plan of treatment:	WOUND CARE:    BATHING: Please do not submerge wound underwater. You may shower and/or sponge bathe.  ACTIVITY: No heavy lifting or straining. Otherwise, you may return to your usual level of physical activity. If you are taking narcotic pain medication (such as Percocet) DO NOT drive a car, operate machinery or make important decisions.  DIET: Return to your usual diet.  NOTIFY YOUR SURGEON IF: You have any bleeding that does not stop, any pus draining from your wound(s), any fever (over 100.4 F) or chills, persistent nausea/vomiting, persistent diarrhea, or if your pain is not controlled on your discharge pain medications.  FOLLOW-UP: Please follow up with your primary care physician in one week regarding your hospitalization Principal Discharge DX:	Malignant neoplasm of stomach, unspecified location  Goal:	pain control  Assessment and plan of treatment:	WOUND CARE:  Keep incision clean, dry, and intact.  BATHING: Please do not submerge wound underwater. You may shower and/or sponge bathe.  ACTIVITY: No heavy lifting or straining. Otherwise, you may return to your usual level of physical activity. If you are taking narcotic pain medication (such as Percocet) DO NOT drive a car, operate machinery or make important decisions.  DIET: Return to your usual diet.  NOTIFY YOUR SURGEON IF: You have any bleeding that does not stop, any pus draining from your wound(s), any fever (over 100.4 F) or chills, persistent nausea/vomiting, persistent diarrhea, or if your pain is not controlled on your discharge pain medications.  FOLLOW-UP: Please follow up with your primary care physician in one week regarding your hospitalization Principal Discharge DX:	Malignant neoplasm of stomach, unspecified location  Goal:	chemotherapy as indicated , hospice care  Assessment and plan of treatment:	hospice care

## 2019-01-28 NOTE — DISCHARGE NOTE ADULT - PATIENT PORTAL LINK FT
You can access the [email or web address] Patient Portal, offered by [facility], by registering with the following website: [email or web address]

## 2019-01-28 NOTE — DISCHARGE NOTE ADULT - INSTRUCTIONS
Comfort feeds Comfort feeds. patient prefers to keep NGT in place, although it has been explained that he is at risk for aspiration regardless of tube being in place.

## 2019-01-28 NOTE — DISCHARGE NOTE ADULT - COMMUNITY RESOURCES
[address] [facility]  [address] [place] [id number]  [phone]  [facility]  [phone]

## 2019-01-28 NOTE — DISCHARGE NOTE ADULT - HOSPITAL COURSE
63M pmh HTN, HLD with history of gastric adenocarcinoma s/p total gastrectomy and Vickie en Y esophagojejunostomy with Dr Espinal at [facility] Dec 2017 c/b early postop SBO s/p lap SHAR, treated with adjuvant chemo and radiation. Pt now transferred from [place] after a ~20 day admission with symptoms of PO intolerance. 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 He underwent endoscopic dilation as well as stent placement, which have not alleviated his symptoms. Pt reports vomiting any PO intake greater than ice chips. States that he has been on TPN.    Admitted to surgical oncology on 1/15, consulted by advanced GI for endoscopic intervention for possible EJ stricture.  CT enterography ordered prior to endoscopy with GI, pt could not tolerate 63M pmh HTN, HLD with history of gastric adenocarcinoma s/p total gastrectomy and Vickie en Y esophagojejunostomy with Dr Espinal at [facility] Dec 2017 c/b early postop SBO s/p lap SHAR, treated with adjuvant chemo and radiation. Pt now transferred from [place] after a ~20 day admission with symptoms of PO intolerance. 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 He underwent endoscopic dilation as well as stent placement, which have not alleviated his symptoms. Pt reports vomiting any PO intake greater than ice chips. States that he has been on TPN.    Admitted to surgical oncology on 1/15, consulted by advanced GI for endoscopic intervention for possible EJ stricture.  CT enterography ordered prior to endoscopy with GI, pt could not tolerate PO contrast.   Patient underwent EGD1/17 which reavealed EJ anastomosis was noted to be friable and erythematous, biopsies were taken. during procedure patient started to have abdominal pain and guarding, chest X-ray obtained in endoscopy suite showing significant pneumoperitoneum.   SICU consulted.  Patient brought emergently to the OR with Dr. Chung and Dr. Shields where he underwent exploratory laparotomy, bowel resection, with associated ostomy and mucous fistula for suspected iatrogenic esophagojejunostomy perforation by GI performed upper endoscopy. About 2L of ascites evacuated. Patient was found to have a large mass encasing the EJ anastomosis that was adherent to the abdominal wall.  Patient found to have a perforation at the hepatic flexure with surrounding necrosis.  An extended right hemicolectomy was performed with end ileostomy and mucous fistula creation of transverse colon.  Patient transferred to SICU for monitoring for concern of hemorrhagic shock post-operatively.  Patient remained intubated and requiring pressors for hypotension. Received 6uPRBC, 5 FFP, 1 cryo, 4 platelets. Patient stabilized on 1/18 after resusitation.  1/19 patient extubated, no pressor requirements, H/H stable, transferred to surgical floor where he remained with NGT, NPO on TPN. 63M pmh HTN, HLD with history of gastric adenocarcinoma s/p total gastrectomy and Vickie en Y esophagojejunostomy with Dr Espinal at [facility] Dec 2017 c/b early postop SBO s/p lap SHAR, treated with adjuvant chemo and radiation. Pt now transferred from [place] after a ~20 day admission with symptoms of PO intolerance. 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 He underwent endoscopic dilation as well as stent placement, which have not alleviated his symptoms. Pt reports vomiting any PO intake greater than ice chips. States that he has been on TPN.    Admitted to surgical oncology on 1/15, consulted by advanced GI for endoscopic intervention for possible EJ stricture.  CT enterography ordered prior to endoscopy with GI, pt could not tolerate PO contrast.   Patient underwent EGD1/17 which reavealed EJ anastomosis was noted to be friable and erythematous, biopsies were taken. during procedure patient started to have abdominal pain and guarding, chest X-ray obtained in endoscopy suite showing significant pneumoperitoneum.   SICU consulted.  Patient brought emergently to the OR with Dr. Chung and Dr. Shields where he underwent exploratory laparotomy, bowel resection, with associated ostomy and mucous fistula for suspected iatrogenic esophagojejunostomy perforation by GI performed upper endoscopy. About 2L of ascites evacuated. Patient was found to have a large mass encasing the EJ anastomosis that was adherent to the abdominal wall.  Patient found to have a perforation at the hepatic flexure with surrounding necrosis.  An extended right hemicolectomy was performed with end ileostomy and mucous fistula creation of transverse colon.  Patient transferred to SICU for monitoring for concern of hemorrhagic shock post-operatively.  Patient remained intubated and requiring pressors for hypotension. Received 6uPRBC, 5 FFP, 1 cryo, 4 platelets. Patient stabilized on 1/18 after resusitation.  1/19 patient extubated, no pressor requirements, H/H stable, transferred to surgical floor where he remained with NGT, NPO on TPN. Plan for discharge to in patient hospice with IV antibiotics. 63M pmh HTN, HLD with history of gastric adenocarcinoma s/p total gastrectomy and Vickie en Y esophagojejunostomy with Dr Espinal at [facility] Dec 2017 c/b early postop SBO s/p lap SHAR, treated with adjuvant chemo and radiation. Pt now transferred from [place] after a ~20 day admission with symptoms of PO intolerance. 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 He underwent endoscopic dilation as well as stent placement, which have not alleviated his symptoms. Pt reports vomiting any PO intake greater than ice chips. States that he has been on TPN.    Admitted to surgical oncology on 1/15, consulted by advanced GI for endoscopic intervention for possible EJ stricture.  CT enterography ordered prior to endoscopy with GI, pt could not tolerate PO contrast.   Patient underwent EGD1/17 which reavealed EJ anastomosis was noted to be friable and erythematous, biopsies were taken. during procedure patient started to have abdominal pain and guarding, chest X-ray obtained in endoscopy suite showing significant pneumoperitoneum.   SICU consulted.  Patient brought emergently to the OR with Dr. Chung and Dr. Shields where he underwent exploratory laparotomy, bowel resection, with associated ostomy and mucous fistula for suspected iatrogenic esophagojejunostomy perforation by GI performed upper endoscopy. About 2L of ascites evacuated. Patient was found to have a large mass encasing the EJ anastomosis that was adherent to the abdominal wall.  Patient found to have a perforation at the hepatic flexure with surrounding necrosis.  An extended right hemicolectomy was performed with end ileostomy and mucous fistula creation of transverse colon.  Patient transferred to SICU for monitoring for concern of hemorrhagic shock post-operatively.  Patient remained intubated and requiring pressors for hypotension. Received 6uPRBC, 5 FFP, 1 cryo, 4 platelets. Patient stabilized on 1/18 after resusitation.  1/19 patient extubated, no pressor requirements, H/H stable, transferred to surgical floor where he remained with NGT, NPO on TPN. Plan for discharge to in patient hospice with IV antibiotics.   1/20 Pt had no acute events overnight . Continued to be on TPN , coto was discontinued and passed the trial of void.   1/21 Pt was seen by Infectious Disease for feculent peritonitis. Micafungin 5 day course was completed today , continued on Zosyn ( 14 day course plan ). Pt was seen by physical therapy   1/22 1/23 1/24 1/25 1/26 63M pmh HTN, HLD with history of gastric adenocarcinoma s/p total gastrectomy and Vickie en Y esophagojejunostomy with Dr Espinal at [facility] Dec 2017 c/b early postop SBO s/p lap SHAR, treated with adjuvant chemo and radiation. Pt now transferred from [place] after a ~20 day admission with symptoms of PO intolerance. 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 He underwent endoscopic dilation as well as stent placement, which have not alleviated his symptoms. Pt reports vomiting any PO intake greater than ice chips. States that he has been on TPN.    Admitted to surgical oncology on 1/15, consulted by advanced GI for endoscopic intervention for possible EJ stricture.  CT enterography ordered prior to endoscopy with GI, pt could not tolerate PO contrast.   Patient underwent EGD1/17 which reavealed EJ anastomosis was noted to be friable and erythematous, biopsies were taken. during procedure patient started to have abdominal pain and guarding, chest X-ray obtained in endoscopy suite showing significant pneumoperitoneum.   SICU consulted.  Patient brought emergently to the OR with Dr. Chung and Dr. Shields where he underwent exploratory laparotomy, bowel resection, with associated ostomy and mucous fistula for suspected iatrogenic esophagojejunostomy perforation by GI performed upper endoscopy. About 2L of ascites evacuated. Patient was found to have a large mass encasing the EJ anastomosis that was adherent to the abdominal wall.  Patient found to have a perforation at the hepatic flexure with surrounding necrosis.  An extended right hemicolectomy was performed with end ileostomy and mucous fistula creation of transverse colon.  Patient transferred to SICU for monitoring for concern of hemorrhagic shock post-operatively.  Patient remained intubated and requiring pressors for hypotension. Received 6uPRBC, 5 FFP, 1 cryo, 4 platelets. Patient stabilized on 1/18 after resusitation.  1/19 patient extubated, no pressor requirements, H/H stable, transferred to surgical floor where he remained with NGT, NPO on TPN. Plan for discharge to in patient hospice with IV antibiotics.   1/20 Pt had no acute events overnight . Continued to be on TPN , coto was discontinued and passed the trial of void.   1/21 Pt was seen by Infectious Disease for feculent peritonitis. Micafungin 5 day course was completed today , continued on Zosyn ( total 14 day course plan ).  Palliative care had a family meeting for goals of care discussion . It was decided that  pt is  to continue with pleasure feeds as tolerated, due to leakage seen in swallow study, patient and wife understands he is not a candidate for PO intake. Despite infection risk, he is now agreeable to pleasure feeds. Plan to  Discharge home is with hospice services, PCA, TPN to be discontinued. Pt was approved by [facility] and awaiting for a bed.  1/22:Continued to be on TPN /NPO   1/23Had a family meeting with son (Jed [phone]), wife (Gisella, HCP, [phone]) and surgery team (pager [phone])  - Patient and wife are now aware of cancer recurrence, since patient was already suspicious of cancer progression beforehand and reports he would like to be involved for decision-making. Patient was  agreeable to further chemo if indicated.   1/25 Pt was readmitted to SICU for for hemodynamic monitoring. Pt was  tachypnea and hypotension responsive to IVF bolus overnight on the floor   PICC Line was contaminated at CT as IV contrast was put through the TPN tube. Pt went to interventional radiology for drainage of pelvic collection .  F/u KUB was done results attached : Xray Abdomen 1 View-Supine Only (01.24.19 @ 12:00) >  Delay of contrast at the esophagojejunostomy with no evidence of leak.   Contrast subsequently passed into the proximal jejunum with suggestion of   an a mortise collection of contrast in the region of the distal   anastomosis. A leak at this level is not excluded. Patient can be brought   back for additional imaging of this region to exclude a leak, if felt   clinically appropriate. Patient is on palliative care and NPO.  1/27 Pt was stabilised and transferred to the surgical floors. Pt continued to be NPO /TPN , Accepted by [facility] ans awaiting a bed .  1/30 Pt went to interventional radiology for IR tube check of Right Gluteal Drain.  Pt seen by his  medical oncologist (Dr. Chin) who recommended to see him in the office immediately after d/c.     ........ 63M pmh HTN, HLD with history of gastric adenocarcinoma s/p total gastrectomy and Vickie en Y esophagojejunostomy with Dr Espinal at [facility] Dec 2017 c/b early postop SBO s/p lap SHAR, treated with adjuvant chemo and radiation. Pt now transferred from [place] after a ~20 day admission with symptoms of PO intolerance. 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 He underwent endoscopic dilation as well as stent placement, which have not alleviated his symptoms. Pt reports vomiting any PO intake greater than ice chips. States that he has been on TPN.    Admitted to surgical oncology on 1/15, consulted by advanced GI for endoscopic intervention for possible EJ stricture.  CT enterography ordered prior to endoscopy with GI, pt could not tolerate PO contrast.   Patient underwent EGD1/17 which reavealed EJ anastomosis was noted to be friable and erythematous, biopsies were taken. during procedure patient started to have abdominal pain and guarding, chest X-ray obtained in endoscopy suite showing significant pneumoperitoneum.   SICU consulted.  Patient brought emergently to the OR with Dr. Chung and Dr. Shields where he underwent exploratory laparotomy, bowel resection, with associated ostomy and mucous fistula for suspected iatrogenic esophagojejunostomy perforation by GI performed upper endoscopy. About 2L of ascites evacuated. Patient was found to have a large mass encasing the EJ anastomosis that was adherent to the abdominal wall.  Patient found to have a perforation at the hepatic flexure with surrounding necrosis.  An extended right hemicolectomy was performed with end ileostomy and mucous fistula creation of transverse colon.  Patient transferred to SICU for monitoring for concern of hemorrhagic shock post-operatively.  Patient remained intubated and requiring pressors for hypotension. Received 6uPRBC, 5 FFP, 1 cryo, 4 platelets. Patient stabilized on 1/18 after resusitation.  1/19 patient extubated, no pressor requirements, H/H stable, transferred to surgical floor where he remained with NGT, NPO on TPN. Plan for discharge to in patient hospice with IV antibiotics.   1/20 Pt had no acute events overnight . Continued to be on TPN , coto was discontinued and passed the trial of void.   1/21 Pt was seen by Infectious Disease for feculent peritonitis. Micafungin 5 day course was completed today , continued on Zosyn ( total 14 day course plan ).  Palliative care had a family meeting for goals of care discussion . It was decided that  pt is  to continue with pleasure feeds as tolerated, due to leakage seen in swallow study, patient and wife understands he is not a candidate for PO intake. Despite infection risk, he is now agreeable to pleasure feeds. Plan to  Discharge home is with hospice services, PCA, TPN to be discontinued. Pt was approved by [facility] and awaiting for a bed.  1/22:Continued to be on TPN /NPO   1/23Had a family meeting with son (Jed [phone]), wife (Gisella, HCP, [phone]) and surgery team (pager [phone])  - Patient and wife are now aware of cancer recurrence, since patient was already suspicious of cancer progression beforehand and reports he would like to be involved for decision-making. Patient was  agreeable to further chemo if indicated.   1/25 Pt was readmitted to SICU for for hemodynamic monitoring. Pt was  tachypnea and hypotension responsive to IVF bolus overnight on the floor   PICC Line was contaminated at CT as IV contrast was put through the TPN tube. Pt went to interventional radiology for drainage of pelvic collection .  F/u KUB was done results attached : Xray Abdomen 1 View-Supine Only (01.24.19 @ 12:00) >  Delay of contrast at the esophagojejunostomy with no evidence of leak.   Contrast subsequently passed into the proximal jejunum with suggestion of   an a mortise collection of contrast in the region of the distal   anastomosis. A leak at this level is not excluded. Patient can be brought   back for additional imaging of this region to exclude a leak, if felt   clinically appropriate. Patient is on palliative care and NPO.  1/27 Pt was stabilised and transferred to the surgical floors. Pt continued to be NPO /TPN , Accepted by [facility] ans awaiting a bed .  1/30 Pt went to interventional radiology for IR tube check of Right Gluteal Drain.  Pt seen by his  medical oncologist (Dr. Chin) who recommended to see him in the office immediately after d/c. However patient is alonso got hospice.    Patient has received a bed at [facility] for inpatient hospice. Patient is ready for discharge with PCA and palliative feeds.

## 2019-01-28 NOTE — PROGRESS NOTE ADULT - PROBLEM SELECTOR PLAN 2
- S/p right hemicolectomy with ileostomy   - PAin well controlled on current regimen. Pending placement of PICC line for PCA, given his pain is not controlled.

## 2019-01-28 NOTE — PROGRESS NOTE ADULT - SUBJECTIVE AND OBJECTIVE BOX
SUBJECTIVE AND OBJECTIVE: pt with pain controlled.  INTERVAL HPI/OVERNIGHT EVENTS: Plan is for pt to be d/c with hospice services, pending to be determined if inpatient or home. Given symptom burden likely inpatient.    DNR on chart: Yes    Allergies    No Known Allergies    Intolerances    MEDICATIONS  (STANDING):  acetaminophen  IVPB .. 1000 milliGRAM(s) IV Intermittent once  chlorhexidine 4% Liquid 1 Application(s) Topical two times a day  dextrose 5% + sodium chloride 0.45% with potassium chloride 20 mEq/L 1000 milliLiter(s) (100 mL/Hr) IV Continuous <Continuous>  dextrose 5%. 1000 milliLiter(s) (50 mL/Hr) IV Continuous <Continuous>  dextrose 50% Injectable 12.5 Gram(s) IV Push once  dextrose 50% Injectable 25 Gram(s) IV Push once  dextrose 50% Injectable 25 Gram(s) IV Push once  HYDROmorphone PCA (1 mG/mL) 30 milliLiter(s) PCA Continuous PCA Continuous  insulin lispro (HumaLOG) corrective regimen sliding scale   SubCutaneous every 6 hours  melatonin 3 milliGRAM(s) Oral at bedtime  piperacillin/tazobactam IVPB. 3.375 Gram(s) IV Intermittent every 8 hours  sodium phosphate IVPB 30 milliMole(s) IV Intermittent once    MEDICATIONS  (PRN):  dextrose 40% Gel 15 Gram(s) Oral once PRN Blood Glucose LESS THAN 70 milliGRAM(s)/deciliter  glucagon  Injectable 1 milliGRAM(s) IntraMuscular once PRN Glucose LESS THAN 70 milligrams/deciliter  HYDROmorphone  Injectable 0.5 milliGRAM(s) IV Push every 3 hours PRN mild and moderate pain  HYDROmorphone PCA (1 mG/mL) Rescue Clinician Bolus 0.5 milliGRAM(s) IV Push every 2 hours PRN for Pain Scale GREATER THAN 6  naloxone Injectable 0.1 milliGRAM(s) IV Push every 3 minutes PRN For ANY of the following changes in patient status:  A. RR LESS THAN 10 breaths per minute, B. Oxygen saturation LESS THAN 90%, C. Sedation score of 6  ondansetron Injectable 4 milliGRAM(s) IV Push every 6 hours PRN Nausea    ITEMS UNCHECKED ARE NOT PRESENT    PRESENT SYMPTOMS: [ ]Unable to obtain due to poor mentation   Source if other than patient:  [ ]Family   [ ]Team     Pain (Impact on QOL):  controlled with current regimen  Location: epigastric, radiates to back  Minimal acceptable level (0-10 scale): 3-4/10         Aggravating factors:  Quality:  Radiation:  Severity (0-10 scale):    Timing:    Dyspnea:                           [x ]Mild [ ]Moderate [ ]Severe  Anxiety:                             [ ]Mild [ ]Moderate [ ]Severe  Fatigue:                             [ ]Mild [ ]Moderate [ ]Severe  Nausea:                             [ ]Mild [ x]Moderate [ ]Severe  Loss of appetite:              [ ]Mild [ x]Moderate [ ]Severe  Constipation:                    [ ]Mild [ ]Moderate [ ]Severe    PAIN AD Score:	  [email or web address] (Ctrl + left click to view)    Other Symptoms:  [x ]All other review of systems negative     Karnofsky Performance Score/Palliative Performance Status Version 2: 40%    http://palliative.info/resource_material/[email or web address]    PHYSICAL EXAM:  Vital Signs Last 24 Hrs  T(C): 37.1 (28 Jan 2019 13:28), Max: 37.1 (28 Jan 2019 13:28)  T(F): 98.7 (28 Jan 2019 13:28), Max: 98.7 (28 Jan 2019 13:28)  HR: 76 (28 Jan 2019 13:28) (76 - 84)  BP: 111/61 (28 Jan 2019 13:28) (111/61 - 124/67)  BP(mean): --  RR: 18 (28 Jan 2019 13:28) (18 - 20)  SpO2: 98% (28 Jan 2019 13:28) (97% - 99%) I&O's Summary    27 Jan 2019 07:01  -  28 Jan 2019 07:00  --------------------------------------------------------  IN: 1050 mL / OUT: 1926 mL / NET: -876 mL    28 Jan 2019 07:01  -  28 Jan 2019 15:16  --------------------------------------------------------  IN: 600 mL / OUT: 0 mL / NET: 600 mL     GENERAL:  [x ]Alert  [x ]Oriented x 3  [ ]Lethargic  [ ]Cachexia  [ ]Unarousable  [x ]Verbal  [ ]Non-Verbal  Behavioral:   [ ] Anxiety  [ ] Delirium [ ] Agitation [ ] Other  HEENT:  [x ]Normal   [ ]Dry mouth   [ ]ET Tube/Trach  [ ]Oral lesions  PULMONARY:   [ x]Clear [ ]Tachypnea  [ ]Audible excessive secretions   [ ]Rhonchi        [ ]Right [ ]Left [ ]Bilateral  [ ]Crackles        [ ]Right [ ]Left [ ]Bilateral  [ ]Wheezing     [ ]Right [ ]Left [ ]Bilateral  CARDIOVASCULAR:    [x ]Regular [ ]Irregular [ ]Tachy  [ ]Sim [ ]Murmur [ ]Other  GASTROINTESTINAL:  [ ]Soft  [ x]Distended   [ ]+BS  [ ]Non tender [ ]Tender  [ ]PEG [x ]OGT/ NGT   Last BM:    GENITOURINARY:  [ ]Normal [ ] Incontinent   [ ]Oliguria/Anuria   [ ][place]  MUSCULOSKELETAL:   [ ]Normal   [x ]Weakness  [ ]Bed/Wheelchair bound [ ]Edema  NEUROLOGIC:   [x ]No focal deficits  [ ] Cognitive impairment  [ ] Dysphagia [ ]Dysarthria [ ] Paresis [ ]Other   SKIN:   [ x]Normal   [ ]Pressure ulcer(s)  [ ]Rash    CRITICAL CARE:  [ ] Shock Present  [ ]Septic [ ]Cardiogenic [ ]Neurologic [ ]Hypovolemic  [ ]  Vasopressors [ ]  Inotropes   [ ] Respiratory failure present  [ ] Acute  [ ] Chronic [ ] Hypoxic  [ ] Hypercarbic [ ] Other  [ ] Other organ failure     LABS:                        7.7    7.41  )-----------( 178      ( 28 Jan 2019 05:40 )             24.3   01-28    132<L>  |  98  |  8   ----------------------------<  93  4.0   |  25  |  0.55    Ca    8.2<L>      28 Jan 2019 05:40  Phos  2.2     01-28  Mg     1.9     01-28    RADIOLOGY & ADDITIONAL STUDIES: reviewed.     Protein Calorie Malnutrition Present: [ ] yes [ ] no  [ ] PPSV2 < or = 30%  [ x] significant weight loss [ x] poor nutritional intake [ ] anasarca [ ] catabolic state Albumin, Serum: 2.3 g/dL (01-25-19 @ 21:30)  Artificial Nutrition [ ]     REFERRALS:   [ ]Chaplaincy  [ ] Hospice  [ ]Child Life  [ ]Social Work  [ ]Case management [ ]Holistic Therapy   Goals of Care Document: Goals of Care Conversation - Personal Advance Directive [FLAQUITA Castro] (01-25-19 @ 11:36)

## 2019-01-28 NOTE — DISCHARGE NOTE ADULT - MEDICATION SUMMARY - MEDICATIONS TO TAKE
I will START or STAY ON the medications listed below when I get home from the hospital:     mg oral tablet  -- 1 tab(s) by mouth every 6 hours, As Needed -for moderate pain MDD:5 tabs   -- Do not take this drug if you are pregnant.  It is very important that you take or use this exactly as directed.  Do not skip doses or discontinue unless directed by your doctor.  May cause drowsiness or dizziness.  Obtain medical advice before taking any non-prescription drugs as some may affect the action of this medication.  Take with food or milk.    -- Indication: For Pain    acetaminophen 10 mg/mL intravenous solution  -- 100 milliliter(s) intravenous once  -- Indication: For Pain    HYDROmorphone  -- Indication: For Pain    HYDROmorphone  -- Indication: For Pain    tamsulosin  -- 0.4  by mouth once a day  -- Indication: For home med    ondansetron 2 mg/mL injectable solution  --  injectable   -- Indication: For nausea    melatonin 3 mg oral tablet  -- 1 tab(s) by mouth once a day (at bedtime)  -- Indication: For Sleep

## 2019-01-29 LAB
ANION GAP SERPL CALC-SCNC: 9 MMO/L — SIGNIFICANT CHANGE UP (ref 7–14)
BUN SERPL-MCNC: 5 MG/DL — LOW (ref 7–23)
CALCIUM SERPL-MCNC: 8.2 MG/DL — LOW (ref 8.4–10.5)
CHLORIDE SERPL-SCNC: 97 MMOL/L — LOW (ref 98–107)
CO2 SERPL-SCNC: 24 MMOL/L — SIGNIFICANT CHANGE UP (ref 22–31)
CREAT SERPL-MCNC: 0.52 MG/DL — SIGNIFICANT CHANGE UP (ref 0.5–1.3)
GLUCOSE BLDC GLUCOMTR-MCNC: 107 MG/DL — HIGH (ref 70–99)
GLUCOSE BLDC GLUCOMTR-MCNC: 110 MG/DL — HIGH (ref 70–99)
GLUCOSE BLDC GLUCOMTR-MCNC: 113 MG/DL — HIGH (ref 70–99)
GLUCOSE SERPL-MCNC: 112 MG/DL — HIGH (ref 70–99)
HCT VFR BLD CALC: 23.8 % — LOW (ref 39–50)
HGB BLD-MCNC: 8 G/DL — LOW (ref 13–17)
MAGNESIUM SERPL-MCNC: 1.7 MG/DL — SIGNIFICANT CHANGE UP (ref 1.6–2.6)
MCHC RBC-ENTMCNC: 30.2 PG — SIGNIFICANT CHANGE UP (ref 27–34)
MCHC RBC-ENTMCNC: 33.6 % — SIGNIFICANT CHANGE UP (ref 32–36)
MCV RBC AUTO: 89.8 FL — SIGNIFICANT CHANGE UP (ref 80–100)
NRBC # FLD: 0 K/UL — LOW (ref 25–125)
PHOSPHATE SERPL-MCNC: 2.4 MG/DL — LOW (ref 2.5–4.5)
PLATELET # BLD AUTO: 200 K/UL — SIGNIFICANT CHANGE UP (ref 150–400)
PMV BLD: 9.9 FL — SIGNIFICANT CHANGE UP (ref 7–13)
POTASSIUM SERPL-MCNC: 3.9 MMOL/L — SIGNIFICANT CHANGE UP (ref 3.5–5.3)
POTASSIUM SERPL-SCNC: 3.9 MMOL/L — SIGNIFICANT CHANGE UP (ref 3.5–5.3)
RBC # BLD: 2.65 M/UL — LOW (ref 4.2–5.8)
RBC # FLD: 14.1 % — SIGNIFICANT CHANGE UP (ref 10.3–14.5)
SODIUM SERPL-SCNC: 130 MMOL/L — LOW (ref 135–145)
WBC # BLD: 7.98 K/UL — SIGNIFICANT CHANGE UP (ref 3.8–10.5)
WBC # FLD AUTO: 7.98 K/UL — SIGNIFICANT CHANGE UP (ref 3.8–10.5)

## 2019-01-29 PROCEDURE — 99232 SBSQ HOSP IP/OBS MODERATE 35: CPT

## 2019-01-29 RX ORDER — POTASSIUM PHOSPHATE, MONOBASIC POTASSIUM PHOSPHATE, DIBASIC 236; 224 MG/ML; MG/ML
15 INJECTION, SOLUTION INTRAVENOUS ONCE
Qty: 0 | Refills: 0 | Status: COMPLETED | OUTPATIENT
Start: 2019-01-29 | End: 2019-01-29

## 2019-01-29 RX ORDER — DEXTROSE MONOHYDRATE, SODIUM CHLORIDE, AND POTASSIUM CHLORIDE 50; .745; 4.5 G/1000ML; G/1000ML; G/1000ML
1000 INJECTION, SOLUTION INTRAVENOUS
Qty: 0 | Refills: 0 | Status: DISCONTINUED | OUTPATIENT
Start: 2019-01-29 | End: 2019-02-01

## 2019-01-29 RX ORDER — ONDANSETRON 8 MG/1
0 TABLET, FILM COATED ORAL
Qty: 0 | Refills: 0 | COMMUNITY
Start: 2019-01-29

## 2019-01-29 RX ORDER — MAGNESIUM SULFATE 500 MG/ML
2 VIAL (ML) INJECTION ONCE
Qty: 0 | Refills: 0 | Status: COMPLETED | OUTPATIENT
Start: 2019-01-29 | End: 2019-01-29

## 2019-01-29 RX ADMIN — Medication 50 GRAM(S): at 09:07

## 2019-01-29 RX ADMIN — PIPERACILLIN AND TAZOBACTAM 25 GRAM(S): 4; .5 INJECTION, POWDER, LYOPHILIZED, FOR SOLUTION INTRAVENOUS at 05:01

## 2019-01-29 RX ADMIN — HYDROMORPHONE HYDROCHLORIDE 30 MILLILITER(S): 2 INJECTION INTRAMUSCULAR; INTRAVENOUS; SUBCUTANEOUS at 19:28

## 2019-01-29 RX ADMIN — PIPERACILLIN AND TAZOBACTAM 25 GRAM(S): 4; .5 INJECTION, POWDER, LYOPHILIZED, FOR SOLUTION INTRAVENOUS at 12:27

## 2019-01-29 RX ADMIN — CHLORHEXIDINE GLUCONATE 1 APPLICATION(S): 213 SOLUTION TOPICAL at 18:45

## 2019-01-29 RX ADMIN — DEXTROSE MONOHYDRATE, SODIUM CHLORIDE, AND POTASSIUM CHLORIDE 100 MILLILITER(S): 50; .745; 4.5 INJECTION, SOLUTION INTRAVENOUS at 09:07

## 2019-01-29 RX ADMIN — CHLORHEXIDINE GLUCONATE 1 APPLICATION(S): 213 SOLUTION TOPICAL at 05:01

## 2019-01-29 RX ADMIN — POTASSIUM PHOSPHATE, MONOBASIC POTASSIUM PHOSPHATE, DIBASIC 62.5 MILLIMOLE(S): 236; 224 INJECTION, SOLUTION INTRAVENOUS at 10:23

## 2019-01-29 RX ADMIN — PIPERACILLIN AND TAZOBACTAM 25 GRAM(S): 4; .5 INJECTION, POWDER, LYOPHILIZED, FOR SOLUTION INTRAVENOUS at 20:18

## 2019-01-29 RX ADMIN — HYDROMORPHONE HYDROCHLORIDE 30 MILLILITER(S): 2 INJECTION INTRAMUSCULAR; INTRAVENOUS; SUBCUTANEOUS at 07:12

## 2019-01-29 NOTE — PROGRESS NOTE ADULT - ASSESSMENT
63M pmh HTN, HLD with history of gastric adenocarcinoma s/p total gastrectomy and Vickie en Y esophagojejunostomy with Dr Espinal at [facility] Dec 2017 c/b early postop SBO s/p lap SHAR, treated with adjuvant chemo and radiation who was transferred to [facility] on 1/15/19 for esophageal dilatation. On 1/17 patient had endoscopy to better assess strictures.  EJ anastomosis was noted to be friable and erythematous, biopsies were taken.  In PACU patient had complaints of abdominal distention and pain.  Upright CXR performed significant for pneumoperitoneum. 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 Started on Zosyn (1/17/19-->) and Micafungin (1/17/19-->).     Overall, feculent peritonitis secondary to colonic perforation. Would finish micafungin after today (Day 5). Would continue Zosyn at this time (tentative plan for 14 days of antibiotics). Would followup on E. coli and pseudomonas susceptibilities from the SOSA drainage.

## 2019-01-29 NOTE — PROGRESS NOTE ADULT - ASSESSMENT
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 transitioned from sicu to floor now back to SICU (1/25) for dropping H/H, leukocytosis, and hemodynamic monitoring, now transitioned from sicu to floor.    PLAN:  - Pain control as needed  - NPO/IVF, NGT  - monitor ostomy function  - lovenox for DVT ppx  - oob/ambulate as tolerated, PT  - cont to appreciate ID recs: complete 14day course of zosyn  - appreciate pt's medical oncologist (Dr. Chin) recs: recommends pt to see him in office immediately after d/c  - f/u if [facility] OK with NGT    D Team Surgery  [id number]

## 2019-01-29 NOTE — GOALS OF CARE CONVERSATION - PERSONAL ADVANCE DIRECTIVE - CONVERSATION DETAILS
[facility]: Pt. waiting for [facility] bed for management of pain. Bed not available at this time. Carol Jara RN

## 2019-01-29 NOTE — PROGRESS NOTE ADULT - SUBJECTIVE AND OBJECTIVE BOX
Surgery Progress Note    S: Patient seen and examined. No acute events overnight. Patient was accepted to [facility] yesterday for hospice and is currently awaiting a bed. pain is well controlled. patient denies n/v.     O:  Vital Signs Last 24 Hrs  T(C): 36.9 (29 Jan 2019 04:57), Max: 37.7 (28 Jan 2019 16:06)  T(F): 98.4 (29 Jan 2019 04:57), Max: 99.9 (28 Jan 2019 16:06)  HR: 86 (29 Jan 2019 04:57) (76 - 86)  BP: 122/65 (29 Jan 2019 04:57) (111/61 - 126/73)  BP(mean): --  RR: 18 (29 Jan 2019 04:57) (18 - 20)  SpO2: 97% (29 Jan 2019 04:57) (97% - 99%)    I&O's Detail    27 Jan 2019 07:01  -  28 Jan 2019 07:00  --------------------------------------------------------  IN:    dextrose 5% + sodium chloride 0.45% with potassium chloride 20 mEq/L: 500 mL    IV PiggyBack: 100 mL    lactated ringers.: 450 mL  Total IN: 1050 mL    OUT:    Bulb: 55 mL    Drain: 1 mL    Ileostomy: 175 mL    Nasoenteral Tube: 45 mL    Voided: 1650 mL  Total OUT: 1926 mL    Total NET: -876 mL      28 Jan 2019 07:01  -  29 Jan 2019 06:54  --------------------------------------------------------  IN:    dextrose 5% + sodium chloride 0.45% with potassium chloride 20 mEq/L: 1000 mL  Total IN: 1000 mL    OUT:    Bulb: 62 mL    Drain: 20 mL    Ileostomy: 500 mL    Nasoenteral Tube: 100 mL    Voided: 2490 mL  Total OUT: 3172 mL    Total NET: -2172 mL          MEDICATIONS  (STANDING):  acetaminophen  IVPB .. 1000 milliGRAM(s) IV Intermittent once  chlorhexidine 4% Liquid 1 Application(s) Topical two times a day  dextrose 5% + sodium chloride 0.45% with potassium chloride 20 mEq/L 1000 milliLiter(s) (100 mL/Hr) IV Continuous <Continuous>  dextrose 5%. 1000 milliLiter(s) (50 mL/Hr) IV Continuous <Continuous>  dextrose 50% Injectable 12.5 Gram(s) IV Push once  dextrose 50% Injectable 25 Gram(s) IV Push once  dextrose 50% Injectable 25 Gram(s) IV Push once  HYDROmorphone PCA (1 mG/mL) 30 milliLiter(s) PCA Continuous PCA Continuous  insulin lispro (HumaLOG) corrective regimen sliding scale   SubCutaneous every 6 hours  melatonin 3 milliGRAM(s) Oral at bedtime  piperacillin/tazobactam IVPB. 3.375 Gram(s) IV Intermittent every 8 hours    MEDICATIONS  (PRN):  dextrose 40% Gel 15 Gram(s) Oral once PRN Blood Glucose LESS THAN 70 milliGRAM(s)/deciliter  glucagon  Injectable 1 milliGRAM(s) IntraMuscular once PRN Glucose LESS THAN 70 milligrams/deciliter  HYDROmorphone  Injectable 0.5 milliGRAM(s) IV Push every 3 hours PRN mild and moderate pain  HYDROmorphone PCA (1 mG/mL) Rescue Clinician Bolus 0.5 milliGRAM(s) IV Push every 2 hours PRN for Pain Scale GREATER THAN 6  naloxone Injectable 0.1 milliGRAM(s) IV Push every 3 minutes PRN For ANY of the following changes in patient status:  A. RR LESS THAN 10 breaths per minute, B. Oxygen saturation LESS THAN 90%, C. Sedation score of 6  ondansetron Injectable 4 milliGRAM(s) IV Push every 6 hours PRN Nausea                            8.0    7.98  )-----------( 200      ( 29 Jan 2019 06:04 )             23.8       01-28    132<L>  |  98  |  8   ----------------------------<  93  4.0   |  25  |  0.55    Ca    8.2<L>      28 Jan 2019 05:40  Phos  2.2     01-28  Mg     1.9     01-28        Physical Exam:  Gen: Laying in bed, NAD, NGT in place  Resp: Unlabored breathing  Abd: soft, NTND, midline incision re-packed, LUQ SOSA serious, RLQ ostomy pink/viable with stool and gas in bag, RUQ mucus fistula pink/viable, no rebound or guarding  Ext: WWP, L arm PICC in place  Skin: No rashes

## 2019-01-29 NOTE — PROGRESS NOTE ADULT - SUBJECTIVE AND OBJECTIVE BOX
TOMROGER 63y [id number]    Patient is a 63y old  Male who presents with a chief complaint of Bowel Obstruction (29 Jan 2019 06:54)      Follow Up/CC:  ID following for abscess    Interval History/ROS: no complaints, no fever    Allergies    No Known Allergies    Intolerances        ANTIMICROBIALS:  piperacillin/tazobactam IVPB. 3.375 every 8 hours      MEDICATIONS  (STANDING):  acetaminophen  IVPB .. 1000 milliGRAM(s) IV Intermittent once  chlorhexidine 4% Liquid 1 Application(s) Topical two times a day  dextrose 5% + sodium chloride 0.9% with potassium chloride 20 mEq/L 1000 milliLiter(s) (100 mL/Hr) IV Continuous <Continuous>  dextrose 5%. 1000 milliLiter(s) (50 mL/Hr) IV Continuous <Continuous>  dextrose 50% Injectable 12.5 Gram(s) IV Push once  dextrose 50% Injectable 25 Gram(s) IV Push once  dextrose 50% Injectable 25 Gram(s) IV Push once  HYDROmorphone PCA (1 mG/mL) 30 milliLiter(s) PCA Continuous PCA Continuous  insulin lispro (HumaLOG) corrective regimen sliding scale   SubCutaneous every 6 hours  melatonin 3 milliGRAM(s) Oral at bedtime  piperacillin/tazobactam IVPB. 3.375 Gram(s) IV Intermittent every 8 hours    MEDICATIONS  (PRN):  dextrose 40% Gel 15 Gram(s) Oral once PRN Blood Glucose LESS THAN 70 milliGRAM(s)/deciliter  glucagon  Injectable 1 milliGRAM(s) IntraMuscular once PRN Glucose LESS THAN 70 milligrams/deciliter  HYDROmorphone  Injectable 0.5 milliGRAM(s) IV Push every 3 hours PRN mild and moderate pain  HYDROmorphone PCA (1 mG/mL) Rescue Clinician Bolus 0.5 milliGRAM(s) IV Push every 2 hours PRN for Pain Scale GREATER THAN 6  naloxone Injectable 0.1 milliGRAM(s) IV Push every 3 minutes PRN For ANY of the following changes in patient status:  A. RR LESS THAN 10 breaths per minute, B. Oxygen saturation LESS THAN 90%, C. Sedation score of 6  ondansetron Injectable 4 milliGRAM(s) IV Push every 6 hours PRN Nausea        Vital Signs Last 24 Hrs  T(C): 36.9 (29 Jan 2019 08:11), Max: 37.7 (28 Jan 2019 16:06)  T(F): 98.4 (29 Jan 2019 08:11), Max: 99.9 (28 Jan 2019 16:06)  HR: 86 (29 Jan 2019 08:11) (76 - 86)  BP: 117/72 (29 Jan 2019 08:11) (111/61 - 126/73)  BP(mean): --  RR: 18 (29 Jan 2019 08:11) (18 - 18)  SpO2: 98% (29 Jan 2019 08:11) (97% - 98%)    CBC Full  -  ( 29 Jan 2019 06:04 )  WBC Count : 7.98 K/uL  Hemoglobin : 8.0 g/dL  Hematocrit : 23.8 %  Platelet Count - Automated : 200 K/uL  Mean Cell Volume : 89.8 fL  Mean Cell Hemoglobin : 30.2 pg  Mean Cell Hemoglobin Concentration : 33.6 %  Auto Neutrophil # : x  Auto Lymphocyte # : x  Auto Monocyte # : x  Auto Eosinophil # : x  Auto Basophil # : x  Auto Neutrophil % : x  Auto Lymphocyte % : x  Auto Monocyte % : x  Auto Eosinophil % : x  Auto Basophil % : x    01-29    130<L>  |  97<L>  |  5<L>  ----------------------------<  112<H>  3.9   |  24  |  0.52    Ca    8.2<L>      29 Jan 2019 06:04  Phos  2.4     01-29  Mg     1.7     01-29            MICROBIOLOGY:  PELVIS  01-25-19 --  --  Escherichia coli  Escherichia coli 2  Bacteroides fragilis      INCISION  01-25-19   EC^Escherichia coli  ENTF^Enterococcus faecalis  QUANTITY OF GROWTH: FEW  PSA^Pseudomonas aeruginosa  --  Escherichia coli  Enterococcus faecalis  Pseudomonas aeruginosa      SURGERY  01-18-19 --  --  Pseudomonas aeruginosa  Escherichia coli      RADIOLOGY    CT Abdomen and Pelvis w/ IV Cont (01.25.19 @ 07:43) >  No pulmonary embolism.    Large heterogeneously dense ascites, new from 1/17/2019, concerning for   hemoperitoneum.    Small pneumoperitoneum, suggestive of bowel perforation/anastomotic leak.    A large peripherally enhancing fluid collection in the rectovesical space   measures 9.7 cm in greatest dimension.    Mild bowel distention diffusely, question ileus versus early or partial   obstruction.    Apparent narrowing of the right ventricular outflow tract.  Suggestion of   pulmonary artery narrowing.  Echo recommended for complete evaluation.

## 2019-01-30 LAB
APTT BLD: 29.8 SEC — SIGNIFICANT CHANGE UP (ref 27.5–36.3)
GLUCOSE BLDC GLUCOMTR-MCNC: 102 MG/DL — HIGH (ref 70–99)
GLUCOSE BLDC GLUCOMTR-MCNC: 119 MG/DL — HIGH (ref 70–99)
GLUCOSE BLDC GLUCOMTR-MCNC: 120 MG/DL — HIGH (ref 70–99)
GLUCOSE BLDC GLUCOMTR-MCNC: 89 MG/DL — SIGNIFICANT CHANGE UP (ref 70–99)
INR BLD: 1.59 — HIGH (ref 0.88–1.17)
PROTHROM AB SERPL-ACNC: 18.4 SEC — HIGH (ref 9.8–13.1)

## 2019-01-30 PROCEDURE — 76080 X-RAY EXAM OF FISTULA: CPT | Mod: 26

## 2019-01-30 PROCEDURE — 99232 SBSQ HOSP IP/OBS MODERATE 35: CPT

## 2019-01-30 PROCEDURE — 72192 CT PELVIS W/O DYE: CPT | Mod: 26

## 2019-01-30 PROCEDURE — 49424 ASSESS CYST CONTRAST INJECT: CPT

## 2019-01-30 RX ADMIN — PIPERACILLIN AND TAZOBACTAM 25 GRAM(S): 4; .5 INJECTION, POWDER, LYOPHILIZED, FOR SOLUTION INTRAVENOUS at 12:13

## 2019-01-30 RX ADMIN — PIPERACILLIN AND TAZOBACTAM 25 GRAM(S): 4; .5 INJECTION, POWDER, LYOPHILIZED, FOR SOLUTION INTRAVENOUS at 04:09

## 2019-01-30 RX ADMIN — HYDROMORPHONE HYDROCHLORIDE 30 MILLILITER(S): 2 INJECTION INTRAMUSCULAR; INTRAVENOUS; SUBCUTANEOUS at 07:08

## 2019-01-30 RX ADMIN — HYDROMORPHONE HYDROCHLORIDE 30 MILLILITER(S): 2 INJECTION INTRAMUSCULAR; INTRAVENOUS; SUBCUTANEOUS at 19:17

## 2019-01-30 RX ADMIN — PIPERACILLIN AND TAZOBACTAM 25 GRAM(S): 4; .5 INJECTION, POWDER, LYOPHILIZED, FOR SOLUTION INTRAVENOUS at 20:26

## 2019-01-30 RX ADMIN — DEXTROSE MONOHYDRATE, SODIUM CHLORIDE, AND POTASSIUM CHLORIDE 100 MILLILITER(S): 50; .745; 4.5 INJECTION, SOLUTION INTRAVENOUS at 08:53

## 2019-01-30 RX ADMIN — CHLORHEXIDINE GLUCONATE 1 APPLICATION(S): 213 SOLUTION TOPICAL at 05:50

## 2019-01-30 NOTE — PROGRESS NOTE ADULT - SUBJECTIVE AND OBJECTIVE BOX
TOMROGER 63y [id number]    Patient is a 63y old  Male who presents with a chief complaint of Bowel Obstruction (30 Jan 2019 00:22)      Follow Up/CC:  ID following for peritonitis    Interval History/ROS: tired, no fever, for tube check today    Allergies    No Known Allergies    Intolerances        ANTIMICROBIALS:  piperacillin/tazobactam IVPB. 3.375 every 8 hours      MEDICATIONS  (STANDING):  acetaminophen  IVPB .. 1000 milliGRAM(s) IV Intermittent once  chlorhexidine 4% Liquid 1 Application(s) Topical two times a day  dextrose 5% + sodium chloride 0.9% with potassium chloride 20 mEq/L 1000 milliLiter(s) (100 mL/Hr) IV Continuous <Continuous>  dextrose 5%. 1000 milliLiter(s) (50 mL/Hr) IV Continuous <Continuous>  dextrose 50% Injectable 12.5 Gram(s) IV Push once  dextrose 50% Injectable 25 Gram(s) IV Push once  dextrose 50% Injectable 25 Gram(s) IV Push once  HYDROmorphone PCA (1 mG/mL) 30 milliLiter(s) PCA Continuous PCA Continuous  insulin lispro (HumaLOG) corrective regimen sliding scale   SubCutaneous every 6 hours  melatonin 3 milliGRAM(s) Oral at bedtime  piperacillin/tazobactam IVPB. 3.375 Gram(s) IV Intermittent every 8 hours    MEDICATIONS  (PRN):  dextrose 40% Gel 15 Gram(s) Oral once PRN Blood Glucose LESS THAN 70 milliGRAM(s)/deciliter  glucagon  Injectable 1 milliGRAM(s) IntraMuscular once PRN Glucose LESS THAN 70 milligrams/deciliter  HYDROmorphone  Injectable 0.5 milliGRAM(s) IV Push every 3 hours PRN mild and moderate pain  HYDROmorphone PCA (1 mG/mL) Rescue Clinician Bolus 0.5 milliGRAM(s) IV Push every 2 hours PRN for Pain Scale GREATER THAN 6  naloxone Injectable 0.1 milliGRAM(s) IV Push every 3 minutes PRN For ANY of the following changes in patient status:  A. RR LESS THAN 10 breaths per minute, B. Oxygen saturation LESS THAN 90%, C. Sedation score of 6  ondansetron Injectable 4 milliGRAM(s) IV Push every 6 hours PRN Nausea        Vital Signs Last 24 Hrs  T(C): 37.4 (30 Jan 2019 11:56), Max: 37.5 (29 Jan 2019 18:20)  T(F): 99.4 (30 Jan 2019 11:56), Max: 99.5 (29 Jan 2019 18:20)  HR: 90 (30 Jan 2019 11:56) (65 - 90)  BP: 109/59 (30 Jan 2019 11:56) (101/57 - 122/70)  BP(mean): --  RR: 18 (30 Jan 2019 11:56) (17 - 18)  SpO2: 100% (30 Jan 2019 11:56) (93% - 100%)    CBC Full  -  ( 29 Jan 2019 06:04 )  WBC Count : 7.98 K/uL  Hemoglobin : 8.0 g/dL  Hematocrit : 23.8 %  Platelet Count - Automated : 200 K/uL  Mean Cell Volume : 89.8 fL  Mean Cell Hemoglobin : 30.2 pg  Mean Cell Hemoglobin Concentration : 33.6 %  Auto Neutrophil # : x  Auto Lymphocyte # : x  Auto Monocyte # : x  Auto Eosinophil # : x  Auto Basophil # : x  Auto Neutrophil % : x  Auto Lymphocyte % : x  Auto Monocyte % : x  Auto Eosinophil % : x  Auto Basophil % : x    01-29    130<L>  |  97<L>  |  5<L>  ----------------------------<  112<H>  3.9   |  24  |  0.52    Ca    8.2<L>      29 Jan 2019 06:04  Phos  2.4     01-29  Mg     1.7     01-29            MICROBIOLOGY:  PELVIS  01-25-19 --  --  Escherichia coli  Escherichia coli 2  Bacteroides fragilis      INCISION  01-25-19   EC^Escherichia coli  ENTF^Enterococcus faecalis  QUANTITY OF GROWTH: FEW  PSA^Pseudomonas aeruginosa  --  Escherichia coli  Enterococcus faecalis  Pseudomonas aeruginosa      SURGERY  01-18-19 --  --  Pseudomonas aeruginosa  Escherichia coli    RADIOLOGY    CT Abdomen and Pelvis w/ IV Cont (01.25.19 @ 07:43) >  No pulmonary embolism.    Large heterogeneously dense ascites, new from 1/17/2019, concerning for   hemoperitoneum.    Small pneumoperitoneum, suggestive of bowel perforation/anastomotic leak.    A large peripherally enhancing fluid collection in the rectovesical space   measures 9.7 cm in greatest dimension.    Mild bowel distention diffusely, question ileus versus early or partial   obstruction.    Apparent narrowing of the right ventricular outflow tract.  Suggestion of   pulmonary artery narrowing.  Echo recommended for complete evaluation.

## 2019-01-30 NOTE — CHART NOTE - NSCHARTNOTEFT_GEN_A_CORE
PROCEDURE: IR Tube Check of R. gluteal drain    REQUESTING ATTENDING: Dr. Chung      ---------------------------------------------------------------------------------------------   VITALS  T(C): 37.1 (01-30-19 @ 08:20), Max: 37.5 (01-29-19 @ 18:20)  HR: 88 (01-30-19 @ 08:20) (65 - 92)  BP: 122/70 (01-30-19 @ 08:20) (101/57 - 122/70)  RR: 18 (01-30-19 @ 08:20) (17 - 18)  SpO2: 97% (01-30-19 @ 08:20) (93% - 98%)  CAPILLARY BLOOD GLUCOSE      POCT Blood Glucose.: 119 mg/dL (30 Jan 2019 06:20)  POCT Blood Glucose.: 120 mg/dL (30 Jan 2019 00:05)  POCT Blood Glucose.: 107 mg/dL (29 Jan 2019 18:38)  POCT Blood Glucose.: 113 mg/dL (29 Jan 2019 12:18)      Is/Os    01-29 @ 07:01 - 01-30 @ 07:00  --------------------------------------------------------  IN:    dextrose 5% + sodium chloride 0.45% with potassium chloride 20 mEq/L: 200 mL    dextrose 5% + sodium chloride 0.9% with potassium chloride 20 mEq/L: 400 mL  Total IN: 600 mL    OUT:    Bulb: 45 mL    Ileostomy: 100 mL    Nasoenteral Tube: 100 mL    Voided: 2150 mL  Total OUT: 2395 mL    Total NET: -1795 mL      01-30 @ 07:01 - 01-30 @ 11:32  --------------------------------------------------------  IN:    dextrose 5% + sodium chloride 0.9% with potassium chloride 20 mEq/L: 200 mL  Total IN: 200 mL    OUT:    Voided: 515 mL  Total OUT: 515 mL    Total NET: -315 mL    ---------------------------------------------------------------------------------------------   MEDICATIONS (STANDING): acetaminophen  IVPB .. 1000 milliGRAM(s) IV Intermittent once  dextrose 5% + sodium chloride 0.9% with potassium chloride 20 mEq/L 1000 milliLiter(s) IV Continuous <Continuous>  dextrose 5%. 1000 milliLiter(s) IV Continuous <Continuous>  dextrose 50% Injectable 12.5 Gram(s) IV Push once  dextrose 50% Injectable 25 Gram(s) IV Push once  dextrose 50% Injectable 25 Gram(s) IV Push once  HYDROmorphone PCA (1 mG/mL) 30 milliLiter(s) PCA Continuous PCA Continuous  insulin lispro (HumaLOG) corrective regimen sliding scale   SubCutaneous every 6 hours  melatonin 3 milliGRAM(s) Oral at bedtime  piperacillin/tazobactam IVPB. 3.375 Gram(s) IV Intermittent every 8 hours    MEDICATIONS (PRN):dextrose 40% Gel 15 Gram(s) Oral once PRN Blood Glucose LESS THAN 70 milliGRAM(s)/deciliter  glucagon  Injectable 1 milliGRAM(s) IntraMuscular once PRN Glucose LESS THAN 70 milligrams/deciliter  HYDROmorphone  Injectable 0.5 milliGRAM(s) IV Push every 3 hours PRN mild and moderate pain  HYDROmorphone PCA (1 mG/mL) Rescue Clinician Bolus 0.5 milliGRAM(s) IV Push every 2 hours PRN for Pain Scale GREATER THAN 6  naloxone Injectable 0.1 milliGRAM(s) IV Push every 3 minutes PRN For ANY of the following changes in patient status:  A. RR LESS THAN 10 breaths per minute, B. Oxygen saturation LESS THAN 90%, C. Sedation score of 6  ondansetron Injectable 4 milliGRAM(s) IV Push every 6 hours PRN Nausea      ---------------------------------------------------------------------------------------------   LABS  CBC (01-29 @ 06:04)                              8.0<L>                         7.98    )----------------(  200        --    % Neutrophils, --    % Lymphocytes, ANC: --                                  23.8<L>    BMP (01-29 @ 06:04)             130<L>  |  97<L>   |  5<L>  		Ca++ --      Ca 8.2<L>             ---------------------------------( 112<H>		Mg 1.7                3.9     |  24      |  0.52  			Ph 2.4<L>

## 2019-01-30 NOTE — PROGRESS NOTE ADULT - ASSESSMENT
63M pmh HTN, HLD with history of gastric adenocarcinoma s/p total gastrectomy and Vickie en Y esophagojejunostomy with Dr Espinal at [facility] Dec 2017 c/b early postop SBO s/p lap SHAR, treated with adjuvant chemo and radiation who was transferred to [facility] on 1/15/19 for esophageal dilatation. On 1/17 patient had endoscopy to better assess strictures.  EJ anastomosis was noted to be friable and erythematous, biopsies were taken.  In PACU patient had complaints of abdominal distention and pain.  Upright CXR performed significant for pneumoperitoneum. 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 Started on Zosyn (1/17/19-->) and Micafungin (1/17/19-->).     Overall, feculent peritonitis secondary to colonic perforation. Would finish micafungin after today (Day 5). Would continue Zosyn at this time (tentative plan for 14 days of antibiotics). Would followup on E. coli and pseudomonas susceptibilities from the SOSA drainage.

## 2019-01-30 NOTE — PROGRESS NOTE ADULT - ASSESSMENT
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 transitioned from sicu to floor now back to SICU (1/25) for dropping H/H, leukocytosis, and hemodynamic monitoring, now transitioned from sicu to floor.    PLAN:  - Pain control as needed  - NPO/IVF, NGT  - may remove NGT if patient decides he does not want it  - monitor ostomy function  - lovenox for DVT ppx  - oob/ambulate as tolerated, PT  - cont to appreciate ID recs: complete 14day course of zosyn  - appreciate pt's medical oncologist (Dr. Chin) recs: recommends pt to see him in office immediately after d/c  - d/c to [facility] hosp[facility] when bed available    D Team Surgery  [id number]

## 2019-01-30 NOTE — PROGRESS NOTE ADULT - SUBJECTIVE AND OBJECTIVE BOX
Surgery Progress Note    S: Patient seen and examined. No acute events overnight. Patient continues to await transfer to hospice. Patient remains with NGT in place due to patient preference. It has been explained to patient that he does not require the NGT given that he has preferred for comfort measures however at this time he prefers to keep the NGT in place. Patient's pain is well controlled.     O:  Vital Signs Last 24 Hrs  T(C): 37.1 (29 Jan 2019 23:33), Max: 37.5 (29 Jan 2019 18:20)  T(F): 98.7 (29 Jan 2019 23:33), Max: 99.5 (29 Jan 2019 18:20)  HR: 87 (29 Jan 2019 23:33) (65 - 92)  BP: 111/67 (29 Jan 2019 23:33) (101/57 - 122/65)  BP(mean): --  RR: 18 (29 Jan 2019 23:33) (18 - 18)  SpO2: 98% (29 Jan 2019 23:33) (93% - 98%)    I&O's Detail    28 Jan 2019 07:01  -  29 Jan 2019 07:00  --------------------------------------------------------  IN:    dextrose 5% + sodium chloride 0.45% with potassium chloride 20 mEq/L: 1000 mL  Total IN: 1000 mL    OUT:    Bulb: 62 mL    Drain: 20 mL    Ileostomy: 500 mL    Nasoenteral Tube: 100 mL    Voided: 2490 mL  Total OUT: 3172 mL    Total NET: -2172 mL      29 Jan 2019 07:01  -  30 Jan 2019 00:23  --------------------------------------------------------  IN:    dextrose 5% + sodium chloride 0.45% with potassium chloride 20 mEq/L: 200 mL    dextrose 5% + sodium chloride 0.9% with potassium chloride 20 mEq/L: 400 mL  Total IN: 600 mL    OUT:    Bulb: 40 mL    Nasoenteral Tube: 50 mL    Voided: 1500 mL  Total OUT: 1590 mL    Total NET: -990 mL          MEDICATIONS  (STANDING):  acetaminophen  IVPB .. 1000 milliGRAM(s) IV Intermittent once  chlorhexidine 4% Liquid 1 Application(s) Topical two times a day  dextrose 5% + sodium chloride 0.9% with potassium chloride 20 mEq/L 1000 milliLiter(s) (100 mL/Hr) IV Continuous <Continuous>  dextrose 5%. 1000 milliLiter(s) (50 mL/Hr) IV Continuous <Continuous>  dextrose 50% Injectable 12.5 Gram(s) IV Push once  dextrose 50% Injectable 25 Gram(s) IV Push once  dextrose 50% Injectable 25 Gram(s) IV Push once  HYDROmorphone PCA (1 mG/mL) 30 milliLiter(s) PCA Continuous PCA Continuous  insulin lispro (HumaLOG) corrective regimen sliding scale   SubCutaneous every 6 hours  melatonin 3 milliGRAM(s) Oral at bedtime  piperacillin/tazobactam IVPB. 3.375 Gram(s) IV Intermittent every 8 hours    MEDICATIONS  (PRN):  dextrose 40% Gel 15 Gram(s) Oral once PRN Blood Glucose LESS THAN 70 milliGRAM(s)/deciliter  glucagon  Injectable 1 milliGRAM(s) IntraMuscular once PRN Glucose LESS THAN 70 milligrams/deciliter  HYDROmorphone  Injectable 0.5 milliGRAM(s) IV Push every 3 hours PRN mild and moderate pain  HYDROmorphone PCA (1 mG/mL) Rescue Clinician Bolus 0.5 milliGRAM(s) IV Push every 2 hours PRN for Pain Scale GREATER THAN 6  naloxone Injectable 0.1 milliGRAM(s) IV Push every 3 minutes PRN For ANY of the following changes in patient status:  A. RR LESS THAN 10 breaths per minute, B. Oxygen saturation LESS THAN 90%, C. Sedation score of 6  ondansetron Injectable 4 milliGRAM(s) IV Push every 6 hours PRN Nausea                            8.0    7.98  )-----------( 200      ( 29 Jan 2019 06:04 )             23.8       01-29    130<L>  |  97<L>  |  5<L>  ----------------------------<  112<H>  3.9   |  24  |  0.52    Ca    8.2<L>      29 Jan 2019 06:04  Phos  2.4     01-29  Mg     1.7     01-29        Physical Exam:  Gen: Laying in bed, NAD, NGT in place  Resp: Unlabored breathing  Abd: soft, NTND, midline incision re-packed, LUQ SOSA serious, RLQ ostomy pink/viable with stool and gas in bag, RUQ mucus fistula pink/viable, no rebound or guarding  Ext: WWP, L arm PICC in place  Skin: No rashes Surgery Progress Note    S: Patient seen and examined. No acute events overnight. Patient continues to await transfer to hospice. Patient remains with NGT in place due to patient preference. It has been explained to patient that he does not require the NGT given that he has preferred for comfort measures however at this time he prefers to keep the NGT in place. Patient's pain is well controlled.     O:  Vital Signs Last 24 Hrs  T(C): 37.1 (29 Jan 2019 23:33), Max: 37.5 (29 Jan 2019 18:20)  T(F): 98.7 (29 Jan 2019 23:33), Max: 99.5 (29 Jan 2019 18:20)  HR: 87 (29 Jan 2019 23:33) (65 - 92)  BP: 111/67 (29 Jan 2019 23:33) (101/57 - 122/65)  BP(mean): --  RR: 18 (29 Jan 2019 23:33) (18 - 18)  SpO2: 98% (29 Jan 2019 23:33) (93% - 98%)    I&O's Detail    28 Jan 2019 07:01  -  29 Jan 2019 07:00  --------------------------------------------------------  IN:    dextrose 5% + sodium chloride 0.45% with potassium chloride 20 mEq/L: 1000 mL  Total IN: 1000 mL    OUT:    Bulb: 62 mL    Drain: 20 mL    Ileostomy: 500 mL    Nasoenteral Tube: 100 mL    Voided: 2490 mL  Total OUT: 3172 mL    Total NET: -2172 mL      29 Jan 2019 07:01  -  30 Jan 2019 00:23  --------------------------------------------------------  IN:    dextrose 5% + sodium chloride 0.45% with potassium chloride 20 mEq/L: 200 mL    dextrose 5% + sodium chloride 0.9% with potassium chloride 20 mEq/L: 400 mL  Total IN: 600 mL    OUT:    Bulb: 40 mL    Nasoenteral Tube: 50 mL    Voided: 1500 mL  Total OUT: 1590 mL    Total NET: -990 mL          MEDICATIONS  (STANDING):  acetaminophen  IVPB .. 1000 milliGRAM(s) IV Intermittent once  chlorhexidine 4% Liquid 1 Application(s) Topical two times a day  dextrose 5% + sodium chloride 0.9% with potassium chloride 20 mEq/L 1000 milliLiter(s) (100 mL/Hr) IV Continuous <Continuous>  dextrose 5%. 1000 milliLiter(s) (50 mL/Hr) IV Continuous <Continuous>  dextrose 50% Injectable 12.5 Gram(s) IV Push once  dextrose 50% Injectable 25 Gram(s) IV Push once  dextrose 50% Injectable 25 Gram(s) IV Push once  HYDROmorphone PCA (1 mG/mL) 30 milliLiter(s) PCA Continuous PCA Continuous  insulin lispro (HumaLOG) corrective regimen sliding scale   SubCutaneous every 6 hours  melatonin 3 milliGRAM(s) Oral at bedtime  piperacillin/tazobactam IVPB. 3.375 Gram(s) IV Intermittent every 8 hours    MEDICATIONS  (PRN):  dextrose 40% Gel 15 Gram(s) Oral once PRN Blood Glucose LESS THAN 70 milliGRAM(s)/deciliter  glucagon  Injectable 1 milliGRAM(s) IntraMuscular once PRN Glucose LESS THAN 70 milligrams/deciliter  HYDROmorphone  Injectable 0.5 milliGRAM(s) IV Push every 3 hours PRN mild and moderate pain  HYDROmorphone PCA (1 mG/mL) Rescue Clinician Bolus 0.5 milliGRAM(s) IV Push every 2 hours PRN for Pain Scale GREATER THAN 6  naloxone Injectable 0.1 milliGRAM(s) IV Push every 3 minutes PRN For ANY of the following changes in patient status:  A. RR LESS THAN 10 breaths per minute, B. Oxygen saturation LESS THAN 90%, C. Sedation score of 6  ondansetron Injectable 4 milliGRAM(s) IV Push every 6 hours PRN Nausea                            8.0    7.98  )-----------( 200      ( 29 Jan 2019 06:04 )             23.8       01-29    130<L>  |  97<L>  |  5<L>  ----------------------------<  112<H>  3.9   |  24  |  0.52    Ca    8.2<L>      29 Jan 2019 06:04  Phos  2.4     01-29  Mg     1.7     01-29        Physical Exam:  Gen: Laying in bed, NAD, NGT in place  Resp: Unlabored breathing  Abd: soft, NTND, midline incision re-packed, LUQ SOSA serous, RLQ ostomy pink/viable with stool and gas in bag, RUQ mucus fistula pink/viable, no rebound or guarding  Ext: WWP, L arm PICC in place  Skin: No rashes

## 2019-01-31 LAB
GLUCOSE BLDC GLUCOMTR-MCNC: 111 MG/DL — HIGH (ref 70–99)
GLUCOSE BLDC GLUCOMTR-MCNC: 116 MG/DL — HIGH (ref 70–99)
GLUCOSE BLDC GLUCOMTR-MCNC: 83 MG/DL — SIGNIFICANT CHANGE UP (ref 70–99)
GLUCOSE BLDC GLUCOMTR-MCNC: 97 MG/DL — SIGNIFICANT CHANGE UP (ref 70–99)

## 2019-01-31 PROCEDURE — 99232 SBSQ HOSP IP/OBS MODERATE 35: CPT

## 2019-01-31 RX ADMIN — HYDROMORPHONE HYDROCHLORIDE 30 MILLILITER(S): 2 INJECTION INTRAMUSCULAR; INTRAVENOUS; SUBCUTANEOUS at 07:08

## 2019-01-31 RX ADMIN — PIPERACILLIN AND TAZOBACTAM 25 GRAM(S): 4; .5 INJECTION, POWDER, LYOPHILIZED, FOR SOLUTION INTRAVENOUS at 04:55

## 2019-01-31 RX ADMIN — PIPERACILLIN AND TAZOBACTAM 25 GRAM(S): 4; .5 INJECTION, POWDER, LYOPHILIZED, FOR SOLUTION INTRAVENOUS at 20:17

## 2019-01-31 RX ADMIN — CHLORHEXIDINE GLUCONATE 1 APPLICATION(S): 213 SOLUTION TOPICAL at 19:09

## 2019-01-31 RX ADMIN — CHLORHEXIDINE GLUCONATE 1 APPLICATION(S): 213 SOLUTION TOPICAL at 05:42

## 2019-01-31 RX ADMIN — HYDROMORPHONE HYDROCHLORIDE 30 MILLILITER(S): 2 INJECTION INTRAMUSCULAR; INTRAVENOUS; SUBCUTANEOUS at 19:09

## 2019-01-31 RX ADMIN — PIPERACILLIN AND TAZOBACTAM 25 GRAM(S): 4; .5 INJECTION, POWDER, LYOPHILIZED, FOR SOLUTION INTRAVENOUS at 13:57

## 2019-01-31 NOTE — GOALS OF CARE CONVERSATION - PERSONAL ADVANCE DIRECTIVE - CONVERSATION/DISCUSSION
Hospice Referral
Diagnosis/MOLST Discussed/Hospice Referral/Prognosis

## 2019-01-31 NOTE — PROGRESS NOTE ADULT - NEUROLOGICAL DETAILS
responds to verbal commands
responds to verbal commands/alert and oriented x 3
alert and oriented x 3/responds to verbal commands
responds to verbal commands/alert and oriented x 3
responds to verbal commands/alert and oriented x 3

## 2019-01-31 NOTE — PROGRESS NOTE ADULT - CONSTITUTIONAL DETAILS
in chair/no distress
no distress/in chair
well-groomed/no distress
no distress/in chair, fatigued
no distress/in chair
fatigued, tired

## 2019-01-31 NOTE — PROGRESS NOTE ADULT - NEGATIVE SKIN SYMPTOMS
no itching/no rash
no itching/no rash
no rash/no itching
no itching/no rash
no rash/no itching
no itching/no rash

## 2019-01-31 NOTE — PROGRESS NOTE ADULT - ASSESSMENT
63M pmh HTN, HLD with history of gastric adenocarcinoma s/p total gastrectomy and Vickie en Y esophagojejunostomy with Dr Espinal at [facility] Dec 2017 c/b early postop SBO s/p lap SHAR, treated with adjuvant chemo and radiation who was transferred to [facility] on 1/15/19 for esophageal dilatation. On 1/17 patient had endoscopy to better assess strictures.  EJ anastomosis was noted to be friable and erythematous, biopsies were taken.  In PACU patient had complaints of abdominal distention and pain.  Upright CXR performed significant for pneumoperitoneum. 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 Started on Zosyn (1/17/19-->) and Micafungin (1/17/19-->).     Overall, feculent peritonitis secondary to colonic perforation. Would finish micafungin after today (Day 5). Would continue Zosyn at this time (tentative plan for 14 days of antibiotics). Would followup on E. coli and pseudomonas susceptibilities from the SOSA drainage.

## 2019-01-31 NOTE — PROGRESS NOTE ADULT - PROBLEM SELECTOR PROBLEM 1
Fecal peritonitis
Malignant neoplasm of stomach, unspecified location
Malignant neoplasm of stomach, unspecified location
Stomach cancer
Fecal peritonitis

## 2019-01-31 NOTE — PROGRESS NOTE ADULT - GASTROINTESTINAL DETAILS
no distention/nontender/soft
soft
no rigidity/nontender/no rebound tenderness/no guarding
soft/nontender/no distention/no rigidity/no guarding
soft/ostomy+, drains+/no distention
no guarding/nontender/soft

## 2019-01-31 NOTE — PROGRESS NOTE ADULT - NEGATIVE OPHTHALMOLOGIC SYMPTOMS
no pain R/no pain L
no pain L/no pain R
no pain R/no pain L
no pain R/no pain L
no pain L/no pain R
no pain L/no pain R

## 2019-01-31 NOTE — PROGRESS NOTE ADULT - ADDITIONAL PE
Left UE PICC-no redness/swelling/tenderness
Left arm PICC-no redness/swelling/tenderness
Rt CW port intact
Left UE PICC-no redness/swelling/tenderness

## 2019-01-31 NOTE — PROGRESS NOTE ADULT - ATTENDING COMMENTS
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 transitioned from sicu to floor     TPN cycled over 12 hrs with a total volume 1500cc   preparation for D/C home tomorrow per primary team       1. Protein calorie malnutrition being optimized with TPN: CHO [ ] gm.  AA [ ] gm. Lipids [ ] gm.  2.  Hyperglycemia managed with: [ ] units of regular insulin    3.  Check fluid balance daily.  Strict I/O  [ ] [ ]   4.  Daily BMP, Ionized Calcium, Magnesium and Phosphorous   5.  Triglycerides at initiation of TPN and monthly [ ] [ ]   6.  Pepcid in TPN for Gi prophylaxis  [ ]   I have seen and examined the patient, reviewed the laboratory and radiologic data and agree with the history, physical assessment and plan.  I reviewed and discussed with all consultants, house staff and PA's.  The note is edited where appropriate. The Nutrition Support Team (NST) discusses on an ongoing basis with the primary team and all consultants, House staff and PA's to have a permanent risk benefit analyses on all decisions.    I spent  45  minutes in total encounter; more than 50% of the visit counseling and coordinating nutrition care while providing diagnoses, assessment, and plan.
Continue TPN for severe protein calorie malnutrition. Agree with plan as outlined.
Continue TPN for severe protein calorie malnutrition. Agree with plan as outlined.  Hypokalemia and hypophosphatemia noted - increased in TPN.
Continue TPN for severe protein calorie malnutrition. Agree with plan as outlined.  Mild hypokalemia would plan to increase potassium in formula tomorrow. Today replete outside of TPN.
Continue TPN for severe protein calorie malnutrition. Agree with plan as outlined.
Patient seen and examined with GI fellow. I agree with above A/P.
Patient seen and examined with GI fellow. I agree with the above A/P.
Seen and examined, chart and note reviewed, case discussed with SICU team    Acute posthemorrhage anemia  a.  MTP instituted overnight    Postprocedural hypotension  a.  Significant amount of Blood noted from drain.  Patient labile initially post-procedure with improvement with fluid/blood product/vasopressor resuscitation  b.  Case discussed with Primary surgical attending thru on-call chief resident    Respiratory failure  a.  Continue vent support  b.  Obtain ABG    Spent 75 minutes in critical care
The patient is a critical care patient with hemodynamic and metabolic instability in SICU.  I have personally interviewed and examined this patient, reviewed labs and x-rays, discussed with other consultants, House staff and PA's.  I spent   40  minutes  in total providing critical care for the diagnoses, assessment and plan above.  These diagnoses are unrelated to the surgical procedure noted above.  Time involved in performance of separately billable procedures was not counted toward my critical care time.  There is no overlap.    Current Issues:  J96.00 Acute respiratory failure, unspecified whether with hypoxia or hypercapnia   - weaned to extubation successfully on am rounds  - aggressive pulm toilet  E43 Severe protein-calorie malnutrition   - on TPN  C16.9 Malignant neoplasm of stomach, unspecified location   - family aware of diagnosis.  Further treatment plan as per primary team  D64.9 Anemia, unspecified type   - H/H stable, no clinical signs of bleeding
63y Male with history of gastric adenocarcinoma s/p total gastrectomy and Kelby en Y esophagojejunostomy presented from [facility] for stricture at EJ anastomosis s/p EGD c/b pneumoperitoneum post-procedure s/p ex-lap right hemicolectomy, end ileostomy, mucus fistula. Patient is previously known to SICU team and has been reconsulted for hemodynamic monitoring s/p hypotensive event overnight, downtrending Hct and tachypnea. Palliative care following.     PLAN:   Neurologic: Patient is A&Ox3, in moderate pain  -pain control with PCA morphine, palliative care following     Respiratory: Tachypneic on physical exam, saturating well, non-hypoxic  -supplemental O2 as needed    Cardiovascular: stable hemodynamically for whole day in SICU  -ct IVF hydration with LR  -hemodynamic monitoring as per routine   -hold lopressor 5mg q6h IV in setting of hypotension    Gastrointestinal/Nutrition:   TPN stopped as PICC line was violated with contrast injection, pt now has opted for pleasure feeds and TPN is discontinued   -IR guided drain placement to drain pelvic collections  -PO feeds once back from procedure   -Ct A/P positive for ascites, collection and leak, pt has opted for comfort care at this point, no surgical intervention    Renal/Genitourinary:   -Monitor urine output, and BMP, replete lytes as needed     Hematologic:   H/H stable  -lovenox ppx      Infectious Disease: ID following, recs appreciated  -continue with Zosyn - consider d/c tomorrow    Endocrine: FS with ISS    Disposition: SICU for hemodynamic monitoring    -Palliative care following, GOC discussion ongoing, currently full code    Diagnosis: Respiratory abnormalities, severe caloric nutrition  The patient is a critical care patient with life threatening hemodynamic and metabolic instability in SICU.  I have personally interviewed when possible and examined the patient, reviewed data and laboratory tests/x-rays and all pertinent electronic images.  I was physically present for the key portions of the evaluation and management (E/M) service provided.   The SICU team has a constant risk benefit analyzes discussion with the primary team, all consultants, House Staff and PA's on all decisions.  These diagnoses are unrelated to the surgical procedure noted above.  I meet with family if needed to get further history, discuss the case and make care decisions for this patient who might not be able to participate.  Time involved in performance of separately billable procedures was not counted toward my critical care time. There is no overlap.  I spent 55-75 minutes of critical care time for the diagnoses, assessment, plan and interventions.
63y Male with history of gastric adenocarcinoma s/p total gastrectomy and Kelby en Y esophagojejunostomy presented from [facility] for stricture at EJ anastomosis s/p EGD c/b pneumoperitoneum post-procedure s/p ex-lap right hemicolectomy, end ileostomy, mucus fistula. Patient is previously known to SICU team and has been reconsulted for hemodynamic monitoring s/p hypotensive event overnight, downtrending Hct and tachypnea. Palliative care following.     PLAN:   Neurologic: Patient is A&Ox3, in moderate pain  -pain control with Fentanyl patch, Dilaudid and Tylenol for breakthrough    Respiratory: Tachypneic on physical exam, saturating well, non-hypoxic  -low suspicion for PE, but will scan chest with CT A/P  -CXR ordered additionally  -supplemental O2 as needed    Cardiovascular: hypotensive responsive to IVF bolus  -IVF hydration  -EKG  -monitor BP in SICU, possibly actively bleeding  -hold lopressor 5mg q6h IV in setting of hypotension    Gastrointestinal/Nutrition: Continue TPN  -Expanding right sided Pericholic hematoma  -Speak with surgery about need for intervention as increasing retroperitoneal collection  -Will repeat small bowel series  -Will call IR to replace PICC line and determine if drainage is possible    Renal/Genitourinary: NS at 30 -> Will switch to LR maintenance fluid  -Monitor urine output, replete lytes    Hematologic: Hct downtrending over the past 48hrs  -CT A/P to evaluate for active bleeding  -Will transfuse if Hb 7.0, unless actively bleeding  -lovenox ppx  -Repeat CBC/coags    Infectious Disease: ID following, recs appreciated  -continue with Zosyn - consider d/c tomorrow    Endocrine: FS with ISS    Disposition: SICU for hemodynamic monitoring    -Palliative care following, GOC discussion ongoing, currently full code    Diagnosis: Respiratory abnormalities, severe caloric nutrition  The patient is a critical care patient with life threatening hemodynamic and metabolic instability in SICU.  I have personally interviewed when possible and examined the patient, reviewed data and laboratory tests/x-rays and all pertinent electronic images.  I was physically present for the key portions of the evaluation and management (E/M) service provided.   The SICU team has a constant risk benefit analyzes discussion with the primary team, all consultants, House Staff and PA's on all decisions.  These diagnoses are unrelated to the surgical procedure noted above.  I meet with family if needed to get further history, discuss the case and make care decisions for this patient who might not be able to participate.  Time involved in performance of separately billable procedures was not counted toward my critical care time. There is no overlap.  I spent 55-75 minutes of critical care time for the diagnoses, assessment, plan and interventions.
Patient admitted with dysphagia and stricture of esophagojejunal anastomosis.  Noted perforation during EGD with resultant pneumoperitoneum.  Vitals signs stable, complaining of abdominal pain.  Abdominal x-ray demonstrates large volume pneumoperitoneum.  Naso-esophageal tube placed.  Empiric antibiotics including fungal coverage.  NPO/IVF.  IR consultation for decompression of pneumoperitoneum and subsequent reassessment for need for exploration/drain placement.
Patricia Salinas MD  Pager: [phone]  After 5 PM or weekends please call fellow on call or office [phone]
Lui Benton  Attending Physician   Division of Infectious Disease  Pager #[phone]  After 5pm/weekend or no response, call #[phone]    Please call the ID service [phone] with questions or concerns over the weekend.
Pt seen with resident. After GOC discussion pt confirmed prior wishes of DNR/DNI, MOLST filled out and in the chart. Pain uncontrolled with current regimen, will resume PCA as pt wants to go home, does not want to pursue any further chemotherapy and is agreeable to pleasure feeds, understanding that this will result in infection and his demise. He wants to focus on his comfort and symptom management, agreeable for hospice services at home.
Pt seen with resident. Agree with above plan. Awaiting recommendations from surgery team if pt can tolerate PO to transition regimen to oral medications.
Pt seen with resident. Family meeting today, resulting in pt understanding the severity of his disease and future steps. Pending oncology's further recommendations. If pt tolerates oral route can change to long acting oral meds.
Lui Benton  Attending Physician   Division of Infectious Disease  Pager #[phone]  After 5pm/weekend or no response, call #[phone]
Lui Benton  Attending Physician   Division of Infectious Disease  Pager #[phone]  After 5pm/weekend or no response, call #[phone]
Lui Benton  Attending Physician   Division of Infectious Disease  Pager #[phone]  After 5pm/weekend or no response, call #[phone]    I am away on 1/24 and will return 1/25. ID available #[phone].
Lui Benton  Attending Physician   Division of Infectious Disease  Pager #[phone]  After 5pm/weekend or no response, call #[phone]
Lui Benton  Attending Physician   Division of Infectious Disease  Pager #[phone]  After 5pm/weekend or no response, call #[phone]

## 2019-01-31 NOTE — PROGRESS NOTE ADULT - NEGATIVE ALLERGIC REACTIONS
no respiratory distress

## 2019-01-31 NOTE — PROGRESS NOTE ADULT - NEGATIVE CARDIOVASCULAR SYMPTOMS
no palpitations/no chest pain

## 2019-01-31 NOTE — PROGRESS NOTE ADULT - PROBLEM SELECTOR PROBLEM 2
Fecal peritonitis
Polymicrobial bacterial infection
Fecal peritonitis
Polymicrobial bacterial infection

## 2019-01-31 NOTE — PROGRESS NOTE ADULT - MS EXT PE MLT D E PC
no cyanosis
no pedal edema/no cyanosis
no cyanosis/no pedal edema

## 2019-01-31 NOTE — PROGRESS NOTE ADULT - NEGATIVE GASTROINTESTINAL SYMPTOMS
no nausea/no diarrhea/no vomiting
no vomiting/no nausea/no diarrhea
no diarrhea/no vomiting/no nausea
no nausea/no vomiting/no diarrhea
no vomiting/no nausea/no diarrhea
no nausea/no vomiting/no diarrhea

## 2019-01-31 NOTE — PROGRESS NOTE ADULT - SUBJECTIVE AND OBJECTIVE BOX
General Surgery Progress Note    SUBJECTIVE:  The patient was seen and examined. No acute events overnight. Pain well controlled. Yesterday, IR repositioned R. gluteal drain.    OBJECTIVE:     ** VITAL SIGNS / I&O's **    Vital Signs Last 24 Hrs  T(C): 37 (31 Jan 2019 07:38), Max: 37.4 (30 Jan 2019 11:56)  T(F): 98.6 (31 Jan 2019 07:38), Max: 99.4 (30 Jan 2019 11:56)  HR: 90 (31 Jan 2019 07:38) (90 - 93)  BP: 114/70 (31 Jan 2019 07:38) (109/59 - 119/74)  BP(mean): --  RR: 18 (31 Jan 2019 07:38) (16 - 18)  SpO2: 98% (31 Jan 2019 07:38) (97% - 100%)      30 Jan 2019 07:01  -  31 Jan 2019 07:00  --------------------------------------------------------  IN:    dextrose 5% + sodium chloride 0.9% with potassium chloride 20 mEq/L: 600 mL  Total IN: 600 mL    OUT:    Bulb: 30 mL    Drain: 11.5 mL    Ileostomy: 210 mL    Voided: 2125 mL  Total OUT: 2376.5 mL    Total NET: -1776.5 mL          ** PHYSICAL EXAM **    -- CONSTITUTIONAL: Alert, NAD, NGT in place to suction  -- PULMONARY: non-labored respirations  -- ABDOMEN: soft, NTND, midline incision re-packed, LUQ SOSA serous, RLQ ostomy pink/viable with stool and gas in bag, RUQ mucus fistula pink/viable, no rebound or     ** LABS **            PT/INR - ( 30 Jan 2019 12:06 )   PT: 18.4 SEC;   INR: 1.59          PTT - ( 30 Jan 2019 12:06 )  PTT:29.8 SEC  CAPILLARY BLOOD GLUCOSE      POCT Blood Glucose.: 116 mg/dL (31 Jan 2019 05:57)  POCT Blood Glucose.: 111 mg/dL (30 Jan 2019 23:59)  POCT Blood Glucose.: 89 mg/dL (30 Jan 2019 17:50)  POCT Blood Glucose.: 102 mg/dL (30 Jan 2019 12:08)                MEDICATIONS  (STANDING):  acetaminophen  IVPB .. 1000 milliGRAM(s) IV Intermittent once  chlorhexidine 4% Liquid 1 Application(s) Topical two times a day  dextrose 5% + sodium chloride 0.9% with potassium chloride 20 mEq/L 1000 milliLiter(s) (100 mL/Hr) IV Continuous <Continuous>  dextrose 5%. 1000 milliLiter(s) (50 mL/Hr) IV Continuous <Continuous>  dextrose 50% Injectable 12.5 Gram(s) IV Push once  dextrose 50% Injectable 25 Gram(s) IV Push once  dextrose 50% Injectable 25 Gram(s) IV Push once  HYDROmorphone PCA (1 mG/mL) 30 milliLiter(s) PCA Continuous PCA Continuous  insulin lispro (HumaLOG) corrective regimen sliding scale   SubCutaneous every 6 hours  melatonin 3 milliGRAM(s) Oral at bedtime  piperacillin/tazobactam IVPB. 3.375 Gram(s) IV Intermittent every 8 hours    MEDICATIONS  (PRN):  dextrose 40% Gel 15 Gram(s) Oral once PRN Blood Glucose LESS THAN 70 milliGRAM(s)/deciliter  glucagon  Injectable 1 milliGRAM(s) IntraMuscular once PRN Glucose LESS THAN 70 milligrams/deciliter  HYDROmorphone  Injectable 0.5 milliGRAM(s) IV Push every 3 hours PRN mild and moderate pain  HYDROmorphone PCA (1 mG/mL) Rescue Clinician Bolus 0.5 milliGRAM(s) IV Push every 2 hours PRN for Pain Scale GREATER THAN 6  naloxone Injectable 0.1 milliGRAM(s) IV Push every 3 minutes PRN For ANY of the following changes in patient status:  A. RR LESS THAN 10 breaths per minute, B. Oxygen saturation LESS THAN 90%, C. Sedation score of 6  ondansetron Injectable 4 milliGRAM(s) IV Push every 6 hours PRN Nausea General Surgery Progress Note    SUBJECTIVE:  The patient was seen and examined. No acute events overnight. Pain well controlled. Yesterday, IR repositioned R. gluteal drain.    OBJECTIVE:     ** VITAL SIGNS / I&O's **    Vital Signs Last 24 Hrs  T(C): 37 (31 Jan 2019 07:38), Max: 37.4 (30 Jan 2019 11:56)  T(F): 98.6 (31 Jan 2019 07:38), Max: 99.4 (30 Jan 2019 11:56)  HR: 90 (31 Jan 2019 07:38) (90 - 93)  BP: 114/70 (31 Jan 2019 07:38) (109/59 - 119/74)  BP(mean): --  RR: 18 (31 Jan 2019 07:38) (16 - 18)  SpO2: 98% (31 Jan 2019 07:38) (97% - 100%)      30 Jan 2019 07:01  -  31 Jan 2019 07:00  --------------------------------------------------------  IN:    dextrose 5% + sodium chloride 0.9% with potassium chloride 20 mEq/L: 600 mL  Total IN: 600 mL    OUT:    Bulb: 30 mL    Drain: 11.5 mL    Ileostomy: 210 mL    Voided: 2125 mL  Total OUT: 2376.5 mL    Total NET: -1776.5 mL          ** PHYSICAL EXAM **    -- CONSTITUTIONAL: Alert, NAD, NGT in place to suction  -- PULMONARY: non-labored respirations  -- ABDOMEN: soft, NTND, midline incision re-packed, LUQ SOSA serous, RLQ ostomy pink/viable with stool and gas in bag, RUQ mucus fistula pink/viable, no rebound or guarding    ** LABS **            PT/INR - ( 30 Jan 2019 12:06 )   PT: 18.4 SEC;   INR: 1.59          PTT - ( 30 Jan 2019 12:06 )  PTT:29.8 SEC  CAPILLARY BLOOD GLUCOSE      POCT Blood Glucose.: 116 mg/dL (31 Jan 2019 05:57)  POCT Blood Glucose.: 111 mg/dL (30 Jan 2019 23:59)  POCT Blood Glucose.: 89 mg/dL (30 Jan 2019 17:50)  POCT Blood Glucose.: 102 mg/dL (30 Jan 2019 12:08)                MEDICATIONS  (STANDING):  acetaminophen  IVPB .. 1000 milliGRAM(s) IV Intermittent once  chlorhexidine 4% Liquid 1 Application(s) Topical two times a day  dextrose 5% + sodium chloride 0.9% with potassium chloride 20 mEq/L 1000 milliLiter(s) (100 mL/Hr) IV Continuous <Continuous>  dextrose 5%. 1000 milliLiter(s) (50 mL/Hr) IV Continuous <Continuous>  dextrose 50% Injectable 12.5 Gram(s) IV Push once  dextrose 50% Injectable 25 Gram(s) IV Push once  dextrose 50% Injectable 25 Gram(s) IV Push once  HYDROmorphone PCA (1 mG/mL) 30 milliLiter(s) PCA Continuous PCA Continuous  insulin lispro (HumaLOG) corrective regimen sliding scale   SubCutaneous every 6 hours  melatonin 3 milliGRAM(s) Oral at bedtime  piperacillin/tazobactam IVPB. 3.375 Gram(s) IV Intermittent every 8 hours    MEDICATIONS  (PRN):  dextrose 40% Gel 15 Gram(s) Oral once PRN Blood Glucose LESS THAN 70 milliGRAM(s)/deciliter  glucagon  Injectable 1 milliGRAM(s) IntraMuscular once PRN Glucose LESS THAN 70 milligrams/deciliter  HYDROmorphone  Injectable 0.5 milliGRAM(s) IV Push every 3 hours PRN mild and moderate pain  HYDROmorphone PCA (1 mG/mL) Rescue Clinician Bolus 0.5 milliGRAM(s) IV Push every 2 hours PRN for Pain Scale GREATER THAN 6  naloxone Injectable 0.1 milliGRAM(s) IV Push every 3 minutes PRN For ANY of the following changes in patient status:  A. RR LESS THAN 10 breaths per minute, B. Oxygen saturation LESS THAN 90%, C. Sedation score of 6  ondansetron Injectable 4 milliGRAM(s) IV Push every 6 hours PRN Nausea

## 2019-01-31 NOTE — PROGRESS NOTE ADULT - NEGATIVE GENERAL SYMPTOMS
no fever/no chills
no chills/no fever
no chills/no fever
no fever/no chills

## 2019-01-31 NOTE — PROGRESS NOTE ADULT - NEGATIVE HEMATOLOGY SYMPTOMS
no nose bleeding

## 2019-01-31 NOTE — PROGRESS NOTE ADULT - CARDIOVASCULAR DETAILS
positive S1/positive S2
positive S2/positive S1
positive S1/positive S2
positive S1/positive S2/tachycardia

## 2019-01-31 NOTE — PROGRESS NOTE ADULT - SUBJECTIVE AND OBJECTIVE BOX
TOMROGER 63y [id number]    Patient is a 63y old  Male who presents with a chief complaint of Bowel Obstruction (31 Jan 2019 11:08)      Follow Up/CC:  ID following for peritonitis     Interval History/ROS: no fever, no complaints    Allergies    No Known Allergies    Intolerances        ANTIMICROBIALS:  piperacillin/tazobactam IVPB. 3.375 every 8 hours      MEDICATIONS  (STANDING):  acetaminophen  IVPB .. 1000 milliGRAM(s) IV Intermittent once  chlorhexidine 4% Liquid 1 Application(s) Topical two times a day  dextrose 5% + sodium chloride 0.9% with potassium chloride 20 mEq/L 1000 milliLiter(s) (100 mL/Hr) IV Continuous <Continuous>  dextrose 5%. 1000 milliLiter(s) (50 mL/Hr) IV Continuous <Continuous>  dextrose 50% Injectable 12.5 Gram(s) IV Push once  dextrose 50% Injectable 25 Gram(s) IV Push once  dextrose 50% Injectable 25 Gram(s) IV Push once  HYDROmorphone PCA (1 mG/mL) 30 milliLiter(s) PCA Continuous PCA Continuous  insulin lispro (HumaLOG) corrective regimen sliding scale   SubCutaneous every 6 hours  melatonin 3 milliGRAM(s) Oral at bedtime  piperacillin/tazobactam IVPB. 3.375 Gram(s) IV Intermittent every 8 hours    MEDICATIONS  (PRN):  dextrose 40% Gel 15 Gram(s) Oral once PRN Blood Glucose LESS THAN 70 milliGRAM(s)/deciliter  glucagon  Injectable 1 milliGRAM(s) IntraMuscular once PRN Glucose LESS THAN 70 milligrams/deciliter  HYDROmorphone  Injectable 0.5 milliGRAM(s) IV Push every 3 hours PRN mild and moderate pain  HYDROmorphone PCA (1 mG/mL) Rescue Clinician Bolus 0.5 milliGRAM(s) IV Push every 2 hours PRN for Pain Scale GREATER THAN 6  naloxone Injectable 0.1 milliGRAM(s) IV Push every 3 minutes PRN For ANY of the following changes in patient status:  A. RR LESS THAN 10 breaths per minute, B. Oxygen saturation LESS THAN 90%, C. Sedation score of 6  ondansetron Injectable 4 milliGRAM(s) IV Push every 6 hours PRN Nausea        Vital Signs Last 24 Hrs  T(C): 37 (31 Jan 2019 16:29), Max: 37 (31 Jan 2019 04:55)  T(F): 98.6 (31 Jan 2019 16:29), Max: 98.6 (31 Jan 2019 04:55)  HR: 102 (31 Jan 2019 16:29) (90 - 102)  BP: 99/67 (31 Jan 2019 16:29) (99/67 - 119/74)  BP(mean): --  RR: 18 (31 Jan 2019 16:29) (16 - 18)  SpO2: 100% (31 Jan 2019 16:29) (97% - 100%)                  MICROBIOLOGY:  PELVIS  01-25-19 --  --  Escherichia coli  Escherichia coli 2  Bacteroides fragilis      INCISION  01-25-19   EC^Escherichia coli  ENTF^Enterococcus faecalis  QUANTITY OF GROWTH: FEW  PSA^Pseudomonas aeruginosa  --  Escherichia coli  Enterococcus faecalis  Pseudomonas aeruginosa      SURGERY  01-18-19 --  --  Pseudomonas aeruginosa  Escherichia coli        RADIOLOGY    < from: IR Procedure (01.30.19 @ 16:24) >  CT SCAN AND TUBE CHECK REVEALING A DECREASE IN SIZE, BUT   PERSISTENT, OF THE PREVIOUSLY DRAINED COLLECTION WITH MODERATELY SIZED   CAVITY.   CATHETER WAS THEREFORE LEFT IN PLACE. PLAN IS TO REPEAT TUBE CHECK IN 1   WEEK.

## 2019-01-31 NOTE — CHART NOTE - NSCHARTNOTEFT_GEN_A_CORE
NUTRITION FOLLOW-UP: Pt has been taken off TPN. Pt is awaiting a bed in a hospice facility. Diet was advanced to regular diet with pleasure feeds.     Weight: 64.9 kg -1/26/19    Labs: POCT-116    Current Diet: Regular with pleasure feeds    Enteral Recommendations:    RD to Remain Available: Gaby Nash MS, RDN, CDN pager [phone]     Additional Recommendations:   1) Monitor weight, labs, po intake and skin integrity. normal...

## 2019-01-31 NOTE — PROGRESS NOTE ADULT - NEGATIVE ALLERGY TYPES
no reactions to medicines

## 2019-01-31 NOTE — PROGRESS NOTE ADULT - ASSESSMENT
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 transitioned from sicu to floor now back to SICU (1/25) for dropping H/H, leukocytosis, and hemodynamic monitoring, now transitioned from sicu to floor.    PLAN:  - Pain control as needed  - NPO/IVF, NGT (Pt chooses to have NGT, may remove NGT if patient decides he does not want it)  - monitor ostomy function, monitor drains output  - lovenox for DVT ppx  - oob/ambulate as tolerated, PT  - cont to appreciate ID recs: complete 14day course of zosyn  - appreciate pt's medical oncologist (Dr. Chin) recs: recommends pt to see him in office immediately after d/c  - dispo: d/c to [facility] hospice when bed available

## 2019-01-31 NOTE — GOALS OF CARE CONVERSATION - PERSONAL ADVANCE DIRECTIVE - CONVERSATION DETAILS
[facility]:  TCT Wife Gisella, to offer possible bed availability in [place].  Wife/Pt decline [place] and have requested to remain on wait list for [place].

## 2019-01-31 NOTE — PROGRESS NOTE ADULT - NEGATIVE LYMPHATIC SYMPTOMS
no swelling of extremity

## 2019-01-31 NOTE — PROGRESS NOTE ADULT - GIT GEN HX ROS MEA POS PC
abdominal pain/controlled
controlled/abdominal pain
abdominal pain/controlled
controlled/abdominal pain

## 2019-01-31 NOTE — PROGRESS NOTE ADULT - GIT ABD PE PAL DETAILS PC
ostomy+, drains+
ostomy+, drains+
ostomy X 2 intact, SOSA drain+/distended
ostomy x 2, SOSA+
Drains+, ostomy+, fistula+

## 2019-01-31 NOTE — PROGRESS NOTE ADULT - NEGATIVE ENMT SYMPTOMS
no nasal congestion/no throat pain/no ear pain
no ear pain/no throat pain/no nasal congestion
no nasal congestion/no ear pain/no throat pain
no nasal congestion/no throat pain/no ear pain
no ear pain/no throat pain/no nasal congestion
no throat pain/no nasal congestion/no ear pain

## 2019-01-31 NOTE — PROGRESS NOTE ADULT - RS GEN PE MLT RESP DETAILS PC
respirations non-labored/good air movement/clear to auscultation bilaterally
clear to auscultation bilaterally/good air movement
clear to auscultation bilaterally/respirations non-labored
respirations non-labored/clear to auscultation bilaterally
clear to auscultation bilaterally/respirations non-labored
clear to auscultation bilaterally

## 2019-02-01 VITALS
SYSTOLIC BLOOD PRESSURE: 115 MMHG | HEART RATE: 88 BPM | TEMPERATURE: 99 F | OXYGEN SATURATION: 98 % | RESPIRATION RATE: 18 BRPM | DIASTOLIC BLOOD PRESSURE: 70 MMHG

## 2019-02-01 PROBLEM — C16.9 MALIGNANT NEOPLASM OF STOMACH, UNSPECIFIED: Chronic | Status: ACTIVE | Noted: 2018-12-24

## 2019-02-01 LAB
GLUCOSE BLDC GLUCOMTR-MCNC: 104 MG/DL — HIGH (ref 70–99)
GLUCOSE BLDC GLUCOMTR-MCNC: 128 MG/DL — HIGH (ref 70–99)
GLUCOSE BLDC GLUCOMTR-MCNC: 99 MG/DL — SIGNIFICANT CHANGE UP (ref 70–99)

## 2019-02-01 RX ORDER — HYDROMORPHONE HYDROCHLORIDE 2 MG/ML
0 INJECTION INTRAMUSCULAR; INTRAVENOUS; SUBCUTANEOUS
Qty: 0 | Refills: 0 | COMMUNITY
Start: 2019-02-01

## 2019-02-01 RX ORDER — ACETAMINOPHEN 500 MG
100 TABLET ORAL
Qty: 0 | Refills: 0 | COMMUNITY
Start: 2019-02-01

## 2019-02-01 RX ADMIN — HYDROMORPHONE HYDROCHLORIDE 30 MILLILITER(S): 2 INJECTION INTRAMUSCULAR; INTRAVENOUS; SUBCUTANEOUS at 07:05

## 2019-02-01 RX ADMIN — CHLORHEXIDINE GLUCONATE 1 APPLICATION(S): 213 SOLUTION TOPICAL at 05:10

## 2019-02-01 NOTE — PROGRESS NOTE ADULT - ASSESSMENT
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 transitioned from sicu to floor now back to SICU (1/25) for dropping H/H, leukocytosis, and hemodynamic monitoring, now transitioned from sicu to floor.    PLAN:  - Pain control as needed  - comfort feeds/IVF, NGT (Pt chooses to have NGT, may remove NGT if patient decides he does not want it)  - monitor ostomy function, monitor drains output  - lovenox for DVT ppx  - oob/ambulate as tolerated, PT  - cont to appreciate ID recs: completed 14day course of zosyn yesterday  - dispo: d/c to [facility] hospice when bed available

## 2019-02-01 NOTE — PROGRESS NOTE ADULT - SUBJECTIVE AND OBJECTIVE BOX
General Surgery Progress Note    SUBJECTIVE:  The patient was seen and examined. No acute events overnight. Started comfort feeds yesterday, pt still chooses to have NGT in place. Pain well controlled. Denies n/v, cp, sob, abd pain.    OBJECTIVE:     ** VITAL SIGNS / I&O's **    Vital Signs Last 24 Hrs  T(C): 36.9 (31 Jan 2019 21:21), Max: 37 (31 Jan 2019 04:55)  T(F): 98.4 (31 Jan 2019 21:21), Max: 98.6 (31 Jan 2019 04:55)  HR: 98 (31 Jan 2019 21:21) (90 - 102)  BP: 133/62 (31 Jan 2019 21:21) (99/67 - 133/62)  BP(mean): --  RR: 17 (31 Jan 2019 21:21) (17 - 18)  SpO2: 91% (31 Jan 2019 21:21) (91% - 100%)      30 Jan 2019 07:01  -  31 Jan 2019 07:00  --------------------------------------------------------  IN:    dextrose 5% + sodium chloride 0.9% with potassium chloride 20 mEq/L: 600 mL  Total IN: 600 mL    OUT:    Bulb: 30 mL    Drain: 11.5 mL    Ileostomy: 210 mL    Voided: 2125 mL  Total OUT: 2376.5 mL    Total NET: -1776.5 mL      31 Jan 2019 07:01  -  01 Feb 2019 01:04  --------------------------------------------------------  IN:  Total IN: 0 mL    OUT:    Bulb: 20 mL    Drain: 5 mL    Ileostomy: 100 mL    Voided: 650 mL  Total OUT: 775 mL    Total NET: -775 mL          ** PHYSICAL EXAM **    -- CONSTITUTIONAL: Alert, NAD, NGT in place  -- PULMONARY: non-labored respirations  -- ABDOMEN: soft, NTND, midline incision re-packed, LUQ SOSA serous, RLQ ostomy pink/viable with stool and gas in bag, RUQ mucus fistula pink/viable, no rebound or guarding    ** LABS **            PT/INR - ( 30 Jan 2019 12:06 )   PT: 18.4 SEC;   INR: 1.59          PTT - ( 30 Jan 2019 12:06 )  PTT:29.8 SEC  CAPILLARY BLOOD GLUCOSE      POCT Blood Glucose.: 83 mg/dL (31 Jan 2019 17:17)  POCT Blood Glucose.: 97 mg/dL (31 Jan 2019 12:05)  POCT Blood Glucose.: 116 mg/dL (31 Jan 2019 05:57)                MEDICATIONS  (STANDING):  acetaminophen  IVPB .. 1000 milliGRAM(s) IV Intermittent once  chlorhexidine 4% Liquid 1 Application(s) Topical two times a day  dextrose 5% + sodium chloride 0.9% with potassium chloride 20 mEq/L 1000 milliLiter(s) (100 mL/Hr) IV Continuous <Continuous>  dextrose 5%. 1000 milliLiter(s) (50 mL/Hr) IV Continuous <Continuous>  dextrose 50% Injectable 12.5 Gram(s) IV Push once  dextrose 50% Injectable 25 Gram(s) IV Push once  dextrose 50% Injectable 25 Gram(s) IV Push once  HYDROmorphone PCA (1 mG/mL) 30 milliLiter(s) PCA Continuous PCA Continuous  insulin lispro (HumaLOG) corrective regimen sliding scale   SubCutaneous every 6 hours  melatonin 3 milliGRAM(s) Oral at bedtime    MEDICATIONS  (PRN):  dextrose 40% Gel 15 Gram(s) Oral once PRN Blood Glucose LESS THAN 70 milliGRAM(s)/deciliter  glucagon  Injectable 1 milliGRAM(s) IntraMuscular once PRN Glucose LESS THAN 70 milligrams/deciliter  HYDROmorphone  Injectable 0.5 milliGRAM(s) IV Push every 3 hours PRN mild and moderate pain  HYDROmorphone PCA (1 mG/mL) Rescue Clinician Bolus 0.5 milliGRAM(s) IV Push every 2 hours PRN for Pain Scale GREATER THAN 6  naloxone Injectable 0.1 milliGRAM(s) IV Push every 3 minutes PRN For ANY of the following changes in patient status:  A. RR LESS THAN 10 breaths per minute, B. Oxygen saturation LESS THAN 90%, C. Sedation score of 6  ondansetron Injectable 4 milliGRAM(s) IV Push every 6 hours PRN Nausea

## 2019-02-01 NOTE — PROGRESS NOTE ADULT - PROVIDER SPECIALTY LIST ADULT
Anesthesia
Gastroenterology
Gastroenterology
Infectious Disease
Nutrition Support
Pain Medicine
Palliative Care
SICU
Surgery
Nutrition Support
Nutrition Support
Gastroenterology
Gastroenterology
Surgery
Infectious Disease

## 2019-02-01 NOTE — PROGRESS NOTE ADULT - REASON FOR ADMISSION
Bowel Obstruction

## 2019-02-18 LAB — FUNGUS SPEC QL CULT: SIGNIFICANT CHANGE UP

## 2019-02-26 LAB — FUNGUS SPEC QL CULT: SIGNIFICANT CHANGE UP

## 2019-07-27 NOTE — ED ADULT NURSE NOTE - CAS DISCH CONDITION
Pt has had a scratchy throat for 1 week, that she even feels like it may close when she's lying down,  She did not get any cold symptoms, and has had a headache, Improved

## 2019-11-05 NOTE — PROGRESS NOTE ADULT - SUBJECTIVE AND OBJECTIVE BOX
s/p exploratory laparotomy and lysis of adhesions POD # 7 Patient examined at bedside, no complaints.   No nausea, no vomiting  Tolerating diet    T(F): 98.4 (01-10-18 @ 05:30), Max: 98.6 (01-09-18 @ 21:30)  HR: 80 (01-10-18 @ 05:30) (80 - 93)  BP: 100/54 (01-10-18 @ 05:30) (100/54 - 109/67)  RR: 16 (01-10-18 @ 05:30) (16 - 17)  SpO2: 98% (01-10-18 @ 05:30) (98% - 100%)  Wt(kg): --            Physical Exam  General: AAOx3, No acute distress  Skin: No jaundice, no icterus  Abdomen: soft, nontender, nondistended, no rebound tenderness, no guarding, no palpable masses  : Normal external genitalia  Extremities: non edematous, no calf pain bilaterally Cartilage Graft Text: The defect edges were debeveled with a #15 scalpel blade.  Given the location of the defect, shape of the defect, the fact the defect involved a full thickness cartilage defect a cartilage graft was deemed most appropriate.  An appropriate donor site was identified, cleansed, and anesthetized. The cartilage graft was then harvested and transferred to the recipient site, oriented appropriately and then sutured into place.  The secondary defect was then repaired using a primary closure.

## 2020-01-07 NOTE — PROGRESS NOTE ADULT - PROBLEM SELECTOR PROBLEM 2
Pt seen today for 4 wk f/u. Patient is on warfarin 28mg/wk.   Patient reports:  NO Missed or extra warfarin doses  NO S/sx of bleeding or bruising  NO s/sx of thromboembolic complications  NO Med/OTC/suppl changes  NO Diet Changes  NO Alcohol or tobacco use changes   NO Recent illness  NO Falls   NO Procedures scheduled     Assessment:   INR of 2.6 is therapeutic for goal of 2.0-3.0. No issues reported.     Plan: continue same warfarin dose. F/u: 4 wks- 2/4/20 at 8:20    Atelectasis

## 2020-04-11 NOTE — PROGRESS NOTE ADULT - SUBJECTIVE AND OBJECTIVE BOX
Patient is a 62y old  Male who presents with a chief complaint of I have gastric cancer (12 Dec 2017 11:41)      pt seen in icu [ ], reg med floor [ x  ], bed [ ], chair at bedside [ x  ], a+o x3 [ x ], lethargic [  ],   nad [ x ], ngt to wall suction [x]      Allergies    No Known Allergies        Vitals    T(F): 98.4 (12-23-17 @ 06:17), Max: 98.4 (12-23-17 @ 06:17)  HR: 83 (12-23-17 @ 06:17) (83 - 91)  BP: 105/63 (12-23-17 @ 06:17) (105/63 - 113/73)  RR: 16 (12-23-17 @ 06:17) (16 - 17)  SpO2: 97% (12-23-17 @ 06:17) (97% - 100%)  Wt(kg): --  CAPILLARY BLOOD GLUCOSE      POCT Blood Glucose.: 152 mg/dL (23 Dec 2017 05:41)      Labs                          12.8   11.4  )-----------( 248      ( 22 Dec 2017 07:39 )             38.3       12-23    136  |  105  |  24<H>  ----------------------------<  156<H>  4.2   |  23  |  1.03    Ca    8.6      23 Dec 2017 06:20  Phos  3.8     12-23  Mg     2.6     12-23    TPro  7.3  /  Alb  2.7<L>  /  TBili  0.6  /  DBili  x   /  AST  55<H>  /  ALT  101<H>  /  AlkPhos  88  12-23            .Urine Clean Catch (Midstream)  12-21 @ 11:20   10,000 - 49,000 CFU/mL Escherichia coli  --  --      .Blood  12-21 @ 11:19   No growth to date.  --  --      .Blood Blood  12-21 @ 11:17   No growth to date.  --  --          Radiology Results      Meds    MEDICATIONS  (STANDING):  dextrose 5% + sodium chloride 0.45% 1000 milliLiter(s) (150 mL/Hr) IV Continuous <Continuous>  fat emulsion (Fish Oil and Plant Based) 20% Infusion 0.3 Gm/kG/Day (5 mL/Hr) IV Continuous <Continuous>  heparin  Injectable 5000 Unit(s) SubCutaneous every 8 hours  pantoprazole  Injectable 40 milliGRAM(s) IV Push daily  Parenteral Nutrition - Adult 1 Each (42 mL/Hr) TPN Continuous <Continuous>  sodium chloride 0.9% lock flush 20 milliLiter(s) IV Push once      MEDICATIONS  (PRN):  acetaminophen   Tablet 650 milliGRAM(s) Oral every 6 hours PRN mild pain  ondansetron Injectable 4 milliGRAM(s) IV Push every 8 hours PRN Nausea and/or Vomiting  sodium chloride 0.9% lock flush 10 milliLiter(s) IV Push every 1 hour PRN After each medication administration  sodium chloride 0.9% lock flush 10 milliLiter(s) IV Push every 12 hours PRN Lumen of catheter NOT used      Physical Exam      Neuro :  no focal deficits  Respiratory: CTA B/L  CV: RRR, S1S2, no murmurs,   Abdominal: Soft, ND, incision w/ staples in place, clean and dry,  diann x2 with serosanguinous drainage,  Extremities: No edema, + peripheral pulses    ASSESSMENT    Malignant neoplasm of stomach  small bowell ileus vs obstruction  h/o Prediabetes  HLD (hyperlipidemia)  HTN (hypertension)  Malignant neoplasm of stomach, unspecified location  H/O prostate biopsy  History of arthroscopy of right shoulder  History of arthroscopy of left knee  History of appendectomy        PLAN      s/p total gatrectomy with cristobal-en-y esophagojejunal anastomosis  cont wound care   cont npo  reglan prn  cont ngt to ws  pt to start tpm today  renal cons for eval for tpn  small bowell series result noted above  gi and dvt prophylaxis  incentive spirometer  pulm f/u  cont pain control  pain mgmt f/u  oobto chair  cont current meds Patient is a 62y old  Male who presents with a chief complaint of I have gastric cancer (12 Dec 2017 11:41)      pt seen in icu [ ], reg med floor [ x  ], bed [ ], chair at bedside [ x  ], a+o x3 [ x ], lethargic [  ],   nad [ x ], ngt to wall suction [x] on tpn via left upper arm picc line [x]      Allergies    No Known Allergies        Vitals    T(F): 98.4 (12-23-17 @ 06:17), Max: 98.4 (12-23-17 @ 06:17)  HR: 83 (12-23-17 @ 06:17) (83 - 91)  BP: 105/63 (12-23-17 @ 06:17) (105/63 - 113/73)  RR: 16 (12-23-17 @ 06:17) (16 - 17)  SpO2: 97% (12-23-17 @ 06:17) (97% - 100%)  Wt(kg): --  CAPILLARY BLOOD GLUCOSE      POCT Blood Glucose.: 152 mg/dL (23 Dec 2017 05:41)      Labs                          12.8   11.4  )-----------( 248      ( 22 Dec 2017 07:39 )             38.3       12-23    136  |  105  |  24<H>  ----------------------------<  156<H>  4.2   |  23  |  1.03    Ca    8.6      23 Dec 2017 06:20  Phos  3.8     12-23  Mg     2.6     12-23    TPro  7.3  /  Alb  2.7<L>  /  TBili  0.6  /  DBili  x   /  AST  55<H>  /  ALT  101<H>  /  AlkPhos  88  12-23            .Urine Clean Catch (Midstream)  12-21 @ 11:20   10,000 - 49,000 CFU/mL Escherichia coli  --  --      .Blood  12-21 @ 11:19   No growth to date.  --  --      .Blood Blood  12-21 @ 11:17   No growth to date.  --  --          Radiology Results      Meds    MEDICATIONS  (STANDING):  dextrose 5% + sodium chloride 0.45% 1000 milliLiter(s) (150 mL/Hr) IV Continuous <Continuous>  fat emulsion (Fish Oil and Plant Based) 20% Infusion 0.3 Gm/kG/Day (5 mL/Hr) IV Continuous <Continuous>  heparin  Injectable 5000 Unit(s) SubCutaneous every 8 hours  pantoprazole  Injectable 40 milliGRAM(s) IV Push daily  Parenteral Nutrition - Adult 1 Each (42 mL/Hr) TPN Continuous <Continuous>  sodium chloride 0.9% lock flush 20 milliLiter(s) IV Push once      MEDICATIONS  (PRN):  acetaminophen   Tablet 650 milliGRAM(s) Oral every 6 hours PRN mild pain  ondansetron Injectable 4 milliGRAM(s) IV Push every 8 hours PRN Nausea and/or Vomiting  sodium chloride 0.9% lock flush 10 milliLiter(s) IV Push every 1 hour PRN After each medication administration  sodium chloride 0.9% lock flush 10 milliLiter(s) IV Push every 12 hours PRN Lumen of catheter NOT used      Physical Exam      Neuro :  no focal deficits  Respiratory: CTA B/L  CV: RRR, S1S2, no murmurs,   Abdominal: Soft, ND, incision w/ staples in place, clean and dry,  diann x2 with serosanguinous drainage,  Extremities: No edema, + peripheral pulses    ASSESSMENT    Malignant neoplasm of stomach  small bowell ileus vs obstruction  h/o Prediabetes  HLD (hyperlipidemia)  HTN (hypertension)  Malignant neoplasm of stomach, unspecified location  H/O prostate biopsy  History of arthroscopy of right shoulder  History of arthroscopy of left knee  History of appendectomy        PLAN      s/p total gatrectomy with cristobal-en-y esophagojejunal anastomosis  cont wound care   cont npo  pt having bm and flattus  reglan prn  cont ngt to ws  cont tpn  renal cons for eval for tpn  small bowell series result noted   gi and dvt prophylaxis  incentive spirometer  pulm f/u  cont pain control  pain mgmt f/u  oob to chair  cont current meds <<----- Click to add NO significant Past Surgical History

## 2020-10-29 NOTE — PROGRESS NOTE ADULT - ENMT
Postpartum Note- PPD#1    Allergies:  Ceclor (Hives)  sulfa drugs (Other)    Blood Type  A+  Rubella immne  RPR Negative        Patient w/o complaints, pain is controlled.  Pt is OOB, tolerating PO, passing flatus. Lochia WNL.     O:  Vital Signs Last 24 Hrs  T(C): 36.7 (29 Oct 2020 06:16), Max: 36.8 (28 Oct 2020 15:15)  T(F): 98 (29 Oct 2020 06:16), Max: 98.2 (28 Oct 2020 15:15)  HR: 67 (29 Oct 2020 06:16) (60 - 79)  BP: 101/66 (29 Oct 2020 06:16) (84/55 - 116/59)  BP(mean): 78 (28 Oct 2020 14:00) (78 - 78)  RR: 18 (29 Oct 2020 06:16) (16 - 18)  SpO2: 98% (29 Oct 2020 06:16) (98% - 100%)     Gen: NAD  Heart: S1S2 RRR  Lungs: CTA b/l  Abdomen: Soft, nontender, non-distended, fundus firm.  Lochia WNL  Ext: Neg edema, Neg calf tenderness    LABS:               10.7   7.69  )-----------( 256      ( 10-28 @ 06:26 )             33.0                  details… detailed exam nose/mouth

## 2021-03-16 NOTE — PROGRESS NOTE ADULT - SUBJECTIVE AND OBJECTIVE BOX
PT HAD A LARGE DARK PASTY STOOL. POD#1  Tntubated but alert and awake. c/o small amt of abd pain  NGT to lws  JPx2  coto    Vital Signs:  T(C): 37 (12-14-17 @ 06:00), Max: 37 (12-14-17 @ 06:00)  HR: 74 (12-14-17 @ 07:00) (70 - 104)  BP: 108/58 (12-14-17 @ 07:00) (90/47 - 149/74)  RR: 21 (12-14-17 @ 07:00) (10 - 24)  SpO2: 100% (12-14-17 @ 07:00) (99% - 100%)  Wt(kg): --    Physical Exam:  General: NAD, comfortable. Alert but Intubated  Abdomen: softly distended. all dressings C/D/I. 2 SOSA drains to self suction, both outputs serosang    Ins/Outs:    12-13 @ 07:01  -  12-14 @ 07:00  --------------------------------------------------------  IN:    fentaNYL  Infusion: 629.2 mL    lactated ringers.: 1100 mL    lactated ringers.: 600 mL    Solution: 50 mL    Solution: 187.5 mL    Solution: 500 mL    Solution: 300 mL  Total IN: 3366.7 mL    OUT:    Bulb: 70 mL    Bulb: 200 mL    Indwelling Catheter - Urethral: 2565 mL  Total OUT: 2835 mL    Total NET: 531.7 mL                          11.5   9.7   )-----------( 119      ( 14 Dec 2017 06:26 )             34.1

## 2021-06-09 NOTE — PROGRESS NOTE ADULT - PROBLEM SELECTOR PROBLEM 4
Prior Authorization Request  Who s requesting:  Enoc From [facility] Worker   Pharmacy Name and Location: [facility]   Medication Name: omeprazole (PRILOSEC) 20 MG capsule   Insurance Plan: [employer]   Insurance Member ID Number:  [id number]  CoverMyMeds Key: N/A  Informed patient that prior authorizations can take up to 10 business days for response:   Yes  Okay to leave a detailed message: No  FYI : Caller states patient is using new insurance from July need prior authorization for Omeprazole .     Prediabetes
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
## 2022-01-29 NOTE — PROGRESS NOTE ADULT - PROBLEM SELECTOR PLAN 2
INTERNAL MEDICINE DEPARTMENT AT [address] [place] [address]  DISCHARGE SUMMARY    Patient ID: Brittny Dominguez                                             Discharge Date: 1/29/2022   Patient's PCP: No primary care provider on file. Discharge Physician: Luciano Barker DO  Admit Date: 1/26/2022   Admitting Physician: Darling Grey MD    PROBLEMS DURING HOSPITALIZATION:  Present on Admission:   Dehydration   Severe malnutrition ([facility] [address]   DWIGHT (acute kidney injury) ([facility] [address]   Hypernatremia      DISCHARGE DIAGNOSES:  1. ***    HPI: 69 y.o. male who presented to [facility], [facility]. for failure to thrive.     He is unable to provide history at this time, so this is gathered from chart review and discussion w/ other providers.     He underwent CABG x3 1/3 w/ Dr. Mikey Cabrera and after this was discharged to a SNF. He has been unhappy there and has refused to eat anything. He has become progressively weaker and has become altered recently so he is brought in today. Hospital Course:    Pt admitted to the hospital from his SNF for an acute metabolic encephalopathy. Pt previously underwent a three vessel CABG on 1/03/2022, and was d/c to SNF, were he reportedly stopped all PO intake. He came back to [facility], his Na 162, was found be both free water down and volume down. He was admitted to the hospital, began tx on ghe GMF, and had to be transferred to the ICU for a higher level of monitoring. While there he was tx with 1/2 D5W and 1/2 NS, free water flushes, small fluid boluses to resuscitate his volume, and vasopressors. Tx of his UTI was continued, and abx coverage was broadened upon his arrival to [facility], vanc, and an atifungal was added.  ***    Physical Exam:  BP (!) 83/55   Pulse 99   Temp 98.3 °F (36.8 °C) (Axillary)   Resp 25   Ht 5' 7\" (1.702 m)   Wt 142 lb 13.7 oz (64.8 kg)   SpO2 94%   BMI 22.37 kg/m²     ***  Physical Exam      Consults: {consultation:86264}  Significant Diagnostic Studies: {Radiology studies d/c note md:81424}  Treatments: {Tx:85990}  Disposition: {disposition:41577}  Discharged Condition: {STABLE/UNSTABLE:924213619}  Follow Up: Primary Care Physician in {Time; 1 week to 1 month:53516}    DISCHARGE MEDICATION:     Medication List      ASK your doctor about these medications    albuterol (2.5 MG/3ML) 0.083% nebulizer solution  Commonly known as: PROVENTIL  Take 3 mLs by nebulization every 6 hours as needed for Shortness of Breath     amiodarone 200 MG tablet  Commonly known as: CORDARONE  Take 1 tablet by mouth daily for 19 doses     amoxicillin-clavulanate 500-125 MG per tablet  Commonly known as: AUGMENTIN     Arformoterol Tartrate 15 MCG/2ML Nebu  Commonly known as: BROVANA  Take 2 mLs by nebulization 2 times daily     aspirin 325 MG EC tablet  Take 1 tablet by mouth daily     atorvastatin 40 MG tablet  Commonly known as: LIPITOR  Take 1 tablet by mouth nightly     buPROPion 300 MG extended release tablet  Commonly known as: WELLBUTRIN XL  Take 1 tablet by mouth every morning     clopidogrel 75 MG tablet  Commonly known as: PLAVIX  Take 1 tablet by mouth daily     furosemide 20 MG tablet  Commonly known as: Lasix  Take 1 tablet by mouth daily     gabapentin 300 MG capsule  Commonly known as: NEURONTIN  Take 1 capsule by mouth 3 times daily for 30 days.      melatonin 3 MG Tabs tablet  Take 1 tablet by mouth nightly as needed (sleep)     metoprolol tartrate 25 MG tablet  Commonly known as: LOPRESSOR  Take 0.25 tablets by mouth 2 times daily     montelukast 10 MG tablet  Commonly known as: SINGULAIR  Take 1 tablet by mouth nightly     multivitamin Tabs tablet  Take 1 tablet by mouth daily     pantoprazole 40 MG tablet  Commonly known as: PROTONIX  Take 1 tablet by mouth 2 times daily (before meals)     polyethylene glycol 17 g packet  Commonly known as: GLYCOLAX  Take 17 g by mouth daily as needed for Constipation     potassium chloride 10 MEQ extended release tablet  Commonly known Loreta Epperson  Take 1 tablet by mouth daily     tiotropium 2.5 MCG/ACT Aers inhaler  Commonly known as: SPIRIVA RESPIMAT  Inhale 2 puffs into the lungs daily     Venelex Oint ointment  Apply topically 2 times daily     vitamin C 500 MG tablet  Commonly known as: ASCORBIC ACID          Activity: {discharge activity:70694}  Diet: {diet:96916}  Wound Care: {wound care:19170}    Time Spent on discharge is more than {Time; 15 min - 8 hours:51897}    Signed:  Shefali Mcnamara DO, PGY-***  Internal Medicine Resident  1/29/2022 Management as per GI/ Surgery. If unable to successfully dilate and/or place stent, consider J-tube as long term plan.

## 2022-05-03 NOTE — PROGRESS NOTE ADULT - PROBLEM SELECTOR PROBLEM 4
Mom called stating she would like Rogelio tested for ADHD. He has been having trouble in school with focusing, keeping up with the workload, he has had emotional breakdowns at school, and he has been avoiding tasks. His teacher has reached out to mom a few times and is concerned as well. He has been seeing a therapist. RN went over [employer] forms and let her know to call to schedule an appointment when forms are completed and then bring the forms to the appointment and Dr. Villalobos will go over the results and treatment if it is needed. Mom verbalized understanding.     Mom would like the forms emailed to her at:   [email or web address]     RN emailed Ryne forms as requested.    Prediabetes yes

## 2022-10-13 NOTE — PROGRESS NOTE ADULT - PROBLEM SELECTOR PROBLEM 2
Emergency Department Provider Note                   PCP:                KARLI Nath NP               Age: 60 y.o. Sex: female       ICD-10-CM    1. Acute pain of right shoulder  M25.511           DISPOSITION Decision To Discharge 10/13/2022 11:57:10 AM    ===================================  ED EKG Interpretation  EKG was interpreted in the absence of a cardiologist.    Rate: 64  EKG Interpretation: EKG Interpretation: sinus rhythm  ST Segments: Normal ST segments - NO STEMI    MADELYN BELTRAN 2:52 PM       MDM  Number of Diagnoses or Management Options  Acute pain of right shoulder  Diagnosis management comments: 10:18 AM  Most likely diagnosis based off initial evaluation includes chronic right shoulder pain, tension headache, arthritis. Less likely would be emergent causes such as intracranial bleed, meningitis, ACS, PE, referred pain from abdominal pathology. Wells score for PE of 0 making and risk for PE very low. Do not suspect this diagnosis as the condition has been ongoing for 5 years, there is no hypoxia, no pleuritic pain, no hemoptysis, no tachycardia. No abdominal tenderness. Benign abdominal exam.  No headache being sudden in onset, worst of life, maximal in onset. No fevers or neck stiffness or vomiting. Based on what that patient is telling me, the headache seems to be referred from the shoulder as it is described as extending up the neck and into the head. We will get basic labs to assess kidney function, liver function, electrolytes, blood cells, troponin. EKG as well. If all unremarkable, will discharge with pain control and primary care follow-up.    2:51 PM  Work-up today unremarkable except for [facility] joint arthritis. Given the chronic nature of the patient's symptoms and the pain with range of motion's, I do feel that this is likely the cause of the patient's continued pain. Work-up of her chest was unremarkable. Patient remained stable.   She was discharged in stable condition. Pain control and recommended follow-up with primary care. Good return precautions given. Amount and/or Complexity of Data Reviewed  Clinical lab tests: ordered and reviewed  Tests in the radiology section of CPT®: ordered and reviewed  Independent visualization of images, tracings, or specimens: yes    Risk of Complications, Morbidity, and/or Mortality  Presenting problems: moderate  Diagnostic procedures: moderate  Management options: moderate    Patient Progress  Patient progress: stable         ED Course as of 10/13/22 1452   u Oct 13, 2022   1202 12:02 PM  IMPRESSION:  Mild AC joint arthrosis. [NR]      ED Course User Index  [NR] MADELYN Smith        Orders Placed This Encounter   Procedures    XR CHEST (2 VW)    XR SHOULDER RIGHT (MIN 2 VIEWS)    XR CERVICAL SPINE (2-3 VIEWS)    CBC with Auto Differential    Comprehensive Metabolic Panel    Troponin    TSH with Reflex    T4, Free    EKG 12 Lead        Medications   ketorolac (TORADOL) injection 15 mg (15 mg IntraVENous Given 10/13/22 1059)       Discharge Medication List as of 10/13/2022 11:59 AM        START taking these medications    Details   chlorzoxazone (PARAFON FORTE) 500 MG tablet Take 1 tablet by mouth 3 times daily as needed for Muscle spasms, Disp-30 tablet, R-0Print      meloxicam (MOBIC) 15 MG tablet Take 1 tablet by mouth daily, Disp-30 tablet, R-0Print              Adryan Johnson is a 60 y.o. female who presents to the Emergency Department with chief complaint of    Chief Complaint   Patient presents with    Headache    Shoulder Pain     right    Otalgia     right      78-year-old Yemeni-speaking female patient with history of hypertension, diabetes mellitus, hypothyroidism, hyperlipidemia presents today complaining of right-sided shoulder pain that extends into the neck and into her head that has been ongoing for the past 5 years.   She reports the pain is there every day but for the past week seem to get a little bit worse. She reports she has been evaluated for this condition before without a clear or obvious cause. She states she takes Tylenol and ibuprofen at home which seems to help. States her pain is most in the shoulder. States it is worse with movements of the shoulder but denies loss of range of motion. Denies any paresthesias, swollen compartments. Denies any trauma or new injury. Denies any erythema, skin changes, swelling. Denies headaches been worst of life, sudden onset, maximal in onset. Denies any fevers, vomiting, neck stiffness, rashes. Denies chest pain, shortness of breath, syncope, weakness, pleuritic pain, hemoptysis, dyspnea, dyspnea on exertion. Denies abdominal pain. Patient is primary historian through use of a  and quality does seem reliable. The history is provided by the patient. A  was used. Review of Systems   Constitutional:  Negative for appetite change, chills, fatigue, fever and unexpected weight change. HENT:  Negative for congestion, ear pain, hearing loss, postnasal drip, rhinorrhea, sinus pressure, sore throat and voice change. Eyes:  Negative for photophobia, pain, discharge, redness and visual disturbance. Respiratory:  Negative for apnea, cough, chest tightness, shortness of breath and wheezing. Cardiovascular:  Negative for chest pain, palpitations and leg swelling. Gastrointestinal:  Negative for abdominal pain, blood in stool, constipation, diarrhea, nausea and vomiting. Endocrine: Negative for polydipsia, polyphagia and polyuria. Genitourinary:  Negative for decreased urine volume, dysuria, flank pain, frequency and hematuria. Musculoskeletal:  Positive for arthralgias, myalgias and neck pain. Negative for joint swelling and neck stiffness. Skin:  Negative for color change, rash and wound. Neurological:  Positive for headaches.  Negative for dizziness, syncope, speech difficulty, weakness and light-headedness. Hematological:  Negative for adenopathy. Does not bruise/bleed easily. Psychiatric/Behavioral:  Negative for confusion, self-injury, sleep disturbance and suicidal ideas. All other systems reviewed and are negative. Past Medical History:   Diagnosis Date    Hypertension     Mixed hyperlipidemia     Obesity     Osteopenia     Postsurgical hypothyroidism     Type 2 diabetes mellitus (HCC)         Past Surgical History:   Procedure Laterality Date    LIPOMA RESECTION      left shoulder    THYROIDECTOMY  2011        Family History   Problem Relation Age of Onset    Heart Attack Father     Heart Attack Paternal Uncle     Thyroid Disease Neg Hx     Thyroid Cancer Neg Hx         Social History     Socioeconomic History    Marital status:      Spouse name: None    Number of children: None    Years of education: None    Highest education level: None   Tobacco Use    Smoking status: Never    Smokeless tobacco: Never   Vaping Use    Vaping Use: Never used   Substance and Sexual Activity    Alcohol use: Never    Drug use: Never    Sexual activity: Not Currently     Partners: Male   Social History Narrative    ** Merged History Encounter **          Social Determinants of Health     Financial Resource Strain: Medium Risk    Difficulty of Paying Living Expenses: Somewhat hard   Food Insecurity: No Food Insecurity    Worried About Running Out of Food in the Last Year: Never true    Ran Out of Food in the Last Year: Never true   Housing Stability: Unknown    Unable to Pay for Housing in the Last Year: No    Unstable Housing in the Last Year: No         Patient has no known allergies.      Discharge Medication List as of 10/13/2022 11:59 AM        CONTINUE these medications which have NOT CHANGED    Details   levothyroxine (SYNTHROID) 100 MCG tablet Take 1 tablet by mouth Daily, Disp-90 tablet, R-3Normal      CALCIUM PO Take 600 mg by mouthHistorical Med      atenolol (TENORMIN) 100 MG tablet Take 1 tablet by mouth in the morning., Disp-60 tablet, R-0Normal      atorvastatin (LIPITOR) 20 MG tablet Take 1 tablet by mouth daily, Disp-90 tablet, R-3Normal              Vitals signs and nursing note reviewed. Patient Vitals for the past 4 hrs:   Pulse Resp BP   10/13/22 1104 84 12 (!) 183/86          Physical Exam  Vitals and nursing note reviewed. Exam conducted with a chaperone present. Constitutional:       General: She is not in acute distress. Appearance: Normal appearance. She is obese. She is not ill-appearing, toxic-appearing or diaphoretic. Comments: Pleasant and cooperative. No acute distress. Moves all extremities equally and without obvious appearance of pain. HENT:      Head: Normocephalic and atraumatic. Comments: Nontender to palpation. No temporal artery tenderness or scalp tenderness. No deformities or signs of fracture or trauma. Right Ear: Tympanic membrane, ear canal and external ear normal. There is no impacted cerumen. Left Ear: Tympanic membrane, ear canal and external ear normal. There is no impacted cerumen. Ears:      Comments: Some cerumen present in the right ear from 3:00 to 6 o'clock position. Soft appearing. No obstruction or impaction present. Otherwise normal ear exam bilaterally. Nose: Nose normal. No congestion. Mouth/Throat:      Mouth: Mucous membranes are moist.      Pharynx: Oropharynx is clear. No oropharyngeal exudate or posterior oropharyngeal erythema. Eyes:      General: No visual field deficit or scleral icterus. Right eye: No discharge. Left eye: No discharge. Extraocular Movements: Extraocular movements intact. Conjunctiva/sclera: Conjunctivae normal.      Pupils: Pupils are equal, round, and reactive to light. Comments: Vision grossly normal for patient. No photosensitivity.     Neck:      Comments: Tender over right paracervical spinal muscles extending through trapezius muscles on right side and into diffuse posterior right shoulder. No meningeal signs. Full ROM of neck without pain. Patient able to touch chin to chest with mouth closed. Cardiovascular:      Rate and Rhythm: Normal rate and regular rhythm. Pulses: Normal pulses. Heart sounds: Normal heart sounds. No murmur heard. Pulmonary:      Effort: Pulmonary effort is normal. No respiratory distress. Breath sounds: Normal breath sounds. No wheezing or rhonchi. Comments: Even nonlabored respirations. Chest:      Chest wall: No tenderness. Comments: No pulse deficits to bilateral upper or lower extremities. Abdominal:      General: Abdomen is flat. Bowel sounds are normal.      Palpations: Abdomen is soft. Tenderness: There is no abdominal tenderness. There is no right CVA tenderness, left CVA tenderness, guarding or rebound. Comments: Normal abdominal exam without tenderness. No peritoneal signs. No right upper quadrant pain. No Gómez sign. Musculoskeletal:         General: Normal range of motion. Right shoulder: Tenderness present. No swelling, deformity, bony tenderness or crepitus. Normal range of motion. Normal strength. Normal pulse. Left shoulder: Normal.      Right upper arm: Normal.      Left upper arm: Normal.      Right elbow: Normal.      Left elbow: Normal.      Right forearm: Normal.      Left forearm: Normal.      Cervical back: Normal range of motion and neck supple. Tenderness present. No rigidity. Right lower leg: No edema. Left lower leg: No edema. Comments: Full range of motion of right shoulder. Patient does note increased pain in all directions. Neurovascular intact distal to area of pain. Tender to palpation over diffuse posterior right shoulder and soft tissue and muscle bellies. No bony tenderness or deformity palpated. Strength 5 out of 5. Sensation 5 out of 5. Lymphadenopathy:      Cervical: No cervical adenopathy.    Skin: General: Skin is warm and dry. Capillary Refill: Capillary refill takes less than 2 seconds. Findings: No lesion or rash. Comments: No rashes or petechiae. Right shoulder is without erythema or swelling or any skin changes. Neurological:      General: No focal deficit present. Mental Status: She is alert and oriented to person, place, and time. Mental status is at baseline. GCS: GCS eye subscore is 4. GCS verbal subscore is 5. GCS motor subscore is 6. Cranial Nerves: No cranial nerve deficit, dysarthria or facial asymmetry. Sensory: Sensation is intact. No sensory deficit. Motor: Motor function is intact. No weakness, tremor, atrophy, abnormal muscle tone, seizure activity or pronator drift. Coordination: Coordination is intact. Romberg sign negative. Coordination normal. Finger-Nose-Finger Test and Heel to Anastasia Scale Test normal. Rapid alternating movements normal.      Gait: Gait is intact. Gait and tandem walk normal.      Deep Tendon Reflexes: Reflexes are normal and symmetric. Comments: Normal neurologic exam without focal neurologic deficit. Psychiatric:         Mood and Affect: Mood normal.         Behavior: Behavior normal.         Thought Content: Thought content normal.         Judgment: Judgment normal.        Procedures      Results for orders placed or performed during the hospital encounter of 10/13/22   XR CHEST (2 VW)    Narrative    History: Chest pain, right shoulder pain    Two views chest    Findings: The lungs are well expanded and clear. The cardiac silhouette, and  mediastinal contour, and osseous structures are normal.      Impression    Unremarkable two-view chest.       XR SHOULDER RIGHT (MIN 2 VIEWS)    Narrative    History: Right shoulder pain    EXAM: Right shoulder series    FINDINGS: 3 views right shoulder demonstrate mild AC joint arthrosis. There is  no fracture, dislocation, or additional bony abnormality.       Impression    Mild AC joint arthrosis. XR CERVICAL SPINE (2-3 VIEWS)    Narrative    History: Right-sided neck pain    EXAM: Cervical spine series    FINDINGS: There is no prevertebral soft tissue edema. The patient is edentulous. The alignment is normal. There is mild facet arthropathy. The lung apices are  clear. Impression    Chronic appearing changes without acute abnormality.    CBC with Auto Differential   Result Value Ref Range    WBC 9.0 4.3 - 11.1 K/uL    RBC 4.69 4.05 - 5.2 M/uL    Hemoglobin 14.0 11.7 - 15.4 g/dL    Hematocrit 43.3 35.8 - 46.3 %    MCV 92.3 82.0 - 102.0 FL    MCH 29.9 26.1 - 32.9 PG    MCHC 32.3 31.4 - 35.0 g/dL    RDW 13.4 11.9 - 14.6 %    Platelets 025 165 - 570 K/uL    MPV 9.1 (L) 9.4 - 12.3 FL    nRBC 0.00 0.0 - 0.2 K/uL    Differential Type AUTOMATED      Seg Neutrophils 60 43 - 78 %    Lymphocytes 32 13 - 44 %    Monocytes 7 4.0 - 12.0 %    Eosinophils % 1 0.5 - 7.8 %    Basophils 0 0.0 - 2.0 %    Immature Granulocytes 0 0.0 - 5.0 %    Segs Absolute 5.4 1.7 - 8.2 K/UL    Absolute Lymph # 2.9 0.5 - 4.6 K/UL    Absolute Mono # 0.6 0.1 - 1.3 K/UL    Absolute Eos # 0.1 0.0 - 0.8 K/UL    Basophils Absolute 0.0 0.0 - 0.2 K/UL    Absolute Immature Granulocyte 0.0 0.0 - 0.5 K/UL   Comprehensive Metabolic Panel   Result Value Ref Range    Sodium 143 133 - 143 mmol/L    Potassium 3.3 (L) 3.5 - 5.1 mmol/L    Chloride 110 101 - 110 mmol/L    CO2 27 21 - 32 mmol/L    Anion Gap 6 2 - 11 mmol/L    Glucose 108 (H) 65 - 100 mg/dL    BUN 10 6 - 23 MG/DL    Creatinine 0.67 0.6 - 1.0 MG/DL    Est, Glom Filt Rate >60 >60 ml/min/1.73m2    Calcium 8.9 8.3 - 10.4 MG/DL    Total Bilirubin 0.3 0.2 - 1.1 MG/DL    ALT 48 12 - 65 U/L    AST 24 15 - 37 U/L    Alk Phosphatase 111 50 - 130 U/L    Total Protein 8.4 (H) 6.3 - 8.2 g/dL    Albumin 3.8 3.5 - 5.0 g/dL    Globulin 4.6 (H) 2.8 - 4.5 g/dL    Albumin/Globulin Ratio 0.8 0.4 - 1.6     Troponin   Result Value Ref Range    Troponin, High Sensitivity 5.8 0 - 14 pg/mL   TSH with Reflex   Result Value Ref Range    TSH w Free Thyroid if Abnormal 3.81 (H) 0.358 - 3.740 UIU/ML   T4, Free   Result Value Ref Range    T4 Free 1.5 (H) 0.78 - 1.46 NG/DL   EKG 12 Lead   Result Value Ref Range    Ventricular Rate 64 BPM    Atrial Rate 64 BPM    P-R Interval 166 ms    QRS Duration 72 ms    Q-T Interval 412 ms    QTc Calculation (Bazett) 425 ms    P Axis 50 degrees    R Axis 47 degrees    T Axis 52 degrees    Diagnosis Normal sinus rhythm         XR CHEST (2 VW)   Final Result   Unremarkable two-view chest.            XR SHOULDER RIGHT (MIN 2 VIEWS)   Final Result   Mild AC joint arthrosis. XR CERVICAL SPINE (2-3 VIEWS)   Final Result   Chronic appearing changes without acute abnormality. Voice dictation software was used during the making of this note. This software is not perfect and grammatical and other typographical errors may be present. This note has not been completely proofread for errors.      Eva Lloyd, [id number]  10/13/22 4913 Atelectasis

## 2022-12-11 NOTE — PROGRESS NOTE ADULT - PROBLEM/PLAN-1
DISPLAY PLAN FREE TEXT
related to alteration in GI tract structure/function 
DISPLAY PLAN FREE TEXT

## 2024-02-06 NOTE — BRIEF OPERATIVE NOTE - URINE OUTPUT
Returning your call re: having side effects from meds - did take Monday evening, but not today. Please call back.     950

## 2024-10-02 NOTE — PHYSICAL THERAPY INITIAL EVALUATION ADULT - LEVEL OF CONSCIOUSNESS, REHAB EVAL
You can access the FollowMyHealth Patient Portal offered by [facility] by registering at the following website: [email or web address]. By joining [employer]’s FollowMyHealth portal, you will also be able to view your health information using other applications (apps) compatible with our system.
alert

## 2024-10-17 NOTE — ED ADULT NURSE NOTE - CARDIO ASSESSMENT
Caller: Jonathon Forbes    Relationship: Self    Best call back number: [phone]    What is the best time to reach you: ANYTIME AFTER NOON    Who are you requesting to speak with (clinical staff, provider,  specific staff member):         What was the call regarding: ARAVIND ADVISED THIS PT TO CALL AND SCHEDULE AN APPT W/HER. PLEASE ASSIST.          WDL
